# Patient Record
Sex: FEMALE | Race: WHITE | NOT HISPANIC OR LATINO | Employment: OTHER | URBAN - METROPOLITAN AREA
[De-identification: names, ages, dates, MRNs, and addresses within clinical notes are randomized per-mention and may not be internally consistent; named-entity substitution may affect disease eponyms.]

---

## 2018-01-03 ENCOUNTER — HOSPITAL ENCOUNTER (OUTPATIENT)
Dept: RADIOLOGY | Facility: HOSPITAL | Age: 68
Discharge: HOME | End: 2018-01-03
Attending: ORTHOPAEDIC SURGERY
Payer: MEDICARE

## 2018-01-03 ENCOUNTER — OFFICE VISIT (OUTPATIENT)
Dept: ORTHOPEDICS | Facility: CLINIC | Age: 68
End: 2018-01-03
Payer: MEDICARE

## 2018-01-03 VITALS — WEIGHT: 161.06 LBS | BODY MASS INDEX: 24.41 KG/M2 | HEIGHT: 68 IN

## 2018-01-03 DIAGNOSIS — M79.605 LEG PAIN, LEFT: ICD-10-CM

## 2018-01-03 DIAGNOSIS — M79.605 LEG PAIN, LEFT: Primary | ICD-10-CM

## 2018-01-03 DIAGNOSIS — M79.604 LEG PAIN, RIGHT: ICD-10-CM

## 2018-01-03 PROCEDURE — 99213 OFFICE O/P EST LOW 20 MIN: CPT | Mod: S$GLB,,, | Performed by: ORTHOPAEDIC SURGERY

## 2018-01-03 PROCEDURE — 99999 PR PBB SHADOW E&M-EST. PATIENT-LVL III: CPT | Mod: PBBFAC,,, | Performed by: ORTHOPAEDIC SURGERY

## 2018-01-03 PROCEDURE — 73552 X-RAY EXAM OF FEMUR 2/>: CPT | Mod: 26,RT,, | Performed by: RADIOLOGY

## 2018-01-03 PROCEDURE — 73552 X-RAY EXAM OF FEMUR 2/>: CPT | Mod: TC,RT

## 2018-01-03 RX ORDER — LEVOTHYROXINE SODIUM 25 UG/1
25 TABLET ORAL DAILY
COMMUNITY
Start: 2017-10-29 | End: 2019-08-06 | Stop reason: SDUPTHER

## 2018-01-03 RX ORDER — HYDROCODONE BITARTRATE AND ACETAMINOPHEN 5; 325 MG/1; MG/1
TABLET ORAL
COMMUNITY
Start: 2017-11-01 | End: 2020-01-22 | Stop reason: SDUPTHER

## 2018-01-03 RX ORDER — PROPAFENONE HYDROCHLORIDE 150 MG/1
150 TABLET, COATED ORAL 4 TIMES DAILY
Status: ON HOLD | COMMUNITY
Start: 2017-12-18 | End: 2019-08-15 | Stop reason: HOSPADM

## 2018-01-03 RX ORDER — TALC
5 POWDER (GRAM) TOPICAL NIGHTLY
COMMUNITY
End: 2019-07-12

## 2018-01-03 RX ORDER — RIVAROXABAN 20 MG/1
20 TABLET, FILM COATED ORAL DAILY
COMMUNITY
Start: 2017-12-11 | End: 2018-12-05

## 2018-01-03 RX ORDER — METOPROLOL SUCCINATE 25 MG/1
12.5 TABLET, EXTENDED RELEASE ORAL DAILY
COMMUNITY
Start: 2017-12-11 | End: 2019-07-12

## 2018-01-03 RX ORDER — ACYCLOVIR 400 MG/1
TABLET ORAL
COMMUNITY
End: 2019-07-12

## 2018-01-03 RX ORDER — DOCUSATE SODIUM 250 MG
250 CAPSULE ORAL 2 TIMES DAILY
COMMUNITY

## 2018-01-03 NOTE — PROGRESS NOTES
CHIEF COMPLAINT: Right  Knee pain.                                                          HISTORY OF PRESENT ILLNESS:  The patient is a 67 y.o. female  who presents  for re-evaluation of her right knee pain. 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  She subsequently had this revised to a distal femoral replacing knee replacement in [facility] in 2007.  She apparently had an infection treated in 2009.  She denies significant pain and denies swelling.      She  presents for further treatment recommendations.    She denies radicular pain or low back pain.  She denies distal paresthesias, lower extremity edema, or calf pain concerning for vascular claudication.  She has no history of discreet prior trauma.                                                                                                                       PAST MEDICAL HISTORY:    Past Medical History:   Diagnosis Date    Atrial fibrillation 09/2017    Diverticulosis 1998    Hx of atrial flutter     Hx of mitral valve prolapse 1990    Migraines                                                                                                             PAST SURGICAL HISTORY:    Past Surgical History:   Procedure Laterality Date    HYSTERECTOMY      KNEE ARTHROPLASTY Right 2007    revison in 2009    KNEE SURGERY Right 1989    wide resection of right distal femur with graft                                                                                                               SOCIAL HISTORY:  Reviewed per EPIC history for tobacco or alcohol use and she   is an active  67 y.o.  female                                                                             FAMILY MEDICAL HISTORY:  family history is not on file.                                                                                                                                                     PHYSICAL EXAMINATION:                                                         GENERAL:  A well-developed, well-nourished 67 y.o. female who is alert and       oriented in no acute distress.      Gait: She  walks with a slight limp.                   EXTREMITIES:  Examination of lower extremities reveals there is no visible mass or deformity.      Right knee:  ROM 0-115    Well healed incision    Ligamentously stable to varus/valgus stress.    Anterior and posterior drawers negative.    No pain over pes bursa.    No warmth    No erythema    Effusion No    She has no calf tenderness to palpation nor edema.    AP and lateral radiographs of the right femur were ordered and personally reviewed with the patient today.                                                                                 IMPRESSION: S/P right distal femoral replacing total knee.    These show a stable prosthesis.                     PLAN:  Dental prophylaxsis discussed.  F/U in 1 year for repeat x-rays.

## 2018-03-15 ENCOUNTER — TELEPHONE (OUTPATIENT)
Dept: NEUROLOGY | Facility: CLINIC | Age: 68
End: 2018-03-15

## 2018-03-15 NOTE — TELEPHONE ENCOUNTER
----- Message from Kathryn Martinez RN sent at 3/15/2018 10:51 AM CDT -----  Contact: pt @ [phone]      ----- Message -----  From: Charity Gordon  Sent: 3/15/2018   9:55 AM  To: Farida Talamantes Staff    Patient is calling to schedule an appt with Dr. Iqbal.Says she has called several times. Please call.

## 2018-03-26 ENCOUNTER — OFFICE VISIT (OUTPATIENT)
Dept: NEUROLOGY | Facility: CLINIC | Age: 68
End: 2018-03-26
Payer: MEDICARE

## 2018-03-26 VITALS
BODY MASS INDEX: 24.32 KG/M2 | WEIGHT: 160.5 LBS | SYSTOLIC BLOOD PRESSURE: 105 MMHG | HEART RATE: 69 BPM | DIASTOLIC BLOOD PRESSURE: 69 MMHG | HEIGHT: 68 IN

## 2018-03-26 DIAGNOSIS — M79.18 MYOFASCIAL PAIN: ICD-10-CM

## 2018-03-26 DIAGNOSIS — R06.83 SNORING: ICD-10-CM

## 2018-03-26 DIAGNOSIS — G44.229 CHRONIC TENSION-TYPE HEADACHE, NOT INTRACTABLE: ICD-10-CM

## 2018-03-26 DIAGNOSIS — R51.9 CHRONIC DAILY HEADACHE: Primary | ICD-10-CM

## 2018-03-26 DIAGNOSIS — G44.86 CERVICOGENIC HEADACHE: ICD-10-CM

## 2018-03-26 DIAGNOSIS — G47.9 TROUBLE IN SLEEPING: ICD-10-CM

## 2018-03-26 PROCEDURE — 99205 OFFICE O/P NEW HI 60 MIN: CPT | Mod: S$GLB,,, | Performed by: NURSE PRACTITIONER

## 2018-03-26 PROCEDURE — 99999 PR PBB SHADOW E&M-EST. PATIENT-LVL III: CPT | Mod: PBBFAC,,, | Performed by: NURSE PRACTITIONER

## 2018-03-26 RX ORDER — BACLOFEN 10 MG/1
TABLET ORAL
Qty: 90 TABLET | Refills: 2 | Status: SHIPPED | OUTPATIENT
Start: 2018-03-26 | End: 2019-07-12

## 2018-03-26 RX ORDER — PREDNISONE 20 MG/1
TABLET ORAL
Qty: 12 TABLET | Refills: 0 | Status: SHIPPED | OUTPATIENT
Start: 2018-03-26 | End: 2018-04-30 | Stop reason: ALTCHOICE

## 2018-03-26 NOTE — PROGRESS NOTES
"SUBJECTIVE:  Patient ID: Zoë Enrique   MRN: [id number]  Referred By: Aaareferral Self  Chief Complaint: Consult    History of Present Illness:   68 y.o. female with migraines, A-fib, diverticulosis, MVP, hypothyroidism, and hx of osteosarcoma s/p right distal femur removal, who presents to clinic alone for evaluation of headaches.   She has suffered with headaches and migraines since she was in her twenties, though her headaches became more prominent in the early 90's.  She has been under the care of Dr. Wong, Neurologist in [place] for the last 18 years, she wants to re-establish care in [place] as she recently moved here.      Headaches are described as a "vice" or pressure, located across her forehead, encompassing her temples and throughout her cheeks and upper teeth.  In the past, headaches would come and go intermittently, however since the end of February, Zoë reports experiencing a headache all, day everyday.  Headaches gradually became all day, everyday.  Prior to this, she would experience a daily morning headache which would typically resolve after her morning cup of coffee, however with time, headache began to persist for longer and now is present all day, everyday.   Pain can be rated anywhere from a 1-5 out of 10, she reports pain gets to a 5 out of 10 a few days per week.  During this time, she would only have to treat a headache once every 2-4 weeks as more often than not the headache would resolve once she got her day going and/or drank a cup of coffee.  She denies presence of nausea, vomiting, photo/phonophobia, positional component, changes in her vision, confusion, weakness, bowel/bladder dysfunction, tinnitus.  Headaches are not worsened by movement/activity, Zoë reports exercise actually makes the pain of her headache lessen.  Around late February/early March, she noticed she was using OTC medications far too frequently, she reports treating her headaches with Excedrin daily, for " about 10-14 days.  She has been counseled on medication overuse in the past and thus stopped taking daily Excedrin.  She denies any change in the headaches since d/c excedrin.  In the past she has been treated with Lyrica, which greatly decreased the frequency of her headaches.  Acutely she would treat her headaches with Fioricet, Norco or Excedrin.  If these therapies were ineffective, she would get a prednisone taper to break up the headache.  She has also tried sumatriptan which was ineffective.  She has seen her eye doctor since her headaches began to worse, she had a dilated exam and was told it was normal.    Secondarily, she reports she has difficulty sleeping at night. She had a blepharoplasty done years ago and her  tells her she sleeps with her eyes open, since the blepharoplasty she has also chronically suffered with dry eyes.  She thinks she may be squinting at night and not allowing her muscles to fully relax.  She feels some of her headaches are caused by this.  She also knows she clenches her jaw and teeth both at night as well as during the day time, though she does not feel she is under an increased amount of stress which would correlate with why she is clenching her jaw so often, she also feels this is contributing to her headaches.  She has limited her caffeine intake to 1 cup of coffee per day, no change in headaches with decreased caffeine intake.  She also stopped reading to see if that would help, as she was spending a lot of time on the computer, has since stopped, no change in the headaches.    Headaches are the same as they have always been, just occurring more frequently, lasting longer.  She has had imaging done in the past, all of which was negative.   Head Trauma? Infection? Fever? Cancer? <-- denies   Recent Changes - January she began working as a , but reports she is not stressed, she does not work many days    Sleep - she has trouble staying asleep, wakes up 1-2  times per night to go to the bathroom, she does not feel rested in the morning, this is a chronic problem,  does report she snores (she sleeps in separate room from )   Positives in bold: Hx of Kidney Stones, asthma, GI bleed, osteoporosis, CAD/MI, CVA/TIA, DM <-- otherwise she denies     Treatments Tried and Response  Lyrica - helped   Ice - no   Heat - decreased the intensity of her headaches   Excedrin - lost efficacy   Fioricet - helped in the past  Norco - helps   Prednisone - helped in past, retried today   Baclofen - given today   Topical cream - given today     Social History  Alcohol - yes, very rarely now due to headaches, prior to this scoth 1-2 times per week   Smoke - denies   Recreational Drug Use- denies     Current Medications:    acyclovir (ZOVIRAX) 400 MG tablet, Take by mouth as needed. Three times daily PRN, Disp: , Rfl:     AMOXICILLIN ORAL, Take by mouth. 3 gms one hr prior plus 1 gm six hrs after for dental procedure, Disp: , Rfl:     CALCIUM CARB/VIT D3/MINERALS (CALTRATE PLUS ORAL), Take by mouth. Takes two chews daily, Disp: , Rfl:     docusate sodium (COLACE) 250 MG capsule, Take 250 mg by mouth once daily., Disp: , Rfl:     hydrocodone-acetaminophen 5-325mg (NORCO) 5-325 mg per tablet, Pt states takes PRN for headaches, Disp: , Rfl:     levothyroxine (SYNTHROID) 25 MCG tablet, 25 mcg once daily. , Disp: , Rfl:     melatonin 3 mg Tab, Take by mouth every evening., Disp: , Rfl:     metoprolol succinate (TOPROL-XL) 25 MG 24 hr tablet, 12.5 mg once daily. Pt states takes only half daily (12.5), Disp: , Rfl:     propafenone (RHTHYMOL) 150 MG Tab, 150 mg 2 (two) times daily. , Disp: , Rfl:     VIT A/VIT C/VIT E/ZINC/COPPER (ICAPS AREDS ORAL), Take by mouth 2 (two) times daily., Disp: , Rfl:     XARELTO 20 mg Tab, 20 mg once daily. , Disp: , Rfl:     baclofen (LIORESAL) 10 MG tablet, 1/2 tab 1-2 times per day as needed for muscle tightness, 1 tab nightly., Disp: 90  "tablet, Rfl: 2    hydrOXYzine pamoate (VISTARIL) 25 MG Cap, Take 1 capsule every 8 hours for 3 days. No alcohol or driving with medication.  May use the rest as needed to help with sleep., Disp: 15 capsule, Rfl: 0    predniSONE (DELTASONE) 20 MG tablet, 3 tabs in Am x 2 days, then 2 tabs in AM x 2 days, then 1 tab in AM x 2 days.  Take with food., Disp: 12 tablet, Rfl: 0    UNABLE TO FIND, Diclofenac 0.15%, Lidocaine 2.25%, Prilocaine 2.25% in Compounded Topical Cream to be applied up to 5x/day as needed for headaches., Disp: , Rfl:     Review of Systems - as per HPI, otherwise a balanced 10 systems review is negative.    OBJECTIVE:  Vitals:  /69   Pulse 69   Ht 5' 8" (1.727 m)   Wt 72.8 kg (160 lb 7.9 oz)   BMI 24.40 kg/m²     Physical Exam   Constitutional: She appears well-developed and well-nourished. She is well groomed. NAD.  Anxious   HENT:    Head: Normocephalic and atraumatic, oral and nasal mucosa intact.  Frontalis was NTTP, temporalis was NTTP (reports applying pressure over temples gives her relief)   Eyes: Conjunctivae and EOM are normal. Pupils are equal, round, and reactive to light   Neck: Neck supple. Occiput TTP bilaterally and trapezius TTP bilaterally.  Traps tight bilaterally.   DROM of neck in all directions.    Musculoskeletal: Normal range of motion. No joint stiffness. No vertebral point tenderness.  Skin: Skin is warm and dry.  Psychiatric: Normal mood and affect.     Neuro exam:    Mental status:  The patient is awake, alert, and oriented to person, place and time.  Language is intact and fluent  Remote and recent memory are intact  Normal attention and concentration  Mood is stable    Cranial Nerves:  Fundoscopic examination does not reveal any occult papilledema.    Pupils are equal and reactive to light.    Extraocular movements are intact and without nystagmus.    Visual fields are full to confrontation testing.   Facial movement is symmetric.  Facial sensation is intact. "    Hearing is intact to finger rub   Uvula in midline. Oral crowding. Tongue in midline without fasciculation.   Shoulder shrug symmetrical.    Coordination:     Finger to nose - normal and symmetric bilaterally   Heel to shin test - normal and symmetric bilaterally     Motor:  Normal muscle bulk and symmetry. No fasciculations were noted.   Tremor not apparent   Pronator drift not apparent.    strength was strong and symmetric   Finger extension strength was strong and symmetric  RUE:appropriate against gravity and medium force as tested 5/5  LUE: appropriate against gravity and medium force as tested 5/5  RLE:appropriate against gravity and medium force as tested 5/5              LLE: appropriate against gravity and medium force as tested 5/5    Reflexes:  Right Brachioradialis 2+  Left Brachioradialis 2+  Right Biceps 2+  Left Biceps 2+  Right Patellar2+  Left Patellar 2+                          Plantar flexion bilat   Torres was negative bilaterally      Sensory:  RUE  intact light touch  LUE intact light touch    RLE intact light touch  LLE intact light touch    Gait:   Romberg - mild sway from weakness in RLE   Normal gait  Tandem, Heel, and Toe Walk - able to perform without difficulty     Review of Data:   Notes from Dr. Wong reviewed     Note: I have independently reviewed any/all imaging/labs/tests and agree with the report (s) as documented.  Any discrepancies will be as noted/demarcated by free text.  YULISSA, GILBERTOP-C 3/26/2018    ASSESSMENT:  1. Chronic daily headache    2. Myofascial pain    3. Trouble in sleeping    4. Chronic tension-type headache, not intractable    5. Snoring    6. Cervicogenic headache      PLAN:  - To help break up headache cycle - 6 day Prednisone taper - 60 mg x 2 days, 40 mg x 2 days, 20 mg x 2 days. Take in AM with food.   - To ease muscle tension and decrease jaw clenching - start Baclofen 10 mg, encouraged her to try 1/2 tab AM and noon and 1 tab nightly x 1 week, if she  does not find 5 mg Baclofen too sedating, she can increase dose to 10 mg TID   - To help with acute headaches - given compounded topical cream to be applied up to 5x/day as needed for headaches   - She has Norco available if needed   - Strongly advised she decrease use of Norco to avoid rebound headache   - Future treatment considerations include - restarting Lyrica or Gabapentin vs Topiramate vs Effexor or Elavil   - May consider referral to PT vs sleep medicine in future   - Start tracking headaches via [facility] on phone   - RTC in 1 month or sooner if needed      Orders Placed This Encounter    baclofen (LIORESAL) 10 MG tablet    predniSONE (DELTASONE) 20 MG tablet    UNABLE TO FIND     I have discussed realistic goals of care with patient at length as well as medication options, and need for lifestyle adjustment. I have explained that treatment will take time. We have agreed that the goal will be to reduce frequency/intensity/quantity of HA, not to be completely HA free. I have explained my non narcotic policy regarding headache treatment.    Patient to track frequency of headaches.  Patient agreeable to work on lifestyle adjustments.    I spent 65 minutes face-to-face with the patient with >50% of the time spent with counseling and education regarding:  - results of data, diagnosis, and recommendations stated above  - risks, benefits, and potential side effects of baclofen and prednisone discussed   - alternative treatment options including topiramate vs gabapentin or lyrica offered   - importance of healthy diet, regular exercise and sleep hygiene in the treatment of headaches      Questions and concerns were sought and answered to the patient's stated verbal satisfaction.  The patient verbalizes understanding and agreement with the above stated treatment plan.     CC: MD Karlee Padgett, FNP-C  [facility]  [phone]    Dr. Iqbal was available during  today's encounter.

## 2018-04-02 ENCOUNTER — TELEPHONE (OUTPATIENT)
Dept: NEUROLOGY | Facility: CLINIC | Age: 68
End: 2018-04-02

## 2018-04-02 RX ORDER — HYDROXYZINE PAMOATE 25 MG/1
CAPSULE ORAL
Qty: 15 CAPSULE | Refills: 0 | Status: SHIPPED | OUTPATIENT
Start: 2018-04-02 | End: 2019-07-12

## 2018-04-02 NOTE — TELEPHONE ENCOUNTER
Call returned/Spoke with Mrs. Enrique-    She states that her headaches are worsening, when she saw MONIKA MACIASP-JOHN last week she was given prednisone, a muscle relaxer and a pain cream. She's been having daily headaches and the only thing that's helping is Norco (until it wears off).     Jacquelyn,    She states that nothing you prescribed is helping an she would like a call back with recommendations.

## 2018-04-02 NOTE — TELEPHONE ENCOUNTER
----- Message from Nathaly Alexandre sent at 4/2/2018 10:17 AM CDT -----  Contact: Pt. [phone]  The patient would like to speak to someone regarding her headaches. She's in extreme pain and the medication isn't working. Please contact the patient to discuss further.

## 2018-04-02 NOTE — TELEPHONE ENCOUNTER
Spoke with patient who states she did not get any relief from the prednisone taper.  She is only getting relief from Norco which she is taking 2-3 times per day for the past few weeks.  Checked [place] , new prescription was filled on 3/31 for Norco #30 tabs.    Offered to give her a 3 day course of vistaril.  If ineffective, will have her come back to clinic to discuss alternative treatment options or to discuss nerve blocks.

## 2018-04-05 ENCOUNTER — TELEPHONE (OUTPATIENT)
Dept: NEUROLOGY | Facility: CLINIC | Age: 68
End: 2018-04-05

## 2018-04-05 NOTE — TELEPHONE ENCOUNTER
Called and spoke to Patient.She states that she was placed on  Prednisone and it was ineffective in controlling her headache-and earlier this week was placed on Vistaril.She states this did not work.She rates pain at a 7 on scale.She states she has not taken anything since Sunday as she was instructed concerning rebound headaches.No associated nausea,vomiting,or other symptoms voiced.Will inform Jacquelyn Argueta of Patient's symptoms.

## 2018-04-06 ENCOUNTER — OFFICE VISIT (OUTPATIENT)
Dept: NEUROLOGY | Facility: CLINIC | Age: 68
End: 2018-04-06
Payer: MEDICARE

## 2018-04-06 VITALS
HEART RATE: 66 BPM | WEIGHT: 160.94 LBS | HEIGHT: 68 IN | SYSTOLIC BLOOD PRESSURE: 99 MMHG | BODY MASS INDEX: 24.39 KG/M2 | DIASTOLIC BLOOD PRESSURE: 66 MMHG

## 2018-04-06 DIAGNOSIS — M54.81 BILATERAL OCCIPITAL NEURALGIA: ICD-10-CM

## 2018-04-06 DIAGNOSIS — G51.8 FACIAL NEURALGIA: ICD-10-CM

## 2018-04-06 DIAGNOSIS — R51.9 CHRONIC INTRACTABLE HEADACHE, UNSPECIFIED HEADACHE TYPE: Primary | ICD-10-CM

## 2018-04-06 DIAGNOSIS — G89.29 CHRONIC INTRACTABLE HEADACHE, UNSPECIFIED HEADACHE TYPE: Primary | ICD-10-CM

## 2018-04-06 PROCEDURE — 64400 NJX AA&/STRD TRIGEMINAL NRV: CPT | Mod: 50,51,S$GLB, | Performed by: NURSE PRACTITIONER

## 2018-04-06 PROCEDURE — 64405 NJX AA&/STRD GR OCPL NRV: CPT | Mod: 50,S$GLB,, | Performed by: NURSE PRACTITIONER

## 2018-04-06 PROCEDURE — 99213 OFFICE O/P EST LOW 20 MIN: CPT | Mod: 25,S$GLB,, | Performed by: NURSE PRACTITIONER

## 2018-04-06 PROCEDURE — 99999 PR PBB SHADOW E&M-EST. PATIENT-LVL III: CPT | Mod: PBBFAC,,, | Performed by: NURSE PRACTITIONER

## 2018-04-06 RX ORDER — METHYLPREDNISOLONE ACETATE 40 MG/ML
40 INJECTION, SUSPENSION INTRA-ARTICULAR; INTRALESIONAL; INTRAMUSCULAR; SOFT TISSUE
Status: COMPLETED | OUTPATIENT
Start: 2018-04-06 | End: 2018-04-06

## 2018-04-06 RX ORDER — PREGABALIN 75 MG/1
75 CAPSULE ORAL 2 TIMES DAILY
Qty: 60 CAPSULE | Refills: 2 | Status: SHIPPED | OUTPATIENT
Start: 2018-04-06 | End: 2018-04-30 | Stop reason: SDUPTHER

## 2018-04-06 RX ORDER — LIDOCAINE HYDROCHLORIDE 20 MG/ML
6 INJECTION, SOLUTION INFILTRATION; PERINEURAL
Status: COMPLETED | OUTPATIENT
Start: 2018-04-06 | End: 2018-04-06

## 2018-04-06 RX ADMIN — LIDOCAINE HYDROCHLORIDE 6 ML: 20 INJECTION, SOLUTION INFILTRATION; PERINEURAL at 12:04

## 2018-04-06 RX ADMIN — METHYLPREDNISOLONE ACETATE 40 MG: 40 INJECTION, SUSPENSION INTRA-ARTICULAR; INTRALESIONAL; INTRAMUSCULAR; SOFT TISSUE at 12:04

## 2018-04-06 NOTE — TELEPHONE ENCOUNTER
----- Message from Varinder Sullivan sent at 4/6/2018  9:26 AM CDT -----  Contact: Patient @ [phone]  Patient is requesting a return call about the approval through the insurance for  the nerve block, pls call to advise

## 2018-04-06 NOTE — TELEPHONE ENCOUNTER
Spoke to Patient and informed her that Jacquelyn would like to see her today at 11.Patient is agreeable to this.

## 2018-04-06 NOTE — PROGRESS NOTES
Established Patient   SUBJECTIVE:  Patient ID: Zoë Enrique   Chief Complaint: Headache and Follow-up    History of Present Illness:  Zoë Enrique is a 68 y.o. female who presents to clinic with her friend for follow-up of headaches.     Recommendations made at last Office Visit on 3/26/2018:  - To help break up headache cycle - 6 day Prednisone taper - 60 mg x 2 days, 40 mg x 2 days, 20 mg x 2 days. Take in AM with food.   - To ease muscle tension and decrease jaw clenching - start Baclofen 10 mg, encouraged her to try 1/2 tab AM and noon and 1 tab nightly x 1 week, if she does not find 5 mg Baclofen too sedating, she can increase dose to 10 mg TID   - To help with acute headaches - given compounded topical cream to be applied up to 5x/day as needed for headaches   - She has Norco available if needed   - Strongly advised she decrease use of Norco to avoid rebound headache   - Future treatment considerations include - restarting Lyrica or Gabapentin vs Topiramate vs Effexor or Elavil   - May consider referral to PT vs sleep medicine in future   - Start tracking headaches via [facility] on phone   - RTC in 1 month or sooner if needed      04/06/2018 - Interval History:  She completed prednisone pulse, however she did not experience any relief from prednisone.  She was also started on a 3 day course of vistaril, but has yet to experience any relief from vistaril either.  Headaches continue to be located throughout her forehead and cheeks.  She would like to try nerve blocks to see if she can get any relief from this headache.  She is also interested in restarting Lyrica, as she experienced significant relief from Lyrica in the past.     Otherwise, information below is still accurate and current.     Initial DUBOSE HPI:  68 y.o. female with migraines, A-fib, diverticulosis, MVP, hypothyroidism, and hx of osteosarcoma s/p right distal femur removal, who presents to clinic alone for evaluation of headaches.   She  "has suffered with headaches and migraines since she was in her twenties, though her headaches became more prominent in the early 90's.  She has been under the care of Dr. Wong, Neurologist in [place] for the last 18 years, she wants to re-establish care in [place] as she recently moved here.    Headaches are described as a "vice" or pressure, located across her forehead, encompassing her temples and throughout her cheeks and upper teeth.  In the past, headaches would come and go intermittently, however since the end of February, Zoë reports experiencing a headache all, day everyday.  Headaches gradually became all day, everyday.  Prior to this, she would experience a daily morning headache which would typically resolve after her morning cup of coffee, however with time, headache began to persist for longer and now is present all day, everyday.   Pain can be rated anywhere from a 1-5 out of 10, she reports pain gets to a 5 out of 10 a few days per week.  During this time, she would only have to treat a headache once every 2-4 weeks as more often than not the headache would resolve once she got her day going and/or drank a cup of coffee.  She denies presence of nausea, vomiting, photo/phonophobia, positional component, changes in her vision, confusion, weakness, bowel/bladder dysfunction, tinnitus.  Headaches are not worsened by movement/activity, Zoë reports exercise actually makes the pain of her headache lessen.  Around late February/early March, she noticed she was using OTC medications far too frequently, she reports treating her headaches with Excedrin daily, for about 10-14 days.  She has been counseled on medication overuse in the past and thus stopped taking daily Excedrin.  She denies any change in the headaches since d/c excedrin.  In the past she has been treated with Lyrica, which greatly decreased the frequency of her headaches.  Acutely she would treat her headaches with Fioricet, Norco or " Excedrin.  If these therapies were ineffective, she would get a prednisone taper to break up the headache.  She has also tried sumatriptan which was ineffective.  She has seen her eye doctor since her headaches began to worse, she had a dilated exam and was told it was normal.    Secondarily, she reports she has difficulty sleeping at night. She had a blepharoplasty done years ago and her  tells her she sleeps with her eyes open, since the blepharoplasty she has also chronically suffered with dry eyes.  She thinks she may be squinting at night and not allowing her muscles to fully relax.  She feels some of her headaches are caused by this.  She also knows she clenches her jaw and teeth both at night as well as during the day time, though she does not feel she is under an increased amount of stress which would correlate with why she is clenching her jaw so often, she also feels this is contributing to her headaches.  She has limited her caffeine intake to 1 cup of coffee per day, no change in headaches with decreased caffeine intake.  She also stopped reading to see if that would help, as she was spending a lot of time on the computer, has since stopped, no change in the headaches.    Headaches are the same as they have always been, just occurring more frequently, lasting longer.  She has had imaging done in the past, all of which was negative.   Head Trauma? Infection? Fever? Cancer? <-- denies   Recent Changes - January she began working as a , but reports she is not stressed, she does not work many days    Sleep - she has trouble staying asleep, wakes up 1-2 times per night to go to the bathroom, she does not feel rested in the morning, this is a chronic problem,  does report she snores (she sleeps in separate room from )   Positives in bold: Hx of Kidney Stones, asthma, GI bleed, osteoporosis, CAD/MI, CVA/TIA, DM <-- otherwise she denies      Treatments Tried and Response  Lyrica  - helped   Ice - no   Heat - decreased the intensity of her headaches   Excedrin - lost efficacy   Fioricet - helped in the past  Norco - helps   Prednisone - no  Baclofen - no  Topical cream - no  Vistaril - no  GONB/TNB - tried today  Lyrica - given today    Current Medications:    acyclovir (ZOVIRAX) 400 MG tablet, Take by mouth as needed. Three times daily PRN, Disp: , Rfl:     baclofen (LIORESAL) 10 MG tablet, 1/2 tab 1-2 times per day as needed for muscle tightness, 1 tab nightly., Disp: 90 tablet, Rfl: 2    CALCIUM CARB/VIT D3/MINERALS (CALTRATE PLUS ORAL), Take by mouth. Takes two chews daily, Disp: , Rfl:     docusate sodium (COLACE) 250 MG capsule, Take 250 mg by mouth once daily., Disp: , Rfl:     hydrOXYzine pamoate (VISTARIL) 25 MG Cap, Take 1 capsule every 8 hours for 3 days. No alcohol or driving with medication.  May use the rest as needed to help with sleep., Disp: 15 capsule, Rfl: 0    levothyroxine (SYNTHROID) 25 MCG tablet, 25 mcg once daily. , Disp: , Rfl:     metoprolol succinate (TOPROL-XL) 25 MG 24 hr tablet, 12.5 mg once daily. Pt states takes only half daily (12.5), Disp: , Rfl:     propafenone (RHTHYMOL) 150 MG Tab, 150 mg 2 (two) times daily. , Disp: , Rfl:     VIT A/VIT C/VIT E/ZINC/COPPER (ICAPS AREDS ORAL), Take by mouth 2 (two) times daily., Disp: , Rfl:     XARELTO 20 mg Tab, 20 mg once daily. , Disp: , Rfl:     AMOXICILLIN ORAL, Take by mouth. 3 gms one hr prior plus 1 gm six hrs after for dental procedure, Disp: , Rfl:     hydrocodone-acetaminophen 5-325mg (NORCO) 5-325 mg per tablet, Pt states takes PRN for headaches, Disp: , Rfl:     melatonin 3 mg Tab, Take by mouth every evening., Disp: , Rfl:     predniSONE (DELTASONE) 20 MG tablet, 3 tabs in Am x 2 days, then 2 tabs in AM x 2 days, then 1 tab in AM x 2 days.  Take with food., Disp: 12 tablet, Rfl: 0    pregabalin (LYRICA) 75 MG capsule, Take 1 capsule (75 mg total) by mouth 2 (two) times daily., Disp: 60  "capsule, Rfl: 2    UNABLE TO FIND, Diclofenac 0.15%, Lidocaine 2.25%, Prilocaine 2.25% in Compounded Topical Cream to be applied up to 5x/day as needed for headaches., Disp: , Rfl:   No current facility-administered medications for this visit.     Review of Systems - as per HPI, otherwise a balanced 10 systems review is negative.    OBJECTIVE:  Vitals:  BP 99/66   Pulse 66   Ht 5' 8" (1.727 m)   Wt 73 kg (160 lb 15 oz)   BMI 24.47 kg/m²      Physical Exam:  Constitutional: She appears well-developed and well-nourished. She is well groomed. NAD   HENT:    Head: Normocephalic and atraumatic.  Frontalis was NTTP, temporalis was TTP bilaterally    Neck: Neck supple. Occiput and trapezius TTP bilaterally      ASSESSMENT:  1. Chronic intractable headache, unspecified headache type    2. Facial neuralgia      PLAN:  - Bilateral GONB and TNB performed in clinic (see below)  - Restart Lyrica 75 mg BID, if too expensive, will change prescription to Gabapentin   - Continue all other medications as directed   - Continue tracking headaches   - Follow up in 3 weeks     Orders Placed This Encounter    pregabalin (LYRICA) 75 MG capsule    lidocaine HCL 20 mg/ml (2%) injection 6 mL    methylPREDNISolone acetate injection 40 mg     Procedure Note:   GONB (Greater Occipital Nerve Block): After informed consent was obtained (a copy was given to office staff to scan into the EMR), the patient was asked to remain in a sitting position her head resting prone on her chest. The area was prepped using alcohol swabs. 2% lidocaine (2 mL x2 sides of neck=4 mL total) and 40 mg (1 bottle per sitex2 for total of 80 mg)depomedrol was drawn up utilizing a 21 gauge needle. The occipital trigger points were palpated utilizing latex gloves, a 27 gauge needle and aspiration occured to ensure no medication was introduced into the blood stream during the technique. The medication was delivered bilateral (all of the above was duplicated for the " opposite side) in sites 1) midway between the inion and mastoid along the occipital ridge, 2) 2 finger breaths superior lateral to the first injection and 3) 2 finger breaths superior medial to the first injection. The patient tolerated the procedure well with no active bleeding, erythema, or discharge.  The patient was assessed and allowed to leave after ten minutes.  Findings from repeat exam (10 mins after procedure) showed:  Gen: NAD  Derm: no drainage  Neuro: AOx3, EOMI, tongue in midline, FROM of all extremities, gait WNL   Pre-Procedure Pain- 7 out of 10   Post-Procedure Pain- 0 out of 10     Procedure Note:   Nerve Block ( Trigeminal Nerve/Temporal Auricular Nerve CPT: 55680): After informed consent was obtained (asked office staff to scan into EMR), the patient was asked to remain in a sitting position.The area was prepped using alcohol swabs. 2% lidocaine (2.0 cc)  was drawn up utilizing a 22 gauge needle. A 30 gauge needle was used for the procedure. Three Temperoauricular locations were palpated on the RIGHT side of the head and 0.2 ccs injected into 3 separate sites (1 near the top of the ear and 2 along the parietal region of the head). 2 supraglabellar areas were palpated on the RIGHT, approx 5-6 mm above the orbital ridge and then 5-6 mm lateral along the orbital ridge. 0.2 cc per site for a total of 0.4 cc given. This was repeated on the LEFT for a total of 1 full cc (5 injections, 0.2 cc a piece). The patient tolerated the procedure well with no active bleeding, erythema, or discharge.  The patient was assessed and allowed to leave after ten minutes.  Findings from repeat exam:  Gen: NAD  Derm: no drainage  Neuro: AOx3, VFF, EOMI,  FROM of all extremities, gait WNL   Pre-Procedure Pain - 7 out 10   Post-Procedure Pain- 0 out of 10     I spent 20 minutes face-to-face with the patient with >50% of the time spent with counseling and education regarding:  - results of data, diagnosis, and  recommendations stated above  - risks, benefits, and potential side effects of GONB, TNB, and Lyrica discussed   - alternative treatment options including gabapentin offered   - importance of healthy diet, regular exercise and sleep hygiene in the treatment of headaches     Questions and concerns were sought and answered to the patient's stated verbal satisfaction.  The patient verbalizes understanding and agreement with the above stated treatment plan.     CC: MD Karlee Padgett, P-C  [facility]   [phone]    Dr. Kong was available during today's encounter.

## 2018-04-30 ENCOUNTER — OFFICE VISIT (OUTPATIENT)
Dept: NEUROLOGY | Facility: CLINIC | Age: 68
End: 2018-04-30
Payer: MEDICARE

## 2018-04-30 VITALS — HEIGHT: 68 IN | SYSTOLIC BLOOD PRESSURE: 110 MMHG | DIASTOLIC BLOOD PRESSURE: 68 MMHG | HEART RATE: 55 BPM

## 2018-04-30 DIAGNOSIS — G51.8 FACIAL NEURALGIA: ICD-10-CM

## 2018-04-30 DIAGNOSIS — G44.229 CHRONIC TENSION-TYPE HEADACHE, NOT INTRACTABLE: Primary | ICD-10-CM

## 2018-04-30 PROCEDURE — 99213 OFFICE O/P EST LOW 20 MIN: CPT | Mod: S$GLB,,, | Performed by: NURSE PRACTITIONER

## 2018-04-30 PROCEDURE — 99999 PR PBB SHADOW E&M-EST. PATIENT-LVL III: CPT | Mod: PBBFAC,,, | Performed by: NURSE PRACTITIONER

## 2018-04-30 RX ORDER — PREGABALIN 75 MG/1
75 CAPSULE ORAL 2 TIMES DAILY
Qty: 60 CAPSULE | Refills: 2 | Status: SHIPPED | OUTPATIENT
Start: 2018-04-30 | End: 2018-05-03 | Stop reason: ALTCHOICE

## 2018-04-30 NOTE — PROGRESS NOTES
Established Patient   SUBJECTIVE:  Patient ID: Zoë Enrique   Chief Complaint: Headache and Follow-up    History of Present Illness:  Zoë Enrique is a 68 y.o. female who presents to clinic alone for follow-up of headaches.     Recommendations made at last Office Visit on 4/6/2018:  - Bilateral GONB and TNB performed in clinic (see below)  - Restart Lyrica 75 mg BID, if too expensive, will change prescription to Gabapentin   - Continue all other medications as directed   - Continue tracking headaches   - Follow up in 3 weeks     04/30/2018 - Interval History:  Headaches have been significantly better since she began taking Lyrica. Following nerve blocks, she was headache free for 5 days, after which she suffered with a 9 day headache.  Since then, she has only had two headaches over the last 10 days.  Also, when she does get a headache, intensity is lessened, and they respond better to excedrin.  On two occasions she had to take Norco, however this was during her 9 day headache.  She is not waking up with a headache any longer, though she does report feeling slightly dizzy or woozy when she wakes up which she feels is from the Lyrica, she has had this side effect in the past, these symptoms resolve within 1-2 hours.  She has been monitoring her heart rate and blood pressure regularly, she has not noticed any significant changes in her readings.  un to slack up, she has been taking Excedrin vs 1/2 tab of Norco when she gets a headache, which does abort her migraines.  She has not been taking Baclofen, though she does feel her jaw is not as tight.  Overall, she is happy with the current state of her headaches and does not feel further adjustments need to be made at this time.     Otherwise, information below is still accurate and current.     04/06/2018 - Interval History:  She completed prednisone pulse, however she did not experience any relief from prednisone.  She was also started on a 3 day course of vistaril,  "but has yet to experience any relief from vistaril either.  Headaches continue to be located throughout her forehead and cheeks.  She would like to try nerve blocks to see if she can get any relief from this headache.  She is also interested in restarting Lyrica, as she experienced significant relief from Lyrica in the past.      Initial HA HPI:  68 y.o. female with migraines, A-fib, diverticulosis, MVP, hypothyroidism, and hx of osteosarcoma s/p right distal femur removal, who presents to clinic alone for evaluation of headaches.   She has suffered with headaches and migraines since she was in her twenties, though her headaches became more prominent in the early 90's.  She has been under the care of Dr. Wong, Neurologist in [place] for the last 18 years, she wants to re-establish care in [place] as she recently moved here.    Headaches are described as a "vice" or pressure, located across her forehead, encompassing her temples and throughout her cheeks and upper teeth.  In the past, headaches would come and go intermittently, however since the end of February, Zoë reports experiencing a headache all, day everyday.  Headaches gradually became all day, everyday.  Prior to this, she would experience a daily morning headache which would typically resolve after her morning cup of coffee, however with time, headache began to persist for longer and now is present all day, everyday.   Pain can be rated anywhere from a 1-5 out of 10, she reports pain gets to a 5 out of 10 a few days per week.  During this time, she would only have to treat a headache once every 2-4 weeks as more often than not the headache would resolve once she got her day going and/or drank a cup of coffee.  She denies presence of nausea, vomiting, photo/phonophobia, positional component, changes in her vision, confusion, weakness, bowel/bladder dysfunction, tinnitus.  Headaches are not worsened by movement/activity, Zoë reports exercise " actually makes the pain of her headache lessen.  Around late February/early March, she noticed she was using OTC medications far too frequently, she reports treating her headaches with Excedrin daily, for about 10-14 days.  She has been counseled on medication overuse in the past and thus stopped taking daily Excedrin.  She denies any change in the headaches since d/c excedrin.  In the past she has been treated with Lyrica, which greatly decreased the frequency of her headaches.  Acutely she would treat her headaches with Fioricet, Norco or Excedrin.  If these therapies were ineffective, she would get a prednisone taper to break up the headache.  She has also tried sumatriptan which was ineffective.  She has seen her eye doctor since her headaches began to worse, she had a dilated exam and was told it was normal.    Secondarily, she reports she has difficulty sleeping at night. She had a blepharoplasty done years ago and her  tells her she sleeps with her eyes open, since the blepharoplasty she has also chronically suffered with dry eyes.  She thinks she may be squinting at night and not allowing her muscles to fully relax.  She feels some of her headaches are caused by this.  She also knows she clenches her jaw and teeth both at night as well as during the day time, though she does not feel she is under an increased amount of stress which would correlate with why she is clenching her jaw so often, she also feels this is contributing to her headaches.  She has limited her caffeine intake to 1 cup of coffee per day, no change in headaches with decreased caffeine intake.  She also stopped reading to see if that would help, as she was spending a lot of time on the computer, has since stopped, no change in the headaches.    Headaches are the same as they have always been, just occurring more frequently, lasting longer.  She has had imaging done in the past, all of which was negative.   Head Trauma? Infection?  Fever? Cancer? <-- denies   Recent Changes - January she began working as a , but reports she is not stressed, she does not work many days    Sleep - she has trouble staying asleep, wakes up 1-2 times per night to go to the bathroom, she does not feel rested in the morning, this is a chronic problem,  does report she snores (she sleeps in separate room from )   Positives in bold: Hx of Kidney Stones, asthma, GI bleed, osteoporosis, CAD/MI, CVA/TIA, DM <-- otherwise she denies      Treatments Tried and Response  Lyrica - helped   Ice - no   Heat - decreased the intensity of her headaches   Excedrin - lost efficacy   Fioricet - helped in the past  Norco - helps   Prednisone - no  Baclofen - no  Topical cream - no  Vistaril - no  GONB/TNB - 5 days of relief   Lyrica - helping     Current Medications:    acyclovir (ZOVIRAX) 400 MG tablet, Take by mouth as needed. Three times daily PRN, Disp: , Rfl:     baclofen (LIORESAL) 10 MG tablet, 1/2 tab 1-2 times per day as needed for muscle tightness, 1 tab nightly., Disp: 90 tablet, Rfl: 2    CALCIUM CARB/VIT D3/MINERALS (CALTRATE PLUS ORAL), Take by mouth. Takes two chews daily, Disp: , Rfl:     docusate sodium (COLACE) 250 MG capsule, Take 250 mg by mouth once daily., Disp: , Rfl:     levothyroxine (SYNTHROID) 25 MCG tablet, 25 mcg once daily. , Disp: , Rfl:     melatonin 3 mg Tab, Take by mouth every evening., Disp: , Rfl:     metoprolol succinate (TOPROL-XL) 25 MG 24 hr tablet, 12.5 mg once daily. Pt states takes only half daily (12.5), Disp: , Rfl:     pregabalin (LYRICA) 75 MG capsule, Take 1 capsule (75 mg total) by mouth 2 (two) times daily., Disp: 60 capsule, Rfl: 2    propafenone (RHTHYMOL) 150 MG Tab, 150 mg 3 (three) times daily. , Disp: , Rfl:     VIT A/VIT C/VIT E/ZINC/COPPER (ICAPS AREDS ORAL), Take by mouth 2 (two) times daily., Disp: , Rfl:     XARELTO 20 mg Tab, 20 mg once daily. , Disp: , Rfl:     AMOXICILLIN ORAL, Take  "by mouth. 3 gms one hr prior plus 1 gm six hrs after for dental procedure, Disp: , Rfl:     hydrocodone-acetaminophen 5-325mg (NORCO) 5-325 mg per tablet, Pt states takes PRN for headaches, Disp: , Rfl:     hydrOXYzine pamoate (VISTARIL) 25 MG Cap, Take 1 capsule every 8 hours for 3 days. No alcohol or driving with medication.  May use the rest as needed to help with sleep., Disp: 15 capsule, Rfl: 0    UNABLE TO FIND, Diclofenac 0.15%, Lidocaine 2.25%, Prilocaine 2.25% in Compounded Topical Cream to be applied up to 5x/day as needed for headaches., Disp: , Rfl:     Review of Systems - as per HPI, otherwise a balanced 10 systems review is negative.    OBJECTIVE:  Vitals:  /68 (BP Location: Left arm, Patient Position: Sitting, BP Method: Large (Automatic))   Pulse (!) 55   Ht 5' 8" (1.727 m)      Physical Exam:  Constitutional: She appears well-developed and well-nourished. She is well groomed. NAD   HENT:    Head: Normocephalic and atraumatic    ASSESSMENT:  1. Chronic tension-type headache, not intractable    2. Facial neuralgia      PLAN:  - Continue Lyrica 75 mg BID   - For acute headaches, uses Excedrin migraine vs Norco depending on intensity of headaches   - 3 month refill provided for Lyrica   - Continue tracking headaches   - Discussed goals of therapy are to decrease the frequency, intensity, and duration of headaches  - Follow up in 3 months or sooner if needed      Orders Placed This Encounter    pregabalin (LYRICA) 75 MG capsule     Counseling:  I spent 15 minutes face-to-face with the patient with > 50% of the time spent counseling and educating regarding the results of the data, diagnosis, and recommendations stated above, the risks, benefits, and potential side effects of the medications, and the future plan of care.  Questions and concerns were sought and answered to the patient's stated verbal satisfaction.  The patient verbalizes understanding and agreement with the above stated " treatment plan.     CC: MD Karlee Padgett, FNP-C  [facility]   [phone]    Dr. Iqbal was available during today's encounter.

## 2018-05-02 ENCOUNTER — PATIENT MESSAGE (OUTPATIENT)
Dept: NEUROLOGY | Facility: CLINIC | Age: 68
End: 2018-05-02

## 2018-05-03 ENCOUNTER — PATIENT MESSAGE (OUTPATIENT)
Dept: NEUROLOGY | Facility: CLINIC | Age: 68
End: 2018-05-03

## 2018-05-03 RX ORDER — GABAPENTIN 300 MG/1
300 CAPSULE ORAL 3 TIMES DAILY
Qty: 90 CAPSULE | Refills: 5 | Status: SHIPPED | OUTPATIENT
Start: 2018-05-03 | End: 2018-11-20 | Stop reason: SINTOL

## 2018-06-14 ENCOUNTER — PATIENT MESSAGE (OUTPATIENT)
Dept: NEUROLOGY | Facility: CLINIC | Age: 68
End: 2018-06-14

## 2018-07-05 ENCOUNTER — PATIENT MESSAGE (OUTPATIENT)
Dept: NEUROLOGY | Facility: CLINIC | Age: 68
End: 2018-07-05

## 2018-07-09 RX ORDER — GABAPENTIN 100 MG/1
CAPSULE ORAL
Qty: 60 CAPSULE | Refills: 2 | Status: SHIPPED | OUTPATIENT
Start: 2018-07-09 | End: 2018-11-20 | Stop reason: SINTOL

## 2018-07-23 ENCOUNTER — OFFICE VISIT (OUTPATIENT)
Dept: NEUROLOGY | Facility: CLINIC | Age: 68
End: 2018-07-23
Payer: MEDICARE

## 2018-07-23 VITALS
DIASTOLIC BLOOD PRESSURE: 66 MMHG | BODY MASS INDEX: 24.09 KG/M2 | HEART RATE: 77 BPM | WEIGHT: 158.94 LBS | SYSTOLIC BLOOD PRESSURE: 101 MMHG | HEIGHT: 68 IN

## 2018-07-23 DIAGNOSIS — G51.8 FACIAL NEURALGIA: ICD-10-CM

## 2018-07-23 DIAGNOSIS — G44.229 CHRONIC TENSION-TYPE HEADACHE, NOT INTRACTABLE: Primary | ICD-10-CM

## 2018-07-23 PROCEDURE — 99213 OFFICE O/P EST LOW 20 MIN: CPT | Mod: S$GLB,,, | Performed by: NURSE PRACTITIONER

## 2018-07-23 PROCEDURE — 99999 PR PBB SHADOW E&M-EST. PATIENT-LVL III: CPT | Mod: PBBFAC,,, | Performed by: NURSE PRACTITIONER

## 2018-07-23 NOTE — PROGRESS NOTES
"Established Patient   SUBJECTIVE:  Patient ID: Zoë Enrique   Chief Complaint: Follow-up    History of Present Illness:  Zoë Enrique is a 68 y.o. female who presents to clinic alone for follow-up of headaches.     Recommendations made at last Office Visit on 4/30/2018:  - Continue Lyrica 75 mg BID   - For acute headaches, uses Excedrin migraine vs Norco depending on intensity of headaches   - 3 month refill provided for Lyrica   - Continue tracking headaches   - Discussed goals of therapy are to decrease the frequency, intensity, and duration of headaches  - Follow up in 3 months or sooner if needed       07/23/2018 - Interval History:  Message sent to clinic via patient portal requesting to change Lyrica to Gabapentin due to cost of Lyrica, she was changed to Gabapentin 300 mg nightly with plan to titrate dose to 300 mg TID, however when she started 300 mg AM and Qhs, she felt it was causing her to feel dizzy and lightheaded, morning dose was then changed to 100 mg capsule.  Headaches have been "great", however this morning she feels some pain in her temples and throughout her face.  She is currently taking gabapentin 300 mg nightly and 100 mg in the morning, does feel the following morning she feels groggy until about noon.  Typically takes PM dose of gabapentin between 9 and 11 PM, admits she had one day where she woke up feeling "amazing" had a lot of energy and did not feel groggy at all.  Facial pain and headaches are largely resolved with gabapentin and she does wish to continue with medication.       04/30/2018 - Interval History:  Headaches have been significantly better since she began taking Lyrica. Following nerve blocks, she was headache free for 5 days, after which she suffered with a 9 day headache.  Since then, she has only had two headaches over the last 10 days.  Also, when she does get a headache, intensity is lessened, and they respond better to excedrin.  On two occasions she had to take " "Norco, however this was during her 9 day headache.  She is not waking up with a headache any longer, though she does report feeling slightly dizzy or woozy when she wakes up which she feels is from the Lyrica, she has had this side effect in the past, these symptoms resolve within 1-2 hours.  She has been monitoring her heart rate and blood pressure regularly, she has not noticed any significant changes in her readings.  un to slack up, she has been taking Excedrin vs 1/2 tab of Norco when she gets a headache, which does abort her migraines.  She has not been taking Baclofen, though she does feel her jaw is not as tight.  Overall, she is happy with the current state of her headaches and does not feel further adjustments need to be made at this time.       04/06/2018 - Interval History:  She completed prednisone pulse, however she did not experience any relief from prednisone.  She was also started on a 3 day course of vistaril, but has yet to experience any relief from vistaril either.  Headaches continue to be located throughout her forehead and cheeks.  She would like to try nerve blocks to see if she can get any relief from this headache.  She is also interested in restarting Lyrica, as she experienced significant relief from Lyrica in the past.      Initial HA HPI:  68 y.o. female with migraines, A-fib, diverticulosis, MVP, hypothyroidism, and hx of osteosarcoma s/p right distal femur removal, who presents to clinic alone for evaluation of headaches.   She has suffered with headaches and migraines since she was in her twenties, though her headaches became more prominent in the early 90's.  She has been under the care of Dr. Wong, Neurologist in [place] for the last 18 years, she wants to re-establish care in [place] as she recently moved here.    Headaches are described as a "vice" or pressure, located across her forehead, encompassing her temples and throughout her cheeks and upper teeth.  In the past, " headaches would come and go intermittently, however since the end of February, Zoë reports experiencing a headache all, day everyday.  Headaches gradually became all day, everyday.  Prior to this, she would experience a daily morning headache which would typically resolve after her morning cup of coffee, however with time, headache began to persist for longer and now is present all day, everyday.   Pain can be rated anywhere from a 1-5 out of 10, she reports pain gets to a 5 out of 10 a few days per week.  During this time, she would only have to treat a headache once every 2-4 weeks as more often than not the headache would resolve once she got her day going and/or drank a cup of coffee.  She denies presence of nausea, vomiting, photo/phonophobia, positional component, changes in her vision, confusion, weakness, bowel/bladder dysfunction, tinnitus.  Headaches are not worsened by movement/activity, Zoë reports exercise actually makes the pain of her headache lessen.  Around late February/early March, she noticed she was using OTC medications far too frequently, she reports treating her headaches with Excedrin daily, for about 10-14 days.  She has been counseled on medication overuse in the past and thus stopped taking daily Excedrin.  She denies any change in the headaches since d/c excedrin.  In the past she has been treated with Lyrica, which greatly decreased the frequency of her headaches.  Acutely she would treat her headaches with Fioricet, Norco or Excedrin.  If these therapies were ineffective, she would get a prednisone taper to break up the headache.  She has also tried sumatriptan which was ineffective.  She has seen her eye doctor since her headaches began to worse, she had a dilated exam and was told it was normal.    Secondarily, she reports she has difficulty sleeping at night. She had a blepharoplasty done years ago and her  tells her she sleeps with her eyes open, since the blepharoplasty  she has also chronically suffered with dry eyes.  She thinks she may be squinting at night and not allowing her muscles to fully relax.  She feels some of her headaches are caused by this.  She also knows she clenches her jaw and teeth both at night as well as during the day time, though she does not feel she is under an increased amount of stress which would correlate with why she is clenching her jaw so often, she also feels this is contributing to her headaches.  She has limited her caffeine intake to 1 cup of coffee per day, no change in headaches with decreased caffeine intake.  She also stopped reading to see if that would help, as she was spending a lot of time on the computer, has since stopped, no change in the headaches.    Headaches are the same as they have always been, just occurring more frequently, lasting longer.  She has had imaging done in the past, all of which was negative.   Head Trauma? Infection? Fever? Cancer? <-- denies   Recent Changes - January she began working as a , but reports she is not stressed, she does not work many days    Sleep - she has trouble staying asleep, wakes up 1-2 times per night to go to the bathroom, she does not feel rested in the morning, this is a chronic problem,  does report she snores (she sleeps in separate room from )   Positives in bold: Hx of Kidney Stones, asthma, GI bleed, osteoporosis, CAD/MI, CVA/TIA, DM <-- otherwise she denies      Treatments Tried and Response  Lyrica - helped   Ice - no   Heat - decreased the intensity of her headaches   Excedrin - lost efficacy   Fioricet - helped in the past  Norco - helps   Prednisone - no  Baclofen - no  Topical cream - no  Vistaril - no  GONB/TNB - 5 days of relief   Lyrica - helping/cost (too expensive)  Gabapentin - helping (currently taking 100 mg AM/300 mg PM)    Current Medications:    acyclovir (ZOVIRAX) 400 MG tablet, Take by mouth as needed. Three times daily PRN, Disp: , Rfl:  "    baclofen (LIORESAL) 10 MG tablet, 1/2 tab 1-2 times per day as needed for muscle tightness, 1 tab nightly., Disp: 90 tablet, Rfl: 2    CALCIUM CARB/VIT D3/MINERALS (CALTRATE PLUS ORAL), Take by mouth. Takes two chews daily, Disp: , Rfl:     docusate sodium (COLACE) 250 MG capsule, Take 250 mg by mouth once daily., Disp: , Rfl:     gabapentin (NEURONTIN) 100 MG capsule, Take 1 capsule twice daily, continue gabapentin 300 mg once daily., Disp: 60 capsule, Rfl: 2    gabapentin (NEURONTIN) 300 MG capsule, Take 1 capsule (300 mg total) by mouth 3 (three) times daily., Disp: 90 capsule, Rfl: 5    hydrocodone-acetaminophen 5-325mg (NORCO) 5-325 mg per tablet, Pt states takes PRN for headaches, Disp: , Rfl:     levothyroxine (SYNTHROID) 25 MCG tablet, 25 mcg once daily. , Disp: , Rfl:     melatonin 3 mg Tab, Take 5 mg by mouth every evening. , Disp: , Rfl:     metoprolol succinate (TOPROL-XL) 25 MG 24 hr tablet, 12.5 mg once daily. Pt states takes only half daily (12.5), Disp: , Rfl:     propafenone (RHTHYMOL) 150 MG Tab, 150 mg 4 (four) times daily. , Disp: , Rfl:     VIT A/VIT C/VIT E/ZINC/COPPER (ICAPS AREDS ORAL), Take by mouth 2 (two) times daily., Disp: , Rfl:     XARELTO 20 mg Tab, 20 mg once daily. , Disp: , Rfl:     hydrOXYzine pamoate (VISTARIL) 25 MG Cap, Take 1 capsule every 8 hours for 3 days. No alcohol or driving with medication.  May use the rest as needed to help with sleep., Disp: 15 capsule, Rfl: 0    UNABLE TO FIND, Diclofenac 0.15%, Lidocaine 2.25%, Prilocaine 2.25% in Compounded Topical Cream to be applied up to 5x/day as needed for headaches., Disp: , Rfl:     Review of Systems - as per HPI, otherwise a balanced 10 systems review is negative.    OBJECTIVE:  Vitals:  /66 (BP Location: Right arm, Patient Position: Sitting, BP Method: Large (Automatic))   Pulse 77   Ht 5' 8" (1.727 m)   Wt 72.1 kg (158 lb 15.2 oz)   BMI 24.17 kg/m²      Physical Exam:  Constitutional: She " appears well-developed and well-nourished. She is well groomed. NAD.      ASSESSMENT:  1. Chronic tension-type headache, not intractable    2. Facial neuralgia      PLAN:  - Gentle change in gabapentin from 100 mg AM/300 mg PM to 100 mg AM and 100-200 mg PM   - She will trial taking both 100 mg and 200 mg nightly to see if decreasing the dose helps with morning drowsiness and will send message via patient portal with update.    - Continue tracking headaches   - Has compounded topical cream available for acute headaches   - Follow up in 4-6 months or sooner if needed     Questions and concerns were sought and answered to the patient's stated verbal satisfaction.  The patient verbalizes understanding and agreement with the above stated treatment plan.     CC: MD Karlee Padgett, FNP-C  [facility]   [phone]    Dr. Iqbal was available during today's encounter.

## 2018-08-08 ENCOUNTER — PATIENT MESSAGE (OUTPATIENT)
Dept: NEUROLOGY | Facility: CLINIC | Age: 68
End: 2018-08-08

## 2018-08-09 ENCOUNTER — PATIENT MESSAGE (OUTPATIENT)
Dept: NEUROLOGY | Facility: CLINIC | Age: 68
End: 2018-08-09

## 2018-11-16 ENCOUNTER — PATIENT MESSAGE (OUTPATIENT)
Dept: NEUROLOGY | Facility: CLINIC | Age: 68
End: 2018-11-16

## 2018-11-20 ENCOUNTER — PATIENT MESSAGE (OUTPATIENT)
Dept: NEUROLOGY | Facility: CLINIC | Age: 68
End: 2018-11-20

## 2018-11-20 DIAGNOSIS — G44.229 CHRONIC TENSION-TYPE HEADACHE, NOT INTRACTABLE: Primary | ICD-10-CM

## 2018-11-20 RX ORDER — PREGABALIN 75 MG/1
75 CAPSULE ORAL 2 TIMES DAILY
Qty: 60 CAPSULE | Refills: 2 | Status: SHIPPED | OUTPATIENT
Start: 2018-11-20 | End: 2019-07-12

## 2018-12-05 ENCOUNTER — OFFICE VISIT (OUTPATIENT)
Dept: NEUROLOGY | Facility: CLINIC | Age: 68
End: 2018-12-05
Payer: MEDICARE

## 2018-12-05 VITALS
WEIGHT: 156.31 LBS | HEART RATE: 91 BPM | DIASTOLIC BLOOD PRESSURE: 75 MMHG | BODY MASS INDEX: 23.69 KG/M2 | HEIGHT: 68 IN | SYSTOLIC BLOOD PRESSURE: 113 MMHG

## 2018-12-05 DIAGNOSIS — I48.0 PAROXYSMAL ATRIAL FIBRILLATION: ICD-10-CM

## 2018-12-05 DIAGNOSIS — G44.229 CHRONIC TENSION-TYPE HEADACHE, NOT INTRACTABLE: Primary | ICD-10-CM

## 2018-12-05 PROCEDURE — 99999 PR PBB SHADOW E&M-EST. PATIENT-LVL III: CPT | Mod: PBBFAC,,, | Performed by: NURSE PRACTITIONER

## 2018-12-05 PROCEDURE — 1101F PT FALLS ASSESS-DOCD LE1/YR: CPT | Mod: CPTII,S$GLB,, | Performed by: NURSE PRACTITIONER

## 2018-12-05 PROCEDURE — 99214 OFFICE O/P EST MOD 30 MIN: CPT | Mod: S$GLB,,, | Performed by: NURSE PRACTITIONER

## 2018-12-05 RX ORDER — WARFARIN 7.5 MG/1
7.5 TABLET ORAL DAILY
COMMUNITY
End: 2019-07-12

## 2018-12-05 RX ORDER — WARFARIN SODIUM 5 MG/1
5 TABLET ORAL DAILY
COMMUNITY
End: 2019-07-12

## 2018-12-05 NOTE — PROGRESS NOTES
"Established Patient   SUBJECTIVE:  Patient ID: Zoë Enrique   Chief Complaint: Follow-up    History of Present Illness:  Zoë Enrique is a 68 y.o. female who presents to clinic alone for follow-up of headaches.     Recommendations made at last Office Visit on 7/23/2018:  - Gentle change in gabapentin from 100 mg AM/300 mg PM to 100 mg AM and 100-200 mg PM   - She will trial taking both 100 mg and 200 mg nightly to see if decreasing the dose helps with morning drowsiness and will send message via patient portal with update.    - Continue tracking headaches   - Has compounded topical cream available for acute headaches   - Follow up in 4-6 months or sooner if needed     12/05/2018 - Interval History:  Headaches were well controlled for sometime after changing lyrica to gabapentin, however over the past few months, she has been expeirencing increased frequency of headaches.  Additionally she has since discontinued taking gabapentin as she felt it was causing her blood pressure to become too low as well as contributing to symptoms of dizziness. She has since restarted taking Lyrica 75 mg nightly.  Additionally, she reports she has been suffering with increasing episodes of A-Fib, has been restarted on Coumadin and anti-arrhythmics.  She is scheduled for a cardiac ablation on Friday.  Also adds symptoms of dizziness and low blood pressure could be associated with A-Fib.  Headaches continue to feel the same as they always have, she denies any change in the quality or nature of her headaches.     07/23/2018 - Interval History:  Message sent to clinic via patient portal requesting to change Lyrica to Gabapentin due to cost of Lyrica, she was changed to Gabapentin 300 mg nightly with plan to titrate dose to 300 mg TID, however when she started 300 mg AM and Qhs, she felt it was causing her to feel dizzy and lightheaded, morning dose was then changed to 100 mg capsule.  Headaches have been "great", however this morning she " "feels some pain in her temples and throughout her face.  She is currently taking gabapentin 300 mg nightly and 100 mg in the morning, does feel the following morning she feels groggy until about noon.  Typically takes PM dose of gabapentin between 9 and 11 PM, admits she had one day where she woke up feeling "amazing" had a lot of energy and did not feel groggy at all.  Facial pain and headaches are largely resolved with gabapentin and she does wish to continue with medication.       04/30/2018 - Interval History:  Headaches have been significantly better since she began taking Lyrica. Following nerve blocks, she was headache free for 5 days, after which she suffered with a 9 day headache.  Since then, she has only had two headaches over the last 10 days.  Also, when she does get a headache, intensity is lessened, and they respond better to excedrin.  On two occasions she had to take Norco, however this was during her 9 day headache.  She is not waking up with a headache any longer, though she does report feeling slightly dizzy or woozy when she wakes up which she feels is from the Lyrica, she has had this side effect in the past, these symptoms resolve within 1-2 hours.  She has been monitoring her heart rate and blood pressure regularly, she has not noticed any significant changes in her readings.  un to slack up, she has been taking Excedrin vs 1/2 tab of Norco when she gets a headache, which does abort her migraines.  She has not been taking Baclofen, though she does feel her jaw is not as tight.  Overall, she is happy with the current state of her headaches and does not feel further adjustments need to be made at this time.       04/06/2018 - Interval History:  She completed prednisone pulse, however she did not experience any relief from prednisone.  She was also started on a 3 day course of vistaril, but has yet to experience any relief from vistaril either.  Headaches continue to be located throughout her " "forehead and cheeks.  She would like to try nerve blocks to see if she can get any relief from this headache.  She is also interested in restarting Lyrica, as she experienced significant relief from Lyrica in the past.      Initial HA HPI:  68 y.o. female with migraines, A-fib, diverticulosis, MVP, hypothyroidism, and hx of osteosarcoma s/p right distal femur removal, who presents to clinic alone for evaluation of headaches.   She has suffered with headaches and migraines since she was in her twenties, though her headaches became more prominent in the early 90's.  She has been under the care of Dr. Wong, Neurologist in [place] for the last 18 years, she wants to re-establish care in [place] as she recently moved here.    Headaches are described as a "vice" or pressure, located across her forehead, encompassing her temples and throughout her cheeks and upper teeth.  In the past, headaches would come and go intermittently, however since the end of February, Zoë reports experiencing a headache all, day everyday.  Headaches gradually became all day, everyday.  Prior to this, she would experience a daily morning headache which would typically resolve after her morning cup of coffee, however with time, headache began to persist for longer and now is present all day, everyday.   Pain can be rated anywhere from a 1-5 out of 10, she reports pain gets to a 5 out of 10 a few days per week.  During this time, she would only have to treat a headache once every 2-4 weeks as more often than not the headache would resolve once she got her day going and/or drank a cup of coffee.  She denies presence of nausea, vomiting, photo/phonophobia, positional component, changes in her vision, confusion, weakness, bowel/bladder dysfunction, tinnitus.  Headaches are not worsened by movement/activity, Zoë reports exercise actually makes the pain of her headache lessen.  Around late February/early March, she noticed she was using OTC " medications far too frequently, she reports treating her headaches with Excedrin daily, for about 10-14 days.  She has been counseled on medication overuse in the past and thus stopped taking daily Excedrin.  She denies any change in the headaches since d/c excedrin.  In the past she has been treated with Lyrica, which greatly decreased the frequency of her headaches.  Acutely she would treat her headaches with Fioricet, Norco or Excedrin.  If these therapies were ineffective, she would get a prednisone taper to break up the headache.  She has also tried sumatriptan which was ineffective.  She has seen her eye doctor since her headaches began to worse, she had a dilated exam and was told it was normal.    Secondarily, she reports she has difficulty sleeping at night. She had a blepharoplasty done years ago and her  tells her she sleeps with her eyes open, since the blepharoplasty she has also chronically suffered with dry eyes.  She thinks she may be squinting at night and not allowing her muscles to fully relax.  She feels some of her headaches are caused by this.  She also knows she clenches her jaw and teeth both at night as well as during the day time, though she does not feel she is under an increased amount of stress which would correlate with why she is clenching her jaw so often, she also feels this is contributing to her headaches.  She has limited her caffeine intake to 1 cup of coffee per day, no change in headaches with decreased caffeine intake.  She also stopped reading to see if that would help, as she was spending a lot of time on the computer, has since stopped, no change in the headaches.    Headaches are the same as they have always been, just occurring more frequently, lasting longer.  She has had imaging done in the past, all of which was negative.   Head Trauma? Infection? Fever? Cancer? <-- denies   Recent Changes - January she began working as a , but reports she is not  stressed, she does not work many days    Sleep - she has trouble staying asleep, wakes up 1-2 times per night to go to the bathroom, she does not feel rested in the morning, this is a chronic problem,  does report she snores (she sleeps in separate room from )   Positives in bold: Hx of Kidney Stones, asthma, GI bleed, osteoporosis, CAD/MI, CVA/TIA, DM <-- otherwise she denies      Treatments Tried and Response  Lyrica - helped   Ice - no   Heat - decreased the intensity of her headaches   Excedrin - lost efficacy   Fioricet - helped in the past  Norco - helps   Prednisone - no  Baclofen - no  Topical cream - no  Vistaril - no  GONB/TNB - 5 days of relief   Lyrica - helping/retried today   Gabapentin - helping (currently taking 100 mg AM/300 mg PM)    Current Medications:    acyclovir (ZOVIRAX) 400 MG tablet, Take by mouth as needed. Three times daily PRN, Disp: , Rfl:     CALCIUM CARB/VIT D3/MINERALS (CALTRATE PLUS ORAL), Take by mouth. Takes two chews daily, Disp: , Rfl:     docusate sodium (COLACE) 250 MG capsule, Take 250 mg by mouth once daily., Disp: , Rfl:     hydrocodone-acetaminophen 5-325mg (NORCO) 5-325 mg per tablet, Pt states takes PRN for headaches, Disp: , Rfl:     levothyroxine (SYNTHROID) 25 MCG tablet, 25 mcg once daily. , Disp: , Rfl:     melatonin 3 mg Tab, Take 5 mg by mouth every evening. , Disp: , Rfl:     metoprolol succinate (TOPROL-XL) 25 MG 24 hr tablet, 12.5 mg once daily. Pt states takes only half daily (12.5), Disp: , Rfl:     propafenone (RHTHYMOL) 150 MG Tab, 150 mg 4 (four) times daily. , Disp: , Rfl:     UNABLE TO FIND, Diclofenac 0.15%, Lidocaine 2.25%, Prilocaine 2.25% in Compounded Topical Cream to be applied up to 5x/day as needed for headaches., Disp: , Rfl:     VIT A/VIT C/VIT E/ZINC/COPPER (ICAPS AREDS ORAL), Take by mouth 2 (two) times daily., Disp: , Rfl:     warfarin (COUMADIN) 5 MG tablet, Take 5 mg by mouth once daily., Disp: , Rfl:     warfarin  "(COUMADIN) 7.5 MG tablet, Take 7.5 mg by mouth once daily., Disp: , Rfl:     baclofen (LIORESAL) 10 MG tablet, 1/2 tab 1-2 times per day as needed for muscle tightness, 1 tab nightly., Disp: 90 tablet, Rfl: 2    hydrOXYzine pamoate (VISTARIL) 25 MG Cap, Take 1 capsule every 8 hours for 3 days. No alcohol or driving with medication.  May use the rest as needed to help with sleep., Disp: 15 capsule, Rfl: 0    pregabalin (LYRICA) 75 MG capsule, Take 1 capsule (75 mg total) by mouth 2 (two) times daily., Disp: 60 capsule, Rfl: 2    Review of Systems - as per HPI, otherwise a balanced 10 systems review is negative.    OBJECTIVE:  Vitals:  /75 (BP Location: Right arm, Patient Position: Sitting, BP Method: Large (Automatic))   Pulse 91   Ht 5' 8" (1.727 m)   Wt 70.9 kg (156 lb 4.9 oz)   BMI 23.77 kg/m²      Physical Exam:  Constitutional: she appears well-developed and well-nourished. she is well groomed. NAD   HENT:    Head: Normocephalic and atraumatic  Eyes: Conjunctivae and EOM are normal  Musculoskeletal: Normal range of motion. No joint stiffness.   Skin: Skin is warm and dry.  Psychiatric: Mood and affect are normal    Neuro: Patient is alert and oriented to person, place, and time. Language is intact and fluent.  Recent and remote memory are intact.  Normal attention and concentration.  Gait is normal.     ASSESSMENT:  1. Chronic tension-type headache, not intractable    2. Paroxysmal atrial fibrillation      PLAN:  - Discussed at length its possible symptoms of dizziness and low BP could be attributed to episodes of atrial fib, encouraged her to move forward with the scheduled ablation on Friday and restart Lyrica after procedure  - Start Lyrica 75 mg nightly x 2 weeks, if no benefits, but no side effects, then increase dose to 150 mg daily, divided into two doses   - Continue tracking headaches   - Discussed goals of therapy are to decrease the frequency, intensity, and duration of headaches  - RTC " in 6 weeks or sooner if needed     Counseling:  I spent 26 minutes face-to-face with the patient with > 50% of the time spent counseling and educating regarding the results of the data, diagnosis, and recommendations stated above, the risks, benefits, and potential side effects of the medications, and the future plan of care.  Questions and concerns were sought and answered to the patient's stated verbal satisfaction.  The patient verbalizes understanding and agreement with the above stated treatment plan.     CC: MD Karlee Padgett, FNP-C  [facility]   [phone]    Dr. Iqbal was available during today's encounter.

## 2018-12-06 ENCOUNTER — PATIENT MESSAGE (OUTPATIENT)
Dept: ORTHOPEDICS | Facility: CLINIC | Age: 68
End: 2018-12-06

## 2018-12-08 DIAGNOSIS — Z98.890 POSTOPERATIVE STATE: Primary | ICD-10-CM

## 2018-12-10 ENCOUNTER — TELEPHONE (OUTPATIENT)
Dept: ORTHOPEDICS | Facility: CLINIC | Age: 68
End: 2018-12-10

## 2018-12-10 PROBLEM — I48.0 PAROXYSMAL ATRIAL FIBRILLATION: Status: ACTIVE | Noted: 2018-12-10

## 2018-12-10 NOTE — TELEPHONE ENCOUNTER
Left message for patient informing her that the referral to Dr. Urban is in the system and ready to be sent. I contacted Kimberly Luu satellite nurse for a valid fax because we have [phone] and it is a voice line. Message left for Kimberly to provide a good fax number 12/10

## 2018-12-20 ENCOUNTER — PATIENT MESSAGE (OUTPATIENT)
Dept: ORTHOPEDICS | Facility: CLINIC | Age: 68
End: 2018-12-20

## 2019-02-05 ENCOUNTER — PATIENT MESSAGE (OUTPATIENT)
Dept: ORTHOPEDICS | Facility: CLINIC | Age: 69
End: 2019-02-05

## 2019-03-04 ENCOUNTER — HOSPITAL ENCOUNTER (OUTPATIENT)
Dept: RADIOLOGY | Facility: HOSPITAL | Age: 69
Discharge: HOME | End: 2019-03-04
Attending: ORTHOPAEDIC SURGERY
Payer: MEDICARE

## 2019-03-04 DIAGNOSIS — C41.9 OSTEOSARCOMA: ICD-10-CM

## 2019-03-04 DIAGNOSIS — Z96.651 PRESENCE OF RIGHT ARTIFICIAL KNEE JOINT: ICD-10-CM

## 2019-03-04 DIAGNOSIS — Z96.651 PRESENCE OF RIGHT ARTIFICIAL KNEE JOINT: Primary | ICD-10-CM

## 2019-03-04 PROCEDURE — 73552 X-RAY EXAM OF FEMUR 2/>: CPT | Mod: 26,RT,, | Performed by: RADIOLOGY

## 2019-03-04 PROCEDURE — 73564 XR KNEE COMP 4 OR MORE VIEWS RIGHT: ICD-10-PCS | Mod: 26,RT,, | Performed by: RADIOLOGY

## 2019-03-04 PROCEDURE — 73564 X-RAY EXAM KNEE 4 OR MORE: CPT | Mod: TC,RT

## 2019-03-04 PROCEDURE — 73552 X-RAY EXAM OF FEMUR 2/>: CPT | Mod: TC,RT

## 2019-03-04 PROCEDURE — 73552 XR FEMUR 2 VIEW RIGHT: ICD-10-PCS | Mod: 26,RT,, | Performed by: RADIOLOGY

## 2019-03-04 PROCEDURE — 73564 X-RAY EXAM KNEE 4 OR MORE: CPT | Mod: 26,RT,, | Performed by: RADIOLOGY

## 2019-06-06 DIAGNOSIS — I48.91 ATRIAL FIBRILLATION, UNSPECIFIED TYPE: Primary | ICD-10-CM

## 2019-06-07 DIAGNOSIS — Z96.651 KNEE JOINT REPLACEMENT STATUS, RIGHT: ICD-10-CM

## 2019-06-07 DIAGNOSIS — C41.9: Primary | ICD-10-CM

## 2019-06-10 ENCOUNTER — HOSPITAL ENCOUNTER (OUTPATIENT)
Dept: RADIOLOGY | Facility: HOSPITAL | Age: 69
Discharge: HOME | End: 2019-06-10
Attending: ORTHOPAEDIC SURGERY
Payer: MEDICARE

## 2019-06-10 DIAGNOSIS — Z96.651 KNEE JOINT REPLACEMENT STATUS, RIGHT: ICD-10-CM

## 2019-06-10 DIAGNOSIS — C41.9: ICD-10-CM

## 2019-06-10 PROCEDURE — 73552 X-RAY EXAM OF FEMUR 2/>: CPT | Mod: TC,RT

## 2019-06-10 PROCEDURE — 73552 XR FEMUR 2 VIEW RIGHT: ICD-10-PCS | Mod: 26,RT,, | Performed by: RADIOLOGY

## 2019-06-10 PROCEDURE — 73564 X-RAY EXAM KNEE 4 OR MORE: CPT | Mod: 26,RT,, | Performed by: RADIOLOGY

## 2019-06-10 PROCEDURE — 73552 X-RAY EXAM OF FEMUR 2/>: CPT | Mod: 26,RT,, | Performed by: RADIOLOGY

## 2019-06-10 PROCEDURE — 73562 X-RAY EXAM OF KNEE 3: CPT | Mod: TC,LT,59

## 2019-06-10 PROCEDURE — 73564 XR KNEE ORTHO RIGHT WITH FLEXION: ICD-10-PCS | Mod: 26,RT,, | Performed by: RADIOLOGY

## 2019-06-21 ENCOUNTER — TELEPHONE (OUTPATIENT)
Dept: ELECTROPHYSIOLOGY | Facility: CLINIC | Age: 69
End: 2019-06-21

## 2019-06-21 NOTE — TELEPHONE ENCOUNTER
Spoke w/ pt regarding her appt w/ DM on 6/26. Pt stated she would bring all her outside records, but she needed to cancel her appts as well. Pt will reschedule appts on her own & I will request records from [facility].

## 2019-06-26 ENCOUNTER — TELEPHONE (OUTPATIENT)
Dept: ELECTROPHYSIOLOGY | Facility: CLINIC | Age: 69
End: 2019-06-26

## 2019-07-12 ENCOUNTER — OFFICE VISIT (OUTPATIENT)
Dept: ELECTROPHYSIOLOGY | Facility: CLINIC | Age: 69
End: 2019-07-12
Payer: MEDICARE

## 2019-07-12 ENCOUNTER — HOSPITAL ENCOUNTER (OUTPATIENT)
Dept: CARDIOLOGY | Facility: CLINIC | Age: 69
Discharge: HOME | End: 2019-07-12
Payer: MEDICARE

## 2019-07-12 VITALS
SYSTOLIC BLOOD PRESSURE: 130 MMHG | DIASTOLIC BLOOD PRESSURE: 80 MMHG | BODY MASS INDEX: 23.02 KG/M2 | WEIGHT: 151.88 LBS | HEART RATE: 80 BPM | HEIGHT: 68 IN

## 2019-07-12 DIAGNOSIS — I48.91 ATRIAL FIBRILLATION, UNSPECIFIED TYPE: ICD-10-CM

## 2019-07-12 DIAGNOSIS — G44.229 CHRONIC TENSION-TYPE HEADACHE, NOT INTRACTABLE: ICD-10-CM

## 2019-07-12 DIAGNOSIS — I48.0 PAROXYSMAL ATRIAL FIBRILLATION: Primary | ICD-10-CM

## 2019-07-12 PROCEDURE — 1101F PR PT FALLS ASSESS DOC 0-1 FALLS W/OUT INJ PAST YR: ICD-10-PCS | Mod: CPTII,S$GLB,, | Performed by: INTERNAL MEDICINE

## 2019-07-12 PROCEDURE — 93005 RHYTHM STRIP: ICD-10-PCS | Mod: S$GLB,,, | Performed by: INTERNAL MEDICINE

## 2019-07-12 PROCEDURE — 1101F PT FALLS ASSESS-DOCD LE1/YR: CPT | Mod: CPTII,S$GLB,, | Performed by: INTERNAL MEDICINE

## 2019-07-12 PROCEDURE — 93010 RHYTHM STRIP: ICD-10-PCS | Mod: S$GLB,,, | Performed by: INTERNAL MEDICINE

## 2019-07-12 PROCEDURE — 99205 OFFICE O/P NEW HI 60 MIN: CPT | Mod: S$GLB,,, | Performed by: INTERNAL MEDICINE

## 2019-07-12 PROCEDURE — 99999 PR PBB SHADOW E&M-EST. PATIENT-LVL III: CPT | Mod: PBBFAC,,, | Performed by: INTERNAL MEDICINE

## 2019-07-12 PROCEDURE — 93005 ELECTROCARDIOGRAM TRACING: CPT | Mod: S$GLB,,, | Performed by: INTERNAL MEDICINE

## 2019-07-12 PROCEDURE — 99205 PR OFFICE/OUTPT VISIT, NEW, LEVL V, 60-74 MIN: ICD-10-PCS | Mod: S$GLB,,, | Performed by: INTERNAL MEDICINE

## 2019-07-12 PROCEDURE — 93010 ELECTROCARDIOGRAM REPORT: CPT | Mod: S$GLB,,, | Performed by: INTERNAL MEDICINE

## 2019-07-12 PROCEDURE — 99999 PR PBB SHADOW E&M-EST. PATIENT-LVL III: ICD-10-PCS | Mod: PBBFAC,,, | Performed by: INTERNAL MEDICINE

## 2019-07-12 RX ORDER — MELATONIN 5 MG
5 CAPSULE ORAL
COMMUNITY
Start: 2017-07-21 | End: 2022-04-20

## 2019-07-12 RX ORDER — LEVOTHYROXINE SODIUM 25 UG/1
25 TABLET ORAL
COMMUNITY
Start: 2017-07-21 | End: 2019-07-12
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
## 2019-07-17 ENCOUNTER — PATIENT MESSAGE (OUTPATIENT)
Dept: ELECTROPHYSIOLOGY | Facility: CLINIC | Age: 69
End: 2019-07-17

## 2019-07-23 ENCOUNTER — PATIENT MESSAGE (OUTPATIENT)
Dept: ELECTROPHYSIOLOGY | Facility: CLINIC | Age: 69
End: 2019-07-23

## 2019-07-23 ENCOUNTER — TELEPHONE (OUTPATIENT)
Dept: ELECTROPHYSIOLOGY | Facility: CLINIC | Age: 69
End: 2019-07-23

## 2019-07-23 DIAGNOSIS — I48.91 ATRIAL FIBRILLATION, UNSPECIFIED TYPE: Primary | ICD-10-CM

## 2019-07-23 NOTE — TELEPHONE ENCOUNTER
Patient  Instructions  KHANH (Transesophageal Echocardiogram) with Cardioversion     Pre-Procedure Testin/7/19 at 8 am, or your convience  Pre-procedure labs have been ordered for you at:  [facility]  · Be sure to arrive at your scheduled time. You do NOT need to fast for this blood work.       Important Medication Information:    · HOLD (do NOT take) propaferone 3 days prior to your procedure.  Your last dose should be taken on 12  · You may take your other usual morning medications with a sip of water on the day of your procedure.       Day of Procedure:    19 at 11:00 am   Please report to Cardiology Waiting Room on the 3rd floor of the Hospital    · Do not eat or drink anything after 12 midnight on the night before your procedure.  · Please do not wear makeup, especially mascara, when arriving for your procedure.  · You will be going home after your procedure.  · You will need someone to drive you home from the hospital due to anesthesia.       Post Procedure Testin/19/19 at 0930 am  You will have a post procedural EKG one week after your cardioversion to assess your heart rhythm and rate.       Directions to Cardiology Waiting Room  If you park in the Parking Garage:  Take elevators to the 2nd floor  Walk up ramp and turn right by [employer] Elevators  Take elevator to the 3rd floor  Upon exiting the elevator, turn away from the clinic areas  Walk long donald around to front of hospital to area with windows overlooking [facility]  Check in at Reception Desk  OR  If family is dropping you off:  Have them drop you off at the front of the Hospital  (Near the ER, where all the flags are hung).  Take the E elevators to the 3rd floor.  Check in at the Reception Desk in the waiting room.      Any need to reschedule or cancel procedures, or any questions regarding your procedures should be addressed directly with the Arrhythmia Department Nurses at the following phone number: [phone].  You will  have a post procedural EKG one week after your cardioversion to assess your heart rhythm and rate.       Directions to Cardiology Waiting Room  If you park in the Parking Garage:  Take elevators to the 2nd floor  Walk up ramp and turn right by Gold Elevators  Take elevator to the 3rd floor  Upon exiting the elevator, turn away from the clinic areas  Walk long donald around to front of hospital to area with windows overlooking [facility]  Check in at Reception Desk  OR  If family is dropping you off:  Have them drop you off at the front of the Hospital  (Near the ER, where all the flags are hung).  Take the E elevators to the 3rd floor.  Check in at the Reception Desk in the waiting room.      Any need to reschedule or cancel procedures, or any questions regarding your procedures should be addressed directly with the Arrhythmia Department Nurses at the following phone number: [phone].

## 2019-08-06 ENCOUNTER — PATIENT MESSAGE (OUTPATIENT)
Dept: FAMILY MEDICINE | Facility: CLINIC | Age: 69
End: 2019-08-06

## 2019-08-06 RX ORDER — LEVOTHYROXINE SODIUM 25 UG/1
25 TABLET ORAL DAILY
Qty: 30 TABLET | Refills: 1 | Status: SHIPPED | OUTPATIENT
Start: 2019-08-06 | End: 2019-09-04 | Stop reason: SDUPTHER

## 2019-08-06 RX ORDER — LEVOTHYROXINE SODIUM 25 UG/1
25 TABLET ORAL DAILY
Qty: 30 TABLET | Refills: 1 | Status: CANCELLED | OUTPATIENT
Start: 2019-08-06

## 2019-08-07 ENCOUNTER — TELEPHONE (OUTPATIENT)
Dept: ELECTROPHYSIOLOGY | Facility: CLINIC | Age: 69
End: 2019-08-07

## 2019-08-07 DIAGNOSIS — I48.91 ATRIAL FIBRILLATION, UNSPECIFIED TYPE: Primary | ICD-10-CM

## 2019-08-07 NOTE — TELEPHONE ENCOUNTER
Spoke w/ pt regarding her lab work. Informed her that lorna will put orders in &  Pt can go to [facility] to have her blood work done. No appt is needed. Will also contact her to let her know that orders are in.  ----- Message from Carlee Sands sent at 8/7/2019  4:20 PM CDT -----  Contact: Erwin GarciaNeeds Advice    Reason for call: Pt is @ lab trying to do her pre-surgery labs but [facility]  unable to open file/ orders. They are closing in 7 min, so Pt ask ing if can get the orders scheduled @ [facility] for tomorrow. Thanks        Communication Preference:  [phone]    Additional Information:

## 2019-08-08 ENCOUNTER — LAB VISIT (OUTPATIENT)
Dept: LAB | Facility: HOSPITAL | Age: 69
End: 2019-08-08
Attending: INTERNAL MEDICINE
Payer: MEDICARE

## 2019-08-08 ENCOUNTER — PATIENT MESSAGE (OUTPATIENT)
Dept: MEDSURG UNIT | Facility: HOSPITAL | Age: 69
End: 2019-08-08

## 2019-08-08 ENCOUNTER — TELEPHONE (OUTPATIENT)
Dept: ELECTROPHYSIOLOGY | Facility: CLINIC | Age: 69
End: 2019-08-08

## 2019-08-08 ENCOUNTER — PATIENT MESSAGE (OUTPATIENT)
Dept: ELECTROPHYSIOLOGY | Facility: CLINIC | Age: 69
End: 2019-08-08

## 2019-08-08 DIAGNOSIS — I48.91 ATRIAL FIBRILLATION, UNSPECIFIED TYPE: ICD-10-CM

## 2019-08-08 LAB
ANION GAP SERPL CALC-SCNC: 8 MMOL/L (ref 8–16)
APTT BLDCRRT: 29.4 SEC (ref 21–32)
BASOPHILS # BLD AUTO: 0 K/UL (ref 0–0.2)
BASOPHILS NFR BLD: 0 % (ref 0–1.9)
BUN SERPL-MCNC: 17 MG/DL (ref 8–23)
CALCIUM SERPL-MCNC: 9.3 MG/DL (ref 8.7–10.5)
CHLORIDE SERPL-SCNC: 107 MMOL/L (ref 95–110)
CO2 SERPL-SCNC: 27 MMOL/L (ref 23–29)
CREAT SERPL-MCNC: 0.9 MG/DL (ref 0.5–1.4)
DIFFERENTIAL METHOD: ABNORMAL
EOSINOPHIL # BLD AUTO: 0 K/UL (ref 0–0.5)
EOSINOPHIL NFR BLD: 0.7 % (ref 0–8)
ERYTHROCYTE [DISTWIDTH] IN BLOOD BY AUTOMATED COUNT: 13.5 % (ref 11.5–14.5)
EST. GFR  (AFRICAN AMERICAN): >60 ML/MIN/1.73 M^2
EST. GFR  (NON AFRICAN AMERICAN): >60 ML/MIN/1.73 M^2
GLUCOSE SERPL-MCNC: 86 MG/DL (ref 70–110)
HCT VFR BLD AUTO: 41.8 % (ref 37–48.5)
HGB BLD-MCNC: 13.6 G/DL (ref 12–16)
INR PPP: 0.9 (ref 0.8–1.2)
LYMPHOCYTES # BLD AUTO: 1 K/UL (ref 1–4.8)
LYMPHOCYTES NFR BLD: 23.1 % (ref 18–48)
MCH RBC QN AUTO: 31.9 PG (ref 27–31)
MCHC RBC AUTO-ENTMCNC: 32.5 G/DL (ref 32–36)
MCV RBC AUTO: 98 FL (ref 82–98)
MONOCYTES # BLD AUTO: 0.4 K/UL (ref 0.3–1)
MONOCYTES NFR BLD: 9.8 % (ref 4–15)
NEUTROPHILS # BLD AUTO: 3 K/UL (ref 1.8–7.7)
NEUTROPHILS NFR BLD: 66.4 % (ref 38–73)
PLATELET # BLD AUTO: 140 K/UL (ref 150–350)
PMV BLD AUTO: 11 FL (ref 9.2–12.9)
POTASSIUM SERPL-SCNC: 4.8 MMOL/L (ref 3.5–5.1)
PROTHROMBIN TIME: 10.1 SEC (ref 9–12.5)
RBC # BLD AUTO: 4.26 M/UL (ref 4–5.4)
SODIUM SERPL-SCNC: 142 MMOL/L (ref 136–145)
WBC # BLD AUTO: 4.51 K/UL (ref 3.9–12.7)

## 2019-08-08 PROCEDURE — 80048 BASIC METABOLIC PNL TOTAL CA: CPT

## 2019-08-08 PROCEDURE — 85610 PROTHROMBIN TIME: CPT

## 2019-08-08 PROCEDURE — 85025 COMPLETE CBC W/AUTO DIFF WBC: CPT

## 2019-08-08 PROCEDURE — 85730 THROMBOPLASTIN TIME PARTIAL: CPT

## 2019-08-08 PROCEDURE — 36415 COLL VENOUS BLD VENIPUNCTURE: CPT

## 2019-08-08 NOTE — TELEPHONE ENCOUNTER
Spoke w/ pt regarding her lab work. Informed pt that orders are in and it is better for her to have them done prior to proedure date so procedure won't get delayed. Pt understood & will go to [facility] today for labs to be done.

## 2019-08-08 NOTE — TELEPHONE ENCOUNTER
Called patient.  Answered questions.     ----- Message from Carmen Nelson MA sent at 8/8/2019  3:02 PM CDT -----  Contact: self      ----- Message -----  From: Isela Kim MA  Sent: 8/8/2019   2:11 PM  To: Dawn RODRIGUEZ Staff    Pt.is going to have a CV on Mon.8/12. She is also going to stay in hospital 3 more days after  procedure for Tykson issue.Tue.Wed & Thurs.What about another hospital admission or is this continued stay. Please  call

## 2019-08-09 ENCOUNTER — PATIENT MESSAGE (OUTPATIENT)
Dept: ELECTROPHYSIOLOGY | Facility: CLINIC | Age: 69
End: 2019-08-09

## 2019-08-11 ENCOUNTER — ANESTHESIA EVENT (OUTPATIENT)
Dept: MEDSURG UNIT | Facility: HOSPITAL | Age: 69
DRG: 310 | End: 2019-08-11
Payer: MEDICARE

## 2019-08-12 ENCOUNTER — ANESTHESIA (OUTPATIENT)
Dept: MEDSURG UNIT | Facility: HOSPITAL | Age: 69
DRG: 310 | End: 2019-08-12
Payer: MEDICARE

## 2019-08-12 ENCOUNTER — HOSPITAL ENCOUNTER (INPATIENT)
Facility: HOSPITAL | Age: 69
LOS: 3 days | Discharge: HOME | DRG: 310 | End: 2019-08-15
Attending: INTERNAL MEDICINE | Admitting: HOSPITALIST
Payer: MEDICARE

## 2019-08-12 DIAGNOSIS — I50.9 HF (HEART FAILURE): ICD-10-CM

## 2019-08-12 DIAGNOSIS — E03.9 HYPOTHYROIDISM (ACQUIRED): Primary | ICD-10-CM

## 2019-08-12 DIAGNOSIS — Z79.899 MEDICATION MANAGEMENT: ICD-10-CM

## 2019-08-12 DIAGNOSIS — I48.0 PAROXYSMAL ATRIAL FIBRILLATION: ICD-10-CM

## 2019-08-12 DIAGNOSIS — I48.91 ATRIAL FIBRILLATION: ICD-10-CM

## 2019-08-12 DIAGNOSIS — R07.9 CHEST PAIN: ICD-10-CM

## 2019-08-12 DIAGNOSIS — Z51.81 VISIT FOR MONITORING TIKOSYN THERAPY: ICD-10-CM

## 2019-08-12 DIAGNOSIS — Z79.899 VISIT FOR MONITORING TIKOSYN THERAPY: ICD-10-CM

## 2019-08-12 LAB
ANION GAP SERPL CALC-SCNC: 4 MMOL/L (ref 8–16)
ASCENDING AORTA: 2.7 CM
BSA FOR ECHO PROCEDURE: 1.81 M2
BUN SERPL-MCNC: 15 MG/DL (ref 8–23)
CALCIUM SERPL-MCNC: 8.8 MG/DL (ref 8.7–10.5)
CHLORIDE SERPL-SCNC: 107 MMOL/L (ref 95–110)
CO2 SERPL-SCNC: 28 MMOL/L (ref 23–29)
CREAT SERPL-MCNC: 0.8 MG/DL (ref 0.5–1.4)
EST. GFR  (AFRICAN AMERICAN): >60 ML/MIN/1.73 M^2
EST. GFR  (NON AFRICAN AMERICAN): >60 ML/MIN/1.73 M^2
GLUCOSE SERPL-MCNC: 101 MG/DL (ref 70–110)
POTASSIUM SERPL-SCNC: 4.1 MMOL/L (ref 3.5–5.1)
SINUS: 3.1 CM
SODIUM SERPL-SCNC: 139 MMOL/L (ref 136–145)
STJ: 2.5 CM
TSH SERPL DL<=0.005 MIU/L-ACNC: 1.85 UIU/ML (ref 0.4–4)

## 2019-08-12 PROCEDURE — 93010 ELECTROCARDIOGRAM REPORT: CPT | Mod: 76,,, | Performed by: INTERNAL MEDICINE

## 2019-08-12 PROCEDURE — 99233 SBSQ HOSP IP/OBS HIGH 50: CPT | Mod: 25,,, | Performed by: INTERNAL MEDICINE

## 2019-08-12 PROCEDURE — 63600175 PHARM REV CODE 636 W HCPCS: Performed by: NURSE ANESTHETIST, CERTIFIED REGISTERED

## 2019-08-12 PROCEDURE — 92960 CARDIOVERSION ELECTRIC EXT: CPT | Performed by: INTERNAL MEDICINE

## 2019-08-12 PROCEDURE — 92960 CARDIOVERSION ELECTRIC EXT: CPT | Mod: ,,, | Performed by: INTERNAL MEDICINE

## 2019-08-12 PROCEDURE — D9220A PRA ANESTHESIA: ICD-10-PCS | Mod: ANES,,, | Performed by: ANESTHESIOLOGY

## 2019-08-12 PROCEDURE — 93005 ELECTROCARDIOGRAM TRACING: CPT

## 2019-08-12 PROCEDURE — 25000003 PHARM REV CODE 250: Performed by: INTERNAL MEDICINE

## 2019-08-12 PROCEDURE — 99223 1ST HOSP IP/OBS HIGH 75: CPT | Mod: AI,,, | Performed by: NURSE PRACTITIONER

## 2019-08-12 PROCEDURE — 37000008 HC ANESTHESIA 1ST 15 MINUTES: Performed by: INTERNAL MEDICINE

## 2019-08-12 PROCEDURE — 84443 ASSAY THYROID STIM HORMONE: CPT

## 2019-08-12 PROCEDURE — D9220A PRA ANESTHESIA: ICD-10-PCS | Mod: CRNA,,, | Performed by: NURSE ANESTHETIST, CERTIFIED REGISTERED

## 2019-08-12 PROCEDURE — 25000003 PHARM REV CODE 250: Performed by: NURSE PRACTITIONER

## 2019-08-12 PROCEDURE — 93010 EKG 12-LEAD: ICD-10-PCS | Mod: 76,,, | Performed by: INTERNAL MEDICINE

## 2019-08-12 PROCEDURE — D9220A PRA ANESTHESIA: Mod: ANES,,, | Performed by: ANESTHESIOLOGY

## 2019-08-12 PROCEDURE — 36415 COLL VENOUS BLD VENIPUNCTURE: CPT

## 2019-08-12 PROCEDURE — 37000009 HC ANESTHESIA EA ADD 15 MINS: Performed by: INTERNAL MEDICINE

## 2019-08-12 PROCEDURE — D9220A PRA ANESTHESIA: Mod: CRNA,,, | Performed by: NURSE ANESTHETIST, CERTIFIED REGISTERED

## 2019-08-12 PROCEDURE — 92960 PR CARDIOVERSION, ELECTIVE;EXTERN: ICD-10-PCS | Mod: ,,, | Performed by: INTERNAL MEDICINE

## 2019-08-12 PROCEDURE — 80048 BASIC METABOLIC PNL TOTAL CA: CPT

## 2019-08-12 PROCEDURE — 20600001 HC STEP DOWN PRIVATE ROOM

## 2019-08-12 PROCEDURE — 99223 PR INITIAL HOSPITAL CARE,LEVL III: ICD-10-PCS | Mod: AI,,, | Performed by: NURSE PRACTITIONER

## 2019-08-12 PROCEDURE — 63600175 PHARM REV CODE 636 W HCPCS: Performed by: NURSE PRACTITIONER

## 2019-08-12 PROCEDURE — 99233 PR SUBSEQUENT HOSPITAL CARE,LEVL III: ICD-10-PCS | Mod: 25,,, | Performed by: INTERNAL MEDICINE

## 2019-08-12 PROCEDURE — 25000003 PHARM REV CODE 250: Performed by: NURSE ANESTHETIST, CERTIFIED REGISTERED

## 2019-08-12 RX ORDER — GLUCAGON 1 MG
1 KIT INJECTION
Status: DISCONTINUED | OUTPATIENT
Start: 2019-08-12 | End: 2019-08-15 | Stop reason: HOSPADM

## 2019-08-12 RX ORDER — FENTANYL CITRATE 50 UG/ML
25 INJECTION, SOLUTION INTRAMUSCULAR; INTRAVENOUS EVERY 5 MIN PRN
Status: DISCONTINUED | OUTPATIENT
Start: 2019-08-12 | End: 2019-08-12

## 2019-08-12 RX ORDER — ETOMIDATE 2 MG/ML
INJECTION INTRAVENOUS
Status: DISCONTINUED | OUTPATIENT
Start: 2019-08-12 | End: 2019-08-12

## 2019-08-12 RX ORDER — SODIUM CHLORIDE 0.9 % (FLUSH) 0.9 %
10 SYRINGE (ML) INJECTION
Status: DISCONTINUED | OUTPATIENT
Start: 2019-08-12 | End: 2019-08-12

## 2019-08-12 RX ORDER — DIPHENHYDRAMINE HYDROCHLORIDE 50 MG/ML
25 INJECTION INTRAMUSCULAR; INTRAVENOUS EVERY 6 HOURS PRN
Status: DISCONTINUED | OUTPATIENT
Start: 2019-08-12 | End: 2019-08-12

## 2019-08-12 RX ORDER — SODIUM CHLORIDE 9 MG/ML
INJECTION, SOLUTION INTRAVENOUS CONTINUOUS
Status: DISCONTINUED | OUTPATIENT
Start: 2019-08-12 | End: 2019-08-12

## 2019-08-12 RX ORDER — RAMELTEON 8 MG/1
8 TABLET ORAL NIGHTLY PRN
Status: DISCONTINUED | OUTPATIENT
Start: 2019-08-12 | End: 2019-08-15 | Stop reason: HOSPADM

## 2019-08-12 RX ORDER — LIDOCAINE HYDROCHLORIDE 10 MG/ML
INJECTION, SOLUTION INTRAVENOUS
Status: DISCONTINUED | OUTPATIENT
Start: 2019-08-12 | End: 2019-08-12

## 2019-08-12 RX ORDER — ACYCLOVIR 400 MG/1
TABLET ORAL 3 TIMES DAILY PRN
COMMUNITY
End: 2019-09-04 | Stop reason: SDUPTHER

## 2019-08-12 RX ORDER — SILVER SULFADIAZINE 10 G/1000G
CREAM TOPICAL
Status: DISCONTINUED | OUTPATIENT
Start: 2019-08-12 | End: 2019-08-12 | Stop reason: HOSPADM

## 2019-08-12 RX ORDER — IBUPROFEN 200 MG
16 TABLET ORAL
Status: DISCONTINUED | OUTPATIENT
Start: 2019-08-12 | End: 2019-08-15 | Stop reason: HOSPADM

## 2019-08-12 RX ORDER — IBUPROFEN 200 MG
24 TABLET ORAL
Status: DISCONTINUED | OUTPATIENT
Start: 2019-08-12 | End: 2019-08-15 | Stop reason: HOSPADM

## 2019-08-12 RX ORDER — SODIUM CHLORIDE 0.9 % (FLUSH) 0.9 %
5 SYRINGE (ML) INJECTION
Status: ACTIVE | OUTPATIENT
Start: 2019-08-12

## 2019-08-12 RX ORDER — HYDROMORPHONE HYDROCHLORIDE 1 MG/ML
0.2 INJECTION, SOLUTION INTRAMUSCULAR; INTRAVENOUS; SUBCUTANEOUS EVERY 5 MIN PRN
Status: DISCONTINUED | OUTPATIENT
Start: 2019-08-12 | End: 2019-08-12

## 2019-08-12 RX ORDER — DOFETILIDE 0.25 MG/1
250 CAPSULE ORAL EVERY 12 HOURS
Status: DISCONTINUED | OUTPATIENT
Start: 2019-08-12 | End: 2019-08-12

## 2019-08-12 RX ORDER — LEVOTHYROXINE SODIUM 25 UG/1
25 TABLET ORAL
Status: DISCONTINUED | OUTPATIENT
Start: 2019-08-13 | End: 2019-08-15 | Stop reason: HOSPADM

## 2019-08-12 RX ORDER — PROPOFOL 10 MG/ML
VIAL (ML) INTRAVENOUS
Status: DISCONTINUED | OUTPATIENT
Start: 2019-08-12 | End: 2019-08-12

## 2019-08-12 RX ORDER — DOFETILIDE 0.25 MG/1
500 CAPSULE ORAL EVERY 12 HOURS
Status: DISCONTINUED | OUTPATIENT
Start: 2019-08-12 | End: 2019-08-13

## 2019-08-12 RX ORDER — ACETAMINOPHEN 325 MG/1
650 TABLET ORAL EVERY 4 HOURS PRN
Status: DISCONTINUED | OUTPATIENT
Start: 2019-08-12 | End: 2019-08-15 | Stop reason: HOSPADM

## 2019-08-12 RX ADMIN — PROPOFOL 20 MG: 10 INJECTION, EMULSION INTRAVENOUS at 01:08

## 2019-08-12 RX ADMIN — PROPOFOL 30 MG: 10 INJECTION, EMULSION INTRAVENOUS at 01:08

## 2019-08-12 RX ADMIN — DOFETILIDE 500 MCG: 0.25 CAPSULE ORAL at 09:08

## 2019-08-12 RX ADMIN — ETOMIDATE 2 MG: 2 INJECTION, SOLUTION INTRAVENOUS at 01:08

## 2019-08-12 RX ADMIN — LIDOCAINE HYDROCHLORIDE 20 MG: 10 INJECTION, SOLUTION INTRAVENOUS at 01:08

## 2019-08-12 RX ADMIN — SODIUM CHLORIDE: 0.9 INJECTION, SOLUTION INTRAVENOUS at 01:08

## 2019-08-12 RX ADMIN — DOCUSATE SODIUM 250 MG: 50 CAPSULE, LIQUID FILLED ORAL at 09:08

## 2019-08-12 RX ADMIN — RIVAROXABAN 20 MG: 20 TABLET, FILM COATED ORAL at 05:08

## 2019-08-12 RX ADMIN — RAMELTEON 8 MG: 8 TABLET, FILM COATED ORAL at 09:08

## 2019-08-12 NOTE — HOSPITAL COURSE
Ms. Enrique was admitted 8/12 after successful DCCV for initiation of tikoysn. EP consulted and followed. Tikosyn initiated at 500 mcg however decreased to 250 mcg after first dose based upon QTc. Baseline QTc 430 >> 507 >> 429 >> 478 >> 449 >> 459 >> 465.     Disposition: home with spouse, no home needs identified, pharmD assisted with tikosyn PA and cost will be ~ $87/month. Patient financially capable of the medication cost. Outpt follow up with EP scheduled.

## 2019-08-12 NOTE — ASSESSMENT & PLAN NOTE
1. KHANH for evaluation of BRITTNI for DCCV  -No absolute contraindications of esophageal stricture, tumor, perforation, laceration,or diverticulum and/or active GI bleed  -The risks, benefits & alternatives of the procedure were explained to the patient.   -The risks of transesophageal echo include but are not limited to:  Dental trauma, esophageal trauma/perforation, bleeding, laryngospasm/brochospasm, aspiration, sore throat/hoarseness, & dislodgement of the endotracheal tube/nasogastric tube (where applicable).    -The risks of moderate sedation include hypotension, respiratory depression, arrhythmias, bronchospasm, & death.    -Informed consent was obtained. The patient is agreeable to proceed with the procedure and all questions and concerns addressed.    Case discussed with an attending in echocardiography lab.     Further recommendations per attending addendum
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
## 2019-08-12 NOTE — SUBJECTIVE & OBJECTIVE
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    Diverticulosis 1998    Hx of atrial flutter     Hx of mitral valve prolapse 1990    Migraines        Past Surgical History:   Procedure Laterality Date    HYSTERECTOMY      KNEE ARTHROPLASTY Right 2007    revison in 2009    KNEE SURGERY Right 1989    distal femoral replacing TK       Review of patient's allergies indicates:   Allergen Reactions    Anesthetics - amide type Other (See Comments)     Succinocholine, gives v-tach    Morphine Nausea And Vomiting    Succinylcholine      Other reaction(s): v tach       No current facility-administered medications on file prior to encounter.      Current Outpatient Medications on File Prior to Encounter   Medication Sig    acyclovir (ZOVIRAX) 400 MG tablet Take by mouth 3 (three) times daily as needed.    CALCIUM CARB/VIT D3/MINERALS (CALTRATE PLUS ORAL) Take by mouth. Takes two chews daily    docusate sodium (COLACE) 250 MG capsule Take 250 mg by mouth once daily.    hydrocodone-acetaminophen 5-325mg (NORCO) 5-325 mg per tablet Pt states takes PRN for headaches    melatonin 5 mg Cap Take 5 mg by mouth as needed.     rivaroxaban (XARELTO) 20 mg Tab Take 1 tablet (20 mg total) by mouth once daily.    vit A/vit C/vit E/zinc/copper (ICAPS AREDS ORAL) Take by mouth.    propafenone (RHTHYMOL) 150 MG Tab 150 mg 4 (four) times daily.     UNABLE TO FIND Diclofenac 0.15%, Lidocaine 2.25%, Prilocaine 2.25% in Compounded Topical Cream to be applied up to 5x/day as needed for headaches.     Family History     None        Tobacco Use    Smoking status: Never Smoker    Smokeless tobacco: Never Used   Substance and Sexual Activity    Alcohol use: Yes     Comment: socially    Drug use: No    Sexual activity: Not on file     Review of Systems   Constitution: Negative. Negative for chills and fever.   HENT: Negative.    Eyes: Negative.     Cardiovascular: Negative for chest pain, claudication and paroxysmal nocturnal dyspnea.   Respiratory: Negative for cough, shortness of breath and wheezing.    Endocrine: Negative.    Hematologic/Lymphatic: Does not bruise/bleed easily.   Skin: Negative for nail changes and rash.   Musculoskeletal: Negative.  Negative for back pain.   Gastrointestinal: Negative for abdominal pain, melena, nausea and vomiting.   Neurological: Negative for dizziness and headaches.   Psychiatric/Behavioral: Negative for altered mental status, depression and substance abuse.   Allergic/Immunologic: Negative.      Objective:     Vital Signs (Most Recent):  Temp: 97.6 °F (36.4 °C) (08/12/19 1655)  Pulse: 87 (08/12/19 1655)  Resp: 16 (08/12/19 1655)  BP: 118/65 (08/12/19 1655)  SpO2: 98 % (08/12/19 1655) Vital Signs (24h Range):  Temp:  [97 °F (36.1 °C)-97.6 °F (36.4 °C)] 97.6 °F (36.4 °C)  Pulse:  [] 87  Resp:  [12-18] 16  SpO2:  [94 %-98 %] 98 %  BP: (105-132)/(65-75) 118/65       Weight: 68 kg (150 lb)  Body mass index is 22.81 kg/m².    SpO2: 98 %  O2 Device (Oxygen Therapy): room air    Physical Exam   Constitutional: She is oriented to person, place, and time. She appears well-developed and well-nourished.   HENT:   Head: Normocephalic and atraumatic.   Eyes: Pupils are equal, round, and reactive to light. Conjunctivae and EOM are normal.   Neck: No JVD present.   Cardiovascular: Normal rate, regular rhythm, normal heart sounds and intact distal pulses. Exam reveals no gallop and no friction rub.   No murmur heard.  Pulmonary/Chest: Effort normal. No stridor. She has no wheezes. She has no rales. She exhibits no tenderness.   Abdominal: Soft. Bowel sounds are normal. She exhibits no distension and no mass. There is no tenderness. There is no guarding.   Musculoskeletal: She exhibits no edema, tenderness or deformity.   Neurological: She is alert and oriented to person, place, and time.   Skin: Skin is warm and dry. No rash  noted. No erythema.   Psychiatric: She has a normal mood and affect.       Significant Labs:   CMP:   Recent Labs   Lab 08/12/19  1656      K 4.1      CO2 28      BUN 15   CREATININE 0.8   CALCIUM 8.8   ANIONGAP 4*   ESTGFRAFRICA >60.0   EGFRNONAA >60.0    and CBC: No results for input(s): WBC, HGB, HCT, PLT in the last 48 hours.    Lab Results   Component Value Date    TSH 1.854 08/12/2019         Significant Imaging: reviewed in Epic

## 2019-08-12 NOTE — H&P
[facility]  Cardiology  History and Physical     Patient Name: Zoë Enrique  MRN: [id number]  Admission Date: 8/12/2019  Code Status: No Order   Attending Provider: Moe Seymour MD   Primary Care Physician: Praveen Jeffery MD  Principal Problem:<principal problem not specified>    Patient information was obtained from patient and ER records.     Subjective:     Chief Complaint:  Afib     HPI:  70 yo F with PMH of Complex AF/AT/AFL hx, with multiple ablations in past performed by experienced operators. Now with both AF and atypical AFL again.  Plan DCCV +  trial of tikosyn. Stop propafenone for at least 3 days pre-tikosyn.  Cath showed perfectly clean cors in 2014. 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    Diverticulosis 1998    Hx of atrial flutter     Hx of mitral valve prolapse 1990    Migraines        Past Surgical History:   Procedure Laterality Date    HYSTERECTOMY      KNEE ARTHROPLASTY Right 2007    revison in 2009    KNEE SURGERY Right 1989    distal femoral replacing TK       Review of patient's allergies indicates:   Allergen Reactions    Anesthetics - amide type Other (See Comments)     Succinocholine, gives v-tach    Morphine Nausea And Vomiting    Succinylcholine      Other reaction(s): v tach       No current facility-administered medications on file prior to encounter.      Current Outpatient Medications on File Prior to Encounter   Medication Sig    CALCIUM CARB/VIT D3/MINERALS (CALTRATE PLUS  ORAL) Take by mouth. Takes two chews daily    docusate sodium (COLACE) 250 MG capsule Take 250 mg by mouth once daily.    hydrocodone-acetaminophen 5-325mg (NORCO) 5-325 mg per tablet Pt states takes PRN for headaches    melatonin 5 mg Cap Take 5 mg by mouth.    propafenone (RHTHYMOL) 150 MG Tab 150 mg 4 (four) times daily.     rivaroxaban (XARELTO) 20 mg Tab Take 1 tablet (20 mg total) by mouth once daily.    UNABLE TO FIND Diclofenac 0.15%, Lidocaine 2.25%, Prilocaine 2.25% in Compounded Topical Cream to be applied up to 5x/day as needed for headaches.     Family History     None        Tobacco Use    Smoking status: Never Smoker    Smokeless tobacco: Never Used   Substance and Sexual Activity    Alcohol use: Yes     Comment: socially    Drug use: No    Sexual activity: Not on file     Review of Systems   Constitution: Negative for chills, decreased appetite, diaphoresis and night sweats.   HENT: Negative for congestion and ear discharge.    Eyes: Negative for blurred vision and discharge.   Cardiovascular: Negative for chest pain, dyspnea on exertion, irregular heartbeat, leg swelling and paroxysmal nocturnal dyspnea.   Respiratory: Negative for cough, hemoptysis and shortness of breath.    Gastrointestinal: Negative for abdominal pain.     Objective:     Vital Signs (Most Recent):  Temp: 97.5 °F (36.4 °C) (08/12/19 1146)  Pulse: (!) 117 (08/12/19 1146)  Resp: 18 (08/12/19 1146)  BP: 106/68 (08/12/19 1147)  SpO2: (!) 94 % (08/12/19 1146) Vital Signs (24h Range):  Temp:  [97.5 °F (36.4 °C)] 97.5 °F (36.4 °C)  Pulse:  [117] 117  Resp:  [18] 18  SpO2:  [94 %] 94 %  BP: (105-106)/(68-75) 106/68        There is no height or weight on file to calculate BMI.    SpO2: (!) 94 %  O2 Device (Oxygen Therapy): room air    No intake or output data in the 24 hours ending 08/12/19 1150    Lines/Drains/Airways          None          Physical Exam   Constitutional: She is oriented to person, place, and time. She appears  well-developed and well-nourished. No distress.   Eyes: Pupils are equal, round, and reactive to light. Conjunctivae are normal.   Neck: No tracheal deviation present. No thyromegaly present.   Cardiovascular: Intact distal pulses. An irregularly irregular rhythm present. Tachycardia present. Exam reveals gallop. Exam reveals no S3 and no friction rub.   No murmur heard.  Pulses:       Radial pulses are 2+ on the right side, and 2+ on the left side.        Femoral pulses are 2+ on the right side, and 2+ on the left side.  Pulmonary/Chest: Effort normal and breath sounds normal. No respiratory distress. She has no wheezes. She has no rales.   Abdominal: Soft. Bowel sounds are normal. She exhibits no distension. There is no tenderness.   Musculoskeletal: She exhibits no edema or deformity.   Neurological: She is alert and oriented to person, place, and time. No cranial nerve deficit. Coordination normal.   Skin: Skin is warm and dry. She is not diaphoretic.   Psychiatric: She has a normal mood and affect. Her behavior is normal.       Significant Labs: BMP: No results for input(s): GLU, NA, K, CL, CO2, BUN, CREATININE, CALCIUM, MG in the last 48 hours.    Significant Imaging: Echocardiogram: Transthoracic echo (TTE) complete (Cupid Only): No results found for this or any previous visit.    Assessment and Plan:     Paroxysmal atrial fibrillation  1. KHANH for evaluation of BRITTNI for DCCV  -No absolute contraindications of esophageal stricture, tumor, perforation, laceration,or diverticulum and/or active GI bleed  -The risks, benefits & alternatives of the procedure were explained to the patient.   -The risks of transesophageal echo include but are not limited to:  Dental trauma, esophageal trauma/perforation, bleeding, laryngospasm/brochospasm, aspiration, sore throat/hoarseness, & dislodgement of the endotracheal tube/nasogastric tube (where applicable).    -The risks of moderate sedation include hypotension, respiratory  depression, arrhythmias, bronchospasm, & death.    -Informed consent was obtained. The patient is agreeable to proceed with the procedure and all questions and concerns addressed.    Case discussed with an attending in echocardiography lab.     Further recommendations per attending addendum          VTE Risk Mitigation (From admission, onward)    None          Yuridia Borrego MD  Cardiology   [facility]

## 2019-08-12 NOTE — TRANSFER OF CARE
"Anesthesia Transfer of Care Note    Patient: Zoë Enrique    Procedure(s) Performed: Procedure(s) (LRB):  CARDIOVERSION (N/A)  ECHOCARDIOGRAM,TRANSESOPHAGEAL (N/A)    Patient location: [facility]    Anesthesia Type: general    Transport from OR: Transported from OR on room air with adequate spontaneous ventilation    Post pain: adequate analgesia    Post assessment: no apparent anesthetic complications and tolerated procedure well    Post vital signs: stable    Level of consciousness: awake, alert and oriented    Nausea/Vomiting: no nausea/vomiting    Complications: none    Transfer of care protocol was followed      Last vitals:   Visit Vitals  /68 (BP Location: Left arm, Patient Position: Lying)   Pulse 67   Temp 36.4 °C (97.5 °F) (Oral)   Resp 18   Ht 5' 8" (1.727 m)   Wt 68 kg (150 lb)   SpO2 98%   Breastfeeding? No   BMI 22.81 kg/m²     "

## 2019-08-12 NOTE — ASSESSMENT & PLAN NOTE
- s/p DCCV, now in Sr with 1st degree AVB, PVC's, baseline   - Tikosyn 250 bid ordered based on labs from 8/8, rechecking labs to see if improvement  - consulted EP to monitor tikosyn and to see if they wanted higher dose of 500 as she's so close and complex history  - EKG scheduled 2 hrs post Tikosyn dosing 11 am and 11 pm, following dosage to be based on prior ECG  - [id number] 2. Anticoagulation indicated currently. Anticoagulation done with xarelto 20

## 2019-08-12 NOTE — CONSULTS
[facility]  Cardiac Electrophysiology  Consult Note    Admission Date: 8/12/2019  Code Status: Full Code   Attending Provider: Dayanna Mota MD  Consulting Provider: Lui Latif MD  Principal Problem:Paroxysmal atrial fibrillation    Inpatient consult to Electrophysiology  Consult performed by: Lui Latif MD  Consult ordered by: Aleksandra Taylor NP        Subjective:     Chief Complaint:  Atrial fibrillation  HPI:   68 y/o F with history of AF/AT/AFL hx, s/p multiple ablations in past. Presenting for AF and atypical AFL again, s/p DCCV; planned for initiation of tikosyn. Stopped propafenone for at least 3 days pre-tikosyn. Denies chest pain, shortness of breath, dyspnea on exertion.          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    Diverticulosis 1998    Hx of atrial flutter     Hx of mitral valve prolapse 1990    Migraines        Past Surgical History:   Procedure Laterality Date    HYSTERECTOMY      KNEE ARTHROPLASTY Right 2007    revison in 2009    KNEE SURGERY Right 1989    distal femoral replacing TK       Review of patient's allergies indicates:   Allergen Reactions    Anesthetics - amide type Other (See Comments)     Succinocholine, gives v-tach    Morphine Nausea And Vomiting    Succinylcholine      Other reaction(s): v tach       No current facility-administered medications on file prior to encounter.      Current Outpatient Medications on File Prior to Encounter   Medication Sig    acyclovir (ZOVIRAX) 400 MG tablet Take by mouth 3 (three) times daily as needed.    CALCIUM CARB/VIT D3/MINERALS (CALTRATE PLUS ORAL) Take by mouth. Takes two chews daily    docusate sodium (COLACE) 250 MG capsule Take 250 mg by mouth once daily.    hydrocodone-acetaminophen 5-325mg (NORCO) 5-325 mg per tablet Pt states takes PRN for headaches    melatonin 5 mg Cap Take 5 mg by mouth as needed.      rivaroxaban (XARELTO) 20 mg Tab Take 1 tablet (20 mg total) by mouth once daily.    vit A/vit C/vit E/zinc/copper (ICAPS AREDS ORAL) Take by mouth.    propafenone (RHTHYMOL) 150 MG Tab 150 mg 4 (four) times daily.     UNABLE TO FIND Diclofenac 0.15%, Lidocaine 2.25%, Prilocaine 2.25% in Compounded Topical Cream to be applied up to 5x/day as needed for headaches.     Family History     None        Tobacco Use    Smoking status: Never Smoker    Smokeless tobacco: Never Used   Substance and Sexual Activity    Alcohol use: Yes     Comment: socially    Drug use: No    Sexual activity: Not on file     Review of Systems   Constitution: Negative. Negative for chills and fever.   HENT: Negative.    Eyes: Negative.    Cardiovascular: Negative for chest pain, claudication and paroxysmal nocturnal dyspnea.   Respiratory: Negative for cough, shortness of breath and wheezing.    Endocrine: Negative.    Hematologic/Lymphatic: Does not bruise/bleed easily.   Skin: Negative for nail changes and rash.   Musculoskeletal: Negative.  Negative for back pain.   Gastrointestinal: Negative for abdominal pain, melena, nausea and vomiting.   Neurological: Negative for dizziness and headaches.   Psychiatric/Behavioral: Negative for altered mental status, depression and substance abuse.   Allergic/Immunologic: Negative.      Objective:     Vital Signs (Most Recent):  Temp: 97.6 °F (36.4 °C) (08/12/19 1655)  Pulse: 87 (08/12/19 1655)  Resp: 16 (08/12/19 1655)  BP: 118/65 (08/12/19 1655)  SpO2: 98 % (08/12/19 1655) Vital Signs (24h Range):  Temp:  [97 °F (36.1 °C)-97.6 °F (36.4 °C)] 97.6 °F (36.4 °C)  Pulse:  [] 87  Resp:  [12-18] 16  SpO2:  [94 %-98 %] 98 %  BP: (105-132)/(65-75) 118/65       Weight: 68 kg (150 lb)  Body mass index is 22.81 kg/m².    SpO2: 98 %  O2 Device (Oxygen Therapy): room air    Physical Exam   Constitutional: She is oriented to person, place, and time. She appears well-developed and well-nourished.    HENT:   Head: Normocephalic and atraumatic.   Eyes: Pupils are equal, round, and reactive to light. Conjunctivae and EOM are normal.   Neck: No JVD present.   Cardiovascular: Normal rate, regular rhythm, normal heart sounds and intact distal pulses. Exam reveals no gallop and no friction rub.   No murmur heard.  Pulmonary/Chest: Effort normal. No stridor. She has no wheezes. She has no rales. She exhibits no tenderness.   Abdominal: Soft. Bowel sounds are normal. She exhibits no distension and no mass. There is no tenderness. There is no guarding.   Musculoskeletal: She exhibits no edema, tenderness or deformity.   Neurological: She is alert and oriented to person, place, and time.   Skin: Skin is warm and dry. No rash noted. No erythema.   Psychiatric: She has a normal mood and affect.       Significant Labs:   CMP:   Recent Labs   Lab 08/12/19  1656      K 4.1      CO2 28      BUN 15   CREATININE 0.8   CALCIUM 8.8   ANIONGAP 4*   ESTGFRAFRICA >60.0   EGFRNONAA >60.0    and CBC: No results for input(s): WBC, HGB, HCT, PLT in the last 48 hours.    Lab Results   Component Value Date    TSH 1.854 08/12/2019         Significant Imaging: reviewed in Epic              Assessment and Plan:     * Paroxysmal atrial fibrillation  CHADS-VASC 3  Rhythm control: dofetilide as below  AC: continue home xarelto    Visit for monitoring Tikosyn therapy  QTc<500ms at 430ms.  - First dose dofetilide 500mcg 8/12 @2100  - Check EKG 2 hours after each dose dofetilide  - If QTc>495ms; decrease dosing to 250mcg bid; if second EKG or any thereafter the shows QTc>495ms, please DC dofetilide  - Replete lytes daily, K>4, Mg>2        Thank you for your consult. I will follow-up with patient. Please contact us if you have any additional questions.    Lui Latif MD  Cardiac Electrophysiology  [facility]

## 2019-08-12 NOTE — SUBJECTIVE & OBJECTIVE
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    Diverticulosis 1998    Hx of atrial flutter     Hx of mitral valve prolapse 1990    Migraines        Past Surgical History:   Procedure Laterality Date    HYSTERECTOMY      KNEE ARTHROPLASTY Right 2007    revison in 2009    KNEE SURGERY Right 1989    distal femoral replacing TK       Review of patient's allergies indicates:   Allergen Reactions    Anesthetics - amide type Other (See Comments)     Succinocholine, gives v-tach    Morphine Nausea And Vomiting    Succinylcholine      Other reaction(s): v tach       No current facility-administered medications on file prior to encounter.      Current Outpatient Medications on File Prior to Encounter   Medication Sig    acyclovir (ZOVIRAX) 400 MG tablet Take by mouth 3 (three) times daily as needed.    CALCIUM CARB/VIT D3/MINERALS (CALTRATE PLUS ORAL) Take by mouth. Takes two chews daily    docusate sodium (COLACE) 250 MG capsule Take 250 mg by mouth once daily.    hydrocodone-acetaminophen 5-325mg (NORCO) 5-325 mg per tablet Pt states takes PRN for headaches    melatonin 5 mg Cap Take 5 mg by mouth as needed.     rivaroxaban (XARELTO) 20 mg Tab Take 1 tablet (20 mg total) by mouth once daily.    vit A/vit C/vit E/zinc/copper (ICAPS AREDS ORAL) Take by mouth.    propafenone (RHTHYMOL) 150 MG Tab 150 mg 4 (four) times daily.     UNABLE TO FIND Diclofenac 0.15%, Lidocaine 2.25%, Prilocaine 2.25% in Compounded Topical Cream to be applied up to 5x/day as needed for headaches.     Family History     None        Tobacco Use    Smoking status: Never Smoker    Smokeless tobacco: Never Used   Substance and Sexual Activity    Alcohol use: Yes     Comment: socially    Drug use: No    Sexual activity: Not on file     Review of Systems   Constitutional: Positive for activity change and fatigue. Negative for appetite change, chills and fever.    HENT: Negative for congestion, rhinorrhea, sinus pressure, sore throat and trouble swallowing.    Eyes: Negative for pain, redness and visual disturbance.   Respiratory: Positive for shortness of breath. Negative for cough, chest tightness, wheezing and stridor.    Cardiovascular: Negative for chest pain, palpitations and leg swelling.   Gastrointestinal: Positive for constipation. Negative for abdominal distention, abdominal pain, blood in stool, diarrhea, nausea and vomiting.   Endocrine: Negative for cold intolerance and heat intolerance.   Genitourinary: Negative for dysuria, frequency, hematuria and urgency.   Musculoskeletal: Negative for arthralgias, back pain, myalgias and neck pain.   Skin: Negative for color change, pallor and rash.   Allergic/Immunologic: Negative for immunocompromised state.   Neurological: Negative for dizziness, tremors, syncope, weakness, light-headedness, numbness and headaches.   Hematological: Does not bruise/bleed easily.   Psychiatric/Behavioral: Negative for agitation and confusion. The patient is not nervous/anxious.      Objective:     Vital Signs (Most Recent):  Temp: 97 °F (36.1 °C) (08/12/19 1517)  Pulse: 70 (08/12/19 1517)  Resp: 16 (08/12/19 1517)  BP: 119/70 (08/12/19 1517)  SpO2: 98 % (08/12/19 1517) Vital Signs (24h Range):  Temp:  [97 °F (36.1 °C)-97.5 °F (36.4 °C)] 97 °F (36.1 °C)  Pulse:  [] 70  Resp:  [12-18] 16  SpO2:  [94 %-98 %] 98 %  BP: (105-132)/(68-75) 119/70     Weight: 68 kg (150 lb)  Body mass index is 22.81 kg/m².    Physical Exam   Constitutional: She is oriented to person, place, and time. She appears well-developed and well-nourished. No distress.   HENT:   Head: Normocephalic and atraumatic.   Right Ear: External ear normal.   Left Ear: External ear normal.   Nose: Nose normal.   Mouth/Throat: Oropharynx is clear and moist.   Eyes: Conjunctivae and EOM are normal. No scleral icterus.   Neck: Normal range of motion. Neck supple. No JVD  present. No tracheal deviation present. No thyromegaly present.   Cardiovascular: Normal rate, regular rhythm, normal heart sounds and intact distal pulses. Exam reveals no gallop and no friction rub.   No murmur heard.  Pulmonary/Chest: Effort normal and breath sounds normal. No respiratory distress. She has no wheezes. She has no rales.   Abdominal: Soft. Bowel sounds are normal. She exhibits no distension and no mass. There is no tenderness. There is no rebound and no guarding.   + bm today   Musculoskeletal: Normal range of motion. She exhibits no edema or tenderness.   Neurological: She is alert and oriented to person, place, and time. No cranial nerve deficit or sensory deficit. She exhibits normal muscle tone. Coordination normal.   Skin: Skin is warm and dry. No rash noted. No erythema.   Psychiatric: She has a normal mood and affect. Her behavior is normal. Judgment and thought content normal.   Nursing note and vitals reviewed.        CRANIAL NERVES     CN III, IV, VI   Extraocular motions are normal.        Significant Labs: None  pending cmp/ tsh    Significant Imaging: EKG: I have reviewed all pertinent results/findings within the past 24 hours and my personal findings are: baseline , SR Ist degree avb

## 2019-08-12 NOTE — H&P
[facility]  Cardiology  History and Physical     Patient Name: Zoë Enrique  MRN: [id number]  Admission Date: 8/12/2019  Code Status: No Order   Attending Provider: Moe Seymour MD   Primary Care Physician: Praveen Jeffery MD  Principal Problem:<principal problem not specified>    Patient information was obtained from patient and ER records.     Subjective:     Chief Complaint:  Afib     HPI:  70 yo F with PMH of Complex AF/AT/AFL hx, with multiple ablations in past performed by experienced operators. Now with both AF and atypical AFL again.  Plan DCCV +  trial of tikosyn. Stop propafenone for at least 3 days pre-tikosyn.  Cath showed perfectly clean cors in 2014. Had DCCV 8/18, 1/2019     TTE  11/18 60-65%    Dysphagia or odynophagia:  No  Liver Disease, esophageal disease, or known varices:  No  Upper GI Bleeding: Yes  Snoring:  No  Sleep Apnea:  No  Prior neck surgery or radiation:  No  History of anesthetic difficulties:  No  Family history of anesthetic difficulties:  No  Last oral intake:  12 hours ago  Able to move neck in all directions:  Yes        No new subjective & objective note has been filed under this hospital service since the last note was generated.    Assessment and Plan:     Paroxysmal atrial fibrillation  1. KHANH for evaluation of BRITTNI for DCCV  -No absolute contraindications of esophageal stricture, tumor, perforation, laceration,or diverticulum and/or active GI bleed  -The risks, benefits & alternatives of the procedure were explained to the patient.   -The risks of transesophageal echo include but are not limited to:  Dental trauma, esophageal trauma/perforation, bleeding, laryngospasm/brochospasm, aspiration, sore throat/hoarseness, & dislodgement of the endotracheal tube/nasogastric tube (where applicable).    -The risks of moderate sedation include hypotension, respiratory depression, arrhythmias, bronchospasm, & death.    -Informed consent was obtained. The patient  is agreeable to proceed with the procedure and all questions and concerns addressed.    Case discussed with an attending in echocardiography lab.     Further recommendations per attending addendum          VTE Risk Mitigation (From admission, onward)    None          Yuridia Borrego MD  Cardiology   [facility]

## 2019-08-12 NOTE — HPI
"70 yo F with PMH of Complex AF/AT/AFL hx, with multiple ablations in past performed by experienced operators. Now with both AF and atypical AFL again.  Plan DCCV +  trial of tikosyn. Stop propafenone for at least 3 days pre-tikosyn.  Cath showed perfectly clean cors in 2014. Had DCCV 8/18, 1/2019     TTE  11/18 60-65%    Dysphagia or odynophagia:  {Blank single:19197::"Yes","No"}  Liver Disease, esophageal disease, or known varices:  {Blank single:19197::"Yes","No"}  Upper GI Bleeding: {Blank single:19197::"Yes","No"}  Snoring:  {Blank single:19197::"Yes","No"}  Sleep Apnea:  {Blank single:19197::"Yes","No"}  Prior neck surgery or radiation:  {Blank single:19197::"Yes","No"}  History of anesthetic difficulties:  {Blank single:19197::"Yes","No"}  Family history of anesthetic difficulties:  {Blank single:19197::"Yes","No"}  Last oral intake:  {Blank single:19197::"12 hours ago","24 hours ago"}  Able to move neck in all directions:  {Blank single:19197::"Yes","No"}      "

## 2019-08-12 NOTE — PLAN OF CARE
Received report from FADI Saucedo. Patient s/p DCCV, AAOx3. VSS, no c/o pain or discomfort at this time, resp even and unlabored. Post procedure protocol reviewed with patient and patient's family. Understanding verbalized. Family members at bedside. Nurse call bell within reach. Will continue to monitor per post procedure protocol.

## 2019-08-12 NOTE — HPI
"Ms. Enrique is a 69 year old female with past medical history of complex AFib/ ATach/ AFlutter, multiple ablations/DCCVs (8/19, 1/19), clean coronaries noted on [facility] 2014, colon resection/diverticulosis/itis, migraines (takes narcotics), MVP, osteosarcoma/ R distal femoral revision, R TKR/ Revisions, breast augmentation, and SAM/ BSO. She presented for outpt KHANH/DCCV with successful restoration of NSR. She was then admitted for tikosyn initiation. She reports that during arrhythmia episodes she is very symptomatic with marked CHAIDEZ and chest pressure; describing "feeling awful". She lives with , is independent in ADLs, retired nurse. She denies alcohol, tobacco, or illicit drug use. All past medical, social, and family history reviewed.     Labs prior to DCCV reviewed noting CBC unremarkable, chemistry with normal SCr/BUN, electrolytes, and  CrCL 59, baseline QTc 430. Tikoysn initiated per protocol. The patient was admitted to the Hospital Medicine Service for further evaluation and management.   "

## 2019-08-12 NOTE — ASSESSMENT & PLAN NOTE
- baseline   - Tikosyn 250 bid ordered based on labs from 8/8, rechecking labs to see if improvement  - consulted EP to monitor tikosyn and to see if they wanted higher dose of 500 as she's so close and complex history  - EKG scheduled 2 hrs post Tikosyn dosing 11 am and 11 pm, following dosage to be based on prior ECG  - [id number] 2. Anticoagulation indicated currently. Anticoagulation done with xarelto 20

## 2019-08-12 NOTE — ANESTHESIA PREPROCEDURE EVALUATION
08/12/2019  Zoë Enrique is a 69 y.o., female.  Patient Active Problem List   Diagnosis    Chronic tension-type headache, not intractable    Paroxysmal atrial fibrillation       Anesthesia Evaluation         Review of Systems      Physical Exam  General:  Well nourished    Airway/Jaw/Neck:  Airway Findings: Mouth Opening: Normal Tongue: Normal  General Airway Assessment: Adult  Mallampati: II  Improves to II with phonation.  TM Distance: Normal, at least 6 cm      Dental:  Dental Findings: In tact   Chest/Lungs:  Chest/Lungs Findings: Clear to auscultation     Heart/Vascular:  Heart Findings: Rate: Normal  Rhythm: Regular Rhythm  Sounds: Normal        Mental Status:  Mental Status Findings:  Cooperative, Alert and Oriented         Anesthesia Plan  Type of Anesthesia, risks & benefits discussed:  Anesthesia Type:  general  Patient's Preference: General  Intra-op Monitoring Plan: standard ASA monitors  Intra-op Monitoring Plan Comments: Standard ASA monitors.   Post Op Pain Control Plan: per primary service following discharge from PACU  Post Op Pain Control Plan Comments: Per primary service.     Induction:   IV  Beta Blocker:  Patient is not currently on a Beta-Blocker (No further documentation required).       Informed Consent: Patient understands risks and agrees with Anesthesia plan.  Questions answered. Anesthesia consent signed with patient.  ASA Score: 3     Day of Surgery Review of History & Physical:    H&P update referred to the surgeon.     Anesthesia Plan Notes: Chart reviewed, patient interviewed and examined.  The plan for general anesthesia was explained.  Questions were answered and the consent was signed.  Jadyn KUHN         Ready For Surgery From Anesthesia Perspective.

## 2019-08-12 NOTE — SUBJECTIVE & OBJECTIVE
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    Diverticulosis 1998    Hx of atrial flutter     Hx of mitral valve prolapse 1990    Migraines        Past Surgical History:   Procedure Laterality Date    HYSTERECTOMY      KNEE ARTHROPLASTY Right 2007    revison in 2009    KNEE SURGERY Right 1989    distal femoral replacing TK       Review of patient's allergies indicates:   Allergen Reactions    Anesthetics - amide type Other (See Comments)     Succinocholine, gives v-tach    Morphine Nausea And Vomiting    Succinylcholine      Other reaction(s): v tach       No current facility-administered medications on file prior to encounter.      Current Outpatient Medications on File Prior to Encounter   Medication Sig    CALCIUM CARB/VIT D3/MINERALS (CALTRATE PLUS ORAL) Take by mouth. Takes two chews daily    docusate sodium (COLACE) 250 MG capsule Take 250 mg by mouth once daily.    hydrocodone-acetaminophen 5-325mg (NORCO) 5-325 mg per tablet Pt states takes PRN for headaches    melatonin 5 mg Cap Take 5 mg by mouth.    propafenone (RHTHYMOL) 150 MG Tab 150 mg 4 (four) times daily.     rivaroxaban (XARELTO) 20 mg Tab Take 1 tablet (20 mg total) by mouth once daily.    UNABLE TO FIND Diclofenac 0.15%, Lidocaine 2.25%, Prilocaine 2.25% in Compounded Topical Cream to be applied up to 5x/day as needed for headaches.     Family History     None        Tobacco Use    Smoking status: Never Smoker    Smokeless tobacco: Never Used   Substance and Sexual Activity    Alcohol use: Yes     Comment: socially    Drug use: No    Sexual activity: Not on file     Review of Systems   Constitution: Negative for chills, decreased appetite, diaphoresis and night sweats.   HENT: Negative for congestion and ear discharge.    Eyes: Negative for blurred vision and discharge.   Cardiovascular: Negative for chest pain, dyspnea on exertion, irregular  heartbeat, leg swelling and paroxysmal nocturnal dyspnea.   Respiratory: Negative for cough, hemoptysis and shortness of breath.    Gastrointestinal: Negative for abdominal pain.     Objective:     Vital Signs (Most Recent):  Temp: 97.5 °F (36.4 °C) (08/12/19 1146)  Pulse: (!) 117 (08/12/19 1146)  Resp: 18 (08/12/19 1146)  BP: 106/68 (08/12/19 1147)  SpO2: (!) 94 % (08/12/19 1146) Vital Signs (24h Range):  Temp:  [97.5 °F (36.4 °C)] 97.5 °F (36.4 °C)  Pulse:  [117] 117  Resp:  [18] 18  SpO2:  [94 %] 94 %  BP: (105-106)/(68-75) 106/68        There is no height or weight on file to calculate BMI.    SpO2: (!) 94 %  O2 Device (Oxygen Therapy): room air    No intake or output data in the 24 hours ending 08/12/19 1150    Lines/Drains/Airways          None          Physical Exam   Constitutional: She is oriented to person, place, and time. She appears well-developed and well-nourished. No distress.   Eyes: Pupils are equal, round, and reactive to light. Conjunctivae are normal.   Neck: No tracheal deviation present. No thyromegaly present.   Cardiovascular: Intact distal pulses. An irregularly irregular rhythm present. Tachycardia present. Exam reveals gallop. Exam reveals no S3 and no friction rub.   No murmur heard.  Pulses:       Radial pulses are 2+ on the right side, and 2+ on the left side.        Femoral pulses are 2+ on the right side, and 2+ on the left side.  Pulmonary/Chest: Effort normal and breath sounds normal. No respiratory distress. She has no wheezes. She has no rales.   Abdominal: Soft. Bowel sounds are normal. She exhibits no distension. There is no tenderness.   Musculoskeletal: She exhibits no edema or deformity.   Neurological: She is alert and oriented to person, place, and time. No cranial nerve deficit. Coordination normal.   Skin: Skin is warm and dry. She is not diaphoretic.   Psychiatric: She has a normal mood and affect. Her behavior is normal.       Significant Labs: BMP: No results for  input(s): GLU, NA, K, CL, CO2, BUN, CREATININE, CALCIUM, MG in the last 48 hours.    Significant Imaging: Echocardiogram: Transthoracic echo (TTE) complete (Cupid Only): No results found for this or any previous visit.

## 2019-08-12 NOTE — HPI
68 y/o F with history of AF/AT/AFL hx, s/p multiple ablations in past. Presenting for AF and atypical AFL again, s/p DCCV; planned for initiation of tikosyn. Stopped propafenone for at least 3 days pre-tikosyn. Denies chest pain, shortness of breath, dyspnea on exertion.

## 2019-08-12 NOTE — ANESTHESIA POSTPROCEDURE EVALUATION
Anesthesia Post Evaluation    Patient: Zoë Enrique    Procedure(s) Performed: Procedure(s) (LRB):  CARDIOVERSION (N/A)  ECHOCARDIOGRAM,TRANSESOPHAGEAL (N/A)    Final Anesthesia Type: general  Patient location during evaluation: telemetry step down  Patient participation: Yes- Able to Participate  Level of consciousness: awake and alert  Post-procedure vital signs: reviewed and stable  Pain management: adequate  Airway patency: patent  PONV status at discharge: No PONV  Anesthetic complications: no      Cardiovascular status: hemodynamically stable  Respiratory status: unassisted  Hydration status: euvolemic  Follow-up not needed.          Vitals Value Taken Time   /65 8/12/2019  4:59 PM   Temp 36.4 °C (97.6 °F) 8/12/2019  4:55 PM   Pulse 87 8/12/2019  5:41 PM   Resp 16 8/12/2019  4:55 PM   SpO2 98 % 8/12/2019  4:55 PM   Vitals shown include unvalidated device data.      No case tracking events are documented in the log.      Pain/Kim Score: Kim Score: 10 (8/12/2019  2:55 PM)

## 2019-08-12 NOTE — ASSESSMENT & PLAN NOTE
QTc<500ms at 430ms.  - First dose dofetilide 500mcg 8/12 @2100  - Check EKG 2 hours after each dose dofetilide  - If QTc>495ms; decrease dosing to 250mcg bid; if second EKG or any thereafter the shows QTc>495ms, please DC dofetilide  - Replete lytes daily, K>4, Mg>2

## 2019-08-12 NOTE — PROGRESS NOTES
Pt arrived on unit via stretcher. Pt on RA. Telemetry applied. Pt sitting up comfortable in bed denying c/o: VSS. Chart and meds sent with pt. Family at beside. Pt and family oriented to room and unit. Bed in lowest position with siderales up x2. Call bell within reach. Pt instructed to call for assistance. Pt and family tolerating care. Will continue to monitor.

## 2019-08-13 ENCOUNTER — TELEPHONE (OUTPATIENT)
Dept: PHARMACY | Facility: CLINIC | Age: 69
End: 2019-08-13

## 2019-08-13 PROBLEM — E03.9 HYPOTHYROIDISM (ACQUIRED): Status: ACTIVE | Noted: 2019-08-13

## 2019-08-13 LAB
ANION GAP SERPL CALC-SCNC: 5 MMOL/L (ref 8–16)
BASOPHILS # BLD AUTO: 0 K/UL (ref 0–0.2)
BASOPHILS NFR BLD: 0 % (ref 0–1.9)
BUN SERPL-MCNC: 16 MG/DL (ref 8–23)
CALCIUM SERPL-MCNC: 8.4 MG/DL (ref 8.7–10.5)
CHLORIDE SERPL-SCNC: 108 MMOL/L (ref 95–110)
CO2 SERPL-SCNC: 27 MMOL/L (ref 23–29)
CREAT SERPL-MCNC: 0.7 MG/DL (ref 0.5–1.4)
DIFFERENTIAL METHOD: ABNORMAL
EOSINOPHIL # BLD AUTO: 0.1 K/UL (ref 0–0.5)
EOSINOPHIL NFR BLD: 1.4 % (ref 0–8)
ERYTHROCYTE [DISTWIDTH] IN BLOOD BY AUTOMATED COUNT: 13.3 % (ref 11.5–14.5)
EST. GFR  (AFRICAN AMERICAN): >60 ML/MIN/1.73 M^2
EST. GFR  (NON AFRICAN AMERICAN): >60 ML/MIN/1.73 M^2
GLUCOSE SERPL-MCNC: 89 MG/DL (ref 70–110)
HCT VFR BLD AUTO: 35 % (ref 37–48.5)
HGB BLD-MCNC: 11.3 G/DL (ref 12–16)
IMM GRANULOCYTES # BLD AUTO: 0.01 K/UL (ref 0–0.04)
IMM GRANULOCYTES NFR BLD AUTO: 0.3 % (ref 0–0.5)
LYMPHOCYTES # BLD AUTO: 1.4 K/UL (ref 1–4.8)
LYMPHOCYTES NFR BLD: 38.1 % (ref 18–48)
MAGNESIUM SERPL-MCNC: 2 MG/DL (ref 1.6–2.6)
MCH RBC QN AUTO: 31.8 PG (ref 27–31)
MCHC RBC AUTO-ENTMCNC: 32.3 G/DL (ref 32–36)
MCV RBC AUTO: 99 FL (ref 82–98)
MONOCYTES # BLD AUTO: 0.4 K/UL (ref 0.3–1)
MONOCYTES NFR BLD: 11.3 % (ref 4–15)
NEUTROPHILS # BLD AUTO: 1.7 K/UL (ref 1.8–7.7)
NEUTROPHILS NFR BLD: 48.9 % (ref 38–73)
NRBC BLD-RTO: 0 /100 WBC
PHOSPHATE SERPL-MCNC: 3.5 MG/DL (ref 2.7–4.5)
PLATELET # BLD AUTO: 113 K/UL (ref 150–350)
PMV BLD AUTO: 11.8 FL (ref 9.2–12.9)
POTASSIUM SERPL-SCNC: 4.5 MMOL/L (ref 3.5–5.1)
RBC # BLD AUTO: 3.55 M/UL (ref 4–5.4)
SODIUM SERPL-SCNC: 140 MMOL/L (ref 136–145)
WBC # BLD AUTO: 3.54 K/UL (ref 3.9–12.7)

## 2019-08-13 PROCEDURE — 99232 SBSQ HOSP IP/OBS MODERATE 35: CPT | Mod: ,,, | Performed by: INTERNAL MEDICINE

## 2019-08-13 PROCEDURE — 93005 ELECTROCARDIOGRAM TRACING: CPT

## 2019-08-13 PROCEDURE — 93010 ELECTROCARDIOGRAM REPORT: CPT | Mod: 76,,, | Performed by: INTERNAL MEDICINE

## 2019-08-13 PROCEDURE — 36415 COLL VENOUS BLD VENIPUNCTURE: CPT

## 2019-08-13 PROCEDURE — 25000003 PHARM REV CODE 250: Performed by: STUDENT IN AN ORGANIZED HEALTH CARE EDUCATION/TRAINING PROGRAM

## 2019-08-13 PROCEDURE — 83735 ASSAY OF MAGNESIUM: CPT

## 2019-08-13 PROCEDURE — 99233 PR SUBSEQUENT HOSPITAL CARE,LEVL III: ICD-10-PCS | Mod: ,,, | Performed by: NURSE PRACTITIONER

## 2019-08-13 PROCEDURE — 93010 ELECTROCARDIOGRAM REPORT: CPT | Mod: ,,, | Performed by: INTERNAL MEDICINE

## 2019-08-13 PROCEDURE — 84100 ASSAY OF PHOSPHORUS: CPT

## 2019-08-13 PROCEDURE — 99233 SBSQ HOSP IP/OBS HIGH 50: CPT | Mod: ,,, | Performed by: NURSE PRACTITIONER

## 2019-08-13 PROCEDURE — 80048 BASIC METABOLIC PNL TOTAL CA: CPT

## 2019-08-13 PROCEDURE — 25000003 PHARM REV CODE 250: Performed by: NURSE PRACTITIONER

## 2019-08-13 PROCEDURE — 99232 PR SUBSEQUENT HOSPITAL CARE,LEVL II: ICD-10-PCS | Mod: ,,, | Performed by: INTERNAL MEDICINE

## 2019-08-13 PROCEDURE — 93010 EKG 12-LEAD: ICD-10-PCS | Mod: 76,,, | Performed by: INTERNAL MEDICINE

## 2019-08-13 PROCEDURE — 20600001 HC STEP DOWN PRIVATE ROOM

## 2019-08-13 PROCEDURE — 85025 COMPLETE CBC W/AUTO DIFF WBC: CPT

## 2019-08-13 RX ORDER — HYDROXYZINE HYDROCHLORIDE 25 MG/1
25 TABLET, FILM COATED ORAL 3 TIMES DAILY PRN
Status: DISCONTINUED | OUTPATIENT
Start: 2019-08-13 | End: 2019-08-15 | Stop reason: HOSPADM

## 2019-08-13 RX ORDER — DOFETILIDE 0.25 MG/1
250 CAPSULE ORAL EVERY 12 HOURS
Status: DISCONTINUED | OUTPATIENT
Start: 2019-08-13 | End: 2019-08-15 | Stop reason: HOSPADM

## 2019-08-13 RX ADMIN — LEVOTHYROXINE SODIUM 25 MCG: 25 TABLET ORAL at 05:08

## 2019-08-13 RX ADMIN — DOFETILIDE 250 MCG: 0.25 CAPSULE ORAL at 08:08

## 2019-08-13 RX ADMIN — DOFETILIDE 250 MCG: 0.25 CAPSULE ORAL at 09:08

## 2019-08-13 RX ADMIN — RAMELTEON 8 MG: 8 TABLET, FILM COATED ORAL at 11:08

## 2019-08-13 RX ADMIN — DOCUSATE SODIUM 250 MG: 50 CAPSULE, LIQUID FILLED ORAL at 09:08

## 2019-08-13 RX ADMIN — DOCUSATE SODIUM 250 MG: 50 CAPSULE, LIQUID FILLED ORAL at 08:08

## 2019-08-13 RX ADMIN — RIVAROXABAN 20 MG: 20 TABLET, FILM COATED ORAL at 05:08

## 2019-08-13 NOTE — PLAN OF CARE
Problem: Adult Inpatient Plan of Care  Goal: Plan of Care Review  Outcome: Ongoing (interventions implemented as appropriate)  Reviewed plan of care with patient.  Plan is Tikosyn therapy, and EKG x 2 hours post medication administration.  Patient has remained free of falls/trauma/injury by using appropriate lighting, nonskid socks, by keeping area free of debris, call light within reach, and frequent rounding of staff.  Patient verbalized understanding of all instructions.

## 2019-08-13 NOTE — PLAN OF CARE
Problem: Adult Inpatient Plan of Care  Goal: Plan of Care Review  Outcome: Ongoing (interventions implemented as appropriate)  Pt free of falls and injury during shift. POC reviewed with pt VS stable and AAox4. Pt was encouraged to ask questions and all questions were answered.  Pt ambulated around the halls. SR on telemetry. Pt was in and out of trigeminy, see note. Pt received her first dose of Tikosyn and had an EKG done 2 hours after dose. Admin PRN med for insomnia.  No complaints. Educated pt why she is at risk for falls and to use call light for assistance ambulating. Yellow non-slip socks on pt. Bed low and locked, call light with in reach. Will continue to monitor.

## 2019-08-13 NOTE — NURSING
Shazia Latif, EP team, to approve morning dose of Tikosyn.  Morning dose may be increased to 500 mcg, will hold medication until determination of increase dose has been made.

## 2019-08-13 NOTE — PROGRESS NOTES
MD Lona made aware tele called and stated the pt was in and out of trigeminy. Pt stated she did feel fluttering in her chest but denies chest pain. PT had a recent EKG after first dose of Tikosyn, EKG shows SR with 1st degree AV block and  . MD stated no new orders. Will continue to monitor.

## 2019-08-13 NOTE — NURSING
"Administered Tikosyn at 0834 hours.  Patient reports "feeling unwell, and tightness in chest."  PVCs noted.  THOMAS Cordero NP, notified.  STAT EKG ordered.   "

## 2019-08-13 NOTE — NURSING
Patient states that she feels much better.  Called ASHLY Latif M.D. to approve night dose of Tikosyn.

## 2019-08-13 NOTE — PLAN OF CARE
08/13/19 1030   Discharge Assessment   Assessment Type Discharge Planning Assessment   Confirmed/corrected address and phone number on facesheet? Yes   Assessment information obtained from? Patient;Medical Record   Expected Length of Stay (days) 3   Communicated expected length of stay with patient/caregiver yes   Prior to hospitilization cognitive status: Alert/Oriented   Prior to hospitalization functional status: Independent   Current cognitive status: Alert/Oriented   Current Functional Status: Independent   Lives With spouse   Able to Return to Prior Arrangements yes   Is patient able to care for self after discharge? Yes   Patient's perception of discharge disposition home or selfcare   Readmission Within the Last 30 Days no previous admission in last 30 days   Patient currently being followed by outpatient case management? No   Patient currently receives any other outside agency services? No   Equipment Currently Used at Home none   Do you have any problems affording any of your prescribed medications? TBD   Is the patient taking medications as prescribed? yes   Does the patient have transportation home? Yes   Does the patient receive services at the [facility]? No   Discharge Plan A Home with family   DME Needed Upon Discharge  none   Patient/Family in Agreement with Plan yes   Admitted with [facility] for Tikosyn initiation. Lives with  and is independent in her ADLs. Plan is to DC home. No DC needs identified.  CM requested assistance from [employer] to look into Rx Cost.

## 2019-08-13 NOTE — SUBJECTIVE & OBJECTIVE
Interval History: tolerated initial dose of flecainide, QTc inc to 507 ms; decreased dose to 250mcg.    Review of Systems   Constitution: Negative. Negative for chills and fever.   HENT: Negative.    Eyes: Negative.    Cardiovascular: Negative for chest pain, claudication and paroxysmal nocturnal dyspnea.   Respiratory: Negative for cough, shortness of breath and wheezing.    Endocrine: Negative.    Hematologic/Lymphatic: Does not bruise/bleed easily.   Skin: Negative for nail changes and rash.   Musculoskeletal: Negative.  Negative for back pain.   Gastrointestinal: Negative for abdominal pain, melena, nausea and vomiting.   Neurological: Negative for dizziness and headaches.   Psychiatric/Behavioral: Negative for altered mental status, depression and substance abuse.   Allergic/Immunologic: Negative.      Objective:     Vital Signs (Most Recent):  Temp: 98.5 °F (36.9 °C) (08/13/19 0743)  Pulse: 65 (08/13/19 0743)  Resp: 16 (08/13/19 0743)  BP: 94/62 (08/13/19 0743)  SpO2: (!) 94 % (08/13/19 0743) Vital Signs (24h Range):  Temp:  [97 °F (36.1 °C)-98.5 °F (36.9 °C)] 98.5 °F (36.9 °C)  Pulse:  [] 65  Resp:  [12-18] 16  SpO2:  [94 %-98 %] 94 %  BP: ()/(57-75) 94/62     Weight: 68.2 kg (150 lb 5.7 oz)  Body mass index is 22.86 kg/m².     SpO2: (!) 94 %  O2 Device (Oxygen Therapy): room air    Physical Exam   Constitutional: She is oriented to person, place, and time. She appears well-developed and well-nourished.   HENT:   Head: Normocephalic and atraumatic.   Eyes: Pupils are equal, round, and reactive to light. Conjunctivae and EOM are normal.   Neck: No JVD present.   Cardiovascular: Normal rate, regular rhythm, normal heart sounds and intact distal pulses. Exam reveals no gallop and no friction rub.   No murmur heard.  Pulmonary/Chest: Effort normal. No stridor. She has no wheezes. She has no rales. She exhibits no tenderness.   Abdominal: Soft. Bowel sounds are normal. She exhibits no distension and no  mass. There is no tenderness. There is no guarding.   Musculoskeletal: She exhibits no edema, tenderness or deformity.   Neurological: She is alert and oriented to person, place, and time.   Skin: Skin is warm and dry. No rash noted. No erythema.   Psychiatric: She has a normal mood and affect.       Significant Labs:   CMP:   Recent Labs   Lab 08/12/19  1656 08/13/19  0434    140   K 4.1 4.5    108   CO2 28 27    89   BUN 15 16   CREATININE 0.8 0.7   CALCIUM 8.8 8.4*   ANIONGAP 4* 5*   ESTGFRAFRICA >60.0 >60.0   EGFRNONAA >60.0 >60.0    and CBC:   Recent Labs   Lab 08/13/19  0434   WBC 3.54*   HGB 11.3*   HCT 35.0*   *       Significant Imaging: Echocardiogram: Transthoracic echo (TTE) complete (Cupid Only): No results found for this or any previous visit.

## 2019-08-13 NOTE — PROGRESS NOTES
[facility]  Cardiac Electrophysiology  Progress Note    Admission Date: 8/12/2019  Code Status: Full Code   Attending Physician: Praveen Thomas MD   Expected Discharge Date:   Principal Problem:Paroxysmal atrial fibrillation    Subjective:     Interval History: tolerated initial dose of flecainide, QTc inc to 507 ms; decreased dose to 250mcg.    Review of Systems   Constitution: Negative. Negative for chills and fever.   HENT: Negative.    Eyes: Negative.    Cardiovascular: Negative for chest pain, claudication and paroxysmal nocturnal dyspnea.   Respiratory: Negative for cough, shortness of breath and wheezing.    Endocrine: Negative.    Hematologic/Lymphatic: Does not bruise/bleed easily.   Skin: Negative for nail changes and rash.   Musculoskeletal: Negative.  Negative for back pain.   Gastrointestinal: Negative for abdominal pain, melena, nausea and vomiting.   Neurological: Negative for dizziness and headaches.   Psychiatric/Behavioral: Negative for altered mental status, depression and substance abuse.   Allergic/Immunologic: Negative.      Objective:     Vital Signs (Most Recent):  Temp: 98.5 °F (36.9 °C) (08/13/19 0743)  Pulse: 65 (08/13/19 0743)  Resp: 16 (08/13/19 0743)  BP: 94/62 (08/13/19 0743)  SpO2: (!) 94 % (08/13/19 0743) Vital Signs (24h Range):  Temp:  [97 °F (36.1 °C)-98.5 °F (36.9 °C)] 98.5 °F (36.9 °C)  Pulse:  [] 65  Resp:  [12-18] 16  SpO2:  [94 %-98 %] 94 %  BP: ()/(57-75) 94/62     Weight: 68.2 kg (150 lb 5.7 oz)  Body mass index is 22.86 kg/m².     SpO2: (!) 94 %  O2 Device (Oxygen Therapy): room air    Physical Exam   Constitutional: She is oriented to person, place, and time. She appears well-developed and well-nourished.   HENT:   Head: Normocephalic and atraumatic.   Eyes: Pupils are equal, round, and reactive to light. Conjunctivae and EOM are normal.   Neck: No JVD present.   Cardiovascular: Normal rate, regular rhythm, normal heart sounds and  intact distal pulses. Exam reveals no gallop and no friction rub.   No murmur heard.  Pulmonary/Chest: Effort normal. No stridor. She has no wheezes. She has no rales. She exhibits no tenderness.   Abdominal: Soft. Bowel sounds are normal. She exhibits no distension and no mass. There is no tenderness. There is no guarding.   Musculoskeletal: She exhibits no edema, tenderness or deformity.   Neurological: She is alert and oriented to person, place, and time.   Skin: Skin is warm and dry. No rash noted. No erythema.   Psychiatric: She has a normal mood and affect.       Significant Labs:   CMP:   Recent Labs   Lab 08/12/19  1656 08/13/19  0434    140   K 4.1 4.5    108   CO2 28 27    89   BUN 15 16   CREATININE 0.8 0.7   CALCIUM 8.8 8.4*   ANIONGAP 4* 5*   ESTGFRAFRICA >60.0 >60.0   EGFRNONAA >60.0 >60.0    and CBC:   Recent Labs   Lab 08/13/19  0434   WBC 3.54*   HGB 11.3*   HCT 35.0*   *       Significant Imaging: Echocardiogram: Transthoracic echo (TTE) complete (Cupid Only): No results found for this or any previous visit.    Assessment and Plan:     * Paroxysmal atrial fibrillation  CHADS-VASC 3  Rhythm control: dofetilide as below  AC: continue home xarelto    Visit for monitoring Tikosyn therapy  Baseline QTc<500ms at 430ms. >500ms after first dose  - Decreased dofetilide to 250mcg   - Check EKG 2 hours after each dose dofetilide  - If next EKG or any thereafter the shows QTc>495ms, please DC dofetilide  - Replete lytes daily, K>4, Mg>2        Lui Latif MD  Cardiac Electrophysiology  [facility]

## 2019-08-14 PROCEDURE — 25000003 PHARM REV CODE 250: Performed by: STUDENT IN AN ORGANIZED HEALTH CARE EDUCATION/TRAINING PROGRAM

## 2019-08-14 PROCEDURE — 93010 EKG 12-LEAD: ICD-10-PCS | Mod: 76,,, | Performed by: INTERNAL MEDICINE

## 2019-08-14 PROCEDURE — 93010 ELECTROCARDIOGRAM REPORT: CPT | Mod: 76,,, | Performed by: INTERNAL MEDICINE

## 2019-08-14 PROCEDURE — 93010 ELECTROCARDIOGRAM REPORT: CPT | Mod: ,,, | Performed by: INTERNAL MEDICINE

## 2019-08-14 PROCEDURE — 20600001 HC STEP DOWN PRIVATE ROOM

## 2019-08-14 PROCEDURE — 99232 SBSQ HOSP IP/OBS MODERATE 35: CPT | Mod: ,,, | Performed by: INTERNAL MEDICINE

## 2019-08-14 PROCEDURE — 93005 ELECTROCARDIOGRAM TRACING: CPT

## 2019-08-14 PROCEDURE — 25000003 PHARM REV CODE 250: Performed by: NURSE PRACTITIONER

## 2019-08-14 PROCEDURE — 94761 N-INVAS EAR/PLS OXIMETRY MLT: CPT

## 2019-08-14 PROCEDURE — 99232 PR SUBSEQUENT HOSPITAL CARE,LEVL II: ICD-10-PCS | Mod: ,,, | Performed by: INTERNAL MEDICINE

## 2019-08-14 PROCEDURE — 99233 PR SUBSEQUENT HOSPITAL CARE,LEVL III: ICD-10-PCS | Mod: ,,, | Performed by: NURSE PRACTITIONER

## 2019-08-14 PROCEDURE — 99233 SBSQ HOSP IP/OBS HIGH 50: CPT | Mod: ,,, | Performed by: NURSE PRACTITIONER

## 2019-08-14 RX ADMIN — DOCUSATE SODIUM 250 MG: 50 CAPSULE, LIQUID FILLED ORAL at 08:08

## 2019-08-14 RX ADMIN — ACETAMINOPHEN 650 MG: 325 TABLET ORAL at 11:08

## 2019-08-14 RX ADMIN — DOFETILIDE 250 MCG: 0.25 CAPSULE ORAL at 09:08

## 2019-08-14 RX ADMIN — DOFETILIDE 250 MCG: 0.25 CAPSULE ORAL at 08:08

## 2019-08-14 RX ADMIN — DOCUSATE SODIUM 250 MG: 50 CAPSULE, LIQUID FILLED ORAL at 09:08

## 2019-08-14 RX ADMIN — RIVAROXABAN 20 MG: 20 TABLET, FILM COATED ORAL at 04:08

## 2019-08-14 RX ADMIN — LEVOTHYROXINE SODIUM 25 MCG: 25 TABLET ORAL at 06:08

## 2019-08-14 NOTE — SUBJECTIVE & OBJECTIVE
Interval History: Ambulating in hallway with spouse, reports feeling well at current, dizziness earlier in the morning has subsided. She understands plan of care, medication cost reviewed and patient accepts cost. She currently denies chest pain, palpitations, or shortness of breath. Denies any acute events or distress overnight.     Review of Systems   Constitutional: Positive for fatigue. Negative for activity change, appetite change, chills and fever.   HENT: Negative for congestion, rhinorrhea, sinus pressure, sore throat and trouble swallowing.    Respiratory: Negative for cough, chest tightness, shortness of breath and wheezing.    Cardiovascular: Negative for chest pain, palpitations and leg swelling.   Gastrointestinal: Negative for abdominal distention, abdominal pain, constipation, diarrhea, nausea and vomiting.   Genitourinary: Negative for difficulty urinating, dysuria, frequency and urgency.   Musculoskeletal: Negative for arthralgias, back pain, gait problem, joint swelling, myalgias and neck pain.   Skin: Negative for color change, pallor, rash and wound.   Neurological: Negative for dizziness, syncope, weakness, light-headedness and headaches.   Psychiatric/Behavioral: Negative for agitation and confusion. The patient is not nervous/anxious.      Objective:     Vital Signs (Most Recent):  Temp: 98.2 °F (36.8 °C) (08/13/19 1539)  Pulse: 67 (08/13/19 2015)  Resp: 16 (08/13/19 1539)  BP: (!) 120/57 (08/13/19 2015)  SpO2: 98 % (08/13/19 1539) Vital Signs (24h Range):  Temp:  [97.4 °F (36.3 °C)-98.3 °F (36.8 °C)] 98.2 °F (36.8 °C)  Pulse:  [55-85] 67  Resp:  [16-18] 16  SpO2:  [96 %-98 %] 98 %  BP: (108-120)/(56-60) 120/57     Weight: 68.2 kg (150 lb 5.7 oz)  Body mass index is 22.86 kg/m².    Intake/Output Summary (Last 24 hours) at 8/13/2019 2105  Last data filed at 8/13/2019 1200  Gross per 24 hour   Intake 955 ml   Output --   Net 955 ml      Physical Exam   Constitutional: She is oriented to person,  place, and time. She appears well-developed and well-nourished. No distress.   HENT:   Head: Normocephalic and atraumatic.   Mouth/Throat: No oropharyngeal exudate.   Eyes: Conjunctivae and EOM are normal. No scleral icterus.   Neck: Normal range of motion. Neck supple. No JVD present.   Cardiovascular: Normal rate, regular rhythm, normal heart sounds and intact distal pulses.   No murmur heard.  Pulmonary/Chest: Effort normal and breath sounds normal. No respiratory distress. She has no wheezes. She has no rales.   Abdominal: Soft. Bowel sounds are normal. She exhibits no distension. There is no tenderness.   Musculoskeletal: Normal range of motion. She exhibits no edema or tenderness.   Neurological: She is alert and oriented to person, place, and time.   Skin: Skin is warm and dry. No rash noted. She is not diaphoretic. No erythema.   Psychiatric: She has a normal mood and affect. Her behavior is normal. Judgment normal.   Nursing note and vitals reviewed.    Significant Labs:   CBC:   Recent Labs   Lab 08/13/19  0434   WBC 3.54*   HGB 11.3*   HCT 35.0*   *     CMP:   Recent Labs   Lab 08/12/19  1656 08/13/19  0434    140   K 4.1 4.5    108   CO2 28 27    89   BUN 15 16   CREATININE 0.8 0.7   CALCIUM 8.8 8.4*   ANIONGAP 4* 5*   EGFRNONAA >60.0 >60.0     Magnesium:   Recent Labs   Lab 08/13/19  0434   MG 2.0     Significant Imaging: I have reviewed all pertinent imaging results/findings within the past 24 hours.

## 2019-08-14 NOTE — PROGRESS NOTES
"[facility] Hospital Medicine  Progress Note    Patient Name: Zoë Enrique  MRN: [id number]  Patient Class: IP- Inpatient   Admission Date: 8/12/2019  Length of Stay: 1 days  Attending Physician: Praveen Thomas MD  Primary Care Provider: Praveen Jeffery MD    [facility] Medicine Team: [facility] J Natasha Cordero NP    Subjective:     Principal Problem:Visit for monitoring Tikosyn therapy    HPI:  Ms. Enrique is a 69 year old female with past medical history of complex AFib/ ATach/ AFlutter, multiple ablations/DCCVs (8/19, 1/19), clean coronaries noted on [facility] 2014, colon resection/diverticulosis/itis, migraines (takes narcotics), MVP, osteosarcoma/ R distal femoral revision, R TKR/ Revisions, breast augmentation, and SAM/ BSO. She presented for outpt KHANH/DCCV with successful restoration of NSR. She was then admitted for tikosyn initiation. She reports that during arrhythmia episodes she is very symptomatic with marked CHAIDEZ and chest pressure; describing "feeling awful". She lives with , is independent in ADLs, retired nurse. She denies alcohol, tobacco, or illicit drug use. All past medical, social, and family history reviewed.     Labs prior to DCCV reviewed noting CBC unremarkable, chemistry with normal SCr/BUN, electrolytes, and  CrCL 59, baseline QTc 430. Tikoysn initiated per protocol. The patient was admitted to the Hospital Medicine Service for further evaluation and management.     Overview/Hospital Course:  Ms. Enrique was admitted 8/12 after successful DCCV for initiation of tikoysn. EP consulted and following. Tikosyn initiated at 500 mcg however decreased to 250 mcg after first dose, continue to monitor EKGs after each dose. Baseline QTc 430 >> 507 >> 429.     Disposition plans: home with spouse, Thursday midday likely, no home needs identified, pharmD assisted with tikosyn [place] and cost will be ~ $87/month. Patient financially capable of the medication cost. Will need " outpt follow up with EP.     Interval History: Ambulating in hallway with spouse, reports feeling well at current, dizziness earlier in the morning has subsided. She understands plan of care, medication cost reviewed and patient accepts cost. She currently denies chest pain, palpitations, or shortness of breath. Denies any acute events or distress overnight.     Review of Systems   Constitutional: Positive for fatigue. Negative for activity change, appetite change, chills and fever.   HENT: Negative for congestion, rhinorrhea, sinus pressure, sore throat and trouble swallowing.    Respiratory: Negative for cough, chest tightness, shortness of breath and wheezing.    Cardiovascular: Negative for chest pain, palpitations and leg swelling.   Gastrointestinal: Negative for abdominal distention, abdominal pain, constipation, diarrhea, nausea and vomiting.   Genitourinary: Negative for difficulty urinating, dysuria, frequency and urgency.   Musculoskeletal: Negative for arthralgias, back pain, gait problem, joint swelling, myalgias and neck pain.   Skin: Negative for color change, pallor, rash and wound.   Neurological: Negative for dizziness, syncope, weakness, light-headedness and headaches.   Psychiatric/Behavioral: Negative for agitation and confusion. The patient is not nervous/anxious.      Objective:     Vital Signs (Most Recent):  Temp: 98.2 °F (36.8 °C) (08/13/19 1539)  Pulse: 67 (08/13/19 2015)  Resp: 16 (08/13/19 1539)  BP: (!) 120/57 (08/13/19 2015)  SpO2: 98 % (08/13/19 1539) Vital Signs (24h Range):  Temp:  [97.4 °F (36.3 °C)-98.3 °F (36.8 °C)] 98.2 °F (36.8 °C)  Pulse:  [55-85] 67  Resp:  [16-18] 16  SpO2:  [96 %-98 %] 98 %  BP: (108-120)/(56-60) 120/57     Weight: 68.2 kg (150 lb 5.7 oz)  Body mass index is 22.86 kg/m².    Intake/Output Summary (Last 24 hours) at 8/13/2019 2105  Last data filed at 8/13/2019 1200  Gross per 24 hour   Intake 955 ml   Output --   Net 955 ml      Physical Exam    Constitutional: She is oriented to person, place, and time. She appears well-developed and well-nourished. No distress.   HENT:   Head: Normocephalic and atraumatic.   Mouth/Throat: No oropharyngeal exudate.   Eyes: Conjunctivae and EOM are normal. No scleral icterus.   Neck: Normal range of motion. Neck supple. No JVD present.   Cardiovascular: Normal rate, regular rhythm, normal heart sounds and intact distal pulses.   No murmur heard.  Pulmonary/Chest: Effort normal and breath sounds normal. No respiratory distress. She has no wheezes. She has no rales.   Abdominal: Soft. Bowel sounds are normal. She exhibits no distension. There is no tenderness.   Musculoskeletal: Normal range of motion. She exhibits no edema or tenderness.   Neurological: She is alert and oriented to person, place, and time.   Skin: Skin is warm and dry. No rash noted. She is not diaphoretic. No erythema.   Psychiatric: She has a normal mood and affect. Her behavior is normal. Judgment normal.   Nursing note and vitals reviewed.    Significant Labs:   CBC:   Recent Labs   Lab 08/13/19  0434   WBC 3.54*   HGB 11.3*   HCT 35.0*   *     CMP:   Recent Labs   Lab 08/12/19  1656 08/13/19  0434    140   K 4.1 4.5    108   CO2 28 27    89   BUN 15 16   CREATININE 0.8 0.7   CALCIUM 8.8 8.4*   ANIONGAP 4* 5*   EGFRNONAA >60.0 >60.0     Magnesium:   Recent Labs   Lab 08/13/19  0434   MG 2.0     Significant Imaging: I have reviewed all pertinent imaging results/findings within the past 24 hours.    Assessment/Plan:      * Visit for monitoring Tikosyn therapy  -see below    Paroxysmal atrial fibrillation  -admitted 8/12 after successful DCCV for initiation of tikoysn  -EP consulted and following  -tikosyn initiated at 500 mcg however decreased to 250 mcg after first dose   -continue to monitor EKGs 2-3 hours after each dose, NO EXCEPTIONS   -EP fellow to approve each dose   -baseline QTc 430 >> 507 >> 429  -[id number] 2 >>  continue OAC with xarelto  -continue tele monitoring  -cardiac diet in house  -outpt follow up with EP at [place]     Hypothyroidism (acquired)  -chronic, stable  -TSH WNL  -continue home levothyroxine       VTE Risk Mitigation (From admission, onward)        Ordered     rivaroxaban tablet 20 mg  with dinner      08/12/19 1633     IP VTE LOW RISK PATIENT  Once      08/12/19 1633          Natasha Cordero, DNP, AG-ACNP, BC  Department of Hospital Medicine  [facility]  Pager [phone]  [facility] [id number]

## 2019-08-14 NOTE — SUBJECTIVE & OBJECTIVE
Interval History: Resting on couch in room, spouse at bedside, reports feeling well, no dizziness or light headiness today. She understands plan of care, medication cost reviewed and patient accepts cost. She currently denies chest pain, palpitations, or shortness of breath. Denies any acute events or distress overnight.     Review of Systems   Constitutional: Negative for activity change, appetite change, chills, fatigue and fever.   HENT: Negative for congestion, rhinorrhea, sinus pressure, sore throat and trouble swallowing.    Respiratory: Negative for cough, chest tightness, shortness of breath and wheezing.    Cardiovascular: Negative for chest pain, palpitations and leg swelling.   Gastrointestinal: Negative for abdominal distention, abdominal pain, constipation, diarrhea, nausea and vomiting.   Genitourinary: Negative for difficulty urinating, dysuria, frequency and urgency.   Musculoskeletal: Negative for arthralgias, back pain, gait problem, joint swelling, myalgias and neck pain.   Skin: Negative for color change, pallor, rash and wound.   Neurological: Negative for dizziness, syncope, weakness, light-headedness and headaches.   Psychiatric/Behavioral: Negative for agitation and confusion. The patient is not nervous/anxious.      Objective:     Vital Signs (Most Recent):  Temp: 96.4 °F (35.8 °C) (08/14/19 1150)  Pulse: 77 (08/14/19 1419)  Resp: 14 (08/14/19 1419)  BP: (!) 106/58 (08/14/19 1150)  SpO2: 96 % (08/14/19 1419) Vital Signs (24h Range):  Temp:  [96.4 °F (35.8 °C)-98.2 °F (36.8 °C)] 96.4 °F (35.8 °C)  Pulse:  [52-85] 77  Resp:  [14-18] 14  SpO2:  [96 %-99 %] 96 %  BP: (103-120)/(51-59) 106/58     Weight: 68.7 kg (151 lb 7.3 oz)  Body mass index is 23.03 kg/m².    Intake/Output Summary (Last 24 hours) at 8/14/2019 1517  Last data filed at 8/14/2019 1200  Gross per 24 hour   Intake 710 ml   Output --   Net 710 ml      Physical Exam   Constitutional: She is oriented to person, place, and time. She  appears well-developed and well-nourished. No distress.   HENT:   Head: Normocephalic and atraumatic.   Mouth/Throat: No oropharyngeal exudate.   Eyes: Conjunctivae and EOM are normal. No scleral icterus.   Neck: Normal range of motion. Neck supple. No JVD present.   Cardiovascular: Normal rate, regular rhythm, normal heart sounds and intact distal pulses.   No murmur heard.  Pulmonary/Chest: Effort normal and breath sounds normal. No respiratory distress. She has no wheezes. She has no rales.   Abdominal: Soft. Bowel sounds are normal. She exhibits no distension. There is no tenderness.   Musculoskeletal: Normal range of motion. She exhibits no edema or tenderness.   Neurological: She is alert and oriented to person, place, and time.   Skin: Skin is warm and dry. No rash noted. She is not diaphoretic. No erythema.   Psychiatric: She has a normal mood and affect. Her behavior is normal. Judgment normal.   Nursing note and vitals reviewed.    Significant Labs:   CBC:   Recent Labs   Lab 08/13/19  0434   WBC 3.54*   HGB 11.3*   HCT 35.0*   *     CMP:   Recent Labs   Lab 08/12/19  1656 08/13/19  0434    140   K 4.1 4.5    108   CO2 28 27    89   BUN 15 16   CREATININE 0.8 0.7   CALCIUM 8.8 8.4*   ANIONGAP 4* 5*   EGFRNONAA >60.0 >60.0     Magnesium:   Recent Labs   Lab 08/13/19  0434   MG 2.0     Significant Imaging: I have reviewed all pertinent imaging results/findings within the past 24 hours.

## 2019-08-14 NOTE — PROGRESS NOTES
[facility]  Cardiac Electrophysiology  Progress Note    Admission Date: 8/12/2019  Code Status: Full Code   Attending Physician: Praveen Thomas MD   Expected Discharge Date: 8/15/2019  Principal Problem:Visit for monitoring Tikosyn therapy    Subjective:     Interval History: no acute events overnight; QTc on 11pm EKG remains <500ms    Review of Systems   Constitution: Negative. Negative for chills and fever.   HENT: Negative.    Eyes: Negative.    Cardiovascular: Negative for chest pain, claudication and paroxysmal nocturnal dyspnea.   Respiratory: Negative for cough, shortness of breath and wheezing.    Endocrine: Negative.    Hematologic/Lymphatic: Does not bruise/bleed easily.   Skin: Negative for nail changes and rash.   Musculoskeletal: Negative.  Negative for back pain.   Gastrointestinal: Negative for abdominal pain, melena, nausea and vomiting.   Neurological: Negative for dizziness and headaches.   Psychiatric/Behavioral: Negative for altered mental status, depression and substance abuse.   Allergic/Immunologic: Negative.      Objective:     Vital Signs (Most Recent):  Temp: 98.1 °F (36.7 °C) (08/14/19 0825)  Pulse: 72 (08/14/19 0825)  Resp: 16 (08/14/19 0825)  BP: (!) 103/51 (08/14/19 0825)  SpO2: 98 % (08/14/19 0825) Vital Signs (24h Range):  Temp:  [97.5 °F (36.4 °C)-98.2 °F (36.8 °C)] 98.1 °F (36.7 °C)  Pulse:  [52-85] 72  Resp:  [16-18] 16  SpO2:  [96 %-99 %] 98 %  BP: (103-120)/(51-59) 103/51     Weight: 68.7 kg (151 lb 7.3 oz)  Body mass index is 23.03 kg/m².     SpO2: 98 %  O2 Device (Oxygen Therapy): room air    Physical Exam   Constitutional: She is oriented to person, place, and time. She appears well-developed and well-nourished.   HENT:   Head: Normocephalic and atraumatic.   Eyes: Pupils are equal, round, and reactive to light. Conjunctivae and EOM are normal.   Neck: No JVD present.   Cardiovascular: Normal rate, regular rhythm, normal heart sounds and intact distal  pulses. Exam reveals no gallop and no friction rub.   No murmur heard.  Pulmonary/Chest: Effort normal. No stridor. She has no wheezes. She has no rales. She exhibits no tenderness.   Abdominal: Soft. Bowel sounds are normal. She exhibits no distension and no mass. There is no tenderness. There is no guarding.   Musculoskeletal: She exhibits no edema, tenderness or deformity.   Neurological: She is alert and oriented to person, place, and time.   Skin: Skin is warm and dry. No rash noted. No erythema.   Psychiatric: She has a normal mood and affect.       Significant Labs: reviewed    Significant Imaging: reviewed    Assessment and Plan:     * Visit for monitoring Tikosyn therapy  QTc remains <495 ms   - First dose dofetilide 500mcg 8/12 @2100  - Check EKG 2 hours after each dose dofetilide  - If QTc>495ms please DC dofetilide  - Replete lytes daily, K>4, Mg>2    Paroxysmal atrial fibrillation  CHADS-VASC 3  Rhythm control: dofetilide as below  AC: continue home xarelto    Thank you for this interesting consult. EP consult will continue to follow. Case to be discussed with attending, Dr. Seymour with final attestation to follow.          Lui Latif MD  Cardiac Electrophysiology  [facility]

## 2019-08-14 NOTE — PROGRESS NOTES
Spoke with JO-ANN Cordero that pt has no labs ordered. NP stated that no labs were needed.   Pt also requested to shower. Spoke with JO-ANN Cordero as pt would be off tele. NP stated it was OK for pt to shower.    Will continue to monitor pt.

## 2019-08-14 NOTE — PLAN OF CARE
Problem: Adult Inpatient Plan of Care  Goal: Plan of Care Review  Outcome: Ongoing (interventions implemented as appropriate)  Pt free of falls and injury during shift. POC reviewed with pt VS stable and AAox4. Pt ambulated around the donald with daughter. SR On telemetry.  No acute events noted at this time. Admin Tikosyn, EKG completed 2 hours after, and QTC was 429.  Pt had BM tonight. PRN Ramelteon given. No complaints. Educated pt why she is at risk for falls and to use call light for assistance ambulating. Yellow non-slip socks on pt. Bed low and locked, call light with in reach. Will continue to monitor.

## 2019-08-14 NOTE — PROGRESS NOTES
"[facility] Hospital Medicine  Progress Note    Patient Name: Zoë Enrique  MRN: [id number]  Patient Class: IP- Inpatient   Admission Date: 8/12/2019  Length of Stay: 2 days  Attending Physician: Praveen Thomas MD  Primary Care Provider: Praveen Jeffery MD    [facility] Medicine Team: [facility] [place] [facility] MED J Natasha Cordero NP    Subjective:     Principal Problem:Visit for monitoring Tikosyn therapy    HPI:  Ms. Enrique is a 69 year old female with past medical history of complex AFib/ ATach/ AFlutter, multiple ablations/DCCVs (8/19, 1/19), clean coronaries noted on [facility] 2014, colon resection/diverticulosis/itis, migraines (takes narcotics), MVP, osteosarcoma/ R distal femoral revision, R TKR/ Revisions, breast augmentation, and SAM/ BSO. She presented for outpt KHANH/DCCV with successful restoration of NSR. She was then admitted for tikosyn initiation. She reports that during arrhythmia episodes she is very symptomatic with marked CHAIDEZ and chest pressure; describing "feeling awful". She lives with , is independent in ADLs, retired nurse. She denies alcohol, tobacco, or illicit drug use. All past medical, social, and family history reviewed.     Labs prior to DCCV reviewed noting CBC unremarkable, chemistry with normal SCr/BUN, electrolytes, and  CrCL 59, baseline QTc 430. Tikoysn initiated per protocol. The patient was admitted to the Hospital Medicine Service for further evaluation and management.     Overview/Hospital Course:  Ms. Enrique was admitted 8/12 after successful DCCV for initiation of tikoysn. EP consulted and following. Tikosyn initiated at 500 mcg however decreased to 250 mcg after first dose, continue to monitor EKGs after each dose. Baseline QTc 430 >> 507 >> 429 >> 478.     Disposition plans: home with spouse, Thursday midday likely, no home needs identified, pharmD assisted with tikosyn [place] and cost will be ~ $87/month. Patient financially capable of the medication cost. Will " need outpt follow up with EP.     Interval History: Resting on couch in room, spouse at bedside, reports feeling well, no dizziness or light headiness today. She understands plan of care, medication cost reviewed and patient accepts cost. She currently denies chest pain, palpitations, or shortness of breath. Denies any acute events or distress overnight.     Review of Systems   Constitutional: Negative for activity change, appetite change, chills, fatigue and fever.   HENT: Negative for congestion, rhinorrhea, sinus pressure, sore throat and trouble swallowing.    Respiratory: Negative for cough, chest tightness, shortness of breath and wheezing.    Cardiovascular: Negative for chest pain, palpitations and leg swelling.   Gastrointestinal: Negative for abdominal distention, abdominal pain, constipation, diarrhea, nausea and vomiting.   Genitourinary: Negative for difficulty urinating, dysuria, frequency and urgency.   Musculoskeletal: Negative for arthralgias, back pain, gait problem, joint swelling, myalgias and neck pain.   Skin: Negative for color change, pallor, rash and wound.   Neurological: Negative for dizziness, syncope, weakness, light-headedness and headaches.   Psychiatric/Behavioral: Negative for agitation and confusion. The patient is not nervous/anxious.      Objective:     Vital Signs (Most Recent):  Temp: 96.4 °F (35.8 °C) (08/14/19 1150)  Pulse: 77 (08/14/19 1419)  Resp: 14 (08/14/19 1419)  BP: (!) 106/58 (08/14/19 1150)  SpO2: 96 % (08/14/19 1419) Vital Signs (24h Range):  Temp:  [96.4 °F (35.8 °C)-98.2 °F (36.8 °C)] 96.4 °F (35.8 °C)  Pulse:  [52-85] 77  Resp:  [14-18] 14  SpO2:  [96 %-99 %] 96 %  BP: (103-120)/(51-59) 106/58     Weight: 68.7 kg (151 lb 7.3 oz)  Body mass index is 23.03 kg/m².    Intake/Output Summary (Last 24 hours) at 8/14/2019 1517  Last data filed at 8/14/2019 1200  Gross per 24 hour   Intake 710 ml   Output --   Net 710 ml      Physical Exam   Constitutional: She is oriented  to person, place, and time. She appears well-developed and well-nourished. No distress.   HENT:   Head: Normocephalic and atraumatic.   Mouth/Throat: No oropharyngeal exudate.   Eyes: Conjunctivae and EOM are normal. No scleral icterus.   Neck: Normal range of motion. Neck supple. No JVD present.   Cardiovascular: Normal rate, regular rhythm, normal heart sounds and intact distal pulses.   No murmur heard.  Pulmonary/Chest: Effort normal and breath sounds normal. No respiratory distress. She has no wheezes. She has no rales.   Abdominal: Soft. Bowel sounds are normal. She exhibits no distension. There is no tenderness.   Musculoskeletal: Normal range of motion. She exhibits no edema or tenderness.   Neurological: She is alert and oriented to person, place, and time.   Skin: Skin is warm and dry. No rash noted. She is not diaphoretic. No erythema.   Psychiatric: She has a normal mood and affect. Her behavior is normal. Judgment normal.   Nursing note and vitals reviewed.    Significant Labs:   CBC:   Recent Labs   Lab 08/13/19  0434   WBC 3.54*   HGB 11.3*   HCT 35.0*   *     CMP:   Recent Labs   Lab 08/12/19  1656 08/13/19  0434    140   K 4.1 4.5    108   CO2 28 27    89   BUN 15 16   CREATININE 0.8 0.7   CALCIUM 8.8 8.4*   ANIONGAP 4* 5*   EGFRNONAA >60.0 >60.0     Magnesium:   Recent Labs   Lab 08/13/19  0434   MG 2.0     Significant Imaging: I have reviewed all pertinent imaging results/findings within the past 24 hours.     Assessment/Plan:      * Visit for monitoring Tikosyn therapy  -see below    Paroxysmal atrial fibrillation  -admitted 8/12 after successful DCCV for initiation of tikoysn  -EP consulted and following  -tikosyn initiated at 500 mcg however decreased to 250 mcg after first dose   -continue to monitor EKGs 2-3 hours after each dose, NO EXCEPTIONS   -EP fellow to approve each dose   -baseline QTc 430 >> 507 >> 429 >> 478  -[id number] 2 >> continue OAC with  xarelto  -continue tele monitoring  -cardiac diet in house  -outpt follow up with EP at [place]     Hypothyroidism (acquired)  -chronic, stable  -TSH WNL  -continue home levothyroxine       VTE Risk Mitigation (From admission, onward)        Ordered     rivaroxaban tablet 20 mg  with dinner      08/12/19 1633     IP VTE LOW RISK PATIENT  Once      08/12/19 1633          Natasha Cordero, DNP, AG-ACNP, BC  Department of Hospital Medicine  [facility]  Pager [phone]  [facility] [address]

## 2019-08-14 NOTE — ASSESSMENT & PLAN NOTE
QTc remains <495 ms   - First dose dofetilide 500mcg 8/12 @2100  - Check EKG 2 hours after each dose dofetilide  - If QTc>495ms please DC dofetilide  - Replete lytes daily, K>4, Mg>2

## 2019-08-14 NOTE — SUBJECTIVE & OBJECTIVE
Interval History: no acute events overnight; QTc on 11pm EKG remains <500ms    Review of Systems   Constitution: Negative. Negative for chills and fever.   HENT: Negative.    Eyes: Negative.    Cardiovascular: Negative for chest pain, claudication and paroxysmal nocturnal dyspnea.   Respiratory: Negative for cough, shortness of breath and wheezing.    Endocrine: Negative.    Hematologic/Lymphatic: Does not bruise/bleed easily.   Skin: Negative for nail changes and rash.   Musculoskeletal: Negative.  Negative for back pain.   Gastrointestinal: Negative for abdominal pain, melena, nausea and vomiting.   Neurological: Negative for dizziness and headaches.   Psychiatric/Behavioral: Negative for altered mental status, depression and substance abuse.   Allergic/Immunologic: Negative.      Objective:     Vital Signs (Most Recent):  Temp: 98.1 °F (36.7 °C) (08/14/19 0825)  Pulse: 72 (08/14/19 0825)  Resp: 16 (08/14/19 0825)  BP: (!) 103/51 (08/14/19 0825)  SpO2: 98 % (08/14/19 0825) Vital Signs (24h Range):  Temp:  [97.5 °F (36.4 °C)-98.2 °F (36.8 °C)] 98.1 °F (36.7 °C)  Pulse:  [52-85] 72  Resp:  [16-18] 16  SpO2:  [96 %-99 %] 98 %  BP: (103-120)/(51-59) 103/51     Weight: 68.7 kg (151 lb 7.3 oz)  Body mass index is 23.03 kg/m².     SpO2: 98 %  O2 Device (Oxygen Therapy): room air    Physical Exam   Constitutional: She is oriented to person, place, and time. She appears well-developed and well-nourished.   HENT:   Head: Normocephalic and atraumatic.   Eyes: Pupils are equal, round, and reactive to light. Conjunctivae and EOM are normal.   Neck: No JVD present.   Cardiovascular: Normal rate, regular rhythm, normal heart sounds and intact distal pulses. Exam reveals no gallop and no friction rub.   No murmur heard.  Pulmonary/Chest: Effort normal. No stridor. She has no wheezes. She has no rales. She exhibits no tenderness.   Abdominal: Soft. Bowel sounds are normal. She exhibits no distension and no mass. There is no  tenderness. There is no guarding.   Musculoskeletal: She exhibits no edema, tenderness or deformity.   Neurological: She is alert and oriented to person, place, and time.   Skin: Skin is warm and dry. No rash noted. No erythema.   Psychiatric: She has a normal mood and affect.       Significant Labs: reviewed    Significant Imaging: reviewed

## 2019-08-14 NOTE — ASSESSMENT & PLAN NOTE
-admitted 8/12 after successful DCCV for initiation of tikoysn  -EP consulted and following  -tikosyn initiated at 500 mcg however decreased to 250 mcg after first dose   -continue to monitor EKGs 2-3 hours after each dose, NO EXCEPTIONS   -EP fellow to approve each dose   -baseline QTc 430 >> 507 >> 429 >> 478  -[id number] 2 >> continue OAC with xarelto  -continue tele monitoring  -cardiac diet in house  -outpt follow up with EP at [place]

## 2019-08-14 NOTE — PLAN OF CARE
Problem: Adult Inpatient Plan of Care  Goal: Patient-Specific Goal (Individualization)  Outcome: Ongoing (interventions implemented as appropriate)  Plan of care discussed with patient. Patient is free of fall/trauma/injury. Denies CP, SOB, or pain/discomfort. 4th dose tikosyn administered, OK to give night dose. All questions addressed. Will continue to monitor.

## 2019-08-15 ENCOUNTER — TELEPHONE (OUTPATIENT)
Dept: PHARMACY | Facility: CLINIC | Age: 69
End: 2019-08-15

## 2019-08-15 VITALS
BODY MASS INDEX: 22.95 KG/M2 | HEART RATE: 77 BPM | WEIGHT: 151.44 LBS | HEIGHT: 68 IN | SYSTOLIC BLOOD PRESSURE: 101 MMHG | DIASTOLIC BLOOD PRESSURE: 64 MMHG | TEMPERATURE: 97 F | OXYGEN SATURATION: 98 % | RESPIRATION RATE: 14 BRPM

## 2019-08-15 LAB
ANION GAP SERPL CALC-SCNC: 7 MMOL/L (ref 8–16)
BASOPHILS # BLD AUTO: 0 K/UL (ref 0–0.2)
BASOPHILS NFR BLD: 0 % (ref 0–1.9)
BUN SERPL-MCNC: 18 MG/DL (ref 8–23)
CALCIUM SERPL-MCNC: 9.1 MG/DL (ref 8.7–10.5)
CHLORIDE SERPL-SCNC: 106 MMOL/L (ref 95–110)
CO2 SERPL-SCNC: 27 MMOL/L (ref 23–29)
CREAT SERPL-MCNC: 0.7 MG/DL (ref 0.5–1.4)
DIFFERENTIAL METHOD: ABNORMAL
EOSINOPHIL # BLD AUTO: 0 K/UL (ref 0–0.5)
EOSINOPHIL NFR BLD: 0.8 % (ref 0–8)
ERYTHROCYTE [DISTWIDTH] IN BLOOD BY AUTOMATED COUNT: 13.2 % (ref 11.5–14.5)
EST. GFR  (AFRICAN AMERICAN): >60 ML/MIN/1.73 M^2
EST. GFR  (NON AFRICAN AMERICAN): >60 ML/MIN/1.73 M^2
GLUCOSE SERPL-MCNC: 97 MG/DL (ref 70–110)
HCT VFR BLD AUTO: 37.4 % (ref 37–48.5)
HGB BLD-MCNC: 12.1 G/DL (ref 12–16)
IMM GRANULOCYTES # BLD AUTO: 0 K/UL (ref 0–0.04)
IMM GRANULOCYTES NFR BLD AUTO: 0 % (ref 0–0.5)
LYMPHOCYTES # BLD AUTO: 1.4 K/UL (ref 1–4.8)
LYMPHOCYTES NFR BLD: 36.6 % (ref 18–48)
MAGNESIUM SERPL-MCNC: 2.1 MG/DL (ref 1.6–2.6)
MCH RBC QN AUTO: 31.8 PG (ref 27–31)
MCHC RBC AUTO-ENTMCNC: 32.4 G/DL (ref 32–36)
MCV RBC AUTO: 98 FL (ref 82–98)
MONOCYTES # BLD AUTO: 0.4 K/UL (ref 0.3–1)
MONOCYTES NFR BLD: 10.3 % (ref 4–15)
NEUTROPHILS # BLD AUTO: 2 K/UL (ref 1.8–7.7)
NEUTROPHILS NFR BLD: 52.3 % (ref 38–73)
NRBC BLD-RTO: 0 /100 WBC
PLATELET # BLD AUTO: 116 K/UL (ref 150–350)
PMV BLD AUTO: 11.3 FL (ref 9.2–12.9)
POTASSIUM SERPL-SCNC: 4.6 MMOL/L (ref 3.5–5.1)
RBC # BLD AUTO: 3.8 M/UL (ref 4–5.4)
SODIUM SERPL-SCNC: 140 MMOL/L (ref 136–145)
WBC # BLD AUTO: 3.8 K/UL (ref 3.9–12.7)

## 2019-08-15 PROCEDURE — 25000003 PHARM REV CODE 250: Performed by: NURSE PRACTITIONER

## 2019-08-15 PROCEDURE — 99232 SBSQ HOSP IP/OBS MODERATE 35: CPT | Mod: ,,, | Performed by: INTERNAL MEDICINE

## 2019-08-15 PROCEDURE — 83735 ASSAY OF MAGNESIUM: CPT

## 2019-08-15 PROCEDURE — 93010 ELECTROCARDIOGRAM REPORT: CPT | Mod: ,,, | Performed by: INTERNAL MEDICINE

## 2019-08-15 PROCEDURE — 99232 PR SUBSEQUENT HOSPITAL CARE,LEVL II: ICD-10-PCS | Mod: ,,, | Performed by: INTERNAL MEDICINE

## 2019-08-15 PROCEDURE — 99239 PR HOSPITAL DISCHARGE DAY,>30 MIN: ICD-10-PCS | Mod: ,,, | Performed by: NURSE PRACTITIONER

## 2019-08-15 PROCEDURE — 99239 HOSP IP/OBS DSCHRG MGMT >30: CPT | Mod: ,,, | Performed by: NURSE PRACTITIONER

## 2019-08-15 PROCEDURE — 36415 COLL VENOUS BLD VENIPUNCTURE: CPT

## 2019-08-15 PROCEDURE — 85025 COMPLETE CBC W/AUTO DIFF WBC: CPT

## 2019-08-15 PROCEDURE — 93005 ELECTROCARDIOGRAM TRACING: CPT

## 2019-08-15 PROCEDURE — 25000003 PHARM REV CODE 250: Performed by: STUDENT IN AN ORGANIZED HEALTH CARE EDUCATION/TRAINING PROGRAM

## 2019-08-15 PROCEDURE — 80048 BASIC METABOLIC PNL TOTAL CA: CPT

## 2019-08-15 PROCEDURE — 93010 EKG 12-LEAD: ICD-10-PCS | Mod: ,,, | Performed by: INTERNAL MEDICINE

## 2019-08-15 RX ORDER — DOFETILIDE 0.25 MG/1
250 CAPSULE ORAL EVERY 12 HOURS
Qty: 60 CAPSULE | Refills: 3 | Status: SHIPPED | OUTPATIENT
Start: 2019-08-15 | End: 2020-02-11 | Stop reason: SDUPTHER

## 2019-08-15 RX ADMIN — ACETAMINOPHEN 650 MG: 325 TABLET ORAL at 05:08

## 2019-08-15 RX ADMIN — DOCUSATE SODIUM 250 MG: 50 CAPSULE, LIQUID FILLED ORAL at 09:08

## 2019-08-15 RX ADMIN — ACETAMINOPHEN 650 MG: 325 TABLET ORAL at 09:08

## 2019-08-15 RX ADMIN — DOFETILIDE 250 MCG: 0.25 CAPSULE ORAL at 09:08

## 2019-08-15 RX ADMIN — LEVOTHYROXINE SODIUM 25 MCG: 25 TABLET ORAL at 05:08

## 2019-08-15 NOTE — PLAN OF CARE
08/15/19 1504   Final Note   Assessment Type Final Discharge Note   Anticipated Discharge Disposition Home   Hospital Follow Up  Appt(s) scheduled? Yes

## 2019-08-15 NOTE — TELEPHONE ENCOUNTER
Samia Castillo, PharmD, informed [facility] of patient discharging today, 8/15/19, deliver Tikosyn 250mcg to [id number].  Copay $86.90; [facility].  Patient declines consult, provided by floor pharmacist already.  Comorbidities reviewed.  Medication list and allergies reviewed with patient; she was advised to call [facility] should any medication changes. Explained [facility] services.  [facility] will reach out to her in 1-2 weeks to f/u and then monthly to arrange refills if needed; patient is in process of applying for Tikosyn assistance from . Patient verbalized understanding. All questions answered to patient's satisfaction.

## 2019-08-15 NOTE — NURSING
Ordered EKG for 10:40pm. EKG completed too early so reordered second EKG for 11 pm. Spoke with Vanessa from respiratory about importance of obtaining EKG within correct timeframe. Second EKG completed at 11:40pm.

## 2019-08-15 NOTE — DISCHARGE SUMMARY
"[facility] Hospital Medicine  Discharge Summary      Patient Name: Zoë Enrique  MRN: [id number]  Admission Date: 8/12/2019  Hospital Length of Stay: 3 days  Discharge Date and Time:  08/15/2019 2:08 PM  Attending Physician: Praveen Thomas MD   Discharging Provider: Natasha Cordero NP  Primary Care Provider: Praveen Jeffery MD  [facility] Medicine Team: [facility]OS[facility] MED  Natasha Cordero NP    HPI:   Ms. Ernique is a 69 year old female with past medical history of complex AFib/ ATach/ AFlutter, multiple ablations/DCCVs (8/19, 1/19), clean coronaries noted on [facility] 2014, colon resection/diverticulosis/itis, migraines (takes narcotics), MVP, osteosarcoma/ R distal femoral revision, R TKR/ Revisions, breast augmentation, and SAM/ BSO. She presented for outpt KHANH/DCCV with successful restoration of NSR. She was then admitted for tikosyn initiation. She reports that during arrhythmia episodes she is very symptomatic with marked CHAIDEZ and chest pressure; describing "feeling awful". She lives with , is independent in ADLs, retired nurse. She denies alcohol, tobacco, or illicit drug use. All past medical, social, and family history reviewed.     Labs prior to DCCV reviewed noting CBC unremarkable, chemistry with normal SCr/BUN, electrolytes, and  CrCL 59, baseline QTc 430. Tikoysn initiated per protocol. The patient was admitted to the Hospital Medicine Service for further evaluation and management.     Procedure(s) (LRB):  CARDIOVERSION (N/A)  ECHOCARDIOGRAM,TRANSESOPHAGEAL (N/A)      Hospital Course:   Ms. Enrique was admitted 8/12 after successful DCCV for initiation of tikoysn. EP consulted and followed. Tikosyn initiated at 500 mcg however decreased to 250 mcg after first dose based upon QTc. Baseline QTc 430 >> 507 >> 429 >> 478 >> 449 >> 459 >> 465.     Disposition: home with spouse, no home needs identified, pharmD assisted with tikosyn PA and cost will be ~ $87/month. Patient financially " capable of the medication cost. Outpt follow up with EP scheduled.      Consults:   Consults (From admission, onward)        Status Ordering Provider     Inpatient consult to Electrophysiology  Once     Provider:  (Not yet assigned)    JACKI Urrutia        * Visit for monitoring Tikosyn therapy  -see below    Paroxysmal atrial fibrillation  -admitted 8/12 after successful DCCV for initiation of tikoysn  -EP consulted and following  -tikosyn initiated at 500 mcg however decreased to 250 mcg after first dose   -continue to monitor EKGs 2-3 hours after each dose, NO EXCEPTIONS   -EP fellow to approve each dose   -baseline QTc 430 >> 507 >> 429 >> 478 >> 449 >> 459  [id number] 2 >> continue OAC with xarelto  -no acute events on tele   -cardiac diet advised  -outpt follow up with EP scheduled     Hypothyroidism (acquired)  -chronic, stable  -TSH WNL  -continue home levothyroxine     Final Active Diagnoses:    Diagnosis Date Noted POA    PRINCIPAL PROBLEM:  Visit for monitoring Tikosyn therapy [Z51.81, Z79.899] 08/12/2019 Not Applicable    Paroxysmal atrial fibrillation [I48.0] 12/10/2018 Yes    Hypothyroidism (acquired) [E03.9] 08/13/2019 Unknown      Problems Resolved During this Admission:     Discharged Condition: good    Disposition: Home or Self Care    Follow Up: as scheduled    Patient Instructions:      Diet Cardiac     Notify your health care provider if you experience any of the following:  temperature >100.4     Notify your health care provider if you experience any of the following:  persistent nausea and vomiting or diarrhea     Notify your health care provider if you experience any of the following:  severe uncontrolled pain     Notify your health care provider if you experience any of the following:  difficulty breathing or increased cough     Notify your health care provider if you experience any of the following:  severe persistent headache     Notify your health care provider if  you experience any of the following:  persistent dizziness, light-headedness, or visual disturbances     Notify your health care provider if you experience any of the following:  increased confusion or weakness     Activity as tolerated     Review of Systems   Constitutional: Negative for activity change, appetite change, chills, fatigue and fever.   HENT: Negative for congestion, rhinorrhea, sinus pressure, sore throat and trouble swallowing.    Respiratory: Negative for cough, chest tightness, shortness of breath and wheezing.    Cardiovascular: Negative for chest pain, palpitations and leg swelling.   Gastrointestinal: Negative for abdominal distention, abdominal pain, constipation, diarrhea, nausea and vomiting.   Genitourinary: Negative for difficulty urinating, dysuria, frequency and urgency.   Musculoskeletal: Negative for arthralgias, back pain, gait problem, joint swelling, myalgias and neck pain.   Skin: Negative for color change, pallor, rash and wound.   Neurological: Negative for dizziness, syncope, weakness, light-headedness and headaches.   Psychiatric/Behavioral: Negative for agitation and confusion. The patient is not nervous/anxious.      Physical Exam   Constitutional: She is oriented to person, place, and time. She appears well-developed and well-nourished. No distress.   HENT:   Head: Normocephalic and atraumatic.   Mouth/Throat: No oropharyngeal exudate.   Eyes: Conjunctivae and EOM are normal. No scleral icterus.   Neck: Normal range of motion. Neck supple. No JVD present.   Cardiovascular: Normal rate, regular rhythm, normal heart sounds and intact distal pulses.   No murmur heard.  Pulmonary/Chest: Effort normal and breath sounds normal. No respiratory distress. She has no wheezes. She has no rales.   Abdominal: Soft. Bowel sounds are normal. She exhibits no distension. There is no tenderness.   Musculoskeletal: Normal range of motion. She exhibits no edema or tenderness.   Neurological: She  is alert and oriented to person, place, and time.   Skin: Skin is warm and dry. No rash noted. She is not diaphoretic. No erythema.   Psychiatric: She has a normal mood and affect. Her behavior is normal. Judgment normal.   Nursing note and vitals reviewed.    Significant Diagnostic Studies: Labs:   BMP:   Recent Labs   Lab 08/15/19  0323   GLU 97      K 4.6      CO2 27   BUN 18   CREATININE 0.7   CALCIUM 9.1   MG 2.1   , CBC   Recent Labs   Lab 08/15/19  0323   WBC 3.80*   HGB 12.1   HCT 37.4   *    and All labs within the past 24 hours have been reviewed    Pending Diagnostic Studies:     Procedure Component Value Units Date/Time    EKG 12-lead [[id number]]     Order Status:  Sent Lab Status:  No result          Medications:  Reconciled Home Medications:      Medication List      START taking these medications    dofetilide 250 MCG Cap  Commonly known as:  TIKOSYN  Take 1 capsule (250 mcg total) by mouth every 12 (twelve) hours.        CONTINUE taking these medications    acyclovir 400 MG tablet  Commonly known as:  ZOVIRAX  Take by mouth 3 (three) times daily as needed.     CALTRATE PLUS ORAL  Take by mouth. Takes two chews daily     docusate sodium 250 MG capsule  Commonly known as:  COLACE  Take 250 mg by mouth once daily.     HYDROcodone-acetaminophen 5-325 mg per tablet  Commonly known as:  NORCO  Pt states takes PRN for headaches     ICAPS AREDS ORAL  Take by mouth.     levothyroxine 25 MCG tablet  Commonly known as:  SYNTHROID  Take 1 tablet (25 mcg total) by mouth once daily.     melatonin 5 mg Cap  Take 5 mg by mouth as needed.     UNABLE TO FIND  Diclofenac 0.15%, Lidocaine 2.25%, Prilocaine 2.25% in Compounded Topical Cream to be applied up to 5x/day as needed for headaches.     XARELTO 20 mg Tab  Generic drug:  rivaroxaban  Take 1 tablet (20 mg total) by mouth once daily.        STOP taking these medications    propafenone 150 MG Tab  Commonly known as:  RHTHYMOL          Indwelling  Lines/Drains at time of discharge:   Lines/Drains/Airways          None        Time spent on the discharge of patient: 45 minutes  Patient was seen and examined on the date of discharge and determined to be suitable for discharge.      Natasha Cordero DNP, AG-ACNP, BC  Department of Hospital Medicine  [facility]

## 2019-08-15 NOTE — PLAN OF CARE
Problem: Adult Inpatient Plan of Care  Goal: Patient-Specific Goal (Individualization)  Outcome: Ongoing (interventions implemented as appropriate)  Plan of care discussed with patient. Patient is free of fall/trauma/injury. Denies CP and SOB. C/O mild HA relieved by PRN tylenol.  All questions addressed. Will continue to monitor

## 2019-08-15 NOTE — TELEPHONE ENCOUNTER
Tikosyn is approved by the patient's insurance with a high co pay. We will be assisting the patient in applying to the  for assistance.   Sending Dr Moe Seymour a staff message regarding approval and faxing assistance application for their review and signature

## 2019-08-15 NOTE — PLAN OF CARE
Problem: Adult Inpatient Plan of Care  Goal: Plan of Care Review  Outcome: Ongoing (interventions implemented as appropriate)  Pt remained free of falls, trauma, injury. Pt AOx4. VSS. Skin CDI. Pt continues on tikosyn therapy monitoring. 5th dose administered tonight. Order placed for EKG (see previous note). Awaiting nursing communication for next dose. Pt complained of 4/10 headache administered PRN tylenol with relief obtained. Reviewed plan of care with pt; answered all questions. Pt tolerating plan of care. Will continue to monitor.

## 2019-08-15 NOTE — NURSING
Patient is ready for discharge. Patient stable alert and oriented. IVs removed. No complaints of pain. Discussed discharge plan. Reviewed medications and side effects, appointments, and answered questions with patient and family. Pt received supply of tikosyn before d/c.

## 2019-08-15 NOTE — ASSESSMENT & PLAN NOTE
-admitted 8/12 after successful DCCV for initiation of tikoysn  -EP consulted and following  -tikosyn initiated at 500 mcg however decreased to 250 mcg after first dose   -continue to monitor EKGs 2-3 hours after each dose, NO EXCEPTIONS   -EP fellow to approve each dose   -baseline QTc 430 >> 507 >> 429 >> 478 >> 449 >> 459  [id number] 2 >> continue OAC with xarelto  -no acute events on tele   -cardiac diet advised  -outpt follow up with EP scheduled

## 2019-08-15 NOTE — PLAN OF CARE
Mrs Enrique was educated on dofetilide.  Side effects, compliance, drug interactions, and how to properly take was discussed.  She was given a medication hand out.  She is aware that [facility] will deliver her medication to bedside and of her co-pay.  All questions and concerns were addressed.    Samia Castillo, PharmD  [phone]

## 2019-08-15 NOTE — SUBJECTIVE & OBJECTIVE
Interval History: No acute events, feeling well after last in house dose of tikosyn.    Review of Systems   Constitution: Negative. Negative for chills and fever.   HENT: Negative.    Eyes: Negative.    Cardiovascular: Negative for chest pain, claudication and paroxysmal nocturnal dyspnea.   Respiratory: Negative for cough, shortness of breath and wheezing.    Endocrine: Negative.    Hematologic/Lymphatic: Does not bruise/bleed easily.   Skin: Negative for nail changes and rash.   Musculoskeletal: Negative.  Negative for back pain.   Gastrointestinal: Negative for abdominal pain, melena, nausea and vomiting.   Neurological: Negative for dizziness and headaches.   Psychiatric/Behavioral: Negative for altered mental status, depression and substance abuse.   Allergic/Immunologic: Negative.      Objective:     Vital Signs (Most Recent):  Temp: 96.8 °F (36 °C) (08/15/19 1111)  Pulse: 67 (08/15/19 1111)  Resp: 14 (08/15/19 1111)  BP: 101/64 (08/15/19 1111)  SpO2: 98 % (08/15/19 1111) Vital Signs (24h Range):  Temp:  [96.4 °F (35.8 °C)-98.6 °F (37 °C)] 96.8 °F (36 °C)  Pulse:  [54-80] 67  Resp:  [14-18] 14  SpO2:  [95 %-98 %] 98 %  BP: ()/(54-84) 101/64     Weight: 68.7 kg (151 lb 7.3 oz)  Body mass index is 23.03 kg/m².     SpO2: 98 %  O2 Device (Oxygen Therapy): room air      Intake/Output Summary (Last 24 hours) at 8/15/2019 1127  Last data filed at 8/15/2019 0900  Gross per 24 hour   Intake 1250 ml   Output --   Net 1250 ml       Lines/Drains/Airways     Peripheral Intravenous Line                 Peripheral IV - Single Lumen 08/12/19 1628 20 G Left Forearm 2 days                Physical Exam   Constitutional: She is oriented to person, place, and time. She appears well-developed and well-nourished.   HENT:   Head: Normocephalic and atraumatic.   Eyes: Pupils are equal, round, and reactive to light. Conjunctivae and EOM are normal.   Neck: No JVD present.   Cardiovascular: Normal rate, regular rhythm, normal  heart sounds and intact distal pulses. Exam reveals no gallop and no friction rub.   No murmur heard.  Pulmonary/Chest: Effort normal. No stridor. She has no wheezes. She has no rales. She exhibits no tenderness.   Abdominal: Soft. Bowel sounds are normal. She exhibits no distension and no mass. There is no tenderness. There is no guarding.   Musculoskeletal: She exhibits no edema, tenderness or deformity.   Neurological: She is alert and oriented to person, place, and time.   Skin: Skin is warm and dry. No rash noted. No erythema.   Psychiatric: She has a normal mood and affect.       Significant Labs:   Blood Culture: No results for input(s): LABBLOO in the last 48 hours., CMP   Recent Labs   Lab 08/15/19  0323      K 4.6      CO2 27   GLU 97   BUN 18   CREATININE 0.7   CALCIUM 9.1   ANIONGAP 7*   ESTGFRAFRICA >60.0   EGFRNONAA >60.0    and CBC   Recent Labs   Lab 08/15/19  0323   WBC 3.80*   HGB 12.1   HCT 37.4   *       Significant Imaging: no new imaging    EKG 8/15: QTc<495ms

## 2019-08-15 NOTE — PROGRESS NOTES
[facility]  Cardiology  Progress Note    Patient Name: Zoë Enrique  MRN: [id number]  Admission Date: 8/12/2019  Hospital Length of Stay: 3 days  Code Status: Full Code   Attending Physician: Praveen Thomas MD   Primary Care Physician: Praveen Jeffery MD  Expected Discharge Date: 8/15/2019  Principal Problem:Visit for monitoring Tikosyn therapy    Subjective:     Hospital Course:   No notes on file    Interval History: No acute events, feeling well after last in house dose of tikosyn.    Review of Systems   Constitution: Negative. Negative for chills and fever.   HENT: Negative.    Eyes: Negative.    Cardiovascular: Negative for chest pain, claudication and paroxysmal nocturnal dyspnea.   Respiratory: Negative for cough, shortness of breath and wheezing.    Endocrine: Negative.    Hematologic/Lymphatic: Does not bruise/bleed easily.   Skin: Negative for nail changes and rash.   Musculoskeletal: Negative.  Negative for back pain.   Gastrointestinal: Negative for abdominal pain, melena, nausea and vomiting.   Neurological: Negative for dizziness and headaches.   Psychiatric/Behavioral: Negative for altered mental status, depression and substance abuse.   Allergic/Immunologic: Negative.      Objective:     Vital Signs (Most Recent):  Temp: 96.8 °F (36 °C) (08/15/19 1111)  Pulse: 67 (08/15/19 1111)  Resp: 14 (08/15/19 1111)  BP: 101/64 (08/15/19 1111)  SpO2: 98 % (08/15/19 1111) Vital Signs (24h Range):  Temp:  [96.4 °F (35.8 °C)-98.6 °F (37 °C)] 96.8 °F (36 °C)  Pulse:  [54-80] 67  Resp:  [14-18] 14  SpO2:  [95 %-98 %] 98 %  BP: ()/(54-84) 101/64     Weight: 68.7 kg (151 lb 7.3 oz)  Body mass index is 23.03 kg/m².     SpO2: 98 %  O2 Device (Oxygen Therapy): room air      Intake/Output Summary (Last 24 hours) at 8/15/2019 1127  Last data filed at 8/15/2019 0900  Gross per 24 hour   Intake 1250 ml   Output --   Net 1250 ml       Lines/Drains/Airways     Peripheral Intravenous Line                  Peripheral IV - Single Lumen 08/12/19 1628 20 G Left Forearm 2 days                Physical Exam   Constitutional: She is oriented to person, place, and time. She appears well-developed and well-nourished.   HENT:   Head: Normocephalic and atraumatic.   Eyes: Pupils are equal, round, and reactive to light. Conjunctivae and EOM are normal.   Neck: No JVD present.   Cardiovascular: Normal rate, regular rhythm, normal heart sounds and intact distal pulses. Exam reveals no gallop and no friction rub.   No murmur heard.  Pulmonary/Chest: Effort normal. No stridor. She has no wheezes. She has no rales. She exhibits no tenderness.   Abdominal: Soft. Bowel sounds are normal. She exhibits no distension and no mass. There is no tenderness. There is no guarding.   Musculoskeletal: She exhibits no edema, tenderness or deformity.   Neurological: She is alert and oriented to person, place, and time.   Skin: Skin is warm and dry. No rash noted. No erythema.   Psychiatric: She has a normal mood and affect.       Significant Labs:   Blood Culture: No results for input(s): LABBLOO in the last 48 hours., CMP   Recent Labs   Lab 08/15/19  0323      K 4.6      CO2 27   GLU 97   BUN 18   CREATININE 0.7   CALCIUM 9.1   ANIONGAP 7*   ESTGFRAFRICA >60.0   EGFRNONAA >60.0    and CBC   Recent Labs   Lab 08/15/19  0323   WBC 3.80*   HGB 12.1   HCT 37.4   *       Significant Imaging: no new imaging    EKG 8/15: QTc<495ms    Assessment and Plan:     Visit for monitoring Tikosyn therapy  Baseline QTc<500ms at 430ms. >500ms after first dose  - Patient QTc at goal following 6 doses Tikosyn, may be discharged on Tikosyn 250mc q12h  - advised on avoidance of QT prolonging meds  - Patient should be seen in 3 months in clinic w Dr. Seymour and annually theeafter    Paroxysmal atrial fibrillation  CHADS-VASC 3  Rhythm control: dofetilide as above  AC: continue home xarelto       Thank you for this interesting consult. EP consult  will sign off. Please call with questions as needed. Will need follow up in Dr. Seymour's clinic in 3 months.         VTE Risk Mitigation (From admission, onward)        Ordered     rivaroxaban tablet 20 mg  with dinner      08/12/19 1633     IP VTE LOW RISK PATIENT  Once      08/12/19 1633          Lui Latif MD  Cardiology  [facility]

## 2019-08-19 ENCOUNTER — HOSPITAL ENCOUNTER (OUTPATIENT)
Dept: CARDIOLOGY | Facility: CLINIC | Age: 69
Discharge: HOME | End: 2019-08-19
Payer: MEDICARE

## 2019-08-19 ENCOUNTER — PATIENT OUTREACH (OUTPATIENT)
Dept: ADMINISTRATIVE | Facility: CLINIC | Age: 69
End: 2019-08-19

## 2019-08-19 ENCOUNTER — PATIENT MESSAGE (OUTPATIENT)
Dept: ELECTROPHYSIOLOGY | Facility: CLINIC | Age: 69
End: 2019-08-19

## 2019-08-19 DIAGNOSIS — I48.91 ATRIAL FIBRILLATION, UNSPECIFIED TYPE: ICD-10-CM

## 2019-08-19 PROCEDURE — 93005 EKG 12-LEAD: ICD-10-PCS | Mod: S$GLB,,, | Performed by: INTERNAL MEDICINE

## 2019-08-19 PROCEDURE — 93010 EKG 12-LEAD: ICD-10-PCS | Mod: S$GLB,,, | Performed by: INTERNAL MEDICINE

## 2019-08-19 PROCEDURE — 93010 ELECTROCARDIOGRAM REPORT: CPT | Mod: S$GLB,,, | Performed by: INTERNAL MEDICINE

## 2019-08-19 PROCEDURE — 93005 ELECTROCARDIOGRAM TRACING: CPT | Mod: S$GLB,,, | Performed by: INTERNAL MEDICINE

## 2019-08-19 NOTE — PROGRESS NOTES
Patient states that she has an EKG monitor at home and the strip showed she was in Afib yesterday. Patient states that she has sent a message to Dr Seymour. Also states that she went in for an EKG this morning. Patient states that she feels fine now. OOC # given.

## 2019-08-19 NOTE — PATIENT INSTRUCTIONS
Dofetilide capsules  What is this medicine?  DOFETILIDE (lantigua FET il kailey) is an antiarrhythmic drug. It helps make your heart beat regularly. This medicine also helps to slow rapid heartbeats.  How should I use this medicine?  Take this medicine by mouth with a glass of water. Follow the directions on the prescription label. You can take this medicine with or without food. Do not drink grapefruit juice with this medicine. Take your doses at regular intervals. Do not take your medicine more often than directed. Do not stop taking this medicine suddenly. This may cause serious, heart-related side effects. Your doctor will tell you how much medicine to take. If your doctor wants you to stop the medicine, the dose will be slowly lowered over time to avoid any side effects.  Talk to your pediatrician regarding the use of this medicine in children. Special care may be needed.  What side effects may I notice from receiving this medicine?  Side effects that you should report to your doctor or health care professional as soon as possible:  · allergic reactions like skin rash, itching or hives, swelling of the face, lips, or tongue  · breathing problems  · dizziness  · fast or rapid beating of the heart  · feeling faint or lightheaded  · swelling of the ankles  · unusually weak or tired  · vomiting  Side effects that usually do not require medical attention (report to your doctor or health care professional if they continue or are bothersome):  · cough  · diarrhea  · difficulty sleeping  · headache  · nausea  · stomach pain  What may interact with this medicine?  Do not take this medicine with any of the following medications:  · cimetidine  · dolutegravir  · hydrochlorothiazide alone or in combination with other medicines  · ketoconazole  · megestrol  · other medicines that prolong the QT interval (cause an abnormal heart rhythm)  · prochlorperazine  · trimethoprim alone or in combination with  sulfamethoxazole  · verapamil  This medicine may also interact with the following medications:  · amiloride  · certain antidepressants like fluvoxamine or paroxetine  · certain antiviral medicines for HIV or AIDS like atazanavir or darunavir  · certain medicines for fungal infections like clotrimazole or miconazole  · digoxin  · diltiazem  · dronabinol, THC  · grapefruit juice  · metformin  · nefazodone  · triamterene  · zafirlukast  What if I miss a dose?  If you miss a dose, take it as soon as you can. If it is almost time for your next dose, take only that dose. Do not take double or extra doses.  Where should I keep my medicine?  Keep out of the reach of children.  Store at room temperature between 15 and 30 degrees C (59 and 86 degrees F). Protect the medicine from moisture or humidity. Keep container tightly closed. Throw away any unused medicine after the expiration date.  What should I tell my health care provider before I take this medicine?  They need to know if you have any of these conditions:  · heart disease  · history of low levels of potassium or magnesium  · kidney disease  · liver disease  · an unusual or allergic reaction to dofetilide, other medicines, foods, dyes, or preservatives  · pregnant or trying to get pregnant  · breast-feeding  What should I watch for while using this medicine?  Visit your doctor or health care professional for regular checks on your progress. Wear a medical ID bracelet or chain, and carry a card that describes your disease and details of your medicine and dosage times.  Check your heart rate and blood pressure regularly while you are taking this medicine. Ask your doctor or health care professional what your heart rate and blood pressure should be, and when you should contact him or her. Your doctor or health care professional also may schedule regular tests to check your progress.  You will be started on this medicine in a specialized facility for at least three days.  You will be monitored to find the right dose of medicine for you. It is very important that you take your medicine exactly as prescribed when you leave the hospital. The correct dosing of this medicine is very important to treat your condition and prevent possible serious side effects.  NOTE:This sheet is a summary. It may not cover all possible information. If you have questions about this medicine, talk to your doctor, pharmacist, or health care provider. Copyright© 2017 [employer]

## 2019-08-19 NOTE — PROGRESS NOTES
Pt is currently scheduled 09/04/19. Do you want to fit pt in on schedule before appt? See her for the follow up on 09/04?

## 2019-08-19 NOTE — PROGRESS NOTES
C3 nurse attempted to contact patient. The following occurred:   C3 nurse attempted to contact Zoë Enrique for a TCC post hospital discharge follow up call. The patient is unable to conduct the call @ this time. The patient requested a callback.    The patient does not have a scheduled HOSFU appointment within 7-14 days post hospital discharge date 08/15/19. Message sent to Physician staff to assist with HOSFU appointment scheduling.

## 2019-08-21 ENCOUNTER — TELEPHONE (OUTPATIENT)
Dept: ELECTROPHYSIOLOGY | Facility: CLINIC | Age: 69
End: 2019-08-21

## 2019-08-21 ENCOUNTER — PATIENT MESSAGE (OUTPATIENT)
Dept: ELECTROPHYSIOLOGY | Facility: CLINIC | Age: 69
End: 2019-08-21

## 2019-08-21 NOTE — TELEPHONE ENCOUNTER
Called pt to let her know that the EKG she done yesterday was showed to Dr Seymour and it shows she is in Sinus.  Pt stated the night before she was not. She has the [employer] valentín which showed her to be irregular. Advised pt to send strip via portal for Dr Seymour to view. Pt will attach to message.       ----- Message from Carmen Nelson MA sent at 8/20/2019  8:44 AM CDT -----      ----- Message -----  From: Blanca Allen LPN  Sent: 8/19/2019   4:28 PM  To: Dawn RODRIGUEZ Staff    Spoke with patient, states that she had an episode of Afib last night. Came into [facility] today for an EKG. Patient states that she is not currently experiencing any issues. Please contact patient to discuss.    Respectfully,  Blanca Allen LPN  [facility]    [email or web address]       Please do not reply to this message, as this inbox is not routinely monitored.

## 2019-08-23 ENCOUNTER — PATIENT MESSAGE (OUTPATIENT)
Dept: ELECTROPHYSIOLOGY | Facility: CLINIC | Age: 69
End: 2019-08-23

## 2019-08-26 NOTE — TELEPHONE ENCOUNTER
Tikosyn 7 day post call follow up. Confirmed two patient identifiers - name + . She confirms continuing Tikosyn at home when discharged from the hospital on 8/15/19, which she takes 1 capsule po BID. She denies any side effects or missed doses. She also has no further questions or concerns at this time. [facility] working on FA for future fills. We will continue to reach out to patient accordingly for refills. TTN

## 2019-08-29 ENCOUNTER — TELEPHONE (OUTPATIENT)
Dept: ELECTROPHYSIOLOGY | Facility: CLINIC | Age: 69
End: 2019-08-29

## 2019-08-29 NOTE — TELEPHONE ENCOUNTER
FOR DOCUMENTATION ONLY: Financial Assistance for Tikosyn is approved from 8/27/19 to 12/31/19  Source [employer] .Sending a staff message to   Dr Moe Seymour regarding assistance approval.

## 2019-08-29 NOTE — TELEPHONE ENCOUNTER
----- Message from Carmen Nelson MA sent at 8/29/2019  9:43 AM CDT -----  Regarding: FW: Tikosyn  Patient Assistance Approval      ----- Message -----  From: Kina Post  Sent: 8/29/2019   9:28 AM  To: Moe Seymour MD, Dawn RODRIGUEZ Staff  Subject: Tikosyn  Patient Assistance Approval             Financial Assistance for Tikosyn has been approved from 8/27/19 to  12/31/19 thru the [employer] Program. We will reach out to the patient to notify of the approval.  Thank you     Kina Post, [facility]   [id number]

## 2019-08-30 ENCOUNTER — TELEPHONE (OUTPATIENT)
Dept: ELECTROPHYSIOLOGY | Facility: CLINIC | Age: 69
End: 2019-08-30

## 2019-08-30 NOTE — TELEPHONE ENCOUNTER
Notified Pt that Tikosyn was received from [employer] in the mail. Pt stated she would  next week.

## 2019-09-04 ENCOUNTER — OFFICE VISIT (OUTPATIENT)
Dept: FAMILY MEDICINE | Facility: CLINIC | Age: 69
End: 2019-09-04
Payer: MEDICARE

## 2019-09-04 VITALS
OXYGEN SATURATION: 98 % | HEIGHT: 68 IN | DIASTOLIC BLOOD PRESSURE: 76 MMHG | SYSTOLIC BLOOD PRESSURE: 138 MMHG | TEMPERATURE: 98 F | WEIGHT: 153.88 LBS | HEART RATE: 58 BPM | BODY MASS INDEX: 23.32 KG/M2

## 2019-09-04 DIAGNOSIS — C40.21 OSTEOSARCOMA OF RIGHT FEMUR: ICD-10-CM

## 2019-09-04 DIAGNOSIS — Z23 NEED FOR VACCINATION: ICD-10-CM

## 2019-09-04 DIAGNOSIS — Z00.00 ANNUAL PHYSICAL EXAM: ICD-10-CM

## 2019-09-04 DIAGNOSIS — B00.9 HERPES: ICD-10-CM

## 2019-09-04 DIAGNOSIS — I48.0 PAROXYSMAL ATRIAL FIBRILLATION: Primary | ICD-10-CM

## 2019-09-04 DIAGNOSIS — E03.9 HYPOTHYROIDISM (ACQUIRED): ICD-10-CM

## 2019-09-04 PROCEDURE — 99387 INIT PM E/M NEW PAT 65+ YRS: CPT | Mod: HCNC,S$GLB,, | Performed by: INTERNAL MEDICINE

## 2019-09-04 PROCEDURE — 99499 RISK ADDL DX/OHS AUDIT: ICD-10-PCS | Mod: HCNC,S$GLB,, | Performed by: INTERNAL MEDICINE

## 2019-09-04 PROCEDURE — 99999 PR PBB SHADOW E&M-EST. PATIENT-LVL III: ICD-10-PCS | Mod: PBBFAC,HCNC,, | Performed by: INTERNAL MEDICINE

## 2019-09-04 PROCEDURE — 99499 UNLISTED E&M SERVICE: CPT | Mod: HCNC,S$GLB,, | Performed by: INTERNAL MEDICINE

## 2019-09-04 PROCEDURE — 99999 PR PBB SHADOW E&M-EST. PATIENT-LVL III: CPT | Mod: PBBFAC,HCNC,, | Performed by: INTERNAL MEDICINE

## 2019-09-04 PROCEDURE — 99387 PR PREVENTIVE VISIT,NEW,65 & OVER: ICD-10-PCS | Mod: HCNC,S$GLB,, | Performed by: INTERNAL MEDICINE

## 2019-09-04 RX ORDER — ACYCLOVIR 400 MG/1
400 TABLET ORAL 3 TIMES DAILY PRN
Qty: 60 TABLET | Refills: 5 | Status: SHIPPED | OUTPATIENT
Start: 2019-09-04 | End: 2020-07-28 | Stop reason: SDUPTHER

## 2019-09-04 RX ORDER — LEVOTHYROXINE SODIUM 25 UG/1
25 TABLET ORAL DAILY
Qty: 90 TABLET | Refills: 3 | Status: SHIPPED | OUTPATIENT
Start: 2019-09-04 | End: 2020-07-16

## 2019-09-04 NOTE — PROGRESS NOTES
[facility] Note    Chief Complaint      Chief Complaint   Patient presents with    Annual Exam       History of Present Illness      Zoë Enrique is a 69 y.o. female with chronic conditions of A fib, hypothyroidism, hx of osteosarcoma of right femur, herpes who presents today for: re-establish care from  and annual preventative visit.   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  Needs referral.  Surveillance exam ok.  A fib: Sees Dr. Seymour.  On xarelto for anticoagulation. Recently started on tikosyn. Does not need rate control.  Hypothyroidism: Controlled on sytnhroid.  Will update labs.    Herpes: Usually takes acyclovir as needed but asking about suppression prior to delivery of grandchild.    Diet: Prepares own food.  Eats healthy when out.  Limtiing processed foods and fatty foods.  Limits sugars and carbs.  Drinks plenty water  Exercise: Limited by A fib.    Denies drinking and driving, drinking more than 4 drinks on occasion, drug use.       Past Medical History:  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    Diverticulosis 1998    Hx of atrial flutter     Hx of mitral valve prolapse 1990    Migraines        Past Surgical History:   has a past surgical history that includes Hysterectomy; Knee surgery (Right, 1989); Knee Arthroplasty (Right, 2007); and Treatment of cardiac arrhythmia (N/A, 8/12/2019).    Family History:  family history is not on file.     Social History:  Social History     Tobacco Use    Smoking status: Never Smoker    Smokeless tobacco: Never Used   Substance Use Topics    Alcohol use: Yes     Frequency: 2-4 times a month     Drinks per session: 1 or 2     Binge frequency: Never     Comment: socially    Drug use: No       Review of Systems   HENT: Negative for hearing loss.    Eyes: Negative for discharge.   Respiratory: Negative for wheezing.    Cardiovascular: Positive for palpitations.  Negative for chest pain.   Gastrointestinal: Negative for blood in stool, constipation, diarrhea and vomiting.   Genitourinary: Negative for dysuria and hematuria.   Musculoskeletal: Negative for neck pain.   Neurological: Positive for headaches. Negative for weakness.   Endo/Heme/Allergies: Negative for polydipsia.        Medications:  Outpatient Encounter Medications as of 9/4/2019   Medication Sig Dispense Refill    acyclovir (ZOVIRAX) 400 MG tablet Take by mouth 3 (three) times daily as needed.      CALCIUM CARB/VIT D3/MINERALS (CALTRATE PLUS ORAL) Take by mouth. Takes two chews daily      docusate sodium (COLACE) 250 MG capsule Take 250 mg by mouth 2 (two) times daily.       dofetilide (TIKOSYN) 250 MCG Cap Take 1 capsule (250 mcg total) by mouth every 12 (twelve) hours. 60 capsule 3    hydrocodone-acetaminophen 5-325mg (NORCO) 5-325 mg per tablet Pt states takes PRN for headaches      levothyroxine (SYNTHROID) 25 MCG tablet Take 1 tablet (25 mcg total) by mouth once daily. 30 tablet 1    melatonin 5 mg Cap Take 5 mg by mouth as needed.       rivaroxaban (XARELTO) 20 mg Tab Take 1 tablet (20 mg total) by mouth once daily. 90 tablet 3    vit A/vit C/vit E/zinc/copper (ICAPS AREDS ORAL) Take by mouth.      diphth,pertus,acell,,tetanus (BOOSTRIX) 2.5-8-5 Lf-mcg-Lf/0.5mL Susp Inject 0.5 mLs into the muscle once. for 1 dose 0.5 mL 0    UNABLE TO FIND Diclofenac 0.15%, Lidocaine 2.25%, Prilocaine 2.25% in Compounded Topical Cream to be applied up to 5x/day as needed for headaches.       Facility-Administered Encounter Medications as of 9/4/2019   Medication Dose Route Frequency Provider Last Rate Last Dose    sodium chloride 0.9% flush 5 mL  5 mL Intravenous PRN Aleksandra Taylor NP           Allergies:  Review of patient's allergies indicates:   Allergen Reactions    Morphine Nausea And Vomiting    Succinylcholine      Other reaction(s): v tach       Health Maintenance:    There is no immunization  "history on file for this patient.   Health Maintenance   Topic Date Due    Hepatitis C Screening  1950    Lipid Panel  1950    TETANUS VACCINE  03/07/1968    Mammogram  03/07/1990    DEXA SCAN  03/07/1990    Colonoscopy  03/07/2000    Pneumococcal Vaccine (65+ High/Highest Risk) (1 of 2 - PCV13) 03/07/2015      Flu shot due when available.  TdAP due today.  Prevnar 2016, pneumovax 2011.  Zostavax UTD.  Mammogram due soon.  DEXA 2018 normal  FIT test 2019 negative from [employer].  Physical Exam      Vital Signs  Temp: 97.5 °F (36.4 °C)  Temp src: Oral  Pulse: (!) 58  SpO2: 98 %  BP: 138/76  BP Location: Right arm  Patient Position: Sitting  Height and Weight  Height: 5' 8" (172.7 cm)  Weight: 69.8 kg (153 lb 14.1 oz)  BSA (Calculated - sq m): 1.83 sq meters  BMI (Calculated): 23.4  Weight in (lb) to have BMI = 25: 164.1]    Physical Exam   Constitutional: She appears well-developed and well-nourished.   HENT:   Head: Normocephalic and atraumatic.   Right Ear: External ear normal.   Left Ear: External ear normal.   Mouth/Throat: Oropharynx is clear and moist.   Eyes: Pupils are equal, round, and reactive to light. Conjunctivae and EOM are normal.   Neck: Carotid bruit is not present.   Cardiovascular: Normal rate, regular rhythm, normal heart sounds and intact distal pulses.   No murmur heard.  Pulmonary/Chest: Effort normal and breath sounds normal. She has no wheezes. She has no rales.   Abdominal: Soft. Bowel sounds are normal. She exhibits no distension. There is no hepatosplenomegaly. There is no tenderness.   Musculoskeletal: She exhibits no edema.   Vitals reviewed.       Laboratory:  CBC:  Recent Labs   Lab 08/08/19  1304 08/13/19  0434 08/15/19  0323   WBC 4.51 3.54 L 3.80 L   RBC 4.26 3.55 L 3.80 L   Hemoglobin 13.6 11.3 L 12.1   Hematocrit 41.8 35.0 L 37.4   Platelets 140 L 113 L 116 L   Mean Corpuscular Volume 98 99 H 98   Mean Corpuscular Hemoglobin 31.9 H 31.8 H 31.8 H   Mean Corpuscular " Hemoglobin Conc 32.5 32.3 32.4     CMP:  Recent Labs   Lab 08/15/19  0323   Glucose 97   Calcium 9.1   Sodium 140   Potassium 4.6   CO2 27   Chloride 106   BUN, Bld 18     URINALYSIS:       LIPIDS:  Recent Labs   Lab 08/12/19  1656   TSH 1.854     TSH:  Recent Labs   Lab 08/12/19  1656   TSH 1.854     A1C:        Assessment/Plan     Zoë Enrique is a 69 y.o.female with:    1. Annual physical exam  Discussed diet and exercise, vaccines and cancer screening, risk factors.  Screening labs ordered.     2. Paroxysmal atrial fibrillation  Sees Dr. Seymour.  DOing well  3. Hypothyroidism (acquired)  Cont current meds.    4. Osteosarcoma of right femur  Cont f/u with specialist.    5. Need for vaccination  - diphth,pertus,acell,,tetanus (BOOSTRIX) 2.5-8-5 Lf-mcg-Lf/0.5mL Susp; Inject 0.5 mLs into the muscle once. for 1 dose  Dispense: 0.5 mL; Refill: 0       Chronic conditions status updated as per HPI.  Other than changes above, cont current medications and maintain follow up with specialists.  Return to clinic in 12 months.    Praveen Jeffery MD  [facility]

## 2019-09-04 NOTE — LETTER
[address], [address]  [facility] [address]  Phone: [phone]  Fax: [phone] February 22, 2019    Paul Urban M.D.    Patient: Zoë Enrique   MR Number: [id number]   YOB: 1950           Dear Dr. Urban:    I am referring my patient, Zoë Enrique, to you for evaluation of Osteosarcoma of the right femur.  Please extend this referral to a 12 month period.    I appreciate your assistance in her care and look forward to your findings and recommendations.    Sincerely,                           Praveen Jeffery MD

## 2019-09-04 NOTE — PROGRESS NOTES
Patient, Zoë Enrique (MRN #[id number]), presented with a recent Platelet count less than 150 K/uL consistent with the definition of thrombocytopenia (ICD10 - D69.6).    Platelets   Date Value Ref Range Status   08/15/2019 116 (L) 150 - 350 K/uL Final     The patient's thrombocytopenia was monitored, evaluated, addressed and/or treated. This addendum to the medical record is made on 09/04/2019.

## 2019-09-19 ENCOUNTER — PATIENT MESSAGE (OUTPATIENT)
Dept: FAMILY MEDICINE | Facility: CLINIC | Age: 69
End: 2019-09-19

## 2019-09-19 DIAGNOSIS — T85.43XA BREAST IMPLANT RUPTURE, INITIAL ENCOUNTER: Primary | ICD-10-CM

## 2019-09-27 DIAGNOSIS — Z12.39 BREAST CANCER SCREENING: ICD-10-CM

## 2019-10-03 ENCOUNTER — OFFICE VISIT (OUTPATIENT)
Dept: PLASTIC SURGERY | Facility: CLINIC | Age: 69
End: 2019-10-03
Payer: MEDICARE

## 2019-10-03 VITALS
HEART RATE: 73 BPM | BODY MASS INDEX: 23.42 KG/M2 | WEIGHT: 154 LBS | SYSTOLIC BLOOD PRESSURE: 111 MMHG | DIASTOLIC BLOOD PRESSURE: 63 MMHG

## 2019-10-03 DIAGNOSIS — Z98.82 HISTORY OF BREAST AUGMENTATION: Primary | ICD-10-CM

## 2019-10-03 PROCEDURE — 1101F PT FALLS ASSESS-DOCD LE1/YR: CPT | Mod: HCNC,CPTII,S$GLB, | Performed by: SURGERY

## 2019-10-03 PROCEDURE — 99999 PR PBB SHADOW E&M-EST. PATIENT-LVL III: CPT | Mod: PBBFAC,HCNC,, | Performed by: SURGERY

## 2019-10-03 PROCEDURE — 99203 OFFICE O/P NEW LOW 30 MIN: CPT | Mod: HCNC,S$GLB,, | Performed by: SURGERY

## 2019-10-03 PROCEDURE — 99203 PR OFFICE/OUTPT VISIT, NEW, LEVL III, 30-44 MIN: ICD-10-PCS | Mod: HCNC,S$GLB,, | Performed by: SURGERY

## 2019-10-03 PROCEDURE — 1101F PR PT FALLS ASSESS DOC 0-1 FALLS W/OUT INJ PAST YR: ICD-10-PCS | Mod: HCNC,CPTII,S$GLB, | Performed by: SURGERY

## 2019-10-03 PROCEDURE — 99999 PR PBB SHADOW E&M-EST. PATIENT-LVL III: ICD-10-PCS | Mod: PBBFAC,HCNC,, | Performed by: SURGERY

## 2019-10-03 NOTE — LETTER
[facility] - Plastic Surg [facility]  [address], [address]  [facility] [address]  Phone: [phone]  Fax: [phone] October 13, 2019      Praveen Jeffery MD  [address]  [address]  [facility] [address]    Patient: Zoë Enrique   MR Number: [id number]   YOB: 1950   Date of Visit: 10/3/2019     Dear Dr. Praveen Jeffery:    Thank you for referring Zoë Enrique to me for evaluation. Attached you will find relevant portions of my assessment and plan of care.    If you have questions, please do not hesitate to call me. I look forward to following Zoë Enrique along with you.    Sincerely,    Colin Siddiqui MD  Section of Plastic & Reconstructive Surgery  Department of Surgery  [facility]    TEE/neil

## 2019-10-04 ENCOUNTER — HOSPITAL ENCOUNTER (OUTPATIENT)
Dept: CARDIOLOGY | Facility: CLINIC | Age: 69
Discharge: HOME | End: 2019-10-04
Payer: MEDICARE

## 2019-10-04 ENCOUNTER — OFFICE VISIT (OUTPATIENT)
Dept: ELECTROPHYSIOLOGY | Facility: CLINIC | Age: 69
End: 2019-10-04
Payer: MEDICARE

## 2019-10-04 VITALS
SYSTOLIC BLOOD PRESSURE: 112 MMHG | BODY MASS INDEX: 23.56 KG/M2 | DIASTOLIC BLOOD PRESSURE: 62 MMHG | WEIGHT: 155.44 LBS | HEIGHT: 68 IN | HEART RATE: 68 BPM

## 2019-10-04 DIAGNOSIS — I48.91 ATRIAL FIBRILLATION, UNSPECIFIED TYPE: ICD-10-CM

## 2019-10-04 DIAGNOSIS — Z79.899 VISIT FOR MONITORING TIKOSYN THERAPY: ICD-10-CM

## 2019-10-04 DIAGNOSIS — Z51.81 VISIT FOR MONITORING TIKOSYN THERAPY: ICD-10-CM

## 2019-10-04 DIAGNOSIS — I48.0 PAROXYSMAL ATRIAL FIBRILLATION: Primary | ICD-10-CM

## 2019-10-04 PROCEDURE — 1101F PT FALLS ASSESS-DOCD LE1/YR: CPT | Mod: HCNC,CPTII,S$GLB, | Performed by: INTERNAL MEDICINE

## 2019-10-04 PROCEDURE — 93010 ELECTROCARDIOGRAM REPORT: CPT | Mod: HCNC,S$GLB,, | Performed by: INTERNAL MEDICINE

## 2019-10-04 PROCEDURE — 99214 OFFICE O/P EST MOD 30 MIN: CPT | Mod: HCNC,S$GLB,, | Performed by: INTERNAL MEDICINE

## 2019-10-04 PROCEDURE — 93005 RHYTHM STRIP: ICD-10-PCS | Mod: HCNC,S$GLB,, | Performed by: INTERNAL MEDICINE

## 2019-10-04 PROCEDURE — 93005 ELECTROCARDIOGRAM TRACING: CPT | Mod: HCNC,S$GLB,, | Performed by: INTERNAL MEDICINE

## 2019-10-04 PROCEDURE — 99499 UNLISTED E&M SERVICE: CPT | Mod: HCNC,S$GLB,, | Performed by: INTERNAL MEDICINE

## 2019-10-04 PROCEDURE — 1101F PR PT FALLS ASSESS DOC 0-1 FALLS W/OUT INJ PAST YR: ICD-10-PCS | Mod: HCNC,CPTII,S$GLB, | Performed by: INTERNAL MEDICINE

## 2019-10-04 PROCEDURE — 99999 PR PBB SHADOW E&M-EST. PATIENT-LVL III: ICD-10-PCS | Mod: PBBFAC,HCNC,, | Performed by: INTERNAL MEDICINE

## 2019-10-04 PROCEDURE — 99214 PR OFFICE/OUTPT VISIT, EST, LEVL IV, 30-39 MIN: ICD-10-PCS | Mod: HCNC,S$GLB,, | Performed by: INTERNAL MEDICINE

## 2019-10-04 PROCEDURE — 99999 PR PBB SHADOW E&M-EST. PATIENT-LVL III: CPT | Mod: PBBFAC,HCNC,, | Performed by: INTERNAL MEDICINE

## 2019-10-04 PROCEDURE — 93010 RHYTHM STRIP: ICD-10-PCS | Mod: HCNC,S$GLB,, | Performed by: INTERNAL MEDICINE

## 2019-10-04 PROCEDURE — 99499 RISK ADDL DX/OHS AUDIT: ICD-10-PCS | Mod: HCNC,S$GLB,, | Performed by: INTERNAL MEDICINE
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
## 2019-10-10 ENCOUNTER — HOSPITAL ENCOUNTER (OUTPATIENT)
Dept: RADIOLOGY | Facility: HOSPITAL | Age: 69
Discharge: HOME | End: 2019-10-10
Attending: INTERNAL MEDICINE
Payer: MEDICARE

## 2019-10-10 DIAGNOSIS — Z12.39 BREAST CANCER SCREENING: ICD-10-CM

## 2019-10-10 PROCEDURE — 77063 MAMMO DIGITAL SCREENING BILAT WITH TOMOSYNTHESIS_CAD: ICD-10-PCS | Mod: 26,HCNC,, | Performed by: RADIOLOGY

## 2019-10-10 PROCEDURE — 77067 MAMMO DIGITAL SCREENING BILAT WITH TOMOSYNTHESIS_CAD: ICD-10-PCS | Mod: 26,HCNC,, | Performed by: RADIOLOGY

## 2019-10-10 PROCEDURE — 77067 SCR MAMMO BI INCL CAD: CPT | Mod: TC,HCNC

## 2019-10-10 PROCEDURE — 77063 BREAST TOMOSYNTHESIS BI: CPT | Mod: 26,HCNC,, | Performed by: RADIOLOGY

## 2019-10-10 PROCEDURE — 77067 SCR MAMMO BI INCL CAD: CPT | Mod: 26,HCNC,, | Performed by: RADIOLOGY

## 2019-10-13 NOTE — PROGRESS NOTES
Plastic Surgery Consult    The patient presents at her own request to discuss management of her bilateral breast implants.  She recently felt a pop in her right breast and is concerned that her prosthesis is leaking.  She admits to pain and reports that she has new tender areas of her right breast.  She saw an outside plastic surgeon for consultation, who did not recommend implant removal.  She receives screening mammography at the [facility] and is concerned that mammography may worsen any potential rupture.  She had the implants placed for augmentation.  She has no personal history of breast cancer.  On examination, she has Baker grade 3 capsules.  There is no obvious seroma, skin retractions or bulky adenopathy in her axillae.  She has symmetric volume and she reports that she feels good in clothes.  We discussed management of her implants, the risk of ALCL, indications for ultrasound, protocols for observation related to implant rupture.  I recommended after hearing her concerns that she continue mammography (accepting possible risks) and return if she has demonstrated gel rupture, worsening pain, worsening capsular contracture, or any suggestion of fluid collection.  All of her questions were answered.  I personally reviewed her intake form and it was scanned into the computer.  She states that she is not inclined to have her implants removed at this time from a cosmetic or pain standpoint, and she agrees to follow up if her preferences or clinical condition changes.  We discussed MRI without contrast for monitoring her implants, should she wish to pursue it.     30 minutes was spend with patient, more than 50% was spent explaining the nature of implant rupture or answering questions, coordinating care related to her bilateral breast prostheses.      Plastic & Reconstructive Surgery  [facility]  c/o Colin Siddiqui M.D.  [facility]  [address]  [address] [place] [address]    Work [phone]  Toll free [phone]  If no answer [phone]

## 2019-10-30 ENCOUNTER — CLINICAL SUPPORT (OUTPATIENT)
Dept: FAMILY MEDICINE | Facility: CLINIC | Age: 69
End: 2019-10-30
Payer: MEDICARE

## 2019-10-30 DIAGNOSIS — Z23 FLU VACCINE NEED: Primary | ICD-10-CM

## 2019-10-30 PROCEDURE — 90662 FLU VACCINE - HIGH DOSE (65+) PRESERVATIVE FREE IM: ICD-10-PCS | Mod: HCNC,S$GLB,, | Performed by: INTERNAL MEDICINE

## 2019-10-30 PROCEDURE — 90662 IIV NO PRSV INCREASED AG IM: CPT | Mod: HCNC,S$GLB,, | Performed by: INTERNAL MEDICINE

## 2019-10-30 PROCEDURE — G0008 ADMIN INFLUENZA VIRUS VAC: HCPCS | Mod: HCNC,S$GLB,, | Performed by: INTERNAL MEDICINE

## 2019-10-30 PROCEDURE — G0008 FLU VACCINE - HIGH DOSE (65+) PRESERVATIVE FREE IM: ICD-10-PCS | Mod: HCNC,S$GLB,, | Performed by: INTERNAL MEDICINE

## 2019-11-11 ENCOUNTER — TELEPHONE (OUTPATIENT)
Dept: ELECTROPHYSIOLOGY | Facility: CLINIC | Age: 69
End: 2019-11-11

## 2019-11-11 ENCOUNTER — PATIENT MESSAGE (OUTPATIENT)
Dept: ELECTROPHYSIOLOGY | Facility: CLINIC | Age: 69
End: 2019-11-11

## 2019-11-11 NOTE — TELEPHONE ENCOUNTER
Called Pt assistant program at [employer]. [phone]  PT ID#[id number]   Order # [id number]     Medication should arrive in 7-10 days. Pt notified.

## 2019-11-12 ENCOUNTER — PATIENT MESSAGE (OUTPATIENT)
Dept: FAMILY MEDICINE | Facility: CLINIC | Age: 69
End: 2019-11-12

## 2019-11-12 DIAGNOSIS — G89.29 CHRONIC RIGHT SHOULDER PAIN: Primary | ICD-10-CM

## 2019-11-12 DIAGNOSIS — M25.511 CHRONIC RIGHT SHOULDER PAIN: Primary | ICD-10-CM

## 2019-11-13 ENCOUNTER — PATIENT MESSAGE (OUTPATIENT)
Dept: FAMILY MEDICINE | Facility: CLINIC | Age: 69
End: 2019-11-13

## 2019-11-21 ENCOUNTER — TELEPHONE (OUTPATIENT)
Dept: ELECTROPHYSIOLOGY | Facility: CLINIC | Age: 69
End: 2019-11-21

## 2019-11-21 NOTE — TELEPHONE ENCOUNTER
----- Message from Kina Post sent at 11/21/2019  9:01 AM CST -----  Regarding: Tikosyn  Good Morning,  I am reaching out to you concerning the patients Tikosyn Renewal Application. I have been informed by [employer] that accepting or renewing any Patient Assistance Applications for 2020. Tikosyn will be on back order till the middle of the year. I wanted to inform the office so that other therapy can be looked into. I will notify the patient of the situation so that she can call the office.  Thank you    Kina Post  Patient Care Advocate  [facility]

## 2019-11-21 NOTE — TELEPHONE ENCOUNTER
Attempted to reach pt, no answer. Left voice message with number advises pt that her TIKOSYN was in and she could  at her convenience.

## 2019-11-21 NOTE — TELEPHONE ENCOUNTER
----- Message from Kina Post sent at 11/21/2019  9:37 AM CST -----  Regarding: Tikosyn  Correction on message before. [employer] is not accepting any new or Renewal application for Tikosyn due to back order.  Thank you    Kina Post, [facility]  [phone]

## 2019-11-30 ENCOUNTER — PATIENT MESSAGE (OUTPATIENT)
Dept: FAMILY MEDICINE | Facility: CLINIC | Age: 69
End: 2019-11-30

## 2019-12-10 ENCOUNTER — PATIENT MESSAGE (OUTPATIENT)
Dept: FAMILY MEDICINE | Facility: CLINIC | Age: 69
End: 2019-12-10

## 2019-12-26 ENCOUNTER — PATIENT MESSAGE (OUTPATIENT)
Dept: FAMILY MEDICINE | Facility: CLINIC | Age: 69
End: 2019-12-26

## 2019-12-26 ENCOUNTER — PATIENT MESSAGE (OUTPATIENT)
Dept: ADMINISTRATIVE | Facility: HOSPITAL | Age: 69
End: 2019-12-26

## 2019-12-26 DIAGNOSIS — Z12.11 SCREEN FOR COLON CANCER: Primary | ICD-10-CM

## 2020-01-21 ENCOUNTER — PATIENT MESSAGE (OUTPATIENT)
Dept: FAMILY MEDICINE | Facility: CLINIC | Age: 70
End: 2020-01-21

## 2020-01-22 ENCOUNTER — OFFICE VISIT (OUTPATIENT)
Dept: FAMILY MEDICINE | Facility: CLINIC | Age: 70
End: 2020-01-22
Payer: MEDICARE

## 2020-01-22 VITALS
WEIGHT: 159.19 LBS | HEIGHT: 68 IN | OXYGEN SATURATION: 98 % | HEART RATE: 88 BPM | TEMPERATURE: 98 F | SYSTOLIC BLOOD PRESSURE: 110 MMHG | DIASTOLIC BLOOD PRESSURE: 70 MMHG | BODY MASS INDEX: 24.13 KG/M2

## 2020-01-22 DIAGNOSIS — M79.10 MYALGIA: Primary | ICD-10-CM

## 2020-01-22 DIAGNOSIS — C40.21 OSTEOSARCOMA OF RIGHT FEMUR: ICD-10-CM

## 2020-01-22 DIAGNOSIS — M25.50 POLYARTHRALGIA: ICD-10-CM

## 2020-01-22 DIAGNOSIS — I48.0 PAROXYSMAL ATRIAL FIBRILLATION: ICD-10-CM

## 2020-01-22 DIAGNOSIS — E03.9 HYPOTHYROIDISM (ACQUIRED): ICD-10-CM

## 2020-01-22 DIAGNOSIS — G44.229 CHRONIC TENSION-TYPE HEADACHE, NOT INTRACTABLE: ICD-10-CM

## 2020-01-22 DIAGNOSIS — E78.2 HYPERLIPIDEMIA, MIXED: ICD-10-CM

## 2020-01-22 PROCEDURE — 1101F PR PT FALLS ASSESS DOC 0-1 FALLS W/OUT INJ PAST YR: ICD-10-PCS | Mod: HCNC,CPTII,S$GLB, | Performed by: INTERNAL MEDICINE

## 2020-01-22 PROCEDURE — 99499 UNLISTED E&M SERVICE: CPT | Mod: HCNC,S$GLB,, | Performed by: INTERNAL MEDICINE

## 2020-01-22 PROCEDURE — 1125F PR PAIN SEVERITY QUANTIFIED, PAIN PRESENT: ICD-10-PCS | Mod: HCNC,S$GLB,, | Performed by: INTERNAL MEDICINE

## 2020-01-22 PROCEDURE — 1125F AMNT PAIN NOTED PAIN PRSNT: CPT | Mod: HCNC,S$GLB,, | Performed by: INTERNAL MEDICINE

## 2020-01-22 PROCEDURE — 1159F MED LIST DOCD IN RCRD: CPT | Mod: HCNC,S$GLB,, | Performed by: INTERNAL MEDICINE

## 2020-01-22 PROCEDURE — 99999 PR PBB SHADOW E&M-EST. PATIENT-LVL III: CPT | Mod: PBBFAC,HCNC,, | Performed by: INTERNAL MEDICINE

## 2020-01-22 PROCEDURE — 99213 OFFICE O/P EST LOW 20 MIN: CPT | Mod: HCNC,S$GLB,, | Performed by: INTERNAL MEDICINE

## 2020-01-22 PROCEDURE — 99999 PR PBB SHADOW E&M-EST. PATIENT-LVL III: ICD-10-PCS | Mod: PBBFAC,HCNC,, | Performed by: INTERNAL MEDICINE

## 2020-01-22 PROCEDURE — 1101F PT FALLS ASSESS-DOCD LE1/YR: CPT | Mod: HCNC,CPTII,S$GLB, | Performed by: INTERNAL MEDICINE

## 2020-01-22 PROCEDURE — 1159F PR MEDICATION LIST DOCUMENTED IN MEDICAL RECORD: ICD-10-PCS | Mod: HCNC,S$GLB,, | Performed by: INTERNAL MEDICINE

## 2020-01-22 PROCEDURE — 99499 RISK ADDL DX/OHS AUDIT: ICD-10-PCS | Mod: HCNC,S$GLB,, | Performed by: INTERNAL MEDICINE

## 2020-01-22 PROCEDURE — 99213 PR OFFICE/OUTPT VISIT, EST, LEVL III, 20-29 MIN: ICD-10-PCS | Mod: HCNC,S$GLB,, | Performed by: INTERNAL MEDICINE

## 2020-01-22 RX ORDER — HYDROCODONE BITARTRATE AND ACETAMINOPHEN 5; 325 MG/1; MG/1
1 TABLET ORAL EVERY 6 HOURS PRN
Qty: 28 TABLET | Refills: 0 | Status: SHIPPED | OUTPATIENT
Start: 2020-01-22 | End: 2022-01-04 | Stop reason: SDUPTHER

## 2020-01-22 NOTE — PROGRESS NOTES
[facility] Note    Chief Complaint      Chief Complaint   Patient presents with    Joint pain     both hands and ankles       History of Present Illness      Zoë Enrique is a 69 y.o. female with chronic conditions of A fib, hypothyroidism, hx of osteosarcoma of right femur, tension headaches who presents today for: complaints of bilateral wrist, hand, ankle joint stiffness.  Started 5-6 days ago, onset was abrupt. 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  Worse in the mornings, takes a few hours to loosen up.  Has been taking excedrin for headaches which has helped.  Has had worsened sinus headaches and tension headaches in the past week.  Of note, pt went to see Dr. Urban for right hip pain and was given a steroid injection in the trochanteric bursa which resolved pain.      Past Medical History:  Past Medical History:   Diagnosis Date    Atrial fibrillation 2017    Cancer     osteosarcoma of right femur    Diverticulosis     Hx of atrial flutter     Hx of mitral valve prolapse     Migraines        Past Surgical History:   has a past surgical history that includes Hysterectomy; Knee surgery (Right, ); Knee Arthroplasty (Right, ); Treatment of cardiac arrhythmia (N/A, 2019); Augmentation of breast (Bilateral);  section (); Eye surgery (); Breast surgery (); Joint replacement (, , ); Colon surgery (); and Cosmetic surgery ().    Family History:  family history includes Diabetes in her sister; Hearing loss in her father; Heart disease in her brother, brother, father, mother, and sister.     Social History:  Social History     Tobacco Use    Smoking status: Never Smoker    Smokeless tobacco: Never Used   Substance Use Topics    Alcohol use: Yes     Alcohol/week: 1.0 standard drinks     Types: 1 drink(s) per week     Frequency: 2-4 times a month     Drinks per session: 1 or 2      Binge frequency: Never     Comment: socially    Drug use: No       Review of Systems   Constitutional: Negative for chills, fever and malaise/fatigue.   Respiratory: Negative for shortness of breath.    Cardiovascular: Negative for chest pain.   Gastrointestinal: Negative for constipation, diarrhea, nausea and vomiting.   Skin: Negative for rash.   Neurological: Negative for weakness.        Medications:  Outpatient Encounter Medications as of 1/22/2020   Medication Sig Dispense Refill    acyclovir (ZOVIRAX) 400 MG tablet Take 1 tablet (400 mg total) by mouth 3 (three) times daily as needed. 60 tablet 5    CALCIUM CARB/VIT D3/MINERALS (CALTRATE PLUS ORAL) Take by mouth. Takes two chews daily      docusate sodium (COLACE) 250 MG capsule Take 250 mg by mouth 2 (two) times daily.       dofetilide (TIKOSYN) 250 MCG Cap Take 1 capsule (250 mcg total) by mouth every 12 (twelve) hours. 60 capsule 3    HYDROcodone-acetaminophen (NORCO) 5-325 mg per tablet Take 1 tablet by mouth every 6 (six) hours as needed for Pain. Pt states takes PRN for headaches 28 tablet 0    levothyroxine (SYNTHROID) 25 MCG tablet Take 1 tablet (25 mcg total) by mouth once daily. 90 tablet 3    melatonin 5 mg Cap Take 5 mg by mouth as needed.       rivaroxaban (XARELTO) 20 mg Tab Take 1 tablet (20 mg total) by mouth once daily. 90 tablet 3    UNABLE TO FIND Diclofenac 0.15%, Lidocaine 2.25%, Prilocaine 2.25% in Compounded Topical Cream to be applied up to 5x/day as needed for headaches.      vit C/E/Zn/coppr/lutein/zeaxan (PRESERVISION AREDS-2 ORAL)       [DISCONTINUED] hydrocodone-acetaminophen 5-325mg (NORCO) 5-325 mg per tablet Pt states takes PRN for headaches       Facility-Administered Encounter Medications as of 1/22/2020   Medication Dose Route Frequency Provider Last Rate Last Dose    sodium chloride 0.9% flush 5 mL  5 mL Intravenous PRN Aleksandra Taylor NP           Allergies:  Review of patient's allergies indicates:  "  Allergen Reactions    Morphine Nausea And Vomiting    Succinylcholine      Other reaction(s): v tach       Health Maintenance:  Immunization History   Administered Date(s) Administered    Influenza 10/06/2018    Influenza - High Dose - PF (65 years and older) 10/30/2019      Health Maintenance   Topic Date Due    Hepatitis C Screening  1950    Lipid Panel  1950    TETANUS VACCINE  03/07/1968    DEXA SCAN  03/07/1990    Colonoscopy  03/07/2000    Pneumococcal Vaccine (65+ High/Highest Risk) (1 of 2 - PCV13) 03/07/2015    Mammogram  10/10/2021        Physical Exam      Vital Signs  Temp: 97.8 °F (36.6 °C)  Temp src: Oral  Pulse: 88  SpO2: 98 %  BP: 110/70  BP Location: Right arm  Patient Position: Sitting  Pain Score:   2  Height and Weight  Height: 5' 8" (172.7 cm)  Weight: 72.2 kg (159 lb 2.8 oz)  BSA (Calculated - sq m): 1.86 sq meters  BMI (Calculated): 24.2  Weight in (lb) to have BMI = 25: 164.1]    Physical Exam   Constitutional: She appears well-developed and well-nourished.   HENT:   Head: Normocephalic and atraumatic.   Right Ear: External ear normal.   Left Ear: External ear normal.   Mouth/Throat: Oropharynx is clear and moist.   Eyes: Pupils are equal, round, and reactive to light. Conjunctivae and EOM are normal.   Neck: Carotid bruit is not present.   Cardiovascular: Normal rate, regular rhythm, normal heart sounds and intact distal pulses.   No murmur heard.  Pulmonary/Chest: Effort normal and breath sounds normal. She has no wheezes. She has no rales.   Abdominal: Soft. Bowel sounds are normal. She exhibits no distension. There is no hepatosplenomegaly. There is no tenderness.   Musculoskeletal: She exhibits no edema.   Vitals reviewed.       Laboratory:  CBC:  Recent Labs   Lab 08/08/19  1304 08/13/19  0434 08/15/19  0323   WBC 4.51 3.54 L 3.80 L   RBC 4.26 3.55 L 3.80 L   Hemoglobin 13.6 11.3 L 12.1   Hematocrit 41.8 35.0 L 37.4   Platelets 140 L 113 L 116 L   Mean " Corpuscular Volume 98 99 H 98   Mean Corpuscular Hemoglobin 31.9 H 31.8 H 31.8 H   Mean Corpuscular Hemoglobin Conc 32.5 32.3 32.4     CMP:  Recent Labs   Lab 08/15/19  0323   Glucose 97   Calcium 9.1   Sodium 140   Potassium 4.6   CO2 27   Chloride 106   BUN, Bld 18     URINALYSIS:       LIPIDS:  Recent Labs   Lab 08/12/19  1656   TSH 1.854     TSH:  Recent Labs   Lab 08/12/19  1656   TSH 1.854     A1C:        Assessment/Plan     Zoë Enrique is a 69 y.o.female with:    1. Myalgia  - Sedimentation rate; Future  - C-reactive protein; Future  - ABILIO Screen w/Reflex; Future  - Rheumatoid factor; Future  - CK; Future    2. Polyarthralgia  - Sedimentation rate; Future  - C-reactive protein; Future  - ABILIO Screen w/Reflex; Future  - Rheumatoid factor; Future  - CK; Future  - Uric acid; Future    3. Chronic tension-type headache, not intractable  - HYDROcodone-acetaminophen (NORCO) 5-325 mg per tablet; Take 1 tablet by mouth every 6 (six) hours as needed for Pain. Pt states takes PRN for headaches  Dispense: 28 tablet; Refill: 0    4. Paroxysmal atrial fibrillation  - Comprehensive metabolic panel; Future  - CBC auto differential; Future    5. Hypothyroidism (acquired)  - Comprehensive metabolic panel; Future  - CBC auto differential; Future  - TSH; Future  - T4, free; Future    6. Osteosarcoma of right femur  - Comprehensive metabolic panel; Future  - CBC auto differential; Future    7. Hyperlipidemia, mixed  - Lipid panel; Future     Will look for inflammation or autoimmune disease with labs.  Will likely send medrol dosepack once labs result.  Also recommended to start anti-inflammatory supplements like turmeric and fish oil.  RTC as scheduled.    Praveen Jeffery MD  [facility]

## 2020-01-23 ENCOUNTER — LAB VISIT (OUTPATIENT)
Dept: LAB | Facility: HOSPITAL | Age: 70
End: 2020-01-23
Attending: INTERNAL MEDICINE
Payer: MEDICARE

## 2020-01-23 DIAGNOSIS — C40.21 OSTEOSARCOMA OF RIGHT FEMUR: ICD-10-CM

## 2020-01-23 DIAGNOSIS — M25.50 POLYARTHRALGIA: ICD-10-CM

## 2020-01-23 DIAGNOSIS — I48.0 PAROXYSMAL ATRIAL FIBRILLATION: ICD-10-CM

## 2020-01-23 DIAGNOSIS — E78.2 HYPERLIPIDEMIA, MIXED: ICD-10-CM

## 2020-01-23 DIAGNOSIS — M79.10 MYALGIA: ICD-10-CM

## 2020-01-23 DIAGNOSIS — E03.9 HYPOTHYROIDISM (ACQUIRED): ICD-10-CM

## 2020-01-23 LAB
ALBUMIN SERPL BCP-MCNC: 4.3 G/DL (ref 3.5–5.2)
ALP SERPL-CCNC: 63 U/L (ref 55–135)
ALT SERPL W/O P-5'-P-CCNC: 16 U/L (ref 10–44)
ANION GAP SERPL CALC-SCNC: 8 MMOL/L (ref 8–16)
AST SERPL-CCNC: 21 U/L (ref 10–40)
BASOPHILS # BLD AUTO: 0.01 K/UL (ref 0–0.2)
BASOPHILS NFR BLD: 0.3 % (ref 0–1.9)
BILIRUB SERPL-MCNC: 0.8 MG/DL (ref 0.1–1)
BUN SERPL-MCNC: 21 MG/DL (ref 8–23)
CALCIUM SERPL-MCNC: 9.6 MG/DL (ref 8.7–10.5)
CHLORIDE SERPL-SCNC: 106 MMOL/L (ref 95–110)
CHOLEST SERPL-MCNC: 210 MG/DL (ref 120–199)
CHOLEST/HDLC SERPL: 2.8 {RATIO} (ref 2–5)
CK SERPL-CCNC: 46 U/L (ref 20–180)
CO2 SERPL-SCNC: 27 MMOL/L (ref 23–29)
CREAT SERPL-MCNC: 0.8 MG/DL (ref 0.5–1.4)
CRP SERPL-MCNC: 2.1 MG/L (ref 0–8.2)
DIFFERENTIAL METHOD: ABNORMAL
EOSINOPHIL # BLD AUTO: 0.1 K/UL (ref 0–0.5)
EOSINOPHIL NFR BLD: 1.8 % (ref 0–8)
ERYTHROCYTE [DISTWIDTH] IN BLOOD BY AUTOMATED COUNT: 12.9 % (ref 11.5–14.5)
ERYTHROCYTE [SEDIMENTATION RATE] IN BLOOD BY WESTERGREN METHOD: <2 MM/HR (ref 0–36)
EST. GFR  (AFRICAN AMERICAN): >60 ML/MIN/1.73 M^2
EST. GFR  (NON AFRICAN AMERICAN): >60 ML/MIN/1.73 M^2
GLUCOSE SERPL-MCNC: 91 MG/DL (ref 70–110)
HCT VFR BLD AUTO: 43.2 % (ref 37–48.5)
HDLC SERPL-MCNC: 75 MG/DL (ref 40–75)
HDLC SERPL: 35.7 % (ref 20–50)
HGB BLD-MCNC: 13.6 G/DL (ref 12–16)
IMM GRANULOCYTES # BLD AUTO: 0.01 K/UL (ref 0–0.04)
IMM GRANULOCYTES NFR BLD AUTO: 0.3 % (ref 0–0.5)
LDLC SERPL CALC-MCNC: 119 MG/DL (ref 63–159)
LYMPHOCYTES # BLD AUTO: 1.3 K/UL (ref 1–4.8)
LYMPHOCYTES NFR BLD: 38.2 % (ref 18–48)
MCH RBC QN AUTO: 31.1 PG (ref 27–31)
MCHC RBC AUTO-ENTMCNC: 31.5 G/DL (ref 32–36)
MCV RBC AUTO: 99 FL (ref 82–98)
MONOCYTES # BLD AUTO: 0.3 K/UL (ref 0.3–1)
MONOCYTES NFR BLD: 8.8 % (ref 4–15)
NEUTROPHILS # BLD AUTO: 1.7 K/UL (ref 1.8–7.7)
NEUTROPHILS NFR BLD: 50.6 % (ref 38–73)
NONHDLC SERPL-MCNC: 135 MG/DL
NRBC BLD-RTO: 0 /100 WBC
PLATELET # BLD AUTO: 154 K/UL (ref 150–350)
PMV BLD AUTO: 11.4 FL (ref 9.2–12.9)
POTASSIUM SERPL-SCNC: 4.2 MMOL/L (ref 3.5–5.1)
PROT SERPL-MCNC: 6.9 G/DL (ref 6–8.4)
RBC # BLD AUTO: 4.38 M/UL (ref 4–5.4)
RHEUMATOID FACT SERPL-ACNC: <10 IU/ML (ref 0–15)
SODIUM SERPL-SCNC: 141 MMOL/L (ref 136–145)
T4 FREE SERPL-MCNC: 1.04 NG/DL (ref 0.71–1.51)
TRIGL SERPL-MCNC: 80 MG/DL (ref 30–150)
TSH SERPL DL<=0.005 MIU/L-ACNC: 2.53 UIU/ML (ref 0.4–4)
URATE SERPL-MCNC: 4.1 MG/DL (ref 2.4–5.7)
WBC # BLD AUTO: 3.4 K/UL (ref 3.9–12.7)

## 2020-01-23 PROCEDURE — 85025 COMPLETE CBC W/AUTO DIFF WBC: CPT | Mod: HCNC

## 2020-01-23 PROCEDURE — 86140 C-REACTIVE PROTEIN: CPT | Mod: HCNC

## 2020-01-23 PROCEDURE — 36415 COLL VENOUS BLD VENIPUNCTURE: CPT | Mod: HCNC

## 2020-01-23 PROCEDURE — 80061 LIPID PANEL: CPT | Mod: HCNC

## 2020-01-23 PROCEDURE — 85652 RBC SED RATE AUTOMATED: CPT | Mod: HCNC

## 2020-01-23 PROCEDURE — 82550 ASSAY OF CK (CPK): CPT | Mod: HCNC

## 2020-01-23 PROCEDURE — 86431 RHEUMATOID FACTOR QUANT: CPT | Mod: HCNC

## 2020-01-23 PROCEDURE — 84443 ASSAY THYROID STIM HORMONE: CPT | Mod: HCNC

## 2020-01-23 PROCEDURE — 80053 COMPREHEN METABOLIC PANEL: CPT | Mod: HCNC

## 2020-01-23 PROCEDURE — 84439 ASSAY OF FREE THYROXINE: CPT | Mod: HCNC

## 2020-01-23 PROCEDURE — 84550 ASSAY OF BLOOD/URIC ACID: CPT | Mod: HCNC

## 2020-01-23 PROCEDURE — 86038 ANTINUCLEAR ANTIBODIES: CPT | Mod: HCNC

## 2020-01-24 LAB — ANA SER QL IF: NORMAL

## 2020-01-24 NOTE — PROGRESS NOTES
Labs ok.  No sign of underlying autoimmune or inflammatory disease.  No sign of unusual muscle breakdown.

## 2020-01-27 ENCOUNTER — PATIENT MESSAGE (OUTPATIENT)
Dept: FAMILY MEDICINE | Facility: CLINIC | Age: 70
End: 2020-01-27

## 2020-01-27 DIAGNOSIS — M25.50 POLYARTHRALGIA: Primary | ICD-10-CM

## 2020-01-27 RX ORDER — METHYLPREDNISOLONE 4 MG/1
TABLET ORAL
Qty: 1 PACKAGE | Refills: 0 | Status: SHIPPED | OUTPATIENT
Start: 2020-01-27 | End: 2020-02-17

## 2020-01-28 DIAGNOSIS — Z96.651 KNEE JOINT REPLACEMENT STATUS, RIGHT: ICD-10-CM

## 2020-01-28 DIAGNOSIS — C41.9: ICD-10-CM

## 2020-02-03 ENCOUNTER — OFFICE VISIT (OUTPATIENT)
Dept: PAIN MEDICINE | Facility: CLINIC | Age: 70
End: 2020-02-03
Payer: MEDICARE

## 2020-02-03 ENCOUNTER — PATIENT OUTREACH (OUTPATIENT)
Dept: ADMINISTRATIVE | Facility: OTHER | Age: 70
End: 2020-02-03

## 2020-02-03 ENCOUNTER — TELEPHONE (OUTPATIENT)
Dept: PAIN MEDICINE | Facility: CLINIC | Age: 70
End: 2020-02-03

## 2020-02-03 ENCOUNTER — PATIENT MESSAGE (OUTPATIENT)
Dept: FAMILY MEDICINE | Facility: CLINIC | Age: 70
End: 2020-02-03

## 2020-02-03 VITALS
DIASTOLIC BLOOD PRESSURE: 75 MMHG | HEART RATE: 88 BPM | BODY MASS INDEX: 24.72 KG/M2 | SYSTOLIC BLOOD PRESSURE: 121 MMHG | WEIGHT: 162.56 LBS

## 2020-02-03 DIAGNOSIS — M25.50 DIFFUSE ARTHRALGIA: ICD-10-CM

## 2020-02-03 DIAGNOSIS — M25.539 PAIN IN WRIST, UNSPECIFIED LATERALITY: ICD-10-CM

## 2020-02-03 DIAGNOSIS — M79.10 MYALGIA: ICD-10-CM

## 2020-02-03 DIAGNOSIS — M25.532 ACUTE PAIN OF LEFT WRIST: Primary | ICD-10-CM

## 2020-02-03 DIAGNOSIS — M25.50 POLYARTHRALGIA: Primary | ICD-10-CM

## 2020-02-03 PROCEDURE — 99204 OFFICE O/P NEW MOD 45 MIN: CPT | Mod: HCNC,S$GLB,, | Performed by: PAIN MEDICINE

## 2020-02-03 PROCEDURE — 99999 PR PBB SHADOW E&M-EST. PATIENT-LVL III: CPT | Mod: PBBFAC,HCNC,, | Performed by: PAIN MEDICINE

## 2020-02-03 PROCEDURE — 1159F MED LIST DOCD IN RCRD: CPT | Mod: HCNC,S$GLB,, | Performed by: PAIN MEDICINE

## 2020-02-03 PROCEDURE — 1125F PR PAIN SEVERITY QUANTIFIED, PAIN PRESENT: ICD-10-PCS | Mod: HCNC,S$GLB,, | Performed by: PAIN MEDICINE

## 2020-02-03 PROCEDURE — 1125F AMNT PAIN NOTED PAIN PRSNT: CPT | Mod: HCNC,S$GLB,, | Performed by: PAIN MEDICINE

## 2020-02-03 PROCEDURE — 99999 PR PBB SHADOW E&M-EST. PATIENT-LVL III: ICD-10-PCS | Mod: PBBFAC,HCNC,, | Performed by: PAIN MEDICINE

## 2020-02-03 PROCEDURE — 99204 PR OFFICE/OUTPT VISIT, NEW, LEVL IV, 45-59 MIN: ICD-10-PCS | Mod: HCNC,S$GLB,, | Performed by: PAIN MEDICINE

## 2020-02-03 PROCEDURE — 1101F PR PT FALLS ASSESS DOC 0-1 FALLS W/OUT INJ PAST YR: ICD-10-PCS | Mod: HCNC,CPTII,S$GLB, | Performed by: PAIN MEDICINE

## 2020-02-03 PROCEDURE — 1101F PT FALLS ASSESS-DOCD LE1/YR: CPT | Mod: HCNC,CPTII,S$GLB, | Performed by: PAIN MEDICINE

## 2020-02-03 PROCEDURE — 1159F PR MEDICATION LIST DOCUMENTED IN MEDICAL RECORD: ICD-10-PCS | Mod: HCNC,S$GLB,, | Performed by: PAIN MEDICINE

## 2020-02-03 RX ORDER — DICLOFENAC SODIUM 10 MG/G
2 GEL TOPICAL DAILY
Qty: 100 G | Refills: 2 | Status: SHIPPED | OUTPATIENT
Start: 2020-02-03 | End: 2021-06-01

## 2020-02-03 NOTE — TELEPHONE ENCOUNTER
Sending two referrals: one for pain management to see if they can do a joint injection to help wrist and one for rheumatology to get a second opinion as to what's causing her new, significant joint pains.

## 2020-02-03 NOTE — PROGRESS NOTES
[facility] New Patient Evaluation    Referred by: Praveen Graff MD  Reason for referral: Polyarthralgia,Myalgia and wrist pain    CC:   Chief Complaint   Patient presents with    Wrist Pain     Left     Last 3 PDI Scores 2/3/2020   Pain Disability Index (PDI) 35       HPI:   Zoë Enrique is a 69 y.o. female who presents with a chief complaint of left wrist pain at the base of the thumb.  She also complains of diffuse arthralgia.  Both of the symptoms began abruptly approximately 1 week ago; however, the diffuse arthralgia appears to have responded well to an oral steroid regimen.  Pain at the base of the left wrist has continued to worsen over the past 8 days, which is why she sought advice from her primary care physician.  She was referred for consideration of a local injection to help alleviate her pain. Patient notes that she has been wearing a wrist brace for the past few days and that there has been persistent swelling in the left wrist.  She denies a history of rheumatological disease or diffuse arthralgia.  She cannot identify any particular inciting event.    Location: Left Wrist   Onset: one week ago  Current Pain Score: 5/10  Daily Pain of Range: 5-9/10  Quality: Aching and Sharp  Radiation: Into left thumb and left forearm  Worsened by: nothing in particular  Improved by: ice    Previous Therapies:  PT/OT: Denies  HEP: Denies  Interventions: Denies  Surgery:Denies  Medications:   - NSAIDS: Avoiding systemic NSAIDS  - MSK Relaxants:   - TCAs:   - SNRIs:   - Topicals: None for this ailment  - Anticonvulsants:  - Opioids:     Current Pain Medications:  1. Hydrocodone 5-325 mg    2. Tylenol    Review of Systems:  Review of Systems   Constitutional: Negative for chills and fever.   HENT: Negative for nosebleeds.    Eyes: Negative for blurred vision and pain.   Respiratory: Negative for hemoptysis.    Cardiovascular: Negative for chest pain and palpitations.   Gastrointestinal: Negative for  heartburn, nausea and vomiting.   Genitourinary: Negative for dysuria and hematuria.   Musculoskeletal: Positive for joint pain and myalgias.   Skin: Negative for rash.   Neurological: Negative for tingling, focal weakness, seizures and loss of consciousness.   Endo/Heme/Allergies: Does not bruise/bleed easily.   Psychiatric/Behavioral: Negative for hallucinations.       History:    Current Outpatient Medications:     acyclovir (ZOVIRAX) 400 MG tablet, Take 1 tablet (400 mg total) by mouth 3 (three) times daily as needed., Disp: 60 tablet, Rfl: 5    CALCIUM CARB/VIT D3/MINERALS (CALTRATE PLUS ORAL), Take by mouth. Takes two chews daily, Disp: , Rfl:     docusate sodium (COLACE) 250 MG capsule, Take 250 mg by mouth 2 (two) times daily. , Disp: , Rfl:     dofetilide (TIKOSYN) 250 MCG Cap, Take 1 capsule (250 mcg total) by mouth every 12 (twelve) hours., Disp: 60 capsule, Rfl: 3    HYDROcodone-acetaminophen (NORCO) 5-325 mg per tablet, Take 1 tablet by mouth every 6 (six) hours as needed for Pain. Pt states takes PRN for headaches, Disp: 28 tablet, Rfl: 0    levothyroxine (SYNTHROID) 25 MCG tablet, Take 1 tablet (25 mcg total) by mouth once daily., Disp: 90 tablet, Rfl: 3    melatonin 5 mg Cap, Take 5 mg by mouth as needed. , Disp: , Rfl:     rivaroxaban (XARELTO) 20 mg Tab, Take 1 tablet (20 mg total) by mouth once daily., Disp: 90 tablet, Rfl: 3    UNABLE TO FIND, Diclofenac 0.15%, Lidocaine 2.25%, Prilocaine 2.25% in Compounded Topical Cream to be applied up to 5x/day as needed for headaches., Disp: , Rfl:     vit C/E/Zn/coppr/lutein/zeaxan (PRESERVISION AREDS-2 ORAL), , Disp: , Rfl:     methylPREDNISolone (MEDROL DOSEPACK) 4 mg tablet, use as directed (Patient not taking: Reported on 2/3/2020), Disp: 1 Package, Rfl: 0  No current facility-administered medications for this visit.     Facility-Administered Medications Ordered in Other Visits:     sodium chloride 0.9% flush 5 mL, 5 mL, Intravenous, PRN,  Aleksandra Taylor NP    Past Medical History:   Diagnosis Date    Atrial fibrillation 2017    Cancer     osteosarcoma of right femur    Diverticulosis     Hx of atrial flutter     Hx of mitral valve prolapse 1990    Migraines        Past Surgical History:   Procedure Laterality Date    AUGMENTATION OF BREAST Bilateral     35 yrs     BREAST SURGERY  1984     SECTION      COLON SURGERY      COSMETIC SURGERY  1987    EYE SURGERY  2016    HYSTERECTOMY      JOINT REPLACEMENT  , ,     KNEE ARTHROPLASTY Right 2007    revison in     KNEE SURGERY Right     distal femoral replacing TK    TREATMENT OF CARDIAC ARRHYTHMIA N/A 2019    Procedure: CARDIOVERSION;  Surgeon: Moe Seymour MD;  Location: [facility] EP LAB;  Service: Cardiology;  Laterality: N/A;  KHANH/DCCV/MAC/DM/3PREP/*ADMIT FOR TIKOSYN*       Family History   Problem Relation Age of Onset    Diabetes Sister     Heart disease Sister         Atrrial fib, mitral valve repair    Hearing loss Father     Heart disease Father         Angina, AAA    Heart disease Mother         Atrial fib    Heart disease Brother         Atrial fib    Heart disease Brother         Atrial fib       Social History     Socioeconomic History    Marital status:      Spouse name: Not on file    Number of children: Not on file    Years of education: Not on file    Highest education level: Not on file   Occupational History    Not on file   Social Needs    Financial resource strain: Not very hard    Food insecurity:     Worry: Never true     Inability: Never true    Transportation needs:     Medical: No     Non-medical: No   Tobacco Use    Smoking status: Never Smoker    Smokeless tobacco: Never Used   Substance and Sexual Activity    Alcohol use: Yes     Alcohol/week: 1.0 standard drinks     Types: 1 drink(s) per week     Frequency: 2-4 times a month     Drinks per session: 1 or 2     Binge frequency:  Never     Comment: socially    Drug use: No    Sexual activity: Not Currently     Partners: Male     Birth control/protection: None   Lifestyle    Physical activity:     Days per week: 0 days     Minutes per session: Not on file    Stress: Only a little   Relationships    Social connections:     Talks on phone: More than three times a week     Gets together: Three times a week     Attends Orthodoxy service: Not on file     Active member of club or organization: No     Attends meetings of clubs or organizations: Never     Relationship status:    Other Topics Concern    Not on file   Social History Narrative    Not on file       Review of patient's allergies indicates:   Allergen Reactions    Morphine Nausea And Vomiting    Succinylcholine      Other reaction(s): v tach       Physical Exam:  Vitals:    02/03/20 1127   BP: 121/75   Pulse: 88   Weight: 73.8 kg (162 lb 9.4 oz)   PainSc:   5     General    Nursing note and vitals reviewed.  Constitutional: She is oriented to person, place, and time. She appears well-developed and well-nourished. No distress.   HENT:   Head: Normocephalic and atraumatic.   Nose: Nose normal.   Eyes: Conjunctivae and EOM are normal. Pupils are equal, round, and reactive to light. Right eye exhibits no discharge. Left eye exhibits no discharge. No scleral icterus.   Neck: No JVD present.   Cardiovascular: Intact distal pulses.    Pulmonary/Chest: Effort normal. No respiratory distress.   Abdominal: She exhibits no distension.   Neurological: She is alert and oriented to person, place, and time. Coordination normal.   Psychiatric: She has a normal mood and affect. Her behavior is normal. Judgment and thought content normal.         Left Hand/Wrist Exam     Inspection   Scars: Wrist - absent   Effusion: Wrist - present   Bruising: Wrist - absent     Pain   Wrist - The patient exhibits pain of the extensory musculature and lateral epicondyle.    Swelling   Wrist - The patient is  swollen on the extensory musculature.    Tenderness   The patient is tender to palpation of the snuff box.             Imaging:  None relavent    Labs:  BMP  Lab Results   Component Value Date     01/23/2020    K 4.2 01/23/2020     01/23/2020    CO2 27 01/23/2020    BUN 21 01/23/2020    CREATININE 0.8 01/23/2020    CALCIUM 9.6 01/23/2020    ANIONGAP 8 01/23/2020    ESTGFRAFRICA >60.0 01/23/2020    EGFRNONAA >60.0 01/23/2020     Lab Results   Component Value Date    ALT 16 01/23/2020    AST 21 01/23/2020    ALKPHOS 63 01/23/2020    BILITOT 0.8 01/23/2020       Assessment:  Problem List Items Addressed This Visit     Acute pain of left wrist - Primary    Relevant Medications    diclofenac sodium (VOLTAREN) 1 % Gel    Other Relevant Orders    Ambulatory referral/consult to Orthopedics    Diffuse arthralgia          2/3/2020 - Zoë Enrique is a 69 y.o. female with acute onset diffuse arthralgia which responded well to an oral steroid regiment as well as acute onset left wrist pain, the etiology of which is unclear at this time.  There is still significant swelling near the snuffbox of the left risk, the location of the majority of her pain.  As such, I have recommended against a steroid injection. Patient was informed the my practice centers around the management of chronic pain and that acute pain is best treated by identifying the underlying cause, such as would be the case with an evaluation from Orthopedic surgery or rheumatology.  I have advised her to obtain those evaluations.  I do not typically performed injections of the hand and wrist and will defer the need to Orthopedics.  I will place a referral for her to Dr. Adame.    : Reviewed and consistent with medication use as prescribed.    Treatment Plan:   Procedures: None  PT/OT/HEP: Need for PT or OT is deferred to ortho  Medications: Patient was provided Diclofenac topical cream for anti-inflammatory effect while minimizing systemic exposure  due to chronic Xarelto use.  Labs: reviewed and medications are appropriately dosed for current hepatorenal function.  Imaging: No additional recommended at this time.    Follow Up: RTC prn    Dallin Gonzalez Jr, MD  Interventional Pain Medicine / Anesthesiology    Disclaimer: This note was partly generated using dictation software which may occasionally result in transcription errors.

## 2020-02-03 NOTE — LETTER
February 3, 2020      Praveen Jeffery MD  [address]  [address]  [place] [address]           [facility]  [address]  [place] [address]  Phone: [phone]          Patient: Zoë Enrique   MR Number: [id number]   YOB: 1950   Date of Visit: 2/3/2020       Dear Dr. Praveen Jeffery:    Thank you for referring Zoë Enrique to me for evaluation. Attached you will find relevant portions of my assessment and plan of care.    If you have questions, please do not hesitate to call me. I look forward to following Zoë Enrique along with you.    Sincerely,    Dallin Gonzalez Jr., MD    Enclosure  CC:  No Recipients    If you would like to receive this communication electronically, please contact [email or web address] or [phone] to request more information on [email or web address] Link access.    For providers and/or their staff who would like to refer a patient to [facility], please contact us through our one-stop-shop provider referral line, [facility], at [phone].    If you feel you have received this communication in error or would no longer like to receive these types of communications, please e-mail [email or web address]

## 2020-02-05 ENCOUNTER — HOSPITAL ENCOUNTER (OUTPATIENT)
Dept: RADIOLOGY | Facility: HOSPITAL | Age: 70
Discharge: HOME | End: 2020-02-05
Attending: INTERNAL MEDICINE
Payer: MEDICARE

## 2020-02-05 ENCOUNTER — OFFICE VISIT (OUTPATIENT)
Dept: RHEUMATOLOGY | Facility: CLINIC | Age: 70
End: 2020-02-05
Payer: MEDICARE

## 2020-02-05 VITALS
HEIGHT: 68 IN | DIASTOLIC BLOOD PRESSURE: 71 MMHG | WEIGHT: 163 LBS | HEART RATE: 85 BPM | SYSTOLIC BLOOD PRESSURE: 113 MMHG | BODY MASS INDEX: 24.71 KG/M2

## 2020-02-05 DIAGNOSIS — M25.50 POLYARTHRALGIA: ICD-10-CM

## 2020-02-05 DIAGNOSIS — M25.50 POLYARTHRALGIA: Primary | ICD-10-CM

## 2020-02-05 DIAGNOSIS — Z11.4 ENCOUNTER FOR SCREENING FOR HUMAN IMMUNODEFICIENCY VIRUS (HIV): ICD-10-CM

## 2020-02-05 PROCEDURE — 1125F PR PAIN SEVERITY QUANTIFIED, PAIN PRESENT: ICD-10-PCS | Mod: HCNC,S$GLB,, | Performed by: INTERNAL MEDICINE

## 2020-02-05 PROCEDURE — 77077 XR ARTHRITIS SURVEY: ICD-10-PCS | Mod: 26,HCNC,, | Performed by: RADIOLOGY

## 2020-02-05 PROCEDURE — 1125F AMNT PAIN NOTED PAIN PRSNT: CPT | Mod: HCNC,S$GLB,, | Performed by: INTERNAL MEDICINE

## 2020-02-05 PROCEDURE — 99999 PR PBB SHADOW E&M-EST. PATIENT-LVL III: ICD-10-PCS | Mod: PBBFAC,HCNC,, | Performed by: INTERNAL MEDICINE

## 2020-02-05 PROCEDURE — 1159F PR MEDICATION LIST DOCUMENTED IN MEDICAL RECORD: ICD-10-PCS | Mod: HCNC,S$GLB,, | Performed by: INTERNAL MEDICINE

## 2020-02-05 PROCEDURE — 1101F PR PT FALLS ASSESS DOC 0-1 FALLS W/OUT INJ PAST YR: ICD-10-PCS | Mod: HCNC,CPTII,S$GLB, | Performed by: INTERNAL MEDICINE

## 2020-02-05 PROCEDURE — 1159F MED LIST DOCD IN RCRD: CPT | Mod: HCNC,S$GLB,, | Performed by: INTERNAL MEDICINE

## 2020-02-05 PROCEDURE — 99999 PR PBB SHADOW E&M-EST. PATIENT-LVL III: CPT | Mod: PBBFAC,HCNC,, | Performed by: INTERNAL MEDICINE

## 2020-02-05 PROCEDURE — 77077 JOINT SURVEY SINGLE VIEW: CPT | Mod: TC,HCNC,PO

## 2020-02-05 PROCEDURE — 99205 PR OFFICE/OUTPT VISIT, NEW, LEVL V, 60-74 MIN: ICD-10-PCS | Mod: HCNC,S$GLB,, | Performed by: INTERNAL MEDICINE

## 2020-02-05 PROCEDURE — 99205 OFFICE O/P NEW HI 60 MIN: CPT | Mod: HCNC,S$GLB,, | Performed by: INTERNAL MEDICINE

## 2020-02-05 PROCEDURE — 77077 JOINT SURVEY SINGLE VIEW: CPT | Mod: 26,HCNC,, | Performed by: RADIOLOGY

## 2020-02-05 PROCEDURE — 1101F PT FALLS ASSESS-DOCD LE1/YR: CPT | Mod: HCNC,CPTII,S$GLB, | Performed by: INTERNAL MEDICINE

## 2020-02-05 RX ORDER — PREDNISONE 20 MG/1
40 TABLET ORAL DAILY
Qty: 20 TABLET | Refills: 0 | Status: SHIPPED | OUTPATIENT
Start: 2020-02-05 | End: 2020-02-15

## 2020-02-05 NOTE — PROGRESS NOTES
Subjective:       Patient ID: Zoë Enrique is a 69 y.o. female.    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  After yadira, she developed pain in right CMC region.  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 She woke up on Sunday the  Jan 25th, she had pain in hands, wrists, and ankles.  There was swelling in hands and wrists.  Reports significant pain in let wrist.  Sometimes she has shooting pain in left wrist. She has been using voltaren gel for her pain. Pain level can be 8/10, aching. Pain is sharp and burning.   She was given medrol dose pack with some improvement. She still has pain and stiffness in the hands.  She has pain in left wrist. She has pain in left ankle.  She reports she has stiffness worse in the morning for a few hours.  Denies any changes in diet. Denies history of smoking.  Denies changes in weight, rashes, oral ulcers,photosensitivity.    Family hx:niece: PILY        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    Diverticulosis 1998    Hx of atrial flutter     Hx of mitral valve prolapse 1990    Migraines        Review of Systems   Constitutional: Positive for fatigue. Negative for activity change, appetite change, chills, diaphoresis, fever and unexpected weight change.   HENT: Negative for congestion, ear discharge, ear pain, facial swelling, mouth sores, sinus pressure, sneezing, sore throat, tinnitus and trouble swallowing.    Eyes: Negative for photophobia, pain, discharge, redness, itching and visual disturbance.   Respiratory: Negative for apnea, chest tightness, shortness of breath, wheezing and stridor.    Cardiovascular: Negative for leg  "swelling.   Gastrointestinal: Negative for abdominal distention, abdominal pain, anal bleeding, blood in stool, constipation, diarrhea and nausea.   Endocrine: Negative for cold intolerance and heat intolerance.   Genitourinary: Negative for difficulty urinating and dysuria.   Musculoskeletal: Positive for arthralgias and joint swelling. Negative for back pain, gait problem, myalgias, neck pain and neck stiffness.   Skin: Negative for color change, pallor, rash and wound.   Neurological: Negative for dizziness, seizures, light-headedness and numbness.   Hematological: Negative for adenopathy. Does not bruise/bleed easily.   Psychiatric/Behavioral: Negative for sleep disturbance. The patient is not nervous/anxious.            Objective:   /71   Pulse 85   Ht 5' 8" (1.727 m)   Wt 73.9 kg (163 lb 0.5 oz)   BMI 24.79 kg/m²      Physical Exam   Constitutional: She is oriented to person, place, and time.   HENT:   Head: Normocephalic and atraumatic.   Right Ear: External ear normal.   Left Ear: External ear normal.   Nose: Nose normal.   Mouth/Throat: Oropharynx is clear and moist. No oropharyngeal exudate.   Eyes: Conjunctivae and EOM are normal. Pupils are equal, round, and reactive to light. Right eye exhibits no discharge. Left eye exhibits no discharge. No scleral icterus.   Neck: Neck supple. No JVD present. No thyromegaly present.   Cardiovascular: Normal rate, regular rhythm, normal heart sounds and intact distal pulses.  Exam reveals no gallop and no friction rub.    No murmur heard.  Pulmonary/Chest: Effort normal and breath sounds normal. No respiratory distress. She has no wheezes. She has no rales. She exhibits no tenderness.   Abdominal: Soft. Bowel sounds are normal. She exhibits no distension and no mass. There is no tenderness. There is no rebound and no guarding.   Lymphadenopathy:     She has no cervical adenopathy.   Neurological: She is alert and oriented to person, place, and time. No " cranial nerve deficit. Gait normal. Coordination normal.   Skin: Skin is dry. No rash noted. No erythema. No pallor.     Psychiatric: Affect and judgment normal.   Musculoskeletal: She exhibits edema and tenderness. She exhibits no deformity.   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 She also has some synovitis in her ankles so will work her up for inflammatory arthritis.  No diagnosis found.        Plan:       Problem List Items Addressed This Visit     None        Labs  xrays  Start prednisone 40mg a day for 7 days  Note is made of leukopenia/thrombocytopenia  rtc next week    *

## 2020-02-05 NOTE — LETTER
February 6, 2020      Praveen Jeffery MD  [address]  [address]  [place] [address]           [facility]  [address] [address]  Phone: [phone]  Fax: [phone]          Patient: Zoë Enrique   MR Number: [id number]   YOB: 1950   Date of Visit: 2/5/2020       Dear Dr. Praveen Jeffery:    Thank you for referring Zoë Enrique to me for evaluation. Attached you will find relevant portions of my assessment and plan of care.    If you have questions, please do not hesitate to call me. I look forward to following Zoë Enrique along with you.    Sincerely,    Zoë Montesinos MD    Enclosure  CC:  No Recipients    If you would like to receive this communication electronically, please contact [email or web address] or [phone] to request more information on [facility] access.    For providers and/or their staff who would like to refer a patient to [facility], please contact us through our one-stop-shop provider referral line, [facility], at [phone].    If you feel you have received this communication in error or would no longer like to receive these types of communications, please e-mail [email or web address]

## 2020-02-09 ENCOUNTER — PATIENT OUTREACH (OUTPATIENT)
Dept: ADMINISTRATIVE | Facility: OTHER | Age: 70
End: 2020-02-09

## 2020-02-10 ENCOUNTER — PATIENT MESSAGE (OUTPATIENT)
Dept: ELECTROPHYSIOLOGY | Facility: CLINIC | Age: 70
End: 2020-02-10

## 2020-02-10 ENCOUNTER — PATIENT MESSAGE (OUTPATIENT)
Dept: RHEUMATOLOGY | Facility: CLINIC | Age: 70
End: 2020-02-10

## 2020-02-11 ENCOUNTER — OFFICE VISIT (OUTPATIENT)
Dept: ORTHOPEDICS | Facility: CLINIC | Age: 70
End: 2020-02-11
Payer: MEDICARE

## 2020-02-11 VITALS — WEIGHT: 163 LBS | HEIGHT: 68 IN | BODY MASS INDEX: 24.71 KG/M2

## 2020-02-11 DIAGNOSIS — M65.4 TENDINITIS, DE QUERVAIN'S: Primary | ICD-10-CM

## 2020-02-11 DIAGNOSIS — M25.532 ACUTE PAIN OF LEFT WRIST: ICD-10-CM

## 2020-02-11 PROCEDURE — 1101F PT FALLS ASSESS-DOCD LE1/YR: CPT | Mod: HCNC,CPTII,S$GLB, | Performed by: ORTHOPAEDIC SURGERY

## 2020-02-11 PROCEDURE — 99203 PR OFFICE/OUTPT VISIT, NEW, LEVL III, 30-44 MIN: ICD-10-PCS | Mod: HCNC,25,S$GLB, | Performed by: ORTHOPAEDIC SURGERY

## 2020-02-11 PROCEDURE — 1125F PR PAIN SEVERITY QUANTIFIED, PAIN PRESENT: ICD-10-PCS | Mod: HCNC,S$GLB,, | Performed by: ORTHOPAEDIC SURGERY

## 2020-02-11 PROCEDURE — 99999 PR PBB SHADOW E&M-EST. PATIENT-LVL III: CPT | Mod: PBBFAC,HCNC,, | Performed by: ORTHOPAEDIC SURGERY

## 2020-02-11 PROCEDURE — 99203 OFFICE O/P NEW LOW 30 MIN: CPT | Mod: HCNC,25,S$GLB, | Performed by: ORTHOPAEDIC SURGERY

## 2020-02-11 PROCEDURE — 20550 NJX 1 TENDON SHEATH/LIGAMENT: CPT | Mod: HCNC,LT,S$GLB, | Performed by: ORTHOPAEDIC SURGERY

## 2020-02-11 PROCEDURE — 1159F MED LIST DOCD IN RCRD: CPT | Mod: HCNC,S$GLB,, | Performed by: ORTHOPAEDIC SURGERY

## 2020-02-11 PROCEDURE — 1159F PR MEDICATION LIST DOCUMENTED IN MEDICAL RECORD: ICD-10-PCS | Mod: HCNC,S$GLB,, | Performed by: ORTHOPAEDIC SURGERY

## 2020-02-11 PROCEDURE — 99999 PR PBB SHADOW E&M-EST. PATIENT-LVL III: ICD-10-PCS | Mod: PBBFAC,HCNC,, | Performed by: ORTHOPAEDIC SURGERY

## 2020-02-11 PROCEDURE — 1125F AMNT PAIN NOTED PAIN PRSNT: CPT | Mod: HCNC,S$GLB,, | Performed by: ORTHOPAEDIC SURGERY

## 2020-02-11 PROCEDURE — 20550 PR INJECT TENDON SHEATH/LIGAMENT: ICD-10-PCS | Mod: HCNC,LT,S$GLB, | Performed by: ORTHOPAEDIC SURGERY

## 2020-02-11 PROCEDURE — 1101F PR PT FALLS ASSESS DOC 0-1 FALLS W/OUT INJ PAST YR: ICD-10-PCS | Mod: HCNC,CPTII,S$GLB, | Performed by: ORTHOPAEDIC SURGERY

## 2020-02-11 RX ORDER — TRIAMCINOLONE ACETONIDE 40 MG/ML
20 INJECTION, SUSPENSION INTRA-ARTICULAR; INTRAMUSCULAR
Status: COMPLETED | OUTPATIENT
Start: 2020-02-11 | End: 2020-02-11

## 2020-02-11 RX ORDER — DOFETILIDE 0.25 MG/1
250 CAPSULE ORAL EVERY 12 HOURS
Qty: 60 CAPSULE | Refills: 11 | Status: SHIPPED | OUTPATIENT
Start: 2020-02-11 | End: 2020-10-16

## 2020-02-11 RX ADMIN — TRIAMCINOLONE ACETONIDE 20 MG: 40 INJECTION, SUSPENSION INTRA-ARTICULAR; INTRAMUSCULAR at 01:02

## 2020-02-11 NOTE — PROCEDURES
Procedures   PROCEDURE:  I have explained the risks, benefits, and alternatives of the procedure in detail.  The patient voices understanding, gives consent, and all questions have been answered.  Pause for timeout. A sterile prep of the skin performed, then the [id number] dorsal compartment is injected from the radial approach using a 25 gauge needle with a combination of 0.5 cc 1% plain xylocaine and 20 mg of Kenolog.  The remaining 20 mg of Kenolog was properly wasted.   The patient is cautioned and immediate relief of pain is secondary to the local anesthetic and will be temporary.  After the anesthetic wears off there may be a increase in pain that may last for a few hours or a few days and they should use ice to help alleviate this flair up of pain. Patient tolerated the procedure well.

## 2020-02-11 NOTE — PROGRESS NOTES
Subjective:      Patient ID: Zoë Enrique is a 69 y.o. female.    Chief Complaint: Pain of the Left Wrist      HPI: Zoë Enrique is here  For initial visit evaluation with complaints of left wrist pain for 3 weeks duration. 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 Symptoms started as polyarthropathy and pt was evaluated with arthritis survey that was negative for significant degenerative disease. She was treated with 40 mg Prednisone and symptoms have improved except for the left wrist. Pain is located over the 1st dorsal compartment and is worse with gripping, twisting, and lifting activizes. She has tried oral steroids, Voltaren gel and intermittent bracing. Associated symptoms include swelling in that area. She denies any numbness and tingling.     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    Diverticulosis 1998    Hx of atrial flutter     Hx of mitral valve prolapse 1990    Migraines        Current Outpatient Medications:     acyclovir (ZOVIRAX) 400 MG tablet, Take 1 tablet (400 mg total) by mouth 3 (three) times daily as needed., Disp: 60 tablet, Rfl: 5    CALCIUM CARB/VIT D3/MINERALS (CALTRATE PLUS ORAL), Take by mouth. Takes two chews daily, Disp: , Rfl:     diclofenac sodium (VOLTAREN) 1 % Gel, Apply 2 g topically once daily., Disp: 100 g, Rfl: 2    docusate sodium (COLACE) 250 MG capsule, Take 250 mg by mouth 2 (two) times daily. , Disp: , Rfl:     HYDROcodone-acetaminophen (NORCO) 5-325 mg per tablet, Take 1 tablet by mouth every 6 (six) hours as needed for Pain. Pt states takes PRN for headaches, Disp: 28 tablet, Rfl: 0    levothyroxine (SYNTHROID) 25 MCG tablet, Take 1 tablet (25 mcg total) by mouth once daily., Disp: 90 tablet, Rfl: 3    melatonin 5 mg Cap, Take 5 mg by mouth as needed. , Disp: , Rfl:     predniSONE (DELTASONE)  "20 MG tablet, Take 2 tablets (40 mg total) by mouth once daily. for 10 days, Disp: 20 tablet, Rfl: 0    rivaroxaban (XARELTO) 20 mg Tab, Take 1 tablet (20 mg total) by mouth once daily., Disp: 90 tablet, Rfl: 3    vit C/E/Zn/coppr/lutein/zeaxan (PRESERVISION AREDS-2 ORAL), , Disp: , Rfl:     dofetilide (TIKOSYN) 250 MCG Cap, Take 1 capsule (250 mcg total) by mouth every 12 (twelve) hours., Disp: 60 capsule, Rfl: 11    methylPREDNISolone (MEDROL DOSEPACK) 4 mg tablet, use as directed, Disp: 1 Package, Rfl: 0    UNABLE TO FIND, Diclofenac 0.15%, Lidocaine 2.25%, Prilocaine 2.25% in Compounded Topical Cream to be applied up to 5x/day as needed for headaches., Disp: , Rfl:   No current facility-administered medications for this visit.     Facility-Administered Medications Ordered in Other Visits:     sodium chloride 0.9% flush 5 mL, 5 mL, Intravenous, PRN, Aleksandra Taylor NP  Review of patient's allergies indicates:   Allergen Reactions    Morphine Nausea And Vomiting    Succinylcholine      Other reaction(s): v tach       Ht 5' 8" (1.727 m)   Wt 73.9 kg (163 lb)   BMI 24.78 kg/m²     Review of Systems   Constitution: Negative for chills and fever.   Cardiovascular: Negative for chest pain and palpitations.   Respiratory: Negative for shortness of breath and wheezing.    Skin: Negative for poor wound healing and rash.   Musculoskeletal: Positive for joint pain and stiffness.   Gastrointestinal: Negative for nausea and vomiting.   Genitourinary: Negative for dysuria and hematuria.   Neurological: Negative for numbness, paresthesias, seizures and tremors.   Psychiatric/Behavioral: Negative for altered mental status.   Allergic/Immunologic: Negative for environmental allergies and persistent infections.         Objective:    Ortho Exam        left upper extremity:  Significant for tenderness to palpation and swelling over the 1st dorsal compartment.  Positive Finkelstein.  Range of motion thumb full.  Range " of motion remaining fingers full but slightly stiff.  Negative grind.  Sensation intact.  Pulses present.  Cap refill brisk.  GEN: Well developed, well nourished female. AAOX3. No acute distress.   Normocephalic, atraumatic.   LISA  Breathing unlabored.  Mood and affect appropriate.     Assessment:     Imaging: Left  Wrist radiographs from the arthritis survey were reviewed which is unremarkable for significant degenerative disease.  No fracture dislocation noted.        1. Tendinitis, de Quervain's    2. Acute pain of left wrist          Plan:       Orders Placed This Encounter    triamcinolone acetonide injection 20 mg       explained the nature of the problem to the patient.   I recommended and performed corticosteroid injection of the 1st dorsal compartment without complication.  Patient tolerated well.   patient is on long-term blood thinners and cannot tolerate NSAIDs. Continue Voltaren gel.    Recommended thumb wrap for use with activities.  Patient declined.    Follow up in about 6 weeks (around 3/24/2020).

## 2020-02-11 NOTE — LETTER
February 11, 2020      aDllin Gonzalez Jr., MD  [address]  [address]  [facility] [address]           [facility]oped[facility]  [address]  [facility] [address]  Phone: [phone]          Patient: Zoë Enrique   MR Number: [id number]   YOB: 1950   Date of Visit: 2/11/2020       Dear Dr. Dallin Gonzalez Jr.:    Thank you for referring Zoë Enrique to me for evaluation. Attached you will find relevant portions of my assessment and plan of care.    If you have questions, please do not hesitate to call me. I look forward to following Zoë Enrique along with you.    Sincerely,    Aby Green PA-C    Enclosure  CC:  No Recipients    If you would like to receive this communication electronically, please contact [email or web address] or [phone] to request more information on [facility] access.    For providers and/or their staff who would like to refer a patient to [facility], please contact us through our one-stop-shop provider referral line, [facility], at [phone].    If you feel you have received this communication in error or would no longer like to receive these types of communications, please e-mail [email or web address]

## 2020-02-12 ENCOUNTER — PATIENT MESSAGE (OUTPATIENT)
Dept: RHEUMATOLOGY | Facility: CLINIC | Age: 70
End: 2020-02-12

## 2020-02-19 ENCOUNTER — HOSPITAL ENCOUNTER (OUTPATIENT)
Dept: RADIOLOGY | Facility: HOSPITAL | Age: 70
Discharge: HOME | End: 2020-02-19
Attending: ORTHOPAEDIC SURGERY
Payer: MEDICARE

## 2020-02-19 DIAGNOSIS — C41.9: ICD-10-CM

## 2020-02-19 DIAGNOSIS — Z96.651 KNEE JOINT REPLACEMENT STATUS, RIGHT: ICD-10-CM

## 2020-02-19 PROCEDURE — 73552 XR FEMUR 2 VIEW RIGHT: ICD-10-PCS | Mod: 26,HCNC,RT, | Performed by: RADIOLOGY

## 2020-02-19 PROCEDURE — 73590 XR TIBIA FIBULA 2 VIEW RIGHT: ICD-10-PCS | Mod: 26,HCNC,RT, | Performed by: RADIOLOGY

## 2020-02-19 PROCEDURE — 73552 X-RAY EXAM OF FEMUR 2/>: CPT | Mod: TC,HCNC,RT

## 2020-02-19 PROCEDURE — 73590 X-RAY EXAM OF LOWER LEG: CPT | Mod: 26,HCNC,RT, | Performed by: RADIOLOGY

## 2020-02-19 PROCEDURE — 73552 X-RAY EXAM OF FEMUR 2/>: CPT | Mod: 26,HCNC,RT, | Performed by: RADIOLOGY

## 2020-02-19 PROCEDURE — 73590 X-RAY EXAM OF LOWER LEG: CPT | Mod: TC,HCNC,RT

## 2020-02-27 ENCOUNTER — PATIENT OUTREACH (OUTPATIENT)
Dept: ADMINISTRATIVE | Facility: OTHER | Age: 70
End: 2020-02-27

## 2020-02-28 ENCOUNTER — OFFICE VISIT (OUTPATIENT)
Dept: ORTHOPEDICS | Facility: CLINIC | Age: 70
End: 2020-02-28
Payer: MEDICARE

## 2020-02-28 VITALS
SYSTOLIC BLOOD PRESSURE: 131 MMHG | BODY MASS INDEX: 24.96 KG/M2 | HEART RATE: 95 BPM | TEMPERATURE: 98 F | WEIGHT: 164.69 LBS | DIASTOLIC BLOOD PRESSURE: 88 MMHG | HEIGHT: 68 IN

## 2020-02-28 DIAGNOSIS — M70.61 GREATER TROCHANTERIC BURSITIS OF RIGHT HIP: ICD-10-CM

## 2020-02-28 DIAGNOSIS — C40.21 OSTEOSARCOMA OF RIGHT FEMUR: Primary | ICD-10-CM

## 2020-02-28 DIAGNOSIS — Z96.651 HISTORY OF ARTHROPLASTY OF RIGHT KNEE: ICD-10-CM

## 2020-02-28 PROCEDURE — 1125F PR PAIN SEVERITY QUANTIFIED, PAIN PRESENT: ICD-10-PCS | Mod: HCNC,S$GLB,, | Performed by: ORTHOPAEDIC SURGERY

## 2020-02-28 PROCEDURE — 99204 PR OFFICE/OUTPT VISIT, NEW, LEVL IV, 45-59 MIN: ICD-10-PCS | Mod: HCNC,S$GLB,, | Performed by: ORTHOPAEDIC SURGERY

## 2020-02-28 PROCEDURE — 1125F AMNT PAIN NOTED PAIN PRSNT: CPT | Mod: HCNC,S$GLB,, | Performed by: ORTHOPAEDIC SURGERY

## 2020-02-28 PROCEDURE — 1159F PR MEDICATION LIST DOCUMENTED IN MEDICAL RECORD: ICD-10-PCS | Mod: HCNC,S$GLB,, | Performed by: ORTHOPAEDIC SURGERY

## 2020-02-28 PROCEDURE — 1159F MED LIST DOCD IN RCRD: CPT | Mod: HCNC,S$GLB,, | Performed by: ORTHOPAEDIC SURGERY

## 2020-02-28 PROCEDURE — 99204 OFFICE O/P NEW MOD 45 MIN: CPT | Mod: HCNC,S$GLB,, | Performed by: ORTHOPAEDIC SURGERY

## 2020-02-28 PROCEDURE — 99999 PR PBB SHADOW E&M-EST. PATIENT-LVL III: CPT | Mod: PBBFAC,HCNC,, | Performed by: ORTHOPAEDIC SURGERY

## 2020-02-28 PROCEDURE — 99999 PR PBB SHADOW E&M-EST. PATIENT-LVL III: ICD-10-PCS | Mod: PBBFAC,HCNC,, | Performed by: ORTHOPAEDIC SURGERY

## 2020-02-28 PROCEDURE — 1101F PR PT FALLS ASSESS DOC 0-1 FALLS W/OUT INJ PAST YR: ICD-10-PCS | Mod: HCNC,CPTII,S$GLB, | Performed by: ORTHOPAEDIC SURGERY

## 2020-02-28 PROCEDURE — 1101F PT FALLS ASSESS-DOCD LE1/YR: CPT | Mod: HCNC,CPTII,S$GLB, | Performed by: ORTHOPAEDIC SURGERY

## 2020-02-28 RX ORDER — AMOXICILLIN 500 MG/1
CAPSULE ORAL
COMMUNITY
Start: 2020-02-19 | End: 2020-10-09

## 2020-02-28 NOTE — PROGRESS NOTES
Subjective:     HPI:   Zoë Enrique is a 69 y.o. female who presents established seen Caire in my Orthopedic Oncology Clinic at [facility].  She is known to me from [facility].  Her last notes briefly summarized below    Briefly, she has a history of right femur parosteal osteosarcoma in 1989, resected with massive allograft over an intramedullary arnaldo. No adjuvant therapies, wide resection. She had a right knee arthroplasty in February 2007, this collapsed and was revised to a distal femur replacement in 11/2007 at the [facility] by Dr. Vides. She had an infection that was managed with implant retention and debridement in 2009, 8 wks antibiotics, with no issues since then.     She has also had right hip greater trochanteric bursitis that I injected 8 months ago.  This injection largely worked and her hip is only mildly symptomatic now.     She returns for an annual follow-up.  She is over 11 years postop.  No new symptoms involving catching or clicking or giving way from her right knee distal femur prosthesis.  Denies any thigh pain or aching with activities.  Only new change in the past few months is some swelling over her patella.  The chronic swelling over her proximal pretibial area remains unchanged.  She has taken bisphosphonates in the past.    ROS:  The updated medical history is in the chart and has been reviewed. A review of systems is updated and there is no reported vision changes, ear/nose/mouth/throat complaints,  chest pain, shortness of breath, abdominal pain, urological complaints, fevers or chills, psychiatric complaints. Musculoskeletal and neurologcial symptoms are as documented. All other systems are negative.  Review of Systems   Constitutional: Negative.  Negative for chills, fever and weight loss.   HENT: Negative for congestion.    Eyes: Negative for blurred vision.   Respiratory: Negative for cough.    Cardiovascular: Positive for leg swelling. Negative for chest pain and  orthopnea.   Gastrointestinal: Negative for heartburn, nausea and vomiting.   Genitourinary: Negative for dysuria.   Skin: Negative.  Negative for rash.   Neurological: Negative for weakness and headaches.   Psychiatric/Behavioral: Negative for depression.           Objective:   Exam:  Vitals:    02/28/20 1125   BP: 131/88   Pulse: 95   Temp: 98.4 °F (36.9 °C)     Body mass index is 25.04 kg/m².    Physical examination included assessment of the patient's general appearance with particular attention to development, nutrition, body habitus, attention to grooming, and any evidence of distress.  Constitutional: The patient is a well-developed, well-nourished patient in no acute distress.   Cardiovascular: Vascular examination included warmth and capillary refill as well inspection for edema and assessment of pedal pulses. Pulses are palpable and regular.  Musculoskeletal: Gait was assessed as to whether it was steady, non-antalgic, and/or required the use of an assist device.  Well healed anterior incision RLE, knee ROM 0-130 degrees without crepitus, patella is well tracking.  Hinge mechanism is stable.  Boggy region of pretibial area laterally, nontender, nonerythematous.  Similar area over the patella.  No fluctuance.  Mild tenderness over greater trochanter on the right.  There is no tenderness over the thigh with deep palpation.  Skin: The skin was examined for any obvious rashes or lesions in the extremity.  Neurologic: Sensation is intact to light touch in the extremity. The patient has good coordination without hyperreflexia and is alert and oriented to person, place and time and has normal mood and affect.     All of the above were examined and found to be within normal limits except for the following pertinent clinical findings:    Ortho Exam     Specimens (From admission, onward)    None           Imaging:  Right femur and tibia orthogonal radiographs from late January were reviewed, these show a distal  femur knee prosthesis good alignment no complication.  There is cortical hypertrophy at in the femoral diaphysis and wear the implant stem and size.      Assessment:     Osteosarcoma of right femur [C40.21]    Active Problem List with Overview Notes    Diagnosis Date Noted    History of arthroplasty of right knee 02/28/2020    Greater trochanteric bursitis of right hip 02/28/2020    Acute pain of left wrist 02/03/2020    Diffuse arthralgia 02/03/2020    Osteosarcoma of right femur 09/04/2019    Hypothyroidism (acquired) 08/13/2019    Visit for monitoring Tikosyn therapy 08/12/2019    Paroxysmal atrial fibrillation 12/10/2018    Chronic tension-type headache, not intractable 07/23/2018     No evidence of recurrence.  The area of cortical hypertrophy in the femur is likely reactive since it occurs at the end of the femoral stem, and no evidence of loosening.  However with her history of bisphosphonate use, it is in an identical location to where a stress reaction for impending subtrochanteric femur fracture would occur.  She has no symptoms, and has not taken bisphosphonates for years, will continue to observe as needed.     Plan:       Follow up as needed if symptoms occur.  After 10 years postop, no interval change on her radiographs, and no symptoms, I will see her as needed.  Counseled for symptoms of thigh pain for impending subtrochanteric stress fracture.  Counseled on average duration of endoprosthesis longevity, 85% at 10 years on average, with polyethylene inserts potentially able to last 20 years.    I spent more than 45 minutes reviewing this patient's medical records, imaging studies, and taking a full history and physical, and discussing his treatment plan and expected prognosis.  More than 50% of this time was spent face to face with the patient.      No orders of the defined types were placed in this encounter.      Past Medical History:   Diagnosis Date    Atrial fibrillation 09/2017     Cancer     osteosarcoma of right femur    Diverticulosis     Hx of atrial flutter     Hx of mitral valve prolapse     Migraines        Past Surgical History:   Procedure Laterality Date    AUGMENTATION OF BREAST Bilateral     35 yrs     BREAST SURGERY       SECTION      COLON SURGERY      COSMETIC SURGERY  1987    EYE SURGERY  2016    HYSTERECTOMY      JOINT REPLACEMENT  , ,     KNEE ARTHROPLASTY Right 2007    revison in 2009    KNEE SURGERY Right     distal femoral replacing TK    TREATMENT OF CARDIAC ARRHYTHMIA N/A 2019    Procedure: CARDIOVERSION;  Surgeon: Moe Seymour MD;  Location: [facility] EP LAB;  Service: Cardiology;  Laterality: N/A;  KHANH/DCCV/MAC/DM/3PREP/*ADMIT FOR TIKOSYN*       Family History   Problem Relation Age of Onset    Diabetes Sister     Heart disease Sister         Atrrial fib, mitral valve repair    Hearing loss Father     Heart disease Father         Angina, AAA    Heart disease Mother         Atrial fib    Heart disease Brother         Atrial fib    Heart disease Brother         Atrial fib       Social History     Socioeconomic History    Marital status:      Spouse name: Not on file    Number of children: Not on file    Years of education: Not on file    Highest education level: Not on file   Occupational History    Not on file   Social Needs    Financial resource strain: Not very hard    Food insecurity:     Worry: Never true     Inability: Never true    Transportation needs:     Medical: No     Non-medical: No   Tobacco Use    Smoking status: Never Smoker    Smokeless tobacco: Never Used   Substance and Sexual Activity    Alcohol use: Yes     Alcohol/week: 1.0 standard drinks     Types: 1 drink(s) per week     Frequency: 2-4 times a month     Drinks per session: 1 or 2     Binge frequency: Never     Comment: socially    Drug use: No    Sexual activity: Not Currently     Partners: Male      Birth control/protection: None   Lifestyle    Physical activity:     Days per week: 0 days     Minutes per session: Not on file    Stress: Only a little   Relationships    Social connections:     Talks on phone: More than three times a week     Gets together: Three times a week     Attends [employer] service: Not on file     Active member of club or organization: No     Attends meetings of clubs or organizations: Never     Relationship status:    Other Topics Concern    Not on file   Social History Narrative    Not on file

## 2020-03-06 ENCOUNTER — PATIENT MESSAGE (OUTPATIENT)
Dept: FAMILY MEDICINE | Facility: CLINIC | Age: 70
End: 2020-03-06

## 2020-03-06 DIAGNOSIS — Z12.11 SCREEN FOR COLON CANCER: Primary | ICD-10-CM

## 2020-03-06 NOTE — TELEPHONE ENCOUNTER
Will fax referral to [facility] in [place].  Please give pt [facility] contact information to call as well.

## 2020-03-10 ENCOUNTER — PATIENT MESSAGE (OUTPATIENT)
Dept: RHEUMATOLOGY | Facility: CLINIC | Age: 70
End: 2020-03-10

## 2020-03-10 ENCOUNTER — PATIENT OUTREACH (OUTPATIENT)
Dept: ADMINISTRATIVE | Facility: OTHER | Age: 70
End: 2020-03-10

## 2020-03-10 NOTE — PROGRESS NOTES
Chart reviewed.   Requested updates from Care Everywhere.  Immunizations reconciled.   HM updated.  Open referral to GI.

## 2020-03-11 ENCOUNTER — TELEPHONE (OUTPATIENT)
Dept: RHEUMATOLOGY | Facility: CLINIC | Age: 70
End: 2020-03-11

## 2020-03-19 ENCOUNTER — PATIENT OUTREACH (OUTPATIENT)
Dept: ADMINISTRATIVE | Facility: OTHER | Age: 70
End: 2020-03-19

## 2020-04-16 NOTE — TELEPHONE ENCOUNTER
Dr. Jackson, Kiesha Cruz   HPI: A 53-year old female here for follow up. She was admitted to the hospital last month with acute heart failure and found to have a new cardiomyopathy with EF 25%. Heart catheterization did not show any epicardial disease. She has taken off more fluid during dialysis and actually feeling well. She continues to wear a Life Vest. She denies any new chest pain, dyspnea, PND, orthopnea, edema or syncope. Impression/Plan:  1. Recent acute heart failure systolic in nature, improved and likely worsened due to not enough fluid removed during dialysis. 2. Nonischemic cardiomyopathy and heart catheterization March 2020 without evidence for significant epicardial disease. 3. End-stage renal disease on dialysis. She has a right sided dialysis catheter and she is planning to start peritoneal dialysis in the coming months. Her EF was normal June 2019.  4. History of hypertension improving. 5. Dyslipidemia. 6. Diabetes. 7. History of idiopathic thrombocytopenia purpura. Her heart failure appears compensated today. I will continue to have her wear the Life Vest for at least two months and repeat an echocardiogram. If her EF is improved, I will plan to remove the Life Vest. She remains on beta blocker. If her blood pressure remains elevated, my plan would be to start her on low dose ACE inhibitor. She has adequate access on her left arm without previous fistula if she does require an ICD. All questions answered. Past Medical History:   Diagnosis Date    Anemia     Arthritis     Diabetes ([facility] [address]     Hypertension     ITP (idiopathic thrombocytopenic purpura)        Current Outpatient Medications   Medication Sig Dispense Refill    carvediloL (COREG) 6.25 mg tablet Take 1 Tab by mouth every twelve (12) hours. 60 Tab 0    aspirin 81 mg chewable tablet Take 1 Tab by mouth daily. 30 Tab 0    atorvastatin (LIPITOR) 10 mg tablet Take  by mouth daily.       hydrALAZINE (APRESOLINE) 25 mg tablet Take 25 mg by mouth three (3) times daily.  calcium acetate (PHOSLO) 667 mg cap Take 1 Cap by mouth three (3) times daily (with meals). 90 Cap 3    loratadine (CLARITIN) 10 mg tablet Take 10 mg by mouth daily as needed for Allergies.  acetaminophen (TYLENOL) 500 mg tablet Take 2 Tabs by mouth every six (6) hours as needed for Pain. 20 Tab 0    ferrous sulfate 325 mg (65 mg iron) tablet Take 1 Tab by mouth two (2) times daily (with meals). 60 Tab 0       Social History   reports that she has never smoked. She has never used smokeless tobacco.   reports no history of alcohol use. Family History  family history includes Cancer in her father; Hypertension in her mother. Review of Systems  Except as stated above include:  Constitutional: Negative for fever, chills and malaise/fatigue. HEENT: No congestion or recent URI. Gastrointestinal: No nausea, vomiting, abdominal pain, bloody stools. Pulmonary:  Negative except as stated above. Cardiac:  Negative except as stated above. Musculoskeletal: Negative except as stated above. Neurological:  No localized symptoms. Skin:  Negative except as stated above. Psych:  Negative except as stated above. Endocrine:  Negative except as stated above. PHYSICAL EXAM  BP Readings from Last 3 Encounters:   04/16/20 146/90   03/16/20 133/66   12/10/19 (!) 197/112     Pulse Readings from Last 3 Encounters:   04/16/20 93   03/16/20 80   12/10/19 (!) 119     Wt Readings from Last 3 Encounters:   04/16/20 88 kg (194 lb)   03/16/20 85.4 kg (188 lb 3.2 oz)   12/10/19 91.3 kg (201 lb 3.2 oz)     General:   Well developed, well groomed. Head/Neck:   No jugular venous distention     No carotid bruits. No evidence of xanthelasma. Lungs:   No respiratory distress. Clear bilaterally. Heart:    Regular rate and rhythm. Normal S1/S2. Palpation of heart with normal point of maximum impulse. No significant murmurs, rubs or gallops. Abdomen:   Soft and nontender. No palpable abdominal mass or bruits. Extremities:   Intact peripheral pulses. No significant edema. Neurological:   Alert and oriented to person, place, time. No focal neurological deficit visually. Skin:   No obvious rash    Blood Pressure Metric:  Monitor recommended and adjustments stated if needed.

## 2020-05-07 ENCOUNTER — OFFICE VISIT (OUTPATIENT)
Dept: FAMILY MEDICINE | Facility: CLINIC | Age: 70
End: 2020-05-07
Payer: MEDICARE

## 2020-05-07 ENCOUNTER — PATIENT MESSAGE (OUTPATIENT)
Dept: FAMILY MEDICINE | Facility: CLINIC | Age: 70
End: 2020-05-07

## 2020-05-07 DIAGNOSIS — T63.301A SPIDER BITE WOUND, ACCIDENTAL OR UNINTENTIONAL, INITIAL ENCOUNTER: Primary | ICD-10-CM

## 2020-05-07 DIAGNOSIS — L03.115 CELLULITIS OF RIGHT LOWER EXTREMITY: ICD-10-CM

## 2020-05-07 PROCEDURE — 1101F PR PT FALLS ASSESS DOC 0-1 FALLS W/OUT INJ PAST YR: ICD-10-PCS | Mod: HCNC,CPTII,95, | Performed by: INTERNAL MEDICINE

## 2020-05-07 PROCEDURE — 99214 OFFICE O/P EST MOD 30 MIN: CPT | Mod: HCNC,95,, | Performed by: INTERNAL MEDICINE

## 2020-05-07 PROCEDURE — 99214 PR OFFICE/OUTPT VISIT, EST, LEVL IV, 30-39 MIN: ICD-10-PCS | Mod: HCNC,95,, | Performed by: INTERNAL MEDICINE

## 2020-05-07 PROCEDURE — 1159F MED LIST DOCD IN RCRD: CPT | Mod: HCNC,95,, | Performed by: INTERNAL MEDICINE

## 2020-05-07 PROCEDURE — 1101F PT FALLS ASSESS-DOCD LE1/YR: CPT | Mod: HCNC,CPTII,95, | Performed by: INTERNAL MEDICINE

## 2020-05-07 PROCEDURE — 1159F PR MEDICATION LIST DOCUMENTED IN MEDICAL RECORD: ICD-10-PCS | Mod: HCNC,95,, | Performed by: INTERNAL MEDICINE

## 2020-05-07 RX ORDER — TRIAMCINOLONE ACETONIDE 1 MG/G
CREAM TOPICAL 3 TIMES DAILY
Qty: 30 G | Refills: 2 | Status: SHIPPED | OUTPATIENT
Start: 2020-05-07 | End: 2020-10-09

## 2020-05-07 RX ORDER — SULFAMETHOXAZOLE AND TRIMETHOPRIM 800; 160 MG/1; MG/1
1 TABLET ORAL 2 TIMES DAILY
Qty: 14 TABLET | Refills: 0 | Status: SHIPPED | OUTPATIENT
Start: 2020-05-07 | End: 2020-07-09

## 2020-05-08 ENCOUNTER — PATIENT MESSAGE (OUTPATIENT)
Dept: FAMILY MEDICINE | Facility: CLINIC | Age: 70
End: 2020-05-08

## 2020-05-11 ENCOUNTER — PATIENT MESSAGE (OUTPATIENT)
Dept: FAMILY MEDICINE | Facility: CLINIC | Age: 70
End: 2020-05-11

## 2020-05-15 ENCOUNTER — PATIENT OUTREACH (OUTPATIENT)
Dept: ADMINISTRATIVE | Facility: OTHER | Age: 70
End: 2020-05-15

## 2020-05-18 ENCOUNTER — OFFICE VISIT (OUTPATIENT)
Dept: ORTHOPEDICS | Facility: CLINIC | Age: 70
End: 2020-05-18
Payer: MEDICARE

## 2020-05-18 VITALS — BODY MASS INDEX: 24.86 KG/M2 | HEIGHT: 68 IN | WEIGHT: 164 LBS

## 2020-05-18 DIAGNOSIS — M65.4 TENDINITIS, DE QUERVAIN'S: Primary | ICD-10-CM

## 2020-05-18 DIAGNOSIS — M70.61 GREATER TROCHANTERIC BURSITIS OF RIGHT HIP: ICD-10-CM

## 2020-05-18 PROCEDURE — 1101F PR PT FALLS ASSESS DOC 0-1 FALLS W/OUT INJ PAST YR: ICD-10-PCS | Mod: HCNC,CPTII,S$GLB, | Performed by: ORTHOPAEDIC SURGERY

## 2020-05-18 PROCEDURE — 99214 PR OFFICE/OUTPT VISIT, EST, LEVL IV, 30-39 MIN: ICD-10-PCS | Mod: HCNC,25,S$GLB, | Performed by: ORTHOPAEDIC SURGERY

## 2020-05-18 PROCEDURE — 20550 PR INJECT TENDON SHEATH/LIGAMENT: ICD-10-PCS | Mod: HCNC,59,51,LT | Performed by: ORTHOPAEDIC SURGERY

## 2020-05-18 PROCEDURE — 1101F PT FALLS ASSESS-DOCD LE1/YR: CPT | Mod: HCNC,CPTII,S$GLB, | Performed by: ORTHOPAEDIC SURGERY

## 2020-05-18 PROCEDURE — 1159F PR MEDICATION LIST DOCUMENTED IN MEDICAL RECORD: ICD-10-PCS | Mod: HCNC,S$GLB,, | Performed by: ORTHOPAEDIC SURGERY

## 2020-05-18 PROCEDURE — 20550 NJX 1 TENDON SHEATH/LIGAMENT: CPT | Mod: HCNC,59,51,LT | Performed by: ORTHOPAEDIC SURGERY

## 2020-05-18 PROCEDURE — 1125F PR PAIN SEVERITY QUANTIFIED, PAIN PRESENT: ICD-10-PCS | Mod: HCNC,S$GLB,, | Performed by: ORTHOPAEDIC SURGERY

## 2020-05-18 PROCEDURE — 20610 DRAIN/INJ JOINT/BURSA W/O US: CPT | Mod: HCNC,RT,S$GLB, | Performed by: ORTHOPAEDIC SURGERY

## 2020-05-18 PROCEDURE — 1125F AMNT PAIN NOTED PAIN PRSNT: CPT | Mod: HCNC,S$GLB,, | Performed by: ORTHOPAEDIC SURGERY

## 2020-05-18 PROCEDURE — 20610 PR DRAIN/INJECT LARGE JOINT/BURSA: ICD-10-PCS | Mod: HCNC,RT,S$GLB, | Performed by: ORTHOPAEDIC SURGERY

## 2020-05-18 PROCEDURE — 1159F MED LIST DOCD IN RCRD: CPT | Mod: HCNC,S$GLB,, | Performed by: ORTHOPAEDIC SURGERY

## 2020-05-18 PROCEDURE — 99999 PR PBB SHADOW E&M-EST. PATIENT-LVL III: CPT | Mod: PBBFAC,HCNC,, | Performed by: ORTHOPAEDIC SURGERY

## 2020-05-18 PROCEDURE — 99214 OFFICE O/P EST MOD 30 MIN: CPT | Mod: HCNC,25,S$GLB, | Performed by: ORTHOPAEDIC SURGERY

## 2020-05-18 PROCEDURE — 99999 PR PBB SHADOW E&M-EST. PATIENT-LVL III: ICD-10-PCS | Mod: PBBFAC,HCNC,, | Performed by: ORTHOPAEDIC SURGERY

## 2020-05-18 RX ORDER — TRIAMCINOLONE ACETONIDE 40 MG/ML
40 INJECTION, SUSPENSION INTRA-ARTICULAR; INTRAMUSCULAR
Status: COMPLETED | OUTPATIENT
Start: 2020-05-18 | End: 2020-05-18

## 2020-05-18 RX ORDER — TRIAMCINOLONE ACETONIDE 40 MG/ML
20 INJECTION, SUSPENSION INTRA-ARTICULAR; INTRAMUSCULAR
Status: COMPLETED | OUTPATIENT
Start: 2020-05-18 | End: 2020-05-18

## 2020-05-18 RX ADMIN — TRIAMCINOLONE ACETONIDE 40 MG: 40 INJECTION, SUSPENSION INTRA-ARTICULAR; INTRAMUSCULAR at 11:05

## 2020-05-18 RX ADMIN — TRIAMCINOLONE ACETONIDE 20 MG: 40 INJECTION, SUSPENSION INTRA-ARTICULAR; INTRAMUSCULAR at 11:05

## 2020-05-18 NOTE — PROGRESS NOTES
Subjective:      Patient ID: Zoë Enrique is a 70 y.o. female.    Chief Complaint: Follow-up and Pain of the Left Wrist      HPI: Zoë Enrique is here with complaints of recurrent left wrist pain.  Patient was last seen and treated approximately 3 months ago with corticosteroid injection of the left wrist for de Quervain tenosynovitis.  She reports injection was very helpful in symptoms resolved until recently.  Patient reports approximately 10 days of increased left wrist pain with use.  Of note she has been visiting frequently with her 2-month-old grandson which might have caused recurrence.  She cannot use anti-inflammatories secondary to Xarelto.  The Voltaren cream has not been very helpful.    Unrelated, she also complains of right lateral hip pain.  She denies any groin pain or radiation down the leg.  Pain is achy in nature and worse with sleeping on the right side and walking.  She has had a previous trochanteric injection approximately 1 year ago which provided satisfactory symptom control.     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    Diverticulosis 1998    Hx of atrial flutter     Hx of mitral valve prolapse 1990    Migraines        Current Outpatient Medications:     acyclovir (ZOVIRAX) 400 MG tablet, Take 1 tablet (400 mg total) by mouth 3 (three) times daily as needed., Disp: 60 tablet, Rfl: 5    amoxicillin (AMOXIL) 500 MG capsule, , Disp: , Rfl:     CALCIUM CARB/VIT D3/MINERALS (CALTRATE PLUS ORAL), Take by mouth. Takes two chews daily, Disp: , Rfl:     diclofenac sodium (VOLTAREN) 1 % Gel, Apply 2 g topically once daily., Disp: 100 g, Rfl: 2    docusate sodium (COLACE) 250 MG capsule, Take 250 mg by mouth 2 (two) times daily. , Disp: , Rfl:     dofetilide (TIKOSYN) 250 MCG Cap, Take 1 capsule (250 mcg total) by mouth every 12 (twelve) hours., Disp: 60 capsule, Rfl: 11    HYDROcodone-acetaminophen (NORCO)  "5-325 mg per tablet, Take 1 tablet by mouth every 6 (six) hours as needed for Pain. Pt states takes PRN for headaches, Disp: 28 tablet, Rfl: 0    levothyroxine (SYNTHROID) 25 MCG tablet, Take 1 tablet (25 mcg total) by mouth once daily., Disp: 90 tablet, Rfl: 3    melatonin 5 mg Cap, Take 5 mg by mouth as needed. , Disp: , Rfl:     rivaroxaban (XARELTO) 20 mg Tab, Take 1 tablet (20 mg total) by mouth once daily., Disp: 90 tablet, Rfl: 3    triamcinolone acetonide 0.1% (KENALOG) 0.1 % cream, Apply topically 3 (three) times daily., Disp: 30 g, Rfl: 2    UNABLE TO FIND, Diclofenac 0.15%, Lidocaine 2.25%, Prilocaine 2.25% in Compounded Topical Cream to be applied up to 5x/day as needed for headaches., Disp: , Rfl:     vit C/E/Zn/coppr/lutein/zeaxan (PRESERVISION AREDS-2 ORAL), , Disp: , Rfl:     sulfamethoxazole-trimethoprim 800-160mg (BACTRIM DS) 800-160 mg Tab, Take 1 tablet by mouth 2 (two) times daily. (Patient not taking: Reported on 5/18/2020), Disp: 14 tablet, Rfl: 0  No current facility-administered medications for this visit.     Facility-Administered Medications Ordered in Other Visits:     sodium chloride 0.9% flush 5 mL, 5 mL, Intravenous, PRN, Aleksandra Taylor NP  Review of patient's allergies indicates:   Allergen Reactions    Morphine Nausea And Vomiting    Succinylcholine      Other reaction(s): v tach       Ht 5' 8" (1.727 m)   Wt 74.4 kg (164 lb)   BMI 24.94 kg/m²     Review of Systems   Constitution: Negative for chills and fever.   Cardiovascular: Negative for chest pain and palpitations.   Respiratory: Negative for shortness of breath and wheezing.    Skin: Negative for poor wound healing and rash.   Musculoskeletal: Positive for joint pain and joint swelling.   Gastrointestinal: Negative for nausea and vomiting.   Genitourinary: Negative for dysuria and hematuria.   Neurological: Negative for numbness, paresthesias, seizures and tremors.   Psychiatric/Behavioral: Negative for " altered mental status.   Allergic/Immunologic: Negative for environmental allergies and persistent infections.         Objective:    Ortho Exam       Left upper extremity:  Significant for tenderness to palpation over the 1st dorsal compartment with mild swelling noted.  Range of motion wrist and fingers full.  Finkelstein's positive.  Left HIP EXAM  The patient is not in acute distress.   Body habitus is::normal.   The patient walks without a limp.   The skin over the hip is::intact.   There is::tenderness laterally.  Range of motion- Flexion full, External rotation full, internal rotation full.  Resisted SLR negative.  Pain with rotation negative  Sciatic tension findings negative.  Shortening/lengthening compared to the contralateral side exam deferred.  Pulses DP present, PT present.  Motor no muscle wasting or atrophy.   Sensory normal.  GEN: Well developed, well nourished female. AAOX3. No acute distress.   Normocephalic, atraumatic.   LISA  Breathing unlabored.  Mood and affect appropriate.     Assessment:     Imaging:  No new imaging.  Patient will need hip radiographs at her next visit if symptoms persist given her past medical history.        1. Tendinitis, de Quervain's    2. Greater trochanteric bursitis of right hip          Plan:       Orders Placed This Encounter    triamcinolone acetonide injection 20 mg    triamcinolone acetonide injection 40 mg      I recommended and performed repeat corticosteroid injection of the left wrist and right hip.  I recommended OTC Tylenol as needed for pain since patient cannot take NSAIDs.  Continue Voltaren as needed.  Avoid provocative activities such as lifting the grandbaby.  Modifications for this were demonstrated and explained.  Patient follows with a orthopedic oncologist who recommended monitoring thigh pain for concern of stress fracture -- If patient's hip pain persists she should follow up with Dr. li.  Follow up in about 6 weeks (around  6/29/2020).

## 2020-05-18 NOTE — PROCEDURES
Procedures   PROCEDURE:  I have explained the risks, benefits, and alternatives of the procedure in detail.  The patient voices understanding, gives consent, and all questions have been answered.  Pause for timeout. A sterile prep of the skin performed, then the [id number] dorsal compartment is injected from the radial approach using a 25 gauge needle with a combination of 0.5 cc 1% plain xylocaine and 20 mg of Kenolog.  The remaining 20 mg of Kenolog was properly wasted.   The patient is cautioned and immediate relief of pain is secondary to the local anesthetic and will be temporary.  After the anesthetic wears off there may be a increase in pain that may last for a few hours or a few days and they should use ice to help alleviate this flair up of pain. Patient tolerated the procedure well.     PROCEDURE NOTE:  I have explained the risks, benefits, and alternatives of the procedure in detail.  The patient voices understanding and all questions have been answered.  The patient agrees to proceed as planned, consents to injection. Pause for timeout. After a sterile prep of the skin performed in the normal fashion, the right hip is injected from the lateral approach using a 22 gauge needle with a combination of 2cc 1% lidocaine and 40 mg of kenalog. The patient is cautioned and immediate relief of pain is secondary to the local anesthetic and will be temporary.  After the anesthetic wears off there may be a increase in pain that may last for a few hours or a few days and they should use ice to help alleviate this flair up of pain.

## 2020-05-20 ENCOUNTER — OFFICE VISIT (OUTPATIENT)
Dept: RHEUMATOLOGY | Facility: CLINIC | Age: 70
End: 2020-05-20
Payer: MEDICARE

## 2020-05-20 DIAGNOSIS — M15.4 EROSIVE OSTEOARTHRITIS OF BOTH HANDS: Primary | ICD-10-CM

## 2020-05-20 PROCEDURE — 99214 OFFICE O/P EST MOD 30 MIN: CPT | Mod: HCNC,95,, | Performed by: INTERNAL MEDICINE

## 2020-05-20 PROCEDURE — 99214 PR OFFICE/OUTPT VISIT, EST, LEVL IV, 30-39 MIN: ICD-10-PCS | Mod: HCNC,95,, | Performed by: INTERNAL MEDICINE

## 2020-05-20 PROCEDURE — 1159F PR MEDICATION LIST DOCUMENTED IN MEDICAL RECORD: ICD-10-PCS | Mod: HCNC,95,, | Performed by: INTERNAL MEDICINE

## 2020-05-20 PROCEDURE — 1159F MED LIST DOCD IN RCRD: CPT | Mod: HCNC,95,, | Performed by: INTERNAL MEDICINE

## 2020-05-20 PROCEDURE — 1101F PR PT FALLS ASSESS DOC 0-1 FALLS W/OUT INJ PAST YR: ICD-10-PCS | Mod: HCNC,CPTII,95, | Performed by: INTERNAL MEDICINE

## 2020-05-20 PROCEDURE — 1101F PT FALLS ASSESS-DOCD LE1/YR: CPT | Mod: HCNC,CPTII,95, | Performed by: INTERNAL MEDICINE

## 2020-05-20 RX ORDER — METHYLPREDNISOLONE 4 MG/1
TABLET ORAL
Qty: 1 PACKAGE | Refills: 0 | Status: SHIPPED | OUTPATIENT
Start: 2020-05-20 | End: 2020-06-10

## 2020-05-20 NOTE — PROGRESS NOTES
Subjective:       Patient ID: Zoë Enrique is a 69 y.o. female.    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  After yadira, she developed pain in right CMC region.  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 She woke up on Sunday the  Jan 25th, she had pain in hands, wrists, and ankles.  There was swelling in hands and wrists.  Reports significant pain in let wrist.  Sometimes she has shooting pain in left wrist. She has been using voltaren gel for her pain. Pain level can be 8/10, aching. Pain is sharp and burning.   She was given medrol dose pack with some improvement. She still has pain and stiffness in the hands.  She has pain in left wrist. She has pain in left ankle.  She reports she has stiffness worse in the morning for a few hours.  Denies any changes in diet. Denies history of smoking.  Denies changes in weight, rashes, oral ulcers,photosensitivity.    Family hx:niece: PILY      Interval history: she does not think she noticed a difference with the prednisone.SHe has pain in hands and wrists. Pain is worse in morning.  She has mild discomfort with making a fist.  She has pain in both hips, worse in right. She had steroid injection with moderate improvement.  She continues to get swelling. Hot water improves her pain. Voltaren gel  does not help.      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    Diverticulosis 1998    Hx of atrial flutter     Hx of mitral valve prolapse 1990    Migraines        Review of Systems   Constitutional: Positive for fatigue. Negative for activity change, appetite change, chills, diaphoresis, fever and unexpected weight change.   HENT:  "Negative for congestion, ear discharge, ear pain, facial swelling, mouth sores, sinus pressure, sneezing, sore throat, tinnitus and trouble swallowing.    Eyes: Negative for photophobia, pain, discharge, redness, itching and visual disturbance.   Respiratory: Negative for apnea, chest tightness, shortness of breath, wheezing and stridor.    Cardiovascular: Negative for leg swelling.   Gastrointestinal: Negative for abdominal distention, abdominal pain, anal bleeding, blood in stool, constipation, diarrhea and nausea.   Endocrine: Negative for cold intolerance and heat intolerance.   Genitourinary: Negative for difficulty urinating and dysuria.   Musculoskeletal: Positive for arthralgias and joint swelling. Negative for back pain, gait problem, myalgias, neck pain and neck stiffness.   Skin: Negative for color change, pallor, rash and wound.   Neurological: Negative for dizziness, seizures, light-headedness and numbness.   Hematological: Negative for adenopathy. Does not bruise/bleed easily.   Psychiatric/Behavioral: Negative for sleep disturbance. The patient is not nervous/anxious.            Objective:   /71   Pulse 85   Ht 5' 8" (1.727 m)   Wt 73.9 kg (163 lb 0.5 oz)   BMI 24.79 kg/m²      Physical Exam   Constitutional: She is oriented to person, place, and time.   HENT:   Head: Normocephalic and atraumatic.   Right Ear: External ear normal.   Left Ear: External ear normal.   Nose: Nose normal.   Mouth/Throat: Oropharynx is clear and moist. No oropharyngeal exudate.   Eyes: Conjunctivae and EOM are normal. Pupils are equal, round, and reactive to light. Right eye exhibits no discharge. Left eye exhibits no discharge. No scleral icterus.   Neck: Neck supple. No JVD present. No thyromegaly present.   Cardiovascular: Normal rate, regular rhythm, normal heart sounds and intact distal pulses.  Exam reveals no gallop and no friction rub.    No murmur heard.  Pulmonary/Chest: Effort normal and breath sounds " normal. No respiratory distress. She has no wheezes. She has no rales. She exhibits no tenderness.   Abdominal: Soft. Bowel sounds are normal. She exhibits no distension and no mass. There is no tenderness. There is no rebound and no guarding.   Lymphadenopathy:     She has no cervical adenopathy.   Neurological: She is alert and oriented to person, place, and time. No cranial nerve deficit. Gait normal. Coordination normal.   Skin: Skin is dry. No rash noted. No erythema. No pallor.     Psychiatric: Affect and judgment normal.   Musculoskeletal: She exhibits edema and tenderness. She exhibits no deformity.   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  Discussed trial of plaquenil but patient declines.      Plan:       Problem List Items Addressed This Visit     None      Consider MRI of both hands  Medrol pack for inflammatory arthritis of hands  Note is made of leukopenia/thrombocytopenia  rtc in 12 weeks    *  The patient location is: home  The chief complaint leading to consultation is: joint pain    Visit type: audiovisual   Face to Face time with patient: 45 minutes* minutes of total time spent on the encounter, which includes face to face time and non-face to face time preparing to see the patient (eg, review of tests), Obtaining and/or reviewing separately obtained history, Documenting clinical information in the electronic or other health record, Independently interpreting results (not separately reported) and communicating results to the patient/family/caregiver, or Care coordination (not separately  reported).         Each patient to whom he or she provides medical services by telemedicine is:  (1) informed of the relationship between the physician and patient and the respective role of any other health care provider with respect to management of the patient; and (2) notified that he or she may decline to receive medical services by telemedicine and may withdraw from such care at any time.

## 2020-05-21 LAB
LEFT EYE DM RETINOPATHY: NEGATIVE
RIGHT EYE DM RETINOPATHY: NEGATIVE

## 2020-05-27 ENCOUNTER — PATIENT OUTREACH (OUTPATIENT)
Dept: ADMINISTRATIVE | Facility: HOSPITAL | Age: 70
End: 2020-05-27

## 2020-05-27 ENCOUNTER — TELEPHONE (OUTPATIENT)
Dept: ADMINISTRATIVE | Facility: HOSPITAL | Age: 70
End: 2020-05-27

## 2020-06-19 ENCOUNTER — PATIENT OUTREACH (OUTPATIENT)
Dept: ADMINISTRATIVE | Facility: OTHER | Age: 70
End: 2020-06-19

## 2020-06-19 NOTE — PROGRESS NOTES
Chart reviewed.   Immunizations: updated  Orders placed: n/a  Upcoming appts to satisfy TO topics: n/a  Open referral to outside GI.

## 2020-06-22 ENCOUNTER — OFFICE VISIT (OUTPATIENT)
Dept: DERMATOLOGY | Facility: CLINIC | Age: 70
End: 2020-06-22
Payer: MEDICARE

## 2020-06-22 DIAGNOSIS — D22.9 MULTIPLE BENIGN NEVI: ICD-10-CM

## 2020-06-22 DIAGNOSIS — L85.3 XEROSIS CUTIS: ICD-10-CM

## 2020-06-22 DIAGNOSIS — L82.1 SEBORRHEIC KERATOSES: ICD-10-CM

## 2020-06-22 DIAGNOSIS — D48.5 NEOPLASM OF UNCERTAIN BEHAVIOR OF SKIN: Primary | ICD-10-CM

## 2020-06-22 DIAGNOSIS — D18.01 CHERRY ANGIOMA: ICD-10-CM

## 2020-06-22 DIAGNOSIS — Z12.83 SCREENING EXAM FOR SKIN CANCER: ICD-10-CM

## 2020-06-22 PROCEDURE — 11103 TANGNTL BX SKIN EA SEP/ADDL: CPT | Mod: 59,HCNC,S$GLB, | Performed by: DERMATOLOGY

## 2020-06-22 PROCEDURE — 99203 PR OFFICE/OUTPT VISIT, NEW, LEVL III, 30-44 MIN: ICD-10-PCS | Mod: 25,HCNC,S$GLB, | Performed by: DERMATOLOGY

## 2020-06-22 PROCEDURE — 88341 IMHCHEM/IMCYTCHM EA ADD ANTB: CPT | Mod: 26,HCNC,, | Performed by: DERMATOLOGY

## 2020-06-22 PROCEDURE — 88342 CHG IMMUNOCYTOCHEMISTRY: ICD-10-PCS | Mod: 26,HCNC,, | Performed by: DERMATOLOGY

## 2020-06-22 PROCEDURE — 11102 TANGNTL BX SKIN SINGLE LES: CPT | Mod: HCNC,S$GLB,, | Performed by: DERMATOLOGY

## 2020-06-22 PROCEDURE — 88341 PR IHC OR ICC EACH ADD'L SINGLE ANTIBODY  STAINPR: ICD-10-PCS | Mod: 26,HCNC,, | Performed by: DERMATOLOGY

## 2020-06-22 PROCEDURE — 1101F PT FALLS ASSESS-DOCD LE1/YR: CPT | Mod: HCNC,CPTII,S$GLB, | Performed by: DERMATOLOGY

## 2020-06-22 PROCEDURE — 99999 PR PBB SHADOW E&M-EST. PATIENT-LVL III: ICD-10-PCS | Mod: PBBFAC,HCNC,, | Performed by: DERMATOLOGY

## 2020-06-22 PROCEDURE — 99999 PR PBB SHADOW E&M-EST. PATIENT-LVL III: CPT | Mod: PBBFAC,HCNC,, | Performed by: DERMATOLOGY

## 2020-06-22 PROCEDURE — 1126F PR PAIN SEVERITY QUANTIFIED, NO PAIN PRESENT: ICD-10-PCS | Mod: HCNC,S$GLB,, | Performed by: DERMATOLOGY

## 2020-06-22 PROCEDURE — 88305 TISSUE EXAM BY PATHOLOGIST: ICD-10-PCS | Mod: 26,HCNC,, | Performed by: DERMATOLOGY

## 2020-06-22 PROCEDURE — 11102 PR TANGENTIAL BIOPSY, SKIN, SINGLE LESION: ICD-10-PCS | Mod: HCNC,S$GLB,, | Performed by: DERMATOLOGY

## 2020-06-22 PROCEDURE — 99203 OFFICE O/P NEW LOW 30 MIN: CPT | Mod: 25,HCNC,S$GLB, | Performed by: DERMATOLOGY

## 2020-06-22 PROCEDURE — 88341 IMHCHEM/IMCYTCHM EA ADD ANTB: CPT | Mod: HCNC | Performed by: DERMATOLOGY

## 2020-06-22 PROCEDURE — 11103 PR TANGENTIAL BIOPSY, SKIN, EA ADDTL LESION: ICD-10-PCS | Mod: 59,HCNC,S$GLB, | Performed by: DERMATOLOGY

## 2020-06-22 PROCEDURE — 1126F AMNT PAIN NOTED NONE PRSNT: CPT | Mod: HCNC,S$GLB,, | Performed by: DERMATOLOGY

## 2020-06-22 PROCEDURE — 88342 IMHCHEM/IMCYTCHM 1ST ANTB: CPT | Mod: HCNC | Performed by: DERMATOLOGY

## 2020-06-22 PROCEDURE — 1159F MED LIST DOCD IN RCRD: CPT | Mod: HCNC,S$GLB,, | Performed by: DERMATOLOGY

## 2020-06-22 PROCEDURE — 1101F PR PT FALLS ASSESS DOC 0-1 FALLS W/OUT INJ PAST YR: ICD-10-PCS | Mod: HCNC,CPTII,S$GLB, | Performed by: DERMATOLOGY

## 2020-06-22 PROCEDURE — 88342 IMHCHEM/IMCYTCHM 1ST ANTB: CPT | Mod: 26,HCNC,, | Performed by: DERMATOLOGY

## 2020-06-22 PROCEDURE — 88305 TISSUE EXAM BY PATHOLOGIST: CPT | Mod: 59,HCNC | Performed by: DERMATOLOGY

## 2020-06-22 PROCEDURE — 1159F PR MEDICATION LIST DOCUMENTED IN MEDICAL RECORD: ICD-10-PCS | Mod: HCNC,S$GLB,, | Performed by: DERMATOLOGY

## 2020-06-22 PROCEDURE — 88305 TISSUE EXAM BY PATHOLOGIST: CPT | Mod: 26,HCNC,, | Performed by: DERMATOLOGY

## 2020-06-22 NOTE — LETTER
June 22, 2020      Praveen Jeffery MD  [address] [facility]  [address]  [place] [address]           [facility] - Dermatology  [address]  [place] [address]  Phone: [phone]  Fax: [phone]          Patient: Zoë Enrique   MR Number: [id number]   YOB: 1950   Date of Visit: 6/22/2020       Dear Dr. Praveen Jeffery:    Thank you for referring Zoë Enrique to me for evaluation. Attached you will find relevant portions of my assessment and plan of care.    If you have questions, please do not hesitate to call me. I look forward to following Zoë Enrique along with you.    Sincerely,    Abeba Giron MD    Enclosure  CC:  No Recipients    If you would like to receive this communication electronically, please contact [email or web address] or [phone] to request more information on [employer] access.    For providers and/or their staff who would like to refer a patient to [facility], please contact us through our one-stop-shop provider referral line, [facility], at [phone].    If you feel you have received this communication in error or would no longer like to receive these types of communications, please e-mail [email or web address]

## 2020-06-22 NOTE — PROGRESS NOTES
Subjective:       Patient ID:  Zoë Enrique is a 70 y.o. female who presents for   Chief Complaint   Patient presents with    Skin Check     Patient is a 71 yo female with hx of right femur osteosarcoma diagnosed 1989 here for skin exam. Last time she saw dermatologist was 10 years ago; was not a full body screening. Main area of concern that prompted visit is a nonhealing patch on the right lower cheek present x many months that is growing.  No symptoms but scales a little bit.  Also c/o milia left lower eyelid.  The patient denies any moles or growths of the skin that are rapidly growing, hurting, itching, bleeding, or changing colors.  Has itching on left thigh. Has tried Benadryl cream on this area. Has dry skin all over.      Review of Systems   Skin: Positive for daily sunscreen use, activity-related sunscreen use and recent sunburn. Negative for wears hat.   Hematologic/Lymphatic: Bruises/bleeds easily.        Objective:    Physical Exam   Constitutional: She appears well-developed and well-nourished. No distress.   Neurological: She is alert and oriented to person, place, and time. She is not disoriented.   Psychiatric: She has a normal mood and affect.   Skin:   Areas Examined (abnormalities noted in diagram):   Scalp / Hair Palpated and Inspected  Head / Face Inspection Performed  Neck Inspection Performed  Chest / Axilla Inspection Performed  Abdomen Inspection Performed  Genitals / Buttocks / Groin Inspection Performed  Back Inspection Performed  RUE Inspected  LUE Inspection Performed  RLE Inspected  LLE Inspection Performed  Nails and Digits Inspection Performed                   Diagram Legend     Erythematous scaling macule/papule c/w actinic keratosis       Vascular papule c/w angioma      Pigmented verrucoid papule/plaque c/w seborrheic keratosis      Yellow umbilicated papule c/w sebaceous hyperplasia      Irregularly shaped tan macule c/w lentigo     1-2 mm smooth white papules consistent  with Milia      Movable subcutaneous cyst with punctum c/w epidermal inclusion cyst      Subcutaneous movable cyst c/w pilar cyst      Firm pink to brown papule c/w dermatofibroma      Pedunculated fleshy papule(s) c/w skin tag(s)      Evenly pigmented macule c/w junctional nevus     Mildly variegated pigmented, slightly irregular-bordered macule c/w mildly atypical nevus      Flesh colored to evenly pigmented papule c/w intradermal nevus       Pink pearly papule/plaque c/w basal cell carcinoma      Erythematous hyperkeratotic cursted plaque c/w SCC      Surgical scar with no sign of skin cancer recurrence      Open and closed comedones      Inflammatory papules and pustules      Verrucoid papule consistent consistent with wart     Erythematous eczematous patches and plaques     Dystrophic onycholytic nail with subungual debris c/w onychomycosis     Umbilicated papule    Erythematous-base heme-crusted tan verrucoid plaque consistent with inflamed seborrheic keratosis     Erythematous Silvery Scaling Plaque c/w Psoriasis     See annotation    Right cheek          Left breast    Assessment / Plan:      Pathology Orders:     Normal Orders This Visit    Specimen to Pathology, Dermatology     Comments:    Number of Specimens:->2  ------------------------->-------------------------  Spec 1 Procedure:->Biopsy  Spec 1 Clinical Impression:->1 cm scaly irregular white pink  patch r/o superficial BCC vs Ak  Spec 1 Source:->right cheek  ------------------------->-------------------------  Spec 2 Procedure:->Biopsy  Spec 2 Clinical Impression:->irregularly pigmented 3 mm  macule r/o DN  Spec 2 Source:->left breast    Questions:    Procedure Type: Dermatology and skin neoplasms    Number of Specimens: 2    ------------------------: -------------------------    Spec 1 Procedure: Biopsy    Spec 1 Clinical Impression: 1 cm scaly irregular white pink patch r/o superficial BCC vs Ak    Spec 1 Source: right cheek     ------------------------: -------------------------    Spec 2 Procedure: Biopsy    Spec 2 Clinical Impression: irregularly pigmented 3 mm macule r/o DN    Spec 2 Source: left breast    Clinical Information: r/o skin ca        Neoplasm of uncertain behavior of skin  -     Specimen to Pathology, Dermatology  Right cheek r/o superficial BCC  Left breast r/o DN    Shave biopsy procedure note:    Shave biopsy performed after verbal consent including risk of infection, scar, recurrence, need for additional treatment of site. Area prepped with alcohol, anesthetized with approximately 1.0cc of 1% lidocaine with epinephrine. Lesional tissue shaved with razor blade. Hemostasis achieved with application of aluminum chloride. No complications. Dressing applied. Wound care explained.      Screening exam for skin cancer    Total body skin examination performed today including at least 12 points as noted in physical examination. Suspicious lesions noted.    Multiple benign nevi  Discussed ABCDE's of nevi.  Monitor for new mole or moles that are becoming bigger, darker, irritated, or developing irregular borders.     Seborrheic keratoses  These are benign inherited growths without a malignant potential. Reassurance given to patient. No treatment is necessary.     Cherry angioma  This is a benign vascular lesion. Reassurance given. No treatment required.     Xerosis cutis  cetaphil cream  TAC cream to itchy patch left anterior thigh  I advised changing to a fragrance free bar soap or body wash such as Dove, and fragrance free detergent such as Tide Free & Clear, for sensitive skin.                 Follow up if symptoms worsen or fail to improve.

## 2020-06-22 NOTE — PATIENT INSTRUCTIONS

## 2020-06-30 LAB
COMMENT: NORMAL
FINAL PATHOLOGIC DIAGNOSIS: NORMAL
GROSS: NORMAL
MICROSCOPIC EXAM: NORMAL

## 2020-07-01 ENCOUNTER — TELEPHONE (OUTPATIENT)
Dept: DERMATOLOGY | Facility: CLINIC | Age: 70
End: 2020-07-01

## 2020-07-01 DIAGNOSIS — D22.9 ATYPICAL NEVUS: ICD-10-CM

## 2020-07-01 DIAGNOSIS — C44.91 SUPERFICIAL BASAL CELL CARCINOMA (BCC): Primary | ICD-10-CM

## 2020-07-01 RX ORDER — AMOXICILLIN 500 MG/1
CAPSULE ORAL
Qty: 8 CAPSULE | Refills: 0 | Status: SHIPPED | OUTPATIENT
Start: 2020-07-01 | End: 2020-10-09

## 2020-07-01 RX ORDER — FLUOROURACIL 50 MG/G
CREAM TOPICAL
Qty: 40 G | Refills: 0 | Status: SHIPPED | OUTPATIENT
Start: 2020-07-01 | End: 2021-02-17

## 2020-07-01 NOTE — TELEPHONE ENCOUNTER
Left voicemail for pt to discuss bx results - pt instructed to call me back or message me through portal.

## 2020-07-01 NOTE — TELEPHONE ENCOUNTER
Spoke with pt about skin biopsy results.    For superficial BCC right cheek will treat with Efudex cream twice daily with qtip thin layer x 4-6 weeks. Irritation and soreness are expected results. Will mail handout.    For severely dysplastic nevus left breast, will excise with me in clinic 8/7/20 11 am. Pt requires lifelong preoperative antibiotics due to her many metal implants and dose her surgeon advised is 3 g amoxillin 1 hour prior to surgery, 1 g amoxicillin 6 hours after surgery.  I discussed that this is higher than the usual prophylaxis dose of 2 g amoxicillin 1 hour prior to surgery and that for dermatology we usually only prophylaxis for implants done within the past 2 yrs. Pt prefers her usual regimen as given to her in letter from her surgeon and reports she has tolerated fine in the past.  8 tabs amoxicillin sent to [facility] for this purpose.

## 2020-07-01 NOTE — TELEPHONE ENCOUNTER
----- Message from Rosemarie Heard sent at 7/1/2020  1:46 PM CDT -----  Regarding: returning doctor call  Contact: patient  [phone]-please call above patient returning doctor call thanks.

## 2020-07-07 ENCOUNTER — PATIENT OUTREACH (OUTPATIENT)
Dept: ADMINISTRATIVE | Facility: OTHER | Age: 70
End: 2020-07-07

## 2020-07-07 NOTE — PROGRESS NOTES
Requested updates within Care Everywhere.  Patient's chart was reviewed for overdue TO topics.  Immunizations reconciled.    Orders placed:  Tasked appts:  Labs Linked:

## 2020-07-08 ENCOUNTER — TELEPHONE (OUTPATIENT)
Dept: ORTHOPEDICS | Facility: CLINIC | Age: 70
End: 2020-07-08

## 2020-07-08 DIAGNOSIS — M25.551 PAIN OF RIGHT HIP JOINT: Primary | ICD-10-CM

## 2020-07-09 ENCOUNTER — OFFICE VISIT (OUTPATIENT)
Dept: ORTHOPEDICS | Facility: CLINIC | Age: 70
End: 2020-07-09
Payer: MEDICARE

## 2020-07-09 ENCOUNTER — HOSPITAL ENCOUNTER (OUTPATIENT)
Dept: RADIOLOGY | Facility: HOSPITAL | Age: 70
Discharge: HOME | End: 2020-07-09
Attending: ORTHOPAEDIC SURGERY
Payer: MEDICARE

## 2020-07-09 VITALS — HEIGHT: 68 IN | BODY MASS INDEX: 24.86 KG/M2 | WEIGHT: 164 LBS

## 2020-07-09 DIAGNOSIS — M70.61 GREATER TROCHANTERIC BURSITIS OF RIGHT HIP: Primary | ICD-10-CM

## 2020-07-09 DIAGNOSIS — M25.551 PAIN OF RIGHT HIP JOINT: ICD-10-CM

## 2020-07-09 PROCEDURE — 1125F PR PAIN SEVERITY QUANTIFIED, PAIN PRESENT: ICD-10-PCS | Mod: HCNC,S$GLB,, | Performed by: ORTHOPAEDIC SURGERY

## 2020-07-09 PROCEDURE — 99213 OFFICE O/P EST LOW 20 MIN: CPT | Mod: 25,HCNC,S$GLB, | Performed by: ORTHOPAEDIC SURGERY

## 2020-07-09 PROCEDURE — 1125F AMNT PAIN NOTED PAIN PRSNT: CPT | Mod: HCNC,S$GLB,, | Performed by: ORTHOPAEDIC SURGERY

## 2020-07-09 PROCEDURE — 20610 PR DRAIN/INJECT LARGE JOINT/BURSA: ICD-10-PCS | Mod: HCNC,RT,S$GLB, | Performed by: ORTHOPAEDIC SURGERY

## 2020-07-09 PROCEDURE — 1101F PR PT FALLS ASSESS DOC 0-1 FALLS W/OUT INJ PAST YR: ICD-10-PCS | Mod: HCNC,CPTII,S$GLB, | Performed by: ORTHOPAEDIC SURGERY

## 2020-07-09 PROCEDURE — 1159F PR MEDICATION LIST DOCUMENTED IN MEDICAL RECORD: ICD-10-PCS | Mod: HCNC,S$GLB,, | Performed by: ORTHOPAEDIC SURGERY

## 2020-07-09 PROCEDURE — 99999 PR PBB SHADOW E&M-EST. PATIENT-LVL III: CPT | Mod: PBBFAC,HCNC,, | Performed by: ORTHOPAEDIC SURGERY

## 2020-07-09 PROCEDURE — 20610 DRAIN/INJ JOINT/BURSA W/O US: CPT | Mod: HCNC,RT,S$GLB, | Performed by: ORTHOPAEDIC SURGERY

## 2020-07-09 PROCEDURE — 99213 PR OFFICE/OUTPT VISIT, EST, LEVL III, 20-29 MIN: ICD-10-PCS | Mod: 25,HCNC,S$GLB, | Performed by: ORTHOPAEDIC SURGERY

## 2020-07-09 PROCEDURE — 73502 XR HIP 2 VIEW RIGHT: ICD-10-PCS | Mod: 26,HCNC,RT, | Performed by: RADIOLOGY

## 2020-07-09 PROCEDURE — 73502 X-RAY EXAM HIP UNI 2-3 VIEWS: CPT | Mod: TC,HCNC,PN,RT

## 2020-07-09 PROCEDURE — 73502 X-RAY EXAM HIP UNI 2-3 VIEWS: CPT | Mod: 26,HCNC,RT, | Performed by: RADIOLOGY

## 2020-07-09 PROCEDURE — 1159F MED LIST DOCD IN RCRD: CPT | Mod: HCNC,S$GLB,, | Performed by: ORTHOPAEDIC SURGERY

## 2020-07-09 PROCEDURE — 99999 PR PBB SHADOW E&M-EST. PATIENT-LVL III: ICD-10-PCS | Mod: PBBFAC,HCNC,, | Performed by: ORTHOPAEDIC SURGERY

## 2020-07-09 PROCEDURE — 3008F PR BODY MASS INDEX (BMI) DOCUMENTED: ICD-10-PCS | Mod: HCNC,CPTII,S$GLB, | Performed by: ORTHOPAEDIC SURGERY

## 2020-07-09 PROCEDURE — 3008F BODY MASS INDEX DOCD: CPT | Mod: HCNC,CPTII,S$GLB, | Performed by: ORTHOPAEDIC SURGERY

## 2020-07-09 PROCEDURE — 1101F PT FALLS ASSESS-DOCD LE1/YR: CPT | Mod: HCNC,CPTII,S$GLB, | Performed by: ORTHOPAEDIC SURGERY

## 2020-07-09 RX ORDER — TRIAMCINOLONE ACETONIDE 40 MG/ML
40 INJECTION, SUSPENSION INTRA-ARTICULAR; INTRAMUSCULAR
Status: DISCONTINUED | OUTPATIENT
Start: 2020-07-09 | End: 2020-07-09 | Stop reason: HOSPADM

## 2020-07-09 RX ADMIN — TRIAMCINOLONE ACETONIDE 40 MG: 40 INJECTION, SUSPENSION INTRA-ARTICULAR; INTRAMUSCULAR at 01:07

## 2020-07-09 NOTE — PROCEDURES
Large Joint Aspiration/Injection    Date/Time: 7/9/2020 1:00 PM  Performed by: Aby Green PA-C  Authorized by: Aby Green PA-C     Medications:  40 mg triamcinolone acetonide 40 mg/mL       PROCEDURE NOTE:  I have explained the risks, benefits, and alternatives of the procedure in detail.  The patient voices understanding and all questions have been answered.  The patient agrees to proceed as planned, consents to injection. Pause for timeout. After a sterile prep of the skin performed in the normal fashion, the right trochanteric bursa is injected from the lateral approach using a 22 gauge needle with a combination of 2cc 1% lidocaine and 40 mg of kenalog. The patient is cautioned and immediate relief of pain is secondary to the local anesthetic and will be temporary.  After the anesthetic wears off there may be a increase in pain that may last for a few hours or a few days and they should use ice to help alleviate this flair up of pain.

## 2020-07-09 NOTE — PROGRESS NOTES
Subjective:      Patient ID: Zoë Enrique is a 70 y.o. female.    Chief Complaint: Follow-up of the Left Wrist and Follow-up of the Right Hip      HPI: Zoë Enrique is here in follow-up of left de Quervain tenosynovitis and right trochanteric bursitis.  She was last seen and treated approximately 7 weeks ago with corticosteroid injection the 1st dorsal compartment and right trochanteric bursa.  Patient reports the wrist injection has resolved her symptoms.  However, the hip injection has not provided any symptom relief.  She continues to have tenderness palpation of the lateral hip which causes pain when lying on the hip and when walking. She has been using Volaten with poor benefit.     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    Diverticulosis 1998    Hx of atrial flutter     Hx of mitral valve prolapse 1990    Migraines        Current Outpatient Medications:     acyclovir (ZOVIRAX) 400 MG tablet, Take 1 tablet (400 mg total) by mouth 3 (three) times daily as needed., Disp: 60 tablet, Rfl: 5    amoxicillin (AMOXIL) 500 MG capsule, , Disp: , Rfl:     amoxicillin (AMOXIL) 500 MG capsule, Take 6 capsules 1 hour prior to surgery, then 2 capsules 6 hours after surgery for prophylaxis., Disp: 8 capsule, Rfl: 0    CALCIUM CARB/VIT D3/MINERALS (CALTRATE PLUS ORAL), Take by mouth. Takes two chews daily, Disp: , Rfl:     diclofenac sodium (VOLTAREN) 1 % Gel, Apply 2 g topically once daily., Disp: 100 g, Rfl: 2    docusate sodium (COLACE) 250 MG capsule, Take 250 mg by mouth 2 (two) times daily. , Disp: , Rfl:     dofetilide (TIKOSYN) 250 MCG Cap, Take 1 capsule (250 mcg total) by mouth every 12 (twelve) hours., Disp: 60 capsule, Rfl: 11    fluorouraciL (EFUDEX) 5 % cream, Apply thin layer to spot on right cheek with qtip x 4-6 weeks., Disp: 40 g, Rfl: 0    HYDROcodone-acetaminophen (NORCO) 5-325 mg per tablet, Take 1 tablet by mouth  "every 6 (six) hours as needed for Pain. Pt states takes PRN for headaches, Disp: 28 tablet, Rfl: 0    levothyroxine (SYNTHROID) 25 MCG tablet, Take 1 tablet (25 mcg total) by mouth once daily., Disp: 90 tablet, Rfl: 3    melatonin 5 mg Cap, Take 5 mg by mouth as needed. , Disp: , Rfl:     rivaroxaban (XARELTO) 20 mg Tab, Take 1 tablet (20 mg total) by mouth once daily., Disp: 90 tablet, Rfl: 3    triamcinolone acetonide 0.1% (KENALOG) 0.1 % cream, Apply topically 3 (three) times daily., Disp: 30 g, Rfl: 2    UNABLE TO FIND, Diclofenac 0.15%, Lidocaine 2.25%, Prilocaine 2.25% in Compounded Topical Cream to be applied up to 5x/day as needed for headaches., Disp: , Rfl:     vit C/E/Zn/coppr/lutein/zeaxan (PRESERVISION AREDS-2 ORAL), , Disp: , Rfl:   No current facility-administered medications for this visit.     Facility-Administered Medications Ordered in Other Visits:     sodium chloride 0.9% flush 5 mL, 5 mL, Intravenous, PRN, Aleksandra Taylor NP  Review of patient's allergies indicates:   Allergen Reactions    Morphine Nausea And Vomiting    Succinylcholine      Other reaction(s): v tach       Ht 5' 8" (1.727 m)   Wt 74.4 kg (164 lb)   BMI 24.94 kg/m²     Review of Systems   Constitution: Negative for chills and fever.   Cardiovascular: Negative for chest pain and palpitations.   Respiratory: Negative for shortness of breath and wheezing.    Skin: Negative for poor wound healing and rash.   Musculoskeletal: Positive for joint pain. Negative for joint swelling and stiffness.   Gastrointestinal: Negative for nausea and vomiting.   Genitourinary: Negative for dysuria and hematuria.   Neurological: Negative for numbness, paresthesias, seizures and tremors.   Psychiatric/Behavioral: Negative for altered mental status.   Allergic/Immunologic: Negative for environmental allergies and persistent infections.         Objective:    Ortho Exam       Left upper extremity:  Significant for tenderness to palpation " over the 1st dorsal compartment with mild swelling noted.  Range of motion wrist and fingers full.  Finkelstein's positive.    Right HIP EXAM  The patient is not in acute distress.   Body habitus is::normal.   The patient walks without a limp.   The skin over the hip is::intact.   There is::tenderness laterally.  Range of motion- Flexion full, External rotation full, internal rotation full.  Resisted SLR negative.  Pain with rotation negative  Sciatic tension findings negative.  Shortening/lengthening compared to the contralateral side exam deferred.  Pulses DP present, PT present.  Motor no muscle wasting or atrophy.   Sensory normal.    GEN: Well developed, well nourished female. AAOX3. No acute distress.   Normocephalic, atraumatic.   LISA  Breathing unlabored.  Mood and affect appropriate.      Assessment:     Imaging:  Right hip radiographs from today without any significant degenerative disease.   Previous intramedullary arnaldo is noted.      1. Greater trochanteric bursitis of right hip          Plan:       Patient understands the problem in regards to trochanteric bursitis.  She would like to try repeat corticosteroid injection and physical therapy.  Continue Voltaren.    Orders Placed This Encounter    Large Joint Aspiration/Injection    Ambulatory referral/consult to Physical/Occupational Therapy     Follow up in about 4 weeks (around 8/6/2020).

## 2020-07-14 RX ORDER — RIVAROXABAN 20 MG/1
20 TABLET, FILM COATED ORAL DAILY
Qty: 90 TABLET | Refills: 3 | Status: SHIPPED | OUTPATIENT
Start: 2020-07-14 | End: 2021-03-26 | Stop reason: SDUPTHER

## 2020-07-16 DIAGNOSIS — E03.9 HYPOTHYROIDISM (ACQUIRED): ICD-10-CM

## 2020-07-16 RX ORDER — LEVOTHYROXINE SODIUM 25 UG/1
TABLET ORAL
Qty: 90 TABLET | Refills: 2 | Status: SHIPPED | OUTPATIENT
Start: 2020-07-16 | End: 2021-05-24

## 2020-07-17 NOTE — PROGRESS NOTES
Refill Authorization Note     is requesting a refill authorization.    Brief assessment and rationale for refill: APPROVE: prr               Medication reconciliation completed: No                         Comments:   Automatic Epic Protocol Generated Data:    Requested Prescriptions   Signed Prescriptions Disp Refills    levothyroxine (SYNTHROID) 25 MCG tablet 90 tablet 2     Sig: TAKE 1 TABLET EVERY DAY       Endocrinology:  Hypothyroid Agents Failed - 7/16/2020  1:31 PM        Failed - Manual Review: FT4 is not required if last TSH is WNL.        Passed - Patient is at least 18 years old        Passed - Office visit in past 12 months or future 90 days.     Recent Outpatient Visits            1 week ago Greater trochanteric bursitis of right hip    [place] - Orthopedics Aby Green PA-C    3 weeks ago Neoplasm of uncertain behavior of skin    [facility] - Dermatology Abeba Giron MD    1 month ago Erosive osteoarthritis of both hands    [place] - Rheumatology Zoë Montesinos MD    1 month ago Tendinitis, de Quervain's    [facility] Aby Green PA-C    2 months ago Spider bite wound, accidental or unintentional, initial encounter    [facility] Praveen Jeffery MD          Future Appointments              In 1 week Yonny Lang, AUD[facility], [place]    In 3 weeks MD [facility] - Dermatology, [facility]    In 3 weeks [facility] Green [facility]-C [facility]                Passed - TSH in normal range and within 360 days     TSH   Date Value Ref Range Status   01/23/2020 2.533 0.400 - 4.000 uIU/mL Final   08/12/2019 1.854 0.400 - 4.000 uIU/mL Final              Passed - T4 free within 1080 days     Free T4   Date Value Ref Range Status   01/23/2020 1.04 0.71 - 1.51 ng/dL Final                    Appointments  past 12m or future 3m with PCP    Date Provider   Last Visit   5/7/2020 Praveen Jeffery MD   Next Visit   Visit date  not found Praveen Jeffery MD   ED visits in past 90 days: 0     Note composed:7:18 PM 07/16/2020

## 2020-07-27 ENCOUNTER — CLINICAL SUPPORT (OUTPATIENT)
Dept: REHABILITATION | Facility: HOSPITAL | Age: 70
End: 2020-07-27
Payer: MEDICARE

## 2020-07-27 DIAGNOSIS — R29.898 HIP TIGHTNESS: ICD-10-CM

## 2020-07-27 DIAGNOSIS — M25.551 PAIN OF RIGHT HIP JOINT: ICD-10-CM

## 2020-07-27 DIAGNOSIS — M70.61 GREATER TROCHANTERIC BURSITIS OF RIGHT HIP: ICD-10-CM

## 2020-07-27 PROBLEM — M25.559 HIP PAIN: Status: ACTIVE | Noted: 2020-07-27

## 2020-07-27 PROCEDURE — 97110 THERAPEUTIC EXERCISES: CPT | Mod: HCNC

## 2020-07-27 PROCEDURE — 97161 PT EVAL LOW COMPLEX 20 MIN: CPT | Mod: HCNC

## 2020-07-27 NOTE — PLAN OF CARE
[facility]  Physical Therapy Initial Evaluation    Name: Zoë Enrique  Clinic Number: [id number]    Therapy Diagnosis:   Encounter Diagnoses   Name Primary?    Greater trochanteric bursitis of right hip     Hip tightness     Pain of right hip joint      Physician: Aby Green PA-C    Physician Orders: PT Eval and Treat   Medical Diagnosis from Referral: M70.61 (ICD-10-CM) - Greater trochanteric bursitis of right hip  Evaluation Date: 2020  Authorization Period Expiration:   Plan of Care Expiration: 10/09/2020  Visit # / Visits authorized: D    Time In: 835  Time Out: 915  Total Billable Time: 15 minutes    Visit: 113  Total (timed and un-timed): 113    Precautions: Standard    Subjective   Date of onset: 6 Months +   History of current condition - Zoë reports: having R hip pain for about 6 months and does not have a mechanism of injury that she can remember. Patient reports she had an xray on her R hip but did not show anything significant. Patient does not have any shooting pain that goes down her leg. Patient reports her hip pain is causing her to walk less. Patient reports her pain is pretty constant, but can worsen after a long walk. Patient reports icing her hip helps decrease her pain.     Medical History:   Past Medical History:   Diagnosis Date    Atrial fibrillation 2017    Cancer     osteosarcoma of right femur    Diverticulosis     Hx of atrial flutter     Hx of mitral valve prolapse     Migraines        Surgical History:   Zoë Enrique  has a past surgical history that includes Hysterectomy; Knee surgery (Right, ); Knee Arthroplasty (Right, ); Treatment of cardiac arrhythmia (N/A, 2019); Augmentation of breast (Bilateral);  section (); Eye surgery (); Breast surgery (); Joint replacement (, , ); Colon surgery (); and Cosmetic surgery ().    Medications:   Zoë has a current  medication list which includes the following prescription(s): acyclovir, amoxicillin, amoxicillin, calcium carb/vit d3/minerals, diclofenac sodium, docusate sodium, dofetilide, fluorouracil, hydrocodone-acetaminophen, levothyroxine, melatonin, xarelto, triamcinolone acetonide 0.1%, UNABLE TO FIND, and vit c/e/zn/coppr/lutein/zeaxan, and the following Facility-Administered Medications: sodium chloride 0.9%.    Allergies:   Review of patient's allergies indicates:   Allergen Reactions    Morphine Nausea And Vomiting    Succinylcholine      Other reaction(s): v tach        Imaging, Xrays: See imaging report    Prior Therapy: Not for hip   Social History: lives with their family  Occupation: Retired   Prior Level of Function: Walking, ADLs, take care of grandchild  Current Level of Function: Walking less because of hip pain, but still able to do activities she wishes to do    Pain:  Current 2/10, worst 4/10, best 1/10   Location: right hip   Description: Aching and Burning  Aggravating Factors: Walking  Easing Factors: ice and tylenol     Pts goals: To decrease pain and have a good home exercise program with less frequent visits.     Objective     Hip Right Right Left Left Pain/Dysfunction with Movement    AROM MMT AROM MMT    Flexion WNL 4+/5 WNL 4+/5    Abduction WNL 4+/5 WNL 4+/5    Internal rotation 40 4+/5 WNL 4+/5    External rotation 40 4+/5 WNL 4+/5       Knee Right Right Left Left Pain/Dysfunction with Movement    AROM MMT AROM MMT    Flexion WNL 5/5 WNL 5/5    Extension WNL 5/5 WNL 5/5      Palpation:   - Tender to R piriformis, R Trochanteric Bursa, and Glute Max origin on iliac crest    Manual Hip Traction  - Reduced symptoms on R side    CMS Impairment/Limitation/Restriction for FOTO Hip Survey    Therapist reviewed FOTO scores for Zoë Enrique on 7/27/2020.   FOTO documents entered into [employer] - see Media section.    Limitation Score: 48%  Category: Mobility             TREATMENT   Treatment Time In:  900  Treatment Time Out: 915  Total Treatment time separate from Evaluation: 15 minutes    Zoë received therapeutic exercises to develop strength, endurance, ROM and flexibility for 15 minutes including:  Seated Piriformis Stretch   Seated Posterior Hip Stretch  Supine IT Band Stretch  Muscle Roller to glutes      Home Exercises and Patient Education Provided    Education provided:   - HEP    Written Home Exercises Provided: yes.  Exercises were reviewed and Zoë was able to demonstrate them prior to the end of the session.  Zoë demonstrated good  understanding of the education provided.     See EMR under Patient Instructions for exercises provided 7/27/2020.    Assessment   Zoë is a 70 y.o. female referred to outpatient Physical Therapy with a medical diagnosis of Greater Trochanteric Hip Bursitis on R. Pt presents with decreased R hip range of motion, increased tenderness to palpation on R hip/multiple structures, and slightly decreased strength on B LE. Patient tolerated all therex and manual therapy today without adverse effects. Patient reported improvements in symptoms after therex and manual therapy.     Pt prognosis is Good.   Pt will benefit from skilled outpatient Physical Therapy to address the deficits stated above and in the chart below, provide pt/family education, and to maximize pt's level of independence.     Plan of care discussed with patient: Yes  Pt's spiritual, cultural and educational needs considered and patient is agreeable to the plan of care and goals as stated below:     Anticipated Barriers for therapy: None    Medical Necessity is demonstrated by the following  History  Co-morbidities and personal factors that may impact the plan of care Co-morbidities:   history of cancer    Personal Factors:   no deficits     moderate   Examination  Body Structures and Functions, activity limitations and participation restrictions that may impact the plan of care Body Regions:   lower  extremities    Body Systems:    ROM  strength    Participation Restrictions:   None    Activity limitations:   Learning and applying knowledge  no deficits    General Tasks and Commands  no deficits    Communication  no deficits    Mobility  walking    Self care  no deficits    Domestic Life  no deficits    Interactions/Relationships  no deficits    Life Areas  no deficits    Community and Social Life  no deficits         moderate   Clinical Presentation stable and uncomplicated low   Decision Making/ Complexity Score: low     Goals:  Short Term Goals (3 Weeks):   1. Pt will be compliant with HEP to supplement PT in restoring pain free function.  2. Pt will improve impaired LE MMTs by 1/2 grade  to improve strength for functional tasks    Long Term Goals (6 Weeks):  1. Pt will improve FOTO score to </= 25% limited to decrease perceived limitation with mobility  2. Pt will improve impaired LE MMTs by 1 grade to improve strength for functional tasks.  3. Pt improve impaired hip ROM to WNL in all planes to improve mobility for normal movement patterns.   4. Pt will decrease hip pain to 0/10 at its worst to improve tolerance to walking       Plan   Plan of care Certification: 7/27/2020 to 10/09/2020.    Outpatient Physical Therapy 3 times weekly for 10 weeks to include the following interventions: Manual Therapy, Moist Heat/ Ice, Neuromuscular Re-ed, Patient Education, Self Care, Therapeutic Activites, Therapeutic Exercise and Dry Needling.     Yonny Lang, PT

## 2020-07-28 DIAGNOSIS — B00.9 HERPES: ICD-10-CM

## 2020-07-28 NOTE — TELEPHONE ENCOUNTER
No new care gaps identified.  Powered by [employer]. Reference number: [id number]. 7/28/2020 7:38:04 AM KEILYT

## 2020-07-30 RX ORDER — ACYCLOVIR 400 MG/1
400 TABLET ORAL 3 TIMES DAILY PRN
Qty: 180 TABLET | Refills: 1 | Status: SHIPPED | OUTPATIENT
Start: 2020-07-30 | End: 2022-03-03 | Stop reason: SDUPTHER

## 2020-07-30 NOTE — PROGRESS NOTES
Refill Authorization Note    is requesting a refill authorization.    Brief assessment and rationale for refill: APPROVE: prr          Medication Therapy Plan: CDMR.  Pt is using acyclovir for suppression 2/2 new grandchild; valentín    Medication reconciliation completed: No                         Comments:      Orders Placed This Encounter    acyclovir (ZOVIRAX) 400 MG tablet      Requested Prescriptions   Signed Prescriptions Disp Refills    acyclovir (ZOVIRAX) 400 MG tablet 180 tablet 1     Sig: Take 1 tablet (400 mg total) by mouth 3 (three) times daily as needed.       Antimicrobials:  Oral Antiviral Agents - Anti-Herpetic Failed - 7/28/2020  7:37 AM        Failed - WBC in normal range and within 360 days     WBC   Date Value Ref Range Status   01/23/2020 3.40 (L) 3.90 - 12.70 K/uL Final   08/15/2019 3.80 (L) 3.90 - 12.70 K/uL Final   08/13/2019 3.54 (L) 3.90 - 12.70 K/uL Final              Passed - Patient is at least 18 years old        Passed - Office visit in past 12 months or future 90 days.     Recent Outpatient Visits            3 weeks ago Greater trochanteric bursitis of right hip    [place] - Orthopedics Aby Green PA-C    1 month ago Neoplasm of uncertain behavior of skin    [facility] - Dermatology Abeba Giron MD    2 months ago Erosive osteoarthritis of both hands    [place] - Rheumatology Zoë Montesinos MD    2 months ago Tendinitis, de Quervain's    [place] - Orthopedics Aby Green PA-C    2 months ago Spider bite wound, accidental or unintentional, initial encounter    [facility] Praveen Jeffery MD          Future Appointments              In 4 days Yonny Lang PT [facility], [place]    In 1 week MD [facility] - [facility]atology, [facility]    In 1 week Aby Green PA-C [place] [facility], [facility]    In 2 weeks Yonny Lang PT [facility], [place]    In 3 weeks Yonny Lang PT [facility],  [place]    In 3 weeks Yonny Lang, PT [facility] [place]                Passed - Cr is 1.3 or below and within 360 days     Creatinine   Date Value Ref Range Status   01/23/2020 0.8 0.5 - 1.4 mg/dL Final   08/15/2019 0.7 0.5 - 1.4 mg/dL Final   08/13/2019 0.7 0.5 - 1.4 mg/dL Final              Passed - eGFR within 360 days     eGFR if non    Date Value Ref Range Status   01/23/2020 >60.0 >60 mL/min/1.73 m^2 Final     Comment:     Calculation used to obtain the estimated glomerular filtration  rate (eGFR) is the CKD-EPI equation.      08/15/2019 >60.0 >60 mL/min/1.73 m^2 Final     Comment:     Calculation used to obtain the estimated glomerular filtration  rate (eGFR) is the CKD-EPI equation.      08/13/2019 >60.0 >60 mL/min/1.73 m^2 Final     Comment:     Calculation used to obtain the estimated glomerular filtration  rate (eGFR) is the CKD-EPI equation.        eGFR if    Date Value Ref Range Status   01/23/2020 >60.0 >60 mL/min/1.73 m^2 Final   08/15/2019 >60.0 >60 mL/min/1.73 m^2 Final   08/13/2019 >60.0 >60 mL/min/1.73 m^2 Final                  Appointments  past 12m or future 3m with PCP    Date Provider   Last Visit   5/7/2020 Praveen Jeffery MD   Next Visit   Visit date not found Praveen Jeffery MD   ED visits in past 90 days: 0     Note composed:1:40 PM 07/30/2020

## 2020-08-03 ENCOUNTER — CLINICAL SUPPORT (OUTPATIENT)
Dept: REHABILITATION | Facility: HOSPITAL | Age: 70
End: 2020-08-03
Payer: MEDICARE

## 2020-08-03 DIAGNOSIS — R29.898 WEAKNESS OF RIGHT HIP: ICD-10-CM

## 2020-08-03 DIAGNOSIS — M25.551 PAIN OF RIGHT HIP JOINT: ICD-10-CM

## 2020-08-03 PROCEDURE — 97110 THERAPEUTIC EXERCISES: CPT | Mod: HCNC

## 2020-08-03 NOTE — PROGRESS NOTES
Physical Therapy Treatment Note     Name: Zoë Enrique  [facility] Number: [id number]    Therapy Diagnosis:   Encounter Diagnoses   Name Primary?    Weakness of right hip     Pain of right hip joint      Physician: Aby Green PA-C    Visit Date: 8/3/2020    Physician Orders: PT Eval and Treat   Medical Diagnosis from Referral: M70.61 (ICD-10-CM) - Greater trochanteric bursitis of right hip  Evaluation Date: 7/27/2020  Authorization Period Expiration: TBD  Plan of Care Expiration: 10/09/2020  Visit # / Visits authorized: 2/ TBD     Time In: 828  Time Out: 915  Total Billable Time: 47 minutes     Visit: 90  Total (timed and un-timed): 203     Precautions: Standard    Subjective     Pt reports: she is sore today and thinks it is from the stretching exercises.  She was compliant with home exercise program.    Pain: 2/10  Location: right hip      Objective     Zoë received therapeutic exercises to develop strength, endurance, ROM, flexibility, posture and core stabilization for 47 minutes including:    Seated Piriformis Stretch 30 sec x 3  Seated Posterior Hip Stretch 30 sec x 3   Supine IT Band Stretch 30 sec x 3  Bridging 20x  Clams 30x  SLR 30x  Standing Hip ext 30x    Zoë received the following manual therapy techniques: Manual traction were applied to the: R hip joint for 5 minutes, including:  Hip Traction       Home Exercises Provided and Patient Education Provided     Education provided:   - Cont HEP    Written Home Exercises Provided: Patient instructed to cont prior HEP.  Exercises were reviewed and Zoë was able to demonstrate them prior to the end of the session.  Zoë demonstrated good  understanding of the education provided.     See EMR under Patient Instructions for exercises provided prior visit.    Assessment     Patient was progressed with lower extremity strengthening today of glutes, hip rotators, and hip flexors. Patient reported decreased symptoms after physical therapy session today.  Patient requested to continue exercises at home and potentially discharge if is feeling better after doing exercises at home.   Zoë is progressing well towards her goals.   Pt prognosis is Good.     Pt will continue to benefit from skilled outpatient physical therapy to address the deficits listed in the problem list box on initial evaluation, provide pt/family education and to maximize pt's level of independence in the home and community environment.     Pt's spiritual, cultural and educational needs considered and pt agreeable to plan of care and goals.     Anticipated barriers to physical therapy: None    Goals:   Short Term Goals (3 Weeks):   1. Pt will be compliant with HEP to supplement PT in restoring pain free function.  2. Pt will improve impaired LE MMTs by 1/2 grade  to improve strength for functional tasks     Long Term Goals (6 Weeks):  1. Pt will improve FOTO score to </= 25% limited to decrease perceived limitation with mobility  2. Pt will improve impaired LE MMTs by 1 grade to improve strength for functional tasks.  3. Pt improve impaired hip ROM to WNL in all planes to improve mobility for normal movement patterns.   4. Pt will decrease hip pain to 0/10 at its worst to improve tolerance to walking         Plan   Plan of care Certification: 7/27/2020 to 10/09/2020.     Outpatient Physical Therapy 3 times weekly for 10 weeks to include the following interventions: Manual Therapy, Moist Heat/ Ice, Neuromuscular Re-ed, Patient Education, Self Care, Therapeutic Activites, Therapeutic Exercise and Dry Needling.     Yonny Lang, PT

## 2020-08-06 ENCOUNTER — TELEPHONE (OUTPATIENT)
Dept: DERMATOLOGY | Facility: CLINIC | Age: 70
End: 2020-08-06

## 2020-08-06 NOTE — TELEPHONE ENCOUNTER
Left pt a message to contact office to reschedule procedure for a later date since Dr. Giron is out of the office due to labor.

## 2020-08-07 ENCOUNTER — TELEPHONE (OUTPATIENT)
Dept: DERMATOLOGY | Facility: CLINIC | Age: 70
End: 2020-08-07

## 2020-08-10 ENCOUNTER — TELEPHONE (OUTPATIENT)
Dept: DERMATOLOGY | Facility: CLINIC | Age: 70
End: 2020-08-10

## 2020-08-10 NOTE — TELEPHONE ENCOUNTER
Spoke with pt to try to get this compound nevus rescheduled for a future date with Dr. Giron, however, pt prefers to have a different provider remove this Compound Nevus. Can this be something that can be done by the resident or can I get this pt schedule with a different provider.     ----- Message from Mamie He MA sent at 8/6/2020  1:18 PM CDT -----  Procedure needs to be rescheduled. Pt knows you are out for today and that Dr. Giron is out.  ----- Message -----  From: Kat Botello  Sent: 8/6/2020  11:26 AM CDT  To: Mamie He MA    Pt is returning call [phone]

## 2020-08-17 ENCOUNTER — DOCUMENTATION ONLY (OUTPATIENT)
Dept: REHABILITATION | Facility: HOSPITAL | Age: 70
End: 2020-08-17

## 2020-08-17 NOTE — PROGRESS NOTES
Outpatient Therapy Discharge Summary     Name: Zoë Enrique  [facility] Number: [id number]    Therapy Diagnosis: No diagnosis found.  Physician: No ref. provider found    Physician Orders: PT Eval and Treat   Medical Diagnosis from Referral: M70.61 (ICD-10-CM) - Greater trochanteric bursitis of right hip  Evaluation Date: 7/27/2020      Date of Last visit: 8/03/2020  Total Visits Received: 2  Cancelled Visits: 0  No Show Visits: 0    Assessment    Goals: Unable to determine secondary to patient cancelling all remaining appointments     Discharge reason: Patient requested discharge    Plan   This patient is discharged from Physical Therapy

## 2020-09-13 ENCOUNTER — PATIENT OUTREACH (OUTPATIENT)
Dept: ADMINISTRATIVE | Facility: HOSPITAL | Age: 70
End: 2020-09-13

## 2020-09-13 NOTE — PROGRESS NOTES
Tim reviewed. Care Everywhere reviewed.   Chart scrubbed for Colon Cancer Screening.     DEMARIO

## 2020-09-21 ENCOUNTER — PATIENT MESSAGE (OUTPATIENT)
Dept: ELECTROPHYSIOLOGY | Facility: CLINIC | Age: 70
End: 2020-09-21

## 2020-09-21 DIAGNOSIS — I48.0 PAROXYSMAL ATRIAL FIBRILLATION: Primary | ICD-10-CM

## 2020-09-24 ENCOUNTER — OFFICE VISIT (OUTPATIENT)
Dept: DERMATOLOGY | Facility: CLINIC | Age: 70
End: 2020-09-24
Payer: MEDICARE

## 2020-09-24 ENCOUNTER — PROCEDURE VISIT (OUTPATIENT)
Dept: DERMATOLOGY | Facility: CLINIC | Age: 70
End: 2020-09-24
Payer: MEDICARE

## 2020-09-24 ENCOUNTER — PATIENT OUTREACH (OUTPATIENT)
Dept: ADMINISTRATIVE | Facility: OTHER | Age: 70
End: 2020-09-24

## 2020-09-24 DIAGNOSIS — L82.1 SK (SEBORRHEIC KERATOSIS): ICD-10-CM

## 2020-09-24 DIAGNOSIS — C44.91 SUPERFICIAL BASAL CELL CARCINOMA (BCC): Primary | ICD-10-CM

## 2020-09-24 DIAGNOSIS — L90.5 SCAR: ICD-10-CM

## 2020-09-24 DIAGNOSIS — D22.9 ATYPICAL MOLE: Primary | ICD-10-CM

## 2020-09-24 PROCEDURE — 13100 CMPLX RPR TRUNK 1.1-2.5 CM: CPT | Mod: HCNC,51,S$GLB, | Performed by: DERMATOLOGY

## 2020-09-24 PROCEDURE — 99213 PR OFFICE/OUTPT VISIT, EST, LEVL III, 20-29 MIN: ICD-10-PCS | Mod: HCNC,25,S$GLB, | Performed by: DERMATOLOGY

## 2020-09-24 PROCEDURE — 99213 OFFICE O/P EST LOW 20 MIN: CPT | Mod: HCNC,25,S$GLB, | Performed by: DERMATOLOGY

## 2020-09-24 PROCEDURE — 88305 TISSUE EXAM BY PATHOLOGIST: ICD-10-PCS | Mod: 26,HCNC,, | Performed by: PATHOLOGY

## 2020-09-24 PROCEDURE — 1126F AMNT PAIN NOTED NONE PRSNT: CPT | Mod: HCNC,S$GLB,, | Performed by: DERMATOLOGY

## 2020-09-24 PROCEDURE — 11402 EXC TR-EXT B9+MARG 1.1-2 CM: CPT | Mod: HCNC,S$GLB,, | Performed by: DERMATOLOGY

## 2020-09-24 PROCEDURE — 99999 PR PBB SHADOW E&M-EST. PATIENT-LVL II: ICD-10-PCS | Mod: PBBFAC,HCNC,, | Performed by: DERMATOLOGY

## 2020-09-24 PROCEDURE — 1159F MED LIST DOCD IN RCRD: CPT | Mod: HCNC,S$GLB,, | Performed by: DERMATOLOGY

## 2020-09-24 PROCEDURE — 1159F PR MEDICATION LIST DOCUMENTED IN MEDICAL RECORD: ICD-10-PCS | Mod: HCNC,S$GLB,, | Performed by: DERMATOLOGY

## 2020-09-24 PROCEDURE — 88305 TISSUE EXAM BY PATHOLOGIST: CPT | Mod: 26,HCNC,, | Performed by: PATHOLOGY

## 2020-09-24 PROCEDURE — 1126F PR PAIN SEVERITY QUANTIFIED, NO PAIN PRESENT: ICD-10-PCS | Mod: HCNC,S$GLB,, | Performed by: DERMATOLOGY

## 2020-09-24 PROCEDURE — 11402 PR EXC SKIN BENIG 1.1-2 CM TRUNK,ARM,LEG: ICD-10-PCS | Mod: HCNC,S$GLB,, | Performed by: DERMATOLOGY

## 2020-09-24 PROCEDURE — 1101F PR PT FALLS ASSESS DOC 0-1 FALLS W/OUT INJ PAST YR: ICD-10-PCS | Mod: HCNC,CPTII,S$GLB, | Performed by: DERMATOLOGY

## 2020-09-24 PROCEDURE — 13100 PR RECMPL WND TRUNK 1.1-2.5 CM: ICD-10-PCS | Mod: HCNC,51,S$GLB, | Performed by: DERMATOLOGY

## 2020-09-24 PROCEDURE — 1101F PT FALLS ASSESS-DOCD LE1/YR: CPT | Mod: HCNC,CPTII,S$GLB, | Performed by: DERMATOLOGY

## 2020-09-24 PROCEDURE — 88305 TISSUE EXAM BY PATHOLOGIST: CPT | Mod: HCNC | Performed by: PATHOLOGY

## 2020-09-24 PROCEDURE — 99999 PR PBB SHADOW E&M-EST. PATIENT-LVL II: CPT | Mod: PBBFAC,HCNC,, | Performed by: DERMATOLOGY

## 2020-09-24 NOTE — PROGRESS NOTES
PROCEDURE: Elliptical excision with complex layered repair in order to close large gap.    ANESTHETIC: 3 cc 1% Xylocaine with Epinephrine 1:100,000, buffered    SURGEON:  Bello Richter M.D.    ASSISTANTS:  Alycia Campoverde MA    PREOPERATIVE DIAGNOSIS:  Severely Atypical Nevus    POSTOPERATIVE DIAGNOSIS:  Same as preoperative diagnosis    PATHOLOGIC DIAGNOSIS: Pending    LOCATION: l breast    INITIAL LESION SIZE: 1.1 cm    EXCISED DIAMETER: 1.9 cm    PREPARATION: The diagnosis, procedure, alternatives, benefits and risks, including but not limited to: infection, bleeding/bruising, drug reactions, pain, scar or cosmetic defect, local sensation disturbances, wound dehiscence (separation of wound edges after sutures removed) and/or recurrence of present condition were explained to the patient. The patient elected to proceed.  Patient's identity was verified and the site was verified.    PROCEDURE: The location noted above was prepped, draped, and anesthetized in the usual sterile fashion per Alycia Campoverde MA. Lesional tissue was carefully marked with at least 4 mm margins of clinically normal skin in all directions. A fusiform elliptical excision was done with #15 blade carried down completely through the dermis into the deep subcutaneous tissues to the level of the non-muscle fascia, and dissection was carried out in that plane. The wound was widely undermined in all directions. Electrocoagulation was used to obtain hemostasis. Blood loss was minimal. The wound was then approximated in a layered fashion with subcutaneous and intradermal sutures of 3.0 Monocryl, approximately 3 in number, and the wound was then superficially closed with simple interrupted sutures of 4.0 Prolene.    The patient tolerated the procedure well.    The area was cleaned and dressed appropriately and the patient was given wound care instructions, as well as an appointment for follow-up evaluation.    LENGTH OF REPAIR: 2.5 cm

## 2020-09-24 NOTE — PROGRESS NOTES
Requested updates within Care Everywhere.  Patient's chart was reviewed for overdue TO topics.  Media reviewed for outside colon ca screening.  Immunizations reconciled.    Orders placed:n/a  Tasked appts:n/a  Labs Linked:n/a

## 2020-09-24 NOTE — PATIENT INSTRUCTIONS

## 2020-09-24 NOTE — PROGRESS NOTES
Subjective:       Patient ID:  Zoë Enrique is a 70 y.o. female who presents for   Chief Complaint   Patient presents with    Spot     face     Patient is a 69 yo female present for efudex f/u.  Using on r cheek sup bcc.    Not getting any irritation with bid since June.          Review of Systems   Constitutional: Negative for fever, chills and fatigue.   HENT: Negative for sore throat and mouth sores.    Respiratory: Negative for cough and shortness of breath.    Gastrointestinal: Negative for vomiting.   Skin: Positive for daily sunscreen use and activity-related sunscreen use. Negative for rash, dry skin, recent sunburn and wears hat.   Hematologic/Lymphatic: Bruises/bleeds easily.        Objective:    Physical Exam   Constitutional: She appears well-developed and well-nourished.   Eyes: No conjunctival no injection.   Neurological: She is alert and oriented to person, place, and time.   Psychiatric: She has a normal mood and affect.   Skin:   Areas Examined (abnormalities noted in diagram):   Head / Face Inspection Performed  Neck Inspection Performed  Chest / Axilla Inspection Performed                   Diagram Legend     Erythematous scaling macule/papule c/w actinic keratosis       Vascular papule c/w angioma      Pigmented verrucoid papule/plaque c/w seborrheic keratosis      Yellow umbilicated papule c/w sebaceous hyperplasia      Irregularly shaped tan macule c/w lentigo     1-2 mm smooth white papules consistent with Milia      Movable subcutaneous cyst with punctum c/w epidermal inclusion cyst      Subcutaneous movable cyst c/w pilar cyst      Firm pink to brown papule c/w dermatofibroma      Pedunculated fleshy papule(s) c/w skin tag(s)      Evenly pigmented macule c/w junctional nevus     Mildly variegated pigmented, slightly irregular-bordered macule c/w mildly atypical nevus      Flesh colored to evenly pigmented papule c/w intradermal nevus       Pink pearly papule/plaque c/w basal cell  carcinoma      Erythematous hyperkeratotic cursted plaque c/w SCC      Surgical scar with no sign of skin cancer recurrence      Open and closed comedones      Inflammatory papules and pustules      Verrucoid papule consistent consistent with wart     Erythematous eczematous patches and plaques     Dystrophic onycholytic nail with subungual debris c/w onychomycosis     Umbilicated papule    Erythematous-base heme-crusted tan verrucoid plaque consistent with inflamed seborrheic keratosis     Erythematous Silvery Scaling Plaque c/w Psoriasis     See annotation      Assessment / Plan:        Superficial basal cell carcinoma (BCC)  Discussed with patient the etiology and pathogenesis of the disease or skin lesion(s) and possible treatments and aggravators.    [facility] and or records and notes reviewed and considered for their impact on our clinical decision making today.  Hold 5fu for now.  We will recheck the rcheek at our follow up appointment.  May need to consider cryo vs exc.  Reviewed with patient different treatment options and associated risks.      SK (seborrheic keratosis)  Discussed with patient the benign nature of these lesions and that no treatment is indicated.      Scar  We will recheck the r cheek at our follow up appointment.           Follow up in about 2 weeks (around 10/8/2020).

## 2020-10-01 LAB
FINAL PATHOLOGIC DIAGNOSIS: NORMAL
GROSS: NORMAL
MICROSCOPIC EXAM: NORMAL

## 2020-10-09 ENCOUNTER — OFFICE VISIT (OUTPATIENT)
Dept: OBSTETRICS AND GYNECOLOGY | Facility: CLINIC | Age: 70
End: 2020-10-09
Payer: MEDICARE

## 2020-10-09 VITALS
BODY MASS INDEX: 25.29 KG/M2 | DIASTOLIC BLOOD PRESSURE: 70 MMHG | SYSTOLIC BLOOD PRESSURE: 120 MMHG | WEIGHT: 166.88 LBS | HEIGHT: 68 IN

## 2020-10-09 DIAGNOSIS — N95.2 POSTMENOPAUSAL ATROPHIC VAGINITIS: ICD-10-CM

## 2020-10-09 DIAGNOSIS — Z12.31 ENCOUNTER FOR SCREENING MAMMOGRAM FOR MALIGNANT NEOPLASM OF BREAST: ICD-10-CM

## 2020-10-09 DIAGNOSIS — R35.0 URINARY FREQUENCY: ICD-10-CM

## 2020-10-09 DIAGNOSIS — N95.1 MENOPAUSAL SYMPTOM: ICD-10-CM

## 2020-10-09 DIAGNOSIS — Z01.419 ENCOUNTER FOR GYNECOLOGICAL EXAMINATION WITHOUT ABNORMAL FINDING: Primary | ICD-10-CM

## 2020-10-09 LAB
BACTERIA #/AREA URNS AUTO: NORMAL /HPF
BILIRUB UR QL STRIP: NEGATIVE
CLARITY UR REFRACT.AUTO: CLEAR
COLOR UR AUTO: YELLOW
GLUCOSE UR QL STRIP: NEGATIVE
HGB UR QL STRIP: NEGATIVE
KETONES UR QL STRIP: NEGATIVE
LEUKOCYTE ESTERASE UR QL STRIP: NEGATIVE
MICROSCOPIC COMMENT: NORMAL
NITRITE UR QL STRIP: NEGATIVE
PH UR STRIP: 5 [PH] (ref 5–8)
PROT UR QL STRIP: NEGATIVE
RBC #/AREA URNS AUTO: 1 /HPF (ref 0–4)
SP GR UR STRIP: 1.03 (ref 1–1.03)
SQUAMOUS #/AREA URNS AUTO: 0 /HPF
URN SPEC COLLECT METH UR: NORMAL
WBC #/AREA URNS AUTO: 1 /HPF (ref 0–5)

## 2020-10-09 PROCEDURE — 87086 URINE CULTURE/COLONY COUNT: CPT

## 2020-10-09 PROCEDURE — 99999 PR PBB SHADOW E&M-EST. PATIENT-LVL III: ICD-10-PCS | Mod: PBBFAC,,, | Performed by: OBSTETRICS & GYNECOLOGY

## 2020-10-09 PROCEDURE — G0101 PR CA SCREEN;PELVIC/BREAST EXAM: ICD-10-PCS | Mod: S$GLB,,, | Performed by: OBSTETRICS & GYNECOLOGY

## 2020-10-09 PROCEDURE — 81001 URINALYSIS AUTO W/SCOPE: CPT

## 2020-10-09 PROCEDURE — G0101 CA SCREEN;PELVIC/BREAST EXAM: HCPCS | Mod: S$GLB,,, | Performed by: OBSTETRICS & GYNECOLOGY

## 2020-10-09 PROCEDURE — 99999 PR PBB SHADOW E&M-EST. PATIENT-LVL III: CPT | Mod: PBBFAC,,, | Performed by: OBSTETRICS & GYNECOLOGY

## 2020-10-09 RX ORDER — ESTRADIOL 0.1 MG/G
0.5 CREAM VAGINAL
Qty: 42 G | Refills: 4 | Status: SHIPPED | OUTPATIENT
Start: 2020-10-12 | End: 2021-02-17

## 2020-10-09 RX ORDER — TESTOSTERONE 50 MG/5G
0.25 GEL TRANSDERMAL DAILY
Qty: 15 G | Refills: 5 | Status: SHIPPED | OUTPATIENT
Start: 2020-10-09 | End: 2021-06-01

## 2020-10-09 NOTE — PROGRESS NOTES
Subjective:       Patient ID: Zoë Enrique is a 70 y.o. female.    Chief Complaint:  Gynecologic Exam      History of Present Illness  HPI  Zoë Enrique is a 70 y.o. female  new to me  here for her annual GYN exam.  She has not seen a gynecologist in many years as she was told she didn't need an exam following complete hysterectomy; and reports nocturia and urinary frequency/urgency .  She also reports renewed recent occurrence of hot flashes after not having them in many years, had been on HRT for several years after her hysterectomy but stopped over 15 years ago .     denies vaginal itching or irritation.  Denies vaginal discharge.  She is not currently sexually active. She has had 1 partner for the past 34 years .   History of abnormal pap: yes , prior to hysterectomy which was done for Endometriosis  Last Pap: was normal and patient does not recall when last pap was  Last MMG: normal- -routine follow-up in 12 months  Last Colonoscopy:  colonoscopy 10 years ago without abnormalities.  denies domestic violence. She does feel safe at home.     Past Medical History:   Diagnosis Date    Abnormal Pap smear of cervix     Atrial fibrillation 2017    Cancer     osteosarcoma of right femur    Diverticulosis     Hx of atrial flutter     Hx of mitral valve prolapse     Migraines      Past Surgical History:   Procedure Laterality Date    AUGMENTATION OF BREAST Bilateral     35 yrs     BREAST SURGERY       SECTION      COLON SURGERY      COLPOSCOPY      COSMETIC SURGERY  1987    EYE SURGERY  2016    JOINT REPLACEMENT  , ,     KNEE ARTHROPLASTY Right     revison in     KNEE SURGERY Right     distal femoral replacing TK    OOPHORECTOMY      TOTAL ABDOMINAL HYSTERECTOMY      TAHBSO    TREATMENT OF CARDIAC ARRHYTHMIA N/A 2019    Procedure: CARDIOVERSION;  Surgeon: Moe Seymour MD;  Location: [facility];  Service:  Cardiology;  Laterality: N/A;  KHANH/DCCV/MAC/DM/3PREP/*ADMIT FOR TIKOSYN*     Social History     Socioeconomic History    Marital status:      Spouse name: Not on file    Number of children: Not on file    Years of education: Not on file    Highest education level: Not on file   Occupational History    Not on file   Social Needs    Financial resource strain: Not very hard    Food insecurity     Worry: Never true     Inability: Never true    Transportation needs     Medical: No     Non-medical: No   Tobacco Use    Smoking status: Never Smoker    Smokeless tobacco: Never Used   Substance and Sexual Activity    Alcohol use: Yes     Alcohol/week: 2.0 standard drinks     Types: 1 Glasses of wine, 1 Standard drinks or equivalent per week     Frequency: 2-4 times a month     Drinks per session: 1 or 2     Binge frequency: Never     Comment: socially    Drug use: No    Sexual activity: Not Currently     Partners: Male     Birth control/protection: None     Comment: , together x 34 years   Lifestyle    Physical activity     Days per week: 0 days     Minutes per session: Not on file    Stress: Only a little   Relationships    Social connections     Talks on phone: More than three times a week     Gets together: Three times a week     Attends [employer] service: Not on file     Active member of club or organization: No     Attends meetings of clubs or organizations: Never     Relationship status:    Other Topics Concern    Not on file   Social History Narrative    Not on file     Family History   Problem Relation Age of Onset    Diabetes Sister         Type I    Heart disease Sister         Atrrial fib, mitral valve repair    Hearing loss Father     Heart disease Father         Angina, AAA    Stroke Father 93    Heart disease Mother         Atrial fib    Diabetes Mother         Type II    Heart disease Brother         Atrial fib    Heart disease Brother         Atrial fib     "Breast cancer Neg Hx     Ovarian cancer Neg Hx     Colon cancer Neg Hx     Cancer Neg Hx     Hypertension Neg Hx      OB History        1    Para   1    Term   1            AB        Living   1       SAB        TAB        Ectopic        Multiple        Live Births   1                 /70   Ht 5' 8" (1.727 m)   Wt 75.7 kg (166 lb 14.2 oz)   BMI 25.38 kg/m²         GYN & OB History    Date of Last Pap: No result found    OB History    Para Term  AB Living   1 1 1     1   SAB TAB Ectopic Multiple Live Births           1      # Outcome Date GA Lbr Michael/2nd Weight Sex Delivery Anes PTL Lv   1 Term      CS-LTranv   EDWINA       Review of Systems  Review of Systems   Constitutional: Negative for activity change, appetite change, fatigue and unexpected weight change.   HENT: Negative.    Eyes: Negative for visual disturbance.   Respiratory: Negative for shortness of breath and wheezing.    Cardiovascular: Negative for chest pain, palpitations and leg swelling.   Gastrointestinal: Negative for abdominal pain, bloating and blood in stool.   Endocrine: Positive for hot flashes. Negative for diabetes and hair loss.   Genitourinary: Positive for frequency, hot flashes, urgency and vaginal dryness. Negative for decreased libido and dyspareunia.   Musculoskeletal: Negative for back pain and joint swelling.   Integumentary:  Negative for acne, hair changes and nipple discharge.   Neurological: Negative for headaches.   Hematological: Does not bruise/bleed easily.   Psychiatric/Behavioral: Negative for depression and sleep disturbance. The patient is not nervous/anxious.    Breast: Negative for mastodynia and nipple discharge          Objective:      Physical Exam:   Constitutional: She is oriented to person, place, and time. She appears well-developed and well-nourished.    HENT:   Head: Normocephalic and atraumatic.    Eyes: Pupils are equal, round, and reactive to light. EOM are normal.  "   Neck: Normal range of motion. Neck supple.    Cardiovascular: Normal rate and regular rhythm.     Pulmonary/Chest: Effort normal and breath sounds normal.   BREASTS:  no mass, no tenderness, no deformity and no retraction. Right breast exhibits no inverted nipple, no mass, no nipple discharge, no skin change, no tenderness, no bleeding and no swelling. Left breast exhibits no inverted nipple, no mass, no nipple discharge, no skin change, no tenderness, no bleeding and no swelling. Breasts are symmetrical.      Bilateral implants noted.         Abdominal: Soft. Bowel sounds are normal.     Genitourinary:    Pelvic exam was performed with patient supine.      Genitourinary Comments: PELVIC: Normal external female genitalia without lesions. Normal hair distribution. Adequate perineal body, normal urethral meatus. Vagina atrophic without lesions or discharge. No significant cystocele or rectocele. Bimanual exam shows uterus and cervix to be surgically absent. Adnexa without masses or tenderness.  RECTAL: Deferred               Musculoskeletal: Normal range of motion and moves all extremeties.       Neurological: She is alert and oriented to person, place, and time.    Skin: Skin is warm and dry.    Psychiatric: She has a normal mood and affect.              Assessment:        1. Encounter for gynecological examination without abnormal finding    2. Encounter for screening mammogram for malignant neoplasm of breast    3. Urinary frequency    4. Postmenopausal atrophic vaginitis    5. Menopausal symptom                  Plan:      1. Encounter for gynecological examination without abnormal finding  COUNSELING:  The patient was counseled today on regular weight bearing exercise. Patient was counseled today on the new ACS guidelines for cervical cytology screening as well as the current recommendations for breast cancer screening. Counseling session lasted approximately 10 minutes, and all her questions were answered. She  was advised to see her primary care physician for all other health maintenance.   FOLLOW-UP with me for next routine visit.     '    2. Encounter for screening mammogram for malignant neoplasm of breast    - Mammo Digital Screening Bilat w/ Rinku; Future    3. Urinary frequency      - Urine culture  - Urinalysis    4. Postmenopausal atrophic vaginitis    - estradioL (ESTRACE) 0.01 % (0.1 mg/gram) vaginal cream; Place 0.5 g vaginally twice a week. Insert nightly x 2 weeks then twice weekly  Dispense: 42 g; Refill: 4    5. Menopausal symptom  (we discussed that being this far past menopause and use of HRT, she is no longer a candidate to resume Estrogen treatment. Discussed use of low dose testosterone with risks. Side effects and benefits discussed. Will try low dose gel)   - testosterone (TESTIM) 50 mg/5 gram (1 %) Gel; Apply 0.25 g topically once daily.  Dispense: 15 g; Refill: 5       Follow up in about 1 year (around 10/9/2021).

## 2020-10-10 LAB — BACTERIA UR CULT: NO GROWTH

## 2020-10-12 ENCOUNTER — IMMUNIZATION (OUTPATIENT)
Dept: PHARMACY | Facility: CLINIC | Age: 70
End: 2020-10-12
Payer: MEDICARE

## 2020-10-13 ENCOUNTER — TELEPHONE (OUTPATIENT)
Dept: ELECTROPHYSIOLOGY | Facility: CLINIC | Age: 70
End: 2020-10-13

## 2020-10-14 ENCOUNTER — HOSPITAL ENCOUNTER (OUTPATIENT)
Dept: CARDIOLOGY | Facility: HOSPITAL | Age: 70
Discharge: HOME | End: 2020-10-14
Attending: INTERNAL MEDICINE
Payer: MEDICARE

## 2020-10-14 VITALS
DIASTOLIC BLOOD PRESSURE: 70 MMHG | SYSTOLIC BLOOD PRESSURE: 120 MMHG | HEART RATE: 71 BPM | HEIGHT: 68 IN | WEIGHT: 166 LBS | BODY MASS INDEX: 25.16 KG/M2

## 2020-10-14 DIAGNOSIS — I48.0 PAROXYSMAL ATRIAL FIBRILLATION: ICD-10-CM

## 2020-10-14 LAB
ASCENDING AORTA: 2.88 CM
AV INDEX (PROSTH): 0.75
AV MEAN GRADIENT: 3 MMHG
AV PEAK GRADIENT: 5 MMHG
AV VALVE AREA: 2.97 CM2
AV VELOCITY RATIO: 0.73
BSA FOR ECHO PROCEDURE: 1.9 M2
CV ECHO LV RWT: 0.29 CM
DOP CALC AO PEAK VEL: 1.13 M/S
DOP CALC AO VTI: 21.76 CM
DOP CALC LVOT AREA: 4 CM2
DOP CALC LVOT DIAMETER: 2.25 CM
DOP CALC LVOT PEAK VEL: 0.83 M/S
DOP CALC LVOT STROKE VOLUME: 64.66 CM3
DOP CALCLVOT PEAK VEL VTI: 16.27 CM
E/E' RATIO: 10.77 M/S
ECHO LV POSTERIOR WALL: 0.66 CM (ref 0.6–1.1)
FRACTIONAL SHORTENING: 29 % (ref 28–44)
INTERVENTRICULAR SEPTUM: 0.72 CM (ref 0.6–1.1)
IVRT: 77.07 MSEC
LA MAJOR: 4.94 CM
LA MINOR: 4.96 CM
LA WIDTH: 4.85 CM
LEFT ATRIUM SIZE: 3.96 CM
LEFT ATRIUM VOLUME INDEX: 42.8 ML/M2
LEFT ATRIUM VOLUME: 80.81 CM3
LEFT INTERNAL DIMENSION IN SYSTOLE: 3.23 CM (ref 2.1–4)
LEFT VENTRICLE DIASTOLIC VOLUME INDEX: 50.62 ML/M2
LEFT VENTRICLE DIASTOLIC VOLUME: 95.58 ML
LEFT VENTRICLE MASS INDEX: 51 G/M2
LEFT VENTRICLE SYSTOLIC VOLUME INDEX: 22.2 ML/M2
LEFT VENTRICLE SYSTOLIC VOLUME: 42 ML
LEFT VENTRICULAR INTERNAL DIMENSION IN DIASTOLE: 4.56 CM (ref 3.5–6)
LEFT VENTRICULAR MASS: 96.09 G
LV LATERAL E/E' RATIO: 11.67 M/S
LV SEPTAL E/E' RATIO: 10 M/S
MV PEAK E VEL: 1.4 M/S
PISA TR MAX VEL: 2.7 M/S
RA MAJOR: 5.57 CM
RA PRESSURE: 3 MMHG
RA WIDTH: 3.45 CM
RIGHT VENTRICULAR END-DIASTOLIC DIMENSION: 2.99 CM
RV TISSUE DOPPLER FREE WALL SYSTOLIC VELOCITY 1 (APICAL 4 CHAMBER VIEW): 13.55 CM/S
SINUS: 2.65 CM
STJ: 2.83 CM
TDI LATERAL: 0.12 M/S
TDI SEPTAL: 0.14 M/S
TDI: 0.13 M/S
TR MAX PG: 29 MMHG
TRICUSPID ANNULAR PLANE SYSTOLIC EXCURSION: 1.64 CM
TV REST PULMONARY ARTERY PRESSURE: 32 MMHG

## 2020-10-14 PROCEDURE — 93306 TTE W/DOPPLER COMPLETE: CPT

## 2020-10-14 PROCEDURE — 93306 ECHO (CUPID ONLY): ICD-10-PCS | Mod: 26,,, | Performed by: INTERNAL MEDICINE

## 2020-10-14 PROCEDURE — 93306 TTE W/DOPPLER COMPLETE: CPT | Mod: 26,,, | Performed by: INTERNAL MEDICINE

## 2020-10-16 ENCOUNTER — OFFICE VISIT (OUTPATIENT)
Dept: ELECTROPHYSIOLOGY | Facility: CLINIC | Age: 70
End: 2020-10-16
Payer: MEDICARE

## 2020-10-16 ENCOUNTER — HOSPITAL ENCOUNTER (OUTPATIENT)
Dept: CARDIOLOGY | Facility: CLINIC | Age: 70
Discharge: HOME | End: 2020-10-16
Payer: MEDICARE

## 2020-10-16 VITALS
HEART RATE: 87 BPM | DIASTOLIC BLOOD PRESSURE: 71 MMHG | HEIGHT: 68 IN | BODY MASS INDEX: 25.76 KG/M2 | SYSTOLIC BLOOD PRESSURE: 125 MMHG | WEIGHT: 170 LBS

## 2020-10-16 DIAGNOSIS — I48.0 PAROXYSMAL ATRIAL FIBRILLATION: Primary | ICD-10-CM

## 2020-10-16 DIAGNOSIS — Z51.81 VISIT FOR MONITORING TIKOSYN THERAPY: ICD-10-CM

## 2020-10-16 DIAGNOSIS — Z79.899 VISIT FOR MONITORING TIKOSYN THERAPY: ICD-10-CM

## 2020-10-16 DIAGNOSIS — E03.9 HYPOTHYROIDISM (ACQUIRED): ICD-10-CM

## 2020-10-16 DIAGNOSIS — I48.0 PAROXYSMAL ATRIAL FIBRILLATION: ICD-10-CM

## 2020-10-16 PROCEDURE — 93005 RHYTHM STRIP: ICD-10-PCS | Mod: S$GLB,,, | Performed by: INTERNAL MEDICINE

## 2020-10-16 PROCEDURE — 3008F PR BODY MASS INDEX (BMI) DOCUMENTED: ICD-10-PCS | Mod: CPTII,S$GLB,, | Performed by: INTERNAL MEDICINE

## 2020-10-16 PROCEDURE — 99215 PR OFFICE/OUTPT VISIT, EST, LEVL V, 40-54 MIN: ICD-10-PCS | Mod: S$GLB,,, | Performed by: INTERNAL MEDICINE

## 2020-10-16 PROCEDURE — 93010 RHYTHM STRIP: ICD-10-PCS | Mod: S$GLB,,, | Performed by: INTERNAL MEDICINE

## 2020-10-16 PROCEDURE — 1159F MED LIST DOCD IN RCRD: CPT | Mod: S$GLB,,, | Performed by: INTERNAL MEDICINE

## 2020-10-16 PROCEDURE — 1159F PR MEDICATION LIST DOCUMENTED IN MEDICAL RECORD: ICD-10-PCS | Mod: S$GLB,,, | Performed by: INTERNAL MEDICINE

## 2020-10-16 PROCEDURE — 99999 PR PBB SHADOW E&M-EST. PATIENT-LVL III: CPT | Mod: PBBFAC,,, | Performed by: INTERNAL MEDICINE

## 2020-10-16 PROCEDURE — 3008F BODY MASS INDEX DOCD: CPT | Mod: CPTII,S$GLB,, | Performed by: INTERNAL MEDICINE

## 2020-10-16 PROCEDURE — 1126F AMNT PAIN NOTED NONE PRSNT: CPT | Mod: S$GLB,,, | Performed by: INTERNAL MEDICINE

## 2020-10-16 PROCEDURE — 93005 ELECTROCARDIOGRAM TRACING: CPT | Mod: S$GLB,,, | Performed by: INTERNAL MEDICINE

## 2020-10-16 PROCEDURE — 1101F PR PT FALLS ASSESS DOC 0-1 FALLS W/OUT INJ PAST YR: ICD-10-PCS | Mod: CPTII,S$GLB,, | Performed by: INTERNAL MEDICINE

## 2020-10-16 PROCEDURE — 99999 PR PBB SHADOW E&M-EST. PATIENT-LVL III: ICD-10-PCS | Mod: PBBFAC,,, | Performed by: INTERNAL MEDICINE

## 2020-10-16 PROCEDURE — 1126F PR PAIN SEVERITY QUANTIFIED, NO PAIN PRESENT: ICD-10-PCS | Mod: S$GLB,,, | Performed by: INTERNAL MEDICINE

## 2020-10-16 PROCEDURE — 1101F PT FALLS ASSESS-DOCD LE1/YR: CPT | Mod: CPTII,S$GLB,, | Performed by: INTERNAL MEDICINE

## 2020-10-16 PROCEDURE — 99215 OFFICE O/P EST HI 40 MIN: CPT | Mod: S$GLB,,, | Performed by: INTERNAL MEDICINE

## 2020-10-16 PROCEDURE — 93010 ELECTROCARDIOGRAM REPORT: CPT | Mod: S$GLB,,, | Performed by: INTERNAL MEDICINE

## 2020-10-16 RX ORDER — DOFETILIDE 0.25 MG/1
250 CAPSULE ORAL EVERY 12 HOURS
Qty: 180 CAPSULE | Refills: 3 | Status: ON HOLD | OUTPATIENT
Start: 2020-10-16 | End: 2020-11-24 | Stop reason: HOSPADM
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
## 2020-10-19 ENCOUNTER — OFFICE VISIT (OUTPATIENT)
Dept: DERMATOLOGY | Facility: CLINIC | Age: 70
End: 2020-10-19
Payer: MEDICARE

## 2020-10-19 DIAGNOSIS — L90.5 SCAR: ICD-10-CM

## 2020-10-19 DIAGNOSIS — Z48.02 VISIT FOR SUTURE REMOVAL: Primary | ICD-10-CM

## 2020-10-19 DIAGNOSIS — C44.91 SUPERFICIAL BASAL CELL CARCINOMA (BCC): ICD-10-CM

## 2020-10-19 PROCEDURE — 99999 PR PBB SHADOW E&M-EST. PATIENT-LVL III: CPT | Mod: PBBFAC,HCNC,, | Performed by: DERMATOLOGY

## 2020-10-19 PROCEDURE — 1101F PT FALLS ASSESS-DOCD LE1/YR: CPT | Mod: HCNC,CPTII,S$GLB, | Performed by: DERMATOLOGY

## 2020-10-19 PROCEDURE — 99213 OFFICE O/P EST LOW 20 MIN: CPT | Mod: HCNC,S$GLB,, | Performed by: DERMATOLOGY

## 2020-10-19 PROCEDURE — 1101F PR PT FALLS ASSESS DOC 0-1 FALLS W/OUT INJ PAST YR: ICD-10-PCS | Mod: HCNC,CPTII,S$GLB, | Performed by: DERMATOLOGY

## 2020-10-19 PROCEDURE — 1126F PR PAIN SEVERITY QUANTIFIED, NO PAIN PRESENT: ICD-10-PCS | Mod: HCNC,S$GLB,, | Performed by: DERMATOLOGY

## 2020-10-19 PROCEDURE — 1126F AMNT PAIN NOTED NONE PRSNT: CPT | Mod: HCNC,S$GLB,, | Performed by: DERMATOLOGY

## 2020-10-19 PROCEDURE — 99213 PR OFFICE/OUTPT VISIT, EST, LEVL III, 20-29 MIN: ICD-10-PCS | Mod: HCNC,S$GLB,, | Performed by: DERMATOLOGY

## 2020-10-19 PROCEDURE — 1159F MED LIST DOCD IN RCRD: CPT | Mod: HCNC,S$GLB,, | Performed by: DERMATOLOGY

## 2020-10-19 PROCEDURE — 1159F PR MEDICATION LIST DOCUMENTED IN MEDICAL RECORD: ICD-10-PCS | Mod: HCNC,S$GLB,, | Performed by: DERMATOLOGY

## 2020-10-19 PROCEDURE — 99999 PR PBB SHADOW E&M-EST. PATIENT-LVL III: ICD-10-PCS | Mod: PBBFAC,HCNC,, | Performed by: DERMATOLOGY

## 2020-10-19 NOTE — PROGRESS NOTES
Subjective:       Patient ID:  Zoë Enrique is a 70 y.o. female who presents for   Chief Complaint   Patient presents with    Suture / Staple Removal     left chest             Review of Systems   Constitutional: Negative for fever, chills, weight loss, weight gain, fatigue, night sweats and malaise.   Respiratory: Negative for cough and shortness of breath.    Gastrointestinal: Negative for nausea, vomiting, abdominal pain, diarrhea, constipation and indigestion.   Skin: Positive for dry skin.        Objective:    Physical Exam   Constitutional: She appears well-developed and well-nourished.   Eyes: No conjunctival no injection.   Lymphadenopathy:     She has no cervical adenopathy.   Neurological: She is alert and oriented to person, place, and time.   Psychiatric: She has a normal mood and affect.   Skin:   Areas Examined (abnormalities noted in diagram):   Head / Face Inspection Performed  Neck Inspection Performed  Chest / Axilla Inspection Performed  RUE Inspected  LUE Inspection Performed              Diagram Legend     Erythematous scaling macule/papule c/w actinic keratosis       Vascular papule c/w angioma      Pigmented verrucoid papule/plaque c/w seborrheic keratosis      Yellow umbilicated papule c/w sebaceous hyperplasia      Irregularly shaped tan macule c/w lentigo     1-2 mm smooth white papules consistent with Milia      Movable subcutaneous cyst with punctum c/w epidermal inclusion cyst      Subcutaneous movable cyst c/w pilar cyst      Firm pink to brown papule c/w dermatofibroma      Pedunculated fleshy papule(s) c/w skin tag(s)      Evenly pigmented macule c/w junctional nevus     Mildly variegated pigmented, slightly irregular-bordered macule c/w mildly atypical nevus      Flesh colored to evenly pigmented papule c/w intradermal nevus       Pink pearly papule/plaque c/w basal cell carcinoma      Erythematous hyperkeratotic cursted plaque c/w SCC      Surgical scar with no sign of skin  cancer recurrence      Open and closed comedones      Inflammatory papules and pustules      Verrucoid papule consistent consistent with wart     Erythematous eczematous patches and plaques     Dystrophic onycholytic nail with subungual debris c/w onychomycosis     Umbilicated papule    Erythematous-base heme-crusted tan verrucoid plaque consistent with inflamed seborrheic keratosis     Erythematous Silvery Scaling Plaque c/w Psoriasis     See annotation      Assessment / Plan:        Visit for suture removal  sr for chest.  atyp mole all out.    Scar  No signs of recurrence are noted in previous surgical areas or scars.    Superficial basal cell carcinoma (BCC)  Discussed with patient the etiology and pathogenesis of the disease or skin lesion(s) and possible treatments and aggravators.    Patient and or guardian to monitor this area/lesion or these areas/lesions for changes or worsening.  Patient and or guardian to contact us if any changes are noted for such.  Pt tx'd with 5fu cr but wo irritation.  Consider aldara later?  rev'd tx with exc, cryo, aldara if any recur.  Okay to hold 5fu for now.  We will recheck the r cheek at our follow up appointment.             Follow up in about 3 months (around 1/19/2021).

## 2020-10-22 ENCOUNTER — TELEPHONE (OUTPATIENT)
Dept: ELECTROPHYSIOLOGY | Facility: CLINIC | Age: 70
End: 2020-10-22

## 2020-10-22 NOTE — TELEPHONE ENCOUNTER
Returned call to pt. She continues to be very confused about what decision she should move forward with in her care. She would like more clarity from Dr Seymour on whether she will be cardioverted before TIKOSYN is increased and will she still be required to stay 4 days, if she chooses amiodarone will she have cardioversion prior and will she have to stay 4 days.  Advised I would discuss with dr Seymour and return the call.           ----- Message from Evelyn Lane MA sent at 10/22/2020  1:29 PM CDT -----  Regarding: FW: questions  Contact: patient  Please advise     Thank you,   Evelyn Lane MA  [facility]      ----- Message -----  From: Meghan Morrison  Sent: 10/22/2020  10:27 AM CDT  To: Dawn RODRIGUEZ Staff  Subject: questions                                        The pt is calling to speak with Alize. Please call her back @ [phone]. Thanks, Meghan

## 2020-10-28 ENCOUNTER — HOSPITAL ENCOUNTER (OUTPATIENT)
Dept: RADIOLOGY | Facility: HOSPITAL | Age: 70
Discharge: HOME | End: 2020-10-28
Attending: OBSTETRICS & GYNECOLOGY
Payer: MEDICARE

## 2020-10-28 VITALS — HEIGHT: 68 IN | BODY MASS INDEX: 25.07 KG/M2 | WEIGHT: 165.38 LBS

## 2020-10-28 DIAGNOSIS — Z12.31 ENCOUNTER FOR SCREENING MAMMOGRAM FOR MALIGNANT NEOPLASM OF BREAST: ICD-10-CM

## 2020-10-28 PROCEDURE — 77067 MAMMO DIGITAL SCREENING BILAT WITH TOMO: ICD-10-PCS | Mod: 26,HCNC,, | Performed by: RADIOLOGY

## 2020-10-28 PROCEDURE — 77067 SCR MAMMO BI INCL CAD: CPT | Mod: TC,HCNC

## 2020-10-28 PROCEDURE — 77063 BREAST TOMOSYNTHESIS BI: CPT | Mod: 26,HCNC,, | Performed by: RADIOLOGY

## 2020-10-28 PROCEDURE — 77067 SCR MAMMO BI INCL CAD: CPT | Mod: 26,HCNC,, | Performed by: RADIOLOGY

## 2020-10-28 PROCEDURE — 77063 MAMMO DIGITAL SCREENING BILAT WITH TOMO: ICD-10-PCS | Mod: 26,HCNC,, | Performed by: RADIOLOGY

## 2020-10-30 ENCOUNTER — TELEPHONE (OUTPATIENT)
Dept: ELECTROPHYSIOLOGY | Facility: CLINIC | Age: 70
End: 2020-10-30

## 2020-10-30 ENCOUNTER — PATIENT MESSAGE (OUTPATIENT)
Dept: ADMINISTRATIVE | Facility: HOSPITAL | Age: 70
End: 2020-10-30

## 2020-10-30 NOTE — TELEPHONE ENCOUNTER
Called pt today to see if she made a decision on how she would like to move forward. Pt stated that Rose Baldwin had not called her about out of pocket cost. Message resent to Rose Baldwin advising that she is leaning toward a PVI. Codes provided to Rose and asked for her to call pt.so she can move forward and make her decision.

## 2020-11-03 ENCOUNTER — TELEPHONE (OUTPATIENT)
Dept: ELECTROPHYSIOLOGY | Facility: CLINIC | Age: 70
End: 2020-11-03

## 2020-11-03 ENCOUNTER — PATIENT MESSAGE (OUTPATIENT)
Dept: ELECTROPHYSIOLOGY | Facility: CLINIC | Age: 70
End: 2020-11-03

## 2020-11-03 DIAGNOSIS — Z01.818 PRE-OP TESTING: ICD-10-CM

## 2020-11-03 DIAGNOSIS — I48.19 PERSISTENT ATRIAL FIBRILLATION: Primary | ICD-10-CM

## 2020-11-03 DIAGNOSIS — I48.92 ATRIAL FLUTTER, UNSPECIFIED TYPE: ICD-10-CM

## 2020-11-03 NOTE — PROGRESS NOTES
Patient Instructions  PVI (Pulmonary Vein Isolation) Ablation   with   KHANH (Transesophageal Echocardiogram)      Pre-Procedure Testing Needed for Ablation Procedure:      11/20/2020 - Last day to take TIKOSYN prior to procedure.      11/19/2020 at 10:30 AM  Blood Work has been scheduled for you at : [facility]  [address]  [place] [address]  Located behind the Imaging Center to the right  · You do NOT need to fast for this blood work.        11/19/2020 AT 11:15 AM  Cardiac Imaging - CTA Scan has been scheduled for you at: [facility] [address] [place] [address]    · The purpose of this CT scan is to visualize and measure the pulmonary veins (area where your doctor will be performing ablation on the day of your procedure).  This will be done at  the [facility] ([address] [place] [address]) located across the street from the [facility].   · Please do not eat or drink anything FOUR hours prior to your scheduled CT scan.  · Please arrive 30 minutes prior to your scheduled time listed above.  · If you take the medication Metformin, please do NOT take it the day of your CT scan and two days following it (due to kidney pre-cautions).      11/21/2020 at 8:00 AM  COVID 19 Nasal Swab has been scheduled for you at: [facility]  Please park in surface lot or garage and check in on the first floor, Building B  · You do NOT need to fast for this.       **Prior to your procedure please check your skin and groin area thoroughly for any signs or symptoms of infection such as rashes, open sores, yeast infection, or heat rashes.  If you have any concerns with your skin you should be evaluated by your Primary Care Provider or an Urgent Care for treatment prior to your procedure. Your procedure will be cancelled if not treated prior to the day of your procedure.**        Important Medication Information for your ABLATION procedure:    · HOLD (do NOT take)  TIKOSYN 3 days prior to your procedure.  Your last dose should be taken on 11/20/2020.  · Take Xarelto as prescribed the evening prior to procedure.  · You may take all other usual morning medications with a sip of water on the day of your procedure.         Day of Ablation Procedure:    11/24/2020 at 5:15 AM  - KHANH / Cardiac Ablation     [facility] - [address] [place] [address]  Please report to Cardiology Waiting Room on the 3rd floor of the Hospital,    · Do not eat or drink anything after 12 midnight on the night before your procedure.  · Please follow above important medication instructions.  · Please do not wear makeup, especially mascara, when arriving for your procedure.  · You will be spending the night in the hospital the night of your procedure.   · You will need someone to drive you home from the hospital due to anesthesia (you will be unable to drive for 24 hours).  You are allowed one adult guest to accompany you pre and post procedure.  Once your procedure is over, guest must adhere to visiting hours of 8 am - 6 pm.   · If you wear a CPAP or BIPAP machine for sleep apnea, please be sure to bring your machine with you.        · All patients/visitors are asked to arrive with a mask and keep the mask on throughout their visit  · All persons entering will be screened for fever and respiratory symptoms  · Patients may have one adult support person pre and post procedure  · Support person may remain with the patient prior to their procedure and then must wait in a socially distant manner until a member of the medical team provides an update at the conclusion of the procedure  · If you are spending the night, your support person must follow the visitation hours (8am-6pm)  · Your support person should provide the staff with a valid cell phone number so that he or she can receive automated updates        Directions to Cardiology Waiting Room  If you park in the Parking Garage:  Take  elevators to the 2nd floor  Walk up ramp and turn right by Gold Elevators  Take elevator to the 3rd floor  Upon exiting the elevator, turn away from the clinic areas  Walk long donald around to front of hospital to area with windows overlooking [facility]  Check in at Reception Desk  OR  If family is dropping you off:  Have them drop you off at the front of the Hospital  (Near the ER, where all the flags are hung).  Take the 'E' elevators to the 3rd floor.  Check in at the Reception Desk in the waiting room.      Post Ablation Instructions:     No pushing, pulling, or lifting greater than 5 pounds for ONE week.   No long car rides (greater than 1 hour) for ONE week.  If a long car ride is required, we ask that you stop every hour and walk around for a few minutes.    No driving for 24 hours after procedure due to anesthesia.   Do not soak access site (groin) in water for ONE week (this includes baths, pools, and hot tubs).    Your groin site(s) may be tender and have some bruising.  This is normal.  A small lump less than the size of a quarter or a marble is normal and can last a couple of weeks.   If you notice any swelling, bleeding, or increase in the size of this lump please call us at [phone].    It is OK to go up and down home stairs as needed after ablation procedure.       Any need to reschedule or cancel procedures, or any questions regarding your procedures should be addressed directly with the Arrhythmia Department Nurses at the following phone number: [phone].

## 2020-11-03 NOTE — TELEPHONE ENCOUNTER
Returned call to pt and PVI scheduled for 11/23/2020        ----- Message from Gordo Castellanos MA sent at 11/3/2020  3:10 PM CST -----  Regarding: FW: wants to speak with you  Contact: Patient    ----- Message -----  From: Meghan Morrison  Sent: 11/3/2020   2:19 PM CST  To: Dawn RODRIGUEZ Staff  Subject: wants to speak with you                          The pt is calling to speak with Alize. Please call her back @ [phone]. Thanks, Meghan

## 2020-11-06 ENCOUNTER — PATIENT MESSAGE (OUTPATIENT)
Dept: ELECTROPHYSIOLOGY | Facility: CLINIC | Age: 70
End: 2020-11-06

## 2020-11-19 ENCOUNTER — HOSPITAL ENCOUNTER (OUTPATIENT)
Dept: RADIOLOGY | Facility: HOSPITAL | Age: 70
Discharge: HOME | End: 2020-11-19
Attending: INTERNAL MEDICINE
Payer: MEDICARE

## 2020-11-19 DIAGNOSIS — I48.92 ATRIAL FLUTTER, UNSPECIFIED TYPE: ICD-10-CM

## 2020-11-19 DIAGNOSIS — I48.19 PERSISTENT ATRIAL FIBRILLATION: ICD-10-CM

## 2020-11-19 DIAGNOSIS — I48.0 PAROXYSMAL ATRIAL FIBRILLATION: Primary | ICD-10-CM

## 2020-11-19 LAB
CREAT SERPL-MCNC: 0.7 MG/DL (ref 0.5–1.4)
SAMPLE: NORMAL

## 2020-11-19 PROCEDURE — 25500020 PHARM REV CODE 255: Mod: HCNC | Performed by: INTERNAL MEDICINE

## 2020-11-19 PROCEDURE — 71275 CTA CHEST NON CORONARY: ICD-10-PCS | Mod: 26,HCNC,, | Performed by: RADIOLOGY

## 2020-11-19 PROCEDURE — 71275 CT ANGIOGRAPHY CHEST: CPT | Mod: TC,HCNC

## 2020-11-19 PROCEDURE — 71275 CT ANGIOGRAPHY CHEST: CPT | Mod: 26,HCNC,, | Performed by: RADIOLOGY

## 2020-11-19 RX ADMIN — IOHEXOL 100 ML: 350 INJECTION, SOLUTION INTRAVENOUS at 11:11

## 2020-11-20 ENCOUNTER — TELEPHONE (OUTPATIENT)
Dept: ELECTROPHYSIOLOGY | Facility: CLINIC | Age: 70
End: 2020-11-20

## 2020-11-20 ENCOUNTER — PATIENT MESSAGE (OUTPATIENT)
Dept: MEDSURG UNIT | Facility: HOSPITAL | Age: 70
End: 2020-11-20

## 2020-11-20 NOTE — TELEPHONE ENCOUNTER
Returned call to pt. Advised that the second CTA will be done on Monday at 3:45 pm at the hospital. The KHANH will remain on the morning of the procedure. Pt voiced understanding.        ----- Message from Elayne He sent at 11/20/2020 10:22 AM CST -----  Contact: pt called  Pt has a KHANH scheduled on Tuesday but was told by Dr. Seymour to have it completed on Monday.  Please call pt @ [phone]. Thank you.

## 2020-11-21 ENCOUNTER — LAB VISIT (OUTPATIENT)
Dept: SPORTS MEDICINE | Facility: CLINIC | Age: 70
End: 2020-11-21
Payer: MEDICARE

## 2020-11-21 DIAGNOSIS — Z01.818 PRE-OP TESTING: ICD-10-CM

## 2020-11-21 PROCEDURE — U0003 INFECTIOUS AGENT DETECTION BY NUCLEIC ACID (DNA OR RNA); SEVERE ACUTE RESPIRATORY SYNDROME CORONAVIRUS 2 (SARS-COV-2) (CORONAVIRUS DISEASE [COVID-19]), AMPLIFIED PROBE TECHNIQUE, MAKING USE OF HIGH THROUGHPUT TECHNOLOGIES AS DESCRIBED BY CMS-2020-01-R: HCPCS | Mod: HCNC

## 2020-11-22 LAB — SARS-COV-2 RNA RESP QL NAA+PROBE: NOT DETECTED

## 2020-11-23 ENCOUNTER — HOSPITAL ENCOUNTER (OUTPATIENT)
Dept: RADIOLOGY | Facility: HOSPITAL | Age: 70
Discharge: HOME | End: 2020-11-23
Attending: INTERNAL MEDICINE
Payer: MEDICARE

## 2020-11-23 ENCOUNTER — TELEPHONE (OUTPATIENT)
Dept: ELECTROPHYSIOLOGY | Facility: CLINIC | Age: 70
End: 2020-11-23

## 2020-11-23 DIAGNOSIS — I48.0 PAROXYSMAL ATRIAL FIBRILLATION: ICD-10-CM

## 2020-11-23 PROCEDURE — 75574 CTA CARDIAC: ICD-10-PCS | Mod: 26,HCNC,, | Performed by: RADIOLOGY

## 2020-11-23 PROCEDURE — 75574 CT ANGIO HRT W/3D IMAGE: CPT | Mod: TC,HCNC

## 2020-11-23 PROCEDURE — 25500020 PHARM REV CODE 255: Mod: HCNC | Performed by: INTERNAL MEDICINE

## 2020-11-23 PROCEDURE — 75574 CT ANGIO HRT W/3D IMAGE: CPT | Mod: 26,HCNC,, | Performed by: RADIOLOGY

## 2020-11-23 RX ADMIN — IOHEXOL 100 ML: 350 INJECTION, SOLUTION INTRAVENOUS at 04:11

## 2020-11-23 NOTE — TELEPHONE ENCOUNTER
Attempted to contact patient to confirm procedure details. No answer. Left detailed voicemail including: arrival time, NPO after midnight and medication instructions, along with callback number.     Labs done, no alerts    Covid negative    Patient having CTA done today at 3:45PM

## 2020-11-24 ENCOUNTER — ANESTHESIA (OUTPATIENT)
Dept: MEDSURG UNIT | Facility: HOSPITAL | Age: 70
End: 2020-11-24
Payer: MEDICARE

## 2020-11-24 ENCOUNTER — HOSPITAL ENCOUNTER (OUTPATIENT)
Dept: CARDIOLOGY | Facility: HOSPITAL | Age: 70
Discharge: HOME | End: 2020-11-24
Attending: INTERNAL MEDICINE | Admitting: INTERNAL MEDICINE
Payer: MEDICARE

## 2020-11-24 ENCOUNTER — HOSPITAL ENCOUNTER (OUTPATIENT)
Facility: HOSPITAL | Age: 70
Discharge: HOME | End: 2020-11-24
Attending: INTERNAL MEDICINE | Admitting: INTERNAL MEDICINE
Payer: MEDICARE

## 2020-11-24 ENCOUNTER — ANESTHESIA EVENT (OUTPATIENT)
Dept: MEDSURG UNIT | Facility: HOSPITAL | Age: 70
End: 2020-11-24
Payer: MEDICARE

## 2020-11-24 VITALS
WEIGHT: 165 LBS | BODY MASS INDEX: 25.01 KG/M2 | SYSTOLIC BLOOD PRESSURE: 109 MMHG | HEIGHT: 68 IN | DIASTOLIC BLOOD PRESSURE: 66 MMHG

## 2020-11-24 VITALS
HEART RATE: 99 BPM | TEMPERATURE: 98 F | WEIGHT: 165 LBS | BODY MASS INDEX: 25.01 KG/M2 | SYSTOLIC BLOOD PRESSURE: 131 MMHG | DIASTOLIC BLOOD PRESSURE: 61 MMHG | OXYGEN SATURATION: 99 % | RESPIRATION RATE: 20 BRPM | HEIGHT: 68 IN

## 2020-11-24 DIAGNOSIS — I48.3 TYPICAL ATRIAL FLUTTER: ICD-10-CM

## 2020-11-24 DIAGNOSIS — I49.9 ARRHYTHMIA: ICD-10-CM

## 2020-11-24 DIAGNOSIS — I48.92 ATRIAL FLUTTER, UNSPECIFIED TYPE: ICD-10-CM

## 2020-11-24 DIAGNOSIS — I48.11 LONGSTANDING PERSISTENT ATRIAL FIBRILLATION: ICD-10-CM

## 2020-11-24 DIAGNOSIS — I48.19 PERSISTENT ATRIAL FIBRILLATION: ICD-10-CM

## 2020-11-24 LAB
ABO + RH BLD: NORMAL
BLD GP AB SCN CELLS X3 SERPL QL: NORMAL
BSA FOR ECHO PROCEDURE: 1.89 M2
SINUS: 3.1 CM
STJ: 2.7 CM

## 2020-11-24 PROCEDURE — D9220A PRA ANESTHESIA: ICD-10-PCS | Mod: HCNC,CRNA,, | Performed by: NURSE ANESTHETIST, CERTIFIED REGISTERED

## 2020-11-24 PROCEDURE — 93655 PR ABLATE ARRHYTHMIA ADD ON: ICD-10-PCS | Mod: HCNC,,, | Performed by: INTERNAL MEDICINE

## 2020-11-24 PROCEDURE — 25000003 PHARM REV CODE 250: Mod: HCNC | Performed by: NURSE ANESTHETIST, CERTIFIED REGISTERED

## 2020-11-24 PROCEDURE — 99220 PR INITIAL OBSERVATION CARE,LEVL III: CPT | Mod: HCNC,,, | Performed by: INTERNAL MEDICINE

## 2020-11-24 PROCEDURE — C1766 INTRO/SHEATH,STRBLE,NON-PEEL: HCPCS | Mod: HCNC | Performed by: INTERNAL MEDICINE

## 2020-11-24 PROCEDURE — 93010 ELECTROCARDIOGRAM REPORT: CPT | Mod: HCNC,,, | Performed by: INTERNAL MEDICINE

## 2020-11-24 PROCEDURE — 93656 COMPRE EP EVAL ABLTJ ATR FIB: CPT | Mod: HCNC | Performed by: INTERNAL MEDICINE

## 2020-11-24 PROCEDURE — 86901 BLOOD TYPING SEROLOGIC RH(D): CPT | Mod: HCNC

## 2020-11-24 PROCEDURE — 27201037 HC PRESSURE MONITORING SET UP: Mod: HCNC

## 2020-11-24 PROCEDURE — 37000008 HC ANESTHESIA 1ST 15 MINUTES: Mod: HCNC | Performed by: INTERNAL MEDICINE

## 2020-11-24 PROCEDURE — 63600175 PHARM REV CODE 636 W HCPCS: Mod: HCNC | Performed by: NURSE ANESTHETIST, CERTIFIED REGISTERED

## 2020-11-24 PROCEDURE — 93662 INTRACARDIAC ECG (ICE): CPT | Mod: 26,HCNC,, | Performed by: INTERNAL MEDICINE

## 2020-11-24 PROCEDURE — C1753 CATH, INTRAVAS ULTRASOUND: HCPCS | Mod: HCNC | Performed by: INTERNAL MEDICINE

## 2020-11-24 PROCEDURE — 93325 TRANSESOPHAGEAL ECHO (TEE) (CUPID ONLY): ICD-10-PCS | Mod: 26,HCNC,, | Performed by: INTERNAL MEDICINE

## 2020-11-24 PROCEDURE — 93005 ELECTROCARDIOGRAM TRACING: CPT | Mod: HCNC

## 2020-11-24 PROCEDURE — D9220A PRA ANESTHESIA: Mod: HCNC,CRNA,, | Performed by: NURSE ANESTHETIST, CERTIFIED REGISTERED

## 2020-11-24 PROCEDURE — 93613 INTRACARDIAC EPHYS 3D MAPG: CPT | Mod: HCNC,,, | Performed by: INTERNAL MEDICINE

## 2020-11-24 PROCEDURE — 63600175 PHARM REV CODE 636 W HCPCS: Mod: HCNC | Performed by: INTERNAL MEDICINE

## 2020-11-24 PROCEDURE — C1769 GUIDE WIRE: HCPCS | Mod: HCNC | Performed by: INTERNAL MEDICINE

## 2020-11-24 PROCEDURE — 93656 COMPRE EP EVAL ABLTJ ATR FIB: CPT | Mod: HCNC,,, | Performed by: INTERNAL MEDICINE

## 2020-11-24 PROCEDURE — 93662 PR INTRACARD ECHO, THER/DX INTERVENT: ICD-10-PCS | Mod: 26,HCNC,, | Performed by: INTERNAL MEDICINE

## 2020-11-24 PROCEDURE — C1730 CATH, EP, 19 OR FEW ELECT: HCPCS | Mod: HCNC | Performed by: INTERNAL MEDICINE

## 2020-11-24 PROCEDURE — 93325 DOPPLER ECHO COLOR FLOW MAPG: CPT | Mod: 26,HCNC,, | Performed by: INTERNAL MEDICINE

## 2020-11-24 PROCEDURE — 27201423 OPTIME MED/SURG SUP & DEVICES STERILE SUPPLY: Mod: HCNC | Performed by: INTERNAL MEDICINE

## 2020-11-24 PROCEDURE — D9220A PRA ANESTHESIA: ICD-10-PCS | Mod: HCNC,ANES,, | Performed by: ANESTHESIOLOGY

## 2020-11-24 PROCEDURE — 93325 DOPPLER ECHO COLOR FLOW MAPG: CPT | Mod: HCNC

## 2020-11-24 PROCEDURE — 25000003 PHARM REV CODE 250: Mod: HCNC | Performed by: INTERNAL MEDICINE

## 2020-11-24 PROCEDURE — 93320 TRANSESOPHAGEAL ECHO (TEE) (CUPID ONLY): ICD-10-PCS | Mod: 26,HCNC,, | Performed by: INTERNAL MEDICINE

## 2020-11-24 PROCEDURE — 25000003 PHARM REV CODE 250: Mod: HCNC | Performed by: NURSE PRACTITIONER

## 2020-11-24 PROCEDURE — 93655 ICAR CATH ABLTJ DSCRT ARRHYT: CPT | Mod: HCNC,,, | Performed by: INTERNAL MEDICINE

## 2020-11-24 PROCEDURE — 25500020 PHARM REV CODE 255: Mod: HCNC | Performed by: INTERNAL MEDICINE

## 2020-11-24 PROCEDURE — C1732 CATH, EP, DIAG/ABL, 3D/VECT: HCPCS | Mod: HCNC | Performed by: INTERNAL MEDICINE

## 2020-11-24 PROCEDURE — 93312 ECHO TRANSESOPHAGEAL: CPT | Mod: 26,HCNC,, | Performed by: INTERNAL MEDICINE

## 2020-11-24 PROCEDURE — 93662 INTRACARDIAC ECG (ICE): CPT | Mod: HCNC | Performed by: INTERNAL MEDICINE

## 2020-11-24 PROCEDURE — 93613 INTRACARDIAC EPHYS 3D MAPG: CPT | Mod: HCNC | Performed by: INTERNAL MEDICINE

## 2020-11-24 PROCEDURE — 37000009 HC ANESTHESIA EA ADD 15 MINS: Mod: HCNC | Performed by: INTERNAL MEDICINE

## 2020-11-24 PROCEDURE — 93010 EKG 12-LEAD: ICD-10-PCS | Mod: 76,HCNC,, | Performed by: INTERNAL MEDICINE

## 2020-11-24 PROCEDURE — D9220A PRA ANESTHESIA: Mod: HCNC,ANES,, | Performed by: ANESTHESIOLOGY

## 2020-11-24 PROCEDURE — 93312 TRANSESOPHAGEAL ECHO (TEE) (CUPID ONLY): ICD-10-PCS | Mod: 26,HCNC,, | Performed by: INTERNAL MEDICINE

## 2020-11-24 PROCEDURE — 93320 DOPPLER ECHO COMPLETE: CPT | Mod: 26,HCNC,, | Performed by: INTERNAL MEDICINE

## 2020-11-24 PROCEDURE — C1731 CATH, EP, 20 OR MORE ELEC: HCPCS | Mod: HCNC | Performed by: INTERNAL MEDICINE

## 2020-11-24 PROCEDURE — C1894 INTRO/SHEATH, NON-LASER: HCPCS | Mod: HCNC | Performed by: INTERNAL MEDICINE

## 2020-11-24 PROCEDURE — 63600175 PHARM REV CODE 636 W HCPCS: Mod: HCNC | Performed by: ANESTHESIOLOGY

## 2020-11-24 PROCEDURE — 99220 PR INITIAL OBSERVATION CARE,LEVL III: ICD-10-PCS | Mod: HCNC,,, | Performed by: INTERNAL MEDICINE

## 2020-11-24 PROCEDURE — 93655 ICAR CATH ABLTJ DSCRT ARRHYT: CPT | Mod: HCNC | Performed by: INTERNAL MEDICINE

## 2020-11-24 PROCEDURE — 93656 PR ELECTROPHYS EVAL, COMPREHEN, W/ABLATION OF ATRIAL FIBR: ICD-10-PCS | Mod: HCNC,,, | Performed by: INTERNAL MEDICINE

## 2020-11-24 PROCEDURE — 93613 PR INTRACARD ELECTROPHYS 3-DIMENS MAPPING: ICD-10-PCS | Mod: HCNC,,, | Performed by: INTERNAL MEDICINE

## 2020-11-24 RX ORDER — PANTOPRAZOLE SODIUM 40 MG/1
40 TABLET, DELAYED RELEASE ORAL DAILY
Qty: 30 TABLET | Refills: 0 | Status: SHIPPED | OUTPATIENT
Start: 2020-11-24 | End: 2021-02-17

## 2020-11-24 RX ORDER — HALOPERIDOL 5 MG/ML
0.5 INJECTION INTRAMUSCULAR EVERY 10 MIN PRN
Status: DISCONTINUED | OUTPATIENT
Start: 2020-11-24 | End: 2020-11-24 | Stop reason: HOSPADM

## 2020-11-24 RX ORDER — AMIODARONE HYDROCHLORIDE 200 MG/1
400 TABLET ORAL 2 TIMES DAILY
Qty: 56 TABLET | Refills: 0 | Status: SHIPPED | OUTPATIENT
Start: 2020-11-24 | End: 2020-12-08

## 2020-11-24 RX ORDER — HEPARIN SODIUM 10000 [USP'U]/100ML
INJECTION, SOLUTION INTRAVENOUS CONTINUOUS PRN
Status: DISCONTINUED | OUTPATIENT
Start: 2020-11-24 | End: 2020-11-24

## 2020-11-24 RX ORDER — PROTAMINE SULFATE 10 MG/ML
INJECTION, SOLUTION INTRAVENOUS
Status: DISCONTINUED | OUTPATIENT
Start: 2020-11-24 | End: 2020-11-24

## 2020-11-24 RX ORDER — DEXAMETHASONE SODIUM PHOSPHATE 4 MG/ML
INJECTION, SOLUTION INTRA-ARTICULAR; INTRALESIONAL; INTRAMUSCULAR; INTRAVENOUS; SOFT TISSUE
Status: DISCONTINUED | OUTPATIENT
Start: 2020-11-24 | End: 2020-11-24

## 2020-11-24 RX ORDER — LIDOCAINE HYDROCHLORIDE 20 MG/ML
INJECTION, SOLUTION INFILTRATION; PERINEURAL
Status: DISCONTINUED | OUTPATIENT
Start: 2020-11-24 | End: 2020-11-24 | Stop reason: HOSPADM

## 2020-11-24 RX ORDER — AMIODARONE HYDROCHLORIDE 200 MG/1
200 TABLET ORAL DAILY
Qty: 30 TABLET | Refills: 11 | Status: SHIPPED | OUTPATIENT
Start: 2020-12-09 | End: 2021-02-24

## 2020-11-24 RX ORDER — ROCURONIUM BROMIDE 10 MG/ML
INJECTION, SOLUTION INTRAVENOUS
Status: DISCONTINUED | OUTPATIENT
Start: 2020-11-24 | End: 2020-11-24

## 2020-11-24 RX ORDER — PROPOFOL 10 MG/ML
VIAL (ML) INTRAVENOUS
Status: DISCONTINUED | OUTPATIENT
Start: 2020-11-24 | End: 2020-11-24

## 2020-11-24 RX ORDER — CEFAZOLIN SODIUM 1 G/3ML
INJECTION, POWDER, FOR SOLUTION INTRAMUSCULAR; INTRAVENOUS
Status: DISCONTINUED | OUTPATIENT
Start: 2020-11-24 | End: 2020-11-24

## 2020-11-24 RX ORDER — MIDAZOLAM HYDROCHLORIDE 1 MG/ML
INJECTION, SOLUTION INTRAMUSCULAR; INTRAVENOUS
Status: DISCONTINUED | OUTPATIENT
Start: 2020-11-24 | End: 2020-11-24

## 2020-11-24 RX ORDER — LIDOCAINE HYDROCHLORIDE 20 MG/ML
INJECTION INTRAVENOUS
Status: DISCONTINUED | OUTPATIENT
Start: 2020-11-24 | End: 2020-11-24

## 2020-11-24 RX ORDER — FENTANYL CITRATE 50 UG/ML
25 INJECTION, SOLUTION INTRAMUSCULAR; INTRAVENOUS EVERY 5 MIN PRN
Status: DISCONTINUED | OUTPATIENT
Start: 2020-11-24 | End: 2020-11-24 | Stop reason: HOSPADM

## 2020-11-24 RX ORDER — ACETAMINOPHEN 325 MG/1
650 TABLET ORAL EVERY 4 HOURS PRN
Status: DISCONTINUED | OUTPATIENT
Start: 2020-11-24 | End: 2020-11-24 | Stop reason: HOSPADM

## 2020-11-24 RX ORDER — PHENYLEPHRINE HYDROCHLORIDE 10 MG/ML
INJECTION INTRAVENOUS
Status: DISCONTINUED | OUTPATIENT
Start: 2020-11-24 | End: 2020-11-24

## 2020-11-24 RX ORDER — SODIUM CHLORIDE 0.9 % (FLUSH) 0.9 %
10 SYRINGE (ML) INJECTION
Status: DISCONTINUED | OUTPATIENT
Start: 2020-11-24 | End: 2020-11-24 | Stop reason: HOSPADM

## 2020-11-24 RX ORDER — ONDANSETRON 2 MG/ML
INJECTION INTRAMUSCULAR; INTRAVENOUS
Status: DISCONTINUED | OUTPATIENT
Start: 2020-11-24 | End: 2020-11-24

## 2020-11-24 RX ORDER — HEPARIN SODIUM 200 [USP'U]/100ML
INJECTION, SOLUTION INTRAVENOUS
Status: DISCONTINUED | OUTPATIENT
Start: 2020-11-24 | End: 2020-11-24 | Stop reason: HOSPADM

## 2020-11-24 RX ORDER — FUROSEMIDE 10 MG/ML
INJECTION INTRAMUSCULAR; INTRAVENOUS
Status: DISCONTINUED | OUTPATIENT
Start: 2020-11-24 | End: 2020-11-24

## 2020-11-24 RX ORDER — HEPARIN SODIUM 1000 [USP'U]/ML
INJECTION, SOLUTION INTRAVENOUS; SUBCUTANEOUS
Status: DISCONTINUED | OUTPATIENT
Start: 2020-11-24 | End: 2020-11-24

## 2020-11-24 RX ORDER — IODIXANOL 320 MG/ML
INJECTION, SOLUTION INTRAVASCULAR
Status: DISCONTINUED | OUTPATIENT
Start: 2020-11-24 | End: 2020-11-24 | Stop reason: HOSPADM

## 2020-11-24 RX ORDER — FENTANYL CITRATE 50 UG/ML
INJECTION, SOLUTION INTRAMUSCULAR; INTRAVENOUS
Status: DISCONTINUED | OUTPATIENT
Start: 2020-11-24 | End: 2020-11-24

## 2020-11-24 RX ADMIN — HEPARIN SODIUM 5000 UNITS: 1000 INJECTION, SOLUTION INTRAVENOUS; SUBCUTANEOUS at 10:11

## 2020-11-24 RX ADMIN — DEXAMETHASONE SODIUM PHOSPHATE 4 MG: 4 INJECTION, SOLUTION INTRAMUSCULAR; INTRAVENOUS at 08:11

## 2020-11-24 RX ADMIN — SODIUM CHLORIDE: 0.9 INJECTION, SOLUTION INTRAVENOUS at 07:11

## 2020-11-24 RX ADMIN — CEFAZOLIN 2 G: 330 INJECTION, POWDER, FOR SOLUTION INTRAMUSCULAR; INTRAVENOUS at 08:11

## 2020-11-24 RX ADMIN — HEPARIN SODIUM 12000 UNITS: 1000 INJECTION, SOLUTION INTRAVENOUS; SUBCUTANEOUS at 10:11

## 2020-11-24 RX ADMIN — PROPOFOL 50 MG: 10 INJECTION, EMULSION INTRAVENOUS at 09:11

## 2020-11-24 RX ADMIN — FUROSEMIDE 20 MG: 10 INJECTION, SOLUTION INTRAMUSCULAR; INTRAVENOUS at 11:11

## 2020-11-24 RX ADMIN — FENTANYL CITRATE 25 MCG: 50 INJECTION INTRAMUSCULAR; INTRAVENOUS at 09:11

## 2020-11-24 RX ADMIN — FENTANYL CITRATE 25 MCG: 50 INJECTION INTRAMUSCULAR; INTRAVENOUS at 12:11

## 2020-11-24 RX ADMIN — FENTANYL CITRATE 50 MCG: 50 INJECTION INTRAMUSCULAR; INTRAVENOUS at 07:11

## 2020-11-24 RX ADMIN — LIDOCAINE HYDROCHLORIDE 100 MG: 20 INJECTION, SOLUTION INTRAVENOUS at 07:11

## 2020-11-24 RX ADMIN — MIDAZOLAM 1 MG: 1 INJECTION INTRAMUSCULAR; INTRAVENOUS at 07:11

## 2020-11-24 RX ADMIN — PROTAMINE SULFATE 75 MG: 10 INJECTION, SOLUTION INTRAVENOUS at 11:11

## 2020-11-24 RX ADMIN — PHENYLEPHRINE HYDROCHLORIDE 100 MCG: 10 INJECTION, SOLUTION INTRAMUSCULAR; INTRAVENOUS; SUBCUTANEOUS at 09:11

## 2020-11-24 RX ADMIN — SODIUM CHLORIDE: 0.9 INJECTION, SOLUTION INTRAVENOUS at 10:11

## 2020-11-24 RX ADMIN — PROPOFOL 160 MG: 10 INJECTION, EMULSION INTRAVENOUS at 07:11

## 2020-11-24 RX ADMIN — HEPARIN SODIUM AND DEXTROSE 12 UNITS/KG/HR: 10000; 5 INJECTION INTRAVENOUS at 10:11

## 2020-11-24 RX ADMIN — ONDANSETRON 4 MG: 2 INJECTION, SOLUTION INTRAMUSCULAR; INTRAVENOUS at 11:11

## 2020-11-24 RX ADMIN — ACETAMINOPHEN 650 MG: 325 TABLET ORAL at 05:11

## 2020-11-24 RX ADMIN — ROCURONIUM BROMIDE 50 MG: 10 INJECTION, SOLUTION INTRAVENOUS at 07:11

## 2020-11-24 NOTE — CONSULTS
[facility] Short Stay Cardiac Unit  Cardiology  Consult Note    Patient Name: Zoë Enrique  MRN: [id number]  Admission Date: 11/24/2020  Hospital Length of Stay: 0 days  Code Status: Prior   Attending Provider: Moe Seymour MD   Consulting Provider: Zoila Tran MD  Primary Care Physician: Praveen Jeffery MD  Principal Problem:<principal problem not specified>    Patient information was obtained from patient and ER records.     Consults  Subjective:       HPI:   Zoë Enrique presents to short stay cardiac unit on 11/23/2020 for planned redo-PVI and AFL (typical/atypical) RFA with Dr. Seymour. Ordered KHANH prior to PVI/RFA.     Ms. Enrique is a 70 y.o. female with atrial fibrillation, atypical/typical atrial flutter, hypothyroidism, and   femur osteosarcoma s/p resection.  Arrhythmia history is complex, undergoing PVI ablations in 2003, 2004, and 2013.  Later required cardioversion in 08/2018 and 01/2019.  Following cardioversion in 2019, she was placed on tikosyn with symptom improvement.  However, with time, she reports having more frequent episodes of AF associated with fatigue.  Most recent Echo from 10/2020 demonstrated preserved EF 55%.     Most recently, Ms. Enrique was seen in arrhythmia clinic by Dr. Seymour.  At time of visit they discussed treatment options for management of AF/AFL.  Options discussed included increasing tikosyn, switching to amiodarone, or PVI.  Ms. Enrique elected to proceed with PVI.        Today, she reports feeling well, although she continues to have episodes of profound fatigue. She denies chest pain, overt shortness of breath, syncope, or other acute symptoms.  She continues to be treated with tikosyn 250 mg bid for rhythm control (last dose on 11/20/20) and xarelto to stroke prophylaxis.     Review of recent labs demonstrate normal renal function with Cr 0.8 and stable Hgb/Hct.     My independent visualization of most recent EKG is NSR    Dysphagia or  odynophagia:  No  Liver Disease, esophageal disease, or known varices:  No  Upper GI Bleeding: No  Snoring:  Yes  Sleep Apnea:  No  Prior neck surgery or radiation:  No  History of anesthetic difficulties:  No  Family history of anesthetic difficulties:  No  Last oral intake:  12 hours ago  Able to move neck in all directions:  Yes           Past Medical History:   Diagnosis Date    Abnormal Pap smear of cervix     Atrial fibrillation 2017    Atrial flutter     Cancer     osteosarcoma of right femur    Diverticulosis     Hx of atrial flutter     Hx of mitral valve prolapse     Migraines        Past Surgical History:   Procedure Laterality Date    AUGMENTATION OF BREAST Bilateral 1984    35 yrs     BREAST SURGERY       SECTION      COLON SURGERY      COLPOSCOPY      COSMETIC SURGERY  1987    EYE SURGERY  2016    JOINT REPLACEMENT  , ,     KNEE ARTHROPLASTY Right 2007    revison in     KNEE SURGERY Right     distal femoral replacing TK    OOPHORECTOMY      TOTAL ABDOMINAL HYSTERECTOMY      TAHBSO    TREATMENT OF CARDIAC ARRHYTHMIA N/A 2019    Procedure: CARDIOVERSION;  Surgeon: Moe Seymour MD;  Location: [facility] EP LAB;  Service: Cardiology;  Laterality: N/A;  KHANH/DCCV/MAC/DM/3PREP/*ADMIT FOR TIKOSYN*       Review of patient's allergies indicates:   Allergen Reactions    Morphine Nausea And Vomiting    Succinylcholine      Other reaction(s): v tach       Current Facility-Administered Medications on File Prior to Encounter   Medication    sodium chloride 0.9% flush 5 mL     Current Outpatient Medications on File Prior to Encounter   Medication Sig    CALCIUM CARB/VIT D3/MINERALS (CALTRATE PLUS ORAL) Take by mouth. Takes two chews daily    docusate sodium (COLACE) 250 MG capsule Take 250 mg by mouth 2 (two) times daily.     levothyroxine (SYNTHROID) 25 MCG tablet TAKE 1 TABLET EVERY DAY    melatonin 5 mg Cap Take 5 mg by mouth  as needed.     rivaroxaban (XARELTO) 20 mg Tab Take 1 tablet (20 mg total) by mouth once daily.    vit C/E/Zn/coppr/lutein/zeaxan (PRESERVISION AREDS-2 ORAL)     acyclovir (ZOVIRAX) 400 MG tablet Take 1 tablet (400 mg total) by mouth 3 (three) times daily as needed.    diclofenac sodium (VOLTAREN) 1 % Gel Apply 2 g topically once daily. (Patient taking differently: Apply 2 g topically once daily. As needed)    dofetilide (TIKOSYN) 250 MCG Cap Take 1 capsule (250 mcg total) by mouth every 12 (twelve) hours.    estradioL (ESTRACE) 0.01 % (0.1 mg/gram) vaginal cream Place 0.5 g vaginally twice a week. Insert nightly x 2 weeks then twice weekly    fluorouraciL (EFUDEX) 5 % cream Apply thin layer to spot on right cheek with qtip x 4-6 weeks.    HYDROcodone-acetaminophen (NORCO) 5-325 mg per tablet Take 1 tablet by mouth every 6 (six) hours as needed for Pain. Pt states takes PRN for headaches    testosterone (TESTIM) 50 mg/5 gram (1 %) Gel Apply 0.25 g topically once daily.    UNABLE TO FIND Diclofenac 0.15%, Lidocaine 2.25%, Prilocaine 2.25% in Compounded Topical Cream to be applied up to 5x/day as needed for headaches.     Family History     Problem Relation (Age of Onset)    Diabetes Sister, Mother    Hearing loss Father    Heart disease Sister, Father, Mother, Brother, Brother    Stroke Father (93)        Tobacco Use    Smoking status: Never Smoker    Smokeless tobacco: Never Used   Substance and Sexual Activity    Alcohol use: Yes     Alcohol/week: 2.0 standard drinks     Types: 1 Glasses of wine, 1 Standard drinks or equivalent per week     Frequency: 2-4 times a month     Drinks per session: 1 or 2     Binge frequency: Never     Comment: socially    Drug use: No    Sexual activity: Not Currently     Partners: Male     Birth control/protection: None     Comment: , together x 34 years     Review of Systems   All other systems reviewed and are negative.    Objective:     Vital Signs (Most  Recent):  Temp: 97.5 °F (36.4 °C) (11/24/20 0600)  Pulse: 83 (11/24/20 0600)  Resp: 20 (11/24/20 0600)  BP: 109/66 (11/24/20 0606)  SpO2: 95 % (11/24/20 0600) Vital Signs (24h Range):  Temp:  [97.5 °F (36.4 °C)] 97.5 °F (36.4 °C)  Pulse:  [83] 83  Resp:  [20] 20  SpO2:  [95 %] 95 %  BP: (109-122)/(66-68) 109/66     Weight: 74.8 kg (165 lb)  Body mass index is 25.09 kg/m².    SpO2: 95 %  O2 Device (Oxygen Therapy): room air    No intake or output data in the 24 hours ending 11/24/20 0654    Lines/Drains/Airways     Peripheral Intravenous Line                 Peripheral IV - Single Lumen 11/24/20 0617 18 G Left Antecubital less than 1 day         Peripheral IV - Single Lumen 11/24/20 0618 20 G Right Forearm less than 1 day                Physical Exam   Constitutional: She is oriented to person, place, and time. She appears well-developed.   HENT:   Head: Normocephalic.   Eyes: Conjunctivae are normal.   Neck: Neck supple.   Cardiovascular: Normal rate and regular rhythm.   Pulmonary/Chest: Effort normal.   Abdominal: Soft.   Musculoskeletal:         General: No edema.   Neurological: She is alert and oriented to person, place, and time.   Skin: Skin is warm and dry.   Psychiatric: She has a normal mood and affect.       Significant Labs: All pertinent lab results from the last 24 hours have been reviewed.    Significant Imaging: Echocardiogram:   2D echo with color flow doppler: No results found for this or any previous visit. and Transthoracic echo (TTE) complete (Cupid Only):   Results for orders placed or performed during the hospital encounter of 10/14/20   Echo Color Flow Doppler? Yes   Result Value Ref Range    BSA 1.9 m2    TDI SEPTAL 0.14 m/s    LV LATERAL E/E' RATIO 11.67 m/s    LV SEPTAL E/E' RATIO 10.00 m/s    LA WIDTH 4.85 cm    TDI LATERAL 0.12 m/s    LVIDd 4.56 3.5 - 6.0 cm    IVS 0.72 0.6 - 1.1 cm    Posterior Wall 0.66 0.6 - 1.1 cm    LVIDs 3.23 2.1 - 4.0 cm    FS 29 28 - 44 %    LA volume 80.81 cm3     Sinus 2.65 cm    STJ 2.83 cm    Ascending aorta 2.88 cm    LV mass 96.09 g    LA size 3.96 cm    RVDD 2.99 cm    TAPSE 1.64 cm    RV S' 13.55 cm/s    Left Ventricle Relative Wall Thickness 0.29 cm    AV mean gradient 3 mmHg    AV valve area 2.97 cm2    AV Velocity Ratio 0.73     AV index (prosthetic) 0.75     Mean e' 0.13 m/s    IVRT 77.07 msec    LVOT diameter 2.25 cm    LVOT area 4.0 cm2    LVOT peak tera 0.83 m/s    LVOT peak VTI 16.27 cm    Ao peak tera 1.13 m/s    Ao VTI 21.76 cm    LVOT stroke volume 64.66 cm3    AV peak gradient 5 mmHg    E/E' ratio 10.77 m/s    MV Peak E Tera 1.40 m/s    TR Max Tera 2.70 m/s    LV Systolic Volume 42.00 mL    LV Systolic Volume Index 22.2 mL/m2    LV Diastolic Volume 95.58 mL    LV Diastolic Volume Index 50.62 mL/m2    LA Volume Index 42.8 mL/m2    LV Mass Index 51 g/m2    RA Major Axis 5.57 cm    Left Atrium Minor Axis 4.96 cm    Left Atrium Major Axis 4.94 cm    Triscuspid Valve Regurgitation Peak Gradient 29 mmHg    RA Width 3.45 cm    Right Atrial Pressure (from IVC) 3 mmHg    TV rest pulmonary artery pressure 32 mmHg    Narrative    · Moderate left atrial enlargement.  · The left ventricle is normal in size with normal systolic function. The   estimated ejection fraction is 55%.  · Normal left ventricular diastolic function.  · Mild right atrial enlargement.  · Normal right ventricular systolic function.  · Mild tricuspid regurgitation.  · Normal central venous pressure (3 mmHg).  · The estimated PA systolic pressure is 32 mmHg.        Assessment and Plan:     Longstanding persistent atrial fibrillation  1. KHANH for evaluation of BRITTNI thrombus prior to PVI   -No absolute contraindications of esophageal stricture, tumor, perforation, laceration,or diverticulum and/or active GI bleed  -The risks, benefits & alternatives of the procedure were explained to the patient.   -The risks of transesophageal echo include but are not limited to:  Dental trauma, esophageal  trauma/perforation, bleeding, laryngospasm/brochospasm, aspiration, sore throat/hoarseness, & dislodgement of the endotracheal tube/nasogastric tube (where applicable).    -The risks of moderate sedation include hypotension, respiratory depression, arrhythmias, bronchospasm, & death.    -Informed consent was obtained. The patient is agreeable to proceed with the procedure and all questions and concerns addressed.    Case discussed with an attending in echocardiography lab.     Further recommendations per attending addendum          VTE Risk Mitigation (From admission, onward)    None          Thank you for your consult. I will sign off. Please contact us if you have any additional questions.    Zoila Tran MD  Cardiology   [facility] Stay Cardiac [facility]

## 2020-11-24 NOTE — ANESTHESIA PROCEDURE NOTES
Intubation  Performed by: Riaz Preston  Authorized by: VLAD Bocanegra MD     Intubation:     Induction:  Intravenous    Intubated:  Postinduction    Mask Ventilation:  Easy mask    Attempts:  1    Attempted By:  Student    Method of Intubation:  Direct    Blade:  Montes 2    Laryngeal View Grade: Grade IIA - cords partially seen      Difficult Airway Encountered?: No      Complications:  None    Airway Device:  Oral endotracheal tube    Airway Device Size:  7.5    Style/Cuff Inflation:  Cuffed (inflated to minimal occlusive pressure)    Inflation Amount (mL):  6    Tube secured:  22    Secured at:  The lips    Placement Verified By:  Capnometry    Complicating Factors:  Small mouth    Findings Post-Intubation:  BS equal bilateral and atraumatic/condition of teeth unchanged

## 2020-11-24 NOTE — PLAN OF CARE
Received report from FADI Garcia. Patient s/p RFA, AAOx3. VSS, no c/o pain or discomfort at this time, resp even and unlabored. Sutures to B groin is CDI. No active bleeding. No hematoma noted. Post procedure protocol reviewed with patient and patient's family. Understanding verbalized. Family members at bedside. Nurse call bell within reach. Will continue to monitor per post procedure protocol.

## 2020-11-24 NOTE — HOSPITAL COURSE
Ms. Enrique underwent KHANH without evidence of BRITTNI thrombus and preserved EF 65% and is s/p successful ablation of typical atrial flutter and atrial tachycardia.  She tolerated the procedure well without any acute complications. Admitted to PACU and post-procedure ECG sinus rhythm with 1st degree AVB,  ms.  Bilateral groin sites intact and without evidence of hematoma. Sutures removed and she completed bedrest x 6 hours.  Ambulated without difficulty.  Ms. Enrique was monitored on telemetry without any arrhythmias.  Diuresed with Lasix 20 mg IV.   Plan to resume Xarelto for CVA prophylaxis.  Switch from Tikosyn to Amiodarone with oral load and maintenance dose x 3 months.    Discharge instructions discussed with Ms. Enrique and her .  All questions were answered and she was discharged home in stable condition.

## 2020-11-24 NOTE — H&P
[facility] Short Stay Cardiac Unit  Cardiac Electrophysiology  History and Physical     Admission Date: 11/24/2020  Code Status: Prior   Attending Provider: Moe Seymour MD   Principal Problem:<principal problem not specified>    Subjective:     Chief Complaint:  AF     HPI:  Zoë Enrique presents to short stay cardiac unit on 11/23/2020 for planned redo-PVI and AFL (typical/atypical) RFA with Dr. Seymour.     Ms. Enrique is a 70 y.o. female with atrial fibrillation, atypical/typical atrial flutter, hypothyroidism, and   femur osteosarcoma s/p resection.  Arrhythmia history is complex, undergoing PVI ablations in 2003, 2004, and 2013.  Later required cardioversion in 08/2018 and 01/2019.  Following cardioversion in 2019, she was placed on tikosyn with symptom improvement.  However, with time, she reports having more frequent episodes of AF associated with fatigue.  Most recent Echo from 10/2020 demonstrated preserved EF 55%.     Most recently, Ms. Enrique was seen in arrhythmia clinic by Dr. Seymour.  At time of visit they discussed treatment options for management of AF/AFL.  Options discussed included increasing tikosyn, switching to amiodarone, or PVI.  Ms. Enrique elected to proceed with PVI.        Today, she reports feeling well, although she continues to have episodes of profound fatigue. She denies chest pain, overt shortness of breath, syncope, or other acute symptoms.  She continues to be treated with tikosyn 250 mg bid for rhythm control (last dose on 11/20/20) and xarelto to stroke prophylaxis.     Review of recent labs demonstrate normal renal function with Cr 0.8 and stable Hgb/Hct.     EKG:   My independent visualization of most recent EKG is NSR    Past Medical History:   Diagnosis Date    Abnormal Pap smear of cervix     Atrial fibrillation 09/2017    Atrial flutter     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    Diverticulosis 1998    Hx of atrial flutter     Hx of mitral  valve prolapse     Migraines        Past Surgical History:   Procedure Laterality Date    AUGMENTATION OF BREAST Bilateral 1984    35 yrs     BREAST SURGERY  1984     SECTION      COLON SURGERY  2011    COLPOSCOPY      COSMETIC SURGERY  1987    EYE SURGERY  2016    JOINT REPLACEMENT  , ,     KNEE ARTHROPLASTY Right 2007    revison in 2009    KNEE SURGERY Right     distal femoral replacing TK    OOPHORECTOMY      TOTAL ABDOMINAL HYSTERECTOMY      TAHBSO    TREATMENT OF CARDIAC ARRHYTHMIA N/A 2019    Procedure: CARDIOVERSION;  Surgeon: Moe Seymour MD;  Location: [facility];  Service: Cardiology;  Laterality: N/A;  KHANH/DCCV/MAC/DM/3PREP/*ADMIT FOR TIKOSYN*       Review of patient's allergies indicates:   Allergen Reactions    Morphine Nausea And Vomiting    Succinylcholine      Other reaction(s): v tach       Current Facility-Administered Medications on File Prior to Encounter   Medication    sodium chloride 0.9% flush 5 mL     Current Outpatient Medications on File Prior to Encounter   Medication Sig    CALCIUM CARB/VIT D3/MINERALS (CALTRATE PLUS ORAL) Take by mouth. Takes two chews daily    docusate sodium (COLACE) 250 MG capsule Take 250 mg by mouth 2 (two) times daily.     levothyroxine (SYNTHROID) 25 MCG tablet TAKE 1 TABLET EVERY DAY    melatonin 5 mg Cap Take 5 mg by mouth as needed.     rivaroxaban (XARELTO) 20 mg Tab Take 1 tablet (20 mg total) by mouth once daily.    vit C/E/Zn/coppr/lutein/zeaxan (PRESERVISION AREDS-2 ORAL)     acyclovir (ZOVIRAX) 400 MG tablet Take 1 tablet (400 mg total) by mouth 3 (three) times daily as needed.    diclofenac sodium (VOLTAREN) 1 % Gel Apply 2 g topically once daily. (Patient taking differently: Apply 2 g topically once daily. As needed)    dofetilide (TIKOSYN) 250 MCG Cap Take 1 capsule (250 mcg total) by mouth every 12 (twelve) hours.    estradioL (ESTRACE) 0.01 % (0.1 mg/gram) vaginal cream Place  0.5 g vaginally twice a week. Insert nightly x 2 weeks then twice weekly    fluorouraciL (EFUDEX) 5 % cream Apply thin layer to spot on right cheek with qtip x 4-6 weeks.    HYDROcodone-acetaminophen (NORCO) 5-325 mg per tablet Take 1 tablet by mouth every 6 (six) hours as needed for Pain. Pt states takes PRN for headaches    testosterone (TESTIM) 50 mg/5 gram (1 %) Gel Apply 0.25 g topically once daily.    UNABLE TO FIND Diclofenac 0.15%, Lidocaine 2.25%, Prilocaine 2.25% in Compounded Topical Cream to be applied up to 5x/day as needed for headaches.     Family History     Problem Relation (Age of Onset)    Diabetes Sister, Mother    Hearing loss Father    Heart disease Sister, Father, Mother, Brother, Brother    Stroke Father (93)        Tobacco Use    Smoking status: Never Smoker    Smokeless tobacco: Never Used   Substance and Sexual Activity    Alcohol use: Yes     Alcohol/week: 2.0 standard drinks     Types: 1 Glasses of wine, 1 Standard drinks or equivalent per week     Frequency: 2-4 times a month     Drinks per session: 1 or 2     Binge frequency: Never     Comment: socially    Drug use: No    Sexual activity: Not Currently     Partners: Male     Birth control/protection: None     Comment: , together x 34 years     Review of Systems   Constitution: Negative for fever and malaise/fatigue.   HENT: Negative for hearing loss.    Cardiovascular: Negative for chest pain, dyspnea on exertion, irregular heartbeat, leg swelling, palpitations and syncope.   Hematologic/Lymphatic: Negative for bleeding problem.   Gastrointestinal: Negative for heartburn.   Neurological: Negative for light-headedness.   Psychiatric/Behavioral: Negative for altered mental status. The patient is not nervous/anxious.      Objective:     Vital Signs (Most Recent):  Temp: 97.5 °F (36.4 °C) (11/24/20 0600)  Pulse: 83 (11/24/20 0600)  Resp: 20 (11/24/20 0600)  BP: 109/66 (11/24/20 0606)  SpO2: 95 % (11/24/20 0600) Vital Signs  (24h Range):  Temp:  [97.5 °F (36.4 °C)] 97.5 °F (36.4 °C)  Pulse:  [83] 83  Resp:  [20] 20  SpO2:  [95 %] 95 %  BP: (109-122)/(66-68) 109/66       Weight: 74.8 kg (165 lb)  Body mass index is 25.09 kg/m².    SpO2: 95 %  O2 Device (Oxygen Therapy): room air    Physical Exam   Constitutional: She is oriented to person, place, and time. Vital signs are normal. She appears well-developed and well-nourished.   Cardiovascular: Normal rate, regular rhythm, S1 normal, S2 normal, normal heart sounds and intact distal pulses.   Pulmonary/Chest: Effort normal and breath sounds normal.   Musculoskeletal: Normal range of motion.         General: No edema.   Neurological: She is alert and oriented to person, place, and time. She has normal strength.   Skin: Skin is warm, dry and intact.   Vitals reviewed.        Assessment and Plan:     Longstanding persistent atrial fibrillation  - Last dose of Xarelto last eveing, 11/23/20  - Anesthesia for sedation   - Redo-PVI  - CARTO     Informed Consent  -The risks, benefits & alternatives of the procedure were explained to the patient.    -The risks of atrial fibrillation ablation include but are not limited to: Bleeding, infection, AF fistula, cardiac tamponade, heart rhythm abnormalities, allergic reactions, kidney injury, stroke and death.    -Informed consent was obtained & the patient is agreeable to proceed with the procedure.             Candida Issa NP  Cardiac Electrophysiology  [facility]

## 2020-11-24 NOTE — ANESTHESIA POSTPROCEDURE EVALUATION
Anesthesia Post Evaluation    Patient: Zoë Enrique    Procedure(s) Performed: Procedure(s) (LRB):  ABLATION, ARRHYTHMOGENIC FOCUS, FOR ATRIAL FIBRILLATION (N/A)  ABLATION, ATRIAL FLUTTER, TYPICAL (N/A)  ECHOCARDIOGRAM, TRANSESOPHAGEAL (N/A)  Cardioversion    Final Anesthesia Type: general    Patient location during evaluation: Cath Lab  Patient participation: Yes- Able to Participate  Level of consciousness: awake and alert  Post-procedure vital signs: reviewed and stable  Pain management: adequate  Airway patency: patent    PONV status at discharge: No PONV  Anesthetic complications: no      Cardiovascular status: hemodynamically stable and hypertensive  Respiratory status: unassisted, spontaneous ventilation and room air  Hydration status: euvolemic            Vitals Value Taken Time   /66 11/24/20 1216   Temp 36.6 °C (97.9 °F) 11/24/20 1145   Pulse 87 11/24/20 1231   Resp 20 11/24/20 1231   SpO2 96 % 11/24/20 1231   Vitals shown include unvalidated device data.      No case tracking events are documented in the log.      Pain/Kim Score: Pain Rating Prior to Med Admin: 0 (11/24/2020 12:15 PM)  Kim Score: 9 (11/24/2020 12:15 PM)

## 2020-11-24 NOTE — NURSING TRANSFER
Nursing Transfer Note      11/24/2020     Transfer To: ep pacu 3 to sscu 04    Transfer via stretcher    Transfer with cardiac monitoring tele box on confirmed by tele tech    Transported by ronny muse    Medicines sent: silvadene  Chart send with patient: Yes    Notified: spouse    Patient reassessed at: 11/24/2020 1245, next due 1315    Upon arrival to floor: cardiac monitor applied, patient oriented to room, call bell in reach and bed in lowest position

## 2020-11-24 NOTE — ASSESSMENT & PLAN NOTE
- Last dose of Xarelto last eveing, 11/23/20  - Anesthesia for sedation   - Redo-PVI  - CARTO     Informed Consent  -The risks, benefits & alternatives of the procedure were explained to the patient.    -The risks of atrial fibrillation ablation include but are not limited to: Bleeding, infection, AF fistula, cardiac tamponade, heart rhythm abnormalities, allergic reactions, kidney injury, stroke and death.    -Informed consent was obtained & the patient is agreeable to proceed with the procedure.

## 2020-11-24 NOTE — ANESTHESIA PREPROCEDURE EVALUATION
11/24/2020  Zoë Enrique is a 70 y.o., female.    [facility]  Anesthesia Pre-Operative Evaluation       Patient Name: Zoë Enrique  YOB: 1950  MRN: [id number]  CSN: [id number]      Code Status: Prior   Date of Procedure: 11/24/2020  Anesthesia: General Procedure: Procedure(s) (LRB):  ABLATION, ARRHYTHMOGENIC FOCUS, FOR ATRIAL FIBRILLATION (N/A)  ABLATION, ATRIAL FLUTTER, TYPICAL (N/A)  ECHOCARDIOGRAM, TRANSESOPHAGEAL (N/A)  Pre-Operative Diagnosis: Longstanding persistent atrial fibrillation [I48.11]Typical atrial flutter [I48.3]  Proceduralist: Surgeon(s) and Role:  Panel 1:     * Moe Seymour MD - Primary  Panel 2:     * Ramírez Ortez III, MD - Primary     * Zoila Tran MD - Fellow        SUBJECTIVE:   Zoë Enrique is a 70 y.o. female who  has a past medical history of Abnormal Pap smear of cervix, Atrial fibrillation (09/2017), Atrial flutter, Cancer (1989), Diverticulosis (1998), atrial flutter, mitral valve prolapse (1990), and Migraines.   Revised cardiac risk index (RCRI) score is **. Estimated Risk of Major Cardiac Event (MACE) is ** within 30d.    (1 point each: CVA/TIA, CAD, HF, IDDM, Cr > 2, high risk surgery [intraperitoneal, intrathoracic, suprainguinal vascular])           she has a current medication list which includes the following long-term medication(s): acyclovir, diclofenac sodium, dofetilide, estradiol, levothyroxine, and testosterone.   ALLERGIES:     Review of patient's allergies indicates:   Allergen Reactions    Morphine Nausea And Vomiting    Succinylcholine      Other reaction(s): v tach     LDA:      Lines/Drains/Airways     Drain                 Urethral Catheter 11/24/20 0758 less than 1 day          Airway                 Airway - Non-Surgical 11/24/20 0743 Endotracheal Tube less than 1 day          Arterial Line             Arterial Line 20 0744 Right Radial less than 1 day          Peripheral Intravenous Line                 Peripheral IV - Single Lumen 20 0617 18 G Left Antecubital less than 1 day         Peripheral IV - Single Lumen 20 0618 20 G Right Forearm less than 1 day               Anesthesia Evaluation      Airway   Mallampati: II  TM distance: Normal, at least 6 cm  Dental    (+) In tact    Pulmonary    Cardiovascular     Rate: Normal    Neuro/Psych      GI/Hepatic/Renal      Endo/Other    Abdominal                   MEDICATIONS:     Antibiotics (From admission, onward)    None        VTE Risk Mitigation (From admission, onward)    None        No current facility-administered medications for this encounter.      Facility-Administered Medications Ordered in Other Encounters   Medication Dose Route Frequency Provider Last Rate Last Dose    dexamethasone injection    PRN Amira BARRY Parhamot, CRNA   4 mg at 20 0810    fentaNYL injection    PRN Amira M. Guilliot, CRNA   50 mcg at 20 0730    lidocaine (cardiac) injection    PRN Amira BARRY Guilliot, CRNA   100 mg at 20 0739    midazolam injection   Intravenous PRN Amira BARRY Guilliot, CRNA   1 mg at 20 0729    propofol (DIPRIVAN) 10 mg/mL infusion    PRN Amira BARRY Guilliot, CRNA   160 mg at 20 0739    rocuronium injection    PRN Amira RAMU. Guilliot, CRNA   50 mg at 20 0740    sodium chloride 0.9% flush 5 mL  5 mL Intravenous PRN Aleksandra Taylor NP              History:     Active Hospital Problems    Diagnosis  POA    Longstanding persistent atrial fibrillation [I48.11]  Yes      Resolved Hospital Problems   No resolved problems to display.     Surgical History:    has a past surgical history that includes Knee surgery (Right, ); Knee Arthroplasty (Right, ); Treatment of cardiac arrhythmia (N/A, 2019); Augmentation of breast (Bilateral, );  section (); Eye surgery ();  Breast surgery (1984); Joint replacement (2/07, 11/07, 2/09); Colon surgery (2011); Cosmetic surgery (1987); Colposcopy; Oophorectomy; and Total abdominal hysterectomy (1994).   Social History:    reports previously being sexually active and has had partner(s) who are Male. She reports using the following method of birth control/protection: None.  reports that she has never smoked. She has never used smokeless tobacco. She reports current alcohol use of about 2.0 standard drinks of alcohol per week. She reports that she does not use drugs.     OBJECTIVE:     Vital Signs (Most Recent):  Temp: 36.4 °C (97.5 °F) (11/24/20 0600)  Pulse: 83 (11/24/20 0600)  Resp: 20 (11/24/20 0600)  BP: 109/66 (11/24/20 0606)  SpO2: 95 % (11/24/20 0600) Vital Signs Range (Last 24H):  Temp:  [36.4 °C (97.5 °F)]   Pulse:  [83]   Resp:  [20]   BP: (109-122)/(66-68)   SpO2:  [95 %]        Body mass index is 25.09 kg/m².   Wt Readings from Last 4 Encounters:   11/24/20 74.8 kg (165 lb)   11/24/20 74.8 kg (165 lb)   10/28/20 75 kg (165 lb 5.5 oz)   10/16/20 77.1 kg (169 lb 15.6 oz)     Significant Labs:  Lab Results   Component Value Date    WBC 3.59 (L) 11/19/2020    HGB 13.3 11/19/2020    HCT 43.0 11/19/2020     (L) 11/19/2020     11/19/2020    K 4.6 11/19/2020     11/19/2020    CREATININE 0.8 11/19/2020    BUN 13 11/19/2020    CO2 28 11/19/2020    GLU 93 11/19/2020    CALCIUM 8.7 11/19/2020    MG 2.1 08/15/2019    PHOS 3.5 08/13/2019    ALKPHOS 63 01/23/2020    ALT 16 01/23/2020    AST 21 01/23/2020    ALBUMIN 4.3 01/23/2020    INR 1.0 11/19/2020    APTT 33.3 (H) 11/19/2020    CPK 46 01/23/2020     No LMP recorded. Patient has had a hysterectomy.  Recent Results (from the past 72 hour(s))   Type & Screen    Collection Time: 11/24/20  5:30 AM   Result Value Ref Range    Group & Rh A POS     Indirect Hanh NEG    Transesophageal echo (KHANH)    Collection Time: 11/24/20  8:13 AM   Result Value Ref Range    BSA 1.89 m2        EKG:   Results for orders placed or performed during the hospital encounter of 08/19/19   EKG 12-lead    Collection Time: 08/19/19  9:37 AM    Narrative    Test Reason : I48.91,    Vent. Rate : 059 BPM     Atrial Rate : 059 BPM     P-R Int : 212 ms          QRS Dur : 076 ms      QT Int : 446 ms       P-R-T Axes : 079 021 044 degrees     QTc Int : 441 ms    Sinus bradycardia with 1st degree A-V block with Premature atrial complexes   and  with occasional Premature ventricular complexes  Abnormal ECG  When compared with ECG of 15-AUG-2019 11:04,  Premature ventricular complexes are now Present  Premature atrial complexes Now present  Confirmed by Gordo Rao MD ([id number]) on 8/19/2019 5:18:23 PM    Referred By: PETER SCHULTZ           Confirmed By:Gordo Rao MD       TTE:  Results for orders placed or performed during the hospital encounter of 10/14/20   Echo Color Flow Doppler? Yes   Result Value Ref Range    BSA 1.9 m2    TDI SEPTAL 0.14 m/s    LV LATERAL E/E' RATIO 11.67 m/s    LV SEPTAL E/E' RATIO 10.00 m/s    LA WIDTH 4.85 cm    TDI LATERAL 0.12 m/s    LVIDd 4.56 3.5 - 6.0 cm    IVS 0.72 0.6 - 1.1 cm    Posterior Wall 0.66 0.6 - 1.1 cm    LVIDs 3.23 2.1 - 4.0 cm    FS 29 28 - 44 %    LA volume 80.81 cm3    Sinus 2.65 cm    STJ 2.83 cm    Ascending aorta 2.88 cm    LV mass 96.09 g    LA size 3.96 cm    RVDD 2.99 cm    TAPSE 1.64 cm    RV S' 13.55 cm/s    Left Ventricle Relative Wall Thickness 0.29 cm    AV mean gradient 3 mmHg    AV valve area 2.97 cm2    AV Velocity Ratio 0.73     AV index (prosthetic) 0.75     Mean e' 0.13 m/s    IVRT 77.07 msec    LVOT diameter 2.25 cm    LVOT area 4.0 cm2    LVOT peak tera 0.83 m/s    LVOT peak VTI 16.27 cm    Ao peak tera 1.13 m/s    Ao VTI 21.76 cm    LVOT stroke volume 64.66 cm3    AV peak gradient 5 mmHg    E/E' ratio 10.77 m/s    MV Peak E Tera 1.40 m/s    TR Max Tera 2.70 m/s    LV Systolic Volume 42.00 mL    LV Systolic Volume Index 22.2 mL/m2    LV Diastolic Volume 95.58  mL    LV Diastolic Volume Index 50.62 mL/m2    LA Volume Index 42.8 mL/m2    LV Mass Index 51 g/m2    RA Major Axis 5.57 cm    Left Atrium Minor Axis 4.96 cm    Left Atrium Major Axis 4.94 cm    Triscuspid Valve Regurgitation Peak Gradient 29 mmHg    RA Width 3.45 cm    Right Atrial Pressure (from IVC) 3 mmHg    TV rest pulmonary artery pressure 32 mmHg    Narrative    · Moderate left atrial enlargement.  · The left ventricle is normal in size with normal systolic function. The   estimated ejection fraction is 55%.  · Normal left ventricular diastolic function.  · Mild right atrial enlargement.  · Normal right ventricular systolic function.  · Mild tricuspid regurgitation.  · Normal central venous pressure (3 mmHg).  · The estimated PA systolic pressure is 32 mmHg.        No results found for: EF   No results found for this or any previous visit.  KHANH:  Results for orders placed or performed during the hospital encounter of 11/24/20   Transesophageal echo (KHANH)   Result Value Ref Range    BSA 1.89 m2       PFT:  No results found for: FEV1, FVC, [id number], TLC, DLCO   ASSESSMENT/PLAN:     Anesthesia Evaluation    I have reviewed the Patient Summary Reports.    I have reviewed the Nursing Notes. I have reviewed the NPO Status.   I have reviewed the Medications.     Review of Systems      Physical Exam  General:  Well nourished    Airway/Jaw/Neck:  Airway Findings: Mouth Opening: Normal Tongue: Normal  Mallampati: II  TM Distance: Normal, at least 6 cm      Dental:  Dental Findings: In tact   Chest/Lungs:  Chest/Lungs Findings: Normal Respiratory Rate     Heart/Vascular:  Heart Findings: Rate: Normal        Mental Status:  Mental Status Findings:  Cooperative, Alert and Oriented         Anesthesia Plan  Type of Anesthesia, risks & benefits discussed:  Anesthesia Type:  general  Patient's Preference: same   Intra-op Monitoring Plan: arterial line and standard ASA monitors  Intra-op Monitoring Plan Comments:   Post Op Pain  Control Plan: per primary service following discharge from PACU  Post Op Pain Control Plan Comments:   Induction:   IV  Beta Blocker:  Patient is not currently on a Beta-Blocker (No further documentation required).       Informed Consent: Patient understands risks and agrees with Anesthesia plan.  Questions answered. Anesthesia consent signed with patient.  ASA Score: 3     Day of Surgery Review of History & Physical:    H&P update referred to the surgeon.         Ready For Surgery From Anesthesia Perspective.

## 2020-11-24 NOTE — SUBJECTIVE & OBJECTIVE
Past Medical History:   Diagnosis Date    Abnormal Pap smear of cervix     Atrial fibrillation 2017    Atrial flutter     Cancer     osteosarcoma of right femur    Diverticulosis     Hx of atrial flutter     Hx of mitral valve prolapse 1990    Migraines        Past Surgical History:   Procedure Laterality Date    AUGMENTATION OF BREAST Bilateral 1984    35 yrs     BREAST SURGERY       SECTION      COLON SURGERY      COLPOSCOPY      COSMETIC SURGERY  1987    EYE SURGERY  2016    JOINT REPLACEMENT  , ,     KNEE ARTHROPLASTY Right 2007    revison in 2009    KNEE SURGERY Right 1989    distal femoral replacing TK    OOPHORECTOMY      TOTAL ABDOMINAL HYSTERECTOMY      TAHBSO    TREATMENT OF CARDIAC ARRHYTHMIA N/A 2019    Procedure: CARDIOVERSION;  Surgeon: Moe Seymour MD;  Location: [facility] EP LAB;  Service: Cardiology;  Laterality: N/A;  KHANH/DCCV/MAC/DM/3PREP/*ADMIT FOR TIKOSYN*       Review of patient's allergies indicates:   Allergen Reactions    Morphine Nausea And Vomiting    Succinylcholine      Other reaction(s): v tach       Current Facility-Administered Medications on File Prior to Encounter   Medication    sodium chloride 0.9% flush 5 mL     Current Outpatient Medications on File Prior to Encounter   Medication Sig    CALCIUM CARB/VIT D3/MINERALS (CALTRATE PLUS ORAL) Take by mouth. Takes two chews daily    docusate sodium (COLACE) 250 MG capsule Take 250 mg by mouth 2 (two) times daily.     levothyroxine (SYNTHROID) 25 MCG tablet TAKE 1 TABLET EVERY DAY    melatonin 5 mg Cap Take 5 mg by mouth as needed.     rivaroxaban (XARELTO) 20 mg Tab Take 1 tablet (20 mg total) by mouth once daily.    vit C/E/Zn/coppr/lutein/zeaxan (PRESERVISION AREDS-2 ORAL)     acyclovir (ZOVIRAX) 400 MG tablet Take 1 tablet (400 mg total) by mouth 3 (three) times daily as needed.    diclofenac sodium (VOLTAREN) 1 % Gel Apply 2 g topically once daily.  (Patient taking differently: Apply 2 g topically once daily. As needed)    dofetilide (TIKOSYN) 250 MCG Cap Take 1 capsule (250 mcg total) by mouth every 12 (twelve) hours.    estradioL (ESTRACE) 0.01 % (0.1 mg/gram) vaginal cream Place 0.5 g vaginally twice a week. Insert nightly x 2 weeks then twice weekly    fluorouraciL (EFUDEX) 5 % cream Apply thin layer to spot on right cheek with qtip x 4-6 weeks.    HYDROcodone-acetaminophen (NORCO) 5-325 mg per tablet Take 1 tablet by mouth every 6 (six) hours as needed for Pain. Pt states takes PRN for headaches    testosterone (TESTIM) 50 mg/5 gram (1 %) Gel Apply 0.25 g topically once daily.    UNABLE TO FIND Diclofenac 0.15%, Lidocaine 2.25%, Prilocaine 2.25% in Compounded Topical Cream to be applied up to 5x/day as needed for headaches.     Family History     Problem Relation (Age of Onset)    Diabetes Sister, Mother    Hearing loss Father    Heart disease Sister, Father, Mother, Brother, Brother    Stroke Father (93)        Tobacco Use    Smoking status: Never Smoker    Smokeless tobacco: Never Used   Substance and Sexual Activity    Alcohol use: Yes     Alcohol/week: 2.0 standard drinks     Types: 1 Glasses of wine, 1 Standard drinks or equivalent per week     Frequency: 2-4 times a month     Drinks per session: 1 or 2     Binge frequency: Never     Comment: socially    Drug use: No    Sexual activity: Not Currently     Partners: Male     Birth control/protection: None     Comment: , together x 34 years     Review of Systems   Constitution: Negative for fever and malaise/fatigue.   HENT: Negative for hearing loss.    Cardiovascular: Negative for chest pain, dyspnea on exertion, irregular heartbeat, leg swelling, palpitations and syncope.   Hematologic/Lymphatic: Negative for bleeding problem.   Gastrointestinal: Negative for heartburn.   Neurological: Negative for light-headedness.   Psychiatric/Behavioral: Negative for altered mental status. The  patient is not nervous/anxious.      Objective:     Vital Signs (Most Recent):  Temp: 97.5 °F (36.4 °C) (11/24/20 0600)  Pulse: 83 (11/24/20 0600)  Resp: 20 (11/24/20 0600)  BP: 109/66 (11/24/20 0606)  SpO2: 95 % (11/24/20 0600) Vital Signs (24h Range):  Temp:  [97.5 °F (36.4 °C)] 97.5 °F (36.4 °C)  Pulse:  [83] 83  Resp:  [20] 20  SpO2:  [95 %] 95 %  BP: (109-122)/(66-68) 109/66       Weight: 74.8 kg (165 lb)  Body mass index is 25.09 kg/m².    SpO2: 95 %  O2 Device (Oxygen Therapy): room air    Physical Exam   Constitutional: She is oriented to person, place, and time. Vital signs are normal. She appears well-developed and well-nourished.   Cardiovascular: Normal rate, regular rhythm, S1 normal, S2 normal, normal heart sounds and intact distal pulses.   Pulmonary/Chest: Effort normal and breath sounds normal.   Musculoskeletal: Normal range of motion.         General: No edema.   Neurological: She is alert and oriented to person, place, and time. She has normal strength.   Skin: Skin is warm, dry and intact.   Vitals reviewed.

## 2020-11-24 NOTE — TRANSFER OF CARE
"Anesthesia Transfer of Care Note    Patient: Zoë Enrique    Procedure(s) Performed: Procedure(s) (LRB):  ABLATION, ARRHYTHMOGENIC FOCUS, FOR ATRIAL FIBRILLATION (N/A)  ABLATION, ATRIAL FLUTTER, TYPICAL (N/A)  ECHOCARDIOGRAM, TRANSESOPHAGEAL (N/A)  Cardioversion    Patient location: OPS    Anesthesia Type: general    Transport from OR: Transported from OR on 100% O2 by closed face mask with adequate spontaneous ventilation    Post pain: adequate analgesia    Post assessment: no apparent anesthetic complications and tolerated procedure well    Post vital signs: stable    Level of consciousness: awake, alert and oriented    Nausea/Vomiting: no nausea/vomiting    Complications: none    Transfer of care protocol was followed      Last vitals:   Visit Vitals  /66 (BP Location: Right arm, Patient Position: Lying)   Pulse 83   Temp 36.4 °C (97.5 °F) (Oral)   Resp 20   Ht 5' 8" (1.727 m)   Wt 74.8 kg (165 lb)   SpO2 95%   Breastfeeding No   BMI 25.09 kg/m²     "

## 2020-11-24 NOTE — ASSESSMENT & PLAN NOTE
1. KHANH for evaluation of BRITTNI thrombus prior to PVI   -No absolute contraindications of esophageal stricture, tumor, perforation, laceration,or diverticulum and/or active GI bleed  -The risks, benefits & alternatives of the procedure were explained to the patient.   -The risks of transesophageal echo include but are not limited to:  Dental trauma, esophageal trauma/perforation, bleeding, laryngospasm/brochospasm, aspiration, sore throat/hoarseness, & dislodgement of the endotracheal tube/nasogastric tube (where applicable).    -The risks of moderate sedation include hypotension, respiratory depression, arrhythmias, bronchospasm, & death.    -Informed consent was obtained. The patient is agreeable to proceed with the procedure and all questions and concerns addressed.    Case discussed with an attending in echocardiography lab.     Further recommendations per attending addendum

## 2020-11-24 NOTE — SUBJECTIVE & OBJECTIVE
Past Medical History:   Diagnosis Date    Abnormal Pap smear of cervix     Atrial fibrillation 2017    Atrial flutter     Cancer     osteosarcoma of right femur    Diverticulosis     Hx of atrial flutter     Hx of mitral valve prolapse 1990    Migraines        Past Surgical History:   Procedure Laterality Date    AUGMENTATION OF BREAST Bilateral 1984    35 yrs     BREAST SURGERY       SECTION      COLON SURGERY      COLPOSCOPY      COSMETIC SURGERY  1987    EYE SURGERY  2016    JOINT REPLACEMENT  , ,     KNEE ARTHROPLASTY Right 2007    revison in 2009    KNEE SURGERY Right 1989    distal femoral replacing TK    OOPHORECTOMY      TOTAL ABDOMINAL HYSTERECTOMY      TAHBSO    TREATMENT OF CARDIAC ARRHYTHMIA N/A 2019    Procedure: CARDIOVERSION;  Surgeon: Moe Seymour MD;  Location: [facility] EP LAB;  Service: Cardiology;  Laterality: N/A;  KHANH/DCCV/MAC/DM/3PREP/*ADMIT FOR TIKOSYN*       Review of patient's allergies indicates:   Allergen Reactions    Morphine Nausea And Vomiting    Succinylcholine      Other reaction(s): v tach       Current Facility-Administered Medications on File Prior to Encounter   Medication    sodium chloride 0.9% flush 5 mL     Current Outpatient Medications on File Prior to Encounter   Medication Sig    CALCIUM CARB/VIT D3/MINERALS (CALTRATE PLUS ORAL) Take by mouth. Takes two chews daily    docusate sodium (COLACE) 250 MG capsule Take 250 mg by mouth 2 (two) times daily.     levothyroxine (SYNTHROID) 25 MCG tablet TAKE 1 TABLET EVERY DAY    melatonin 5 mg Cap Take 5 mg by mouth as needed.     rivaroxaban (XARELTO) 20 mg Tab Take 1 tablet (20 mg total) by mouth once daily.    vit C/E/Zn/coppr/lutein/zeaxan (PRESERVISION AREDS-2 ORAL)     acyclovir (ZOVIRAX) 400 MG tablet Take 1 tablet (400 mg total) by mouth 3 (three) times daily as needed.    diclofenac sodium (VOLTAREN) 1 % Gel Apply 2 g topically once daily.  (Patient taking differently: Apply 2 g topically once daily. As needed)    dofetilide (TIKOSYN) 250 MCG Cap Take 1 capsule (250 mcg total) by mouth every 12 (twelve) hours.    estradioL (ESTRACE) 0.01 % (0.1 mg/gram) vaginal cream Place 0.5 g vaginally twice a week. Insert nightly x 2 weeks then twice weekly    fluorouraciL (EFUDEX) 5 % cream Apply thin layer to spot on right cheek with qtip x 4-6 weeks.    HYDROcodone-acetaminophen (NORCO) 5-325 mg per tablet Take 1 tablet by mouth every 6 (six) hours as needed for Pain. Pt states takes PRN for headaches    testosterone (TESTIM) 50 mg/5 gram (1 %) Gel Apply 0.25 g topically once daily.    UNABLE TO FIND Diclofenac 0.15%, Lidocaine 2.25%, Prilocaine 2.25% in Compounded Topical Cream to be applied up to 5x/day as needed for headaches.     Family History     Problem Relation (Age of Onset)    Diabetes Sister, Mother    Hearing loss Father    Heart disease Sister, Father, Mother, Brother, Brother    Stroke Father (93)        Tobacco Use    Smoking status: Never Smoker    Smokeless tobacco: Never Used   Substance and Sexual Activity    Alcohol use: Yes     Alcohol/week: 2.0 standard drinks     Types: 1 Glasses of wine, 1 Standard drinks or equivalent per week     Frequency: 2-4 times a month     Drinks per session: 1 or 2     Binge frequency: Never     Comment: socially    Drug use: No    Sexual activity: Not Currently     Partners: Male     Birth control/protection: None     Comment: , together x 34 years     Review of Systems   All other systems reviewed and are negative.    Objective:     Vital Signs (Most Recent):  Temp: 97.5 °F (36.4 °C) (11/24/20 0600)  Pulse: 83 (11/24/20 0600)  Resp: 20 (11/24/20 0600)  BP: 109/66 (11/24/20 0606)  SpO2: 95 % (11/24/20 0600) Vital Signs (24h Range):  Temp:  [97.5 °F (36.4 °C)] 97.5 °F (36.4 °C)  Pulse:  [83] 83  Resp:  [20] 20  SpO2:  [95 %] 95 %  BP: (109-122)/(66-68) 109/66     Weight: 74.8 kg (165 lb)  Body  mass index is 25.09 kg/m².    SpO2: 95 %  O2 Device (Oxygen Therapy): room air    No intake or output data in the 24 hours ending 11/24/20 0654    Lines/Drains/Airways     Peripheral Intravenous Line                 Peripheral IV - Single Lumen 11/24/20 0617 18 G Left Antecubital less than 1 day         Peripheral IV - Single Lumen 11/24/20 0618 20 G Right Forearm less than 1 day                Physical Exam   Constitutional: She is oriented to person, place, and time. She appears well-developed.   HENT:   Head: Normocephalic.   Eyes: Conjunctivae are normal.   Neck: Neck supple.   Cardiovascular: Normal rate and regular rhythm.   Pulmonary/Chest: Effort normal.   Abdominal: Soft.   Musculoskeletal:         General: No edema.   Neurological: She is alert and oriented to person, place, and time.   Skin: Skin is warm and dry.   Psychiatric: She has a normal mood and affect.       Significant Labs: All pertinent lab results from the last 24 hours have been reviewed.    Significant Imaging: Echocardiogram:   2D echo with color flow doppler: No results found for this or any previous visit. and Transthoracic echo (TTE) complete (Cupid Only):   Results for orders placed or performed during the hospital encounter of 10/14/20   Echo Color Flow Doppler? Yes   Result Value Ref Range    BSA 1.9 m2    TDI SEPTAL 0.14 m/s    LV LATERAL E/E' RATIO 11.67 m/s    LV SEPTAL E/E' RATIO 10.00 m/s    LA WIDTH 4.85 cm    TDI LATERAL 0.12 m/s    LVIDd 4.56 3.5 - 6.0 cm    IVS 0.72 0.6 - 1.1 cm    Posterior Wall 0.66 0.6 - 1.1 cm    LVIDs 3.23 2.1 - 4.0 cm    FS 29 28 - 44 %    LA volume 80.81 cm3    Sinus 2.65 cm    STJ 2.83 cm    Ascending aorta 2.88 cm    LV mass 96.09 g    LA size 3.96 cm    RVDD 2.99 cm    TAPSE 1.64 cm    RV S' 13.55 cm/s    Left Ventricle Relative Wall Thickness 0.29 cm    AV mean gradient 3 mmHg    AV valve area 2.97 cm2    AV Velocity Ratio 0.73     AV index (prosthetic) 0.75     Mean e' 0.13 m/s    IVRT 77.07 msec     LVOT diameter 2.25 cm    LVOT area 4.0 cm2    LVOT peak tera 0.83 m/s    LVOT peak VTI 16.27 cm    Ao peak tera 1.13 m/s    Ao VTI 21.76 cm    LVOT stroke volume 64.66 cm3    AV peak gradient 5 mmHg    E/E' ratio 10.77 m/s    MV Peak E Tera 1.40 m/s    TR Max Tera 2.70 m/s    LV Systolic Volume 42.00 mL    LV Systolic Volume Index 22.2 mL/m2    LV Diastolic Volume 95.58 mL    LV Diastolic Volume Index 50.62 mL/m2    LA Volume Index 42.8 mL/m2    LV Mass Index 51 g/m2    RA Major Axis 5.57 cm    Left Atrium Minor Axis 4.96 cm    Left Atrium Major Axis 4.94 cm    Triscuspid Valve Regurgitation Peak Gradient 29 mmHg    RA Width 3.45 cm    Right Atrial Pressure (from IVC) 3 mmHg    TV rest pulmonary artery pressure 32 mmHg    Narrative    · Moderate left atrial enlargement.  · The left ventricle is normal in size with normal systolic function. The   estimated ejection fraction is 55%.  · Normal left ventricular diastolic function.  · Mild right atrial enlargement.  · Normal right ventricular systolic function.  · Mild tricuspid regurgitation.  · Normal central venous pressure (3 mmHg).  · The estimated PA systolic pressure is 32 mmHg.

## 2020-11-24 NOTE — DISCHARGE SUMMARY
[facility] - Short Stay Cardiac Unit  Cardiac Electrophysiology  Discharge Summary      Patient Name: Zoë Enrique  MRN: [id number]  Admission Date: 11/24/2020  Hospital Length of Stay: 0 days  Discharge Date and Time:  11/24/2020 3:54 PM  Attending Physician: Moe Seymour MD    Discharging Provider: Candida Issa NP  Primary Care Physician: Praveen Jeffery MD    HPI:   Zoë Enrique presents to short stay cardiac unit on 11/23/2020 for planned redo-PVI and AFL (typical/atypical) RFA with Dr. Seymour.     Ms. Enrique is a 70 y.o. female with atrial fibrillation, atypical/typical atrial flutter, hypothyroidism, and   femur osteosarcoma s/p resection.  Arrhythmia history is complex, undergoing PVI ablations in 2003, 2004, and 2013.  Later required cardioversion in 08/2018 and 01/2019.  Following cardioversion in 2019, she was placed on tikosyn with symptom improvement.  However, with time, she reports having more frequent episodes of AF associated with fatigue.  Most recent Echo from 10/2020 demonstrated preserved EF 55%.     Most recently, Ms. Enrique was seen in arrhythmia clinic by Dr. Seymour.  At time of visit they discussed treatment options for management of AF/AFL.  Options discussed included increasing tikosyn, switching to amiodarone, or PVI.  Ms. Enrique elected to proceed with PVI.        Today, she reports feeling well, although she continues to have episodes of profound fatigue. She denies chest pain, overt shortness of breath, syncope, or other acute symptoms.  She continues to be treated with tikosyn 250 mg bid for rhythm control (last dose on 11/20/20) and xarelto to stroke prophylaxis.     Review of recent labs demonstrate normal renal function with Cr 0.8 and stable Hgb/Hct.     EKG:   My independent visualization of most recent EKG is NSR    Procedure(s) (LRB):  ABLATION, ARRHYTHMOGENIC FOCUS, FOR ATRIAL FIBRILLATION (N/A)  ABLATION, ATRIAL FLUTTER, TYPICAL (N/A)  ECHOCARDIOGRAM,  TRANSESOPHAGEAL (N/A)  Cardioversion     Indwelling Lines/Drains at time of discharge:  Lines/Drains/Airways     Drain                 Urethral Catheter 11/24/20 0758 less than 1 day            Hospital Course:  Ms. Enrique underwent KHANH without evidence of BRITTNI thrombus and preserved EF 65% and is s/p successful ablation of typical atrial flutter and atrial tachycardia.  She tolerated the procedure well without any acute complications. Admitted to PACU and post-procedure ECG sinus rhythm with 1st degree AVB,  ms.  Bilateral groin sites intact and without evidence of hematoma. Sutures removed and she completed bedrest x 6 hours.  Ambulated without difficulty.  Ms. Enrique was monitored on telemetry without any arrhythmias.  Diuresed with Lasix 20 mg IV.   Plan to resume Xarelto for CVA prophylaxis.  Switch from Tikosyn to Amiodarone with oral load and maintenance dose x 3 months.    Discharge instructions discussed with Ms. Enrique and her .  All questions were answered and she was discharged home in stable condition.       Consults: Anesthesia       Pending Diagnostic Studies:     None          Final Active Diagnoses:    Diagnosis Date Noted POA    Longstanding persistent atrial fibrillation [I48.11] 11/24/2020 Yes      Problems Resolved During this Admission:     Longstanding persistent atrial fibrillation  s/p AT/Typical AFL ablation     Plan:  - Pain relief with Tylenol 650 mg q4h PRN   - Anticoagulation: continue Xarelto 20 mg  - Anti-arrythmic: start Amiodarone 400 mg bid x 14 days, then 200 mg daily    -Protonix 40 mg daily x 4 weeks     - Follow up with Dr. Seymour in 4 months    - Discharge plans/instructions discussed with patient who verbalized understanding and agreement of plans of care. No further questions or concerns voiced at this time.       Discharged Condition: good    Disposition: Home or Self Care    Follow Up:  Follow-up Information     Moe Seymour MD In 4 months.     Specialties: Electrophysiology, Cardiology  Why: s/p AT/typical AFL RFA  Contact information:  [address]  [facility] [address]  [phone]                 Patient Instructions:      Diet Adult Regular     Other restrictions (specify):   Order Comments: Restrictions  1. Do not strain or lift anything greater than 5 lb for 1 week.   2. Do not drive or operate any dangerous machinery for 24 hours.    3. After 24 hours, a shower is allowed.   4. Clean the area with mild soap and water.   5. Once the skin has healed (1 week), bathing in a tub, swimming, or hot tub is allowed.   6. Inspect the groin site daily and report to the physician any signs of infection at the site: redness, pain, fever >100.4, unusual pain at the access site or affected extremity, unusual swelling at the access site, or any yellow, white or green drainage.     Seek immediate medical attention:  Bleeding from the puncture site that you cannot stop by doing the following:   Relax and lie down right away. Keep your leg flat and apply firm pressure to the site using your fingers and a gauze pad. Keep the pressure on for 10 minutes. Continue this until the bleeding stops. This may take awhile. When bleeding stops, cover the site with a sterile bandage and keep your leg still as much as possible.     Go to the Emergency Department if you develop:   -Severe bleeding   -Acute Weakness or numbness   -Visual, gait or speech disturbances   -New chest pain, palpitations, shortness of breath, fainting       Any need to reschedule appointments, or any questions regarding your procedures should be addressed directly with the Arrhythmia Department Nurses at the following phone number: [phone].     Notify your health care provider if you experience any of the following:  difficulty breathing or increased cough     Notify your health care provider if you experience any of the following:  persistent dizziness, light-headedness, or visual  disturbances     Activity as tolerated     Medications:  Reconciled Home Medications:      Medication List      START taking these medications    * amiodarone 200 MG Tab  Commonly known as: PACERONE  Take 2 tablets (400 mg total) by mouth 2 (two) times daily. STEP 1: LOADING DOSE for 14 days     * amiodarone 200 MG Tab  Commonly known as: PACERONE  Take 1 tablet (200 mg total) by mouth once daily. STEP 2: MAINTENANCE DOSE  Start taking on: December 9, 2020     pantoprazole 40 MG tablet  Commonly known as: PROTONIX  Take 1 tablet (40 mg total) by mouth once daily. Take for 30 days post-ablation, then discontinue. No refills.         * This list has 2 medication(s) that are the same as other medications prescribed for you. Read the directions carefully, and ask your doctor or other care provider to review them with you.            CHANGE how you take these medications    diclofenac sodium 1 % Gel  Commonly known as: VOLTAREN  Apply 2 g topically once daily.  What changed: additional instructions        CONTINUE taking these medications    acyclovir 400 MG tablet  Commonly known as: ZOVIRAX  Take 1 tablet (400 mg total) by mouth 3 (three) times daily as needed.     CALTRATE PLUS ORAL  Take by mouth. Takes two chews daily     docusate sodium 250 MG capsule  Commonly known as: COLACE  Take 250 mg by mouth 2 (two) times daily.     estradioL 0.01 % (0.1 mg/gram) vaginal cream  Commonly known as: ESTRACE  Place 0.5 g vaginally twice a week. Insert nightly x 2 weeks then twice weekly     fluorouraciL 5 % cream  Commonly known as: EFUDEX  Apply thin layer to spot on right cheek with qtip x 4-6 weeks.     HYDROcodone-acetaminophen 5-325 mg per tablet  Commonly known as: NORCO  Take 1 tablet by mouth every 6 (six) hours as needed for Pain. Pt states takes PRN for headaches     levothyroxine 25 MCG tablet  Commonly known as: SYNTHROID  TAKE 1 TABLET EVERY DAY     melatonin 5 mg Cap  Take 5 mg by mouth as needed.     PRESERVISION  AREDS-2 ORAL     testosterone 50 mg/5 gram (1 %) Gel  Commonly known as: TESTIM  Apply 0.25 g topically once daily.     UNABLE TO FIND  Diclofenac 0.15%, Lidocaine 2.25%, Prilocaine 2.25% in Compounded Topical Cream to be applied up to 5x/day as needed for headaches.     XARELTO 20 mg Tab  Generic drug: rivaroxaban  Take 1 tablet (20 mg total) by mouth once daily.        STOP taking these medications    dofetilide 250 MCG Cap  Commonly known as: TIKOSYN            Time spent on the discharge of patient: 30 minutes    Candida Issa NP  Cardiac Electrophysiology  [facility] - Short Stay Cardiac Unit

## 2020-11-24 NOTE — PLAN OF CARE
"Vss. sats 99% on room air.  Pt arrived with georgina groin sutures placed at 1100, removal time at 1500, bedrest done at 1700.  Lio well approx x1 each groin. No hematoma or drainage noted.  Palpable pulses noted.  Schmidt in place from procedure. Pt s/p 20mg lasix iv x1 by crna.  1500ml noted clear yellow urine.  Pt complaints of "sore throat."  Chloraseptic lozenge given in ep pacu 3.  12 lead ekg done per md order. Pt arrived cold from procedure.  Warm blankets placed.  Pt states "sore to groin areas".  Prn iv pain meds given per md order.  Pt did not want to take tylenol prn until gets to sscu room. Tele box on . Pt cardiovert x1 200j in ep lab. Silvadene with pt to sscu 04. Pt's  updated by ep pacu rn. Verbalizes understanding. See flowsheet for full assessment.   "

## 2020-11-24 NOTE — ASSESSMENT & PLAN NOTE
S/p AT/Typical AFL ablation     Plan:  - Pain relief with Tylenol 650 mg q4h PRN   - Anticoagulation: continue Xarelto 20 mg nightly   - Anti-arrythmic: start Amiodarone 400 mg bid x 14 days, then 200 mg daily    -Protonix 40 mg daily x 4 weeks     - Follow up with Dr. Seymour in 3 months    - Discharge plans/instructions discussed with patient who verbalized understanding and agreement of plans of care. No further questions or concerns voiced at this time.

## 2020-11-24 NOTE — HPI
Zoë Enrique presents to short stay cardiac unit on 11/23/2020 for planned redo-PVI and AFL (typical/atypical) RFA with Dr. Seymour. Ordered KHANH prior to PVI/RFA.     Ms. Enrique is a 70 y.o. female with atrial fibrillation, atypical/typical atrial flutter, hypothyroidism, and   femur osteosarcoma s/p resection.  Arrhythmia history is complex, undergoing PVI ablations in 2003, 2004, and 2013.  Later required cardioversion in 08/2018 and 01/2019.  Following cardioversion in 2019, she was placed on tikosyn with symptom improvement.  However, with time, she reports having more frequent episodes of AF associated with fatigue.  Most recent Echo from 10/2020 demonstrated preserved EF 55%.     Most recently, Ms. Enrique was seen in arrhythmia clinic by Dr. Seymour.  At time of visit they discussed treatment options for management of AF/AFL.  Options discussed included increasing tikosyn, switching to amiodarone, or PVI.  Ms. Enrique elected to proceed with PVI.        Today, she reports feeling well, although she continues to have episodes of profound fatigue. She denies chest pain, overt shortness of breath, syncope, or other acute symptoms.  She continues to be treated with tikosyn 250 mg bid for rhythm control (last dose on 11/20/20) and xarelto to stroke prophylaxis.     Review of recent labs demonstrate normal renal function with Cr 0.8 and stable Hgb/Hct.     EKG today with 2:1 atrial flutter.     Dysphagia or odynophagia:  No  Liver Disease, esophageal disease, or known varices:  No  Upper GI Bleeding: No  Snoring:  Yes  Sleep Apnea:  No  Prior neck surgery or radiation:  No  History of anesthetic difficulties:  No  Family history of anesthetic difficulties:  No  Last oral intake:  12 hours ago  Able to move neck in all directions:  Yes

## 2020-11-24 NOTE — PROGRESS NOTES
Suture removed from L groin without complications. Dressing applied. Suture removed from R groin with bleeding noted. Pressure held, Candida BUSCH at bedside to continue holding pressure. Will monitor.

## 2020-11-24 NOTE — HPI
Zoë Enrique presents to short stay cardiac unit on 11/23/2020 for planned redo-PVI and AFL (typical/atypical) RFA with Dr. Seymour.     Ms. Enrique is a 70 y.o. female with atrial fibrillation, atypical/typical atrial flutter, hypothyroidism, and   femur osteosarcoma s/p resection.  Arrhythmia history is complex, undergoing PVI ablations in 2003, 2004, and 2013.  Later required cardioversion in 08/2018 and 01/2019.  Following cardioversion in 2019, she was placed on tikosyn with symptom improvement.  However, with time, she reports having more frequent episodes of AF associated with fatigue.  Most recent Echo from 10/2020 demonstrated preserved EF 55%.     Most recently, Ms. Enrique was seen in arrhythmia clinic by Dr. Seymour.  At time of visit they discussed treatment options for management of AF/AFL.  Options discussed included increasing tikosyn, switching to amiodarone, or PVI.  Ms. Enrique elected to proceed with PVI.        Today, she reports feeling well, although she continues to have episodes of profound fatigue. She denies chest pain, overt shortness of breath, syncope, or other acute symptoms.  She continues to be treated with tikosyn 250 mg bid for rhythm control (last dose on 11/20/20) and xarelto to stroke prophylaxis.     Review of recent labs demonstrate normal renal function with Cr 0.8 and stable Hgb/Hct.     EKG:   My independent visualization of most recent EKG is NSR

## 2020-11-25 NOTE — NURSING
Pt dizzy after sitting up and standing. Pt ambulated to bathroom and voided, LONDON groin G&T CDI, no bleeding, no hematoma noted. Candida BIANCHI at bedside ok to D/C after 6pm if pt comfortable.

## 2020-11-25 NOTE — NURSING
Patient discharged per MD orders. Instructions given on medications, wound care, activity, signs of infection, when to call MD, and follow up appointments. Pt and  verbalized understanding.  Patient AAOx4, VSS, no c/o pain or discomfort at this time. Telemetry and PIV removed. Patient left unit via wheelchair with transport and family.

## 2020-11-30 ENCOUNTER — PATIENT MESSAGE (OUTPATIENT)
Dept: FAMILY MEDICINE | Facility: CLINIC | Age: 70
End: 2020-11-30

## 2020-12-02 LAB
POC ACTIVATED CLOTTING TIME K: 136 SEC (ref 74–137)
POC ACTIVATED CLOTTING TIME K: 312 SEC (ref 74–137)
POC ACTIVATED CLOTTING TIME K: 362 SEC (ref 74–137)
POC ACTIVATED CLOTTING TIME K: 527 SEC (ref 74–137)
SAMPLE: ABNORMAL
SAMPLE: NORMAL

## 2020-12-03 ENCOUNTER — OFFICE VISIT (OUTPATIENT)
Dept: FAMILY MEDICINE | Facility: CLINIC | Age: 70
End: 2020-12-03
Payer: MEDICARE

## 2020-12-03 VITALS
HEART RATE: 62 BPM | TEMPERATURE: 98 F | WEIGHT: 173.75 LBS | OXYGEN SATURATION: 97 % | HEIGHT: 68 IN | DIASTOLIC BLOOD PRESSURE: 70 MMHG | BODY MASS INDEX: 26.33 KG/M2 | SYSTOLIC BLOOD PRESSURE: 118 MMHG

## 2020-12-03 DIAGNOSIS — R93.89 ABNORMAL CT OF THE CHEST: ICD-10-CM

## 2020-12-03 DIAGNOSIS — R05.9 COUGH: Primary | ICD-10-CM

## 2020-12-03 DIAGNOSIS — Z12.11 SCREEN FOR COLON CANCER: ICD-10-CM

## 2020-12-03 DIAGNOSIS — J47.9 BRONCHIECTASIS WITHOUT COMPLICATION: ICD-10-CM

## 2020-12-03 PROCEDURE — 99999 PR PBB SHADOW E&M-EST. PATIENT-LVL V: ICD-10-PCS | Mod: PBBFAC,HCNC,, | Performed by: INTERNAL MEDICINE

## 2020-12-03 PROCEDURE — 99214 OFFICE O/P EST MOD 30 MIN: CPT | Mod: HCNC,S$GLB,, | Performed by: INTERNAL MEDICINE

## 2020-12-03 PROCEDURE — 1159F PR MEDICATION LIST DOCUMENTED IN MEDICAL RECORD: ICD-10-PCS | Mod: HCNC,S$GLB,, | Performed by: INTERNAL MEDICINE

## 2020-12-03 PROCEDURE — 99999 PR PBB SHADOW E&M-EST. PATIENT-LVL V: CPT | Mod: PBBFAC,HCNC,, | Performed by: INTERNAL MEDICINE

## 2020-12-03 PROCEDURE — 99499 RISK ADDL DX/OHS AUDIT: ICD-10-PCS | Mod: HCNC,S$GLB,, | Performed by: INTERNAL MEDICINE

## 2020-12-03 PROCEDURE — 3008F PR BODY MASS INDEX (BMI) DOCUMENTED: ICD-10-PCS | Mod: HCNC,CPTII,S$GLB, | Performed by: INTERNAL MEDICINE

## 2020-12-03 PROCEDURE — 99499 UNLISTED E&M SERVICE: CPT | Mod: HCNC,S$GLB,, | Performed by: INTERNAL MEDICINE

## 2020-12-03 PROCEDURE — 1159F MED LIST DOCD IN RCRD: CPT | Mod: HCNC,S$GLB,, | Performed by: INTERNAL MEDICINE

## 2020-12-03 PROCEDURE — 1126F PR PAIN SEVERITY QUANTIFIED, NO PAIN PRESENT: ICD-10-PCS | Mod: HCNC,S$GLB,, | Performed by: INTERNAL MEDICINE

## 2020-12-03 PROCEDURE — 99214 PR OFFICE/OUTPT VISIT, EST, LEVL IV, 30-39 MIN: ICD-10-PCS | Mod: HCNC,S$GLB,, | Performed by: INTERNAL MEDICINE

## 2020-12-03 PROCEDURE — 1126F AMNT PAIN NOTED NONE PRSNT: CPT | Mod: HCNC,S$GLB,, | Performed by: INTERNAL MEDICINE

## 2020-12-03 PROCEDURE — 3008F BODY MASS INDEX DOCD: CPT | Mod: HCNC,CPTII,S$GLB, | Performed by: INTERNAL MEDICINE

## 2020-12-03 NOTE — PROGRESS NOTES
[facility] Note    Chief Complaint      Chief Complaint   Patient presents with    Results     of CT sacn    Cough       History of Present Illness      Zoë Enrique is a 70 y.o. female with chronic conditions of A fib, hypothyroidism, hx of osteosarcoma of right femur, herpes who presents today for: chronic non-productive cough and abnormal CT scan of the chest.  Has had chronic cough for the past few years, however, in the past few weeks, seems to have worsened. Minimal phlegm if at all.  No hemoptysis, fevers, chills. In the past 2 weeks, has had sinus congestion, pressure, post nasal drip.  Of note, pt had CTA chest in preparation for cardiac ablation which showed tubular bronchiectasis concerning for non-TB mycobacterial disease.      Also, of note, pt is a retired nurse and had a positive TB test 20-25 yrs ago followed up with a normal chest xray at the time.  Subsequent CXR for annual TB screenings had been normal.  Pt did not take any antimicrobials for LTBI at the time.    Past Medical History:  Past Medical History:   Diagnosis Date    Abnormal Pap smear of cervix     Atrial fibrillation 2017    Atrial flutter     Cancer     osteosarcoma of right femur    Diverticulosis     Hx of atrial flutter     Hx of mitral valve prolapse     Migraines        Past Surgical History:   has a past surgical history that includes Knee surgery (Right, ); Knee Arthroplasty (Right, ); Treatment of cardiac arrhythmia (N/A, 2019); Augmentation of breast (Bilateral, );  section (); Eye surgery (); Breast surgery (); Joint replacement (, , ); Colon surgery (); Cosmetic surgery (); Colposcopy; Oophorectomy; Total abdominal hysterectomy (); Ablation of arrhythmogenic focus for atrial fibrillation (N/A, 2020); Cardioversion (2020); and Transesophageal echocardiography (N/A, 2020).    Family History:  family  history includes Asthma in her brother; Diabetes in her mother and sister; Hearing loss in her father; Heart disease in her brother, brother, father, mother, and sister; Stroke (age of onset: 93) in her father.     Social History:  Social History     Tobacco Use    Smoking status: Never Smoker    Smokeless tobacco: Never Used   Substance Use Topics    Alcohol use: Yes     Alcohol/week: 2.0 standard drinks     Types: 2 Standard drinks or equivalent per week     Frequency: 2-4 times a month     Drinks per session: 1 or 2     Binge frequency: Never     Comment: socially    Drug use: No       I personally reviewed all past medical, surgical, social and family history.    Review of Systems   Constitutional: Negative for chills, fever and weight loss.   HENT: Negative for ear pain and sore throat.    Respiratory: Positive for cough. Negative for hemoptysis, shortness of breath and wheezing.    Cardiovascular: Negative for chest pain.   Gastrointestinal: Positive for heartburn.   Musculoskeletal: Negative for myalgias.   Skin: Negative for rash.   Neurological: Positive for headaches.   Endo/Heme/Allergies: Negative for environmental allergies.        Medications:  Outpatient Encounter Medications as of 12/3/2020   Medication Sig Dispense Refill    [START ON 12/9/2020] amiodarone (PACERONE) 200 MG Tab Take 1 tablet (200 mg total) by mouth once daily. STEP 2: MAINTENANCE DOSE 30 tablet 11    docusate sodium (COLACE) 250 MG capsule Take 250 mg by mouth 2 (two) times daily.       estradioL (ESTRACE) 0.01 % (0.1 mg/gram) vaginal cream Place 0.5 g vaginally twice a week. Insert nightly x 2 weeks then twice weekly 42 g 4    levothyroxine (SYNTHROID) 25 MCG tablet TAKE 1 TABLET EVERY DAY 90 tablet 2    melatonin 5 mg Cap Take 5 mg by mouth as needed.       pantoprazole (PROTONIX) 40 MG tablet Take 1 tablet (40 mg total) by mouth once daily. Take for 30 days post-ablation, then discontinue. No refills. 30 tablet 0     rivaroxaban (XARELTO) 20 mg Tab Take 1 tablet (20 mg total) by mouth once daily. 90 tablet 3    testosterone (TESTIM) 50 mg/5 gram (1 %) Gel Apply 0.25 g topically once daily. 15 g 5    UNABLE TO FIND Diclofenac 0.15%, Lidocaine 2.25%, Prilocaine 2.25% in Compounded Topical Cream to be applied up to 5x/day as needed for headaches.      vit C/E/Zn/coppr/lutein/zeaxan (PRESERVISION AREDS-2 ORAL)       acyclovir (ZOVIRAX) 400 MG tablet Take 1 tablet (400 mg total) by mouth 3 (three) times daily as needed. 180 tablet 1    amiodarone (PACERONE) 200 MG Tab Take 2 tablets (400 mg total) by mouth 2 (two) times daily. STEP 1: LOADING DOSE for 14 days (Patient not taking: Reported on 12/3/2020) 56 tablet 0    CALCIUM CARB/VIT D3/MINERALS (CALTRATE PLUS ORAL) Take by mouth. Takes two chews daily      diclofenac sodium (VOLTAREN) 1 % Gel Apply 2 g topically once daily. (Patient taking differently: Apply 2 g topically once daily. As needed) 100 g 2    fluorouraciL (EFUDEX) 5 % cream Apply thin layer to spot on right cheek with qtip x 4-6 weeks. 40 g 0    HYDROcodone-acetaminophen (NORCO) 5-325 mg per tablet Take 1 tablet by mouth every 6 (six) hours as needed for Pain. Pt states takes PRN for headaches (Patient not taking: Reported on 12/3/2020) 28 tablet 0     Facility-Administered Encounter Medications as of 12/3/2020   Medication Dose Route Frequency Provider Last Rate Last Dose    sodium chloride 0.9% flush 5 mL  5 mL Intravenous PRN Aleksandra Taylor NP           Allergies:  Review of patient's allergies indicates:   Allergen Reactions    Morphine Nausea And Vomiting    Succinylcholine      Other reaction(s): v tach       Health Maintenance:  Immunization History   Administered Date(s) Administered    Influenza 11/04/2008, 10/06/2018    Influenza (FLUAD) - Quadrivalent - Adjuvanted - PF *Preferred* (65+) 10/09/2020    Influenza (FLUAD) - Trivalent - Adjuvanted - PF (65+) 10/09/2018    Influenza - High  "Dose - PF (65 years and older) 01/04/2016, 11/03/2016, 09/28/2017, 10/30/2019    Pneumococcal Conjugate - 13 Valent 01/04/2016    Tdap 02/13/2020      Health Maintenance   Topic Date Due    DEXA SCAN  03/07/1990    Pneumococcal Vaccine (65+ High/Highest Risk) (2 of 2 - PPSV23) 02/29/2016    Mammogram  10/28/2022    Lipid Panel  01/23/2025    TETANUS VACCINE  02/13/2030    Hepatitis C Screening  Completed        Physical Exam      Vital Signs  Temp: 97.9 °F (36.6 °C)  Temp src: Oral  Pulse: 62  SpO2: 97 %  BP: 118/70  BP Location: Left arm  Patient Position: Sitting  Pain Score: 0-No pain  Height and Weight  Height: 5' 8" (172.7 cm)  Weight: 78.8 kg (173 lb 11.6 oz)  BSA (Calculated - sq m): 1.94 sq meters  BMI (Calculated): 26.4  Weight in (lb) to have BMI = 25: 164.1]    Physical Exam  Vitals signs reviewed.   Constitutional:       Appearance: She is well-developed.   HENT:      Head: Normocephalic and atraumatic.      Right Ear: External ear normal.      Left Ear: External ear normal.   Eyes:      Conjunctiva/sclera: Conjunctivae normal.      Pupils: Pupils are equal, round, and reactive to light.   Neck:      Vascular: No carotid bruit.   Cardiovascular:      Rate and Rhythm: Normal rate and regular rhythm.      Heart sounds: Normal heart sounds. No murmur.   Pulmonary:      Effort: Pulmonary effort is normal.      Breath sounds: Normal breath sounds. No wheezing or rales.   Abdominal:      General: Bowel sounds are normal. There is no distension.      Palpations: Abdomen is soft.      Tenderness: There is no abdominal tenderness.          Laboratory:  CBC:  Recent Labs   Lab 08/15/19  0323 01/23/20  0933 11/19/20  1026   WBC 3.80 L 3.40 L 3.59 L   RBC 3.80 L 4.38 4.26   Hemoglobin 12.1 13.6 13.3   Hematocrit 37.4 43.2 43.0   Platelets 116 L 154 136 L   MCV 98 99 H 101 H   MCH 31.8 H 31.1 H 31.2 H   MCHC 32.4 31.5 L 30.9 L     CMP:  Recent Labs   Lab 01/23/20  0933 11/19/20  1026   Glucose 91 93   Calcium " 9.6 8.7   Albumin 4.3  --    Total Protein 6.9  --    Sodium 141 141   Potassium 4.2 4.6   CO2 27 28   Chloride 106 106   BUN 21 13   Alkaline Phosphatase 63  --    ALT 16  --    AST 21  --    Total Bilirubin 0.8  --      URINALYSIS:  Recent Labs   Lab 10/09/20  1555   Color, UA Yellow   Specific Gravity, UA 1.030   pH, UA 5.0   Protein, UA Negative   Bacteria Occasional   Nitrite, UA Negative   Leukocytes, UA Negative      LIPIDS:  Recent Labs   Lab 08/12/19  1656 01/23/20  0933   TSH 1.854 2.533   HDL  --  75   Cholesterol  --  210 H   Triglycerides  --  80   LDL Cholesterol  --  119.0   HDL/Cholesterol Ratio  --  35.7   Non-HDL Cholesterol  --  135   Total Cholesterol/HDL Ratio  --  2.8     TSH:  Recent Labs   Lab 08/12/19 1656 01/23/20  0933   TSH 1.854 2.533     A1C:        Assessment/Plan     Zoë Enrique is a 70 y.o.female with:    1. Cough  - Ambulatory referral/consult to Pulmonology; Future    2. Bronchiectasis without complication  - Ambulatory referral/consult to Pulmonology; Future    3. Abnormal CT of the chest  - Ambulatory referral/consult to Pulmonology; Future  Will refer to pulm for evaluation and AFB cultures.  Will consider giving antibiotic for this superimposed URTI if it won't affect sensitivities of AFB cultures.     Praveen Jeffery MD  [facility]      Answers for HPI/ROS submitted by the patient on 12/1/2020   Cough  Chronicity: new  Onset: 1 to 4 weeks ago  Progression since onset: gradually worsening  Frequency: hourly  Cough characteristics: productive of sputum  ear congestion: No  nasal congestion: Yes  postnasal drip: Yes  rhinorrhea: No  sweats: No  Aggravated by: exercise, lying down  asthma: No  bronchiectasis: No  bronchitis: Yes  COPD: No  emphysema: No  pneumonia: Yes  Treatments tried: OTC cough suppressant, body position changes, rest  Improvement on treatment: mild

## 2020-12-05 ENCOUNTER — LAB VISIT (OUTPATIENT)
Dept: LAB | Facility: HOSPITAL | Age: 70
End: 2020-12-05
Attending: INTERNAL MEDICINE
Payer: MEDICARE

## 2020-12-05 DIAGNOSIS — Z12.11 SCREEN FOR COLON CANCER: ICD-10-CM

## 2020-12-05 PROCEDURE — 82274 ASSAY TEST FOR BLOOD FECAL: CPT | Mod: HCNC

## 2020-12-07 NOTE — PROGRESS NOTES
CC:  Bronchiectasis  New patient    PI:  71 yo F referred for bronchiectasis.    There is no family history of pulmonary disease.There is no prior history of pneumonia, bronchitis, emphysema, TB, pneumothorax, rib fracture, or pleural effusion.  There is no Hx of asthma, allergies, or sinus dz. She had CT prior to ablation which showed RML bronchiectasis.  She had pneumonia as a child <12 years requiring hosp.  No pneumonia as adult.  No Hx TB but had positive TB skin test years ago and CXR was neg.  No Tx. She has periodic bronchitis.  No Hx emphysema, COPD.  No PTX, rib fx.  Tobacco- LLNS.  Hse has a many year Hx of chronic cough which she attributes to sinus drainage.  Cough is worse with recumbency.  Sputum is clear.  No blood.  She notes CHAIDEZ at 1 block walking.  Thought it would improve with ablation but didn't.  No nocuturnal SOB.  No Hx wt loss.  She has noted hot flashes over past 2 years.  No Hx HIV.      PH:  Long standing atrial fibrillation.  Had ablation procedure 11/24/20.  Apparetnly had cardiac cath showing no sign CAD 2014.    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  No history of toxic exposures.  Patient is currently unemployed.    Meds:  Amiodarone on x 2 weeks. 200 mg daily.    Review of Systems   Constitution: Negative. Negative for malaise/fatigue.   HENT: Negative.  Negative for ear pain and tinnitus.    Eyes: Negative for blurred vision.   Cardiovascular: Positive for palpitations. Negative for near-syncope and syncope.   Respiratory: above    Endocrine: Negative.  Negative for polyuria.   Hematologic/Lymphatic: Does not bruise/bleed easily.   Skin: Negative.  Negative for rash.   Musculoskeletal: Negative.  Negative for joint pain and muscle weakness.   Gastrointestinal:  Negative.  Negative for abdominal pain and change in bowel habit.   Genitourinary: Negative for frequency.   Neurological: Negative.  Negative for dizziness and weakness.   Psychiatric/Behavioral: Negative.  Negative for depression. The patient is not nervous/anxious.    Allergic/Immunologic: Negative for environmental allergies.      Physcial Examination:      Radiology:  CTA chest 11/3/20:  Aorta: Left-sided aortic arch with 3 arterial branches. The aorta maintains normal caliber, contour and course. There is no detectable calcification in the thoracic aorta or coronary arteries of this 70-year-old woman noting that small amounts of calcium could be obscured by the presence of contrast medium..     Heart/pericardium, mediastinum and brendan:     -The heart has a transverse configuration with the interventricular septum in paracoronal orientation (axial series 2, images 300 through 342).  The left ventricular apex is directed at the left anterior axillary line. This represents a normal anatomic variant reflecting the patient's narrow sagittal dimension.     -By echocardiography there is left and right atrial enlargement.     -There is a small amount of pericardial fluid in the sleeve of pericardium that lies along the right inferior pulmonary vein.  This pericardial fluid extends cephalad, medial to the bronchus intermedius and posterior to the right pulmonary artery (axial series 2, image 233.     -No significant pericardial fluid elsewhere.  No pericardial calcification.     -No mediastinal or hilar darlene enlargement.     Pulmonary arteries: Pulmonary arteries distribute normally. Pulmonary arteries are sufficiently opacified for diagnostic assessment.  There no pulmonary thromboembolism or other filling defect and no pulmonary infarct..     Pulmonary veins, left atrium and esophagus:     There are four pulmonary veins that return to the left atrium. Measurements of the pulmonary veins:     LSPV + LIPV: Left  pulmonary veins form a common confluence, a normal congenital variant.     1.6 cm x 2.0 cm.  (Ax SE 2 image #233; cor  image # 99).     RIPV:1.6 cm x 1.7 cm.  (Ax SE 2 image #266; cor  image # 95).     RSPV:1.6 cm x 1.8 cm.  (Ax SE 2 image #254; cor  image # 85).     Please note that the SVC is filled with concentrated contrast medium on images acquired during pulmonary venous enhancement.  There is bright opacification in or overlying the left atrium at the expected location of the atrial septum between the cephalad right atrium and left atrium (see axial series 2, image 251 and coronal series 602, image 85).  This may represent artifact especially given the lack of enlargement of pulmonary vessels and would be expected in the setting of ASD.  There is also no evidence of partial anomalous pulmonary venous return.  However if there is concern for possible ASD and particularly sinus venosus type ASD, consider echocardiogram with bubble study for further assessment.     Bright contrast medium also courses through the right atrium and posterior to the left atrium (axial series 2, image 292); this may represent streaming of refluxed contrast within the cephalad aspect of the IVC.     Esophagus: The esophagus is closely applied to the posterior wall of the left superior and inferior pulmonary veins and their common confluence as well as the posterior wall of the left atrium.  Is remote from the right inferior and superior pulmonary veins.     Pleura: No pleural fluid.No pleural calcification.     Upper Abdomen: No abnormality of the partially imaged upper abdomen.     Thoracic soft tissues: Normal. Both breasts are present.  There are bilateral breast implants with capsular calcification.     Bones: No acute fracture. No suspicious lytic or sclerotic lesion.     Central airways: Patent with normal distribution.     Lungs:     -There is tubular bronchiectasis in the right middle lobe with retained  "secretions.  Appearance and location are typical for nontuberculous mycobacterium avium infection of these airways ("[place] syndrome").  This infection is often associated with cough.  Otherwise intraparenchymal airways appear unremarkable.     -Mild subsegmental atelectasis in the periphery of the middle lobe and the inferior segment of the lingula, likely not significant.    Pulmonary Function  No PFTs in Epic.    Labs:  Quantiferon neg 2/5/20    Physical Exam  Vitals    HEENT Clear    Neck Supple .  No jvd    Chest clear A and P, even with FVC.  No wheeze    Heart RR    Abdomen soft    Extrem  No edema, clubbing    Assesswment:  Right middle lobe bronchiectasis-  In patient w/o prior hx of pneumonia, lung injury most common cause is atypical NTM, usually MAC.  Other possibilities include HIV, alpha 1 AT defic.    Plan:  Sputum AFB x 3  PFTs    Even if proven to be MAC, may not need to treat unless symptoms worsen.       "

## 2020-12-08 ENCOUNTER — OFFICE VISIT (OUTPATIENT)
Dept: PULMONOLOGY | Facility: CLINIC | Age: 70
End: 2020-12-08
Payer: MEDICARE

## 2020-12-08 VITALS
DIASTOLIC BLOOD PRESSURE: 79 MMHG | SYSTOLIC BLOOD PRESSURE: 130 MMHG | HEART RATE: 63 BPM | WEIGHT: 174.38 LBS | HEIGHT: 68 IN | BODY MASS INDEX: 26.43 KG/M2

## 2020-12-08 DIAGNOSIS — R05.9 COUGH: ICD-10-CM

## 2020-12-08 DIAGNOSIS — R93.89 ABNORMAL CT OF THE CHEST: ICD-10-CM

## 2020-12-08 DIAGNOSIS — J47.9 BRONCHIECTASIS WITHOUT COMPLICATION: ICD-10-CM

## 2020-12-08 LAB — HEMOCCULT STL QL IA: POSITIVE

## 2020-12-08 PROCEDURE — 1159F PR MEDICATION LIST DOCUMENTED IN MEDICAL RECORD: ICD-10-PCS | Mod: HCNC,S$GLB,, | Performed by: INTERNAL MEDICINE

## 2020-12-08 PROCEDURE — 3008F BODY MASS INDEX DOCD: CPT | Mod: HCNC,CPTII,S$GLB, | Performed by: INTERNAL MEDICINE

## 2020-12-08 PROCEDURE — 99203 PR OFFICE/OUTPT VISIT, NEW, LEVL III, 30-44 MIN: ICD-10-PCS | Mod: HCNC,S$GLB,, | Performed by: INTERNAL MEDICINE

## 2020-12-08 PROCEDURE — 99203 OFFICE O/P NEW LOW 30 MIN: CPT | Mod: HCNC,S$GLB,, | Performed by: INTERNAL MEDICINE

## 2020-12-08 PROCEDURE — 3008F PR BODY MASS INDEX (BMI) DOCUMENTED: ICD-10-PCS | Mod: HCNC,CPTII,S$GLB, | Performed by: INTERNAL MEDICINE

## 2020-12-08 PROCEDURE — 99999 PR PBB SHADOW E&M-EST. PATIENT-LVL IV: CPT | Mod: PBBFAC,HCNC,, | Performed by: INTERNAL MEDICINE

## 2020-12-08 PROCEDURE — 99999 PR PBB SHADOW E&M-EST. PATIENT-LVL IV: ICD-10-PCS | Mod: PBBFAC,HCNC,, | Performed by: INTERNAL MEDICINE

## 2020-12-08 PROCEDURE — 1159F MED LIST DOCD IN RCRD: CPT | Mod: HCNC,S$GLB,, | Performed by: INTERNAL MEDICINE

## 2020-12-08 NOTE — LETTER
December 8, 2020      Praveen Jeffery MD  [address]  [place] [address]           [facility]  [id number]  [place] [address]          Patient: Zoë Enrique   MR Number: [id number]   YOB: 1950   Date of Visit: 12/8/2020       Dear Dr. Praveen Jeffery:    Thank you for referring Zoë Enrique to me for evaluation. Attached you will find relevant portions of my assessment and plan of care.    If you have questions, please do not hesitate to call me. I look forward to following Zoë Enrique along with you.    Sincerely,    Franklin Ortiz MD    Enclosure  CC:  No Recipients    If you would like to receive this communication electronically, please contact [email or web address] or [phone] to request more information on Pull Link access.    For providers and/or their staff who would like to refer a patient to [facility], please contact us through our one-stop-shop provider referral line, Annmarie Bashir, at [phone].    If you feel you have received this communication in error or would no longer like to receive these types of communications, please e-mail [email or web address]

## 2020-12-08 NOTE — PATIENT INSTRUCTIONS
Sputum Culture  Does this test have other names?  No.  What is this test?  This test finds out what's causing your lung infection. Sputum, or phlegm, is the mucus that settles in the lower airways of your lungs when you have an infection or a chronic illness.  A lung infection like pneumonia can cause you to cough up phlegm. Other conditions, including bronchiectasis and chronic obstructive pulmonary disease (COPD), can also cause coughing and make it difficult to breathe. This test helps your healthcare provider find out whether the buildup of phlegm in your lungs is caused by bacteria, fungi, or another microorganism. Knowing the cause of your infection can help your provider decide on the right treatment for you.  Why do I need this test?  You may have this test if your healthcare provider suspects that you have a lung infection, such as pneumonia, especially if you're coughing up a lot of phlegm.  You may also have this test if you have severe pneumonia and are admitted to the hospital.  What other tests might I have along with this test?  Your healthcare provider may also order an X-ray, blood culture, sputum Gram stain, or urine antigen test to find the source of your infection.  If your provider suspects a chronic lung condition, you may have other tests, including:  · CT scan  · Pulmonary function test to see how well your lungs work  · Complete blood count  · Erythrocyte sedimentation rate and C-reactive protein tests to look for inflammation  What do my test results mean?  Many things may affect your lab test results. These include the method each lab uses to do the test. Even if your test results are different from the normal value, you may not have a problem. To learn what the results mean for you, talk with your healthcare provider.  Normal results are negative, meaning you don't have a lung infection. A positive result means bacteria were found, but it may not pinpoint the exact cause of your  condition. Some bacteria are normally found in the lungs and cause no problems. Your healthcare provider may have to do other tests to a make a diagnosis.  How is this test done?  The test requires a sputum sample from deep inside your lungs. You may need to rinse your mouth with water first, then cough up enough mucus for a culture sample to be taken. If you can't cough up enough fluid, your healthcare provider may use a bronchoscope to get a sample for testing. This instrument is a narrow, flexible tube with a light on it.  Does this test pose any risks?  This test poses no known risks.  What might affect my test results?  Taking antibiotics can cause a false-negative result, because these medicines may kill the bacteria causing the problem.  How do I prepare for the test?  You may need to stop taking antibiotics before the test. You may also need to avoid eating for 1 to 2 hours before the test, or longer if your healthcare provider plans to use a bronchoscope to get the sample.     © 4612-2715 [employer]. [address] [place] [address], [place] [address]. All rights reserved. This information is not intended as a substitute for professional medical care. Always follow your healthcare professional's instructions.

## 2020-12-09 ENCOUNTER — TELEPHONE (OUTPATIENT)
Dept: FAMILY MEDICINE | Facility: CLINIC | Age: 70
End: 2020-12-09

## 2020-12-09 DIAGNOSIS — R19.5 POSITIVE OCCULT STOOL BLOOD TEST: Primary | ICD-10-CM

## 2020-12-09 NOTE — TELEPHONE ENCOUNTER
----- Message from Moriah Romero MA sent at 12/9/2020 11:00 AM CST -----  Regarding: returning call  Type:  Patient Returning Call    Who Called: pt   Who Left Message for Patient: unknown   Does the patient know what this is regarding?:results   Would the patient rather a call back or a response via [facility]? Call back   Best Call Back Number:[phone]  Additional Information: none

## 2020-12-11 ENCOUNTER — TELEPHONE (OUTPATIENT)
Dept: ORTHOPEDICS | Facility: CLINIC | Age: 70
End: 2020-12-11

## 2020-12-11 ENCOUNTER — LAB VISIT (OUTPATIENT)
Dept: LAB | Facility: HOSPITAL | Age: 70
End: 2020-12-11
Attending: INTERNAL MEDICINE
Payer: MEDICARE

## 2020-12-11 DIAGNOSIS — J47.9 BRONCHIECTASIS WITHOUT COMPLICATION: ICD-10-CM

## 2020-12-11 PROCEDURE — 87116 MYCOBACTERIA CULTURE: CPT | Mod: 59,HCNC

## 2020-12-11 PROCEDURE — 87015 SPECIMEN INFECT AGNT CONCNTJ: CPT | Mod: 59,HCNC

## 2020-12-11 PROCEDURE — 87206 SMEAR FLUORESCENT/ACID STAI: CPT | Mod: 91,HCNC

## 2020-12-11 NOTE — TELEPHONE ENCOUNTER
----- Message from Stephanie King sent at 12/11/2020 12:10 PM CST -----  Regarding: appt  Contact: pt  Pt called to reschedule please contact pt epic would not bring up schedule        [phone]

## 2020-12-13 ENCOUNTER — LAB VISIT (OUTPATIENT)
Dept: SPORTS MEDICINE | Facility: CLINIC | Age: 70
End: 2020-12-13
Payer: MEDICARE

## 2020-12-13 DIAGNOSIS — Z13.9 SCREENING PROCEDURE: ICD-10-CM

## 2020-12-13 PROCEDURE — U0003 INFECTIOUS AGENT DETECTION BY NUCLEIC ACID (DNA OR RNA); SEVERE ACUTE RESPIRATORY SYNDROME CORONAVIRUS 2 (SARS-COV-2) (CORONAVIRUS DISEASE [COVID-19]), AMPLIFIED PROBE TECHNIQUE, MAKING USE OF HIGH THROUGHPUT TECHNOLOGIES AS DESCRIBED BY CMS-2020-01-R: HCPCS | Mod: HCNC

## 2020-12-14 ENCOUNTER — OFFICE VISIT (OUTPATIENT)
Dept: ORTHOPEDICS | Facility: CLINIC | Age: 70
End: 2020-12-14
Payer: MEDICARE

## 2020-12-14 VITALS
BODY MASS INDEX: 26.43 KG/M2 | HEART RATE: 76 BPM | HEIGHT: 68 IN | WEIGHT: 174.38 LBS | SYSTOLIC BLOOD PRESSURE: 111 MMHG | DIASTOLIC BLOOD PRESSURE: 70 MMHG

## 2020-12-14 DIAGNOSIS — M65.4 TENDINITIS, DE QUERVAIN'S: Primary | ICD-10-CM

## 2020-12-14 LAB — SARS-COV-2 RNA RESP QL NAA+PROBE: NOT DETECTED

## 2020-12-14 PROCEDURE — 99213 PR OFFICE/OUTPT VISIT, EST, LEVL III, 20-29 MIN: ICD-10-PCS | Mod: HCNC,25,S$GLB, | Performed by: PHYSICIAN ASSISTANT

## 2020-12-14 PROCEDURE — 1101F PR PT FALLS ASSESS DOC 0-1 FALLS W/OUT INJ PAST YR: ICD-10-PCS | Mod: HCNC,CPTII,S$GLB, | Performed by: PHYSICIAN ASSISTANT

## 2020-12-14 PROCEDURE — 99999 PR PBB SHADOW E&M-EST. PATIENT-LVL IV: CPT | Mod: PBBFAC,HCNC,, | Performed by: PHYSICIAN ASSISTANT

## 2020-12-14 PROCEDURE — 3008F PR BODY MASS INDEX (BMI) DOCUMENTED: ICD-10-PCS | Mod: HCNC,CPTII,S$GLB, | Performed by: PHYSICIAN ASSISTANT

## 2020-12-14 PROCEDURE — 3008F BODY MASS INDEX DOCD: CPT | Mod: HCNC,CPTII,S$GLB, | Performed by: PHYSICIAN ASSISTANT

## 2020-12-14 PROCEDURE — 20550 TENDON SHEATH: ICD-10-PCS | Mod: HCNC,LT,S$GLB, | Performed by: PHYSICIAN ASSISTANT

## 2020-12-14 PROCEDURE — 20550 NJX 1 TENDON SHEATH/LIGAMENT: CPT | Mod: HCNC,LT,S$GLB, | Performed by: PHYSICIAN ASSISTANT

## 2020-12-14 PROCEDURE — 1125F AMNT PAIN NOTED PAIN PRSNT: CPT | Mod: HCNC,S$GLB,, | Performed by: PHYSICIAN ASSISTANT

## 2020-12-14 PROCEDURE — 99213 OFFICE O/P EST LOW 20 MIN: CPT | Mod: HCNC,25,S$GLB, | Performed by: PHYSICIAN ASSISTANT

## 2020-12-14 PROCEDURE — 99999 PR PBB SHADOW E&M-EST. PATIENT-LVL IV: ICD-10-PCS | Mod: PBBFAC,HCNC,, | Performed by: PHYSICIAN ASSISTANT

## 2020-12-14 PROCEDURE — 1159F MED LIST DOCD IN RCRD: CPT | Mod: HCNC,S$GLB,, | Performed by: PHYSICIAN ASSISTANT

## 2020-12-14 PROCEDURE — 1125F PR PAIN SEVERITY QUANTIFIED, PAIN PRESENT: ICD-10-PCS | Mod: HCNC,S$GLB,, | Performed by: PHYSICIAN ASSISTANT

## 2020-12-14 PROCEDURE — 1101F PT FALLS ASSESS-DOCD LE1/YR: CPT | Mod: HCNC,CPTII,S$GLB, | Performed by: PHYSICIAN ASSISTANT

## 2020-12-14 PROCEDURE — 1159F PR MEDICATION LIST DOCUMENTED IN MEDICAL RECORD: ICD-10-PCS | Mod: HCNC,S$GLB,, | Performed by: PHYSICIAN ASSISTANT

## 2020-12-14 PROCEDURE — 3288F PR FALLS RISK ASSESSMENT DOCUMENTED: ICD-10-PCS | Mod: HCNC,CPTII,S$GLB, | Performed by: PHYSICIAN ASSISTANT

## 2020-12-14 PROCEDURE — 3288F FALL RISK ASSESSMENT DOCD: CPT | Mod: HCNC,CPTII,S$GLB, | Performed by: PHYSICIAN ASSISTANT

## 2020-12-14 RX ORDER — TRIAMCINOLONE ACETONIDE 40 MG/ML
40 INJECTION, SUSPENSION INTRA-ARTICULAR; INTRAMUSCULAR
Status: COMPLETED | OUTPATIENT
Start: 2020-12-14 | End: 2020-12-14

## 2020-12-14 RX ADMIN — TRIAMCINOLONE ACETONIDE 40 MG: 40 INJECTION, SUSPENSION INTRA-ARTICULAR; INTRAMUSCULAR at 02:12

## 2020-12-14 NOTE — PROCEDURES
Tendon Sheath    Date/Time: 12/14/2020 1:30 PM  Performed by: Tj Godwin PA-C  Authorized by: Tj Godwin PA-C       Additional Comments: PROCEDURE:  I have explained the risks, benefits, and alternatives of the procedure in detail.  The patient voices understanding, gives consent, and all questions have been answered.  Pause for timeout. A sterile prep of the skin performed, then the [id number] dorsal compartment is injected from the radial approach using a 25 gauge needle with a combination of 0.5 cc 1% plain xylocaine and 20 mg of Kenolog.  The remaining 20 mg of Kenolog was properly wasted.   The patient is cautioned and immediate relief of pain is secondary to the local anesthetic and will be temporary.  After the anesthetic wears off there may be a increase in pain that may last for a few hours or a few days and they should use ice to help alleviate this flair up of pain. Patient tolerated the procedure well.

## 2020-12-16 ENCOUNTER — HOSPITAL ENCOUNTER (OUTPATIENT)
Dept: PULMONOLOGY | Facility: CLINIC | Age: 70
Discharge: HOME | End: 2020-12-16
Payer: MEDICARE

## 2020-12-16 DIAGNOSIS — R05.9 COUGH: Primary | ICD-10-CM

## 2020-12-16 DIAGNOSIS — J47.9 BRONCHIECTASIS WITHOUT COMPLICATION: ICD-10-CM

## 2020-12-16 PROCEDURE — 94729 DIFFUSING CAPACITY: CPT | Mod: HCNC,S$GLB,, | Performed by: INTERNAL MEDICINE

## 2020-12-16 PROCEDURE — 94729 PR C02/MEMBANE DIFFUSE CAPACITY: ICD-10-PCS | Mod: HCNC,S$GLB,, | Performed by: INTERNAL MEDICINE

## 2020-12-16 PROCEDURE — 94727 PR PULM FUNCTION TEST BY GAS: ICD-10-PCS | Mod: HCNC,S$GLB,, | Performed by: INTERNAL MEDICINE

## 2020-12-16 PROCEDURE — 94010 BREATHING CAPACITY TEST: ICD-10-PCS | Mod: HCNC,S$GLB,, | Performed by: INTERNAL MEDICINE

## 2020-12-16 PROCEDURE — 94727 GAS DIL/WSHOT DETER LNG VOL: CPT | Mod: HCNC,S$GLB,, | Performed by: INTERNAL MEDICINE

## 2020-12-16 PROCEDURE — 94010 BREATHING CAPACITY TEST: CPT | Mod: HCNC,S$GLB,, | Performed by: INTERNAL MEDICINE

## 2020-12-16 NOTE — PROGRESS NOTES
Subjective:      Patient ID: Zoë Enrique is a 70 y.o. female.    Chief Complaint: Wrist Pain (left ) and Injections      HPI: Zoë Enrique is a 70-year-old female in clinic today for follow-up of de Quervain tenosynovitis of the left wrist.  Patient has been treated for this in the past with injection, the last of which coming in July.  Patient reports that symptoms improved greatly with the injection, but they have since returned.  She would like to receive another injection today.  At her last visit she also received injection for trochanteric bursitis of the right hip, but patient reports that symptoms of this have completely resolved at this time    Past Medical History:   Diagnosis Date    Abnormal Pap smear of cervix     Atrial fibrillation 09/2017    Atrial flutter     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    Diverticulosis 1998    Hx of atrial flutter     Hx of mitral valve prolapse 1990    Migraines        Current Outpatient Medications:     amiodarone (PACERONE) 200 MG Tab, Take 1 tablet (200 mg total) by mouth once daily. STEP 2: MAINTENANCE DOSE, Disp: 30 tablet, Rfl: 11    CALCIUM CARB/VIT D3/MINERALS (CALTRATE PLUS ORAL), Take by mouth. Takes two chews daily, Disp: , Rfl:     levothyroxine (SYNTHROID) 25 MCG tablet, TAKE 1 TABLET EVERY DAY, Disp: 90 tablet, Rfl: 2    pantoprazole (PROTONIX) 40 MG tablet, Take 1 tablet (40 mg total) by mouth once daily. Take for 30 days post-ablation, then discontinue. No refills., Disp: 30 tablet, Rfl: 0    rivaroxaban (XARELTO) 20 mg Tab, Take 1 tablet (20 mg total) by mouth once daily., Disp: 90 tablet, Rfl: 3    vit C/E/Zn/coppr/lutein/zeaxan (PRESERVISION AREDS-2 ORAL), , Disp: , Rfl:     acyclovir (ZOVIRAX) 400 MG tablet, Take 1 tablet (400 mg total) by mouth 3 (three) times daily as needed. (Patient not taking: Reported on 12/14/2020), Disp: 180 tablet, Rfl: 1    diclofenac sodium (VOLTAREN) 1 % Gel, Apply 2 g topically once daily.  "(Patient not taking: Reported on 12/14/2020), Disp: 100 g, Rfl: 2    docusate sodium (COLACE) 250 MG capsule, Take 250 mg by mouth 2 (two) times daily. , Disp: , Rfl:     estradioL (ESTRACE) 0.01 % (0.1 mg/gram) vaginal cream, Place 0.5 g vaginally twice a week. Insert nightly x 2 weeks then twice weekly (Patient not taking: Reported on 12/14/2020), Disp: 42 g, Rfl: 4    fluorouraciL (EFUDEX) 5 % cream, Apply thin layer to spot on right cheek with qtip x 4-6 weeks. (Patient not taking: Reported on 12/14/2020), Disp: 40 g, Rfl: 0    HYDROcodone-acetaminophen (NORCO) 5-325 mg per tablet, Take 1 tablet by mouth every 6 (six) hours as needed for Pain. Pt states takes PRN for headaches (Patient not taking: Reported on 12/14/2020), Disp: 28 tablet, Rfl: 0    melatonin 5 mg Cap, Take 5 mg by mouth as needed. , Disp: , Rfl:     testosterone (TESTIM) 50 mg/5 gram (1 %) Gel, Apply 0.25 g topically once daily. (Patient not taking: Reported on 12/14/2020), Disp: 15 g, Rfl: 5    UNABLE TO FIND, Diclofenac 0.15%, Lidocaine 2.25%, Prilocaine 2.25% in Compounded Topical Cream to be applied up to 5x/day as needed for headaches. (Patient not taking: Reported on 12/14/2020), Disp: , Rfl:   No current facility-administered medications for this visit.     Facility-Administered Medications Ordered in Other Visits:     sodium chloride 0.9% flush 5 mL, 5 mL, Intravenous, PRN, Aleksandra Taylor NP  Review of patient's allergies indicates:   Allergen Reactions    Morphine Nausea And Vomiting    Succinylcholine      Other reaction(s): v tach       /70   Pulse 76   Ht 5' 8" (1.727 m)   Wt 79.1 kg (174 lb 6.1 oz)   BMI 26.51 kg/m²     ROS      Objective:    Ortho Exam   Left wrist:  TTP over the 1st dorsal compartment  Mild swelling noted  Positive Finkelstein  Sensation and pulses intact    GEN: Well developed, well nourished female. AAOX3. No acute distress.   Normocephalic, atraumatic.   LISA  Breathing " unlabored.  Mood and affect appropriate.        Assessment:     Imaging:  No new imaging ordered today        1. Tendinitis, de Quervain's          Plan:       Recommended and performed corticosteroid injection of the 1st dorsal compartment of the left wrist for de Quervain tenosynovitis.  Patient tolerated the procedure well.  I have discussed pathophysiology of this problem with the patient and she understands that a surgery may be necessary to completely resolve her symptoms.  Patient will follow up in 6 weeks for further evaluation    Orders Placed This Encounter    Tendon Sheath    triamcinolone acetonide injection 40 mg     Follow up in about 6 weeks (around 1/25/2021).

## 2020-12-28 ENCOUNTER — PATIENT OUTREACH (OUTPATIENT)
Dept: ADMINISTRATIVE | Facility: OTHER | Age: 70
End: 2020-12-28

## 2020-12-29 ENCOUNTER — PES CALL (OUTPATIENT)
Dept: ADMINISTRATIVE | Facility: CLINIC | Age: 70
End: 2020-12-29

## 2020-12-29 NOTE — PROGRESS NOTES
Requested updates within Care Everywhere.  Patient's chart was reviewed for overdue TO topics.  Open referral to Gastro. Appt 1/07/21 for positive fitkit 12/08/20  Immunizations reconciled.

## 2021-01-04 ENCOUNTER — OFFICE VISIT (OUTPATIENT)
Dept: DERMATOLOGY | Facility: CLINIC | Age: 71
End: 2021-01-04
Payer: MEDICARE

## 2021-01-04 DIAGNOSIS — C44.91 SUPERFICIAL BASAL CELL CARCINOMA (BCC): Primary | ICD-10-CM

## 2021-01-04 DIAGNOSIS — D22.9 ATYPICAL NEVUS: ICD-10-CM

## 2021-01-04 PROCEDURE — 99999 PR PBB SHADOW E&M-EST. PATIENT-LVL II: CPT | Mod: PBBFAC,HCNC,, | Performed by: DERMATOLOGY

## 2021-01-04 PROCEDURE — 1159F PR MEDICATION LIST DOCUMENTED IN MEDICAL RECORD: ICD-10-PCS | Mod: HCNC,S$GLB,, | Performed by: DERMATOLOGY

## 2021-01-04 PROCEDURE — 3288F FALL RISK ASSESSMENT DOCD: CPT | Mod: HCNC,CPTII,S$GLB, | Performed by: DERMATOLOGY

## 2021-01-04 PROCEDURE — 99212 OFFICE O/P EST SF 10 MIN: CPT | Mod: HCNC,S$GLB,, | Performed by: DERMATOLOGY

## 2021-01-04 PROCEDURE — 1126F PR PAIN SEVERITY QUANTIFIED, NO PAIN PRESENT: ICD-10-PCS | Mod: HCNC,S$GLB,, | Performed by: DERMATOLOGY

## 2021-01-04 PROCEDURE — 99999 PR PBB SHADOW E&M-EST. PATIENT-LVL II: ICD-10-PCS | Mod: PBBFAC,HCNC,, | Performed by: DERMATOLOGY

## 2021-01-04 PROCEDURE — 3288F PR FALLS RISK ASSESSMENT DOCUMENTED: ICD-10-PCS | Mod: HCNC,CPTII,S$GLB, | Performed by: DERMATOLOGY

## 2021-01-04 PROCEDURE — 1126F AMNT PAIN NOTED NONE PRSNT: CPT | Mod: HCNC,S$GLB,, | Performed by: DERMATOLOGY

## 2021-01-04 PROCEDURE — 1101F PT FALLS ASSESS-DOCD LE1/YR: CPT | Mod: HCNC,CPTII,S$GLB, | Performed by: DERMATOLOGY

## 2021-01-04 PROCEDURE — 1159F MED LIST DOCD IN RCRD: CPT | Mod: HCNC,S$GLB,, | Performed by: DERMATOLOGY

## 2021-01-04 PROCEDURE — 1101F PR PT FALLS ASSESS DOC 0-1 FALLS W/OUT INJ PAST YR: ICD-10-PCS | Mod: HCNC,CPTII,S$GLB, | Performed by: DERMATOLOGY

## 2021-01-04 PROCEDURE — 99212 PR OFFICE/OUTPT VISIT, EST, LEVL II, 10-19 MIN: ICD-10-PCS | Mod: HCNC,S$GLB,, | Performed by: DERMATOLOGY

## 2021-01-05 ENCOUNTER — OFFICE VISIT (OUTPATIENT)
Dept: PULMONOLOGY | Facility: CLINIC | Age: 71
End: 2021-01-05
Payer: MEDICARE

## 2021-01-05 VITALS
WEIGHT: 173.31 LBS | SYSTOLIC BLOOD PRESSURE: 115 MMHG | BODY MASS INDEX: 26.35 KG/M2 | DIASTOLIC BLOOD PRESSURE: 75 MMHG | OXYGEN SATURATION: 98 % | HEART RATE: 70 BPM

## 2021-01-05 DIAGNOSIS — J47.9 BRONCHIECTASIS WITHOUT COMPLICATION: Primary | ICD-10-CM

## 2021-01-05 PROCEDURE — 99213 PR OFFICE/OUTPT VISIT, EST, LEVL III, 20-29 MIN: ICD-10-PCS | Mod: HCNC,S$GLB,, | Performed by: INTERNAL MEDICINE

## 2021-01-05 PROCEDURE — 1126F PR PAIN SEVERITY QUANTIFIED, NO PAIN PRESENT: ICD-10-PCS | Mod: HCNC,S$GLB,, | Performed by: INTERNAL MEDICINE

## 2021-01-05 PROCEDURE — 99213 OFFICE O/P EST LOW 20 MIN: CPT | Mod: HCNC,S$GLB,, | Performed by: INTERNAL MEDICINE

## 2021-01-05 PROCEDURE — 1101F PT FALLS ASSESS-DOCD LE1/YR: CPT | Mod: HCNC,CPTII,S$GLB, | Performed by: INTERNAL MEDICINE

## 2021-01-05 PROCEDURE — 3008F PR BODY MASS INDEX (BMI) DOCUMENTED: ICD-10-PCS | Mod: HCNC,CPTII,S$GLB, | Performed by: INTERNAL MEDICINE

## 2021-01-05 PROCEDURE — 1159F MED LIST DOCD IN RCRD: CPT | Mod: HCNC,S$GLB,, | Performed by: INTERNAL MEDICINE

## 2021-01-05 PROCEDURE — 3288F FALL RISK ASSESSMENT DOCD: CPT | Mod: HCNC,CPTII,S$GLB, | Performed by: INTERNAL MEDICINE

## 2021-01-05 PROCEDURE — 1101F PR PT FALLS ASSESS DOC 0-1 FALLS W/OUT INJ PAST YR: ICD-10-PCS | Mod: HCNC,CPTII,S$GLB, | Performed by: INTERNAL MEDICINE

## 2021-01-05 PROCEDURE — 1126F AMNT PAIN NOTED NONE PRSNT: CPT | Mod: HCNC,S$GLB,, | Performed by: INTERNAL MEDICINE

## 2021-01-05 PROCEDURE — 1159F PR MEDICATION LIST DOCUMENTED IN MEDICAL RECORD: ICD-10-PCS | Mod: HCNC,S$GLB,, | Performed by: INTERNAL MEDICINE

## 2021-01-05 PROCEDURE — 3288F PR FALLS RISK ASSESSMENT DOCUMENTED: ICD-10-PCS | Mod: HCNC,CPTII,S$GLB, | Performed by: INTERNAL MEDICINE

## 2021-01-05 PROCEDURE — 3008F BODY MASS INDEX DOCD: CPT | Mod: HCNC,CPTII,S$GLB, | Performed by: INTERNAL MEDICINE

## 2021-01-05 PROCEDURE — 99999 PR PBB SHADOW E&M-EST. PATIENT-LVL III: ICD-10-PCS | Mod: PBBFAC,HCNC,, | Performed by: INTERNAL MEDICINE

## 2021-01-05 PROCEDURE — 99999 PR PBB SHADOW E&M-EST. PATIENT-LVL III: CPT | Mod: PBBFAC,HCNC,, | Performed by: INTERNAL MEDICINE

## 2021-01-05 RX ORDER — ALBUTEROL SULFATE 0.83 MG/ML
2.5 SOLUTION RESPIRATORY (INHALATION) 2 TIMES DAILY
Qty: 60 BOX | Refills: 0 | Status: SHIPPED | OUTPATIENT
Start: 2021-01-05 | End: 2021-02-18

## 2021-01-07 ENCOUNTER — OFFICE VISIT (OUTPATIENT)
Dept: GASTROENTEROLOGY | Facility: CLINIC | Age: 71
End: 2021-01-07
Payer: MEDICARE

## 2021-01-07 VITALS
BODY MASS INDEX: 26.13 KG/M2 | HEART RATE: 64 BPM | DIASTOLIC BLOOD PRESSURE: 70 MMHG | WEIGHT: 172.38 LBS | HEIGHT: 68 IN | SYSTOLIC BLOOD PRESSURE: 125 MMHG

## 2021-01-07 DIAGNOSIS — Z12.11 ENCOUNTER FOR SCREENING COLONOSCOPY: Primary | ICD-10-CM

## 2021-01-07 DIAGNOSIS — R13.10 DYSPHAGIA, UNSPECIFIED TYPE: ICD-10-CM

## 2021-01-07 DIAGNOSIS — R19.5 POSITIVE OCCULT STOOL BLOOD TEST: ICD-10-CM

## 2021-01-07 PROCEDURE — 3008F PR BODY MASS INDEX (BMI) DOCUMENTED: ICD-10-PCS | Mod: HCNC,CPTII,S$GLB, | Performed by: NURSE PRACTITIONER

## 2021-01-07 PROCEDURE — 3288F PR FALLS RISK ASSESSMENT DOCUMENTED: ICD-10-PCS | Mod: HCNC,CPTII,S$GLB, | Performed by: NURSE PRACTITIONER

## 2021-01-07 PROCEDURE — 1159F PR MEDICATION LIST DOCUMENTED IN MEDICAL RECORD: ICD-10-PCS | Mod: HCNC,S$GLB,, | Performed by: NURSE PRACTITIONER

## 2021-01-07 PROCEDURE — 99204 PR OFFICE/OUTPT VISIT, NEW, LEVL IV, 45-59 MIN: ICD-10-PCS | Mod: HCNC,S$GLB,, | Performed by: NURSE PRACTITIONER

## 2021-01-07 PROCEDURE — 1101F PT FALLS ASSESS-DOCD LE1/YR: CPT | Mod: HCNC,CPTII,S$GLB, | Performed by: NURSE PRACTITIONER

## 2021-01-07 PROCEDURE — 1159F MED LIST DOCD IN RCRD: CPT | Mod: HCNC,S$GLB,, | Performed by: NURSE PRACTITIONER

## 2021-01-07 PROCEDURE — 1126F PR PAIN SEVERITY QUANTIFIED, NO PAIN PRESENT: ICD-10-PCS | Mod: HCNC,S$GLB,, | Performed by: NURSE PRACTITIONER

## 2021-01-07 PROCEDURE — 3008F BODY MASS INDEX DOCD: CPT | Mod: HCNC,CPTII,S$GLB, | Performed by: NURSE PRACTITIONER

## 2021-01-07 PROCEDURE — 99999 PR PBB SHADOW E&M-EST. PATIENT-LVL IV: ICD-10-PCS | Mod: PBBFAC,HCNC,, | Performed by: NURSE PRACTITIONER

## 2021-01-07 PROCEDURE — 99204 OFFICE O/P NEW MOD 45 MIN: CPT | Mod: HCNC,S$GLB,, | Performed by: NURSE PRACTITIONER

## 2021-01-07 PROCEDURE — 99999 PR PBB SHADOW E&M-EST. PATIENT-LVL IV: CPT | Mod: PBBFAC,HCNC,, | Performed by: NURSE PRACTITIONER

## 2021-01-07 PROCEDURE — 3288F FALL RISK ASSESSMENT DOCD: CPT | Mod: HCNC,CPTII,S$GLB, | Performed by: NURSE PRACTITIONER

## 2021-01-07 PROCEDURE — 1126F AMNT PAIN NOTED NONE PRSNT: CPT | Mod: HCNC,S$GLB,, | Performed by: NURSE PRACTITIONER

## 2021-01-07 PROCEDURE — 1101F PR PT FALLS ASSESS DOC 0-1 FALLS W/OUT INJ PAST YR: ICD-10-PCS | Mod: HCNC,CPTII,S$GLB, | Performed by: NURSE PRACTITIONER

## 2021-01-13 ENCOUNTER — PATIENT MESSAGE (OUTPATIENT)
Dept: ELECTROPHYSIOLOGY | Facility: CLINIC | Age: 71
End: 2021-01-13

## 2021-01-14 ENCOUNTER — TELEPHONE (OUTPATIENT)
Dept: ELECTROPHYSIOLOGY | Facility: CLINIC | Age: 71
End: 2021-01-14

## 2021-01-14 DIAGNOSIS — I48.11 LONGSTANDING PERSISTENT ATRIAL FIBRILLATION: Primary | ICD-10-CM

## 2021-01-21 ENCOUNTER — PATIENT MESSAGE (OUTPATIENT)
Dept: PULMONOLOGY | Facility: CLINIC | Age: 71
End: 2021-01-21

## 2021-01-22 ENCOUNTER — PATIENT MESSAGE (OUTPATIENT)
Dept: ADMINISTRATIVE | Facility: OTHER | Age: 71
End: 2021-01-22

## 2021-01-26 ENCOUNTER — PATIENT MESSAGE (OUTPATIENT)
Dept: ELECTROPHYSIOLOGY | Facility: CLINIC | Age: 71
End: 2021-01-26

## 2021-01-26 DIAGNOSIS — I48.0 PAROXYSMAL ATRIAL FIBRILLATION: Primary | ICD-10-CM

## 2021-01-27 ENCOUNTER — PATIENT MESSAGE (OUTPATIENT)
Dept: OBSTETRICS AND GYNECOLOGY | Facility: CLINIC | Age: 71
End: 2021-01-27

## 2021-01-27 DIAGNOSIS — R35.0 URINARY FREQUENCY: ICD-10-CM

## 2021-01-27 DIAGNOSIS — R35.1 NOCTURIA: Primary | ICD-10-CM

## 2021-01-28 ENCOUNTER — TELEPHONE (OUTPATIENT)
Dept: CARDIOLOGY | Facility: HOSPITAL | Age: 71
End: 2021-01-28

## 2021-01-28 ENCOUNTER — CLINICAL SUPPORT (OUTPATIENT)
Dept: CARDIOLOGY | Facility: HOSPITAL | Age: 71
End: 2021-01-28
Attending: INTERNAL MEDICINE
Payer: MEDICARE

## 2021-01-28 DIAGNOSIS — I48.0 PAROXYSMAL ATRIAL FIBRILLATION: ICD-10-CM

## 2021-02-02 ENCOUNTER — PATIENT OUTREACH (OUTPATIENT)
Dept: ADMINISTRATIVE | Facility: OTHER | Age: 71
End: 2021-02-02

## 2021-02-08 ENCOUNTER — OFFICE VISIT (OUTPATIENT)
Dept: PULMONOLOGY | Facility: CLINIC | Age: 71
End: 2021-02-08
Payer: MEDICARE

## 2021-02-08 VITALS
HEIGHT: 68 IN | DIASTOLIC BLOOD PRESSURE: 67 MMHG | BODY MASS INDEX: 26.3 KG/M2 | HEART RATE: 77 BPM | RESPIRATION RATE: 18 BRPM | SYSTOLIC BLOOD PRESSURE: 104 MMHG | OXYGEN SATURATION: 97 % | WEIGHT: 173.5 LBS

## 2021-02-08 DIAGNOSIS — R05.9 COUGH: Primary | ICD-10-CM

## 2021-02-08 PROCEDURE — 1101F PT FALLS ASSESS-DOCD LE1/YR: CPT | Mod: HCNC,CPTII,S$GLB, | Performed by: INTERNAL MEDICINE

## 2021-02-08 PROCEDURE — 1159F MED LIST DOCD IN RCRD: CPT | Mod: HCNC,S$GLB,, | Performed by: INTERNAL MEDICINE

## 2021-02-08 PROCEDURE — 1126F AMNT PAIN NOTED NONE PRSNT: CPT | Mod: HCNC,S$GLB,, | Performed by: INTERNAL MEDICINE

## 2021-02-08 PROCEDURE — 3008F PR BODY MASS INDEX (BMI) DOCUMENTED: ICD-10-PCS | Mod: HCNC,CPTII,S$GLB, | Performed by: INTERNAL MEDICINE

## 2021-02-08 PROCEDURE — 3008F BODY MASS INDEX DOCD: CPT | Mod: HCNC,CPTII,S$GLB, | Performed by: INTERNAL MEDICINE

## 2021-02-08 PROCEDURE — 1101F PR PT FALLS ASSESS DOC 0-1 FALLS W/OUT INJ PAST YR: ICD-10-PCS | Mod: HCNC,CPTII,S$GLB, | Performed by: INTERNAL MEDICINE

## 2021-02-08 PROCEDURE — 99999 PR PBB SHADOW E&M-EST. PATIENT-LVL IV: ICD-10-PCS | Mod: PBBFAC,HCNC,, | Performed by: INTERNAL MEDICINE

## 2021-02-08 PROCEDURE — 3288F FALL RISK ASSESSMENT DOCD: CPT | Mod: HCNC,CPTII,S$GLB, | Performed by: INTERNAL MEDICINE

## 2021-02-08 PROCEDURE — 99213 OFFICE O/P EST LOW 20 MIN: CPT | Mod: HCNC,S$GLB,, | Performed by: INTERNAL MEDICINE

## 2021-02-08 PROCEDURE — 99213 PR OFFICE/OUTPT VISIT, EST, LEVL III, 20-29 MIN: ICD-10-PCS | Mod: HCNC,S$GLB,, | Performed by: INTERNAL MEDICINE

## 2021-02-08 PROCEDURE — 3288F PR FALLS RISK ASSESSMENT DOCUMENTED: ICD-10-PCS | Mod: HCNC,CPTII,S$GLB, | Performed by: INTERNAL MEDICINE

## 2021-02-08 PROCEDURE — 99999 PR PBB SHADOW E&M-EST. PATIENT-LVL IV: CPT | Mod: PBBFAC,HCNC,, | Performed by: INTERNAL MEDICINE

## 2021-02-08 PROCEDURE — 1126F PR PAIN SEVERITY QUANTIFIED, NO PAIN PRESENT: ICD-10-PCS | Mod: HCNC,S$GLB,, | Performed by: INTERNAL MEDICINE

## 2021-02-08 PROCEDURE — 1159F PR MEDICATION LIST DOCUMENTED IN MEDICAL RECORD: ICD-10-PCS | Mod: HCNC,S$GLB,, | Performed by: INTERNAL MEDICINE

## 2021-02-08 RX ORDER — TIOTROPIUM BROMIDE 18 UG/1
18 CAPSULE ORAL; RESPIRATORY (INHALATION) DAILY
Qty: 30 CAPSULE | Refills: 1 | Status: SHIPPED | OUTPATIENT
Start: 2021-02-08 | End: 2021-06-01

## 2021-02-10 ENCOUNTER — TELEPHONE (OUTPATIENT)
Dept: ENDOSCOPY | Facility: HOSPITAL | Age: 71
End: 2021-02-10

## 2021-02-11 ENCOUNTER — IMMUNIZATION (OUTPATIENT)
Dept: PHARMACY | Facility: CLINIC | Age: 71
End: 2021-02-11
Payer: MEDICARE

## 2021-02-11 DIAGNOSIS — Z23 NEED FOR VACCINATION: Primary | ICD-10-CM

## 2021-02-13 LAB
ACID FAST MOD KINY STN SPEC: NORMAL
MYCOBACTERIUM SPEC QL CULT: NORMAL

## 2021-02-17 DIAGNOSIS — I48.0 PAROXYSMAL ATRIAL FIBRILLATION: Primary | ICD-10-CM

## 2021-02-17 DIAGNOSIS — Z01.818 PRE-OP TESTING: ICD-10-CM

## 2021-02-18 ENCOUNTER — PATIENT MESSAGE (OUTPATIENT)
Dept: FAMILY MEDICINE | Facility: CLINIC | Age: 71
End: 2021-02-18

## 2021-02-18 ENCOUNTER — PATIENT MESSAGE (OUTPATIENT)
Dept: ENDOSCOPY | Facility: HOSPITAL | Age: 71
End: 2021-02-18

## 2021-02-18 ENCOUNTER — OFFICE VISIT (OUTPATIENT)
Dept: FAMILY MEDICINE | Facility: CLINIC | Age: 71
End: 2021-02-18
Payer: MEDICARE

## 2021-02-18 VITALS
HEART RATE: 63 BPM | SYSTOLIC BLOOD PRESSURE: 123 MMHG | BODY MASS INDEX: 25.73 KG/M2 | TEMPERATURE: 98 F | DIASTOLIC BLOOD PRESSURE: 62 MMHG | HEIGHT: 68 IN | WEIGHT: 169.75 LBS | OXYGEN SATURATION: 97 %

## 2021-02-18 DIAGNOSIS — M25.50 DIFFUSE ARTHRALGIA: ICD-10-CM

## 2021-02-18 DIAGNOSIS — M70.61 GREATER TROCHANTERIC BURSITIS OF RIGHT HIP: ICD-10-CM

## 2021-02-18 DIAGNOSIS — Z79.2 PROPHYLACTIC ANTIBIOTIC: Primary | ICD-10-CM

## 2021-02-18 DIAGNOSIS — G44.229 CHRONIC TENSION-TYPE HEADACHE, NOT INTRACTABLE: ICD-10-CM

## 2021-02-18 DIAGNOSIS — D69.6 THROMBOCYTOPENIA: ICD-10-CM

## 2021-02-18 DIAGNOSIS — Z96.651 HISTORY OF ARTHROPLASTY OF RIGHT KNEE: ICD-10-CM

## 2021-02-18 DIAGNOSIS — Z00.00 ENCOUNTER FOR PREVENTIVE HEALTH EXAMINATION: Primary | ICD-10-CM

## 2021-02-18 DIAGNOSIS — J47.9 BRONCHIECTASIS WITHOUT COMPLICATION: ICD-10-CM

## 2021-02-18 DIAGNOSIS — Z12.11 SPECIAL SCREENING FOR MALIGNANT NEOPLASMS, COLON: Primary | ICD-10-CM

## 2021-02-18 DIAGNOSIS — R19.5 FECAL OCCULT BLOOD TEST POSITIVE: ICD-10-CM

## 2021-02-18 DIAGNOSIS — E03.9 HYPOTHYROIDISM (ACQUIRED): ICD-10-CM

## 2021-02-18 DIAGNOSIS — C40.21 OSTEOSARCOMA OF RIGHT FEMUR: ICD-10-CM

## 2021-02-18 DIAGNOSIS — I48.11 LONGSTANDING PERSISTENT ATRIAL FIBRILLATION: ICD-10-CM

## 2021-02-18 PROCEDURE — G0439 PPPS, SUBSEQ VISIT: HCPCS | Mod: S$GLB,,, | Performed by: NURSE PRACTITIONER

## 2021-02-18 PROCEDURE — 3008F BODY MASS INDEX DOCD: CPT | Mod: CPTII,S$GLB,, | Performed by: NURSE PRACTITIONER

## 2021-02-18 PROCEDURE — 3288F PR FALLS RISK ASSESSMENT DOCUMENTED: ICD-10-PCS | Mod: CPTII,S$GLB,, | Performed by: NURSE PRACTITIONER

## 2021-02-18 PROCEDURE — 3288F FALL RISK ASSESSMENT DOCD: CPT | Mod: CPTII,S$GLB,, | Performed by: NURSE PRACTITIONER

## 2021-02-18 PROCEDURE — 1126F AMNT PAIN NOTED NONE PRSNT: CPT | Mod: S$GLB,,, | Performed by: NURSE PRACTITIONER

## 2021-02-18 PROCEDURE — 99999 PR PBB SHADOW E&M-EST. PATIENT-LVL V: ICD-10-PCS | Mod: PBBFAC,,, | Performed by: NURSE PRACTITIONER

## 2021-02-18 PROCEDURE — 1158F PR ADVANCE CARE PLANNING DISCUSS DOCUMENTED IN MEDICAL RECORD: ICD-10-PCS | Mod: S$GLB,,, | Performed by: NURSE PRACTITIONER

## 2021-02-18 PROCEDURE — 99999 PR PBB SHADOW E&M-EST. PATIENT-LVL V: CPT | Mod: PBBFAC,,, | Performed by: NURSE PRACTITIONER

## 2021-02-18 PROCEDURE — 1126F PR PAIN SEVERITY QUANTIFIED, NO PAIN PRESENT: ICD-10-PCS | Mod: S$GLB,,, | Performed by: NURSE PRACTITIONER

## 2021-02-18 PROCEDURE — 1158F ADVNC CARE PLAN TLK DOCD: CPT | Mod: S$GLB,,, | Performed by: NURSE PRACTITIONER

## 2021-02-18 PROCEDURE — 1101F PR PT FALLS ASSESS DOC 0-1 FALLS W/OUT INJ PAST YR: ICD-10-PCS | Mod: CPTII,S$GLB,, | Performed by: NURSE PRACTITIONER

## 2021-02-18 PROCEDURE — 3008F PR BODY MASS INDEX (BMI) DOCUMENTED: ICD-10-PCS | Mod: CPTII,S$GLB,, | Performed by: NURSE PRACTITIONER

## 2021-02-18 PROCEDURE — 1101F PT FALLS ASSESS-DOCD LE1/YR: CPT | Mod: CPTII,S$GLB,, | Performed by: NURSE PRACTITIONER

## 2021-02-18 PROCEDURE — G0439 PR MEDICARE ANNUAL WELLNESS SUBSEQUENT VISIT: ICD-10-PCS | Mod: S$GLB,,, | Performed by: NURSE PRACTITIONER

## 2021-02-18 RX ORDER — SODIUM, POTASSIUM,MAG SULFATES 17.5-3.13G
1 SOLUTION, RECONSTITUTED, ORAL ORAL DAILY
Qty: 1 KIT | Refills: 0 | Status: ON HOLD | OUTPATIENT
Start: 2021-02-18 | End: 2021-03-19 | Stop reason: HOSPADM

## 2021-02-18 RX ORDER — POLYETHYLENE GLYCOL 3350, SODIUM SULFATE ANHYDROUS, SODIUM BICARBONATE, SODIUM CHLORIDE, POTASSIUM CHLORIDE 236; 22.74; 6.74; 5.86; 2.97 G/4L; G/4L; G/4L; G/4L; G/4L
4 POWDER, FOR SOLUTION ORAL ONCE
Qty: 4000 ML | Refills: 0 | Status: SHIPPED | OUTPATIENT
Start: 2021-02-18 | End: 2021-02-18

## 2021-02-18 RX ORDER — AMOXICILLIN 500 MG/1
TABLET, FILM COATED ORAL
Qty: 8 TABLET | Refills: 0 | Status: SHIPPED | OUTPATIENT
Start: 2021-02-18 | End: 2022-02-07

## 2021-02-19 ENCOUNTER — PATIENT MESSAGE (OUTPATIENT)
Dept: FAMILY MEDICINE | Facility: CLINIC | Age: 71
End: 2021-02-19

## 2021-02-19 PROBLEM — D69.6 THROMBOCYTOPENIA: Status: ACTIVE | Noted: 2021-02-19

## 2021-02-19 PROBLEM — R19.5 FECAL OCCULT BLOOD TEST POSITIVE: Status: ACTIVE | Noted: 2021-02-19

## 2021-02-24 ENCOUNTER — OFFICE VISIT (OUTPATIENT)
Dept: ELECTROPHYSIOLOGY | Facility: CLINIC | Age: 71
End: 2021-02-24
Payer: MEDICARE

## 2021-02-24 ENCOUNTER — CLINICAL SUPPORT (OUTPATIENT)
Dept: CARDIOLOGY | Facility: HOSPITAL | Age: 71
End: 2021-02-24
Attending: INTERNAL MEDICINE
Payer: MEDICARE

## 2021-02-24 ENCOUNTER — HOSPITAL ENCOUNTER (OUTPATIENT)
Dept: CARDIOLOGY | Facility: CLINIC | Age: 71
Discharge: HOME | End: 2021-02-24
Payer: MEDICARE

## 2021-02-24 VITALS
HEART RATE: 65 BPM | DIASTOLIC BLOOD PRESSURE: 80 MMHG | WEIGHT: 171.31 LBS | BODY MASS INDEX: 25.96 KG/M2 | HEIGHT: 68 IN | SYSTOLIC BLOOD PRESSURE: 126 MMHG

## 2021-02-24 DIAGNOSIS — I48.11 LONGSTANDING PERSISTENT ATRIAL FIBRILLATION: ICD-10-CM

## 2021-02-24 DIAGNOSIS — I48.0 PAROXYSMAL ATRIAL FIBRILLATION: ICD-10-CM

## 2021-02-24 DIAGNOSIS — I48.0 PAROXYSMAL ATRIAL FIBRILLATION: Primary | ICD-10-CM

## 2021-02-24 PROBLEM — Z79.899 VISIT FOR MONITORING TIKOSYN THERAPY: Status: RESOLVED | Noted: 2019-08-12 | Resolved: 2021-02-24

## 2021-02-24 PROBLEM — Z51.81 VISIT FOR MONITORING TIKOSYN THERAPY: Status: RESOLVED | Noted: 2019-08-12 | Resolved: 2021-02-24

## 2021-02-24 PROCEDURE — 1126F AMNT PAIN NOTED NONE PRSNT: CPT | Mod: S$GLB,,, | Performed by: INTERNAL MEDICINE

## 2021-02-24 PROCEDURE — 1159F PR MEDICATION LIST DOCUMENTED IN MEDICAL RECORD: ICD-10-PCS | Mod: S$GLB,,, | Performed by: INTERNAL MEDICINE

## 2021-02-24 PROCEDURE — 93005 ELECTROCARDIOGRAM TRACING: CPT | Mod: S$GLB,,, | Performed by: INTERNAL MEDICINE

## 2021-02-24 PROCEDURE — 93005 RHYTHM STRIP: ICD-10-PCS | Mod: S$GLB,,, | Performed by: INTERNAL MEDICINE

## 2021-02-24 PROCEDURE — 99214 PR OFFICE/OUTPT VISIT, EST, LEVL IV, 30-39 MIN: ICD-10-PCS | Mod: S$GLB,,, | Performed by: INTERNAL MEDICINE

## 2021-02-24 PROCEDURE — 3008F BODY MASS INDEX DOCD: CPT | Mod: CPTII,S$GLB,, | Performed by: INTERNAL MEDICINE

## 2021-02-24 PROCEDURE — 3288F PR FALLS RISK ASSESSMENT DOCUMENTED: ICD-10-PCS | Mod: CPTII,S$GLB,, | Performed by: INTERNAL MEDICINE

## 2021-02-24 PROCEDURE — 3008F PR BODY MASS INDEX (BMI) DOCUMENTED: ICD-10-PCS | Mod: CPTII,S$GLB,, | Performed by: INTERNAL MEDICINE

## 2021-02-24 PROCEDURE — 93248 EXT ECG>7D<15D REV&INTERPJ: CPT | Mod: ,,, | Performed by: INTERNAL MEDICINE

## 2021-02-24 PROCEDURE — 1101F PR PT FALLS ASSESS DOC 0-1 FALLS W/OUT INJ PAST YR: ICD-10-PCS | Mod: CPTII,S$GLB,, | Performed by: INTERNAL MEDICINE

## 2021-02-24 PROCEDURE — 93247 EXT ECG>7D<15D SCAN A/R: CPT

## 2021-02-24 PROCEDURE — 99214 OFFICE O/P EST MOD 30 MIN: CPT | Mod: S$GLB,,, | Performed by: INTERNAL MEDICINE

## 2021-02-24 PROCEDURE — 1101F PT FALLS ASSESS-DOCD LE1/YR: CPT | Mod: CPTII,S$GLB,, | Performed by: INTERNAL MEDICINE

## 2021-02-24 PROCEDURE — 1126F PR PAIN SEVERITY QUANTIFIED, NO PAIN PRESENT: ICD-10-PCS | Mod: S$GLB,,, | Performed by: INTERNAL MEDICINE

## 2021-02-24 PROCEDURE — 3288F FALL RISK ASSESSMENT DOCD: CPT | Mod: CPTII,S$GLB,, | Performed by: INTERNAL MEDICINE

## 2021-02-24 PROCEDURE — 99499 UNLISTED E&M SERVICE: CPT | Mod: S$GLB,,, | Performed by: INTERNAL MEDICINE

## 2021-02-24 PROCEDURE — 93246 EXT ECG>7D<15D RECORDING: CPT

## 2021-02-24 PROCEDURE — 93248 CV CARDIAC MONITOR - 3-14 DAY ADULT (CUPID ONLY): ICD-10-PCS | Mod: ,,, | Performed by: INTERNAL MEDICINE

## 2021-02-24 PROCEDURE — 93010 RHYTHM STRIP: ICD-10-PCS | Mod: S$GLB,,, | Performed by: INTERNAL MEDICINE

## 2021-02-24 PROCEDURE — 1159F MED LIST DOCD IN RCRD: CPT | Mod: S$GLB,,, | Performed by: INTERNAL MEDICINE

## 2021-02-24 PROCEDURE — 99499 RISK ADDL DX/OHS AUDIT: ICD-10-PCS | Mod: S$GLB,,, | Performed by: INTERNAL MEDICINE

## 2021-02-24 PROCEDURE — 93010 ELECTROCARDIOGRAM REPORT: CPT | Mod: S$GLB,,, | Performed by: INTERNAL MEDICINE

## 2021-02-24 PROCEDURE — 99999 PR PBB SHADOW E&M-EST. PATIENT-LVL IV: CPT | Mod: PBBFAC,,, | Performed by: INTERNAL MEDICINE

## 2021-02-24 PROCEDURE — 99999 PR PBB SHADOW E&M-EST. PATIENT-LVL IV: ICD-10-PCS | Mod: PBBFAC,,, | Performed by: INTERNAL MEDICINE

## 2021-02-24 RX ORDER — POLYETHYLENE GLYCOL 3350, SODIUM SULFATE ANHYDROUS, SODIUM BICARBONATE, SODIUM CHLORIDE, POTASSIUM CHLORIDE 236; 22.74; 6.74; 5.86; 2.97 G/4L; G/4L; G/4L; G/4L; G/4L
POWDER, FOR SOLUTION ORAL
COMMUNITY
Start: 2021-02-18 | End: 2021-06-01

## 2021-03-05 ENCOUNTER — PATIENT OUTREACH (OUTPATIENT)
Dept: ADMINISTRATIVE | Facility: OTHER | Age: 71
End: 2021-03-05

## 2021-03-05 ENCOUNTER — OFFICE VISIT (OUTPATIENT)
Dept: UROGYNECOLOGY | Facility: CLINIC | Age: 71
End: 2021-03-05
Payer: MEDICARE

## 2021-03-05 VITALS
SYSTOLIC BLOOD PRESSURE: 113 MMHG | DIASTOLIC BLOOD PRESSURE: 70 MMHG | HEIGHT: 68 IN | WEIGHT: 171.31 LBS | BODY MASS INDEX: 25.96 KG/M2

## 2021-03-05 DIAGNOSIS — R39.15 URINARY URGENCY: Primary | ICD-10-CM

## 2021-03-05 DIAGNOSIS — R35.1 NOCTURIA: ICD-10-CM

## 2021-03-05 PROCEDURE — 99215 PR OFFICE/OUTPT VISIT, EST, LEVL V, 40-54 MIN: ICD-10-PCS | Mod: S$GLB,,, | Performed by: OBSTETRICS & GYNECOLOGY

## 2021-03-05 PROCEDURE — 1159F PR MEDICATION LIST DOCUMENTED IN MEDICAL RECORD: ICD-10-PCS | Mod: S$GLB,,, | Performed by: OBSTETRICS & GYNECOLOGY

## 2021-03-05 PROCEDURE — 3008F PR BODY MASS INDEX (BMI) DOCUMENTED: ICD-10-PCS | Mod: CPTII,S$GLB,, | Performed by: OBSTETRICS & GYNECOLOGY

## 2021-03-05 PROCEDURE — 99215 OFFICE O/P EST HI 40 MIN: CPT | Mod: S$GLB,,, | Performed by: OBSTETRICS & GYNECOLOGY

## 2021-03-05 PROCEDURE — 3288F PR FALLS RISK ASSESSMENT DOCUMENTED: ICD-10-PCS | Mod: CPTII,S$GLB,, | Performed by: OBSTETRICS & GYNECOLOGY

## 2021-03-05 PROCEDURE — 1126F AMNT PAIN NOTED NONE PRSNT: CPT | Mod: S$GLB,,, | Performed by: OBSTETRICS & GYNECOLOGY

## 2021-03-05 PROCEDURE — 1101F PT FALLS ASSESS-DOCD LE1/YR: CPT | Mod: CPTII,S$GLB,, | Performed by: OBSTETRICS & GYNECOLOGY

## 2021-03-05 PROCEDURE — 87086 URINE CULTURE/COLONY COUNT: CPT | Performed by: OBSTETRICS & GYNECOLOGY

## 2021-03-05 PROCEDURE — 1126F PR PAIN SEVERITY QUANTIFIED, NO PAIN PRESENT: ICD-10-PCS | Mod: S$GLB,,, | Performed by: OBSTETRICS & GYNECOLOGY

## 2021-03-05 PROCEDURE — 99999 PR PBB SHADOW E&M-EST. PATIENT-LVL IV: ICD-10-PCS | Mod: PBBFAC,,, | Performed by: OBSTETRICS & GYNECOLOGY

## 2021-03-05 PROCEDURE — 1101F PR PT FALLS ASSESS DOC 0-1 FALLS W/OUT INJ PAST YR: ICD-10-PCS | Mod: CPTII,S$GLB,, | Performed by: OBSTETRICS & GYNECOLOGY

## 2021-03-05 PROCEDURE — 1159F MED LIST DOCD IN RCRD: CPT | Mod: S$GLB,,, | Performed by: OBSTETRICS & GYNECOLOGY

## 2021-03-05 PROCEDURE — 99999 PR PBB SHADOW E&M-EST. PATIENT-LVL IV: CPT | Mod: PBBFAC,,, | Performed by: OBSTETRICS & GYNECOLOGY

## 2021-03-05 PROCEDURE — 3288F FALL RISK ASSESSMENT DOCD: CPT | Mod: CPTII,S$GLB,, | Performed by: OBSTETRICS & GYNECOLOGY

## 2021-03-05 PROCEDURE — 3008F BODY MASS INDEX DOCD: CPT | Mod: CPTII,S$GLB,, | Performed by: OBSTETRICS & GYNECOLOGY

## 2021-03-06 LAB — BACTERIA UR CULT: NORMAL

## 2021-03-08 ENCOUNTER — PATIENT MESSAGE (OUTPATIENT)
Dept: UROGYNECOLOGY | Facility: CLINIC | Age: 71
End: 2021-03-08

## 2021-03-09 ENCOUNTER — PATIENT MESSAGE (OUTPATIENT)
Dept: UROGYNECOLOGY | Facility: CLINIC | Age: 71
End: 2021-03-09

## 2021-03-11 ENCOUNTER — IMMUNIZATION (OUTPATIENT)
Dept: PHARMACY | Facility: CLINIC | Age: 71
End: 2021-03-11
Payer: MEDICARE

## 2021-03-11 DIAGNOSIS — Z23 NEED FOR VACCINATION: Primary | ICD-10-CM

## 2021-03-15 ENCOUNTER — OFFICE VISIT (OUTPATIENT)
Dept: PULMONOLOGY | Facility: CLINIC | Age: 71
End: 2021-03-15
Payer: MEDICARE

## 2021-03-15 VITALS
WEIGHT: 170 LBS | RESPIRATION RATE: 18 BRPM | DIASTOLIC BLOOD PRESSURE: 67 MMHG | HEART RATE: 71 BPM | SYSTOLIC BLOOD PRESSURE: 116 MMHG | OXYGEN SATURATION: 97 % | BODY MASS INDEX: 25.76 KG/M2 | HEIGHT: 68 IN

## 2021-03-15 DIAGNOSIS — J47.9 BRONCHIECTASIS WITHOUT COMPLICATION: Primary | ICD-10-CM

## 2021-03-15 PROCEDURE — 99999 PR PBB SHADOW E&M-EST. PATIENT-LVL III: CPT | Mod: PBBFAC,,, | Performed by: INTERNAL MEDICINE

## 2021-03-15 PROCEDURE — 1101F PT FALLS ASSESS-DOCD LE1/YR: CPT | Mod: CPTII,S$GLB,, | Performed by: INTERNAL MEDICINE

## 2021-03-15 PROCEDURE — 3288F FALL RISK ASSESSMENT DOCD: CPT | Mod: CPTII,S$GLB,, | Performed by: INTERNAL MEDICINE

## 2021-03-15 PROCEDURE — 1126F PR PAIN SEVERITY QUANTIFIED, NO PAIN PRESENT: ICD-10-PCS | Mod: S$GLB,,, | Performed by: INTERNAL MEDICINE

## 2021-03-15 PROCEDURE — 99213 OFFICE O/P EST LOW 20 MIN: CPT | Mod: S$GLB,,, | Performed by: INTERNAL MEDICINE

## 2021-03-15 PROCEDURE — 3008F PR BODY MASS INDEX (BMI) DOCUMENTED: ICD-10-PCS | Mod: CPTII,S$GLB,, | Performed by: INTERNAL MEDICINE

## 2021-03-15 PROCEDURE — 99999 PR PBB SHADOW E&M-EST. PATIENT-LVL III: ICD-10-PCS | Mod: PBBFAC,,, | Performed by: INTERNAL MEDICINE

## 2021-03-15 PROCEDURE — 1126F AMNT PAIN NOTED NONE PRSNT: CPT | Mod: S$GLB,,, | Performed by: INTERNAL MEDICINE

## 2021-03-15 PROCEDURE — 99213 PR OFFICE/OUTPT VISIT, EST, LEVL III, 20-29 MIN: ICD-10-PCS | Mod: S$GLB,,, | Performed by: INTERNAL MEDICINE

## 2021-03-15 PROCEDURE — 1159F PR MEDICATION LIST DOCUMENTED IN MEDICAL RECORD: ICD-10-PCS | Mod: S$GLB,,, | Performed by: INTERNAL MEDICINE

## 2021-03-15 PROCEDURE — 3288F PR FALLS RISK ASSESSMENT DOCUMENTED: ICD-10-PCS | Mod: CPTII,S$GLB,, | Performed by: INTERNAL MEDICINE

## 2021-03-15 PROCEDURE — 1101F PR PT FALLS ASSESS DOC 0-1 FALLS W/OUT INJ PAST YR: ICD-10-PCS | Mod: CPTII,S$GLB,, | Performed by: INTERNAL MEDICINE

## 2021-03-15 PROCEDURE — 1159F MED LIST DOCD IN RCRD: CPT | Mod: S$GLB,,, | Performed by: INTERNAL MEDICINE

## 2021-03-15 PROCEDURE — 3008F BODY MASS INDEX DOCD: CPT | Mod: CPTII,S$GLB,, | Performed by: INTERNAL MEDICINE

## 2021-03-16 ENCOUNTER — LAB VISIT (OUTPATIENT)
Dept: INTERNAL MEDICINE | Facility: CLINIC | Age: 71
End: 2021-03-16
Payer: MEDICARE

## 2021-03-16 ENCOUNTER — TELEPHONE (OUTPATIENT)
Dept: ADMINISTRATIVE | Facility: OTHER | Age: 71
End: 2021-03-16

## 2021-03-16 DIAGNOSIS — Z01.818 PRE-OP TESTING: ICD-10-CM

## 2021-03-16 PROCEDURE — U0003 INFECTIOUS AGENT DETECTION BY NUCLEIC ACID (DNA OR RNA); SEVERE ACUTE RESPIRATORY SYNDROME CORONAVIRUS 2 (SARS-COV-2) (CORONAVIRUS DISEASE [COVID-19]), AMPLIFIED PROBE TECHNIQUE, MAKING USE OF HIGH THROUGHPUT TECHNOLOGIES AS DESCRIBED BY CMS-2020-01-R: HCPCS | Performed by: FAMILY MEDICINE

## 2021-03-16 PROCEDURE — U0005 INFEC AGEN DETEC AMPLI PROBE: HCPCS | Performed by: FAMILY MEDICINE

## 2021-03-17 LAB — SARS-COV-2 RNA RESP QL NAA+PROBE: NOT DETECTED

## 2021-03-19 ENCOUNTER — HOSPITAL ENCOUNTER (OUTPATIENT)
Facility: HOSPITAL | Age: 71
Discharge: HOME | End: 2021-03-19
Attending: INTERNAL MEDICINE | Admitting: INTERNAL MEDICINE
Payer: MEDICARE

## 2021-03-19 ENCOUNTER — ANESTHESIA (OUTPATIENT)
Dept: ENDOSCOPY | Facility: HOSPITAL | Age: 71
End: 2021-03-19
Payer: MEDICARE

## 2021-03-19 ENCOUNTER — ANESTHESIA EVENT (OUTPATIENT)
Dept: ENDOSCOPY | Facility: HOSPITAL | Age: 71
End: 2021-03-19
Payer: MEDICARE

## 2021-03-19 VITALS
DIASTOLIC BLOOD PRESSURE: 76 MMHG | RESPIRATION RATE: 14 BRPM | BODY MASS INDEX: 25.76 KG/M2 | OXYGEN SATURATION: 98 % | HEIGHT: 68 IN | SYSTOLIC BLOOD PRESSURE: 145 MMHG | WEIGHT: 170 LBS | TEMPERATURE: 98 F | HEART RATE: 60 BPM

## 2021-03-19 DIAGNOSIS — R19.5 FECAL OCCULT BLOOD TEST POSITIVE: Primary | ICD-10-CM

## 2021-03-19 DIAGNOSIS — R13.10 DYSPHAGIA, UNSPECIFIED TYPE: ICD-10-CM

## 2021-03-19 PROCEDURE — 45385 COLONOSCOPY W/LESION REMOVAL: CPT | Mod: ,,, | Performed by: INTERNAL MEDICINE

## 2021-03-19 PROCEDURE — 37000009 HC ANESTHESIA EA ADD 15 MINS: Performed by: INTERNAL MEDICINE

## 2021-03-19 PROCEDURE — 25000003 PHARM REV CODE 250: Performed by: NURSE ANESTHETIST, CERTIFIED REGISTERED

## 2021-03-19 PROCEDURE — 88305 TISSUE EXAM BY PATHOLOGIST: ICD-10-PCS | Mod: 26,,, | Performed by: PATHOLOGY

## 2021-03-19 PROCEDURE — 63600175 PHARM REV CODE 636 W HCPCS: Performed by: NURSE ANESTHETIST, CERTIFIED REGISTERED

## 2021-03-19 PROCEDURE — E9220 PRA ENDO ANESTHESIA: ICD-10-PCS | Mod: ,,, | Performed by: NURSE ANESTHETIST, CERTIFIED REGISTERED

## 2021-03-19 PROCEDURE — 88305 TISSUE EXAM BY PATHOLOGIST: CPT | Performed by: PATHOLOGY

## 2021-03-19 PROCEDURE — 43239 PR EGD, FLEX, W/BIOPSY, SGL/MULTI: ICD-10-PCS | Mod: 51,,, | Performed by: INTERNAL MEDICINE

## 2021-03-19 PROCEDURE — 45385 COLONOSCOPY W/LESION REMOVAL: CPT | Performed by: INTERNAL MEDICINE

## 2021-03-19 PROCEDURE — 37000008 HC ANESTHESIA 1ST 15 MINUTES: Performed by: INTERNAL MEDICINE

## 2021-03-19 PROCEDURE — 27201012 HC FORCEPS, HOT/COLD, DISP: Performed by: INTERNAL MEDICINE

## 2021-03-19 PROCEDURE — 27201089 HC SNARE, DISP (ANY): Performed by: INTERNAL MEDICINE

## 2021-03-19 PROCEDURE — E9220 PRA ENDO ANESTHESIA: HCPCS | Mod: ,,, | Performed by: NURSE ANESTHETIST, CERTIFIED REGISTERED

## 2021-03-19 PROCEDURE — 43239 EGD BIOPSY SINGLE/MULTIPLE: CPT | Mod: 51,,, | Performed by: INTERNAL MEDICINE

## 2021-03-19 PROCEDURE — 45385 PR COLONOSCOPY,REMV LESN,SNARE: ICD-10-PCS | Mod: ,,, | Performed by: INTERNAL MEDICINE

## 2021-03-19 PROCEDURE — 88305 TISSUE EXAM BY PATHOLOGIST: CPT | Mod: 26,,, | Performed by: PATHOLOGY

## 2021-03-19 PROCEDURE — 43239 EGD BIOPSY SINGLE/MULTIPLE: CPT | Performed by: INTERNAL MEDICINE

## 2021-03-19 RX ORDER — PROPOFOL 10 MG/ML
VIAL (ML) INTRAVENOUS CONTINUOUS PRN
Status: DISCONTINUED | OUTPATIENT
Start: 2021-03-19 | End: 2021-03-19

## 2021-03-19 RX ORDER — LIDOCAINE HCL/PF 100 MG/5ML
SYRINGE (ML) INTRAVENOUS
Status: DISCONTINUED | OUTPATIENT
Start: 2021-03-19 | End: 2021-03-19

## 2021-03-19 RX ORDER — SODIUM CHLORIDE 9 MG/ML
INJECTION, SOLUTION INTRAVENOUS CONTINUOUS
Status: DISCONTINUED | OUTPATIENT
Start: 2021-03-19 | End: 2021-03-19 | Stop reason: HOSPADM

## 2021-03-19 RX ORDER — PROPOFOL 10 MG/ML
VIAL (ML) INTRAVENOUS
Status: DISCONTINUED | OUTPATIENT
Start: 2021-03-19 | End: 2021-03-19

## 2021-03-19 RX ADMIN — Medication 90 MG: at 01:03

## 2021-03-19 RX ADMIN — PROPOFOL 125 MCG/KG/MIN: 10 INJECTION, EMULSION INTRAVENOUS at 01:03

## 2021-03-19 RX ADMIN — SODIUM CHLORIDE: 0.9 INJECTION, SOLUTION INTRAVENOUS at 12:03

## 2021-03-19 RX ADMIN — PROPOFOL 50 MG: 10 INJECTION, EMULSION INTRAVENOUS at 01:03

## 2021-03-19 RX ADMIN — SODIUM CHLORIDE: 0.9 INJECTION, SOLUTION INTRAVENOUS at 01:03

## 2021-03-23 LAB
FINAL PATHOLOGIC DIAGNOSIS: NORMAL
GROSS: NORMAL
Lab: NORMAL

## 2021-03-26 ENCOUNTER — HOSPITAL ENCOUNTER (OUTPATIENT)
Dept: CARDIOLOGY | Facility: CLINIC | Age: 71
Discharge: HOME | End: 2021-03-26
Payer: MEDICARE

## 2021-03-26 ENCOUNTER — OFFICE VISIT (OUTPATIENT)
Dept: ELECTROPHYSIOLOGY | Facility: CLINIC | Age: 71
End: 2021-03-26
Payer: MEDICARE

## 2021-03-26 VITALS
BODY MASS INDEX: 25.73 KG/M2 | HEART RATE: 63 BPM | HEIGHT: 68 IN | WEIGHT: 169.75 LBS | SYSTOLIC BLOOD PRESSURE: 112 MMHG | DIASTOLIC BLOOD PRESSURE: 70 MMHG

## 2021-03-26 DIAGNOSIS — I48.0 PAROXYSMAL ATRIAL FIBRILLATION: ICD-10-CM

## 2021-03-26 DIAGNOSIS — I48.0 PAROXYSMAL ATRIAL FIBRILLATION: Primary | ICD-10-CM

## 2021-03-26 DIAGNOSIS — I47.10 PSVT (PAROXYSMAL SUPRAVENTRICULAR TACHYCARDIA): ICD-10-CM

## 2021-03-26 DIAGNOSIS — I48.11 LONGSTANDING PERSISTENT ATRIAL FIBRILLATION: ICD-10-CM

## 2021-03-26 PROCEDURE — 93010 ELECTROCARDIOGRAM REPORT: CPT | Mod: S$GLB,,, | Performed by: INTERNAL MEDICINE

## 2021-03-26 PROCEDURE — 93010 RHYTHM STRIP: ICD-10-PCS | Mod: S$GLB,,, | Performed by: INTERNAL MEDICINE

## 2021-03-26 PROCEDURE — 99214 OFFICE O/P EST MOD 30 MIN: CPT | Mod: S$GLB,,, | Performed by: INTERNAL MEDICINE

## 2021-03-26 PROCEDURE — 1101F PT FALLS ASSESS-DOCD LE1/YR: CPT | Mod: CPTII,S$GLB,, | Performed by: INTERNAL MEDICINE

## 2021-03-26 PROCEDURE — 99214 PR OFFICE/OUTPT VISIT, EST, LEVL IV, 30-39 MIN: ICD-10-PCS | Mod: S$GLB,,, | Performed by: INTERNAL MEDICINE

## 2021-03-26 PROCEDURE — 93005 ELECTROCARDIOGRAM TRACING: CPT | Mod: S$GLB,,, | Performed by: INTERNAL MEDICINE

## 2021-03-26 PROCEDURE — 93005 RHYTHM STRIP: ICD-10-PCS | Mod: S$GLB,,, | Performed by: INTERNAL MEDICINE

## 2021-03-26 PROCEDURE — 3008F PR BODY MASS INDEX (BMI) DOCUMENTED: ICD-10-PCS | Mod: CPTII,S$GLB,, | Performed by: INTERNAL MEDICINE

## 2021-03-26 PROCEDURE — 1159F PR MEDICATION LIST DOCUMENTED IN MEDICAL RECORD: ICD-10-PCS | Mod: S$GLB,,, | Performed by: INTERNAL MEDICINE

## 2021-03-26 PROCEDURE — 3008F BODY MASS INDEX DOCD: CPT | Mod: CPTII,S$GLB,, | Performed by: INTERNAL MEDICINE

## 2021-03-26 PROCEDURE — 99499 RISK ADDL DX/OHS AUDIT: ICD-10-PCS | Mod: S$GLB,,, | Performed by: INTERNAL MEDICINE

## 2021-03-26 PROCEDURE — 99999 PR PBB SHADOW E&M-EST. PATIENT-LVL IV: ICD-10-PCS | Mod: PBBFAC,,, | Performed by: INTERNAL MEDICINE

## 2021-03-26 PROCEDURE — 99499 UNLISTED E&M SERVICE: CPT | Mod: S$GLB,,, | Performed by: INTERNAL MEDICINE

## 2021-03-26 PROCEDURE — 1101F PR PT FALLS ASSESS DOC 0-1 FALLS W/OUT INJ PAST YR: ICD-10-PCS | Mod: CPTII,S$GLB,, | Performed by: INTERNAL MEDICINE

## 2021-03-26 PROCEDURE — 99999 PR PBB SHADOW E&M-EST. PATIENT-LVL IV: CPT | Mod: PBBFAC,,, | Performed by: INTERNAL MEDICINE

## 2021-03-26 PROCEDURE — 3288F PR FALLS RISK ASSESSMENT DOCUMENTED: ICD-10-PCS | Mod: CPTII,S$GLB,, | Performed by: INTERNAL MEDICINE

## 2021-03-26 PROCEDURE — 1159F MED LIST DOCD IN RCRD: CPT | Mod: S$GLB,,, | Performed by: INTERNAL MEDICINE

## 2021-03-26 PROCEDURE — 3288F FALL RISK ASSESSMENT DOCD: CPT | Mod: CPTII,S$GLB,, | Performed by: INTERNAL MEDICINE

## 2021-03-26 PROCEDURE — 1126F AMNT PAIN NOTED NONE PRSNT: CPT | Mod: S$GLB,,, | Performed by: INTERNAL MEDICINE

## 2021-03-26 PROCEDURE — 1126F PR PAIN SEVERITY QUANTIFIED, NO PAIN PRESENT: ICD-10-PCS | Mod: S$GLB,,, | Performed by: INTERNAL MEDICINE

## 2021-03-26 RX ORDER — RIVAROXABAN 20 MG/1
20 TABLET, FILM COATED ORAL DAILY
Qty: 90 TABLET | Refills: 3 | Status: SHIPPED | OUTPATIENT
Start: 2021-03-26 | End: 2021-12-28 | Stop reason: SDUPTHER

## 2021-04-06 ENCOUNTER — TELEPHONE (OUTPATIENT)
Dept: CARDIOLOGY | Facility: CLINIC | Age: 71
End: 2021-04-06

## 2021-04-08 ENCOUNTER — CLINICAL SUPPORT (OUTPATIENT)
Dept: REHABILITATION | Facility: OTHER | Age: 71
End: 2021-04-08
Attending: OBSTETRICS & GYNECOLOGY
Payer: MEDICARE

## 2021-04-08 ENCOUNTER — HOSPITAL ENCOUNTER (OUTPATIENT)
Dept: CARDIOLOGY | Facility: HOSPITAL | Age: 71
Discharge: HOME | End: 2021-04-08
Attending: INTERNAL MEDICINE
Payer: MEDICARE

## 2021-04-08 VITALS — HEART RATE: 72 BPM | DIASTOLIC BLOOD PRESSURE: 68 MMHG | SYSTOLIC BLOOD PRESSURE: 148 MMHG

## 2021-04-08 DIAGNOSIS — N39.41 URGE INCONTINENCE: ICD-10-CM

## 2021-04-08 DIAGNOSIS — N81.89 PELVIC FLOOR WEAKNESS: ICD-10-CM

## 2021-04-08 DIAGNOSIS — I47.10 PSVT (PAROXYSMAL SUPRAVENTRICULAR TACHYCARDIA): ICD-10-CM

## 2021-04-08 DIAGNOSIS — R35.1 NOCTURIA: ICD-10-CM

## 2021-04-08 DIAGNOSIS — I48.0 PAROXYSMAL ATRIAL FIBRILLATION: ICD-10-CM

## 2021-04-08 DIAGNOSIS — R39.15 URINARY URGENCY: ICD-10-CM

## 2021-04-08 LAB
CFR FLOW - ANTERIOR: 2.42
CFR FLOW - INFERIOR: 2.38
CFR FLOW - LATERAL: 2.23
CFR FLOW - MAX: 3.59
CFR FLOW - MIN: 1.72
CFR FLOW - SEPTAL: 2.86
CFR FLOW - WHOLE HEART: 2.47
CV PHARM DOSE: 43 MG
CV STRESS BASE HR: 61 BPM
DIASTOLIC BLOOD PRESSURE: 69 MMHG
END DIASTOLIC INDEX-HIGH: 170 ML/M2
END SYSTOLIC INDEX-HIGH: 70 ML/M2
NUC REST DIASTOLIC VOLUME INDEX: 94
NUC REST EJECTION FRACTION: 79
NUC REST SYSTOLIC VOLUME INDEX: 19
NUC STRESS DIASTOLIC VOLUME INDEX: 98
NUC STRESS EJECTION FRACTION: 75 %
NUC STRESS SYSTOLIC VOLUME INDEX: 24
OHS CV CPX 85 PERCENT MAX PREDICTED HEART RATE MALE: 122
OHS CV CPX MAX PREDICTED HEART RATE: 144
OHS CV CPX PATIENT IS FEMALE: 1
OHS CV CPX PATIENT IS MALE: 0
OHS CV CPX PEAK DIASTOLIC BLOOD PRESSURE: 67 MMHG
OHS CV CPX PEAK HEAR RATE: 67 BPM
OHS CV CPX PEAK RATE PRESSURE PRODUCT: 9916
OHS CV CPX PEAK SYSTOLIC BLOOD PRESSURE: 148 MMHG
OHS CV CPX PERCENT MAX PREDICTED HEART RATE ACHIEVED: 47
OHS CV CPX RATE PRESSURE PRODUCT PRESENTING: 9028
REST FLOW - ANTERIOR: 0.8 CC/MIN/G
REST FLOW - INFERIOR: 0.7 CC/MIN/G
REST FLOW - LATERAL: 0.72 CC/MIN/G
REST FLOW - MAX: 1.16 CC/MIN/G
REST FLOW - MIN: 0.47 CC/MIN/G
REST FLOW - SEPTAL: 0.66 CC/MIN/G
REST FLOW - WHOLE HEART: 0.72 CC/MIN/G
RETIRED EF AND QEF - SEE NOTES: 51 %
STRESS FLOW - ANTERIOR: 1.87 CC/MIN/G
STRESS FLOW - INFERIOR: 1.65 CC/MIN/G
STRESS FLOW - LATERAL: 1.59 CC/MIN/G
STRESS FLOW - MAX: 2.19 CC/MIN/G
STRESS FLOW - MIN: 1.3 CC/MIN/G
STRESS FLOW - SEPTAL: 1.87 CC/MIN/G
STRESS FLOW - WHOLE HEART: 1.75 CC/MIN/G
SYSTOLIC BLOOD PRESSURE: 148 MMHG

## 2021-04-08 PROCEDURE — 78434 AQMBF PET REST & RX STRESS: CPT

## 2021-04-08 PROCEDURE — 93018 CARDIAC PET SCAN STRESS (CUPID ONLY): ICD-10-PCS | Mod: ,,, | Performed by: INTERNAL MEDICINE

## 2021-04-08 PROCEDURE — 78492 CARDIAC PET SCAN STRESS (CUPID ONLY): ICD-10-PCS | Mod: 26,,, | Performed by: INTERNAL MEDICINE

## 2021-04-08 PROCEDURE — 97112 NEUROMUSCULAR REEDUCATION: CPT

## 2021-04-08 PROCEDURE — 78492 MYOCRD IMG PET MLT RST&STRS: CPT | Mod: 26,,, | Performed by: INTERNAL MEDICINE

## 2021-04-08 PROCEDURE — 63600175 PHARM REV CODE 636 W HCPCS: Performed by: INTERNAL MEDICINE

## 2021-04-08 PROCEDURE — 97110 THERAPEUTIC EXERCISES: CPT

## 2021-04-08 PROCEDURE — 93016 CARDIAC PET SCAN STRESS (CUPID ONLY): ICD-10-PCS | Mod: ,,, | Performed by: INTERNAL MEDICINE

## 2021-04-08 PROCEDURE — 97161 PT EVAL LOW COMPLEX 20 MIN: CPT

## 2021-04-08 PROCEDURE — 93016 CV STRESS TEST SUPVJ ONLY: CPT | Mod: ,,, | Performed by: INTERNAL MEDICINE

## 2021-04-08 PROCEDURE — 78434 AQMBF PET REST & RX STRESS: CPT | Mod: 26,,, | Performed by: INTERNAL MEDICINE

## 2021-04-08 PROCEDURE — 78434 CARDIAC PET SCAN STRESS (CUPID ONLY): ICD-10-PCS | Mod: 26,,, | Performed by: INTERNAL MEDICINE

## 2021-04-08 PROCEDURE — 93018 CV STRESS TEST I&R ONLY: CPT | Mod: ,,, | Performed by: INTERNAL MEDICINE

## 2021-04-08 RX ORDER — DIPYRIDAMOLE 5 MG/ML
43.02 INJECTION INTRAVENOUS ONCE
Status: COMPLETED | OUTPATIENT
Start: 2021-04-08 | End: 2021-04-08

## 2021-04-08 RX ADMIN — DIPYRIDAMOLE 43 MG: 5 INJECTION INTRAVENOUS at 01:04

## 2021-04-12 ENCOUNTER — TELEPHONE (OUTPATIENT)
Dept: UROGYNECOLOGY | Facility: CLINIC | Age: 71
End: 2021-04-12

## 2021-04-29 ENCOUNTER — CLINICAL SUPPORT (OUTPATIENT)
Dept: REHABILITATION | Facility: OTHER | Age: 71
End: 2021-04-29
Attending: INTERNAL MEDICINE
Payer: MEDICARE

## 2021-04-29 DIAGNOSIS — R35.1 NOCTURIA: ICD-10-CM

## 2021-04-29 DIAGNOSIS — N81.89 PELVIC FLOOR WEAKNESS: ICD-10-CM

## 2021-04-29 DIAGNOSIS — N39.41 URGE INCONTINENCE: Primary | ICD-10-CM

## 2021-04-29 PROCEDURE — 97110 THERAPEUTIC EXERCISES: CPT

## 2021-05-11 ENCOUNTER — TELEPHONE (OUTPATIENT)
Dept: ENDOSCOPY | Facility: HOSPITAL | Age: 71
End: 2021-05-11

## 2021-05-18 ENCOUNTER — PATIENT OUTREACH (OUTPATIENT)
Dept: ADMINISTRATIVE | Facility: OTHER | Age: 71
End: 2021-05-18

## 2021-05-20 DIAGNOSIS — E03.9 HYPOTHYROIDISM (ACQUIRED): ICD-10-CM

## 2021-05-24 RX ORDER — LEVOTHYROXINE SODIUM 25 UG/1
TABLET ORAL
Qty: 90 TABLET | Refills: 0 | Status: SHIPPED | OUTPATIENT
Start: 2021-05-24 | End: 2021-05-27 | Stop reason: SDUPTHER

## 2021-05-27 ENCOUNTER — PATIENT MESSAGE (OUTPATIENT)
Dept: FAMILY MEDICINE | Facility: CLINIC | Age: 71
End: 2021-05-27

## 2021-05-27 DIAGNOSIS — E03.9 HYPOTHYROIDISM (ACQUIRED): ICD-10-CM

## 2021-05-27 RX ORDER — LEVOTHYROXINE SODIUM 25 UG/1
25 TABLET ORAL DAILY
Qty: 90 TABLET | Refills: 3 | Status: SHIPPED | OUTPATIENT
Start: 2021-05-27 | End: 2022-01-04 | Stop reason: SDUPTHER

## 2021-05-28 ENCOUNTER — OFFICE VISIT (OUTPATIENT)
Dept: GASTROENTEROLOGY | Facility: CLINIC | Age: 71
End: 2021-05-28
Payer: MEDICARE

## 2021-05-28 ENCOUNTER — LAB VISIT (OUTPATIENT)
Dept: LAB | Facility: HOSPITAL | Age: 71
End: 2021-05-28
Payer: MEDICARE

## 2021-05-28 VITALS
HEIGHT: 68 IN | BODY MASS INDEX: 25.29 KG/M2 | SYSTOLIC BLOOD PRESSURE: 110 MMHG | HEART RATE: 76 BPM | WEIGHT: 166.88 LBS | DIASTOLIC BLOOD PRESSURE: 75 MMHG

## 2021-05-28 DIAGNOSIS — R10.9 ABDOMINAL PAIN, UNSPECIFIED ABDOMINAL LOCATION: ICD-10-CM

## 2021-05-28 DIAGNOSIS — R10.9 ABDOMINAL PAIN, UNSPECIFIED ABDOMINAL LOCATION: Primary | ICD-10-CM

## 2021-05-28 DIAGNOSIS — R13.10 DYSPHAGIA, UNSPECIFIED TYPE: ICD-10-CM

## 2021-05-28 LAB
ALBUMIN SERPL BCP-MCNC: 3.9 G/DL (ref 3.5–5.2)
ALP SERPL-CCNC: 66 U/L (ref 55–135)
ALT SERPL W/O P-5'-P-CCNC: 15 U/L (ref 10–44)
ANION GAP SERPL CALC-SCNC: 5 MMOL/L (ref 8–16)
AST SERPL-CCNC: 20 U/L (ref 10–40)
BASOPHILS # BLD AUTO: 0.02 K/UL (ref 0–0.2)
BASOPHILS NFR BLD: 0.4 % (ref 0–1.9)
BILIRUB SERPL-MCNC: 0.5 MG/DL (ref 0.1–1)
BUN SERPL-MCNC: 17 MG/DL (ref 8–23)
CALCIUM SERPL-MCNC: 9.3 MG/DL (ref 8.7–10.5)
CHLORIDE SERPL-SCNC: 105 MMOL/L (ref 95–110)
CO2 SERPL-SCNC: 31 MMOL/L (ref 23–29)
CREAT SERPL-MCNC: 1 MG/DL (ref 0.5–1.4)
CRP SERPL-MCNC: 1.8 MG/L (ref 0–8.2)
DIFFERENTIAL METHOD: ABNORMAL
EOSINOPHIL # BLD AUTO: 0 K/UL (ref 0–0.5)
EOSINOPHIL NFR BLD: 0.6 % (ref 0–8)
ERYTHROCYTE [DISTWIDTH] IN BLOOD BY AUTOMATED COUNT: 12.7 % (ref 11.5–14.5)
EST. GFR  (AFRICAN AMERICAN): >60 ML/MIN/1.73 M^2
EST. GFR  (NON AFRICAN AMERICAN): 56.8 ML/MIN/1.73 M^2
GLUCOSE SERPL-MCNC: 97 MG/DL (ref 70–110)
HCT VFR BLD AUTO: 38.8 % (ref 37–48.5)
HGB BLD-MCNC: 12.6 G/DL (ref 12–16)
IMM GRANULOCYTES # BLD AUTO: 0.01 K/UL (ref 0–0.04)
IMM GRANULOCYTES NFR BLD AUTO: 0.2 % (ref 0–0.5)
LYMPHOCYTES # BLD AUTO: 1.1 K/UL (ref 1–4.8)
LYMPHOCYTES NFR BLD: 24.3 % (ref 18–48)
MCH RBC QN AUTO: 31.5 PG (ref 27–31)
MCHC RBC AUTO-ENTMCNC: 32.5 G/DL (ref 32–36)
MCV RBC AUTO: 97 FL (ref 82–98)
MONOCYTES # BLD AUTO: 0.3 K/UL (ref 0.3–1)
MONOCYTES NFR BLD: 7.1 % (ref 4–15)
NEUTROPHILS # BLD AUTO: 3.1 K/UL (ref 1.8–7.7)
NEUTROPHILS NFR BLD: 67.4 % (ref 38–73)
NRBC BLD-RTO: 0 /100 WBC
PLATELET # BLD AUTO: 145 K/UL (ref 150–450)
PMV BLD AUTO: 11.6 FL (ref 9.2–12.9)
POTASSIUM SERPL-SCNC: 4.6 MMOL/L (ref 3.5–5.1)
PROT SERPL-MCNC: 6.6 G/DL (ref 6–8.4)
RBC # BLD AUTO: 4 M/UL (ref 4–5.4)
SODIUM SERPL-SCNC: 141 MMOL/L (ref 136–145)
WBC # BLD AUTO: 4.65 K/UL (ref 3.9–12.7)

## 2021-05-28 PROCEDURE — 99999 PR PBB SHADOW E&M-EST. PATIENT-LVL IV: ICD-10-PCS | Mod: PBBFAC,,, | Performed by: NURSE PRACTITIONER

## 2021-05-28 PROCEDURE — 86140 C-REACTIVE PROTEIN: CPT | Performed by: NURSE PRACTITIONER

## 2021-05-28 PROCEDURE — 3288F FALL RISK ASSESSMENT DOCD: CPT | Mod: CPTII,S$GLB,, | Performed by: NURSE PRACTITIONER

## 2021-05-28 PROCEDURE — 80053 COMPREHEN METABOLIC PANEL: CPT | Performed by: NURSE PRACTITIONER

## 2021-05-28 PROCEDURE — 1101F PT FALLS ASSESS-DOCD LE1/YR: CPT | Mod: CPTII,S$GLB,, | Performed by: NURSE PRACTITIONER

## 2021-05-28 PROCEDURE — 1159F MED LIST DOCD IN RCRD: CPT | Mod: S$GLB,,, | Performed by: NURSE PRACTITIONER

## 2021-05-28 PROCEDURE — 3008F PR BODY MASS INDEX (BMI) DOCUMENTED: ICD-10-PCS | Mod: CPTII,S$GLB,, | Performed by: NURSE PRACTITIONER

## 2021-05-28 PROCEDURE — 3288F PR FALLS RISK ASSESSMENT DOCUMENTED: ICD-10-PCS | Mod: CPTII,S$GLB,, | Performed by: NURSE PRACTITIONER

## 2021-05-28 PROCEDURE — 1101F PR PT FALLS ASSESS DOC 0-1 FALLS W/OUT INJ PAST YR: ICD-10-PCS | Mod: CPTII,S$GLB,, | Performed by: NURSE PRACTITIONER

## 2021-05-28 PROCEDURE — 36415 COLL VENOUS BLD VENIPUNCTURE: CPT | Performed by: NURSE PRACTITIONER

## 2021-05-28 PROCEDURE — 3008F BODY MASS INDEX DOCD: CPT | Mod: CPTII,S$GLB,, | Performed by: NURSE PRACTITIONER

## 2021-05-28 PROCEDURE — 99213 OFFICE O/P EST LOW 20 MIN: CPT | Mod: S$GLB,,, | Performed by: NURSE PRACTITIONER

## 2021-05-28 PROCEDURE — 1126F PR PAIN SEVERITY QUANTIFIED, NO PAIN PRESENT: ICD-10-PCS | Mod: S$GLB,,, | Performed by: NURSE PRACTITIONER

## 2021-05-28 PROCEDURE — 99999 PR PBB SHADOW E&M-EST. PATIENT-LVL IV: CPT | Mod: PBBFAC,,, | Performed by: NURSE PRACTITIONER

## 2021-05-28 PROCEDURE — 85025 COMPLETE CBC W/AUTO DIFF WBC: CPT | Performed by: NURSE PRACTITIONER

## 2021-05-28 PROCEDURE — 99213 PR OFFICE/OUTPT VISIT, EST, LEVL III, 20-29 MIN: ICD-10-PCS | Mod: S$GLB,,, | Performed by: NURSE PRACTITIONER

## 2021-05-28 PROCEDURE — 1126F AMNT PAIN NOTED NONE PRSNT: CPT | Mod: S$GLB,,, | Performed by: NURSE PRACTITIONER

## 2021-05-28 PROCEDURE — 1159F PR MEDICATION LIST DOCUMENTED IN MEDICAL RECORD: ICD-10-PCS | Mod: S$GLB,,, | Performed by: NURSE PRACTITIONER

## 2021-06-01 ENCOUNTER — OFFICE VISIT (OUTPATIENT)
Dept: OPTOMETRY | Facility: CLINIC | Age: 71
End: 2021-06-01
Payer: MEDICARE

## 2021-06-01 DIAGNOSIS — Z98.890 HX OF LASIK: ICD-10-CM

## 2021-06-01 DIAGNOSIS — Z96.1 PSEUDOPHAKIA OF BOTH EYES: ICD-10-CM

## 2021-06-01 DIAGNOSIS — H35.363 DRUSEN OF MACULA OF BOTH EYES: ICD-10-CM

## 2021-06-01 DIAGNOSIS — H10.13 CONJUNCTIVITIS, ALLERGIC, BILATERAL: ICD-10-CM

## 2021-06-01 DIAGNOSIS — H52.11 MYOPIA OF RIGHT EYE: ICD-10-CM

## 2021-06-01 DIAGNOSIS — H04.123 DRY EYE SYNDROME, BILATERAL: Primary | ICD-10-CM

## 2021-06-01 DIAGNOSIS — Z98.890 H/O BLEPHAROPLASTY: ICD-10-CM

## 2021-06-01 PROCEDURE — 1126F AMNT PAIN NOTED NONE PRSNT: CPT | Mod: S$GLB,,, | Performed by: OPTOMETRIST

## 2021-06-01 PROCEDURE — 1101F PT FALLS ASSESS-DOCD LE1/YR: CPT | Mod: CPTII,S$GLB,, | Performed by: OPTOMETRIST

## 2021-06-01 PROCEDURE — 1126F PR PAIN SEVERITY QUANTIFIED, NO PAIN PRESENT: ICD-10-PCS | Mod: S$GLB,,, | Performed by: OPTOMETRIST

## 2021-06-01 PROCEDURE — 3288F PR FALLS RISK ASSESSMENT DOCUMENTED: ICD-10-PCS | Mod: CPTII,S$GLB,, | Performed by: OPTOMETRIST

## 2021-06-01 PROCEDURE — 92015 PR REFRACTION: ICD-10-PCS | Mod: S$GLB,,, | Performed by: OPTOMETRIST

## 2021-06-01 PROCEDURE — 99999 PR PBB SHADOW E&M-EST. PATIENT-LVL II: CPT | Mod: PBBFAC,,, | Performed by: OPTOMETRIST

## 2021-06-01 PROCEDURE — 1101F PR PT FALLS ASSESS DOC 0-1 FALLS W/OUT INJ PAST YR: ICD-10-PCS | Mod: CPTII,S$GLB,, | Performed by: OPTOMETRIST

## 2021-06-01 PROCEDURE — 92004 PR EYE EXAM, NEW PATIENT,COMPREHESV: ICD-10-PCS | Mod: S$GLB,,, | Performed by: OPTOMETRIST

## 2021-06-01 PROCEDURE — 92015 DETERMINE REFRACTIVE STATE: CPT | Mod: S$GLB,,, | Performed by: OPTOMETRIST

## 2021-06-01 PROCEDURE — 92004 COMPRE OPH EXAM NEW PT 1/>: CPT | Mod: S$GLB,,, | Performed by: OPTOMETRIST

## 2021-06-01 PROCEDURE — 99999 PR PBB SHADOW E&M-EST. PATIENT-LVL II: ICD-10-PCS | Mod: PBBFAC,,, | Performed by: OPTOMETRIST

## 2021-06-01 PROCEDURE — 3288F FALL RISK ASSESSMENT DOCD: CPT | Mod: CPTII,S$GLB,, | Performed by: OPTOMETRIST

## 2021-06-01 RX ORDER — OLOPATADINE HYDROCHLORIDE 1 MG/ML
1 SOLUTION/ DROPS OPHTHALMIC 2 TIMES DAILY
COMMUNITY
End: 2024-01-22

## 2021-06-01 RX ORDER — PROPOFOL 10 MG/ML
INJECTION, EMULSION INTRAVENOUS
COMMUNITY
Start: 2021-03-19 | End: 2021-11-04

## 2021-06-01 RX ORDER — TRIAMCINOLONE ACETONIDE 40 MG/ML
INJECTION, SUSPENSION INTRA-ARTICULAR; INTRAMUSCULAR
COMMUNITY
Start: 2020-12-14 | End: 2021-11-04

## 2021-07-22 ENCOUNTER — PATIENT MESSAGE (OUTPATIENT)
Dept: RHEUMATOLOGY | Facility: CLINIC | Age: 71
End: 2021-07-22

## 2021-07-22 ENCOUNTER — PATIENT OUTREACH (OUTPATIENT)
Dept: ADMINISTRATIVE | Facility: OTHER | Age: 71
End: 2021-07-22

## 2021-07-22 ENCOUNTER — OFFICE VISIT (OUTPATIENT)
Dept: RHEUMATOLOGY | Facility: CLINIC | Age: 71
End: 2021-07-22
Payer: MEDICARE

## 2021-07-22 DIAGNOSIS — M79.642 PAIN OF LEFT HAND: ICD-10-CM

## 2021-07-22 DIAGNOSIS — M79.89 SWELLING OF LEFT HAND: Primary | ICD-10-CM

## 2021-07-22 PROCEDURE — 99215 OFFICE O/P EST HI 40 MIN: CPT | Mod: 95,,, | Performed by: INTERNAL MEDICINE

## 2021-07-22 PROCEDURE — 1159F MED LIST DOCD IN RCRD: CPT | Mod: CPTII,95,, | Performed by: INTERNAL MEDICINE

## 2021-07-22 PROCEDURE — 1125F PR PAIN SEVERITY QUANTIFIED, PAIN PRESENT: ICD-10-PCS | Mod: CPTII,95,, | Performed by: INTERNAL MEDICINE

## 2021-07-22 PROCEDURE — 99499 RISK ADDL DX/OHS AUDIT: ICD-10-PCS | Mod: HCNC,95,, | Performed by: INTERNAL MEDICINE

## 2021-07-22 PROCEDURE — 99499 UNLISTED E&M SERVICE: CPT | Mod: HCNC,95,, | Performed by: INTERNAL MEDICINE

## 2021-07-22 PROCEDURE — 1159F PR MEDICATION LIST DOCUMENTED IN MEDICAL RECORD: ICD-10-PCS | Mod: CPTII,95,, | Performed by: INTERNAL MEDICINE

## 2021-07-22 PROCEDURE — 99215 PR OFFICE/OUTPT VISIT, EST, LEVL V, 40-54 MIN: ICD-10-PCS | Mod: 95,,, | Performed by: INTERNAL MEDICINE

## 2021-07-22 PROCEDURE — 1125F AMNT PAIN NOTED PAIN PRSNT: CPT | Mod: CPTII,95,, | Performed by: INTERNAL MEDICINE

## 2021-07-23 ENCOUNTER — PATIENT MESSAGE (OUTPATIENT)
Dept: RHEUMATOLOGY | Facility: CLINIC | Age: 71
End: 2021-07-23

## 2021-07-26 ENCOUNTER — LAB VISIT (OUTPATIENT)
Dept: LAB | Facility: HOSPITAL | Age: 71
End: 2021-07-26
Attending: INTERNAL MEDICINE
Payer: MEDICARE

## 2021-07-26 DIAGNOSIS — M79.642 PAIN OF LEFT HAND: ICD-10-CM

## 2021-07-26 LAB
ALBUMIN SERPL BCP-MCNC: 4 G/DL (ref 3.5–5.2)
ALP SERPL-CCNC: 61 U/L (ref 55–135)
ALT SERPL W/O P-5'-P-CCNC: 16 U/L (ref 10–44)
ANION GAP SERPL CALC-SCNC: 6 MMOL/L (ref 8–16)
AST SERPL-CCNC: 25 U/L (ref 10–40)
BASOPHILS # BLD AUTO: 0.01 K/UL (ref 0–0.2)
BASOPHILS NFR BLD: 0.2 % (ref 0–1.9)
BILIRUB SERPL-MCNC: 0.4 MG/DL (ref 0.1–1)
BUN SERPL-MCNC: 18 MG/DL (ref 8–23)
CALCIUM SERPL-MCNC: 9.4 MG/DL (ref 8.7–10.5)
CHLORIDE SERPL-SCNC: 106 MMOL/L (ref 95–110)
CO2 SERPL-SCNC: 25 MMOL/L (ref 23–29)
CREAT SERPL-MCNC: 0.9 MG/DL (ref 0.5–1.4)
CRP SERPL-MCNC: 3.3 MG/L (ref 0–8.2)
DIFFERENTIAL METHOD: ABNORMAL
EOSINOPHIL # BLD AUTO: 0 K/UL (ref 0–0.5)
EOSINOPHIL NFR BLD: 0.9 % (ref 0–8)
ERYTHROCYTE [DISTWIDTH] IN BLOOD BY AUTOMATED COUNT: 13.2 % (ref 11.5–14.5)
ERYTHROCYTE [SEDIMENTATION RATE] IN BLOOD BY WESTERGREN METHOD: 10 MM/HR (ref 0–36)
EST. GFR  (AFRICAN AMERICAN): >60 ML/MIN/1.73 M^2
EST. GFR  (NON AFRICAN AMERICAN): >60 ML/MIN/1.73 M^2
GLUCOSE SERPL-MCNC: 92 MG/DL (ref 70–110)
HCT VFR BLD AUTO: 37.7 % (ref 37–48.5)
HGB BLD-MCNC: 12.3 G/DL (ref 12–16)
IMM GRANULOCYTES # BLD AUTO: 0.01 K/UL (ref 0–0.04)
IMM GRANULOCYTES NFR BLD AUTO: 0.2 % (ref 0–0.5)
LYMPHOCYTES # BLD AUTO: 1.4 K/UL (ref 1–4.8)
LYMPHOCYTES NFR BLD: 31.2 % (ref 18–48)
MCH RBC QN AUTO: 31.9 PG (ref 27–31)
MCHC RBC AUTO-ENTMCNC: 32.6 G/DL (ref 32–36)
MCV RBC AUTO: 98 FL (ref 82–98)
MONOCYTES # BLD AUTO: 0.3 K/UL (ref 0.3–1)
MONOCYTES NFR BLD: 7.3 % (ref 4–15)
NEUTROPHILS # BLD AUTO: 2.6 K/UL (ref 1.8–7.7)
NEUTROPHILS NFR BLD: 60.2 % (ref 38–73)
NRBC BLD-RTO: 0 /100 WBC
PLATELET # BLD AUTO: 131 K/UL (ref 150–450)
PMV BLD AUTO: 12.1 FL (ref 9.2–12.9)
POTASSIUM SERPL-SCNC: 4.7 MMOL/L (ref 3.5–5.1)
PROT SERPL-MCNC: 6.6 G/DL (ref 6–8.4)
RBC # BLD AUTO: 3.86 M/UL (ref 4–5.4)
SODIUM SERPL-SCNC: 137 MMOL/L (ref 136–145)
URATE SERPL-MCNC: 3.4 MG/DL (ref 2.4–5.7)
WBC # BLD AUTO: 4.39 K/UL (ref 3.9–12.7)

## 2021-07-26 PROCEDURE — 85652 RBC SED RATE AUTOMATED: CPT | Performed by: INTERNAL MEDICINE

## 2021-07-26 PROCEDURE — 80053 COMPREHEN METABOLIC PANEL: CPT | Performed by: INTERNAL MEDICINE

## 2021-07-26 PROCEDURE — 85025 COMPLETE CBC W/AUTO DIFF WBC: CPT | Performed by: INTERNAL MEDICINE

## 2021-07-26 PROCEDURE — 36415 COLL VENOUS BLD VENIPUNCTURE: CPT | Performed by: INTERNAL MEDICINE

## 2021-07-26 PROCEDURE — 86140 C-REACTIVE PROTEIN: CPT | Performed by: INTERNAL MEDICINE

## 2021-07-26 PROCEDURE — 84550 ASSAY OF BLOOD/URIC ACID: CPT | Performed by: INTERNAL MEDICINE

## 2021-08-02 ENCOUNTER — PATIENT MESSAGE (OUTPATIENT)
Dept: RHEUMATOLOGY | Facility: CLINIC | Age: 71
End: 2021-08-02

## 2021-08-03 ENCOUNTER — PATIENT MESSAGE (OUTPATIENT)
Dept: RHEUMATOLOGY | Facility: CLINIC | Age: 71
End: 2021-08-03

## 2021-08-03 ENCOUNTER — OFFICE VISIT (OUTPATIENT)
Dept: RHEUMATOLOGY | Facility: CLINIC | Age: 71
End: 2021-08-03
Payer: MEDICARE

## 2021-08-03 VITALS
WEIGHT: 171.5 LBS | DIASTOLIC BLOOD PRESSURE: 83 MMHG | HEIGHT: 68 IN | HEART RATE: 65 BPM | SYSTOLIC BLOOD PRESSURE: 125 MMHG | BODY MASS INDEX: 25.99 KG/M2

## 2021-08-03 DIAGNOSIS — M15.4 EROSIVE OSTEOARTHRITIS OF BOTH HANDS: Primary | ICD-10-CM

## 2021-08-03 PROCEDURE — 3008F BODY MASS INDEX DOCD: CPT | Mod: CPTII,S$GLB,, | Performed by: INTERNAL MEDICINE

## 2021-08-03 PROCEDURE — 1159F MED LIST DOCD IN RCRD: CPT | Mod: CPTII,S$GLB,, | Performed by: INTERNAL MEDICINE

## 2021-08-03 PROCEDURE — 99215 OFFICE O/P EST HI 40 MIN: CPT | Mod: S$GLB,,, | Performed by: INTERNAL MEDICINE

## 2021-08-03 PROCEDURE — 3079F PR MOST RECENT DIASTOLIC BLOOD PRESSURE 80-89 MM HG: ICD-10-PCS | Mod: CPTII,S$GLB,, | Performed by: INTERNAL MEDICINE

## 2021-08-03 PROCEDURE — 1159F PR MEDICATION LIST DOCUMENTED IN MEDICAL RECORD: ICD-10-PCS | Mod: CPTII,S$GLB,, | Performed by: INTERNAL MEDICINE

## 2021-08-03 PROCEDURE — 3079F DIAST BP 80-89 MM HG: CPT | Mod: CPTII,S$GLB,, | Performed by: INTERNAL MEDICINE

## 2021-08-03 PROCEDURE — 3074F SYST BP LT 130 MM HG: CPT | Mod: CPTII,S$GLB,, | Performed by: INTERNAL MEDICINE

## 2021-08-03 PROCEDURE — 3008F PR BODY MASS INDEX (BMI) DOCUMENTED: ICD-10-PCS | Mod: CPTII,S$GLB,, | Performed by: INTERNAL MEDICINE

## 2021-08-03 PROCEDURE — 3074F PR MOST RECENT SYSTOLIC BLOOD PRESSURE < 130 MM HG: ICD-10-PCS | Mod: CPTII,S$GLB,, | Performed by: INTERNAL MEDICINE

## 2021-08-03 PROCEDURE — 99999 PR PBB SHADOW E&M-EST. PATIENT-LVL III: ICD-10-PCS | Mod: PBBFAC,,, | Performed by: INTERNAL MEDICINE

## 2021-08-03 PROCEDURE — 99999 PR PBB SHADOW E&M-EST. PATIENT-LVL III: CPT | Mod: PBBFAC,,, | Performed by: INTERNAL MEDICINE

## 2021-08-03 PROCEDURE — 1125F AMNT PAIN NOTED PAIN PRSNT: CPT | Mod: CPTII,S$GLB,, | Performed by: INTERNAL MEDICINE

## 2021-08-03 PROCEDURE — 99215 PR OFFICE/OUTPT VISIT, EST, LEVL V, 40-54 MIN: ICD-10-PCS | Mod: S$GLB,,, | Performed by: INTERNAL MEDICINE

## 2021-08-03 PROCEDURE — 1125F PR PAIN SEVERITY QUANTIFIED, PAIN PRESENT: ICD-10-PCS | Mod: CPTII,S$GLB,, | Performed by: INTERNAL MEDICINE

## 2021-08-03 RX ORDER — PREDNISONE 20 MG/1
40 TABLET ORAL DAILY
Qty: 14 TABLET | Refills: 0 | Status: SHIPPED | OUTPATIENT
Start: 2021-08-03 | End: 2021-08-10

## 2021-08-09 ENCOUNTER — PATIENT MESSAGE (OUTPATIENT)
Dept: RHEUMATOLOGY | Facility: CLINIC | Age: 71
End: 2021-08-09

## 2021-09-12 ENCOUNTER — PATIENT MESSAGE (OUTPATIENT)
Dept: RHEUMATOLOGY | Facility: CLINIC | Age: 71
End: 2021-09-12

## 2021-09-14 ENCOUNTER — LAB VISIT (OUTPATIENT)
Dept: LAB | Facility: HOSPITAL | Age: 71
End: 2021-09-14
Attending: INTERNAL MEDICINE
Payer: MEDICARE

## 2021-09-14 ENCOUNTER — OFFICE VISIT (OUTPATIENT)
Dept: RHEUMATOLOGY | Facility: CLINIC | Age: 71
End: 2021-09-14
Payer: MEDICARE

## 2021-09-14 VITALS
HEIGHT: 68 IN | DIASTOLIC BLOOD PRESSURE: 73 MMHG | SYSTOLIC BLOOD PRESSURE: 111 MMHG | HEART RATE: 64 BPM | WEIGHT: 170.63 LBS | BODY MASS INDEX: 25.86 KG/M2

## 2021-09-14 DIAGNOSIS — M79.642 PAIN OF LEFT HAND: ICD-10-CM

## 2021-09-14 DIAGNOSIS — M79.89 SWELLING OF LEFT HAND: Primary | ICD-10-CM

## 2021-09-14 LAB
ALBUMIN SERPL BCP-MCNC: 4 G/DL (ref 3.5–5.2)
ALP SERPL-CCNC: 64 U/L (ref 55–135)
ALT SERPL W/O P-5'-P-CCNC: 17 U/L (ref 10–44)
ANION GAP SERPL CALC-SCNC: 7 MMOL/L (ref 8–16)
AST SERPL-CCNC: 22 U/L (ref 10–40)
BASOPHILS # BLD AUTO: 0.01 K/UL (ref 0–0.2)
BASOPHILS NFR BLD: 0.3 % (ref 0–1.9)
BILIRUB SERPL-MCNC: 0.4 MG/DL (ref 0.1–1)
BUN SERPL-MCNC: 17 MG/DL (ref 8–23)
CALCIUM SERPL-MCNC: 8.9 MG/DL (ref 8.7–10.5)
CHLORIDE SERPL-SCNC: 105 MMOL/L (ref 95–110)
CO2 SERPL-SCNC: 28 MMOL/L (ref 23–29)
CREAT SERPL-MCNC: 0.7 MG/DL (ref 0.5–1.4)
CRP SERPL-MCNC: 1.6 MG/L (ref 0–8.2)
DIFFERENTIAL METHOD: ABNORMAL
EOSINOPHIL # BLD AUTO: 0.1 K/UL (ref 0–0.5)
EOSINOPHIL NFR BLD: 1.4 % (ref 0–8)
ERYTHROCYTE [DISTWIDTH] IN BLOOD BY AUTOMATED COUNT: 12.8 % (ref 11.5–14.5)
ERYTHROCYTE [SEDIMENTATION RATE] IN BLOOD BY WESTERGREN METHOD: 3 MM/HR (ref 0–36)
EST. GFR  (AFRICAN AMERICAN): >60 ML/MIN/1.73 M^2
EST. GFR  (NON AFRICAN AMERICAN): >60 ML/MIN/1.73 M^2
GLUCOSE SERPL-MCNC: 105 MG/DL (ref 70–110)
HCT VFR BLD AUTO: 38.6 % (ref 37–48.5)
HGB BLD-MCNC: 13 G/DL (ref 12–16)
IMM GRANULOCYTES # BLD AUTO: 0 K/UL (ref 0–0.04)
IMM GRANULOCYTES NFR BLD AUTO: 0 % (ref 0–0.5)
LYMPHOCYTES # BLD AUTO: 1.3 K/UL (ref 1–4.8)
LYMPHOCYTES NFR BLD: 35 % (ref 18–48)
MCH RBC QN AUTO: 31.9 PG (ref 27–31)
MCHC RBC AUTO-ENTMCNC: 33.7 G/DL (ref 32–36)
MCV RBC AUTO: 95 FL (ref 82–98)
MONOCYTES # BLD AUTO: 0.4 K/UL (ref 0.3–1)
MONOCYTES NFR BLD: 9.7 % (ref 4–15)
NEUTROPHILS # BLD AUTO: 1.9 K/UL (ref 1.8–7.7)
NEUTROPHILS NFR BLD: 53.6 % (ref 38–73)
NRBC BLD-RTO: 0 /100 WBC
PLATELET # BLD AUTO: 146 K/UL (ref 150–450)
PMV BLD AUTO: 11.4 FL (ref 9.2–12.9)
POTASSIUM SERPL-SCNC: 4.5 MMOL/L (ref 3.5–5.1)
PROT SERPL-MCNC: 6.3 G/DL (ref 6–8.4)
RBC # BLD AUTO: 4.08 M/UL (ref 4–5.4)
SODIUM SERPL-SCNC: 140 MMOL/L (ref 136–145)
WBC # BLD AUTO: 3.6 K/UL (ref 3.9–12.7)

## 2021-09-14 PROCEDURE — 99215 OFFICE O/P EST HI 40 MIN: CPT | Mod: HCNC,S$GLB,, | Performed by: INTERNAL MEDICINE

## 2021-09-14 PROCEDURE — 3008F PR BODY MASS INDEX (BMI) DOCUMENTED: ICD-10-PCS | Mod: HCNC,CPTII,S$GLB, | Performed by: INTERNAL MEDICINE

## 2021-09-14 PROCEDURE — 84165 PROTEIN E-PHORESIS SERUM: CPT | Mod: 26,HCNC,, | Performed by: PATHOLOGY

## 2021-09-14 PROCEDURE — 1159F MED LIST DOCD IN RCRD: CPT | Mod: HCNC,CPTII,S$GLB, | Performed by: INTERNAL MEDICINE

## 2021-09-14 PROCEDURE — 3078F DIAST BP <80 MM HG: CPT | Mod: HCNC,CPTII,S$GLB, | Performed by: INTERNAL MEDICINE

## 2021-09-14 PROCEDURE — 99999 PR PBB SHADOW E&M-EST. PATIENT-LVL IV: CPT | Mod: PBBFAC,HCNC,, | Performed by: INTERNAL MEDICINE

## 2021-09-14 PROCEDURE — 99999 PR PBB SHADOW E&M-EST. PATIENT-LVL IV: ICD-10-PCS | Mod: PBBFAC,HCNC,, | Performed by: INTERNAL MEDICINE

## 2021-09-14 PROCEDURE — 86140 C-REACTIVE PROTEIN: CPT | Mod: HCNC | Performed by: INTERNAL MEDICINE

## 2021-09-14 PROCEDURE — 85652 RBC SED RATE AUTOMATED: CPT | Mod: HCNC | Performed by: INTERNAL MEDICINE

## 2021-09-14 PROCEDURE — 85025 COMPLETE CBC W/AUTO DIFF WBC: CPT | Mod: HCNC | Performed by: INTERNAL MEDICINE

## 2021-09-14 PROCEDURE — 3074F PR MOST RECENT SYSTOLIC BLOOD PRESSURE < 130 MM HG: ICD-10-PCS | Mod: HCNC,CPTII,S$GLB, | Performed by: INTERNAL MEDICINE

## 2021-09-14 PROCEDURE — 84165 PATHOLOGIST INTERPRETATION SPE: ICD-10-PCS | Mod: 26,HCNC,, | Performed by: PATHOLOGY

## 2021-09-14 PROCEDURE — 99215 PR OFFICE/OUTPT VISIT, EST, LEVL V, 40-54 MIN: ICD-10-PCS | Mod: HCNC,S$GLB,, | Performed by: INTERNAL MEDICINE

## 2021-09-14 PROCEDURE — 84165 PROTEIN E-PHORESIS SERUM: CPT | Mod: HCNC | Performed by: INTERNAL MEDICINE

## 2021-09-14 PROCEDURE — 3008F BODY MASS INDEX DOCD: CPT | Mod: HCNC,CPTII,S$GLB, | Performed by: INTERNAL MEDICINE

## 2021-09-14 PROCEDURE — 81374 HLA I TYPING 1 ANTIGEN LR: CPT | Mod: HCNC,PO | Performed by: INTERNAL MEDICINE

## 2021-09-14 PROCEDURE — 1125F AMNT PAIN NOTED PAIN PRSNT: CPT | Mod: HCNC,CPTII,S$GLB, | Performed by: INTERNAL MEDICINE

## 2021-09-14 PROCEDURE — 1125F PR PAIN SEVERITY QUANTIFIED, PAIN PRESENT: ICD-10-PCS | Mod: HCNC,CPTII,S$GLB, | Performed by: INTERNAL MEDICINE

## 2021-09-14 PROCEDURE — 3074F SYST BP LT 130 MM HG: CPT | Mod: HCNC,CPTII,S$GLB, | Performed by: INTERNAL MEDICINE

## 2021-09-14 PROCEDURE — 3078F PR MOST RECENT DIASTOLIC BLOOD PRESSURE < 80 MM HG: ICD-10-PCS | Mod: HCNC,CPTII,S$GLB, | Performed by: INTERNAL MEDICINE

## 2021-09-14 PROCEDURE — 80053 COMPREHEN METABOLIC PANEL: CPT | Mod: HCNC | Performed by: INTERNAL MEDICINE

## 2021-09-14 PROCEDURE — 1159F PR MEDICATION LIST DOCUMENTED IN MEDICAL RECORD: ICD-10-PCS | Mod: HCNC,CPTII,S$GLB, | Performed by: INTERNAL MEDICINE

## 2021-09-14 PROCEDURE — 36415 COLL VENOUS BLD VENIPUNCTURE: CPT | Mod: HCNC | Performed by: INTERNAL MEDICINE

## 2021-09-14 RX ORDER — DEXTROMETHORPHAN HYDROBROMIDE, GUAIFENESIN 5; 100 MG/5ML; MG/5ML
650 LIQUID ORAL EVERY 8 HOURS
COMMUNITY

## 2021-09-14 RX ORDER — TRAMADOL HYDROCHLORIDE 50 MG/1
50 TABLET ORAL EVERY 12 HOURS PRN
Qty: 60 TABLET | Refills: 5 | Status: SHIPPED | OUTPATIENT
Start: 2021-09-14 | End: 2022-02-20

## 2021-09-15 LAB
ALBUMIN SERPL ELPH-MCNC: 4.09 G/DL (ref 3.35–5.55)
ALPHA1 GLOB SERPL ELPH-MCNC: 0.22 G/DL (ref 0.17–0.41)
ALPHA2 GLOB SERPL ELPH-MCNC: 0.68 G/DL (ref 0.43–0.99)
B-GLOBULIN SERPL ELPH-MCNC: 0.6 G/DL (ref 0.5–1.1)
GAMMA GLOB SERPL ELPH-MCNC: 0.6 G/DL (ref 0.67–1.58)
PATHOLOGIST INTERPRETATION SPE: NORMAL
PROT SERPL-MCNC: 6.2 G/DL (ref 6–8.4)

## 2021-09-21 ENCOUNTER — OFFICE VISIT (OUTPATIENT)
Dept: ORTHOPEDICS | Facility: CLINIC | Age: 71
End: 2021-09-21
Payer: MEDICARE

## 2021-09-21 DIAGNOSIS — M65.4 TENDINITIS, DE QUERVAIN'S: Primary | ICD-10-CM

## 2021-09-21 LAB
HLA B27 INTERPRETATION: NORMAL
HLA-B27 RELATED AG QL: NEGATIVE
HLA-B27 RELATED AG QL: NORMAL

## 2021-09-21 PROCEDURE — 99213 OFFICE O/P EST LOW 20 MIN: CPT | Mod: HCNC,25,S$GLB, | Performed by: PHYSICIAN ASSISTANT

## 2021-09-21 PROCEDURE — 20550 NJX 1 TENDON SHEATH/LIGAMENT: CPT | Mod: HCNC,LT,S$GLB, | Performed by: PHYSICIAN ASSISTANT

## 2021-09-21 PROCEDURE — 99213 PR OFFICE/OUTPT VISIT, EST, LEVL III, 20-29 MIN: ICD-10-PCS | Mod: HCNC,25,S$GLB, | Performed by: PHYSICIAN ASSISTANT

## 2021-09-21 PROCEDURE — 1159F MED LIST DOCD IN RCRD: CPT | Mod: HCNC,CPTII,S$GLB, | Performed by: PHYSICIAN ASSISTANT

## 2021-09-21 PROCEDURE — 1159F PR MEDICATION LIST DOCUMENTED IN MEDICAL RECORD: ICD-10-PCS | Mod: HCNC,CPTII,S$GLB, | Performed by: PHYSICIAN ASSISTANT

## 2021-09-21 PROCEDURE — 20550 PR INJECT TENDON SHEATH/LIGAMENT: ICD-10-PCS | Mod: HCNC,LT,S$GLB, | Performed by: PHYSICIAN ASSISTANT

## 2021-09-21 PROCEDURE — 3288F FALL RISK ASSESSMENT DOCD: CPT | Mod: HCNC,CPTII,S$GLB, | Performed by: PHYSICIAN ASSISTANT

## 2021-09-21 PROCEDURE — 1101F PR PT FALLS ASSESS DOC 0-1 FALLS W/OUT INJ PAST YR: ICD-10-PCS | Mod: HCNC,CPTII,S$GLB, | Performed by: PHYSICIAN ASSISTANT

## 2021-09-21 PROCEDURE — 1160F RVW MEDS BY RX/DR IN RCRD: CPT | Mod: HCNC,CPTII,S$GLB, | Performed by: PHYSICIAN ASSISTANT

## 2021-09-21 PROCEDURE — 1160F PR REVIEW ALL MEDS BY PRESCRIBER/CLIN PHARMACIST DOCUMENTED: ICD-10-PCS | Mod: HCNC,CPTII,S$GLB, | Performed by: PHYSICIAN ASSISTANT

## 2021-09-21 PROCEDURE — 1125F PR PAIN SEVERITY QUANTIFIED, PAIN PRESENT: ICD-10-PCS | Mod: HCNC,CPTII,S$GLB, | Performed by: PHYSICIAN ASSISTANT

## 2021-09-21 PROCEDURE — 99999 PR PBB SHADOW E&M-EST. PATIENT-LVL III: ICD-10-PCS | Mod: PBBFAC,HCNC,, | Performed by: PHYSICIAN ASSISTANT

## 2021-09-21 PROCEDURE — 3288F PR FALLS RISK ASSESSMENT DOCUMENTED: ICD-10-PCS | Mod: HCNC,CPTII,S$GLB, | Performed by: PHYSICIAN ASSISTANT

## 2021-09-21 PROCEDURE — 1101F PT FALLS ASSESS-DOCD LE1/YR: CPT | Mod: HCNC,CPTII,S$GLB, | Performed by: PHYSICIAN ASSISTANT

## 2021-09-21 PROCEDURE — 99999 PR PBB SHADOW E&M-EST. PATIENT-LVL III: CPT | Mod: PBBFAC,HCNC,, | Performed by: PHYSICIAN ASSISTANT

## 2021-09-21 PROCEDURE — 1125F AMNT PAIN NOTED PAIN PRSNT: CPT | Mod: HCNC,CPTII,S$GLB, | Performed by: PHYSICIAN ASSISTANT

## 2021-09-21 RX ADMIN — TRIAMCINOLONE ACETONIDE 20 MG: 40 INJECTION, SUSPENSION INTRA-ARTICULAR; INTRAMUSCULAR at 01:09

## 2021-09-22 RX ORDER — TRIAMCINOLONE ACETONIDE 40 MG/ML
20 INJECTION, SUSPENSION INTRA-ARTICULAR; INTRAMUSCULAR
Status: DISCONTINUED | OUTPATIENT
Start: 2021-09-21 | End: 2021-09-22 | Stop reason: HOSPADM

## 2021-09-22 RX ORDER — TRIAMCINOLONE ACETONIDE 40 MG/ML
40 INJECTION, SUSPENSION INTRA-ARTICULAR; INTRAMUSCULAR
Status: COMPLETED | OUTPATIENT
Start: 2021-09-22 | End: 2021-09-22

## 2021-09-22 RX ADMIN — TRIAMCINOLONE ACETONIDE 40 MG: 40 INJECTION, SUSPENSION INTRA-ARTICULAR; INTRAMUSCULAR at 01:09

## 2021-09-26 ENCOUNTER — PATIENT MESSAGE (OUTPATIENT)
Dept: FAMILY MEDICINE | Facility: CLINIC | Age: 71
End: 2021-09-26

## 2021-09-26 DIAGNOSIS — Z12.31 SCREENING MAMMOGRAM, ENCOUNTER FOR: Primary | ICD-10-CM

## 2021-09-27 ENCOUNTER — HOSPITAL ENCOUNTER (OUTPATIENT)
Dept: RADIOLOGY | Facility: HOSPITAL | Age: 71
Discharge: HOME | End: 2021-09-27
Attending: INTERNAL MEDICINE
Payer: MEDICARE

## 2021-09-27 DIAGNOSIS — M79.89 SWELLING OF LEFT HAND: ICD-10-CM

## 2021-09-27 DIAGNOSIS — M79.642 PAIN OF LEFT HAND: ICD-10-CM

## 2021-09-27 PROCEDURE — 73220 MRI UPPR EXTREMITY W/O&W/DYE: CPT | Mod: TC,HCNC,LT

## 2021-09-27 PROCEDURE — 25500020 PHARM REV CODE 255: Mod: HCNC | Performed by: INTERNAL MEDICINE

## 2021-09-27 PROCEDURE — A9585 GADOBUTROL INJECTION: HCPCS | Mod: HCNC | Performed by: INTERNAL MEDICINE

## 2021-09-27 PROCEDURE — 73220 MRI UPPR EXTREMITY W/O&W/DYE: CPT | Mod: 26,HCNC,LT, | Performed by: RADIOLOGY

## 2021-09-27 PROCEDURE — 73220 MRI HAND FINGERS W WO CONTRAST LEFT: ICD-10-PCS | Mod: 26,HCNC,LT, | Performed by: RADIOLOGY

## 2021-09-27 RX ORDER — GADOBUTROL 604.72 MG/ML
10 INJECTION INTRAVENOUS
Status: COMPLETED | OUTPATIENT
Start: 2021-09-27 | End: 2021-09-27

## 2021-09-27 RX ADMIN — GADOBUTROL 10 ML: 604.72 INJECTION INTRAVENOUS at 04:09

## 2021-10-29 ENCOUNTER — HOSPITAL ENCOUNTER (OUTPATIENT)
Dept: RADIOLOGY | Facility: HOSPITAL | Age: 71
Discharge: HOME | End: 2021-10-29
Attending: INTERNAL MEDICINE
Payer: MEDICARE

## 2021-10-29 ENCOUNTER — PATIENT MESSAGE (OUTPATIENT)
Dept: OBSTETRICS AND GYNECOLOGY | Facility: CLINIC | Age: 71
End: 2021-10-29
Payer: MEDICARE

## 2021-10-29 ENCOUNTER — TELEPHONE (OUTPATIENT)
Dept: OBSTETRICS AND GYNECOLOGY | Facility: CLINIC | Age: 71
End: 2021-10-29
Payer: MEDICARE

## 2021-10-29 DIAGNOSIS — N64.9 DISORDER OF BREAST: ICD-10-CM

## 2021-10-29 DIAGNOSIS — Z12.31 SCREENING MAMMOGRAM, ENCOUNTER FOR: ICD-10-CM

## 2021-10-29 DIAGNOSIS — N63.0 BREAST LUMP IN FEMALE: Primary | ICD-10-CM

## 2021-11-02 ENCOUNTER — PATIENT OUTREACH (OUTPATIENT)
Dept: ADMINISTRATIVE | Facility: OTHER | Age: 71
End: 2021-11-02
Payer: MEDICARE

## 2021-11-03 ENCOUNTER — PATIENT MESSAGE (OUTPATIENT)
Dept: RHEUMATOLOGY | Facility: CLINIC | Age: 71
End: 2021-11-03
Payer: MEDICARE

## 2021-11-04 ENCOUNTER — OFFICE VISIT (OUTPATIENT)
Dept: ORTHOPEDICS | Facility: CLINIC | Age: 71
End: 2021-11-04
Payer: MEDICARE

## 2021-11-04 VITALS — HEIGHT: 68 IN | BODY MASS INDEX: 25.86 KG/M2 | WEIGHT: 170.63 LBS

## 2021-11-04 DIAGNOSIS — M65.4 DE QUERVAIN'S TENOSYNOVITIS, LEFT: ICD-10-CM

## 2021-11-04 PROCEDURE — 99213 PR OFFICE/OUTPT VISIT, EST, LEVL III, 20-29 MIN: ICD-10-PCS | Mod: HCNC,S$GLB,, | Performed by: ORTHOPAEDIC SURGERY

## 2021-11-04 PROCEDURE — 1125F AMNT PAIN NOTED PAIN PRSNT: CPT | Mod: HCNC,CPTII,S$GLB, | Performed by: ORTHOPAEDIC SURGERY

## 2021-11-04 PROCEDURE — 99999 PR PBB SHADOW E&M-EST. PATIENT-LVL III: CPT | Mod: PBBFAC,HCNC,, | Performed by: ORTHOPAEDIC SURGERY

## 2021-11-04 PROCEDURE — 99213 OFFICE O/P EST LOW 20 MIN: CPT | Mod: HCNC,S$GLB,, | Performed by: ORTHOPAEDIC SURGERY

## 2021-11-04 PROCEDURE — 1159F PR MEDICATION LIST DOCUMENTED IN MEDICAL RECORD: ICD-10-PCS | Mod: HCNC,CPTII,S$GLB, | Performed by: ORTHOPAEDIC SURGERY

## 2021-11-04 PROCEDURE — 1125F PR PAIN SEVERITY QUANTIFIED, PAIN PRESENT: ICD-10-PCS | Mod: HCNC,CPTII,S$GLB, | Performed by: ORTHOPAEDIC SURGERY

## 2021-11-04 PROCEDURE — 3008F PR BODY MASS INDEX (BMI) DOCUMENTED: ICD-10-PCS | Mod: HCNC,CPTII,S$GLB, | Performed by: ORTHOPAEDIC SURGERY

## 2021-11-04 PROCEDURE — 1101F PT FALLS ASSESS-DOCD LE1/YR: CPT | Mod: HCNC,CPTII,S$GLB, | Performed by: ORTHOPAEDIC SURGERY

## 2021-11-04 PROCEDURE — 3288F PR FALLS RISK ASSESSMENT DOCUMENTED: ICD-10-PCS | Mod: HCNC,CPTII,S$GLB, | Performed by: ORTHOPAEDIC SURGERY

## 2021-11-04 PROCEDURE — 1101F PR PT FALLS ASSESS DOC 0-1 FALLS W/OUT INJ PAST YR: ICD-10-PCS | Mod: HCNC,CPTII,S$GLB, | Performed by: ORTHOPAEDIC SURGERY

## 2021-11-04 PROCEDURE — 99999 PR PBB SHADOW E&M-EST. PATIENT-LVL III: ICD-10-PCS | Mod: PBBFAC,HCNC,, | Performed by: ORTHOPAEDIC SURGERY

## 2021-11-04 PROCEDURE — 3008F BODY MASS INDEX DOCD: CPT | Mod: HCNC,CPTII,S$GLB, | Performed by: ORTHOPAEDIC SURGERY

## 2021-11-04 PROCEDURE — 3288F FALL RISK ASSESSMENT DOCD: CPT | Mod: HCNC,CPTII,S$GLB, | Performed by: ORTHOPAEDIC SURGERY

## 2021-11-04 PROCEDURE — 1159F MED LIST DOCD IN RCRD: CPT | Mod: HCNC,CPTII,S$GLB, | Performed by: ORTHOPAEDIC SURGERY

## 2021-11-04 RX ORDER — TRAMADOL HYDROCHLORIDE 50 MG/1
50 TABLET ORAL EVERY 12 HOURS PRN
COMMUNITY
End: 2022-02-07

## 2021-11-05 ENCOUNTER — IMMUNIZATION (OUTPATIENT)
Dept: INTERNAL MEDICINE | Facility: CLINIC | Age: 71
End: 2021-11-05
Payer: MEDICARE

## 2021-11-05 DIAGNOSIS — Z23 NEED FOR VACCINATION: Primary | ICD-10-CM

## 2021-11-05 PROCEDURE — 0064A COVID-19, MRNA, LNP-S, PF, 100 MCG/0.25 ML DOSE VACCINE (MODERNA BOOSTER): CPT | Mod: HCNC,CV19,PBBFAC | Performed by: INTERNAL MEDICINE

## 2021-11-15 ENCOUNTER — OFFICE VISIT (OUTPATIENT)
Dept: OBSTETRICS AND GYNECOLOGY | Facility: CLINIC | Age: 71
End: 2021-11-15
Attending: OBSTETRICS & GYNECOLOGY
Payer: MEDICARE

## 2021-11-15 VITALS
WEIGHT: 164.38 LBS | SYSTOLIC BLOOD PRESSURE: 106 MMHG | BODY MASS INDEX: 24.91 KG/M2 | HEIGHT: 68 IN | DIASTOLIC BLOOD PRESSURE: 69 MMHG

## 2021-11-15 DIAGNOSIS — Z01.411 ENCOUNTER FOR GYNECOLOGICAL EXAMINATION WITH ABNORMAL FINDING: Primary | ICD-10-CM

## 2021-11-15 DIAGNOSIS — N64.4 BREAST PAIN: ICD-10-CM

## 2021-11-15 DIAGNOSIS — N95.2 POSTMENOPAUSAL ATROPHIC VAGINITIS: ICD-10-CM

## 2021-11-15 PROCEDURE — 3288F FALL RISK ASSESSMENT DOCD: CPT | Mod: CPTII,S$GLB,, | Performed by: OBSTETRICS & GYNECOLOGY

## 2021-11-15 PROCEDURE — 1160F PR REVIEW ALL MEDS BY PRESCRIBER/CLIN PHARMACIST DOCUMENTED: ICD-10-PCS | Mod: CPTII,S$GLB,, | Performed by: OBSTETRICS & GYNECOLOGY

## 2021-11-15 PROCEDURE — 1159F PR MEDICATION LIST DOCUMENTED IN MEDICAL RECORD: ICD-10-PCS | Mod: CPTII,S$GLB,, | Performed by: OBSTETRICS & GYNECOLOGY

## 2021-11-15 PROCEDURE — 3074F SYST BP LT 130 MM HG: CPT | Mod: CPTII,S$GLB,, | Performed by: OBSTETRICS & GYNECOLOGY

## 2021-11-15 PROCEDURE — G0101 PR CA SCREEN;PELVIC/BREAST EXAM: ICD-10-PCS | Mod: S$GLB,,, | Performed by: OBSTETRICS & GYNECOLOGY

## 2021-11-15 PROCEDURE — 3078F DIAST BP <80 MM HG: CPT | Mod: CPTII,S$GLB,, | Performed by: OBSTETRICS & GYNECOLOGY

## 2021-11-15 PROCEDURE — 1101F PT FALLS ASSESS-DOCD LE1/YR: CPT | Mod: CPTII,S$GLB,, | Performed by: OBSTETRICS & GYNECOLOGY

## 2021-11-15 PROCEDURE — 1101F PR PT FALLS ASSESS DOC 0-1 FALLS W/OUT INJ PAST YR: ICD-10-PCS | Mod: CPTII,S$GLB,, | Performed by: OBSTETRICS & GYNECOLOGY

## 2021-11-15 PROCEDURE — 3074F PR MOST RECENT SYSTOLIC BLOOD PRESSURE < 130 MM HG: ICD-10-PCS | Mod: CPTII,S$GLB,, | Performed by: OBSTETRICS & GYNECOLOGY

## 2021-11-15 PROCEDURE — 3078F PR MOST RECENT DIASTOLIC BLOOD PRESSURE < 80 MM HG: ICD-10-PCS | Mod: CPTII,S$GLB,, | Performed by: OBSTETRICS & GYNECOLOGY

## 2021-11-15 PROCEDURE — 1126F AMNT PAIN NOTED NONE PRSNT: CPT | Mod: CPTII,S$GLB,, | Performed by: OBSTETRICS & GYNECOLOGY

## 2021-11-15 PROCEDURE — 1159F MED LIST DOCD IN RCRD: CPT | Mod: CPTII,S$GLB,, | Performed by: OBSTETRICS & GYNECOLOGY

## 2021-11-15 PROCEDURE — 3008F BODY MASS INDEX DOCD: CPT | Mod: CPTII,S$GLB,, | Performed by: OBSTETRICS & GYNECOLOGY

## 2021-11-15 PROCEDURE — 3008F PR BODY MASS INDEX (BMI) DOCUMENTED: ICD-10-PCS | Mod: CPTII,S$GLB,, | Performed by: OBSTETRICS & GYNECOLOGY

## 2021-11-15 PROCEDURE — 3288F PR FALLS RISK ASSESSMENT DOCUMENTED: ICD-10-PCS | Mod: CPTII,S$GLB,, | Performed by: OBSTETRICS & GYNECOLOGY

## 2021-11-15 PROCEDURE — G0101 CA SCREEN;PELVIC/BREAST EXAM: HCPCS | Mod: S$GLB,,, | Performed by: OBSTETRICS & GYNECOLOGY

## 2021-11-15 PROCEDURE — 1160F RVW MEDS BY RX/DR IN RCRD: CPT | Mod: CPTII,S$GLB,, | Performed by: OBSTETRICS & GYNECOLOGY

## 2021-11-15 PROCEDURE — 1126F PR PAIN SEVERITY QUANTIFIED, NO PAIN PRESENT: ICD-10-PCS | Mod: CPTII,S$GLB,, | Performed by: OBSTETRICS & GYNECOLOGY

## 2021-11-15 RX ORDER — ESTRADIOL 0.1 MG/G
1 CREAM VAGINAL EVERY OTHER DAY
Qty: 42 G | Refills: 3 | Status: SHIPPED | OUTPATIENT
Start: 2021-11-15 | End: 2023-01-18 | Stop reason: SDUPTHER

## 2021-11-22 ENCOUNTER — HOSPITAL ENCOUNTER (OUTPATIENT)
Dept: RADIOLOGY | Facility: HOSPITAL | Age: 71
Discharge: HOME | End: 2021-11-22
Attending: OBSTETRICS & GYNECOLOGY
Payer: MEDICARE

## 2021-11-22 VITALS — WEIGHT: 163 LBS | HEIGHT: 68 IN | BODY MASS INDEX: 24.71 KG/M2

## 2021-11-22 DIAGNOSIS — N64.9 DISORDER OF BREAST: ICD-10-CM

## 2021-11-22 DIAGNOSIS — N64.4 BREAST PAIN: ICD-10-CM

## 2021-11-22 PROCEDURE — 77062 BREAST TOMOSYNTHESIS BI: CPT | Mod: 26,,, | Performed by: RADIOLOGY

## 2021-11-22 PROCEDURE — 77066 MAMMO DIGITAL DIAGNOSTIC BILAT WITH TOMO: ICD-10-PCS | Mod: 26,,, | Performed by: RADIOLOGY

## 2021-11-22 PROCEDURE — 76642 US BREAST BILATERAL LIMITED: ICD-10-PCS | Mod: 26,50,, | Performed by: RADIOLOGY

## 2021-11-22 PROCEDURE — 77066 DX MAMMO INCL CAD BI: CPT | Mod: 26,,, | Performed by: RADIOLOGY

## 2021-11-22 PROCEDURE — 76642 ULTRASOUND BREAST LIMITED: CPT | Mod: TC,50

## 2021-11-22 PROCEDURE — 76642 ULTRASOUND BREAST LIMITED: CPT | Mod: 26,50,, | Performed by: RADIOLOGY

## 2021-11-22 PROCEDURE — 77062 MAMMO DIGITAL DIAGNOSTIC BILAT WITH TOMO: ICD-10-PCS | Mod: 26,,, | Performed by: RADIOLOGY

## 2021-11-22 PROCEDURE — 77066 DX MAMMO INCL CAD BI: CPT | Mod: TC

## 2021-12-07 ENCOUNTER — OFFICE VISIT (OUTPATIENT)
Dept: RHEUMATOLOGY | Facility: CLINIC | Age: 71
End: 2021-12-07
Payer: MEDICARE

## 2021-12-07 VITALS
DIASTOLIC BLOOD PRESSURE: 77 MMHG | WEIGHT: 172.19 LBS | BODY MASS INDEX: 26.18 KG/M2 | HEART RATE: 60 BPM | SYSTOLIC BLOOD PRESSURE: 120 MMHG

## 2021-12-07 DIAGNOSIS — M15.4 EROSIVE OSTEOARTHRITIS OF BOTH HANDS: Primary | ICD-10-CM

## 2021-12-07 PROCEDURE — 99214 PR OFFICE/OUTPT VISIT, EST, LEVL IV, 30-39 MIN: ICD-10-PCS | Mod: HCNC,S$GLB,, | Performed by: INTERNAL MEDICINE

## 2021-12-07 PROCEDURE — 99999 PR PBB SHADOW E&M-EST. PATIENT-LVL III: ICD-10-PCS | Mod: PBBFAC,HCNC,, | Performed by: INTERNAL MEDICINE

## 2021-12-07 PROCEDURE — 99214 OFFICE O/P EST MOD 30 MIN: CPT | Mod: HCNC,S$GLB,, | Performed by: INTERNAL MEDICINE

## 2021-12-07 PROCEDURE — 99999 PR PBB SHADOW E&M-EST. PATIENT-LVL III: CPT | Mod: PBBFAC,HCNC,, | Performed by: INTERNAL MEDICINE

## 2021-12-07 RX ORDER — COVID-19 MOLECULAR TEST ASSAY
KIT MISCELLANEOUS
COMMUNITY
Start: 2021-11-17 | End: 2022-02-07

## 2021-12-07 ASSESSMENT — ROUTINE ASSESSMENT OF PATIENT INDEX DATA (RAPID3)
AM STIFFNESS SCORE: 1, YES
PATIENT GLOBAL ASSESSMENT SCORE: 1
TOTAL RAPID3 SCORE: 1.39
PAIN SCORE: 1.5
MDHAQ FUNCTION SCORE: 0.5
PSYCHOLOGICAL DISTRESS SCORE: 0
FATIGUE SCORE: 0.5

## 2021-12-09 ENCOUNTER — PATIENT MESSAGE (OUTPATIENT)
Dept: INTERNAL MEDICINE | Facility: CLINIC | Age: 71
End: 2021-12-09
Payer: MEDICARE

## 2021-12-09 DIAGNOSIS — E78.2 HYPERLIPIDEMIA, MIXED: ICD-10-CM

## 2021-12-09 DIAGNOSIS — E03.9 HYPOTHYROIDISM (ACQUIRED): Primary | ICD-10-CM

## 2021-12-28 ENCOUNTER — PATIENT MESSAGE (OUTPATIENT)
Dept: ELECTROPHYSIOLOGY | Facility: CLINIC | Age: 71
End: 2021-12-28
Payer: MEDICARE

## 2021-12-28 ENCOUNTER — LAB VISIT (OUTPATIENT)
Dept: LAB | Facility: HOSPITAL | Age: 71
End: 2021-12-28
Attending: INTERNAL MEDICINE
Payer: MEDICARE

## 2021-12-28 DIAGNOSIS — E03.9 HYPOTHYROIDISM (ACQUIRED): ICD-10-CM

## 2021-12-28 DIAGNOSIS — E78.2 HYPERLIPIDEMIA, MIXED: ICD-10-CM

## 2021-12-28 LAB
ALBUMIN SERPL BCP-MCNC: 4 G/DL (ref 3.5–5.2)
ALP SERPL-CCNC: 71 U/L (ref 55–135)
ALT SERPL W/O P-5'-P-CCNC: 28 U/L (ref 10–44)
ANION GAP SERPL CALC-SCNC: 7 MMOL/L (ref 8–16)
AST SERPL-CCNC: 35 U/L (ref 10–40)
BILIRUB SERPL-MCNC: 0.7 MG/DL (ref 0.1–1)
BUN SERPL-MCNC: 11 MG/DL (ref 8–23)
CALCIUM SERPL-MCNC: 9.3 MG/DL (ref 8.7–10.5)
CHLORIDE SERPL-SCNC: 107 MMOL/L (ref 95–110)
CHOLEST SERPL-MCNC: 172 MG/DL (ref 120–199)
CHOLEST/HDLC SERPL: 2.9 {RATIO} (ref 2–5)
CO2 SERPL-SCNC: 29 MMOL/L (ref 23–29)
CREAT SERPL-MCNC: 0.7 MG/DL (ref 0.5–1.4)
EST. GFR  (AFRICAN AMERICAN): >60 ML/MIN/1.73 M^2
EST. GFR  (NON AFRICAN AMERICAN): >60 ML/MIN/1.73 M^2
GLUCOSE SERPL-MCNC: 91 MG/DL (ref 70–110)
HDLC SERPL-MCNC: 60 MG/DL (ref 40–75)
HDLC SERPL: 34.9 % (ref 20–50)
LDLC SERPL CALC-MCNC: 96.6 MG/DL (ref 63–159)
NONHDLC SERPL-MCNC: 112 MG/DL
POTASSIUM SERPL-SCNC: 4.7 MMOL/L (ref 3.5–5.1)
PROT SERPL-MCNC: 6.6 G/DL (ref 6–8.4)
SODIUM SERPL-SCNC: 143 MMOL/L (ref 136–145)
T4 FREE SERPL-MCNC: 0.98 NG/DL (ref 0.71–1.51)
TRIGL SERPL-MCNC: 77 MG/DL (ref 30–150)
TSH SERPL DL<=0.005 MIU/L-ACNC: 1.58 UIU/ML (ref 0.4–4)

## 2021-12-28 PROCEDURE — 36415 COLL VENOUS BLD VENIPUNCTURE: CPT | Mod: HCNC | Performed by: INTERNAL MEDICINE

## 2021-12-28 PROCEDURE — 80053 COMPREHEN METABOLIC PANEL: CPT | Mod: HCNC | Performed by: INTERNAL MEDICINE

## 2021-12-28 PROCEDURE — 84443 ASSAY THYROID STIM HORMONE: CPT | Mod: HCNC | Performed by: INTERNAL MEDICINE

## 2021-12-28 PROCEDURE — 84439 ASSAY OF FREE THYROXINE: CPT | Mod: HCNC | Performed by: INTERNAL MEDICINE

## 2021-12-28 PROCEDURE — 80061 LIPID PANEL: CPT | Mod: HCNC | Performed by: INTERNAL MEDICINE

## 2022-01-01 ENCOUNTER — PATIENT MESSAGE (OUTPATIENT)
Dept: ADMINISTRATIVE | Facility: OTHER | Age: 72
End: 2022-01-01
Payer: MEDICARE

## 2022-01-03 ENCOUNTER — LAB VISIT (OUTPATIENT)
Dept: PRIMARY CARE CLINIC | Facility: OTHER | Age: 72
End: 2022-01-03
Attending: INTERNAL MEDICINE
Payer: MEDICARE

## 2022-01-03 ENCOUNTER — PATIENT MESSAGE (OUTPATIENT)
Dept: INTERNAL MEDICINE | Facility: CLINIC | Age: 72
End: 2022-01-03
Payer: MEDICARE

## 2022-01-03 ENCOUNTER — PATIENT MESSAGE (OUTPATIENT)
Dept: ADMINISTRATIVE | Facility: OTHER | Age: 72
End: 2022-01-03
Payer: MEDICARE

## 2022-01-03 DIAGNOSIS — Z20.822 ENCOUNTER FOR LABORATORY TESTING FOR COVID-19 VIRUS: ICD-10-CM

## 2022-01-04 ENCOUNTER — TELEPHONE (OUTPATIENT)
Dept: INTERNAL MEDICINE | Facility: CLINIC | Age: 72
End: 2022-01-04

## 2022-01-04 ENCOUNTER — OFFICE VISIT (OUTPATIENT)
Dept: INTERNAL MEDICINE | Facility: CLINIC | Age: 72
End: 2022-01-04
Payer: MEDICARE

## 2022-01-04 VITALS
WEIGHT: 166.13 LBS | SYSTOLIC BLOOD PRESSURE: 120 MMHG | TEMPERATURE: 99 F | OXYGEN SATURATION: 97 % | HEIGHT: 68 IN | HEART RATE: 71 BPM | DIASTOLIC BLOOD PRESSURE: 70 MMHG | BODY MASS INDEX: 25.18 KG/M2

## 2022-01-04 DIAGNOSIS — C40.21 OSTEOSARCOMA OF RIGHT FEMUR: ICD-10-CM

## 2022-01-04 DIAGNOSIS — E03.9 HYPOTHYROIDISM (ACQUIRED): ICD-10-CM

## 2022-01-04 DIAGNOSIS — J47.9 BRONCHIECTASIS WITHOUT COMPLICATION: ICD-10-CM

## 2022-01-04 DIAGNOSIS — G44.229 CHRONIC TENSION-TYPE HEADACHE, NOT INTRACTABLE: ICD-10-CM

## 2022-01-04 DIAGNOSIS — J06.9 URTI (ACUTE UPPER RESPIRATORY INFECTION): Primary | ICD-10-CM

## 2022-01-04 DIAGNOSIS — I48.0 PAROXYSMAL ATRIAL FIBRILLATION: Primary | ICD-10-CM

## 2022-01-04 DIAGNOSIS — D69.6 THROMBOCYTOPENIA: ICD-10-CM

## 2022-01-04 DIAGNOSIS — J06.9 URTI (ACUTE UPPER RESPIRATORY INFECTION): ICD-10-CM

## 2022-01-04 LAB
CTP QC/QA: YES
FLUAV AG NPH QL: NEGATIVE
FLUBV AG NPH QL: NEGATIVE

## 2022-01-04 PROCEDURE — 1160F PR REVIEW ALL MEDS BY PRESCRIBER/CLIN PHARMACIST DOCUMENTED: ICD-10-PCS | Mod: HCNC,CPTII,S$GLB, | Performed by: INTERNAL MEDICINE

## 2022-01-04 PROCEDURE — 1126F PR PAIN SEVERITY QUANTIFIED, NO PAIN PRESENT: ICD-10-PCS | Mod: HCNC,CPTII,S$GLB, | Performed by: INTERNAL MEDICINE

## 2022-01-04 PROCEDURE — 3078F PR MOST RECENT DIASTOLIC BLOOD PRESSURE < 80 MM HG: ICD-10-PCS | Mod: HCNC,CPTII,S$GLB, | Performed by: INTERNAL MEDICINE

## 2022-01-04 PROCEDURE — 99499 RISK ADDL DX/OHS AUDIT: ICD-10-PCS | Mod: S$GLB,,, | Performed by: INTERNAL MEDICINE

## 2022-01-04 PROCEDURE — 99999 PR PBB SHADOW E&M-EST. PATIENT-LVL IV: ICD-10-PCS | Mod: PBBFAC,HCNC,, | Performed by: INTERNAL MEDICINE

## 2022-01-04 PROCEDURE — 1126F AMNT PAIN NOTED NONE PRSNT: CPT | Mod: HCNC,CPTII,S$GLB, | Performed by: INTERNAL MEDICINE

## 2022-01-04 PROCEDURE — 99499 UNLISTED E&M SERVICE: CPT | Mod: S$GLB,,, | Performed by: INTERNAL MEDICINE

## 2022-01-04 PROCEDURE — 99397 PER PM REEVAL EST PAT 65+ YR: CPT | Mod: HCNC,S$GLB,, | Performed by: INTERNAL MEDICINE

## 2022-01-04 PROCEDURE — 1159F MED LIST DOCD IN RCRD: CPT | Mod: HCNC,CPTII,S$GLB, | Performed by: INTERNAL MEDICINE

## 2022-01-04 PROCEDURE — 99999 PR PBB SHADOW E&M-EST. PATIENT-LVL IV: CPT | Mod: PBBFAC,HCNC,, | Performed by: INTERNAL MEDICINE

## 2022-01-04 PROCEDURE — 3074F PR MOST RECENT SYSTOLIC BLOOD PRESSURE < 130 MM HG: ICD-10-PCS | Mod: HCNC,CPTII,S$GLB, | Performed by: INTERNAL MEDICINE

## 2022-01-04 PROCEDURE — 99397 PR PREVENTIVE VISIT,EST,65 & OVER: ICD-10-PCS | Mod: HCNC,S$GLB,, | Performed by: INTERNAL MEDICINE

## 2022-01-04 PROCEDURE — 3078F DIAST BP <80 MM HG: CPT | Mod: HCNC,CPTII,S$GLB, | Performed by: INTERNAL MEDICINE

## 2022-01-04 PROCEDURE — 3008F PR BODY MASS INDEX (BMI) DOCUMENTED: ICD-10-PCS | Mod: HCNC,CPTII,S$GLB, | Performed by: INTERNAL MEDICINE

## 2022-01-04 PROCEDURE — 3008F BODY MASS INDEX DOCD: CPT | Mod: HCNC,CPTII,S$GLB, | Performed by: INTERNAL MEDICINE

## 2022-01-04 PROCEDURE — 1159F PR MEDICATION LIST DOCUMENTED IN MEDICAL RECORD: ICD-10-PCS | Mod: HCNC,CPTII,S$GLB, | Performed by: INTERNAL MEDICINE

## 2022-01-04 PROCEDURE — 3074F SYST BP LT 130 MM HG: CPT | Mod: HCNC,CPTII,S$GLB, | Performed by: INTERNAL MEDICINE

## 2022-01-04 PROCEDURE — 87804 POCT INFLUENZA A/B: ICD-10-PCS | Mod: 59,QW,HCNC,S$GLB | Performed by: INTERNAL MEDICINE

## 2022-01-04 PROCEDURE — 1160F RVW MEDS BY RX/DR IN RCRD: CPT | Mod: HCNC,CPTII,S$GLB, | Performed by: INTERNAL MEDICINE

## 2022-01-04 PROCEDURE — 87804 INFLUENZA ASSAY W/OPTIC: CPT | Mod: QW,HCNC,S$GLB, | Performed by: INTERNAL MEDICINE

## 2022-01-04 RX ORDER — LEVOTHYROXINE SODIUM 25 UG/1
25 TABLET ORAL DAILY
Qty: 90 TABLET | Refills: 3 | Status: SHIPPED | OUTPATIENT
Start: 2022-01-04 | End: 2022-03-28 | Stop reason: SDUPTHER

## 2022-01-04 RX ORDER — HYDROCODONE BITARTRATE AND ACETAMINOPHEN 5; 325 MG/1; MG/1
1 TABLET ORAL EVERY 6 HOURS PRN
Qty: 28 TABLET | Refills: 0 | Status: SHIPPED | OUTPATIENT
Start: 2022-01-04 | End: 2022-05-27 | Stop reason: SDUPTHER

## 2022-01-04 RX ORDER — LEVOFLOXACIN 500 MG/1
500 TABLET, FILM COATED ORAL DAILY
Qty: 7 TABLET | Refills: 0 | Status: SHIPPED | OUTPATIENT
Start: 2022-01-04 | End: 2022-02-07

## 2022-01-04 RX ORDER — METHYLPREDNISOLONE 4 MG/1
TABLET ORAL
Qty: 21 EACH | Refills: 0 | Status: SHIPPED | OUTPATIENT
Start: 2022-01-04 | End: 2022-01-25

## 2022-01-04 NOTE — TELEPHONE ENCOUNTER
COVID test has to be done again, top came off tube and they could not run test. Do you want to place order for clinic to swab?

## 2022-01-04 NOTE — PROGRESS NOTES
[facility] Note    Chief Complaint      Chief Complaint   Patient presents with    Annual Exam       History of Present Illness      Zoë Enrique is a 71 y.o. female with chronic conditions of A fib, hypothyroidism, hx of osteosarcoma of right femur, herpes  who presents today for: annual preventative visit.  Complains of 10 days of productive cough, chest congestion, fever, diarrhea.  Tested for COVID 19 yesterday, results pending.  Has sick contacts at home.    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  Surveillance exams deferred until changes.  Pt has not noticed any new symptoms.    Off tikosyn since ablation. Does not need rate control.  Hypothyroidism: Controlled on sytnhroid.  TSH at goal.    Herpes: Takes acyclovir as needed  Diet: Prepares own food.  Eats healthy when out.  Limiting processed foods and fatty foods.  Limits sugars and carbs.  Drinks plenty water  Exercise: Limited by A fib.    Denies drinking and driving, drinking more than 4 drinks on occasion, drug use.  Flu shot UTD.  TdAP UTD.  Prevnar , pneumovax .  Zostavax UTD. Shingrix discussed.  Mammogram 2021.  DEXA  normal  Cscope , Dr. Salgado, polyp, 7 year interval.    Past Medical History:  Past Medical History:   Diagnosis Date    Abnormal Pap smear of cervix     Atrial fibrillation 2017    Atrial flutter     Cancer     osteosarcoma of right femur    Diverticulosis     Hx of atrial flutter     Hx of mitral valve prolapse     Hypothyroidism     Migraines        Past Surgical History:   has a past surgical history that includes Knee surgery (Right, ); Knee Arthroplasty (Right, ); Treatment of cardiac arrhythmia (N/A, 2019); Augmentation of breast (Bilateral, );  section (); Cosmetic surgery (); Colposcopy; Oophorectomy; Total abdominal hysterectomy (); Ablation of arrhythmogenic focus for atrial fibrillation (N/A, 2020); Cardioversion  (11/24/2020); Transesophageal echocardiography (N/A, 11/24/2020); Colon surgery (2011); Breast surgery (1984); Eye surgery (Bilateral, 2016); Joint replacement (Right, 2/07, 11/07, 2/09); Esophagogastroduodenoscopy (N/A, 3/19/2021); and Colonoscopy (N/A, 3/19/2021).    Family History:  family history includes Asthma in her brother; Diabetes in her mother and sister; Diverticulosis in her brother and brother; Hearing loss in her father; Heart disease in her brother, brother, father, mother, and sister; No Known Problems in her daughter; Stroke (age of onset: 93) in her father.     Social History:  Social History     Tobacco Use    Smoking status: Never Smoker    Smokeless tobacco: Never Used   Substance Use Topics    Alcohol use: Yes     Alcohol/week: 1.0 standard drink     Types: 1 Standard drinks or equivalent per week     Comment: 1-2 per month    Drug use: No     Comment: CBD oils for headache       I personally reviewed all past medical, surgical, social and family history.    Review of Systems   Constitutional: Negative for chills, fever and malaise/fatigue.   HENT: Negative for hearing loss.    Eyes: Negative for discharge.   Respiratory: Negative for shortness of breath and wheezing.    Cardiovascular: Negative for chest pain and palpitations.   Gastrointestinal: Positive for diarrhea. Negative for blood in stool, constipation, nausea and vomiting.   Genitourinary: Negative for dysuria and hematuria.   Musculoskeletal: Negative for neck pain.   Skin: Negative for rash.   Neurological: Positive for headaches. Negative for weakness.   Endo/Heme/Allergies: Negative for polydipsia.   All other systems reviewed and are negative.       Medications:  Outpatient Encounter Medications as of 1/4/2022   Medication Sig Note Dispense Refill    acetaminophen (TYLENOL) 650 MG TbSR Take 650 mg by mouth every 8 (eight) hours.       acyclovir (ZOVIRAX) 400 MG tablet Take 1 tablet (400 mg total) by mouth 3 (three) times  daily as needed. 9/14/2021:  tablet 1    albuterol (PROVENTIL) 2.5 mg /3 mL (0.083 %) nebulizer solution Take 1 vial (3 ml) by nebulization every 6 (six) hours until directed to stop. Take only as needed.  75 mL 0    amoxicillin (AMOXIL) 500 MG Tab Take six tablets by mouth one hour prior to colonoscopy then take two tablets by mouth 6 hours after colonoscopy for antibiotic prophylaxis due to orthopedic history 9/14/2021: PRN 8 tablet 0    benzonatate (TESSALON) 200 MG capsule Take 1 capsule (200 mg total) by mouth 3 (three) times daily until directed to stop. Take only as needed.  30 capsule 0    CALCIUM CARB/VIT D3/MINERALS (CALTRATE PLUS ORAL) Take by mouth. Takes two chews daily       docusate sodium (COLACE) 250 MG capsule Take 250 mg by mouth 2 (two) times daily.        estradioL (ESTRACE) 0.01 % (0.1 mg/gram) vaginal cream Place 1 g vaginally every other day.  42 g 3    HYDROcodone-acetaminophen (NORCO) 5-325 mg per tablet Take 1 tablet by mouth every 6 (six) hours as needed for Pain. Pt states takes PRN for headaches 9/14/2021: PRN 28 tablet 0    ID NOW COVID-19 TEST KIT Kit TEST AS DIRECTED TODAY       levothyroxine (SYNTHROID) 25 MCG tablet Take 1 tablet (25 mcg total) by mouth once daily.  90 tablet 3    melatonin 5 mg Cap Take 5 mg by mouth as needed.        olopatadine (PATANOL) 0.1 % ophthalmic solution 1 drop 2 (two) times daily.       promethazine-dextromethorphan (PROMETHAZINE-DM) 6.25-15 mg/5 mL Syrp Take 10 mLs by mouth every 6 (six) hours as needed for cough  200 mL 1    rivaroxaban (XARELTO) 20 mg Tab Take 1 tablet (20 mg total) by mouth once daily.  90 tablet 3    traMADoL (ULTRAM) 50 mg tablet Take 50 mg by mouth every 12 (twelve) hours as needed for Pain.       vit C/E/Zn/coppr/lutein/zeaxan (PRESERVISION AREDS-2 ORAL)        [DISCONTINUED] levothyroxine (SYNTHROID) 25 MCG tablet Take 1 tablet (25 mcg total) by mouth once daily.  90 tablet 3     Facility-Administered  "Encounter Medications as of 1/4/2022   Medication Dose Route Frequency Provider Last Rate Last Admin    sodium chloride 0.9% flush 5 mL  5 mL Intravenous PRN Aleksandra Taylor NP           Allergies:  Review of patient's allergies indicates:   Allergen Reactions    Morphine Nausea And Vomiting    Succinylcholine      Other reaction(s): v tach       Health Maintenance:  Immunization History   Administered Date(s) Administered    COVID-19 MRNA, LN-S PF (MODERNA HALF 0.25 ML DOSE) 11/05/2021    COVID-19, MRNA, LN-S, PF (MODERNA FULL 0.5 ML DOSE) 02/11/2021, 03/11/2021    Influenza 11/04/2008, 10/06/2018, 10/09/2020    Influenza (FLUAD) - Quadrivalent - Adjuvanted - PF *Preferred* (65+) 10/09/2020    Influenza (FLUAD) - Trivalent - Adjuvanted - PF (65+) 10/09/2018    Influenza - High Dose - PF (65 years and older) 01/04/2016, 11/03/2016, 09/28/2017, 10/30/2019    Influenza - Quadrivalent - High Dose - PF (65 years and older) 09/23/2021    Pneumococcal Conjugate - 13 Valent 01/04/2016    Tdap 02/13/2020      Health Maintenance   Topic Date Due    DEXA SCAN  Never done    Mammogram  11/22/2022    Lipid Panel  12/28/2026    TETANUS VACCINE  02/13/2030    Hepatitis C Screening  Completed        Physical Exam      Vital Signs  Temp: 98.7 °F (37.1 °C)  Pulse: 71  SpO2: 97 %  BP: 120/70  BP Location: Right arm  Patient Position: Sitting  Pain Score: 0-No pain  Height and Weight  Height: 5' 8" (172.7 cm)  Weight: 75.4 kg (166 lb 1.9 oz)  BSA (Calculated - sq m): 1.9 sq meters  BMI (Calculated): 25.3  Weight in (lb) to have BMI = 25: 164.1]    Physical Exam  Vitals reviewed.   Constitutional:       Appearance: She is well-developed and well-nourished.   HENT:      Head: Normocephalic and atraumatic.      Right Ear: External ear normal.      Left Ear: External ear normal.      Mouth/Throat:      Mouth: Oropharynx is clear and moist.   Cardiovascular:      Rate and Rhythm: Normal rate and regular rhythm.      " Heart sounds: Normal heart sounds. No murmur heard.      Pulmonary:      Effort: Pulmonary effort is normal. No respiratory distress.      Breath sounds: Wheezing and rhonchi present. No rales.   Abdominal:      General: Bowel sounds are normal. There is no distension.      Palpations: Abdomen is soft.      Tenderness: There is no abdominal tenderness.          Laboratory:  CBC:  Recent Labs   Lab 05/28/21  1218 05/28/21  1218 07/26/21  1316 07/26/21  1316 09/14/21  1654   WBC 4.65   < > 4.39   < > 3.60 L   RBC 4.00   < > 3.86 L   < > 4.08   Hemoglobin 12.6   < > 12.3   < > 13.0   Hematocrit 38.8   < > 37.7   < > 38.6   Platelets 145 L   < > 131 L   < > 146 L   MCV 97   < > 98   < > 95   MCH 31.5 H   < > 31.9 H   < > 31.9 H   MCHC 32.5  --  32.6  --  33.7    < > = values in this interval not displayed.     CMP:  Recent Labs   Lab 07/26/21  1316 07/26/21  1316 09/14/21  1654 09/14/21  1654 12/28/21  0908   Glucose 92   < > 105   < > 91   Calcium 9.4   < > 8.9   < > 9.3   Albumin 4.0   < > 4.0   < > 4.0   Total Protein 6.6   < > 6.3   < > 6.6   Sodium 137   < > 140   < > 143   Potassium 4.7   < > 4.5   < > 4.7   CO2 25   < > 28   < > 29   Chloride 106   < > 105   < > 107   BUN 18   < > 17   < > 11   Alkaline Phosphatase 61   < > 64   < > 71   ALT 16   < > 17   < > 28   AST 25   < > 22   < > 35   Total Bilirubin 0.4  --  0.4  --  0.7    < > = values in this interval not displayed.     URINALYSIS:  Recent Labs   Lab 10/09/20  1555   Color, UA Yellow   Specific Gravity, UA 1.030   pH, UA 5.0   Protein, UA Negative   Bacteria Occasional   Nitrite, UA Negative   Leukocytes, UA Negative      LIPIDS:  Recent Labs   Lab 08/12/19  1656 01/23/20  0933 12/28/21  0908   TSH 1.854 2.533 1.584   HDL  --  75 60   Cholesterol  --  210 H 172   Triglycerides  --  80 77   LDL Cholesterol  --  119.0 96.6   HDL/Cholesterol Ratio  --  35.7 34.9   Non-HDL Cholesterol  --  135 112   Total Cholesterol/HDL Ratio  --  2.8 2.9     TSH:  Recent  Labs   Lab 08/12/19  1656 01/23/20  0933 12/28/21  0908   TSH 1.854 2.533 1.584     A1C:        Assessment/Plan     Zoë Enrique is a 71 y.o.female with:    1. Paroxysmal atrial fibrillation  Continue current meds.  F/U with cardiology  2. Hypothyroidism (acquired)  - levothyroxine (SYNTHROID) 25 MCG tablet; Take 1 tablet (25 mcg total) by mouth once daily.  Dispense: 90 tablet; Refill: 3  Continue current meds.  Reviewed labs  3. Osteosarcoma of right femur  Stable.  F/u with Dr. Urban as needed  4. Bronchiectasis without complication  In exacerbation at the moment.  Sending in medrol/levaquin.  Has been having recurring episodes in the past 4-6 months.  May need to follow back u [facility] pulmonology if continues  5. Thrombocytopenia  Stable. Reviewed labs.  6. URTI (acute upper respiratory infection)  - POCT Influenza A/B  Flu swab negative.  COVID test pending.  See above regarding levaquin/medrol.    Chronic conditions status updated as per HPI.  Other than changes above, cont current medications and maintain follow up with specialists.  No follow-ups on file.    Future Appointments   Date Time Provider Department Center   1/6/2022  1:20 PM Toñito Adame Jr., MD [facility] ORTHO [facility]   3/14/2022  2:30 PM Franklin Ortiz MD [facility]  [facility]   4/13/2022 10:20 AM Moe Seymour MD [facility]       Praveen Jeffery MD  [facility]                Answers for HPI/ROS submitted by the patient on 1/4/2022  activity change: No  unexpected weight change: No  rhinorrhea: Yes  trouble swallowing: No  visual disturbance: No  chest tightness: No  polyuria: No  difficulty urinating: No  menstrual problem: No  joint swelling: No  arthralgias: Yes  confusion: No  dysphoric mood: No

## 2022-01-05 ENCOUNTER — PATIENT OUTREACH (OUTPATIENT)
Dept: ADMINISTRATIVE | Facility: OTHER | Age: 72
End: 2022-01-05
Payer: MEDICARE

## 2022-01-05 ENCOUNTER — IMMUNIZATION (OUTPATIENT)
Dept: INTERNAL MEDICINE | Facility: CLINIC | Age: 72
End: 2022-01-05
Payer: MEDICARE

## 2022-01-05 ENCOUNTER — HOSPITAL ENCOUNTER (OUTPATIENT)
Dept: RADIOLOGY | Facility: HOSPITAL | Age: 72
Discharge: HOME | End: 2022-01-05
Attending: INTERNAL MEDICINE
Payer: MEDICARE

## 2022-01-05 ENCOUNTER — PATIENT MESSAGE (OUTPATIENT)
Dept: INTERNAL MEDICINE | Facility: CLINIC | Age: 72
End: 2022-01-05

## 2022-01-05 DIAGNOSIS — R07.81 RIB PAIN ON RIGHT SIDE: ICD-10-CM

## 2022-01-05 DIAGNOSIS — R07.81 RIB PAIN ON RIGHT SIDE: Primary | ICD-10-CM

## 2022-01-05 DIAGNOSIS — R05.9 COUGH: Primary | ICD-10-CM

## 2022-01-05 LAB
CTP QC/QA: YES
SARS-COV-2 RDRP RESP QL NAA+PROBE: NEGATIVE

## 2022-01-05 PROCEDURE — U0002 COVID-19 LAB TEST NON-CDC: HCPCS | Mod: QW,HCNC,S$GLB, | Performed by: INTERNAL MEDICINE

## 2022-01-05 PROCEDURE — 71100 X-RAY EXAM RIBS UNI 2 VIEWS: CPT | Mod: 26,HCNC,RT, | Performed by: RADIOLOGY

## 2022-01-05 PROCEDURE — 71100 X-RAY EXAM RIBS UNI 2 VIEWS: CPT | Mod: TC,HCNC,RT

## 2022-01-05 PROCEDURE — 71100 XR RIBS 2 VIEW RIGHT: ICD-10-PCS | Mod: 26,HCNC,RT, | Performed by: RADIOLOGY

## 2022-01-05 PROCEDURE — U0002: ICD-10-PCS | Mod: QW,HCNC,S$GLB, | Performed by: INTERNAL MEDICINE

## 2022-01-05 NOTE — PROGRESS NOTES
Health Maintenance Due   Topic Date Due    DEXA SCAN  Never done    Shingles Vaccine (1 of 2) Never done    Pneumococcal Vaccines (Age 65+) (2 of 4 - PPSV23) 02/29/2016     Updates were requested from care everywhere.  Chart was reviewed for overdue Proactive [facility] Encounters (TO) topics (CRS, Breast Cancer Screening, Eye exam)  Health Maintenance has been updated.  LINKS immunization registry triggered.  Immunizations were reconciled.

## 2022-01-06 ENCOUNTER — PATIENT MESSAGE (OUTPATIENT)
Dept: INTERNAL MEDICINE | Facility: CLINIC | Age: 72
End: 2022-01-06
Payer: MEDICARE

## 2022-01-06 ENCOUNTER — OFFICE VISIT (OUTPATIENT)
Dept: ORTHOPEDICS | Facility: CLINIC | Age: 72
End: 2022-01-06
Payer: MEDICARE

## 2022-01-06 VITALS — HEIGHT: 68 IN | BODY MASS INDEX: 25.2 KG/M2 | WEIGHT: 166.25 LBS

## 2022-01-06 DIAGNOSIS — M65.4 DE QUERVAIN'S TENOSYNOVITIS, LEFT: Primary | ICD-10-CM

## 2022-01-06 PROCEDURE — 1125F AMNT PAIN NOTED PAIN PRSNT: CPT | Mod: HCNC,CPTII,S$GLB, | Performed by: ORTHOPAEDIC SURGERY

## 2022-01-06 PROCEDURE — 1101F PR PT FALLS ASSESS DOC 0-1 FALLS W/OUT INJ PAST YR: ICD-10-PCS | Mod: HCNC,CPTII,S$GLB, | Performed by: ORTHOPAEDIC SURGERY

## 2022-01-06 PROCEDURE — 99499 NO LOS: ICD-10-PCS | Mod: HCNC,S$GLB,, | Performed by: ORTHOPAEDIC SURGERY

## 2022-01-06 PROCEDURE — 3008F BODY MASS INDEX DOCD: CPT | Mod: HCNC,CPTII,S$GLB, | Performed by: ORTHOPAEDIC SURGERY

## 2022-01-06 PROCEDURE — 1101F PT FALLS ASSESS-DOCD LE1/YR: CPT | Mod: HCNC,CPTII,S$GLB, | Performed by: ORTHOPAEDIC SURGERY

## 2022-01-06 PROCEDURE — 1159F PR MEDICATION LIST DOCUMENTED IN MEDICAL RECORD: ICD-10-PCS | Mod: HCNC,CPTII,S$GLB, | Performed by: ORTHOPAEDIC SURGERY

## 2022-01-06 PROCEDURE — 3008F PR BODY MASS INDEX (BMI) DOCUMENTED: ICD-10-PCS | Mod: HCNC,CPTII,S$GLB, | Performed by: ORTHOPAEDIC SURGERY

## 2022-01-06 PROCEDURE — 3288F FALL RISK ASSESSMENT DOCD: CPT | Mod: HCNC,CPTII,S$GLB, | Performed by: ORTHOPAEDIC SURGERY

## 2022-01-06 PROCEDURE — 99499 UNLISTED E&M SERVICE: CPT | Mod: HCNC,S$GLB,, | Performed by: ORTHOPAEDIC SURGERY

## 2022-01-06 PROCEDURE — 1159F MED LIST DOCD IN RCRD: CPT | Mod: HCNC,CPTII,S$GLB, | Performed by: ORTHOPAEDIC SURGERY

## 2022-01-06 PROCEDURE — 99999 PR PBB SHADOW E&M-EST. PATIENT-LVL III: ICD-10-PCS | Mod: PBBFAC,HCNC,, | Performed by: ORTHOPAEDIC SURGERY

## 2022-01-06 PROCEDURE — 1125F PR PAIN SEVERITY QUANTIFIED, PAIN PRESENT: ICD-10-PCS | Mod: HCNC,CPTII,S$GLB, | Performed by: ORTHOPAEDIC SURGERY

## 2022-01-06 PROCEDURE — 3288F PR FALLS RISK ASSESSMENT DOCUMENTED: ICD-10-PCS | Mod: HCNC,CPTII,S$GLB, | Performed by: ORTHOPAEDIC SURGERY

## 2022-01-06 PROCEDURE — 99999 PR PBB SHADOW E&M-EST. PATIENT-LVL III: CPT | Mod: PBBFAC,HCNC,, | Performed by: ORTHOPAEDIC SURGERY

## 2022-01-06 NOTE — PROGRESS NOTES
Subjective:      Patient ID: Zoë Enrique is a 71 y.o. female.  Chief Complaint: Finger Pain (left - 3rd digit ) and Follow-up (2 month )      HPI  Zoë Enrique is a  71 y.o. female presenting today for follow up of de Quervain left wrist and triggering left middle finger.  She reports that she is improved both areas no further symptoms.    Review of patient's allergies indicates:   Allergen Reactions    Morphine Nausea And Vomiting    Succinylcholine      Other reaction(s): v tach         Current Outpatient Medications   Medication Sig Dispense Refill    acetaminophen (TYLENOL) 650 MG TbSR Take 650 mg by mouth every 8 (eight) hours.      benzonatate (TESSALON) 200 MG capsule Take 1 capsule (200 mg total) by mouth 3 (three) times daily until directed to stop. Take only as needed. 30 capsule 0    CALCIUM CARB/VIT D3/MINERALS (CALTRATE PLUS ORAL) Take by mouth. Takes two chews daily      docusate sodium (COLACE) 250 MG capsule Take 250 mg by mouth 2 (two) times daily.       estradioL (ESTRACE) 0.01 % (0.1 mg/gram) vaginal cream Place 1 g vaginally every other day. 42 g 3    HYDROcodone-acetaminophen (NORCO) 5-325 mg per tablet Take 1 tablet by mouth every 6 (six) hours as needed for Pain. Pt states takes PRN for headaches 28 tablet 0    levoFLOXacin (LEVAQUIN) 500 MG tablet Take 1 tablet (500 mg total) by mouth once daily. 7 tablet 0    levothyroxine (SYNTHROID) 25 MCG tablet Take 1 tablet (25 mcg total) by mouth once daily. 90 tablet 3    melatonin 5 mg Cap Take 5 mg by mouth as needed.       methylPREDNISolone (MEDROL DOSEPACK) 4 mg tablet use as directed 21 each 0    olopatadine (PATANOL) 0.1 % ophthalmic solution 1 drop 2 (two) times daily.      promethazine-dextromethorphan (PROMETHAZINE-DM) 6.25-15 mg/5 mL Syrp Take 10 mLs by mouth every 6 (six) hours as needed for cough 200 mL 1    rivaroxaban (XARELTO) 20 mg Tab Take 1 tablet (20 mg total) by mouth once daily. 90 tablet 3    traMADoL (ULTRAM)  50 mg tablet Take 50 mg by mouth every 12 (twelve) hours as needed for Pain.      vit C/E/Zn/coppr/lutein/zeaxan (PRESERVISION AREDS-2 ORAL)       acyclovir (ZOVIRAX) 400 MG tablet Take 1 tablet (400 mg total) by mouth 3 (three) times daily as needed. (Patient not taking: Reported on 1/6/2022) 180 tablet 1    albuterol (PROVENTIL) 2.5 mg /3 mL (0.083 %) nebulizer solution Take 1 vial (3 ml) by nebulization every 6 (six) hours until directed to stop. Take only as needed. (Patient not taking: Reported on 1/6/2022) 75 mL 0    amoxicillin (AMOXIL) 500 MG Tab Take six tablets by mouth one hour prior to colonoscopy then take two tablets by mouth 6 hours after colonoscopy for antibiotic prophylaxis due to orthopedic history 8 tablet 0    ID NOW COVID-19 TEST KIT Kit TEST AS DIRECTED TODAY       No current facility-administered medications for this visit.     Facility-Administered Medications Ordered in Other Visits   Medication Dose Route Frequency Provider Last Rate Last Admin    sodium chloride 0.9% flush 5 mL  5 mL Intravenous PRN Aleksandra Taylor NP           Past Medical History:   Diagnosis Date    Abnormal Pap smear of cervix     Atrial fibrillation 09/2017    Atrial flutter     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    Diverticulosis 1998    Hx of atrial flutter     Hx of mitral valve prolapse 1990    Hypothyroidism     Migraines        Past Surgical History:   Procedure Laterality Date    ABLATION OF ARRHYTHMOGENIC FOCUS FOR ATRIAL FIBRILLATION N/A 11/24/2020    Procedure: ABLATION, ARRHYTHMOGENIC FOCUS, FOR ATRIAL FIBRILLATION;  Surgeon: Moe Seymour MD;  Location: [facility] EP LAB;  Service: Cardiology;  Laterality: N/A;  AFL/AF, KHANH, CTI, PVI, RFA, CARTO, GEN, DM, 3 PREP    AUGMENTATION OF BREAST Bilateral 1984    35 yrs     BREAST SURGERY  1984    augmentation    CARDIOVERSION  11/24/2020    Procedure: Cardioversion;  Surgeon: Moe Seymour MD;  Location: [facility] EP LAB;  Service:  "Cardiology;;     SECTION  1988    COLON SURGERY      partial colon resection    COLONOSCOPY N/A 3/19/2021    Procedure: COLONOSCOPY;  Surgeon: Sahara Salgado MD;  Location: [facility] ([address] [place] [address]);  Service: Endoscopy;  Laterality: N/A;  constipation prep- 7 days of miralax, 2 fulls of fulls, then day bf clears and split prep   covid test 3/ pcw, prep instr emailed, antibiotics prophylactically, Xarelto hold x 2 days per Dr. Seymour-see telephone encounter 2/10 -ml    COLPOSCOPY      COSMETIC SURGERY      ESOPHAGOGASTRODUODENOSCOPY N/A 3/19/2021    Procedure: EGD (ESOPHAGOGASTRODUODENOSCOPY);  Surgeon: Sahara Salgado MD;  Location: [facility] ([address] [place] [address]);  Service: Endoscopy;  Laterality: N/A;    EYE SURGERY Bilateral     Lasik, then cataracts extraction    JOINT REPLACEMENT Right , ,     KNEE ARTHROPLASTY Right     revison in     KNEE SURGERY Right     distal femoral replacing TK    OOPHORECTOMY      TOTAL ABDOMINAL HYSTERECTOMY      TAHBSO    TRANSESOPHAGEAL ECHOCARDIOGRAPHY N/A 2020    Procedure: ECHOCARDIOGRAM, TRANSESOPHAGEAL;  Surgeon: Ramírez Ortez III, MD;  Location: [facility] EP LAB;  Service: Cardiology;  Laterality: N/A;    TREATMENT OF CARDIAC ARRHYTHMIA N/A 2019    Procedure: CARDIOVERSION;  Surgeon: Moe Seymour MD;  Location: [facility] EP LAB;  Service: Cardiology;  Laterality: N/A;  KHANH/DCCV/MAC/DM/3PREP/*ADMIT FOR TIKOSYN*       OBJECTIVE:   PHYSICAL EXAM:  Height: 5' 8" (172.7 cm) Weight: 75.4 kg (166 lb 3.6 oz)  Vitals:    22 1316   Weight: 75.4 kg (166 lb 3.6 oz)   Height: 5' 8" (1.727 m)   PainSc:   2   PainLoc: Finger     Ortho/SPM Exam  Examination left hand no swelling no tenderness full range of motion no triggering    RADIOGRAPHS:  None  Comments: I have personally reviewed the imaging and I agree with the above radiologist's report.    ASSESSMENT/PLAN:     IMPRESSION:  Left de Quervain and triggering " improved    PLAN:  Continue regular activities keep an eye on symptoms    FOLLOW UP:  If symptoms recur    Disclaimer: This note has been generated using voice-recognition software. There may be typographical errors that have been missed during proof-reading.

## 2022-01-12 ENCOUNTER — PATIENT MESSAGE (OUTPATIENT)
Dept: INTERNAL MEDICINE | Facility: CLINIC | Age: 72
End: 2022-01-12
Payer: MEDICARE

## 2022-01-12 DIAGNOSIS — R05.9 COUGH: Primary | ICD-10-CM

## 2022-01-12 DIAGNOSIS — R07.89 CHEST WALL PAIN: ICD-10-CM

## 2022-01-12 NOTE — TELEPHONE ENCOUNTER
I am ordering a CT chest to see if the xray missed a slight rib fracture to explain her pain.  Otherwise, I don't know what could be causing it

## 2022-01-13 ENCOUNTER — PATIENT MESSAGE (OUTPATIENT)
Dept: INTERNAL MEDICINE | Facility: CLINIC | Age: 72
End: 2022-01-13
Payer: MEDICARE

## 2022-01-13 NOTE — TELEPHONE ENCOUNTER
Changing the order to stat.  To answer her question: if it is a rib fracture, treatment is just rest and giving it time.  Usually takes 4-6 weeks to heal.

## 2022-01-14 ENCOUNTER — HOSPITAL ENCOUNTER (OUTPATIENT)
Dept: RADIOLOGY | Facility: HOSPITAL | Age: 72
Discharge: HOME | End: 2022-01-14
Attending: INTERNAL MEDICINE
Payer: MEDICARE

## 2022-01-14 DIAGNOSIS — R07.89 CHEST WALL PAIN: ICD-10-CM

## 2022-01-14 DIAGNOSIS — T85.43XA BREAST IMPLANT RUPTURE, INITIAL ENCOUNTER: Primary | ICD-10-CM

## 2022-01-14 DIAGNOSIS — R05.9 COUGH: ICD-10-CM

## 2022-01-14 PROCEDURE — 71250 CT CHEST WITHOUT CONTRAST: ICD-10-PCS | Mod: 26,HCNC,, | Performed by: RADIOLOGY

## 2022-01-14 PROCEDURE — 71250 CT THORAX DX C-: CPT | Mod: TC,HCNC

## 2022-01-14 PROCEDURE — 71250 CT THORAX DX C-: CPT | Mod: 26,HCNC,, | Performed by: RADIOLOGY

## 2022-02-01 ENCOUNTER — PATIENT MESSAGE (OUTPATIENT)
Dept: PULMONOLOGY | Facility: CLINIC | Age: 72
End: 2022-02-01
Payer: MEDICARE

## 2022-02-01 ENCOUNTER — TELEPHONE (OUTPATIENT)
Dept: OBSTETRICS AND GYNECOLOGY | Facility: CLINIC | Age: 72
End: 2022-02-01
Payer: MEDICARE

## 2022-02-01 NOTE — TELEPHONE ENCOUNTER
Pt requesting hormone pellets. Let her know that we do not do those in this clinic. Suggested her to call Dr Alfaro in the big ob group. Pt gave verbal understanding and agreed.

## 2022-02-01 NOTE — TELEPHONE ENCOUNTER
----- Message from [facility] sent at 2/1/2022  3:13 PM CST -----  Type:  Needs Medical Advice    Who Called: PT   Would the patient rather a call back or a response via [facility]? Either  Best Call Back Number: [phone]  Additional Information: Pt requesting callback to discuss if doctor performs specific procedures

## 2022-02-05 ENCOUNTER — PATIENT OUTREACH (OUTPATIENT)
Dept: ADMINISTRATIVE | Facility: OTHER | Age: 72
End: 2022-02-05
Payer: MEDICARE

## 2022-02-07 ENCOUNTER — OFFICE VISIT (OUTPATIENT)
Dept: ORTHOPEDICS | Facility: CLINIC | Age: 72
End: 2022-02-07
Payer: MEDICARE

## 2022-02-07 VITALS — WEIGHT: 166 LBS | HEIGHT: 68 IN | BODY MASS INDEX: 25.16 KG/M2

## 2022-02-07 DIAGNOSIS — M65.332 TRIGGER MIDDLE FINGER OF LEFT HAND: ICD-10-CM

## 2022-02-07 PROCEDURE — 99213 OFFICE O/P EST LOW 20 MIN: CPT | Mod: HCNC,25,S$GLB, | Performed by: ORTHOPAEDIC SURGERY

## 2022-02-07 PROCEDURE — 99999 PR PBB SHADOW E&M-EST. PATIENT-LVL III: ICD-10-PCS | Mod: PBBFAC,HCNC,, | Performed by: ORTHOPAEDIC SURGERY

## 2022-02-07 PROCEDURE — 99213 PR OFFICE/OUTPT VISIT, EST, LEVL III, 20-29 MIN: ICD-10-PCS | Mod: HCNC,25,S$GLB, | Performed by: ORTHOPAEDIC SURGERY

## 2022-02-07 PROCEDURE — 1101F PR PT FALLS ASSESS DOC 0-1 FALLS W/OUT INJ PAST YR: ICD-10-PCS | Mod: HCNC,CPTII,S$GLB, | Performed by: ORTHOPAEDIC SURGERY

## 2022-02-07 PROCEDURE — 20550 NJX 1 TENDON SHEATH/LIGAMENT: CPT | Mod: HCNC,F7,S$GLB, | Performed by: ORTHOPAEDIC SURGERY

## 2022-02-07 PROCEDURE — 1125F PR PAIN SEVERITY QUANTIFIED, PAIN PRESENT: ICD-10-PCS | Mod: HCNC,CPTII,S$GLB, | Performed by: ORTHOPAEDIC SURGERY

## 2022-02-07 PROCEDURE — 3288F PR FALLS RISK ASSESSMENT DOCUMENTED: ICD-10-PCS | Mod: HCNC,CPTII,S$GLB, | Performed by: ORTHOPAEDIC SURGERY

## 2022-02-07 PROCEDURE — 3008F PR BODY MASS INDEX (BMI) DOCUMENTED: ICD-10-PCS | Mod: HCNC,CPTII,S$GLB, | Performed by: ORTHOPAEDIC SURGERY

## 2022-02-07 PROCEDURE — 1159F PR MEDICATION LIST DOCUMENTED IN MEDICAL RECORD: ICD-10-PCS | Mod: HCNC,CPTII,S$GLB, | Performed by: ORTHOPAEDIC SURGERY

## 2022-02-07 PROCEDURE — 1101F PT FALLS ASSESS-DOCD LE1/YR: CPT | Mod: HCNC,CPTII,S$GLB, | Performed by: ORTHOPAEDIC SURGERY

## 2022-02-07 PROCEDURE — 1159F MED LIST DOCD IN RCRD: CPT | Mod: HCNC,CPTII,S$GLB, | Performed by: ORTHOPAEDIC SURGERY

## 2022-02-07 PROCEDURE — 99999 PR PBB SHADOW E&M-EST. PATIENT-LVL III: CPT | Mod: PBBFAC,HCNC,, | Performed by: ORTHOPAEDIC SURGERY

## 2022-02-07 PROCEDURE — 1125F AMNT PAIN NOTED PAIN PRSNT: CPT | Mod: HCNC,CPTII,S$GLB, | Performed by: ORTHOPAEDIC SURGERY

## 2022-02-07 PROCEDURE — 3008F BODY MASS INDEX DOCD: CPT | Mod: HCNC,CPTII,S$GLB, | Performed by: ORTHOPAEDIC SURGERY

## 2022-02-07 PROCEDURE — 20550 PR INJECT TENDON SHEATH/LIGAMENT: ICD-10-PCS | Mod: HCNC,F7,S$GLB, | Performed by: ORTHOPAEDIC SURGERY

## 2022-02-07 PROCEDURE — 3288F FALL RISK ASSESSMENT DOCD: CPT | Mod: HCNC,CPTII,S$GLB, | Performed by: ORTHOPAEDIC SURGERY

## 2022-02-07 RX ORDER — TRIAMCINOLONE ACETONIDE 40 MG/ML
20 INJECTION, SUSPENSION INTRA-ARTICULAR; INTRAMUSCULAR
Status: COMPLETED | OUTPATIENT
Start: 2022-02-07 | End: 2022-02-07

## 2022-02-07 RX ADMIN — TRIAMCINOLONE ACETONIDE 20 MG: 40 INJECTION, SUSPENSION INTRA-ARTICULAR; INTRAMUSCULAR at 02:02

## 2022-02-07 NOTE — PROGRESS NOTES
Subjective:      Patient ID: Zoë Enrique is a 71 y.o. female.  Chief Complaint: Finger Pain (Left middle trigger)      HPI  Zoë Enrique is a  71 y.o. female presenting today for follow up of painful locking of the left middle finger.  She reports that she is having a flare-up again  The wrist is much better however.    Review of patient's allergies indicates:   Allergen Reactions    Morphine Nausea And Vomiting    Succinylcholine      Other reaction(s): v tach         Current Outpatient Medications   Medication Sig Dispense Refill    acetaminophen (TYLENOL) 650 MG TbSR Take 650 mg by mouth every 8 (eight) hours.      CALCIUM CARB/VIT D3/MINERALS (CALTRATE PLUS ORAL) Take by mouth. Takes two chews daily      docusate sodium (COLACE) 250 MG capsule Take 250 mg by mouth 2 (two) times daily.       estradioL (ESTRACE) 0.01 % (0.1 mg/gram) vaginal cream Place 1 g vaginally every other day. 42 g 3    HYDROcodone-acetaminophen (NORCO) 5-325 mg per tablet Take 1 tablet by mouth every 6 (six) hours as needed for Pain. Pt states takes PRN for headaches 28 tablet 0    levothyroxine (SYNTHROID) 25 MCG tablet Take 1 tablet (25 mcg total) by mouth once daily. 90 tablet 3    melatonin 5 mg Cap Take 5 mg by mouth as needed.       olopatadine (PATANOL) 0.1 % ophthalmic solution 1 drop 2 (two) times daily.      rivaroxaban (XARELTO) 20 mg Tab Take 1 tablet (20 mg total) by mouth once daily. 90 tablet 3    traMADoL (ULTRAM) 50 mg tablet Take 1 tablet (50 mg total) by mouth every 12 (twelve) hours as needed for Pain. 60 tablet 5    vit C/E/Zn/coppr/lutein/zeaxan (PRESERVISION AREDS-2 ORAL)       acyclovir (ZOVIRAX) 400 MG tablet Take 1 tablet (400 mg total) by mouth 3 (three) times daily as needed. (Patient not taking: No sig reported) 180 tablet 1     No current facility-administered medications for this visit.     Facility-Administered Medications Ordered in Other Visits   Medication Dose Route Frequency Provider  Last Rate Last Admin    sodium chloride 0.9% flush 5 mL  5 mL Intravenous PRN Aleksandra Taylor NP           Past Medical History:   Diagnosis Date    Abnormal Pap smear of cervix     Atrial fibrillation 2017    Atrial flutter     Cancer     osteosarcoma of right femur    Diverticulosis     Hx of atrial flutter     Hx of mitral valve prolapse     Hypothyroidism     Migraines        Past Surgical History:   Procedure Laterality Date    ABLATION OF ARRHYTHMOGENIC FOCUS FOR ATRIAL FIBRILLATION N/A 2020    Procedure: ABLATION, ARRHYTHMOGENIC FOCUS, FOR ATRIAL FIBRILLATION;  Surgeon: Moe Seymour MD;  Location: [facility] EP LAB;  Service: Cardiology;  Laterality: N/A;  AFL/AF, KHANH, CTI, PVI, RFA, CARTO, GEN, DM, 3 PREP    AUGMENTATION OF BREAST Bilateral     35 yrs     BREAST SURGERY      augmentation    CARDIOVERSION  2020    Procedure: Cardioversion;  Surgeon: Moe Seymour MD;  Location: [facility] EP LAB;  Service: Cardiology;;     SECTION      COLON SURGERY      partial colon resection    COLONOSCOPY N/A 3/19/2021    Procedure: COLONOSCOPY;  Surgeon: Sahara Salgado MD;  Location: [facility] VALDO (4TH FLR);  Service: Endoscopy;  Laterality: N/A;  constipation prep- 7 days of miralax, 2 fulls of fulls, then day bf clears and split prep   covid test 3/5 pcw, prep instr emailed, antibiotics prophylactically, Xarelto hold x 2 days per Dr. Seymour-see telephone encounter 2/10 -ml    COLPOSCOPY      COSMETIC SURGERY      ESOPHAGOGASTRODUODENOSCOPY N/A 3/19/2021    Procedure: EGD (ESOPHAGOGASTRODUODENOSCOPY);  Surgeon: Sahara Salgado MD;  Location: [facility] VALDO (4TH FLR);  Service: Endoscopy;  Laterality: N/A;    EYE SURGERY Bilateral 2016    Lasik, then cataracts extraction    JOINT REPLACEMENT Right , ,     KNEE ARTHROPLASTY Right     revison in     KNEE SURGERY Right     distal femoral replacing TK    OOPHORECTOMY       "TOTAL ABDOMINAL HYSTERECTOMY  1994    TAHBSO    TRANSESOPHAGEAL ECHOCARDIOGRAPHY N/A 11/24/2020    Procedure: ECHOCARDIOGRAM, TRANSESOPHAGEAL;  Surgeon: Ramírez Ortez III, MD;  Location: [facility] EP LAB;  Service: Cardiology;  Laterality: N/A;    TREATMENT OF CARDIAC ARRHYTHMIA N/A 8/12/2019    Procedure: CARDIOVERSION;  Surgeon: Moe Seymour MD;  Location: [facility] EP LAB;  Service: Cardiology;  Laterality: N/A;  KHANH/DCCV/MAC/DM/3PREP/*ADMIT FOR TIKOSYN*       OBJECTIVE:   PHYSICAL EXAM:  Height: 5' 8" (172.7 cm) Weight: 75.3 kg (166 lb)  Vitals:    02/07/22 1418   Weight: 75.3 kg (166 lb)   Height: 5' 8" (1.727 m)   PainSc:   1     Ortho/SPM Exam  Examination left hand no tenderness over the 1st dorsal compartment of the wrist Finkelstein test negative  Mild strength decreased  Tinel sign negative   triggering left middle finger      RADIOGRAPHS:  None  Comments: I have personally reviewed the imaging and I agree with the above radiologist's report.    ASSESSMENT/PLAN:     IMPRESSION:  Triggering of the left middle finger    PLAN:  We discussed options including injection versus surgery  She would like try injection today  After pause for time-out identified the left middle finger injected flexor tendon sheath with combination Kenalog 20 mg 0.5 cc xylocaine sterile technique  Tolerated the procedure well without complication  Gentle range of motion prevent stiffness  Follow-up 4-6 weeks or as needed    FOLLOW UP:  As needed    Disclaimer: This note has been generated using voice-recognition software. There may be typographical errors that have been missed during proof-reading.    "

## 2022-03-03 ENCOUNTER — PATIENT MESSAGE (OUTPATIENT)
Dept: INTERNAL MEDICINE | Facility: CLINIC | Age: 72
End: 2022-03-03
Payer: MEDICARE

## 2022-03-03 DIAGNOSIS — B00.9 HERPES: ICD-10-CM

## 2022-03-03 RX ORDER — ACYCLOVIR 400 MG/1
400 TABLET ORAL 3 TIMES DAILY PRN
Qty: 90 TABLET | Refills: 3 | Status: SHIPPED | OUTPATIENT
Start: 2022-03-03 | End: 2023-01-05 | Stop reason: SDUPTHER

## 2022-03-03 NOTE — TELEPHONE ENCOUNTER
No new care gaps identified.  Powered by [employer]. Reference number: [id number].   3/03/2022 12:00:46 PM CST

## 2022-03-06 ENCOUNTER — HOSPITAL ENCOUNTER (EMERGENCY)
Facility: HOSPITAL | Age: 72
Discharge: HOME | End: 2022-03-06
Attending: EMERGENCY MEDICINE
Payer: MEDICARE

## 2022-03-06 VITALS
BODY MASS INDEX: 24.71 KG/M2 | OXYGEN SATURATION: 99 % | TEMPERATURE: 99 F | HEART RATE: 75 BPM | RESPIRATION RATE: 17 BRPM | SYSTOLIC BLOOD PRESSURE: 99 MMHG | WEIGHT: 163 LBS | DIASTOLIC BLOOD PRESSURE: 65 MMHG | HEIGHT: 68 IN

## 2022-03-06 DIAGNOSIS — M25.461 EFFUSION OF RIGHT KNEE: ICD-10-CM

## 2022-03-06 DIAGNOSIS — M25.561 ACUTE PAIN OF RIGHT KNEE: ICD-10-CM

## 2022-03-06 DIAGNOSIS — M25.569 KNEE PAIN: ICD-10-CM

## 2022-03-06 DIAGNOSIS — R51.9 ACUTE NONINTRACTABLE HEADACHE, UNSPECIFIED HEADACHE TYPE: Primary | ICD-10-CM

## 2022-03-06 LAB
ALBUMIN SERPL BCP-MCNC: 4 G/DL (ref 3.5–5.2)
ALP SERPL-CCNC: 63 U/L (ref 55–135)
ALT SERPL W/O P-5'-P-CCNC: 13 U/L (ref 10–44)
ANION GAP SERPL CALC-SCNC: 10 MMOL/L (ref 8–16)
APPEARANCE FLD: NORMAL
AST SERPL-CCNC: 19 U/L (ref 10–40)
BASOPHILS # BLD AUTO: 0.01 K/UL (ref 0–0.2)
BASOPHILS NFR BLD: 0.2 % (ref 0–1.9)
BILIRUB SERPL-MCNC: 1 MG/DL (ref 0.1–1)
BODY FLD TYPE: NORMAL
BODY FLD TYPE: NORMAL
BUN SERPL-MCNC: 14 MG/DL (ref 8–23)
CALCIUM SERPL-MCNC: 9.5 MG/DL (ref 8.7–10.5)
CHLORIDE SERPL-SCNC: 102 MMOL/L (ref 95–110)
CO2 SERPL-SCNC: 24 MMOL/L (ref 23–29)
COLOR FLD: NORMAL
CREAT SERPL-MCNC: 0.6 MG/DL (ref 0.5–1.4)
CRP SERPL-MCNC: 9.2 MG/L (ref 0–8.2)
CRYSTALS FLD MICRO: NEGATIVE
DIFFERENTIAL METHOD: ABNORMAL
EOSINOPHIL # BLD AUTO: 0 K/UL (ref 0–0.5)
EOSINOPHIL NFR BLD: 0.4 % (ref 0–8)
ERYTHROCYTE [DISTWIDTH] IN BLOOD BY AUTOMATED COUNT: 13.5 % (ref 11.5–14.5)
ERYTHROCYTE [SEDIMENTATION RATE] IN BLOOD BY WESTERGREN METHOD: 7 MM/HR (ref 0–36)
EST. GFR  (AFRICAN AMERICAN): >60 ML/MIN/1.73 M^2
EST. GFR  (NON AFRICAN AMERICAN): >60 ML/MIN/1.73 M^2
GLUCOSE SERPL-MCNC: 109 MG/DL (ref 70–110)
GRAM STN SPEC: NORMAL
GRAM STN SPEC: NORMAL
HCT VFR BLD AUTO: 40.7 % (ref 37–48.5)
HGB BLD-MCNC: 13.1 G/DL (ref 12–16)
IMM GRANULOCYTES # BLD AUTO: 0.01 K/UL (ref 0–0.04)
IMM GRANULOCYTES NFR BLD AUTO: 0.2 % (ref 0–0.5)
LYMPHOCYTES # BLD AUTO: 0.5 K/UL (ref 1–4.8)
LYMPHOCYTES NFR BLD: 10.6 % (ref 18–48)
LYMPHOCYTES NFR FLD MANUAL: 12 %
MCH RBC QN AUTO: 31 PG (ref 27–31)
MCHC RBC AUTO-ENTMCNC: 32.2 G/DL (ref 32–36)
MCV RBC AUTO: 96 FL (ref 82–98)
MONOCYTES # BLD AUTO: 0.4 K/UL (ref 0.3–1)
MONOCYTES NFR BLD: 7.6 % (ref 4–15)
MONOS+MACROS NFR FLD MANUAL: 3 %
NEUTROPHILS # BLD AUTO: 3.8 K/UL (ref 1.8–7.7)
NEUTROPHILS NFR BLD: 81 % (ref 38–73)
NEUTROPHILS NFR FLD MANUAL: 85 %
NRBC BLD-RTO: 0 /100 WBC
PATH INTERP FLD-IMP: NORMAL
PLATELET # BLD AUTO: 138 K/UL (ref 150–450)
PMV BLD AUTO: 11 FL (ref 9.2–12.9)
POTASSIUM SERPL-SCNC: 4.2 MMOL/L (ref 3.5–5.1)
PROT SERPL-MCNC: 6.5 G/DL (ref 6–8.4)
RBC # BLD AUTO: 4.23 M/UL (ref 4–5.4)
SODIUM SERPL-SCNC: 136 MMOL/L (ref 136–145)
WBC # BLD AUTO: 4.71 K/UL (ref 3.9–12.7)
WBC # FLD: 666 /CU MM

## 2022-03-06 PROCEDURE — 80053 COMPREHEN METABOLIC PANEL: CPT | Performed by: EMERGENCY MEDICINE

## 2022-03-06 PROCEDURE — 87206 SMEAR FLUORESCENT/ACID STAI: CPT | Performed by: STUDENT IN AN ORGANIZED HEALTH CARE EDUCATION/TRAINING PROGRAM

## 2022-03-06 PROCEDURE — 99283 EMERGENCY DEPT VISIT LOW MDM: CPT | Mod: 25,,, | Performed by: ORTHOPAEDIC SURGERY

## 2022-03-06 PROCEDURE — 96375 TX/PRO/DX INJ NEW DRUG ADDON: CPT | Mod: 59

## 2022-03-06 PROCEDURE — 85652 RBC SED RATE AUTOMATED: CPT | Performed by: EMERGENCY MEDICINE

## 2022-03-06 PROCEDURE — 99285 EMERGENCY DEPT VISIT HI MDM: CPT | Mod: ,,, | Performed by: EMERGENCY MEDICINE

## 2022-03-06 PROCEDURE — 86803 HEPATITIS C AB TEST: CPT | Performed by: EMERGENCY MEDICINE

## 2022-03-06 PROCEDURE — 20610 DRAIN/INJ JOINT/BURSA W/O US: CPT | Mod: RT

## 2022-03-06 PROCEDURE — 20610 PR DRAIN/INJECT LARGE JOINT/BURSA: ICD-10-PCS | Mod: RT,,, | Performed by: ORTHOPAEDIC SURGERY

## 2022-03-06 PROCEDURE — 89051 BODY FLUID CELL COUNT: CPT | Performed by: STUDENT IN AN ORGANIZED HEALTH CARE EDUCATION/TRAINING PROGRAM

## 2022-03-06 PROCEDURE — 87070 CULTURE OTHR SPECIMN AEROBIC: CPT | Performed by: STUDENT IN AN ORGANIZED HEALTH CARE EDUCATION/TRAINING PROGRAM

## 2022-03-06 PROCEDURE — 99284 EMERGENCY DEPT VISIT MOD MDM: CPT | Mod: 25

## 2022-03-06 PROCEDURE — 63600175 PHARM REV CODE 636 W HCPCS: Performed by: EMERGENCY MEDICINE

## 2022-03-06 PROCEDURE — 87102 FUNGUS ISOLATION CULTURE: CPT | Performed by: STUDENT IN AN ORGANIZED HEALTH CARE EDUCATION/TRAINING PROGRAM

## 2022-03-06 PROCEDURE — 86140 C-REACTIVE PROTEIN: CPT | Performed by: EMERGENCY MEDICINE

## 2022-03-06 PROCEDURE — 87116 MYCOBACTERIA CULTURE: CPT | Performed by: STUDENT IN AN ORGANIZED HEALTH CARE EDUCATION/TRAINING PROGRAM

## 2022-03-06 PROCEDURE — 87075 CULTR BACTERIA EXCEPT BLOOD: CPT | Performed by: STUDENT IN AN ORGANIZED HEALTH CARE EDUCATION/TRAINING PROGRAM

## 2022-03-06 PROCEDURE — 99285 PR EMERGENCY DEPT VISIT,LEVEL V: ICD-10-PCS | Mod: ,,, | Performed by: EMERGENCY MEDICINE

## 2022-03-06 PROCEDURE — 85025 COMPLETE CBC W/AUTO DIFF WBC: CPT | Performed by: EMERGENCY MEDICINE

## 2022-03-06 PROCEDURE — 99283 PR EMERGENCY DEPT VISIT,LEVEL III: ICD-10-PCS | Mod: 25,,, | Performed by: ORTHOPAEDIC SURGERY

## 2022-03-06 PROCEDURE — 25000003 PHARM REV CODE 250: Performed by: EMERGENCY MEDICINE

## 2022-03-06 PROCEDURE — 89060 EXAM SYNOVIAL FLUID CRYSTALS: CPT | Performed by: STUDENT IN AN ORGANIZED HEALTH CARE EDUCATION/TRAINING PROGRAM

## 2022-03-06 PROCEDURE — 96374 THER/PROPH/DIAG INJ IV PUSH: CPT

## 2022-03-06 PROCEDURE — 20610 DRAIN/INJ JOINT/BURSA W/O US: CPT | Mod: RT,,, | Performed by: ORTHOPAEDIC SURGERY

## 2022-03-06 PROCEDURE — 87205 SMEAR GRAM STAIN: CPT | Performed by: STUDENT IN AN ORGANIZED HEALTH CARE EDUCATION/TRAINING PROGRAM

## 2022-03-06 RX ORDER — ONDANSETRON 2 MG/ML
4 INJECTION INTRAMUSCULAR; INTRAVENOUS
Status: COMPLETED | OUTPATIENT
Start: 2022-03-06 | End: 2022-03-06

## 2022-03-06 RX ORDER — HYDROCODONE BITARTRATE AND ACETAMINOPHEN 5; 325 MG/1; MG/1
1 TABLET ORAL
Status: COMPLETED | OUTPATIENT
Start: 2022-03-06 | End: 2022-03-06

## 2022-03-06 RX ORDER — KETOROLAC TROMETHAMINE 30 MG/ML
10 INJECTION, SOLUTION INTRAMUSCULAR; INTRAVENOUS
Status: COMPLETED | OUTPATIENT
Start: 2022-03-06 | End: 2022-03-06

## 2022-03-06 RX ADMIN — KETOROLAC TROMETHAMINE 10 MG: 30 INJECTION, SOLUTION INTRAMUSCULAR at 09:03

## 2022-03-06 RX ADMIN — ONDANSETRON 4 MG: 2 INJECTION INTRAMUSCULAR; INTRAVENOUS at 09:03

## 2022-03-06 RX ADMIN — HYDROCODONE BITARTRATE AND ACETAMINOPHEN 1 TABLET: 5; 325 TABLET ORAL at 12:03

## 2022-03-06 NOTE — SUBJECTIVE & OBJECTIVE
Past Medical History:   Diagnosis Date    Abnormal Pap smear of cervix     Atrial fibrillation 2017    Atrial flutter     Cancer     osteosarcoma of right femur    Diverticulosis     Hx of atrial flutter     Hx of mitral valve prolapse     Hypothyroidism     Migraines        Past Surgical History:   Procedure Laterality Date    ABLATION OF ARRHYTHMOGENIC FOCUS FOR ATRIAL FIBRILLATION N/A 2020    Procedure: ABLATION, ARRHYTHMOGENIC FOCUS, FOR ATRIAL FIBRILLATION;  Surgeon: Moe Seymour MD;  Location: [facility] EP LAB;  Service: Cardiology;  Laterality: N/A;  AFL/AF, KHANH, CTI, PVI, RFA, CARTO, GEN, DM, 3 PREP    AUGMENTATION OF BREAST Bilateral     35 yrs     BREAST SURGERY      augmentation    CARDIOVERSION  2020    Procedure: Cardioversion;  Surgeon: Moe Seymour MD;  Location: [facility] EP LAB;  Service: Cardiology;;     SECTION      COLON SURGERY      partial colon resection    COLONOSCOPY N/A 3/19/2021    Procedure: COLONOSCOPY;  Surgeon: Sahara Salgado MD;  Location: [facility] ENDO (4TH FLR);  Service: Endoscopy;  Laterality: N/A;  constipation prep- 7 days of miralax, 2 fulls of fulls, then day bf clears and split prep   covid test 3/5 pcw, prep instr emailed, antibiotics prophylactically, Xarelto hold x 2 days per Dr. Seymour-see telephone encounter 2/10 -ml    COLPOSCOPY      COSMETIC SURGERY      ESOPHAGOGASTRODUODENOSCOPY N/A 3/19/2021    Procedure: EGD (ESOPHAGOGASTRODUODENOSCOPY);  Surgeon: Sahara Salgado MD;  Location: [facility] (4TH FLR);  Service: Endoscopy;  Laterality: N/A;    EYE SURGERY Bilateral 2016    Lasik, then cataracts extraction    JOINT REPLACEMENT Right , ,     KNEE ARTHROPLASTY Right     revison in     KNEE SURGERY Right     distal femoral replacing TK    OOPHORECTOMY      TOTAL ABDOMINAL HYSTERECTOMY      TAHBSO    TRANSESOPHAGEAL ECHOCARDIOGRAPHY N/A 2020    Procedure: ECHOCARDIOGRAM,  TRANSESOPHAGEAL;  Surgeon: Ramírez Ortez III, MD;  Location: [facility] EP LAB;  Service: Cardiology;  Laterality: N/A;    TREATMENT OF CARDIAC ARRHYTHMIA N/A 8/12/2019    Procedure: CARDIOVERSION;  Surgeon: Moe Seymour MD;  Location: [facility] EP LAB;  Service: Cardiology;  Laterality: N/A;  KHANH/DCCV/MAC/DM/3PREP/*ADMIT FOR TIKOSYN*       Review of patient's allergies indicates:   Allergen Reactions    Morphine Nausea And Vomiting    Succinylcholine      Other reaction(s): v tach       No current facility-administered medications for this encounter.     Current Outpatient Medications   Medication Sig    HYDROcodone-acetaminophen (NORCO) 5-325 mg per tablet Take 1 tablet by mouth every 6 (six) hours as needed for Pain. Pt states takes PRN for headaches    levothyroxine (SYNTHROID) 25 MCG tablet Take 1 tablet (25 mcg total) by mouth once daily.    rivaroxaban (XARELTO) 20 mg Tab Take 1 tablet (20 mg total) by mouth once daily.    acetaminophen (TYLENOL) 650 MG TbSR Take 650 mg by mouth every 8 (eight) hours.    acyclovir (ZOVIRAX) 400 MG tablet Take 1 tablet (400 mg total) by mouth 3 (three) times daily as needed.    CALCIUM CARB/VIT D3/MINERALS (CALTRATE PLUS ORAL) Take by mouth. Takes two chews daily    docusate sodium (COLACE) 250 MG capsule Take 250 mg by mouth 2 (two) times daily.     estradioL (ESTRACE) 0.01 % (0.1 mg/gram) vaginal cream Place 1 g vaginally every other day.    melatonin 5 mg Cap Take 5 mg by mouth as needed.     olopatadine (PATANOL) 0.1 % ophthalmic solution 1 drop 2 (two) times daily.    vit C/E/Zn/coppr/lutein/zeaxan (PRESERVISION AREDS-2 ORAL)      Facility-Administered Medications Ordered in Other Encounters   Medication    sodium chloride 0.9% flush 5 mL     Family History       Problem Relation (Age of Onset)    Asthma Brother    Diabetes Sister, Mother    Diverticulosis Brother, Brother    Hearing loss Father    Heart disease Sister, Father, Mother, Brother, Brother    No Known Problems  "Daughter    Stroke Father (93)          Tobacco Use    Smoking status: Never Smoker    Smokeless tobacco: Never Used   Substance and Sexual Activity    Alcohol use: Yes     Alcohol/week: 1.0 standard drink     Types: 1 Standard drinks or equivalent per week     Comment: 1-2 per month    Drug use: No     Comment: CBD oils for headache    Sexual activity: Not Currently     Partners: Male     Birth control/protection: Post-menopausal, See Surgical Hx, None     Comment: , together x 34 years     ROS  Constitutional: Denies fever/chills  Neurological: Denies numbness/tingling (any exceptions noted in orthopaedic exam)   Psychiatric/Behavioral: Denies change in normal mood  Eyes: Denies change in vision  Cardiovascular: Denies chest pain  Respiratory: Denies shortness of breath  Hematologic/Lymphatic: Denies easy bleeding/bruising   Skin: Denies new rash or skin lesions   Gastrointestinal: Denies nausea/vomitting/diarrhea, change in bowel habits, abdominal pain   Allergic/Immunologic: Denies adverse reactions to current medications  Musculoskeletal: see HPI    Objective:     Vital Signs (Most Recent):  Temp: 98.8 °F (37.1 °C) (03/06/22 0838)  Pulse: 75 (03/06/22 1229)  Resp: 17 (03/06/22 1246)  BP: 99/65 (03/06/22 1229)  SpO2: 99 % (03/06/22 1229) Vital Signs (24h Range):  Temp:  [98.8 °F (37.1 °C)] 98.8 °F (37.1 °C)  Pulse:  [75-92] 75  Resp:  [16-17] 17  SpO2:  [98 %-99 %] 99 %  BP: ()/(65-93) 99/65     Weight: 73.9 kg (163 lb)  Height: 5' 8" (172.7 cm)  Body mass index is 24.78 kg/m².    No intake or output data in the 24 hours ending 03/06/22 1455    Ortho/SPM Exam  PE:  General: No acute distress.  HEENT: Normocephalic. Atraumatic. Sclera anicteric  Cardio: Regular rate.  Chest: No increased work of breathing.  Abdominal: Nondistended.  Extremities: No cyanosis.  No clubbing.    Skin: No generalized rash.  Neuro: Awake. Alert. Oriented to person, place, time, and situation.  Psych: Normal appearance. " Cooperative.  Appropriate mood.  Appropriate affect.     RLE:  Skin intact, anterior knee incision fully healed without SOI  Mild edema about knee, small effusion present  TTP about knee  Compartments soft  Pain through arc of knee motion   No pain with hip or ankle ROM  SILT Sa/Moore/DP/SP/T  Motor intact EHL/FHL/TA/Gastroc  2+ DP, 2+ PT      Significant Labs: All pertinent labs within the past 24 hours have been reviewed.    Significant Imaging: I have reviewed all pertinent imaging results/findings.  XR of R knee showing distal femur replacement; frank GMRS without signs of loosening or fracture

## 2022-03-06 NOTE — ASSESSMENT & PLAN NOTE
Zoë Enrique is a 71 y.o. 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 ESR is normal and CRP is mildly elevated, however given exam and hx of prior PJI, decision was made to aspirate the joint. Aspirate results are below and not consistent with PJI, however samples have also been sent to [facility] which will provide the definitive answer regarding PJI in this patient.    -WBAT, pt offered crutches but reports she has them at home  - We will fu synovasure results on Tuesday. If positive, patient will need to return to ED for admission for DAIR and likely 6 weeks of IV abx.  -anti-inflammatories OTC for likely musculoskeletal origin of pain  - We will arrange fu appt with Dr. Urban for further evaluation

## 2022-03-06 NOTE — ED PROVIDER NOTES
Encounter Date: 3/6/2022       History     Chief Complaint   Patient presents with    Knee Pain     Pt with pain and swelling to right knee that started yesterday, denies injury.  Pt also with a headache and nausea.  Pt reports hx of migraines and usually takes Norco, did not take any today.     72 yo female presenting with right knee pain.    PMH:  Paroxysmal AFib on Xarelto, PSVT, migraines, tension headaches, osteosarcoma of right femur status post joint replacement/multiple surgeries and c/b infection requiring inpatient abx    Context:  The patient states her knee began hurting yesterday.  She that she stood up strangely on it, but today appeared more swollen and warm to touch, prompting her visit to the emergency department.  Onset:  Gradual  Location:  Right knee  Duration:  Constant since yesterday  Modifiers:  Unable to take prescribed norco due to nausea, pain is worse when bearing weight  Associated Symptoms:  Denies subjective fevers or chills, endorses nausea, no vomiting    Patient is also complaining of a headache.  She is unable to take her prescribed Norco.  Headache is on the sides and posteriorly.  She has a history of similar migraine/tension headaches.  Denies vision changes, extremity numbness or weakness. H/a not WOL, not thunderclap in onset.     The history is provided by the patient and medical records. No  was used.     Review of patient's allergies indicates:   Allergen Reactions    Morphine Nausea And Vomiting    Succinylcholine      Other reaction(s): v tach     Past Medical History:   Diagnosis Date    Abnormal Pap smear of cervix     Atrial fibrillation 09/2017    Atrial flutter     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    Diverticulosis 1998    Hx of atrial flutter     Hx of mitral valve prolapse 1990    Hypothyroidism     Migraines      Past Surgical History:   Procedure Laterality Date    ABLATION OF ARRHYTHMOGENIC FOCUS FOR ATRIAL FIBRILLATION  N/A 2020    Procedure: ABLATION, ARRHYTHMOGENIC FOCUS, FOR ATRIAL FIBRILLATION;  Surgeon: Moe Seymour MD;  Location: [facility] EP LAB;  Service: Cardiology;  Laterality: N/A;  AFL/AF, KHANH, CTI, PVI, RFA, CARTO, GEN, DM, 3 PREP    AUGMENTATION OF BREAST Bilateral 1984    35 yrs     BREAST SURGERY      augmentation    CARDIOVERSION  2020    Procedure: Cardioversion;  Surgeon: Moe Seymour MD;  Location: [facility] EP LAB;  Service: Cardiology;;     SECTION      COLON SURGERY      partial colon resection    COLONOSCOPY N/A 3/19/2021    Procedure: COLONOSCOPY;  Surgeon: Sahara Salgado MD;  Location: [facility] ENDO (4TH FLR);  Service: Endoscopy;  Laterality: N/A;  constipation prep- 7 days of miralax, 2 fulls of fulls, then day bf clears and split prep   covid test 3/5 pcw, prep instr emailed, antibiotics prophylactically, Xarelto hold x 2 days per Dr. Seymour-see telephone encounter 2/10 -ml    COLPOSCOPY      COSMETIC SURGERY      ESOPHAGOGASTRODUODENOSCOPY N/A 3/19/2021    Procedure: EGD (ESOPHAGOGASTRODUODENOSCOPY);  Surgeon: Sahara Salgado MD;  Location: [facility] (4TH FLR);  Service: Endoscopy;  Laterality: N/A;    EYE SURGERY Bilateral 2016    Lasik, then cataracts extraction    JOINT REPLACEMENT Right , ,     KNEE ARTHROPLASTY Right     revison in     KNEE SURGERY Right     distal femoral replacing TK    OOPHORECTOMY      TOTAL ABDOMINAL HYSTERECTOMY      TAHBSO    TRANSESOPHAGEAL ECHOCARDIOGRAPHY N/A 2020    Procedure: ECHOCARDIOGRAM, TRANSESOPHAGEAL;  Surgeon: Ramírez Ortez III, MD;  Location: [facility] EP LAB;  Service: Cardiology;  Laterality: N/A;    TREATMENT OF CARDIAC ARRHYTHMIA N/A 2019    Procedure: CARDIOVERSION;  Surgeon: Moe Seymour MD;  Location: [facility] EP LAB;  Service: Cardiology;  Laterality: N/A;  KHANH/DCCV/MAC/DM/3PREP/*ADMIT FOR TIKOSYN*     Family History   Problem Relation Age of Onset    Diabetes  Sister         Type I    Heart disease Sister         Atrrial fib, mitral valve repair    Hearing loss Father     Heart disease Father         Angina, AAA    Stroke Father 93    Heart disease Mother         Atrial fib    Diabetes Mother         Type II    Heart disease Brother         Atrial fib    Asthma Brother     Diverticulosis Brother     Heart disease Brother         Atrial fib    Diverticulosis Brother     No Known Problems Daughter     Breast cancer Neg Hx     Ovarian cancer Neg Hx     Colon cancer Neg Hx     Cancer Neg Hx     Hypertension Neg Hx      Social History     Tobacco Use    Smoking status: Never Smoker    Smokeless tobacco: Never Used   Substance Use Topics    Alcohol use: Yes     Alcohol/week: 1.0 standard drink     Types: 1 Standard drinks or equivalent per week     Comment: 1-2 per month    Drug use: No     Comment: CBD oils for headache     Review of Systems   Constitutional: Negative for chills and fever.   Eyes: Negative for visual disturbance.   Respiratory: Negative for shortness of breath.    Cardiovascular: Negative for chest pain.   Gastrointestinal: Positive for nausea.   Musculoskeletal: Positive for arthralgias and joint swelling.   Skin: Negative for wound.   Allergic/Immunologic: Negative for food allergies.   Neurological: Positive for headaches.   Hematological: Bruises/bleeds easily.       Physical Exam     Initial Vitals [03/06/22 0838]   BP Pulse Resp Temp SpO2   (!) 127/93 92 16 98.8 °F (37.1 °C) 98 %      MAP       --         Physical Exam    Nursing note and vitals reviewed.  Constitutional: She is not diaphoretic. No distress.   HENT:   Head: Normocephalic and atraumatic.   Eyes: Right eye exhibits no discharge. Left eye exhibits no discharge.   Neck: Neck supple. No tracheal deviation present.   Cardiovascular: Normal rate, regular rhythm and intact distal pulses.   Pulmonary/Chest: Breath sounds normal. No respiratory distress.   Musculoskeletal:       Cervical back: Neck supple.      Comments: Right knee:  All compartments soft, intact distal pulses  Right knee prior surgical incision, slightly warm to touch, appreciable effusion, no ligament laxity, can straight leg raise off bed, sensation to light touch intact throughout lower extremity, flexion/extension limited secondary to pain     Neurological: She is alert and oriented to person, place, and time.   Skin: Skin is warm. No rash noted.   Psychiatric: She has a normal mood and affect. Her behavior is normal.         ED Course   Procedures  Labs Reviewed   CBC W/ AUTO DIFFERENTIAL - Abnormal; Notable for the following components:       Result Value    Platelets 138 (*)     Lymph # 0.5 (*)     Gran % 81.0 (*)     Lymph % 10.6 (*)     All other components within normal limits   C-REACTIVE PROTEIN - Abnormal; Notable for the following components:    CRP 9.2 (*)     All other components within normal limits   GRAM STAIN   AFB CULTURE & SMEAR   CULTURE, FUNGUS   CULTURE, ANAEROBIC   CULTURE, AEROBIC  (SPECIFY SOURCE)   COMPREHENSIVE METABOLIC PANEL   SEDIMENTATION RATE   WBC & DIFF, BODY FLUID   BODY FLUID CRYSTAL   PATHOLOGIST REVIEW, BODY FLUID   HEPATITIS C ANTIBODY          Imaging Results          X-Ray Knee 1 or 2 View Right (Final result)  Result time 03/06/22 09:55:42    Final result by Priyanka Leo MD (03/06/22 09:55:42)                 Impression:      Postsurgical changes as above without evidence of complication.      Electronically signed by: Priyanka Leo  Date:    03/06/2022  Time:    09:55             Narrative:    EXAMINATION:  XR KNEE 1 OR 2 VIEW RIGHT    CLINICAL HISTORY:  Pain in unspecified knee    TECHNIQUE:  AP and lateral views of the right knee were performed.    COMPARISON:  02/19/2020    FINDINGS:  distal femur and knee prosthesis in good alignment without evidence of complication. No fracture.  No joint effusion. Surgical clips are again seen in the posterior knee.                                  Medications   ondansetron injection 4 mg (4 mg Intravenous Given 3/6/22 0927)   ketorolac injection 9.999 mg (9.999 mg Intravenous Given 3/6/22 0927)   HYDROcodone-acetaminophen 5-325 mg per tablet 1 tablet (1 tablet Oral Given 3/6/22 1246)     Medical Decision Making:   History:   Old Medical Records: I decided to obtain old medical records.  Initial Assessment:   71-year-old female presenting with atraumatic right knee pain.  On arrival, she is afebrile, no distress.      In terms of her headache, it is not worse or life, not thunderclap in onset, nonfocal neurologic exam.  Do not believe advanced imaging is indicated, but will treat symptoms.  I suspect her headache is secondary to chronic migraine/tension headaches.  Differential Diagnosis:   Including, but not limited to:  Septic arthritis  Osteoarthritis  Ligament injury  Fracture  Migraine  Tension headache  Doubt meningitis, no fever meningismus  History not consistent with SAH  Clinical Tests:   Lab Tests: Ordered and Reviewed       <> Summary of Lab: No leukocytosis, no elevated ESR, CRP 9.2  Radiological Study: Reviewed and Ordered  ED Management:  No acute findings on x-ray of right knee.  I discussed the case with Orthopedics.  Orthopedics performed arthrocentesis at the bedside.  She may have a musculoskeletal injury as the etiology of her symptoms, as opposed to septic arthritis.  Orthopedics recommend outpatient orthopedic follow-up, will follow culture data.  Patient offered crutches, but has them at home.  Headache improved.  Patient understands and agrees with the plan for outpatient PCP and Ortho follow-up.  All questions answered prior to discharge.  Return precautions given.  Instructed to continue over-the-counter and prescribed pain medications at home.   Other:   I have discussed this case with another health care provider.             ED Course as of 03/06/22 1756   Sun Mar 06, 2022   0953 CRP(!): 9.2 [AB]   0953 Sed  Rate: 7  Normal  [AB]   0953 Temp: 98.8 °F (37.1 °C)  No fever  [AB]   0953 WBC: 4.71  No leukocytosis  [AB]      ED Course User Index  [AB] Andrew Villareal MD             Clinical Impression:   Final diagnoses:  [M25.569] Knee pain  [R51.9] Acute nonintractable headache, unspecified headache type (Primary)  [M25.461] Effusion of right knee          ED Disposition Condition    Discharge Stable        ED Prescriptions     None        Follow-up Information     Follow up With Specialties Details Why Contact Info Additional Information    Praveen Jeffery MD Internal Medicine In 1 week  [address] [facility]  [place] [address]  [phone]       [facility] - Emergency Dept Emergency Medicine  As needed, If symptoms worsen [address] [facility] [address] [phone]     [facility] - Orthopedics [facility] Orthopedics In 1 week  [address] [facility]th [address]  [facility] [address] [phone] [facility] - Main Building, 5th Floor Please park in [facility] and take Atrium elevator           Andrew Villareal MD  03/06/22 4732

## 2022-03-06 NOTE — ED NOTES
Patient identifiers verified and correct for Ms Enrique  C/C:Right knee SEE NN  APPEARANCE: awake and alert in NAD.  SKIN: warm, dry and intact. No breakdown or bruising. Swelling to knee, min redness   MUSCULOSKELETAL: Patient moving all extremities spontaneously, no obvious swelling or deformities noted. Ambulates independently.  RESPIRATORY: Denies shortness of breath.Respirations unlabored. Denies fevers.   CARDIAC: Denies CP, 2+ distal pulses; no peripheral edema  ABDOMEN: S/ND/NT, positive nausea  : voids spontaneously, denies difficulty  Neurologic: AAO x 4; follows commands equal strength in all extremities; denies numbness/tingling. Denies dizziness Deneis weakness, reports knee swelling and pain, positive pedal pulse, reports headache

## 2022-03-06 NOTE — ED NOTES
Patient states pain and swelling to right knee onset yesterday, Norco yesterday for headache, Denies fevers. No current antibiotics.

## 2022-03-06 NOTE — CONSULTS
[facility] - Emergency Dept  Orthopedics  Consult Note    Patient Name: Zoë Enrique  MRN: [id number]  Admission Date: 3/6/2022  Hospital Length of Stay: 0 days  Attending Provider: No att. providers found  Primary Care Provider: Praveen Jeffery MD    Patient information was obtained from patient and ER records.     Inpatient consult to Orthopedic Surgery  Consult performed by: Allan Quinatna MD  Consult ordered by: Andrew Villareal MD        Subjective:     Principal Problem:Right knee pain    Chief Complaint:   Chief Complaint   Patient presents with    Knee Pain     Pt with pain and swelling to right knee that started yesterday, denies injury.  Pt also with a headache and nausea.  Pt reports hx of migraines and usually takes Norco, did not take any today.        HPI: Zoë Enrique is a 71 y.o. 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 Patient reports she may have stepped on her knee wrong on Friday, and over the following 2 days began having worsening pain and swelling to right knee. Pain is diffuse around the right knee with radiation proximally and distally. She reports she has not been able to bear weight to RLE since yesterday. She reports similar sx following her previous PJI. She denies f,c,ns. Denies recent open wounds or dental procedures.     Relevant surgical history: She has a history of right femur parosteal osteosarcoma in 1989, resected with massive allograft over an intramedullary arnaldo. No adjuvant therapies, wide resection. She had a right knee arthroplasty in February 2007, this collapsed and was revised to a distal femur replacement in 11/2007 at the [facility] by Dr. Vides. She had an acute prosthetic joint infection 2 years later that was managed with DAIR 2009, 8 wks IV antibiotics. Pt reports cultures were  negative from aspiration at time of PJI and she was told this was because she had already been on abx. She is unsure what IV abx she was treated with. She reports no issues with her right knee since that time.       Past Medical History:   Diagnosis Date    Abnormal Pap smear of cervix     Atrial fibrillation 2017    Atrial flutter     Cancer     osteosarcoma of right femur    Diverticulosis     Hx of atrial flutter     Hx of mitral valve prolapse     Hypothyroidism     Migraines        Past Surgical History:   Procedure Laterality Date    ABLATION OF ARRHYTHMOGENIC FOCUS FOR ATRIAL FIBRILLATION N/A 2020    Procedure: ABLATION, ARRHYTHMOGENIC FOCUS, FOR ATRIAL FIBRILLATION;  Surgeon: Moe Seymour MD;  Location: [facility] EP LAB;  Service: Cardiology;  Laterality: N/A;  AFL/AF, KHANH, CTI, PVI, RFA, CARTO, GEN, DM, 3 PREP    AUGMENTATION OF BREAST Bilateral     35 yrs     BREAST SURGERY      augmentation    CARDIOVERSION  2020    Procedure: Cardioversion;  Surgeon: Moe Seymour MD;  Location: [facility] EP LAB;  Service: Cardiology;;     SECTION      COLON SURGERY      partial colon resection    COLONOSCOPY N/A 3/19/2021    Procedure: COLONOSCOPY;  Surgeon: Sahara Salgado MD;  Location: [facility] (4TH FLR);  Service: Endoscopy;  Laterality: N/A;  constipation prep- 7 days of miralax, 2 fulls of fulls, then day bf clears and split prep   covid test 3/5 pcw, prep instr emailed, antibiotics prophylactically, Xarelto hold x 2 days per Dr. Seymour-see telephone encounter 2/10 -ml    COLPOSCOPY      COSMETIC SURGERY      ESOPHAGOGASTRODUODENOSCOPY N/A 3/19/2021    Procedure: EGD (ESOPHAGOGASTRODUODENOSCOPY);  Surgeon: Sahara Salgado MD;  Location: [facility] ENDO (4TH FLR);  Service: Endoscopy;  Laterality: N/A;    EYE SURGERY Bilateral 2016    Lasik, then cataracts extraction    JOINT REPLACEMENT Right , ,     KNEE ARTHROPLASTY Right      revison in 2009    KNEE SURGERY Right 1989    distal femoral replacing TK    OOPHORECTOMY      TOTAL ABDOMINAL HYSTERECTOMY  1994    TAHBSO    TRANSESOPHAGEAL ECHOCARDIOGRAPHY N/A 11/24/2020    Procedure: ECHOCARDIOGRAM, TRANSESOPHAGEAL;  Surgeon: Ramírez Ortez III, MD;  Location: [facility] EP LAB;  Service: Cardiology;  Laterality: N/A;    TREATMENT OF CARDIAC ARRHYTHMIA N/A 8/12/2019    Procedure: CARDIOVERSION;  Surgeon: Moe Seymour MD;  Location: [facility] EP LAB;  Service: Cardiology;  Laterality: N/A;  KHANH/DCCV/MAC/DM/3PREP/*ADMIT FOR TIKOSYN*       Review of patient's allergies indicates:   Allergen Reactions    Morphine Nausea And Vomiting    Succinylcholine      Other reaction(s): v tach       No current facility-administered medications for this encounter.     Current Outpatient Medications   Medication Sig    HYDROcodone-acetaminophen (NORCO) 5-325 mg per tablet Take 1 tablet by mouth every 6 (six) hours as needed for Pain. Pt states takes PRN for headaches    levothyroxine (SYNTHROID) 25 MCG tablet Take 1 tablet (25 mcg total) by mouth once daily.    rivaroxaban (XARELTO) 20 mg Tab Take 1 tablet (20 mg total) by mouth once daily.    acetaminophen (TYLENOL) 650 MG TbSR Take 650 mg by mouth every 8 (eight) hours.    acyclovir (ZOVIRAX) 400 MG tablet Take 1 tablet (400 mg total) by mouth 3 (three) times daily as needed.    CALCIUM CARB/VIT D3/MINERALS (CALTRATE PLUS ORAL) Take by mouth. Takes two chews daily    docusate sodium (COLACE) 250 MG capsule Take 250 mg by mouth 2 (two) times daily.     estradioL (ESTRACE) 0.01 % (0.1 mg/gram) vaginal cream Place 1 g vaginally every other day.    melatonin 5 mg Cap Take 5 mg by mouth as needed.     olopatadine (PATANOL) 0.1 % ophthalmic solution 1 drop 2 (two) times daily.    vit C/E/Zn/coppr/lutein/zeaxan (PRESERVISION AREDS-2 ORAL)      Facility-Administered Medications Ordered in Other Encounters   Medication    sodium chloride 0.9% flush 5  "mL     Family History       Problem Relation (Age of Onset)    Asthma Brother    Diabetes Sister, Mother    Diverticulosis Brother, Brother    Hearing loss Father    Heart disease Sister, Father, Mother, Brother, Brother    No Known Problems Daughter    Stroke Father (93)          Tobacco Use    Smoking status: Never Smoker    Smokeless tobacco: Never Used   Substance and Sexual Activity    Alcohol use: Yes     Alcohol/week: 1.0 standard drink     Types: 1 Standard drinks or equivalent per week     Comment: 1-2 per month    Drug use: No     Comment: CBD oils for headache    Sexual activity: Not Currently     Partners: Male     Birth control/protection: Post-menopausal, See Surgical Hx, None     Comment: , together x 34 years     ROS  Constitutional: Denies fever/chills  Neurological: Denies numbness/tingling (any exceptions noted in orthopaedic exam)   Psychiatric/Behavioral: Denies change in normal mood  Eyes: Denies change in vision  Cardiovascular: Denies chest pain  Respiratory: Denies shortness of breath  Hematologic/Lymphatic: Denies easy bleeding/bruising   Skin: Denies new rash or skin lesions   Gastrointestinal: Denies nausea/vomitting/diarrhea, change in bowel habits, abdominal pain   Allergic/Immunologic: Denies adverse reactions to current medications  Musculoskeletal: see HPI    Objective:     Vital Signs (Most Recent):  Temp: 98.8 °F (37.1 °C) (03/06/22 0838)  Pulse: 75 (03/06/22 1229)  Resp: 17 (03/06/22 1246)  BP: 99/65 (03/06/22 1229)  SpO2: 99 % (03/06/22 1229) Vital Signs (24h Range):  Temp:  [98.8 °F (37.1 °C)] 98.8 °F (37.1 °C)  Pulse:  [75-92] 75  Resp:  [16-17] 17  SpO2:  [98 %-99 %] 99 %  BP: ()/(65-93) 99/65     Weight: 73.9 kg (163 lb)  Height: 5' 8" (172.7 cm)  Body mass index is 24.78 kg/m².    No intake or output data in the 24 hours ending 03/06/22 1455    Ortho/SPM Exam  PE:  General: No acute distress.  HEENT: Normocephalic. Atraumatic. Sclera anicteric  Cardio: " Regular rate.  Chest: No increased work of breathing.  Abdominal: Nondistended.  Extremities: No cyanosis.  No clubbing.    Skin: No generalized rash.  Neuro: Awake. Alert. Oriented to person, place, time, and situation.  Psych: Normal appearance. Cooperative.  Appropriate mood.  Appropriate affect.     RLE:  Skin intact, anterior knee incision fully healed without SOI  Mild edema about knee, small effusion present  TTP about knee  Compartments soft  Pain through arc of knee motion   No pain with hip or ankle ROM  SILT Sa/Moore/DP/SP/T  Motor intact EHL/FHL/TA/Gastroc  2+ DP, 2+ PT      Significant Labs: All pertinent labs within the past 24 hours have been reviewed.    Significant Imaging: I have reviewed all pertinent imaging results/findings.  XR of R knee showing distal femur replacement; frank GMRS without signs of loosening or fracture    Assessment/Plan:     * Right knee pain  Zoë Enrique is a 71 y.o. 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 ESR is normal and CRP is mildly elevated, however given exam and hx of prior PJI, decision was made to aspirate the joint. Aspirate results are below and not consistent with PJI, however samples have also been sent to synovasure which will provide the definitive answer regarding PJI in this patient.    -WBAT, pt offered crutches but reports she has them at home  - We will fu synovasure results on Tuesday. If positive, patient will need to return to ED for admission for DAIR and likely 6 weeks of IV abx.  -anti-inflammatories OTC for likely musculoskeletal origin of pain  - We will arrange fu appt with Dr. Urban for further evaluation         Thank you for your consult. I will follow-up with patient. Please contact us if  you have any additional questions.    Allan Quintana MD  Orthopedics  [facility] - Emergency Dept

## 2022-03-06 NOTE — PROCEDURES
"Zoë Enrique is a 71 y.o. female patient.    Temp: 98.8 °F (37.1 °C) (03/06/22 0838)  Pulse: 75 (03/06/22 1229)  Resp: 17 (03/06/22 1246)  BP: 99/65 (03/06/22 1229)  SpO2: 99 % (03/06/22 1229)  Weight: 73.9 kg (163 lb) (03/06/22 0838)  Height: 5' 8" (172.7 cm) (03/06/22 0838)       Arthrocentesis    Date/Time: 3/6/2022 3:14 PM  Location procedure was performed: [facility]  Performed by: Lui Franklin MD  Authorized by: Lui Franklin MD   Assisting provider: Allan Quintana MD  Pre-op diagnosis: right knee pain  Post-op diagnosis: right knee pain  Consent Done: Yes  Consent: Verbal consent obtained.  Consent given by: patient  Indications: joint swelling   Body area: knee  Joint: right knee  Needle size: 18 G  Approach: anterior  Aspirate amount: 10 mL  Aspirate: blood-tinged  Complications: No          I agree with resident procedure note and was present or immediately available for all critical portions of the procedure.  Procedure - aspiration right knee  Performed 03/06/2022  Patient tolerated well    3/6/2022    "

## 2022-03-06 NOTE — HPI
Zoë Enrique is a 71 y.o. 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 Patient reports she may have stepped on her knee wrong on Friday, and over the following 2 days began having worsening pain and swelling to right knee. Pain is diffuse around the right knee with radiation proximally and distally. She reports she has not been able to bear weight to RLE since yesterday. She reports similar sx following her previous PJI. She denies f,c,ns. Denies recent open wounds or dental procedures.     Relevant surgical history: She has a history of right femur parosteal osteosarcoma in 1989, resected with massive allograft over an intramedullary arnaldo. No adjuvant therapies, wide resection. She had a right knee arthroplasty in February 2007, this collapsed and was revised to a distal femur replacement in 11/2007 at the [facility] by Dr. Vides. She had an acute prosthetic joint infection 2 years later that was managed with DAIR 2009, 8 wks IV antibiotics. Pt reports cultures were negative from aspiration at time of PJI and she was told this was because she had already been on abx. She is unsure what IV abx she was treated with. She reports no issues with her right knee since that time.

## 2022-03-07 ENCOUNTER — PATIENT MESSAGE (OUTPATIENT)
Dept: ORTHOPEDICS | Facility: CLINIC | Age: 72
End: 2022-03-07
Payer: MEDICARE

## 2022-03-08 ENCOUNTER — PES CALL (OUTPATIENT)
Dept: ADMINISTRATIVE | Facility: CLINIC | Age: 72
End: 2022-03-08
Payer: MEDICARE

## 2022-03-09 LAB
BACTERIA SPEC AEROBE CULT: NO GROWTH
HCV AB SERPL QL IA: NEGATIVE

## 2022-03-11 NOTE — PROGRESS NOTES
CC:  Bronchiectasis  Last seen 3/15/21     PI:  69 yo F referred for bronchiectasis.    There is no family history of pulmonary disease.There is no prior history of pneumonia, bronchitis, emphysema, TB, pneumothorax, rib fracture, or pleural effusion.  There is no Hx of asthma, allergies, or sinus dz. She had CT prior to ablation which showed RML bronchiectasis.  She had pneumonia as a child <12 years requiring hosp.  No pneumonia as adult.  No Hx TB but had positive TB skin test years ago and CXR was neg.  No Tx. She has periodic bronchitis.  No Hx emphysema, COPD.  No PTX, rib fx.  Tobacco- LLNS.  Hse has a many year Hx of chronic cough which she attributes to sinus drainage.  Cough is worse with recumbency.  Sputum is clear.  No blood.  She notes CHAIDEZ at 1 block walking.  Thought it would improve with ablation but didn't.  No nocuturnal SOB.  No Hx wt loss.  She has noted hot flashes over past 2 years.  No Hx HIV.    1/5/21-  Reviewed PFTs, sputum AFB. .  Cultures pending.    2/8/21-  Her main complaint is CHAIDEZ.  Cough is minimal.  Long discussion about MAC and possible bronchoscopy.  Does not want to try albuterol because it may affect heart.  Will try spiriva  3/15/21-  Had covid shots.  No change with spiriva.  Coughs about 2 x/day.  Nonproductive.    3/14/22-  A lot has happended since last visit.  Seen by cardiology for paroxy AF.  On xarelto.  Seen by rheumatology fo OA. developeed pleuritic chest pain.  CT scan (below) showed breast implant rupture.  Referred to plastic surgery.   No new AFB cultures.  Has had 5 ablations.  Apparently worked.  Off amiodarone.  Has seen plastic surgery.  Not sure whether she will get srugical removal.  Pleuritic pain has lessened on right.  Cough has gone.  No sputum.  No hemotpysis.  No longer on nebulizer.  No fever, chills, wt loss.        PH:  Long standing atrial fibrillation.  Had ablation procedure 11/24/20.  Apparetnly had cardiac cath showing no sign CAD 2014.    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  No history of toxic exposures.  Patient is currently unemployed.     Meds:  Amiodarone on x 2 weeks. 200 mg daily.    Review of Systems   Constitution: Negative. Negative for malaise/fatigue.   HENT: Negative.  Negative for ear pain and tinnitus.    Eyes: Negative for blurred vision.   Cardiovascular: Positive for palpitations. Negative for near-syncope and syncope.   Respiratory: above    Endocrine: Negative.  Negative for polyuria.   Hematologic/Lymphatic: Does not bruise/bleed easily.   Skin: Negative.  Negative for rash.   Musculoskeletal: Negative.  Negative for joint pain and muscle weakness.   Gastrointestinal: Negative.  Negative for abdominal pain and change in bowel habit.   Genitourinary: Negative for frequency.   Neurological: Negative.  Negative for dizziness and weakness.   Psychiatric/Behavioral: Negative.  Negative for depression. The patient is not nervous/anxious.    Allergic/Immunologic: Negative for environmental allergies.          Radiology:  CTA chest 11/3/20:  Aorta: Left-sided aortic arch with 3 arterial branches. The aorta maintains normal caliber, contour and course. There is no detectable calcification in the thoracic aorta or coronary arteries of this 70-year-old woman noting that small amounts of calcium could be obscured by the presence of contrast medium..     Heart/pericardium, mediastinum and brendan:     -The heart has a transverse configuration with the interventricular septum in paracoronal orientation (axial series 2, images 300 through 342).  The left ventricular apex is directed at the left anterior axillary line. This represents a normal anatomic variant reflecting the patient's narrow sagittal dimension.     -By  echocardiography there is left and right atrial enlargement.     -There is a small amount of pericardial fluid in the sleeve of pericardium that lies along the right inferior pulmonary vein.  This pericardial fluid extends cephalad, medial to the bronchus intermedius and posterior to the right pulmonary artery (axial series 2, image 233.     -No significant pericardial fluid elsewhere.  No pericardial calcification.     -No mediastinal or hilar darlene enlargement.     Pulmonary arteries: Pulmonary arteries distribute normally. Pulmonary arteries are sufficiently opacified for diagnostic assessment.  There no pulmonary thromboembolism or other filling defect and no pulmonary infarct..     Pulmonary veins, left atrium and esophagus:     There are four pulmonary veins that return to the left atrium. Measurements of the pulmonary veins:     LSPV + LIPV: Left pulmonary veins form a common confluence, a normal congenital variant.     1.6 cm x 2.0 cm.  (Ax SE 2 image #233; cor  image # 99).     RIPV:1.6 cm x 1.7 cm.  (Ax SE 2 image #266; cor  image # 95).     RSPV:1.6 cm x 1.8 cm.  (Ax SE 2 image #254; cor  image # 85).     Please note that the SVC is filled with concentrated contrast medium on images acquired during pulmonary venous enhancement.  There is bright opacification in or overlying the left atrium at the expected location of the atrial septum between the cephalad right atrium and left atrium (see axial series 2, image 251 and coronal series 602, image 85).  This may represent artifact especially given the lack of enlargement of pulmonary vessels and would be expected in the setting of ASD.  There is also no evidence of partial anomalous pulmonary venous return.  However if there is concern for possible ASD and particularly sinus venosus type ASD, consider echocardiogram with bubble study for further assessment.     Bright contrast medium also courses through the right atrium and posterior to the  "left atrium (axial series 2, image 292); this may represent streaming of refluxed contrast within the cephalad aspect of the IVC.     Esophagus: The esophagus is closely applied to the posterior wall of the left superior and inferior pulmonary veins and their common confluence as well as the posterior wall of the left atrium.  Is remote from the right inferior and superior pulmonary veins.     Pleura: No pleural fluid.No pleural calcification.     Upper Abdomen: No abnormality of the partially imaged upper abdomen.     Thoracic soft tissues: Normal. Both breasts are present.  There are bilateral breast implants with capsular calcification.     Bones: No acute fracture. No suspicious lytic or sclerotic lesion.     Central airways: Patent with normal distribution.     Lungs:     -There is tubular bronchiectasis in the right middle lobe with retained secretions.  Appearance and location are typical for nontuberculous mycobacterium avium infection of these airways ("[place] syndrome").  This infection is often associated with cough.  Otherwise intraparenchymal airways appear unremarkable.     -Mild subsegmental atelectasis in the periphery of the middle lobe and the inferior segment of the lingula, likely not significant.    1/14/22-  Chest CT.    CLINICAL HISTORY:  Respiratory illness, nondiagnostic xray; Cough, unspecified     TECHNIQUE:  Low dose axial images, sagittal and coronal reformations were obtained from the thoracic inlet to the lung bases. Contrast was not administered.     COMPARISON:  Chest ribs two view right 01/05/2022 CTA cardiac 11/23/2020, CTA chest non coronary 11/19/2020     FINDINGS:  Base of Neck: No significant abnormality.     Thoracic soft tissues: There is a 4 cm defect in the lateral aspect of the left breast implant, without infiltration into the adjacent soft tissues.  There may also be a smaller, right medial, intracapsular implant rupture.     Aorta: Left-sided aortic arch.  No " aneurysmal dilatation.  Scant atherosclerosis.     Heart: Normal size.  There is a small pericardial effusion.  The heart is borderline enlarged, with a prominent left atrial appendage.  The latter makes up the majority of the superior left heart border..  There are 2 small foci of atrial posterior and right lateral wall calcifications.  The     Pulmonary vasculature: Pulmonary arteries distribute normally.  There are four pulmonary veins.     Shireen/Mediastinum: No pathologic darlene enlargement.     Airways: Trachea is midline.  Central airways are patent.  There is mild cylindrical bronchiectasis and bronchial wall thickening with hyperdense material in the lumen of the segmental and subsegmental bronchi, predominantly within the right lower lobe.     Lungs/Pleura: Lungs are symmetrically expanded.  There are scattered bandlike opacities, most consistent with atelectasis.  Additionally, there are innumerable bronchovascular distributed punctate micronodules predominantly within the right middle and lower lobes with subpleural sparing.  Additional scattered calcified and noncalcified nodules, largest within the left upper lobe measuring approximately 0.5 cm, (series 4, image 290).  No significant volume pleural fluid.  No pneumothorax     Esophagus: Unremarkable.     Upper Abdomen: No abnormality of the partially imaged upper abdomen.     Bones: No acute fracture. No suspicious lytic or sclerotic lesions.     Impression:     1.  Tubular bronchiectasis with retained secretions and innumerable peribronchovascular distributed punctate micro nodules, predominantly within the right middle and lower lobes, concerning for an atypical pneumonia, such as non tuberculous mycobacterium or chronic bronchiolitis.  Findings are increased compared to prior studies.  (Note:  These are trivial increases in bronchiectasis to my interpretation.)     2.  Scattered bandlike opacities compatible with subsegmental atelectasis.     3.   Additional scattered calcified and noncalcified pulmonary nodules, largest measuring 0.5 cm.  For multiple solid nodules all <6 mm, Fleischner Society 2017 guidelines recommend no routine follow up for a low risk patient, or follow up with non-contrast chest CT at 12 months after discovery in a high risk patient.     4.  Dilated left atrial appendage, small foci of atrial wall calcifications.  Correlate with chronic left atrial dysfunction.     5.  Left, and possibly small right, implant rupture without infiltration into adjacent breast tissue        Pulmonary Function  12/16/20-                Ratio 81%              TLC 84% pred              DLCO 94% pred     Labs:  Quantiferon neg 2/5/20 12/11/20 Sputum AFB neg on smear, culture negatvie.  Not clear that this is 3 specimens or same report x3.      Physical Exam  Vitals     HEENT Clear     Neck Supple .  No jvd     Chest clear A and P, even with FVC.  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  Other possibilities include HIV, alpha 1 AT defic.     Plan:  Long discussion of w/u for bronchiecasis.  I am still concerned abotu MAC.  Lung cancer unlikely as she is nonsmoker.  She is relatively asymptomatic and main concern is CHAIDEZ and palpitations.  I told her that lungs are not likely limiting factor in exercise.  She is willing to follow bronchiectais with repeat CT in 1 year.  RTC sooner if hemoptyiss, increased sputum    3/14/22-  She is aware of possible NTM infection and does not wish to start Rx until she develops symptoms.  I am in agreement with this.  Will collect 1 sputum for AFB.  RTC in 1 year to discuss need for further CT.     RTC 1 jyear

## 2022-03-14 ENCOUNTER — OFFICE VISIT (OUTPATIENT)
Dept: PULMONOLOGY | Facility: CLINIC | Age: 72
End: 2022-03-14
Payer: MEDICARE

## 2022-03-14 VITALS
WEIGHT: 161.81 LBS | SYSTOLIC BLOOD PRESSURE: 115 MMHG | OXYGEN SATURATION: 98 % | RESPIRATION RATE: 18 BRPM | BODY MASS INDEX: 24.52 KG/M2 | DIASTOLIC BLOOD PRESSURE: 70 MMHG | HEIGHT: 68 IN | HEART RATE: 63 BPM | TEMPERATURE: 98 F

## 2022-03-14 DIAGNOSIS — J47.9 BRONCHIECTASIS WITHOUT COMPLICATION: Primary | ICD-10-CM

## 2022-03-14 LAB — BACTERIA SPEC ANAEROBE CULT: NORMAL

## 2022-03-14 PROCEDURE — 99213 PR OFFICE/OUTPT VISIT, EST, LEVL III, 20-29 MIN: ICD-10-PCS | Mod: S$GLB,,, | Performed by: INTERNAL MEDICINE

## 2022-03-14 PROCEDURE — 1101F PR PT FALLS ASSESS DOC 0-1 FALLS W/OUT INJ PAST YR: ICD-10-PCS | Mod: CPTII,S$GLB,, | Performed by: INTERNAL MEDICINE

## 2022-03-14 PROCEDURE — 1159F PR MEDICATION LIST DOCUMENTED IN MEDICAL RECORD: ICD-10-PCS | Mod: CPTII,S$GLB,, | Performed by: INTERNAL MEDICINE

## 2022-03-14 PROCEDURE — 3008F BODY MASS INDEX DOCD: CPT | Mod: CPTII,S$GLB,, | Performed by: INTERNAL MEDICINE

## 2022-03-14 PROCEDURE — 3288F PR FALLS RISK ASSESSMENT DOCUMENTED: ICD-10-PCS | Mod: CPTII,S$GLB,, | Performed by: INTERNAL MEDICINE

## 2022-03-14 PROCEDURE — 3078F PR MOST RECENT DIASTOLIC BLOOD PRESSURE < 80 MM HG: ICD-10-PCS | Mod: CPTII,S$GLB,, | Performed by: INTERNAL MEDICINE

## 2022-03-14 PROCEDURE — 3074F PR MOST RECENT SYSTOLIC BLOOD PRESSURE < 130 MM HG: ICD-10-PCS | Mod: CPTII,S$GLB,, | Performed by: INTERNAL MEDICINE

## 2022-03-14 PROCEDURE — 99999 PR PBB SHADOW E&M-EST. PATIENT-LVL IV: ICD-10-PCS | Mod: PBBFAC,,, | Performed by: INTERNAL MEDICINE

## 2022-03-14 PROCEDURE — 3008F PR BODY MASS INDEX (BMI) DOCUMENTED: ICD-10-PCS | Mod: CPTII,S$GLB,, | Performed by: INTERNAL MEDICINE

## 2022-03-14 PROCEDURE — 3074F SYST BP LT 130 MM HG: CPT | Mod: CPTII,S$GLB,, | Performed by: INTERNAL MEDICINE

## 2022-03-14 PROCEDURE — 1159F MED LIST DOCD IN RCRD: CPT | Mod: CPTII,S$GLB,, | Performed by: INTERNAL MEDICINE

## 2022-03-14 PROCEDURE — 99499 RISK ADDL DX/OHS AUDIT: ICD-10-PCS | Mod: S$GLB,,, | Performed by: INTERNAL MEDICINE

## 2022-03-14 PROCEDURE — 3288F FALL RISK ASSESSMENT DOCD: CPT | Mod: CPTII,S$GLB,, | Performed by: INTERNAL MEDICINE

## 2022-03-14 PROCEDURE — 1101F PT FALLS ASSESS-DOCD LE1/YR: CPT | Mod: CPTII,S$GLB,, | Performed by: INTERNAL MEDICINE

## 2022-03-14 PROCEDURE — 99499 UNLISTED E&M SERVICE: CPT | Mod: S$GLB,,, | Performed by: INTERNAL MEDICINE

## 2022-03-14 PROCEDURE — 99213 OFFICE O/P EST LOW 20 MIN: CPT | Mod: S$GLB,,, | Performed by: INTERNAL MEDICINE

## 2022-03-14 PROCEDURE — 99999 PR PBB SHADOW E&M-EST. PATIENT-LVL IV: CPT | Mod: PBBFAC,,, | Performed by: INTERNAL MEDICINE

## 2022-03-14 PROCEDURE — 1126F AMNT PAIN NOTED NONE PRSNT: CPT | Mod: CPTII,S$GLB,, | Performed by: INTERNAL MEDICINE

## 2022-03-14 PROCEDURE — 3078F DIAST BP <80 MM HG: CPT | Mod: CPTII,S$GLB,, | Performed by: INTERNAL MEDICINE

## 2022-03-14 PROCEDURE — 1126F PR PAIN SEVERITY QUANTIFIED, NO PAIN PRESENT: ICD-10-PCS | Mod: CPTII,S$GLB,, | Performed by: INTERNAL MEDICINE

## 2022-03-23 ENCOUNTER — OFFICE VISIT (OUTPATIENT)
Dept: PLASTIC SURGERY | Facility: CLINIC | Age: 72
End: 2022-03-23
Payer: MEDICARE

## 2022-03-23 VITALS — SYSTOLIC BLOOD PRESSURE: 120 MMHG | TEMPERATURE: 97 F | DIASTOLIC BLOOD PRESSURE: 56 MMHG | HEART RATE: 79 BPM

## 2022-03-23 DIAGNOSIS — N64.4 BREAST PAIN, RIGHT: Primary | ICD-10-CM

## 2022-03-23 PROCEDURE — 99213 OFFICE O/P EST LOW 20 MIN: CPT | Mod: S$GLB,,, | Performed by: SURGERY

## 2022-03-23 PROCEDURE — 99999 PR PBB SHADOW E&M-EST. PATIENT-LVL III: ICD-10-PCS | Mod: PBBFAC,,, | Performed by: SURGERY

## 2022-03-23 PROCEDURE — 1101F PR PT FALLS ASSESS DOC 0-1 FALLS W/OUT INJ PAST YR: ICD-10-PCS | Mod: CPTII,S$GLB,, | Performed by: SURGERY

## 2022-03-23 PROCEDURE — 3288F PR FALLS RISK ASSESSMENT DOCUMENTED: ICD-10-PCS | Mod: CPTII,S$GLB,, | Performed by: SURGERY

## 2022-03-23 PROCEDURE — 3078F PR MOST RECENT DIASTOLIC BLOOD PRESSURE < 80 MM HG: ICD-10-PCS | Mod: CPTII,S$GLB,, | Performed by: SURGERY

## 2022-03-23 PROCEDURE — 3078F DIAST BP <80 MM HG: CPT | Mod: CPTII,S$GLB,, | Performed by: SURGERY

## 2022-03-23 PROCEDURE — 1160F RVW MEDS BY RX/DR IN RCRD: CPT | Mod: CPTII,S$GLB,, | Performed by: SURGERY

## 2022-03-23 PROCEDURE — 3074F SYST BP LT 130 MM HG: CPT | Mod: CPTII,S$GLB,, | Performed by: SURGERY

## 2022-03-23 PROCEDURE — 1126F AMNT PAIN NOTED NONE PRSNT: CPT | Mod: CPTII,S$GLB,, | Performed by: SURGERY

## 2022-03-23 PROCEDURE — 99213 PR OFFICE/OUTPT VISIT, EST, LEVL III, 20-29 MIN: ICD-10-PCS | Mod: S$GLB,,, | Performed by: SURGERY

## 2022-03-23 PROCEDURE — 1101F PT FALLS ASSESS-DOCD LE1/YR: CPT | Mod: CPTII,S$GLB,, | Performed by: SURGERY

## 2022-03-23 PROCEDURE — 99999 PR PBB SHADOW E&M-EST. PATIENT-LVL III: CPT | Mod: PBBFAC,,, | Performed by: SURGERY

## 2022-03-23 PROCEDURE — 1159F MED LIST DOCD IN RCRD: CPT | Mod: CPTII,S$GLB,, | Performed by: SURGERY

## 2022-03-23 PROCEDURE — 3074F PR MOST RECENT SYSTOLIC BLOOD PRESSURE < 130 MM HG: ICD-10-PCS | Mod: CPTII,S$GLB,, | Performed by: SURGERY

## 2022-03-23 PROCEDURE — 3288F FALL RISK ASSESSMENT DOCD: CPT | Mod: CPTII,S$GLB,, | Performed by: SURGERY

## 2022-03-23 PROCEDURE — 1160F PR REVIEW ALL MEDS BY PRESCRIBER/CLIN PHARMACIST DOCUMENTED: ICD-10-PCS | Mod: CPTII,S$GLB,, | Performed by: SURGERY

## 2022-03-23 PROCEDURE — 1126F PR PAIN SEVERITY QUANTIFIED, NO PAIN PRESENT: ICD-10-PCS | Mod: CPTII,S$GLB,, | Performed by: SURGERY

## 2022-03-23 PROCEDURE — 1159F PR MEDICATION LIST DOCUMENTED IN MEDICAL RECORD: ICD-10-PCS | Mod: CPTII,S$GLB,, | Performed by: SURGERY

## 2022-03-23 NOTE — LETTER
[facility]  [address]  [facility] [address]  Phone: [phone]  Fax: [phone] April 14, 2022      Praveen Jeffery MD  [address] [address] [place] [address]    Patient: Zoë Enrique   MR Number: [id number]   YOB: 1950   Date of Visit: 3/23/2022     Dear Dr. Jeffery:    Thank you for referring Zoë Enrique to me for evaluation. Attached are the relevant portions of my assessment and plan of care.    If you have questions, please do not hesitate to call me. I look forward to following Zoë along with you.    Sincerely,    Lui Brewer MD   Section of Plastic Surgery  Department of Surgery  [facility]    CRB/hcr

## 2022-03-23 NOTE — PROGRESS NOTES
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  Patient had implants placed in 1984 they were silicone I believe underneath the muscle.  Patient is previously 2 other plastic surgeons regarding this problem and declined to have any surgical procedures.  She presents today with a chief complaint of pain in the right lateral breast.  I looked at the CT scan which shows bilateral calcified silicone implants both of which are ruptured.  I sat down with the patient and discussed with her the options.  I told her she does have ruptured silicone implants.  I stated that we recommend but it is not mandatory that she has them removed.  She has a grade 4 capsular contracture.  I told her that I could not guarantee that I would relieve her pain by removing the implants.  The I answered all the patient's questions.  I went over all surgical options.  The patient has declined any surgical intervention.  Thus this is the 3rd Plastic surgery consult patient still is not interested in having her ruptured silicone implants removed.  The

## 2022-03-25 ENCOUNTER — PATIENT MESSAGE (OUTPATIENT)
Dept: ELECTROPHYSIOLOGY | Facility: CLINIC | Age: 72
End: 2022-03-25
Payer: MEDICARE

## 2022-03-28 DIAGNOSIS — E03.9 HYPOTHYROIDISM (ACQUIRED): ICD-10-CM

## 2022-03-28 RX ORDER — LEVOTHYROXINE SODIUM 25 UG/1
25 TABLET ORAL DAILY
Qty: 90 TABLET | Refills: 3 | Status: SHIPPED | OUTPATIENT
Start: 2022-03-28 | End: 2023-01-05 | Stop reason: SDUPTHER

## 2022-03-28 NOTE — TELEPHONE ENCOUNTER
No new care gaps identified.  Powered by [employer]. Reference number: [id number].   3/28/2022 4:09:09 PM CDT

## 2022-04-01 ENCOUNTER — LAB VISIT (OUTPATIENT)
Dept: LAB | Facility: HOSPITAL | Age: 72
End: 2022-04-01
Attending: ORTHOPAEDIC SURGERY
Payer: MEDICARE

## 2022-04-01 ENCOUNTER — OFFICE VISIT (OUTPATIENT)
Dept: ORTHOPEDICS | Facility: CLINIC | Age: 72
End: 2022-04-01
Payer: MEDICARE

## 2022-04-01 VITALS
DIASTOLIC BLOOD PRESSURE: 84 MMHG | SYSTOLIC BLOOD PRESSURE: 124 MMHG | BODY MASS INDEX: 24.29 KG/M2 | HEIGHT: 68 IN | HEART RATE: 68 BPM | WEIGHT: 160.25 LBS

## 2022-04-01 DIAGNOSIS — M25.561 ACUTE PAIN OF RIGHT KNEE: Primary | ICD-10-CM

## 2022-04-01 DIAGNOSIS — Z87.39 HISTORY OF INFECTION OF TOTAL JOINT PROSTHESIS OF KNEE: ICD-10-CM

## 2022-04-01 DIAGNOSIS — Z96.651 HISTORY OF ARTHROPLASTY OF RIGHT KNEE: ICD-10-CM

## 2022-04-01 DIAGNOSIS — M25.561 ACUTE PAIN OF RIGHT KNEE: ICD-10-CM

## 2022-04-01 LAB
CRP SERPL-MCNC: 1.3 MG/L (ref 0–8.2)
ERYTHROCYTE [SEDIMENTATION RATE] IN BLOOD BY WESTERGREN METHOD: 21 MM/HR (ref 0–36)

## 2022-04-01 PROCEDURE — 99214 OFFICE O/P EST MOD 30 MIN: CPT | Mod: S$GLB,,, | Performed by: ORTHOPAEDIC SURGERY

## 2022-04-01 PROCEDURE — 99999 PR PBB SHADOW E&M-EST. PATIENT-LVL III: CPT | Mod: PBBFAC,,, | Performed by: ORTHOPAEDIC SURGERY

## 2022-04-01 PROCEDURE — 99999 PR PBB SHADOW E&M-EST. PATIENT-LVL III: ICD-10-PCS | Mod: PBBFAC,,, | Performed by: ORTHOPAEDIC SURGERY

## 2022-04-01 PROCEDURE — 86140 C-REACTIVE PROTEIN: CPT | Performed by: ORTHOPAEDIC SURGERY

## 2022-04-01 PROCEDURE — 3008F PR BODY MASS INDEX (BMI) DOCUMENTED: ICD-10-PCS | Mod: CPTII,S$GLB,, | Performed by: ORTHOPAEDIC SURGERY

## 2022-04-01 PROCEDURE — 3079F PR MOST RECENT DIASTOLIC BLOOD PRESSURE 80-89 MM HG: ICD-10-PCS | Mod: CPTII,S$GLB,, | Performed by: ORTHOPAEDIC SURGERY

## 2022-04-01 PROCEDURE — 99214 PR OFFICE/OUTPT VISIT, EST, LEVL IV, 30-39 MIN: ICD-10-PCS | Mod: S$GLB,,, | Performed by: ORTHOPAEDIC SURGERY

## 2022-04-01 PROCEDURE — 85652 RBC SED RATE AUTOMATED: CPT | Performed by: ORTHOPAEDIC SURGERY

## 2022-04-01 PROCEDURE — 3074F PR MOST RECENT SYSTOLIC BLOOD PRESSURE < 130 MM HG: ICD-10-PCS | Mod: CPTII,S$GLB,, | Performed by: ORTHOPAEDIC SURGERY

## 2022-04-01 PROCEDURE — 3074F SYST BP LT 130 MM HG: CPT | Mod: CPTII,S$GLB,, | Performed by: ORTHOPAEDIC SURGERY

## 2022-04-01 PROCEDURE — 3079F DIAST BP 80-89 MM HG: CPT | Mod: CPTII,S$GLB,, | Performed by: ORTHOPAEDIC SURGERY

## 2022-04-01 PROCEDURE — 1159F PR MEDICATION LIST DOCUMENTED IN MEDICAL RECORD: ICD-10-PCS | Mod: CPTII,S$GLB,, | Performed by: ORTHOPAEDIC SURGERY

## 2022-04-01 PROCEDURE — 3008F BODY MASS INDEX DOCD: CPT | Mod: CPTII,S$GLB,, | Performed by: ORTHOPAEDIC SURGERY

## 2022-04-01 PROCEDURE — 36415 COLL VENOUS BLD VENIPUNCTURE: CPT | Performed by: ORTHOPAEDIC SURGERY

## 2022-04-01 PROCEDURE — 1159F MED LIST DOCD IN RCRD: CPT | Mod: CPTII,S$GLB,, | Performed by: ORTHOPAEDIC SURGERY

## 2022-04-01 NOTE — PROGRESS NOTES
Subjective:      Patient ID: Zoë Enrique is a 72 y.o. female.    Chief Complaint: Pain of the Right Knee      HPI: Zoë Enrique is a 72 y.o. female presents for f/u of her right knee. [place] [facility] [place] pain in her right knee.     She has a history of right femur parosteal osteosarcoma in 1989, resected with massive allograft over an intramedullary arnaldo. No adjuvant therapies, wide resection. She had a right knee arthroplasty in February 2007, this collapsed and was revised to a distal femur replacement in 11/2007 at the [facility] by Dr. Vides. She had an acute prosthetic joint infection 2 years later that was managed with DAIR 2009, 8 wks IV antibiotics. Pt reports cultures were negative from aspiration at time of PJI and she was told this was because she had already been on abx. She is unsure what IV abx she was treated with. She reports no issues with her right knee since that time.     Since her ER visit, pain and swelling have improved, but still mild chronic pain of the knee.  Denies bucking or giving way.    Synoviosure sent was negative for alpha defensin, positive for staph panel, but negative on culture after 7 days.  Neutrophil elastase positive.    Lab Results   Component Value Date    SEDRATE 21 04/01/2022     Lab Results   Component Value Date    CRP 1.3 04/01/2022     On 3/6/22 CRP was 9.2, and ESR was 7.    No constitutional symptoms, no F/C/NS.    Past Medical History:   Diagnosis Date    Abnormal Pap smear of cervix     Atrial fibrillation 09/2017    Atrial flutter     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    Diverticulosis 1998    Hx of atrial flutter     Hx of mitral valve prolapse 1990    Hypothyroidism     Migraines      Past Surgical History:   Procedure Laterality Date    ABLATION OF ARRHYTHMOGENIC FOCUS FOR ATRIAL FIBRILLATION N/A 11/24/2020    Procedure: ABLATION, ARRHYTHMOGENIC FOCUS, FOR ATRIAL FIBRILLATION;  Surgeon: Moe Seymour MD;   Location: [facility] EP [facility];  Service: Cardiology;  Laterality: N/A;  AFL/AF, KHANH, CTI, PVI, RFA, CARTO, GEN, DM, 3 PREP    AUGMENTATION OF BREAST Bilateral 1984    35 yrs     BREAST SURGERY  1984    augmentation    CARDIOVERSION  2020    Procedure: Cardioversion;  Surgeon: Moe Seymour MD;  Location: [facility];  Service: Cardiology;;     SECTION      COLON SURGERY      partial colon resection    COLONOSCOPY N/A 3/19/2021    Procedure: COLONOSCOPY;  Surgeon: Sahara Salgado MD;  Location: [facility] (4TH FLR);  Service: Endoscopy;  Laterality: N/A;  constipation prep- 7 days of miralax, 2 fulls of fulls, then day bf clears and split prep   covid test 3/5 pcw, prep instr emailed, antibiotics prophylactically, Xarelto hold x 2 days per Dr. Seymour-see telephone encounter 2/10 -ml    COLPOSCOPY      COSMETIC SURGERY      ESOPHAGOGASTRODUODENOSCOPY N/A 3/19/2021    Procedure: EGD (ESOPHAGOGASTRODUODENOSCOPY);  Surgeon: Sahara Salgado MD;  Location: [facility] (4TH FLR);  Service: Endoscopy;  Laterality: N/A;    EYE SURGERY Bilateral     Lasik, then cataracts extraction    JOINT REPLACEMENT Right , ,     KNEE ARTHROPLASTY Right     revison in     KNEE SURGERY Right     distal femoral replacing TK    OOPHORECTOMY      TOTAL ABDOMINAL HYSTERECTOMY      TAHBSO    TRANSESOPHAGEAL ECHOCARDIOGRAPHY N/A 2020    Procedure: ECHOCARDIOGRAM, TRANSESOPHAGEAL;  Surgeon: Ramírez Ortez III, MD;  Location: [facility];  Service: Cardiology;  Laterality: N/A;    TREATMENT OF CARDIAC ARRHYTHMIA N/A 2019    Procedure: CARDIOVERSION;  Surgeon: Moe Seymour MD;  Location: [facility];  Service: Cardiology;  Laterality: N/A;  KHANH/DCCV/MAC/DM/3PREP/*ADMIT FOR TIKOSYN*     Social History     Socioeconomic History    Marital status:    Tobacco Use    Smoking status: Never Smoker    Smokeless tobacco: Never Used   Substance and Sexual  Activity    Alcohol use: Yes     Alcohol/week: 1.0 standard drink     Types: 1 Standard drinks or equivalent per week     Comment: 1-2 per month    Drug use: No     Comment: CBD oils for headache    Sexual activity: Not Currently     Partners: Male     Birth control/protection: Post-menopausal, See Surgical Hx, None     Comment: , together x 34 years     Social Determinants of Health     Financial Resource Strain: Low Risk     Difficulty of Paying Living Expenses: Not very hard   Food Insecurity: No Food Insecurity    Worried About Running Out of Food in the Last Year: Never true    Ran Out of Food in the Last Year: Never true   Transportation Needs: No Transportation Needs    Lack of Transportation (Medical): No    Lack of Transportation (Non-Medical): No   Physical Activity: Insufficiently Active    Days of Exercise per Week: 2 days    Minutes of Exercise per Session: 10 min   Stress: No Stress Concern Present    Feeling of Stress : Not at all   Social Connections: Unknown    Frequency of Communication with Friends and Family: More than three times a week    Frequency of Social Gatherings with Friends and Family: More than three times a week    Active Member of Clubs or Organizations: No    Attends Club or Organization Meetings: Patient refused    Marital Status:    Housing Stability: Low Risk     Unable to Pay for Housing in the Last Year: No    Number of Places Lived in the Last Year: 1    Unstable Housing in the Last Year: No         Current Outpatient Medications:     acetaminophen (TYLENOL) 650 MG TbSR, Take 650 mg by mouth every 8 (eight) hours., Disp: , Rfl:     acyclovir (ZOVIRAX) 400 MG tablet, Take 1 tablet (400 mg total) by mouth 3 (three) times daily as needed., Disp: 90 tablet, Rfl: 3    CALCIUM CARB/VIT D3/MINERALS (CALTRATE PLUS ORAL), Take by mouth. Takes two chews daily, Disp: , Rfl:     docusate sodium (COLACE) 250 MG capsule, Take 250 mg by mouth 2 (two)  "times daily. , Disp: , Rfl:     estradioL (ESTRACE) 0.01 % (0.1 mg/gram) vaginal cream, Place 1 g vaginally every other day., Disp: 42 g, Rfl: 3    HYDROcodone-acetaminophen (NORCO) 5-325 mg per tablet, Take 1 tablet by mouth every 6 (six) hours as needed for Pain. Pt states takes PRN for headaches, Disp: 28 tablet, Rfl: 0    levothyroxine (SYNTHROID) 25 MCG tablet, Take 1 tablet (25 mcg total) by mouth once daily., Disp: 90 tablet, Rfl: 3    olopatadine (PATANOL) 0.1 % ophthalmic solution, 1 drop 2 (two) times daily., Disp: , Rfl:     rivaroxaban (XARELTO) 20 mg Tab, Take 1 tablet (20 mg total) by mouth once daily., Disp: 90 tablet, Rfl: 3    vit C/E/Zn/coppr/lutein/zeaxan (PRESERVISION AREDS-2 ORAL), , Disp: , Rfl:     melatonin 5 mg Cap, Take 5 mg by mouth as needed. , Disp: , Rfl:   No current facility-administered medications for this visit.    Facility-Administered Medications Ordered in Other Visits:     sodium chloride 0.9% flush 5 mL, 5 mL, Intravenous, PRN, Aleksandra Taylor NP  Review of patient's allergies indicates:   Allergen Reactions    Morphine Nausea And Vomiting    Succinylcholine      Other reaction(s): v tach       /84 (BP Location: Left arm, Patient Position: Sitting, BP Method: Medium (Automatic))   Pulse 68   Ht 5' 8" (1.727 m)   Wt 72.7 kg (160 lb 4.4 oz)   BMI 24.37 kg/m²     ROS      Objective:    Right Knee Exam     Tenderness   The patient is experiencing tenderness in the patella.    Range of Motion   Extension: normal   Flexion: 120     Other   Erythema: absent  Scars: present  Sensation: normal  Pulse: present  Swelling: mild  Effusion: no effusion present    Comments:  No suprapatellar fluid on exam.      Left Knee Exam   Left knee exam is normal.               Assessment:     Imaging:X-Ray Knee 1 or 2 View Right  Narrative: EXAMINATION:  XR KNEE 1 OR 2 VIEW RIGHT    CLINICAL HISTORY:  Pain in unspecified knee    TECHNIQUE:  AP and lateral views of the right " knee were performed.    COMPARISON:  02/19/2020    FINDINGS:  distal femur and knee prosthesis in good alignment without evidence of complication. No fracture.  No joint effusion. Surgical clips are again seen in the posterior knee.  Impression: Postsurgical changes as above without evidence of complication.    Electronically signed by: Priyanka Leo  Date:    03/06/2022  Time:    09:55            1. Acute pain of right knee    2. History of arthroplasty of right knee    3. 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   I can't explain the negative alpha defensin but positive staph panel and negative cultures.  It could be residual / false positive from her prior infection 12 years ago.  She would like to avoid a surgery if possible.  Imaging shows no evidence of loosening.  Her physical exam is stable      Plan:       Orders Placed This Encounter    Sedimentation rate    C-REACTIVE PROTEIN     No follow-ups on file.  Repeat ESR/CRP today are back to within normal limits.  Clinically improved  Followup in 6 weeks with repeat labs, if persistent pain consider Co/Cr labs for potential bushing revision.  It has been 12 yrs after her previous revision.    I spent more than 30 minutes reviewing this patient's medical records, imaging studies, and taking a full history and physical, and discussing his treatment plan and expected prognosis.  More than 50% of this time was spent face to face with the patient.  20 minutes of face to face consultation and 10 minutes of chart review and coordination of care.

## 2022-04-07 LAB — FUNGUS SPEC CULT: NORMAL

## 2022-04-08 ENCOUNTER — IMMUNIZATION (OUTPATIENT)
Dept: PHARMACY | Facility: CLINIC | Age: 72
End: 2022-04-08
Payer: MEDICARE

## 2022-04-08 DIAGNOSIS — Z23 NEED FOR VACCINATION: Primary | ICD-10-CM

## 2022-04-18 ENCOUNTER — HOSPITAL ENCOUNTER (OUTPATIENT)
Dept: CARDIOLOGY | Facility: CLINIC | Age: 72
Discharge: HOME | End: 2022-04-18
Payer: MEDICARE

## 2022-04-18 DIAGNOSIS — I48.0 PAROXYSMAL ATRIAL FIBRILLATION: ICD-10-CM

## 2022-04-18 PROCEDURE — 93010 ELECTROCARDIOGRAM REPORT: CPT | Mod: S$GLB,,, | Performed by: INTERNAL MEDICINE

## 2022-04-18 PROCEDURE — 93005 ELECTROCARDIOGRAM TRACING: CPT | Mod: S$GLB,,, | Performed by: INTERNAL MEDICINE

## 2022-04-18 PROCEDURE — 93010 RHYTHM STRIP: ICD-10-PCS | Mod: S$GLB,,, | Performed by: INTERNAL MEDICINE

## 2022-04-18 PROCEDURE — 93005 RHYTHM STRIP: ICD-10-PCS | Mod: S$GLB,,, | Performed by: INTERNAL MEDICINE

## 2022-04-20 ENCOUNTER — OFFICE VISIT (OUTPATIENT)
Dept: ELECTROPHYSIOLOGY | Facility: CLINIC | Age: 72
End: 2022-04-20
Payer: MEDICARE

## 2022-04-20 VITALS
SYSTOLIC BLOOD PRESSURE: 126 MMHG | WEIGHT: 158.75 LBS | HEIGHT: 68 IN | HEART RATE: 70 BPM | DIASTOLIC BLOOD PRESSURE: 84 MMHG | BODY MASS INDEX: 24.06 KG/M2

## 2022-04-20 DIAGNOSIS — I48.0 PAROXYSMAL ATRIAL FIBRILLATION: ICD-10-CM

## 2022-04-20 DIAGNOSIS — I47.10 PSVT (PAROXYSMAL SUPRAVENTRICULAR TACHYCARDIA): Primary | ICD-10-CM

## 2022-04-20 PROCEDURE — 3008F PR BODY MASS INDEX (BMI) DOCUMENTED: ICD-10-PCS | Mod: CPTII,95,, | Performed by: INTERNAL MEDICINE

## 2022-04-20 PROCEDURE — 99499 UNLISTED E&M SERVICE: CPT | Mod: 95,,, | Performed by: INTERNAL MEDICINE

## 2022-04-20 PROCEDURE — 3288F FALL RISK ASSESSMENT DOCD: CPT | Mod: CPTII,95,, | Performed by: INTERNAL MEDICINE

## 2022-04-20 PROCEDURE — 99499 RISK ADDL DX/OHS AUDIT: ICD-10-PCS | Mod: 95,,, | Performed by: INTERNAL MEDICINE

## 2022-04-20 PROCEDURE — 1126F AMNT PAIN NOTED NONE PRSNT: CPT | Mod: CPTII,95,, | Performed by: INTERNAL MEDICINE

## 2022-04-20 PROCEDURE — 3288F PR FALLS RISK ASSESSMENT DOCUMENTED: ICD-10-PCS | Mod: CPTII,95,, | Performed by: INTERNAL MEDICINE

## 2022-04-20 PROCEDURE — 1126F PR PAIN SEVERITY QUANTIFIED, NO PAIN PRESENT: ICD-10-PCS | Mod: CPTII,95,, | Performed by: INTERNAL MEDICINE

## 2022-04-20 PROCEDURE — 3008F BODY MASS INDEX DOCD: CPT | Mod: CPTII,95,, | Performed by: INTERNAL MEDICINE

## 2022-04-20 PROCEDURE — 3074F PR MOST RECENT SYSTOLIC BLOOD PRESSURE < 130 MM HG: ICD-10-PCS | Mod: CPTII,95,, | Performed by: INTERNAL MEDICINE

## 2022-04-20 PROCEDURE — 3079F DIAST BP 80-89 MM HG: CPT | Mod: CPTII,95,, | Performed by: INTERNAL MEDICINE

## 2022-04-20 PROCEDURE — 1160F PR REVIEW ALL MEDS BY PRESCRIBER/CLIN PHARMACIST DOCUMENTED: ICD-10-PCS | Mod: CPTII,95,, | Performed by: INTERNAL MEDICINE

## 2022-04-20 PROCEDURE — 3079F PR MOST RECENT DIASTOLIC BLOOD PRESSURE 80-89 MM HG: ICD-10-PCS | Mod: CPTII,95,, | Performed by: INTERNAL MEDICINE

## 2022-04-20 PROCEDURE — 99214 PR OFFICE/OUTPT VISIT, EST, LEVL IV, 30-39 MIN: ICD-10-PCS | Mod: 95,,, | Performed by: INTERNAL MEDICINE

## 2022-04-20 PROCEDURE — 1101F PT FALLS ASSESS-DOCD LE1/YR: CPT | Mod: CPTII,95,, | Performed by: INTERNAL MEDICINE

## 2022-04-20 PROCEDURE — 1160F RVW MEDS BY RX/DR IN RCRD: CPT | Mod: CPTII,95,, | Performed by: INTERNAL MEDICINE

## 2022-04-20 PROCEDURE — 3074F SYST BP LT 130 MM HG: CPT | Mod: CPTII,95,, | Performed by: INTERNAL MEDICINE

## 2022-04-20 PROCEDURE — 1101F PR PT FALLS ASSESS DOC 0-1 FALLS W/OUT INJ PAST YR: ICD-10-PCS | Mod: CPTII,95,, | Performed by: INTERNAL MEDICINE

## 2022-04-20 PROCEDURE — 1159F MED LIST DOCD IN RCRD: CPT | Mod: CPTII,95,, | Performed by: INTERNAL MEDICINE

## 2022-04-20 PROCEDURE — 1159F PR MEDICATION LIST DOCUMENTED IN MEDICAL RECORD: ICD-10-PCS | Mod: CPTII,95,, | Performed by: INTERNAL MEDICINE

## 2022-04-20 PROCEDURE — 99214 OFFICE O/P EST MOD 30 MIN: CPT | Mod: 95,,, | Performed by: INTERNAL MEDICINE
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
## 2022-04-21 ENCOUNTER — PATIENT MESSAGE (OUTPATIENT)
Dept: RHEUMATOLOGY | Facility: CLINIC | Age: 72
End: 2022-04-21
Payer: MEDICARE

## 2022-04-24 LAB
ACID FAST MOD KINY STN SPEC: NORMAL
MYCOBACTERIUM SPEC QL CULT: NORMAL

## 2022-05-12 DIAGNOSIS — Z87.39 HISTORY OF INFECTION OF TOTAL JOINT PROSTHESIS OF KNEE: Primary | ICD-10-CM

## 2022-05-13 ENCOUNTER — OFFICE VISIT (OUTPATIENT)
Dept: ORTHOPEDICS | Facility: CLINIC | Age: 72
End: 2022-05-13
Payer: MEDICARE

## 2022-05-13 ENCOUNTER — LAB VISIT (OUTPATIENT)
Dept: LAB | Facility: HOSPITAL | Age: 72
End: 2022-05-13
Attending: ORTHOPAEDIC SURGERY
Payer: MEDICARE

## 2022-05-13 VITALS — BODY MASS INDEX: 24.26 KG/M2 | WEIGHT: 160.06 LBS | HEIGHT: 68 IN

## 2022-05-13 DIAGNOSIS — Z96.651 S/P TOTAL KNEE ARTHROPLASTY, RIGHT: Primary | ICD-10-CM

## 2022-05-13 DIAGNOSIS — Z87.39 HISTORY OF INFECTION OF TOTAL JOINT PROSTHESIS OF KNEE: ICD-10-CM

## 2022-05-13 LAB — CRP SERPL-MCNC: 3.1 MG/L (ref 0–8.2)

## 2022-05-13 PROCEDURE — 99214 OFFICE O/P EST MOD 30 MIN: CPT | Mod: S$GLB,,, | Performed by: ORTHOPAEDIC SURGERY

## 2022-05-13 PROCEDURE — 1126F PR PAIN SEVERITY QUANTIFIED, NO PAIN PRESENT: ICD-10-PCS | Mod: CPTII,S$GLB,, | Performed by: ORTHOPAEDIC SURGERY

## 2022-05-13 PROCEDURE — 99999 PR PBB SHADOW E&M-EST. PATIENT-LVL II: CPT | Mod: PBBFAC,,, | Performed by: ORTHOPAEDIC SURGERY

## 2022-05-13 PROCEDURE — 36415 COLL VENOUS BLD VENIPUNCTURE: CPT | Performed by: ORTHOPAEDIC SURGERY

## 2022-05-13 PROCEDURE — 86140 C-REACTIVE PROTEIN: CPT | Performed by: ORTHOPAEDIC SURGERY

## 2022-05-13 PROCEDURE — 3008F BODY MASS INDEX DOCD: CPT | Mod: CPTII,S$GLB,, | Performed by: ORTHOPAEDIC SURGERY

## 2022-05-13 PROCEDURE — 99999 PR PBB SHADOW E&M-EST. PATIENT-LVL II: ICD-10-PCS | Mod: PBBFAC,,, | Performed by: ORTHOPAEDIC SURGERY

## 2022-05-13 PROCEDURE — 3008F PR BODY MASS INDEX (BMI) DOCUMENTED: ICD-10-PCS | Mod: CPTII,S$GLB,, | Performed by: ORTHOPAEDIC SURGERY

## 2022-05-13 PROCEDURE — 1126F AMNT PAIN NOTED NONE PRSNT: CPT | Mod: CPTII,S$GLB,, | Performed by: ORTHOPAEDIC SURGERY

## 2022-05-13 PROCEDURE — 99214 PR OFFICE/OUTPT VISIT, EST, LEVL IV, 30-39 MIN: ICD-10-PCS | Mod: S$GLB,,, | Performed by: ORTHOPAEDIC SURGERY

## 2022-05-13 RX ORDER — ONDANSETRON 2 MG/ML
INJECTION INTRAMUSCULAR; INTRAVENOUS
COMMUNITY
Start: 2022-03-06 | End: 2022-06-16

## 2022-05-13 RX ORDER — KETOROLAC TROMETHAMINE 30 MG/ML
INJECTION, SOLUTION INTRAMUSCULAR; INTRAVENOUS
COMMUNITY
Start: 2022-03-06 | End: 2022-06-16

## 2022-05-13 NOTE — PROGRESS NOTES
Subjective:      Patient ID: Zoë Enrique is a 72 y.o. female.    Chief Complaint: Post-op Evaluation of the Right Knee    Interval History 5/13/22: Returns to clinic for 6 wk follow up. Pain and swelling has resolved. No fevers/chills. No issues. Did not have labs drawn prior to visit today.    HPI: Zoë Enrique is a 72 y.o. female presents for f/u of her right knee. She presented to [place] at [facility] [place] 3/6/22 for pain in her right knee.     She has a history of right femur parosteal osteosarcoma in 1989, resected with massive allograft over an intramedullary arnaldo. No adjuvant therapies, wide resection. She had a right knee arthroplasty in February 2007, this collapsed and was revised to a distal femur replacement in 11/2007 at the [facility] by Dr. Vides. She had an acute prosthetic joint infection 2 years later that was managed with DAIR 2009, 8 wks IV antibiotics. Pt reports cultures were negative from aspiration at time of PJI and she was told this was because she had already been on abx. She is unsure what IV abx she was treated with. She reports no issues with her right knee since that time.     Since her ER visit, pain and swelling have improved, but still mild chronic pain of the knee.  Denies bucking or giving way.    Synoviosure sent was negative for alpha defensin, positive for staph panel, but negative on culture after 7 days.  Neutrophil elastase positive.    Lab Results   Component Value Date    SEDRATE 21 04/01/2022     Lab Results   Component Value Date    CRP 3.1 05/13/2022     On 3/6/22 CRP was 9.2, and ESR was 7.    No constitutional symptoms, no F/C/NS.    Past Medical History:   Diagnosis Date    Abnormal Pap smear of cervix     Atrial fibrillation 09/2017    Atrial flutter     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    Diverticulosis 1998    Hx of atrial flutter     Hx of mitral valve prolapse 1990    Hypothyroidism     Migraines     Supraventricular tachycardia      Past Surgical  History:   Procedure Laterality Date    ABLATION OF ARRHYTHMOGENIC FOCUS FOR ATRIAL FIBRILLATION N/A 2020    Procedure: ABLATION, ARRHYTHMOGENIC FOCUS, FOR ATRIAL FIBRILLATION;  Surgeon: Moe Seymour MD;  Location: [facility];  Service: Cardiology;  Laterality: N/A;  AFL/AF, KHANH, CTI, PVI, RFA, CARTO, GEN, DM, 3 PREP    AUGMENTATION OF BREAST Bilateral     35 yrs     BREAST SURGERY      augmentation    CARDIOVERSION  2020    Procedure: Cardioversion;  Surgeon: Moe Seymour MD;  Location: [facility];  Service: Cardiology;;     SECTION      COLON SURGERY      partial colon resection    COLONOSCOPY N/A 3/19/2021    Procedure: COLONOSCOPY;  Surgeon: Sahara Salgado MD;  Location: [facility] (4TH FLR);  Service: Endoscopy;  Laterality: N/A;  constipation prep- 7 days of miralax, 2 fulls of fulls, then day bf clears and split prep   covid test 3/5 pcw, prep instr emailed, antibiotics prophylactically, Xarelto hold x 2 days per Dr. Seymour-see telephone encounter 2/10 -ml    COLPOSCOPY      COSMETIC SURGERY      ESOPHAGOGASTRODUODENOSCOPY N/A 3/19/2021    Procedure: EGD (ESOPHAGOGASTRODUODENOSCOPY);  Surgeon: Sahara Salgado MD;  [facility]  Service: Endoscopy;  Laterality: N/A;    EYE SURGERY Bilateral 2016    Lasik, then cataracts extraction    JOINT REPLACEMENT Right , ,     KNEE ARTHROPLASTY Right     revison in     KNEE SURGERY Right     distal femoral replacing TK    OOPHORECTOMY      TOTAL ABDOMINAL HYSTERECTOMY      TAHBSO    TRANSESOPHAGEAL ECHOCARDIOGRAPHY N/A 2020    Procedure: ECHOCARDIOGRAM, TRANSESOPHAGEAL;  Surgeon: Ramírez Ortez III, MD;  Location: [facility];  Service: Cardiology;  Laterality: N/A;    TREATMENT OF CARDIAC ARRHYTHMIA N/A 2019    Procedure: CARDIOVERSION;  Surgeon: Moe Seymour MD;  Location: [facility];  Service: Cardiology;  Laterality: N/A;   KHANH/DCCV/MAC/DM/3PREP/*ADMIT FOR TIKOSYN*     Social History     Socioeconomic History    Marital status:    Tobacco Use    Smoking status: Never Smoker    Smokeless tobacco: Never Used   Substance and Sexual Activity    Alcohol use: Yes     Alcohol/week: 1.0 standard drink     Types: 1 Standard drinks or equivalent per week     Comment: 1-2 per month    Drug use: No     Comment: CBD oils for headache    Sexual activity: Not Currently     Partners: Male     Birth control/protection: Post-menopausal, See Surgical Hx, None     Comment: , together x 34 years     Social Determinants of Health     Financial Resource Strain: Low Risk     Difficulty of Paying Living Expenses: Not very hard   Food Insecurity: No Food Insecurity    Worried About Running Out of Food in the Last Year: Never true    Ran Out of Food in the Last Year: Never true   Transportation Needs: No Transportation Needs    Lack of Transportation (Medical): No    Lack of Transportation (Non-Medical): No   Physical Activity: Insufficiently Active    Days of Exercise per Week: 1 day    Minutes of Exercise per Session: 20 min   Stress: No Stress Concern Present    Feeling of Stress : Only a little   Social Connections: Unknown    Frequency of Communication with Friends and Family: More than three times a week    Frequency of Social Gatherings with Friends and Family: More than three times a week    Active Member of Clubs or Organizations: No    Attends Club or Organization Meetings: Never    Marital Status:    Housing Stability: Low Risk     Unable to Pay for Housing in the Last Year: No    Number of Places Lived in the Last Year: 1    Unstable Housing in the Last Year: No         Current Outpatient Medications:     acetaminophen (TYLENOL) 650 MG TbSR, Take 650 mg by mouth every 8 (eight) hours., Disp: , Rfl:     CALCIUM CARB/VIT D3/MINERALS (CALTRATE PLUS ORAL), Take by mouth. Takes two chews daily, Disp: , Rfl:     " docusate sodium (COLACE) 250 MG capsule, Take 250 mg by mouth 2 (two) times daily. , Disp: , Rfl:     estradioL (ESTRACE) 0.01 % (0.1 mg/gram) vaginal cream, Place 1 g vaginally every other day., Disp: 42 g, Rfl: 3    HYDROcodone-acetaminophen (NORCO) 5-325 mg per tablet, Take 1 tablet by mouth every 6 (six) hours as needed for Pain. Pt states takes PRN for headaches, Disp: 28 tablet, Rfl: 0    levothyroxine (SYNTHROID) 25 MCG tablet, Take 1 tablet (25 mcg total) by mouth once daily., Disp: 90 tablet, Rfl: 3    olopatadine (PATANOL) 0.1 % ophthalmic solution, 1 drop 2 (two) times daily., Disp: , Rfl:     rivaroxaban (XARELTO) 20 mg Tab, Take 1 tablet (20 mg total) by mouth once daily., Disp: 90 tablet, Rfl: 3    vit C/E/Zn/coppr/lutein/zeaxan (PRESERVISION AREDS-2 ORAL), , Disp: , Rfl:     acyclovir (ZOVIRAX) 400 MG tablet, Take 1 tablet (400 mg total) by mouth 3 (three) times daily as needed., Disp: 90 tablet, Rfl: 3    ketorolac (TORADOL) 30 mg/mL (1 mL) injection, , Disp: , Rfl:     ondansetron 4 mg/2 mL Soln, , Disp: , Rfl:   No current facility-administered medications for this visit.    Facility-Administered Medications Ordered in Other Visits:     sodium chloride 0.9% flush 5 mL, 5 mL, Intravenous, PRN, Aleksandra Taylor NP  Review of patient's allergies indicates:   Allergen Reactions    Morphine Nausea And Vomiting    Succinylcholine      Other reaction(s): v tach       Ht 5' 8" (1.727 m)   Wt 72.6 kg (160 lb 0.9 oz)   BMI 24.34 kg/m²     ROS      Objective:    Right Knee Exam     Tenderness   Right knee tenderness location: none.    Range of Motion   Extension: normal   Flexion: 120     Other   Erythema: absent  Scars: present  Sensation: normal  Pulse: present  Swelling: none  Effusion: no effusion present    Comments:  No suprapatellar fluid on exam.      Left Knee Exam   Left knee exam is normal.               Assessment:     Imaging:X-Ray Knee 1 or 2 View Right  Narrative: " EXAMINATION:  XR KNEE 1 OR 2 VIEW RIGHT    CLINICAL HISTORY:  Pain in unspecified knee    TECHNIQUE:  AP and lateral views of the right knee were performed.    COMPARISON:  02/19/2020    FINDINGS:  distal femur and knee prosthesis in good alignment without evidence of complication. No fracture.  No joint effusion. Surgical clips are again seen in the posterior knee.  Impression: Postsurgical changes as above without evidence of complication.    Electronically signed by: Priyanka Leo  Date:    03/06/2022  Time:    09:55            1. S/P total knee arthroplasty, right      Pain resolved. Clinically back to baseline.  Prior history of infection managed with DAIR of distal femur replacement.        Plan:          No follow-ups on file.  Recheck CRP/ESR today. (only enough blood drawn for CRP, so ESR unable to be done).    If pain or mechanical symptoms return, consider repeat aspiration and potential surgery for bushing revision.  It has been 12 yrs after her previous revision/poly exchange.    Counseled to follow up if has another flare would re-aspirate and consider long term suppressive abx.    I spent more than 30 minutes reviewing this patient's medical records, imaging studies, and taking a full history and physical, and discussing his treatment plan and expected prognosis.  More than 50% of this time was spent face to face with the patient.  20 minutes of face to face consultation and 10 minutes of chart review and coordination of care.

## 2022-05-27 ENCOUNTER — PATIENT MESSAGE (OUTPATIENT)
Dept: INTERNAL MEDICINE | Facility: CLINIC | Age: 72
End: 2022-05-27
Payer: MEDICARE

## 2022-05-27 DIAGNOSIS — G44.229 CHRONIC TENSION-TYPE HEADACHE, NOT INTRACTABLE: ICD-10-CM

## 2022-05-27 RX ORDER — HYDROCODONE BITARTRATE AND ACETAMINOPHEN 5; 325 MG/1; MG/1
1 TABLET ORAL EVERY 6 HOURS PRN
Qty: 28 TABLET | Refills: 0 | Status: SHIPPED | OUTPATIENT
Start: 2022-05-27 | End: 2023-01-05 | Stop reason: SDUPTHER

## 2022-05-27 NOTE — TELEPHONE ENCOUNTER
No new care gaps identified.  Health [facility] Embedded Care Gaps. Reference number: [id number]. 5/27/2022   7:45:41 AM KEILYT

## 2022-06-03 ENCOUNTER — LAB VISIT (OUTPATIENT)
Dept: LAB | Facility: HOSPITAL | Age: 72
End: 2022-06-03
Attending: INTERNAL MEDICINE
Payer: MEDICARE

## 2022-06-03 DIAGNOSIS — J47.9 BRONCHIECTASIS WITHOUT COMPLICATION: ICD-10-CM

## 2022-06-03 PROCEDURE — 87015 SPECIMEN INFECT AGNT CONCNTJ: CPT | Performed by: INTERNAL MEDICINE

## 2022-06-03 PROCEDURE — 87206 SMEAR FLUORESCENT/ACID STAI: CPT | Performed by: INTERNAL MEDICINE

## 2022-06-03 PROCEDURE — 87116 MYCOBACTERIA CULTURE: CPT | Performed by: INTERNAL MEDICINE

## 2022-06-14 ENCOUNTER — OFFICE VISIT (OUTPATIENT)
Dept: INTERNAL MEDICINE | Facility: CLINIC | Age: 72
End: 2022-06-14
Payer: MEDICARE

## 2022-06-14 VITALS
HEART RATE: 78 BPM | BODY MASS INDEX: 23.64 KG/M2 | DIASTOLIC BLOOD PRESSURE: 72 MMHG | SYSTOLIC BLOOD PRESSURE: 126 MMHG | HEIGHT: 68 IN | WEIGHT: 156 LBS | OXYGEN SATURATION: 98 %

## 2022-06-14 DIAGNOSIS — J32.9 SINUSITIS, UNSPECIFIED CHRONICITY, UNSPECIFIED LOCATION: Primary | ICD-10-CM

## 2022-06-14 DIAGNOSIS — J31.0 CHRONIC RHINITIS: ICD-10-CM

## 2022-06-14 PROCEDURE — 99999 PR PBB SHADOW E&M-EST. PATIENT-LVL III: ICD-10-PCS | Mod: PBBFAC,,, | Performed by: INTERNAL MEDICINE

## 2022-06-14 PROCEDURE — 1160F PR REVIEW ALL MEDS BY PRESCRIBER/CLIN PHARMACIST DOCUMENTED: ICD-10-PCS | Mod: CPTII,S$GLB,, | Performed by: INTERNAL MEDICINE

## 2022-06-14 PROCEDURE — 3008F PR BODY MASS INDEX (BMI) DOCUMENTED: ICD-10-PCS | Mod: CPTII,S$GLB,, | Performed by: INTERNAL MEDICINE

## 2022-06-14 PROCEDURE — 3008F BODY MASS INDEX DOCD: CPT | Mod: CPTII,S$GLB,, | Performed by: INTERNAL MEDICINE

## 2022-06-14 PROCEDURE — 1126F AMNT PAIN NOTED NONE PRSNT: CPT | Mod: CPTII,S$GLB,, | Performed by: INTERNAL MEDICINE

## 2022-06-14 PROCEDURE — 1159F MED LIST DOCD IN RCRD: CPT | Mod: CPTII,S$GLB,, | Performed by: INTERNAL MEDICINE

## 2022-06-14 PROCEDURE — 99999 PR PBB SHADOW E&M-EST. PATIENT-LVL III: CPT | Mod: PBBFAC,,, | Performed by: INTERNAL MEDICINE

## 2022-06-14 PROCEDURE — 3288F FALL RISK ASSESSMENT DOCD: CPT | Mod: CPTII,S$GLB,, | Performed by: INTERNAL MEDICINE

## 2022-06-14 PROCEDURE — 3074F SYST BP LT 130 MM HG: CPT | Mod: CPTII,S$GLB,, | Performed by: INTERNAL MEDICINE

## 2022-06-14 PROCEDURE — 1101F PT FALLS ASSESS-DOCD LE1/YR: CPT | Mod: CPTII,S$GLB,, | Performed by: INTERNAL MEDICINE

## 2022-06-14 PROCEDURE — 99213 PR OFFICE/OUTPT VISIT, EST, LEVL III, 20-29 MIN: ICD-10-PCS | Mod: S$GLB,,, | Performed by: INTERNAL MEDICINE

## 2022-06-14 PROCEDURE — 99213 OFFICE O/P EST LOW 20 MIN: CPT | Mod: S$GLB,,, | Performed by: INTERNAL MEDICINE

## 2022-06-14 PROCEDURE — 3074F PR MOST RECENT SYSTOLIC BLOOD PRESSURE < 130 MM HG: ICD-10-PCS | Mod: CPTII,S$GLB,, | Performed by: INTERNAL MEDICINE

## 2022-06-14 PROCEDURE — 3078F DIAST BP <80 MM HG: CPT | Mod: CPTII,S$GLB,, | Performed by: INTERNAL MEDICINE

## 2022-06-14 PROCEDURE — 3288F PR FALLS RISK ASSESSMENT DOCUMENTED: ICD-10-PCS | Mod: CPTII,S$GLB,, | Performed by: INTERNAL MEDICINE

## 2022-06-14 PROCEDURE — 3078F PR MOST RECENT DIASTOLIC BLOOD PRESSURE < 80 MM HG: ICD-10-PCS | Mod: CPTII,S$GLB,, | Performed by: INTERNAL MEDICINE

## 2022-06-14 PROCEDURE — 1159F PR MEDICATION LIST DOCUMENTED IN MEDICAL RECORD: ICD-10-PCS | Mod: CPTII,S$GLB,, | Performed by: INTERNAL MEDICINE

## 2022-06-14 PROCEDURE — 1101F PR PT FALLS ASSESS DOC 0-1 FALLS W/OUT INJ PAST YR: ICD-10-PCS | Mod: CPTII,S$GLB,, | Performed by: INTERNAL MEDICINE

## 2022-06-14 PROCEDURE — 1160F RVW MEDS BY RX/DR IN RCRD: CPT | Mod: CPTII,S$GLB,, | Performed by: INTERNAL MEDICINE

## 2022-06-14 PROCEDURE — 1126F PR PAIN SEVERITY QUANTIFIED, NO PAIN PRESENT: ICD-10-PCS | Mod: CPTII,S$GLB,, | Performed by: INTERNAL MEDICINE

## 2022-06-14 RX ORDER — AMOXICILLIN AND CLAVULANATE POTASSIUM 875; 125 MG/1; MG/1
1 TABLET, FILM COATED ORAL 2 TIMES DAILY
Qty: 20 TABLET | Refills: 0 | Status: SHIPPED | OUTPATIENT
Start: 2022-06-14 | End: 2022-06-24 | Stop reason: ALTCHOICE

## 2022-06-14 RX ORDER — FLUTICASONE PROPIONATE 50 MCG
2 SPRAY, SUSPENSION (ML) NASAL DAILY
Qty: 16 G | Refills: 1 | Status: SHIPPED | OUTPATIENT
Start: 2022-06-14 | End: 2022-09-19 | Stop reason: SDUPTHER

## 2022-06-14 NOTE — PROGRESS NOTES
72-year-old female  Since the beginning of May or for about 5-6 weeks she has been having persistent sinus head congestion, blowing out yellow green mucus, throat is irritated, feel short of breath but does not feel tighter congested in the chest on the wheezing.  During this time has been no fever.  She was treated for respiratory infection December January.  But once a month she gets a head cold.    Medical history includes bronchiectasis and possible MAC, right middle lobe, being followed by pulmonologist.    Persistent atrial fibrillation  Hypothyroid    Medication list per med card    Examination  Weight 156 lb  Pulse 76  Blood pressure 110/72  Tympanic membranes normal  Nasal [place] is congested with cloudy mucus  Oropharynx hyperemic  Neck no adenopathy  Tender to palpation over the frontal maxillary sinuses  Chest clear breath sounds no wheezes or rales  Heart irregular regular      Impression  Persistent sinusitis    Plan  Augmentin 875 mg b.i.d. 10 days  Use of Flonase 2 puffs each nostril once a day  Use of Mucinex or Mucinex DM

## 2022-06-16 ENCOUNTER — OFFICE VISIT (OUTPATIENT)
Dept: ALLERGY | Facility: CLINIC | Age: 72
End: 2022-06-16
Payer: MEDICARE

## 2022-06-16 ENCOUNTER — LAB VISIT (OUTPATIENT)
Dept: LAB | Facility: HOSPITAL | Age: 72
End: 2022-06-16
Payer: MEDICARE

## 2022-06-16 VITALS
BODY MASS INDEX: 23.59 KG/M2 | DIASTOLIC BLOOD PRESSURE: 63 MMHG | HEIGHT: 68 IN | SYSTOLIC BLOOD PRESSURE: 109 MMHG | HEART RATE: 86 BPM | WEIGHT: 155.63 LBS

## 2022-06-16 DIAGNOSIS — J31.0 CHRONIC RHINITIS: ICD-10-CM

## 2022-06-16 DIAGNOSIS — J47.9 BRONCHIECTASIS WITHOUT COMPLICATION: ICD-10-CM

## 2022-06-16 DIAGNOSIS — G44.229 CHRONIC TENSION-TYPE HEADACHE, NOT INTRACTABLE: ICD-10-CM

## 2022-06-16 DIAGNOSIS — E03.9 HYPOTHYROIDISM (ACQUIRED): ICD-10-CM

## 2022-06-16 DIAGNOSIS — R09.89 THROAT CLEARING: Primary | ICD-10-CM

## 2022-06-16 DIAGNOSIS — Z87.39 HISTORY OF INFECTION OF TOTAL JOINT PROSTHESIS OF KNEE: ICD-10-CM

## 2022-06-16 DIAGNOSIS — C40.21 OSTEOSARCOMA OF RIGHT FEMUR: ICD-10-CM

## 2022-06-16 DIAGNOSIS — R05.9 COUGH: ICD-10-CM

## 2022-06-16 DIAGNOSIS — I48.0 PAROXYSMAL ATRIAL FIBRILLATION: ICD-10-CM

## 2022-06-16 DIAGNOSIS — J32.9 CHRONIC SINUSITIS, UNSPECIFIED LOCATION: ICD-10-CM

## 2022-06-16 LAB
IGA SERPL-MCNC: 114 MG/DL (ref 40–350)
IGE SERPL-ACNC: <35 IU/ML (ref 0–100)
IGG SERPL-MCNC: 721 MG/DL (ref 650–1600)
IGM SERPL-MCNC: 66 MG/DL (ref 50–300)

## 2022-06-16 PROCEDURE — 86003 ALLG SPEC IGE CRUDE XTRC EA: CPT | Mod: 59 | Performed by: ALLERGY & IMMUNOLOGY

## 2022-06-16 PROCEDURE — 1126F PR PAIN SEVERITY QUANTIFIED, NO PAIN PRESENT: ICD-10-PCS | Mod: CPTII,S$GLB,, | Performed by: ALLERGY & IMMUNOLOGY

## 2022-06-16 PROCEDURE — 86317 IMMUNOASSAY INFECTIOUS AGENT: CPT | Mod: 59 | Performed by: ALLERGY & IMMUNOLOGY

## 2022-06-16 PROCEDURE — 3074F SYST BP LT 130 MM HG: CPT | Mod: CPTII,S$GLB,, | Performed by: ALLERGY & IMMUNOLOGY

## 2022-06-16 PROCEDURE — 86003 ALLG SPEC IGE CRUDE XTRC EA: CPT | Performed by: ALLERGY & IMMUNOLOGY

## 2022-06-16 PROCEDURE — 3078F DIAST BP <80 MM HG: CPT | Mod: CPTII,S$GLB,, | Performed by: ALLERGY & IMMUNOLOGY

## 2022-06-16 PROCEDURE — 1126F AMNT PAIN NOTED NONE PRSNT: CPT | Mod: CPTII,S$GLB,, | Performed by: ALLERGY & IMMUNOLOGY

## 2022-06-16 PROCEDURE — 3078F PR MOST RECENT DIASTOLIC BLOOD PRESSURE < 80 MM HG: ICD-10-PCS | Mod: CPTII,S$GLB,, | Performed by: ALLERGY & IMMUNOLOGY

## 2022-06-16 PROCEDURE — 82784 ASSAY IGA/IGD/IGG/IGM EACH: CPT | Mod: 59 | Performed by: ALLERGY & IMMUNOLOGY

## 2022-06-16 PROCEDURE — 82785 ASSAY OF IGE: CPT | Performed by: ALLERGY & IMMUNOLOGY

## 2022-06-16 PROCEDURE — 1159F MED LIST DOCD IN RCRD: CPT | Mod: CPTII,S$GLB,, | Performed by: ALLERGY & IMMUNOLOGY

## 2022-06-16 PROCEDURE — 3008F PR BODY MASS INDEX (BMI) DOCUMENTED: ICD-10-PCS | Mod: CPTII,S$GLB,, | Performed by: ALLERGY & IMMUNOLOGY

## 2022-06-16 PROCEDURE — 3008F BODY MASS INDEX DOCD: CPT | Mod: CPTII,S$GLB,, | Performed by: ALLERGY & IMMUNOLOGY

## 2022-06-16 PROCEDURE — 99999 PR PBB SHADOW E&M-EST. PATIENT-LVL IV: CPT | Mod: PBBFAC,,, | Performed by: ALLERGY & IMMUNOLOGY

## 2022-06-16 PROCEDURE — 99204 PR OFFICE/OUTPT VISIT, NEW, LEVL IV, 45-59 MIN: ICD-10-PCS | Mod: S$GLB,,, | Performed by: ALLERGY & IMMUNOLOGY

## 2022-06-16 PROCEDURE — 1160F PR REVIEW ALL MEDS BY PRESCRIBER/CLIN PHARMACIST DOCUMENTED: ICD-10-PCS | Mod: CPTII,S$GLB,, | Performed by: ALLERGY & IMMUNOLOGY

## 2022-06-16 PROCEDURE — 3074F PR MOST RECENT SYSTOLIC BLOOD PRESSURE < 130 MM HG: ICD-10-PCS | Mod: CPTII,S$GLB,, | Performed by: ALLERGY & IMMUNOLOGY

## 2022-06-16 PROCEDURE — 1159F PR MEDICATION LIST DOCUMENTED IN MEDICAL RECORD: ICD-10-PCS | Mod: CPTII,S$GLB,, | Performed by: ALLERGY & IMMUNOLOGY

## 2022-06-16 PROCEDURE — 1160F RVW MEDS BY RX/DR IN RCRD: CPT | Mod: CPTII,S$GLB,, | Performed by: ALLERGY & IMMUNOLOGY

## 2022-06-16 PROCEDURE — 36415 COLL VENOUS BLD VENIPUNCTURE: CPT | Performed by: ALLERGY & IMMUNOLOGY

## 2022-06-16 PROCEDURE — 99204 OFFICE O/P NEW MOD 45 MIN: CPT | Mod: S$GLB,,, | Performed by: ALLERGY & IMMUNOLOGY

## 2022-06-16 PROCEDURE — 82784 ASSAY IGA/IGD/IGG/IGM EACH: CPT | Performed by: ALLERGY & IMMUNOLOGY

## 2022-06-16 PROCEDURE — 99999 PR PBB SHADOW E&M-EST. PATIENT-LVL IV: ICD-10-PCS | Mod: PBBFAC,,, | Performed by: ALLERGY & IMMUNOLOGY

## 2022-06-16 NOTE — PROGRESS NOTES
Zoë Enrique is referred by Dr. Ramírez Peñaloza for a consult regarding recurrent upper respiratory infections. She is here alone. 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    She grew up and lived in [place], [place] until moving to [place] in 2015. Her daughter lives here and they now have a 2-year-old grandson.    She did have rhinitis with clear rhinorrhea when she was living in [place]. She saw an allergist Dr. Hector Knight about 15 years ago who did skin tests that were negative.    She also has chronic headaches that are intermittent. They are usually over her maxillary and frontal areas and described as sinus. She has been evaluated in Neurology here and diagnosed with migraine tension headaches.    She has will see her ophthalmologist Dr. Harman on June 27, 2022. She is currently taking Pataday    She has had recurrent upper respiratory infections particularly in the last six months. She is currently taking Augmentin for another one.    She has sneezing, clear to yellow mucus, postnasal drip, sore throat, frequent throat clearing, a sensation that something is in the back of the throat, difficulty swallowing, and a cough that is usually nonproductive. Her cough is worse when she lies down at night.    She has been taking Allegra as needed and Flonase daily. Last night she took a Benadryl to sleep.    She denies any indigestion or heartburn.    She is being followed by Dr. Franklin Ortiz in Pulmonary Medicine for right middle lobe bronchiectasis. She had an abnormal chest CT.    She has recurrent atrial fibrillation and has had four ablations. Her last one was November 2020. She is currently on Xarelto.    She has hypothyroidism and takes levothyroxine.    She had an osteosarcoma in 1989 that was removed. She had a total right knee replacement.    She had a hysterectomy in 1994.    She had diverticulosis and a resection in 2011.    [facility] PEQ ALLERGY QUESTIONNAIRE LONG 6/15/2022   Head or  facial pain: Headaches   Please describe your head and/or facial pain.  Pressure in temples, face and teeth   Eyes: Itching, Tearing, Redness   Which kind of eye drops do you use, if applicable? Pataday   Ears: Difficulty hearing   Do you have ear infections? No   Do you have ear tubes? No   Did you have ear surgery? No   Nose: Post nasal drip, Runny nose, Snoring   Did you have a blocked nose? No   Did you have nasal surgery? No   Has your nose ever been broken? No   Throat: Sore throat, Frequent clearing   Sinuses: Sinus pressure or pain, Sinus infections   Have you had x-rays done for your sinuses? No   Have you had a CT scan done for your sinuses? No   Lungs: Cough, Bronchitis, Pneumonia   Is your cough productive of mucus and/or phlegm? Yes   When was your last chest x-ray, if known and applicable? Jan 2022   Was your last chest x-ray normal or abnormal, if applicable? Normal   Have you ever has a tuberculosis skin test?  Yes   When did you have a tuberculosis skin test? Unknown   Was your tuberculosis skin test positive or negative? Positive   Have you ever had a lung-function test? Yes   When was your lung-function test? Dec 2020   Have you had a flu shot this year? Yes   When was your flu shot? Fall 2021   Have you had the pneumonia vaccine?  Yes   When did you have the pneumonia vaccine? 2011, 2016   Do you have any known problems with your immune system? No   Do you suspect you may have problems with your immune system? Yes   Do you have frequent infections? Yes   What type of frequent infection(s) do you have? Colds, resp infections, sinus infections   Skin: Bruising   When did these symptoms first occur? Frequent resp/sinus infections since moving to [place] in 2015   Are they getting worse or better? Worse   How often do these symptoms occur? Weekly to monthly   When do these symptoms occur? Anytime   Do they occur year round? Yes   If there is any seasonal variation in your symptoms, when are they worse?  Havent noticed   Is there a particular time of the day or night when the symptoms are worse? No   Is there anything you have identified, which can cause symptoms or make them worse? (such as dust, grass, plant or animal products, mold, heat, cold, strong odors, exercise) No   Is there anything you have identified, which can make symptoms better?  Medication   What medications have you tried in the past to help control these symptoms?  Antihistamines, allergy meds, nose spray   Please list all the vitamins or herbal medications you are taking. Preservision Areds 2   Have you ever seen an allergist for these symptoms? Yes   When did you see the allergist? In [place] years ago   Have you ever had skin tests? Yes   When did you have skin tests? Years ago   Have you ever had any other type of allergy testing? No   Have you ever had allergy shots? No   Do you have food allergies? No   Do you have insect allergies? No   Do you have latex allergies? No   Constitution: Activity change, Fatigue   Cardiovascular: No symptoms   Gastrointestinal: No symptoms   Genital/ urinary: Urination at night more than twice   Musculoskeletal: Joint pain   Endocrine: Headaches   Hematologic: Bruises/ bleeds easily   Please note which family members have allergies or asthma and specify which they have. Brother has asthma   How long have you lived at your current address? 3 years   Has your residence ever had water or flood damage? No   Is there any evidence of mold in the house? No   Does your house have: Central air conditioning, Gas heat   Does your bedroom have: Carpeting, Ceiling fan, Venetian blinds, Stuffed animals, Potted plants   What type of pillow do you have, for example feather, foam and fiberfill?  Foam   Do you have pets? Yes   Please list the type of pet(s), how many, how long you have had the pet(s), whether or not the pet(s) are living inside or outside, and whether the pet(s) aggravate your symptoms.  Daughter has 2 dogs  that we frquently keep at our home.   Does anyone in the house smoke? 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  2 yr old grandson attends  and has frequent runny nose and cough., , I have had a persistent runny nose for years. Clear, thin drainage.   Did you find this questionnaire helpful in addressing your symptoms?  Yes     Physical Examination:  General: Well-developed, well-nourished, no acute distress. Coughing and clearing throat throughout interview.  Head: No sinus tenderness.  Eyes: Conjunctivae:  No bulbar or palpebral conjunctival injection.  Ears: EAC's clear.  TM's clear.  No pre-auricular nodes.  Nose: Nasal Mucosa:  Pink.  Septum: No apparent deviation.  Turbinates:  No significant edema.  Polyps/Mass:  None visible.  Teeth/Gums:  No bleeding noted.  Oropharynx: No exudates.  Neck: Supple without thyromegaly. No cervical lymphadenopathy.    Respiratory/Chest: Effort: Good.  Auscultation:  Clear bilaterally.  Cardiovascular:  No murmur, rubs, or gallop heard.   GI:  Non-tender.  No masses.  No organomegaly.  Extremities:  No cyanosis, clubbing, or edema.  Skin: Good turgor.  No urticaria or angioedema.  Neuro/Psych: Oriented x 3.    Spirometry 12/16/2020:  Spirometry is normal. Lung volume determination is normal. DLCO is normal.    Chest CT 01/14/2022:  1.  Tubular bronchiectasis with retained secretions and innumerable peribronchovascular distributed punctate micro nodules, predominantly within the right middle and lower lobes, concerning for an atypical pneumonia, such as non tuberculous mycobacterium or chronic bronchiolitis.  Findings are increased compared to prior studies.  2.  Scattered bandlike opacities compatible with subsegmental atelectasis.  3.  Additional scattered calcified and noncalcified pulmonary nodules, largest measuring 0.5 cm.  For multiple solid nodules all <6 mm, Fleischner Society  2017 guidelines recommend no routine follow up for a low risk patient, or follow up with non-contrast chest CT at 12 months after discovery in a high risk patient.  4.  Dilated left atrial appendage, small foci of atrial wall calcifications.  Correlate with chronic left atrial dysfunction.  5.  Left, and possibly small right, implant rupture without infiltration into adjacent breast tissue    Assessment:  1. Chronic rhinitis, consider allergic.  2. Chronic conjunctivitis, consider allergic.  3. Chronic cough, consider allergic component.  4. Recurrent upper respiratory infections, consider chronic sinusitis.  5. Chronic throat clearing, consider LPR.  6. Right middle lobe bronchiectasis.  7. S/P four ablation for atrial fibrillation currently on Xarelto.  8. Hypothyroidism on levothyroxine.  9. Dry eye syndrome.  10. Osteosarcoma of right [id number] with right knee replacement.  11. S/P hysterectomy 1994.  12. S/P diverticulosis with resection 2011.      Recommendations:  1. Laboratory as ordered.  2. Continue Augmentin.  3. Continue Flonase daily.  4. Hold Benadryl.  Bowel  5. Hold Allegra three days before return to clinic.  6. Consider skin testing on return to clinic.  7. Consider ENT evaluation for LPR.  8. Sinus CT with next sinus infection.  9. Consider neurology evaluation.    Patient education June 16, 2022: LPR and web site were reviewed. Relationship between sinus headaches and migraines was reviewed.

## 2022-06-18 ENCOUNTER — PATIENT MESSAGE (OUTPATIENT)
Dept: ORTHOPEDICS | Facility: CLINIC | Age: 72
End: 2022-06-18
Payer: MEDICARE

## 2022-06-20 LAB
A ALTERNATA IGE QN: <0.1 KU/L
A FUMIGATUS IGE QN: <0.1 KU/L
BERMUDA GRASS IGE QN: <0.1 KU/L
CAT DANDER IGE QN: <0.1 KU/L
CEDAR IGE QN: <0.1 KU/L
D FARINAE IGE QN: <0.1 KU/L
D PTERONYSS IGE QN: <0.1 KU/L
DEPRECATED A ALTERNATA IGE RAST QL: NORMAL
DEPRECATED A FUMIGATUS IGE RAST QL: NORMAL
DEPRECATED BERMUDA GRASS IGE RAST QL: NORMAL
DEPRECATED CAT DANDER IGE RAST QL: NORMAL
DEPRECATED CEDAR IGE RAST QL: NORMAL
DEPRECATED D FARINAE IGE RAST QL: NORMAL
DEPRECATED D PTERONYSS IGE RAST QL: NORMAL
DEPRECATED DOG DANDER IGE RAST QL: NORMAL
DEPRECATED ELDER IGE RAST QL: NORMAL
DEPRECATED ENGL PLANTAIN IGE RAST QL: NORMAL
DEPRECATED PECAN/HICK TREE IGE RAST QL: NORMAL
DEPRECATED ROACH IGE RAST QL: NORMAL
DEPRECATED TIMOTHY IGE RAST QL: NORMAL
DEPRECATED WEST RAGWEED IGE RAST QL: NORMAL
DEPRECATED WHITE OAK IGE RAST QL: NORMAL
DOG DANDER IGE QN: <0.1 KU/L
ELDER IGE QN: <0.1 KU/L
ENGL PLANTAIN IGE QN: <0.1 KU/L
PECAN/HICK TREE IGE QN: <0.1 KU/L
ROACH IGE QN: <0.1 KU/L
TIMOTHY IGE QN: <0.1 KU/L
WEST RAGWEED IGE QN: <0.1 KU/L
WHITE OAK IGE QN: <0.1 KU/L

## 2022-06-21 LAB
DEPRECATED S PNEUM12 IGG SER-MCNC: 0.3 UG/ML
DEPRECATED S PNEUM23 IGG SER-MCNC: <0.3 UG/ML
DEPRECATED S PNEUM4 IGG SER-MCNC: 0.6 UG/ML
DEPRECATED S PNEUM8 IGG SER-MCNC: <0.3 UG/ML
DEPRECATED S PNEUM9 IGG SER-MCNC: 1 UG/ML
S PN DA SERO 19F IGG SER-MCNC: 1.3 UG/ML
S PNEUM DA 1 IGG SER-MCNC: 0.3 UG/ML
S PNEUM DA 14 IGG SER-MCNC: 0.5
S PNEUM DA 18C IGG SER-MCNC: 0.7
S PNEUM DA 3 IGG SER-MCNC: 0.4 UG/ML
S PNEUM DA 5 IGG SER-MCNC: <0.3 UG/ML
S PNEUM DA 6B IGG SER-MCNC: 1.3 UG/ML
S PNEUM DA 7F IGG SER-MCNC: 1.7 UG/ML
S PNEUM DA 9V IGG SER-MCNC: 0.4 UG/ML

## 2022-06-22 ENCOUNTER — PATIENT MESSAGE (OUTPATIENT)
Dept: UROLOGY | Facility: CLINIC | Age: 72
End: 2022-06-22
Payer: MEDICARE

## 2022-06-24 ENCOUNTER — OFFICE VISIT (OUTPATIENT)
Dept: ALLERGY | Facility: CLINIC | Age: 72
End: 2022-06-24
Payer: MEDICARE

## 2022-06-24 ENCOUNTER — TELEPHONE (OUTPATIENT)
Dept: UROLOGY | Facility: CLINIC | Age: 72
End: 2022-06-24
Payer: MEDICARE

## 2022-06-24 VITALS — HEART RATE: 84 BPM | HEIGHT: 68 IN | OXYGEN SATURATION: 96 % | BODY MASS INDEX: 23.69 KG/M2 | WEIGHT: 156.31 LBS

## 2022-06-24 DIAGNOSIS — I48.0 PAROXYSMAL ATRIAL FIBRILLATION: ICD-10-CM

## 2022-06-24 DIAGNOSIS — J47.9 BRONCHIECTASIS WITHOUT COMPLICATION: ICD-10-CM

## 2022-06-24 DIAGNOSIS — R05.9 COUGH: ICD-10-CM

## 2022-06-24 DIAGNOSIS — G44.229 CHRONIC TENSION-TYPE HEADACHE, NOT INTRACTABLE: ICD-10-CM

## 2022-06-24 DIAGNOSIS — I47.10 PSVT (PAROXYSMAL SUPRAVENTRICULAR TACHYCARDIA): ICD-10-CM

## 2022-06-24 DIAGNOSIS — Z86.19 FREQUENT INFECTIONS: ICD-10-CM

## 2022-06-24 DIAGNOSIS — J31.0 CHRONIC RHINITIS: Primary | ICD-10-CM

## 2022-06-24 PROCEDURE — G0009 ADMIN PNEUMOCOCCAL VACCINE: HCPCS | Mod: S$GLB,,, | Performed by: ALLERGY & IMMUNOLOGY

## 2022-06-24 PROCEDURE — 1159F MED LIST DOCD IN RCRD: CPT | Mod: CPTII,S$GLB,, | Performed by: ALLERGY & IMMUNOLOGY

## 2022-06-24 PROCEDURE — 90732 PPSV23 VACC 2 YRS+ SUBQ/IM: CPT | Mod: S$GLB,,, | Performed by: ALLERGY & IMMUNOLOGY

## 2022-06-24 PROCEDURE — 99214 PR OFFICE/OUTPT VISIT, EST, LEVL IV, 30-39 MIN: ICD-10-PCS | Mod: 25,S$GLB,, | Performed by: ALLERGY & IMMUNOLOGY

## 2022-06-24 PROCEDURE — 1160F RVW MEDS BY RX/DR IN RCRD: CPT | Mod: CPTII,S$GLB,, | Performed by: ALLERGY & IMMUNOLOGY

## 2022-06-24 PROCEDURE — 1160F PR REVIEW ALL MEDS BY PRESCRIBER/CLIN PHARMACIST DOCUMENTED: ICD-10-PCS | Mod: CPTII,S$GLB,, | Performed by: ALLERGY & IMMUNOLOGY

## 2022-06-24 PROCEDURE — 99214 OFFICE O/P EST MOD 30 MIN: CPT | Mod: 25,S$GLB,, | Performed by: ALLERGY & IMMUNOLOGY

## 2022-06-24 PROCEDURE — 90732 PNEUMOCOCCAL POLYSACCHARIDE VACCINE 23-VALENT =>2YO SQ IM: ICD-10-PCS | Mod: S$GLB,,, | Performed by: ALLERGY & IMMUNOLOGY

## 2022-06-24 PROCEDURE — 1126F AMNT PAIN NOTED NONE PRSNT: CPT | Mod: CPTII,S$GLB,, | Performed by: ALLERGY & IMMUNOLOGY

## 2022-06-24 PROCEDURE — 1126F PR PAIN SEVERITY QUANTIFIED, NO PAIN PRESENT: ICD-10-PCS | Mod: CPTII,S$GLB,, | Performed by: ALLERGY & IMMUNOLOGY

## 2022-06-24 PROCEDURE — 99999 PR PBB SHADOW E&M-EST. PATIENT-LVL III: CPT | Mod: PBBFAC,,, | Performed by: ALLERGY & IMMUNOLOGY

## 2022-06-24 PROCEDURE — G0009 PNEUMOCOCCAL POLYSACCHARIDE VACCINE 23-VALENT =>2YO SQ IM: ICD-10-PCS | Mod: S$GLB,,, | Performed by: ALLERGY & IMMUNOLOGY

## 2022-06-24 PROCEDURE — 3008F PR BODY MASS INDEX (BMI) DOCUMENTED: ICD-10-PCS | Mod: CPTII,S$GLB,, | Performed by: ALLERGY & IMMUNOLOGY

## 2022-06-24 PROCEDURE — 3008F BODY MASS INDEX DOCD: CPT | Mod: CPTII,S$GLB,, | Performed by: ALLERGY & IMMUNOLOGY

## 2022-06-24 PROCEDURE — 1159F PR MEDICATION LIST DOCUMENTED IN MEDICAL RECORD: ICD-10-PCS | Mod: CPTII,S$GLB,, | Performed by: ALLERGY & IMMUNOLOGY

## 2022-06-24 PROCEDURE — 99999 PR PBB SHADOW E&M-EST. PATIENT-LVL III: ICD-10-PCS | Mod: PBBFAC,,, | Performed by: ALLERGY & IMMUNOLOGY

## 2022-06-24 NOTE — TELEPHONE ENCOUNTER
----- Message from Elie Rajput sent at 6/24/2022  2:12 PM CDT -----  Regarding: resched appt from yesterday      The Pt states that she resched an appt, but didn't know if she sched it correctly due to the fact that it's a bladder study     Please contact the Pt    Ph # [phone]

## 2022-06-24 NOTE — PROGRESS NOTES
Zoë Enrique returns to clinic today for continued evaluation recurrent upper respiratory infections. She is here alone. She was last seen June 16, 2022. 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    After her last visit, this past Tuesday night she developed nausea and vomiting. Her grandchild, daughter, and  all had nausea and vomiting with diarrhea. It lasted about 24 hours before resolving.    She has not had any other infections.    Her rhinitis is now controlled. She takes Flonase daily and Allegra.    She had a Prevnar 13 on January 4, 2016.     She had a Pneumovax she thinks over 10 years ago when she was still working.    She had skin tests done in [place] about 15 years ago that were negative.    She is currently in the [facility] and is concerned about insurance coverage.    [facility] PEQ ALLERGY QUESTIONNAIRE SHORT 6/23/2022   Head or facial pain: No symptoms   Ears: No symptoms   Hearing loss? -   Nosebleeds? No   Postnasal drip? Yes   Sneezing? No   Runny nose? Yes   Congestion? Yes   Throat: No symptoms   Trouble swallowing? -   Eye itching? Yes   Eye redness? Yes   Eye discharge? No   Eye pain?  No   Light sensitivity / light hurts the eyes? No   Lungs: No symptoms   Cough? -   Wheezing? -   Shortness of breath? -   Chest tightness? -   Skin: No symptoms     Physical Examination:  General: Well-developed, well-nourished, no acute distress. Coughing and clearing throat throughout interview.  Head: No sinus tenderness.  Eyes: Conjunctivae:  No bulbar or palpebral conjunctival injection.  Ears: EAC's clear.  TM's clear.  No pre-auricular nodes.  Nose: Nasal Mucosa:  Pink.  Septum: No apparent deviation.  Turbinates:  No significant edema.  Polyps/Mass:  None visible.  Teeth/Gums:  No bleeding noted.  Oropharynx: No exudates.  Neck: Supple without thyromegaly. No cervical lymphadenopathy.    Respiratory/Chest: Effort: Good.  Auscultation:  Clear bilaterally.  Skin: Good turgor.  No urticaria or  angioedema.  Neuro/Psych: Oriented x 3.    Spirometry 2020:  Spirometry is normal. Lung volume determination is normal. DLCO is normal.    Chest CT 2022:  1.  Tubular bronchiectasis with retained secretions and innumerable peribronchovascular distributed punctate micro nodules, predominantly within the right middle and lower lobes, concerning for an atypical pneumonia, such as non tuberculous mycobacterium or chronic bronchiolitis.  Findings are increased compared to prior studies.  2.  Scattered bandlike opacities compatible with subsegmental atelectasis.  3.  Additional scattered calcified and noncalcified pulmonary nodules, largest measuring 0.5 cm.  For multiple solid nodules all <6 mm, Fleischner Society 2017 guidelines recommend no routine follow up for a low risk patient, or follow up with non-contrast chest CT at 12 months after discovery in a high risk patient.  4.  Dilated left atrial appendage, small foci of atrial wall calcifications.  Correlate with chronic left atrial dysfunction.  5.  Left, and possibly small right, implant rupture without infiltration into adjacent breast tissue.    Laboratory 2022:  IgE level: Less than 35.  ImmunoCAP:  Negative.  IgA: 114.  Ig.  IgM: 66.  Pneumococcal titers: Not protective.    Assessment:  1. Chronic rhinitis, consider allergic.  2. Chronic conjunctivitis, consider allergic.  3. Chronic cough, consider allergic component.  4. Recurrent upper respiratory infections, consider chronic sinusitis.  5. Chronic throat clearing, consider LPR.  6. Right middle lobe bronchiectasis.  7. S/P four ablation for atrial fibrillation currently on Xarelto.  8. Hypothyroidism on levothyroxine.  9. Dry eye syndrome.  10. 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  11. S/P hysterectomy .  12. S/P diverticulosis with resection .      Recommendations:  1. Pneumovax today.  2. Recheck titers in six weeks.  3. Continue Flonase daily.  4.  Allegra as needed.  5. Number given to [employer].  6. Consider skin testing on return to clinic.  7. Consider ENT evaluation for LPR.  8. Sinus CT with next sinus infection.  9. Consider neurology evaluation.  10. Return to clinic in the future for skin testing.    Patient education June 16, 2022: LPR and web site were reviewed. Relationship between sinus headaches and migraines was reviewed.

## 2022-06-27 ENCOUNTER — OFFICE VISIT (OUTPATIENT)
Dept: OPHTHALMOLOGY | Facility: CLINIC | Age: 72
End: 2022-06-27
Payer: MEDICARE

## 2022-06-27 DIAGNOSIS — H04.123 DRY EYE SYNDROME OF BOTH EYES: Primary | ICD-10-CM

## 2022-06-27 DIAGNOSIS — H52.7 REFRACTIVE ERROR: ICD-10-CM

## 2022-06-27 DIAGNOSIS — Z96.1 PSEUDOPHAKIA: ICD-10-CM

## 2022-06-27 DIAGNOSIS — H35.363 DRUSEN OF MACULA OF BOTH EYES: ICD-10-CM

## 2022-06-27 DIAGNOSIS — H10.13 ALLERGIC CONJUNCTIVITIS OF BOTH EYES: ICD-10-CM

## 2022-06-27 PROCEDURE — 99999 PR PBB SHADOW E&M-EST. PATIENT-LVL II: CPT | Mod: PBBFAC,,, | Performed by: OPHTHALMOLOGY

## 2022-06-27 PROCEDURE — 92004 PR EYE EXAM, NEW PATIENT,COMPREHESV: ICD-10-PCS | Mod: S$GLB,,, | Performed by: OPHTHALMOLOGY

## 2022-06-27 PROCEDURE — 92004 COMPRE OPH EXAM NEW PT 1/>: CPT | Mod: S$GLB,,, | Performed by: OPHTHALMOLOGY

## 2022-06-27 PROCEDURE — 1160F PR REVIEW ALL MEDS BY PRESCRIBER/CLIN PHARMACIST DOCUMENTED: ICD-10-PCS | Mod: CPTII,S$GLB,, | Performed by: OPHTHALMOLOGY

## 2022-06-27 PROCEDURE — 1159F PR MEDICATION LIST DOCUMENTED IN MEDICAL RECORD: ICD-10-PCS | Mod: CPTII,S$GLB,, | Performed by: OPHTHALMOLOGY

## 2022-06-27 PROCEDURE — 99999 PR PBB SHADOW E&M-EST. PATIENT-LVL II: ICD-10-PCS | Mod: PBBFAC,,, | Performed by: OPHTHALMOLOGY

## 2022-06-27 PROCEDURE — 1159F MED LIST DOCD IN RCRD: CPT | Mod: CPTII,S$GLB,, | Performed by: OPHTHALMOLOGY

## 2022-06-27 PROCEDURE — 1160F RVW MEDS BY RX/DR IN RCRD: CPT | Mod: CPTII,S$GLB,, | Performed by: OPHTHALMOLOGY

## 2022-06-28 NOTE — PROGRESS NOTES
Subjective:       Patient ID: Zoë Enrique is a 72 y.o. female.    Chief Complaint: Concerns About Ocular Health    HPI     DLS 6/1/2021    71 y/o female presents to clinic for annual eye exam    +LASIK OU  +PCIOL  +MONO VISION OD-NEAR, OS - DISTANCE  +blepharoplasty OU  +punctal plugs OU 2017    Pt states ALECIA has been really bad. Still has punctal plugs in place. Using   pataday and refresh throughout the day. Refresh is anywhere between 3-7x   day. VA seems to be getting worse. Doesn't feel comfortable driving   without glasses on even though she has had monovision and could drive   before.     Pataday qD OU   Refresh 3-7x day OU    Last edited by Kelly Mitchell on 6/27/2022  3:00 PM. (History)             Assessment:       1. Dry eye syndrome of both eyes    2. Allergic conjunctivitis of both eyes    3. Drusen of macula of both eyes    4. Refractive error    5. Pseudophakia        Plan:       ALECIA-Needs trial of Systane Complete (PF).  Allergic conj OU-Stable.  Drusen of macula OU-Stable.  RE-Pt wants MRx.      Systane Complete (PF).  Pataday OU.  AREDS.  Give MRx.  RTC Dr Cruz in 1 yr.

## 2022-07-07 ENCOUNTER — OFFICE VISIT (OUTPATIENT)
Dept: UROLOGY | Facility: CLINIC | Age: 72
End: 2022-07-07
Payer: MEDICARE

## 2022-07-07 ENCOUNTER — RESEARCH ENCOUNTER (OUTPATIENT)
Dept: RESEARCH | Facility: HOSPITAL | Age: 72
End: 2022-07-07
Payer: MEDICARE

## 2022-07-07 VITALS
BODY MASS INDEX: 23.76 KG/M2 | HEIGHT: 68 IN | DIASTOLIC BLOOD PRESSURE: 67 MMHG | SYSTOLIC BLOOD PRESSURE: 105 MMHG | WEIGHT: 156.75 LBS | HEART RATE: 64 BPM

## 2022-07-07 DIAGNOSIS — N32.81 OAB (OVERACTIVE BLADDER): Primary | ICD-10-CM

## 2022-07-07 PROCEDURE — 3008F BODY MASS INDEX DOCD: CPT | Mod: CPTII,S$GLB,, | Performed by: UROLOGY

## 2022-07-07 PROCEDURE — 3008F PR BODY MASS INDEX (BMI) DOCUMENTED: ICD-10-PCS | Mod: CPTII,S$GLB,, | Performed by: UROLOGY

## 2022-07-07 PROCEDURE — 99999 PR PBB SHADOW E&M-EST. PATIENT-LVL III: CPT | Mod: PBBFAC,,, | Performed by: UROLOGY

## 2022-07-07 PROCEDURE — 3288F PR FALLS RISK ASSESSMENT DOCUMENTED: ICD-10-PCS | Mod: CPTII,S$GLB,, | Performed by: UROLOGY

## 2022-07-07 PROCEDURE — 3074F SYST BP LT 130 MM HG: CPT | Mod: CPTII,S$GLB,, | Performed by: UROLOGY

## 2022-07-07 PROCEDURE — 99204 PR OFFICE/OUTPT VISIT, NEW, LEVL IV, 45-59 MIN: ICD-10-PCS | Mod: S$GLB,,, | Performed by: UROLOGY

## 2022-07-07 PROCEDURE — 3078F DIAST BP <80 MM HG: CPT | Mod: CPTII,S$GLB,, | Performed by: UROLOGY

## 2022-07-07 PROCEDURE — 1101F PR PT FALLS ASSESS DOC 0-1 FALLS W/OUT INJ PAST YR: ICD-10-PCS | Mod: CPTII,S$GLB,, | Performed by: UROLOGY

## 2022-07-07 PROCEDURE — 1159F MED LIST DOCD IN RCRD: CPT | Mod: CPTII,S$GLB,, | Performed by: UROLOGY

## 2022-07-07 PROCEDURE — 1101F PT FALLS ASSESS-DOCD LE1/YR: CPT | Mod: CPTII,S$GLB,, | Performed by: UROLOGY

## 2022-07-07 PROCEDURE — 1126F AMNT PAIN NOTED NONE PRSNT: CPT | Mod: CPTII,S$GLB,, | Performed by: UROLOGY

## 2022-07-07 PROCEDURE — 3074F PR MOST RECENT SYSTOLIC BLOOD PRESSURE < 130 MM HG: ICD-10-PCS | Mod: CPTII,S$GLB,, | Performed by: UROLOGY

## 2022-07-07 PROCEDURE — 1126F PR PAIN SEVERITY QUANTIFIED, NO PAIN PRESENT: ICD-10-PCS | Mod: CPTII,S$GLB,, | Performed by: UROLOGY

## 2022-07-07 PROCEDURE — 1159F PR MEDICATION LIST DOCUMENTED IN MEDICAL RECORD: ICD-10-PCS | Mod: CPTII,S$GLB,, | Performed by: UROLOGY

## 2022-07-07 PROCEDURE — 99999 PR PBB SHADOW E&M-EST. PATIENT-LVL III: ICD-10-PCS | Mod: PBBFAC,,, | Performed by: UROLOGY

## 2022-07-07 PROCEDURE — 3078F PR MOST RECENT DIASTOLIC BLOOD PRESSURE < 80 MM HG: ICD-10-PCS | Mod: CPTII,S$GLB,, | Performed by: UROLOGY

## 2022-07-07 PROCEDURE — 3288F FALL RISK ASSESSMENT DOCD: CPT | Mod: CPTII,S$GLB,, | Performed by: UROLOGY

## 2022-07-07 PROCEDURE — 99204 OFFICE O/P NEW MOD 45 MIN: CPT | Mod: S$GLB,,, | Performed by: UROLOGY

## 2022-07-07 NOTE — PROGRESS NOTES
Avation: REDUCEOAB- 2022.027     Patient was consented today, she is currently not taking any medications (meds in past) and we used her iphone to download valentín and she was trained on how to complete her baseline diary, she will do her dairy 9-11Jul.      We plan to see her on 13 Jul for her enrollment visit at 9:30 am

## 2022-07-07 NOTE — PROGRESS NOTES
[employer] Avation- REDUCEOAB study   IRB# [id number]  PI: Dr. Monster Smallwood  Subject Number:      Date: 07-Jul-2022     ICF version (date of IRB approval stamp): 05-Apr-2022  Clinical Investigation Plan version: 2.0        The patient was consented for the Avation study in clinic today. Patient was not accompanied.     The patient read through the consent form and the  went through it with them. The purpose of the study, length of the study, study visit and procedures, risks, benefits, responsibilities, alternative treatments, costs, compensation for injury, withdrawal notices, HIPAA authorization were fully discussed.      Patient was given ample time to ask questions. All questions were answered and the patient agreed to participate in the study. Patient then signed the consent form. A copy of the signed consent was given to the patient for their records and a copy has been scanned in to Epic, see Media tab. No study related procedures were performed prior to consent.

## 2022-07-07 NOTE — PROGRESS NOTES
[facility] Department of U[facility]      New Overactive Bladder (OAB) Note    7/7/2022    Referred by:  Praveen Jeffery MD    HPI: Zoë Enrique is a very pleasant 72 y.o. female who is a new patient to our department referred for evaluation of urinary incontinence of several years duration. She reports bother is associated with urinary daytime frequency (10-12x daily), with nocturia (2-3x per night) and with urgency that results in urinary incontinence rarely. She reports no stress urinary incontinence associated with exertion. She does not require daily pad use.  She has decreased overall fluid intake.  She reports urinary incontinence is day and night but more predominant during the day. Patient reports urgency is independent of position.     She denies symptoms of irritative voiding including dysuria. She denies symptoms of obstructive voiding including decreased stream, hesitancy, intermittency, post void dribbling and sense of incomplete emptying. Bladder scan PVR was 0 mL. Her history includes no notation of urolithiasis, hematuria, prior pelvic surgery, previous prolapse or incontinence procedures or neurological symptoms/diagnoses. She denies all other prior pelvic surgeries or neurologic diagnoses. She does not report symptoms suggestive of advanced POP. She denies a history of constipation.     Previous incontinence therapies:   Medication: (oral oxybutynin IR (provided no benefit)     A review of 10+ systems was conducted with pertinent positive and negative findings documented in HPI with all other systems reviewed and negative.    Past medical, family, surgical and social history reviewed as documented in chart with pertinent positive medical, family, surgical and social history detailed in HPI.    Exam Findings:    Const: no acute distress, conversant and alert  Eyes: anicteric, extraocular muscles intact  ENMT: normocephalic, Nl oral membranes  Cardio: no cyanosis, nl cap refill  Pulm: no tachypnea; no  resp distress  Musc: no laceration, no tenderness  Neuro: alert; oriented x 3  Normal upper and lower extremity strength, sensation and reflex. No focal neurologic deficits.   Skin: warm, dry; no petichiae  Psych: no anxiety; normal speech     Assessment/Plan:     Overactive Bladder with Urgency Incontinence (new, addt'l workup): Her history is suggestive of Overactive Bladder (OAB) with predominantly Urgency Urinary Incontinence (UUI). The presence or absence of incontinence as well as the relative contribution of stress or urgency incontinence can be further clarified with a 3-day volume-based voiding/incontinence diary provided today.

## 2022-07-18 ENCOUNTER — OFFICE VISIT (OUTPATIENT)
Dept: ALLERGY | Facility: CLINIC | Age: 72
End: 2022-07-18
Payer: MEDICARE

## 2022-07-18 ENCOUNTER — PATIENT MESSAGE (OUTPATIENT)
Dept: ALLERGY | Facility: CLINIC | Age: 72
End: 2022-07-18

## 2022-07-18 VITALS — OXYGEN SATURATION: 95 % | HEART RATE: 75 BPM | HEIGHT: 68 IN | WEIGHT: 157.44 LBS | BODY MASS INDEX: 23.86 KG/M2

## 2022-07-18 DIAGNOSIS — R09.89 CHRONIC THROAT CLEARING: ICD-10-CM

## 2022-07-18 DIAGNOSIS — J47.9 BRONCHIECTASIS WITHOUT COMPLICATION: ICD-10-CM

## 2022-07-18 DIAGNOSIS — I48.11 LONGSTANDING PERSISTENT ATRIAL FIBRILLATION: ICD-10-CM

## 2022-07-18 DIAGNOSIS — R05.9 COUGH: ICD-10-CM

## 2022-07-18 DIAGNOSIS — Z86.19 FREQUENT INFECTIONS: ICD-10-CM

## 2022-07-18 DIAGNOSIS — H10.403 CHRONIC CONJUNCTIVITIS OF BOTH EYES, UNSPECIFIED CHRONIC CONJUNCTIVITIS TYPE: ICD-10-CM

## 2022-07-18 DIAGNOSIS — J31.0 CHRONIC RHINITIS: Primary | ICD-10-CM

## 2022-07-18 PROCEDURE — 99214 PR OFFICE/OUTPT VISIT, EST, LEVL IV, 30-39 MIN: ICD-10-PCS | Mod: 25,S$GLB,, | Performed by: ALLERGY & IMMUNOLOGY

## 2022-07-18 PROCEDURE — 99999 PR PBB SHADOW E&M-EST. PATIENT-LVL III: CPT | Mod: PBBFAC,,, | Performed by: ALLERGY & IMMUNOLOGY

## 2022-07-18 PROCEDURE — 3008F PR BODY MASS INDEX (BMI) DOCUMENTED: ICD-10-PCS | Mod: CPTII,S$GLB,, | Performed by: ALLERGY & IMMUNOLOGY

## 2022-07-18 PROCEDURE — 99999 PR PBB SHADOW E&M-EST. PATIENT-LVL III: ICD-10-PCS | Mod: PBBFAC,,, | Performed by: ALLERGY & IMMUNOLOGY

## 2022-07-18 PROCEDURE — 95004 PERQ TESTS W/ALRGNC XTRCS: CPT | Mod: S$GLB,,, | Performed by: ALLERGY & IMMUNOLOGY

## 2022-07-18 PROCEDURE — 3008F BODY MASS INDEX DOCD: CPT | Mod: CPTII,S$GLB,, | Performed by: ALLERGY & IMMUNOLOGY

## 2022-07-18 PROCEDURE — 99214 OFFICE O/P EST MOD 30 MIN: CPT | Mod: 25,S$GLB,, | Performed by: ALLERGY & IMMUNOLOGY

## 2022-07-18 PROCEDURE — 1126F AMNT PAIN NOTED NONE PRSNT: CPT | Mod: CPTII,S$GLB,, | Performed by: ALLERGY & IMMUNOLOGY

## 2022-07-18 PROCEDURE — 1126F PR PAIN SEVERITY QUANTIFIED, NO PAIN PRESENT: ICD-10-PCS | Mod: CPTII,S$GLB,, | Performed by: ALLERGY & IMMUNOLOGY

## 2022-07-18 PROCEDURE — 95004 PR ALLERGY SKIN TESTS,ALLERGENS: ICD-10-PCS | Mod: S$GLB,,, | Performed by: ALLERGY & IMMUNOLOGY

## 2022-07-18 PROCEDURE — 1160F PR REVIEW ALL MEDS BY PRESCRIBER/CLIN PHARMACIST DOCUMENTED: ICD-10-PCS | Mod: CPTII,S$GLB,, | Performed by: ALLERGY & IMMUNOLOGY

## 2022-07-18 PROCEDURE — 1159F MED LIST DOCD IN RCRD: CPT | Mod: CPTII,S$GLB,, | Performed by: ALLERGY & IMMUNOLOGY

## 2022-07-18 PROCEDURE — 1159F PR MEDICATION LIST DOCUMENTED IN MEDICAL RECORD: ICD-10-PCS | Mod: CPTII,S$GLB,, | Performed by: ALLERGY & IMMUNOLOGY

## 2022-07-18 PROCEDURE — 1160F RVW MEDS BY RX/DR IN RCRD: CPT | Mod: CPTII,S$GLB,, | Performed by: ALLERGY & IMMUNOLOGY

## 2022-07-18 NOTE — PROGRESS NOTES
Zoë Enrique returns to clinic today for continued evaluation of recurrent upper respiratory infections. She is here alone. She was last seen June 24, 2022.    Last week she had another respiratory infection with productive sputum.     She did not have any facial discomfort her pain. She did not call to get her sinus CT is she did not feel that this was in her sinuses.    She has seen Dr. Linda Oneill in early September.    She has been able to stop her antihistamines.    She had her Pneumovax on June 24, 2022. She has not had repeat titers.    She has seen orthopedics on July 25th for possible left wrist surgery. She will get her in repeat pneumococcal titers then.    [facility] PE[facility] ALLERGY QUESTIONNAIRE SHORT 7/17/2022   Head or facial pain: No symptoms   Ears: No symptoms   Hearing loss? -   Nosebleeds? No   Postnasal drip? Yes   Sneezing? Yes   Runny nose? Yes   Congestion? No   Throat: No symptoms   Trouble swallowing? -   Eye itching? Yes   Eye redness? Yes   Eye discharge? No   Eye pain?  No   Light sensitivity / light hurts the eyes? No   Lungs: -   Cough? Yes   Wheezing? No   Shortness of breath? No   Apnea? No   Choking? No   Chest tightness? No   Skin: No symptoms     Physical Examination:  General: Well-developed, well-nourished, no acute distress.   Head: No sinus tenderness.  Eyes: Conjunctivae:  No bulbar or palpebral conjunctival injection.  Ears: EAC's clear.  TM's clear.  No pre-auricular nodes.  Nose: Nasal Mucosa:  Pink.  Septum: No apparent deviation.  Turbinates:  No significant edema.  Polyps/Mass:  None visible.  Teeth/Gums:  No bleeding noted.  Oropharynx: No exudates.  Neck: Supple without thyromegaly. No cervical lymphadenopathy.    Respiratory/Chest: Effort: Good.  Auscultation:  Clear bilaterally.  Skin: Good turgor.  No urticaria or angioedema.  Neuro/Psych: Oriented x 3.    Spirometry 12/16/2020:  Spirometry is normal. Lung volume determination is normal. DLCO is normal.    Chest  CT 2022:  1.  Tubular bronchiectasis with retained secretions and innumerable peribronchovascular distributed punctate micro nodules, predominantly within the right middle and lower lobes, concerning for an atypical pneumonia, such as non tuberculous mycobacterium or chronic bronchiolitis.  Findings are increased compared to prior studies.  2.  Scattered bandlike opacities compatible with subsegmental atelectasis.  3.  Additional scattered calcified and noncalcified pulmonary nodules, largest measuring 0.5 cm.  For multiple solid nodules all <6 mm, Fleischner Society 2017 guidelines recommend no routine follow up for a low risk patient, or follow up with non-contrast chest CT at 12 months after discovery in a high risk patient.  4.  Dilated left atrial appendage, small foci of atrial wall calcifications.  Correlate with chronic left atrial dysfunction.  5.  Left, and possibly small right, implant rupture without infiltration into adjacent breast tissue.    Laboratory 2022:  IgE level: Less than 35.  ImmunoCAP:  Negative.  IgA: 114.  Ig.  IgM: 66.  Pneumococcal titers: Not protective.    Inhalant skin tests 2022: 3+ histamine control. All tests are negative.    Assessment:  1. Chronic rhinitis, not allergic.  2. Chronic conjunctivitis, not allergic.  3. Chronic cough, without allergic component.  4. Recurrent upper respiratory infections, consider chronic sinusitis.  5. Chronic throat clearing, consider LPR.  6. Right middle lobe bronchiectasis.  7. S/P four ablations for atrial fibrillation currently on Xarelto.  8. Hypothyroidism on levothyroxine.  9. Dry eye syndrome.  10. Osteosarcoma of right [id number] with right knee replacement.  11. S/P hysterectomy .  12. S/P diverticulosis with resection .      Recommendations:  1. Repeat pneumococcal titers in several weeks.  2. ENT evaluation for possible LPR.  3. Continue Flonase daily.  4. Allegra as needed.  5. Sinus CT with next sinus  infection.  6. Consider neurology evaluation.  7. Return to clinic as needed.    Patient education June 16, 2022:   LPR and web site were reviewed.   Relationship between sinus headaches and migraines was reviewed.

## 2022-07-20 ENCOUNTER — OFFICE VISIT (OUTPATIENT)
Dept: UROLOGY | Facility: CLINIC | Age: 72
End: 2022-07-20
Payer: MEDICARE

## 2022-07-20 ENCOUNTER — RESEARCH ENCOUNTER (OUTPATIENT)
Dept: RESEARCH | Facility: HOSPITAL | Age: 72
End: 2022-07-20
Payer: MEDICARE

## 2022-07-20 VITALS
HEART RATE: 67 BPM | HEIGHT: 68 IN | DIASTOLIC BLOOD PRESSURE: 63 MMHG | SYSTOLIC BLOOD PRESSURE: 107 MMHG | BODY MASS INDEX: 23.64 KG/M2 | TEMPERATURE: 98 F | WEIGHT: 156 LBS

## 2022-07-20 DIAGNOSIS — N32.81 OAB (OVERACTIVE BLADDER): Primary | ICD-10-CM

## 2022-07-20 PROCEDURE — 1160F RVW MEDS BY RX/DR IN RCRD: CPT | Mod: CPTII,S$GLB,, | Performed by: UROLOGY

## 2022-07-20 PROCEDURE — 3078F PR MOST RECENT DIASTOLIC BLOOD PRESSURE < 80 MM HG: ICD-10-PCS | Mod: CPTII,S$GLB,, | Performed by: UROLOGY

## 2022-07-20 PROCEDURE — 3074F SYST BP LT 130 MM HG: CPT | Mod: CPTII,S$GLB,, | Performed by: UROLOGY

## 2022-07-20 PROCEDURE — 1159F MED LIST DOCD IN RCRD: CPT | Mod: CPTII,S$GLB,, | Performed by: UROLOGY

## 2022-07-20 PROCEDURE — 1159F PR MEDICATION LIST DOCUMENTED IN MEDICAL RECORD: ICD-10-PCS | Mod: CPTII,S$GLB,, | Performed by: UROLOGY

## 2022-07-20 PROCEDURE — 99999 PR PBB SHADOW E&M-EST. PATIENT-LVL III: ICD-10-PCS | Mod: PBBFAC,,, | Performed by: UROLOGY

## 2022-07-20 PROCEDURE — 3008F BODY MASS INDEX DOCD: CPT | Mod: CPTII,S$GLB,, | Performed by: UROLOGY

## 2022-07-20 PROCEDURE — 1126F AMNT PAIN NOTED NONE PRSNT: CPT | Mod: CPTII,S$GLB,, | Performed by: UROLOGY

## 2022-07-20 PROCEDURE — 3078F DIAST BP <80 MM HG: CPT | Mod: CPTII,S$GLB,, | Performed by: UROLOGY

## 2022-07-20 PROCEDURE — 99214 OFFICE O/P EST MOD 30 MIN: CPT | Mod: S$GLB,,, | Performed by: UROLOGY

## 2022-07-20 PROCEDURE — 1160F PR REVIEW ALL MEDS BY PRESCRIBER/CLIN PHARMACIST DOCUMENTED: ICD-10-PCS | Mod: CPTII,S$GLB,, | Performed by: UROLOGY

## 2022-07-20 PROCEDURE — 3008F PR BODY MASS INDEX (BMI) DOCUMENTED: ICD-10-PCS | Mod: CPTII,S$GLB,, | Performed by: UROLOGY

## 2022-07-20 PROCEDURE — 99214 PR OFFICE/OUTPT VISIT, EST, LEVL IV, 30-39 MIN: ICD-10-PCS | Mod: S$GLB,,, | Performed by: UROLOGY

## 2022-07-20 PROCEDURE — 99999 PR PBB SHADOW E&M-EST. PATIENT-LVL III: CPT | Mod: PBBFAC,,, | Performed by: UROLOGY

## 2022-07-20 PROCEDURE — 1126F PR PAIN SEVERITY QUANTIFIED, NO PAIN PRESENT: ICD-10-PCS | Mod: CPTII,S$GLB,, | Performed by: UROLOGY

## 2022-07-20 PROCEDURE — 3074F PR MOST RECENT SYSTOLIC BLOOD PRESSURE < 130 MM HG: ICD-10-PCS | Mod: CPTII,S$GLB,, | Performed by: UROLOGY

## 2022-07-20 NOTE — PROGRESS NOTES
[facility]        Overactive Bladder (OAB) Return Note     7/20/2022     Referred by:  Praveen Jeffery MD     HPI: Zoë Enrique is a very pleasant 72 y.o. female who is a return patient to our department referred for evaluation of urinary incontinence of several years duration. Her voiding diary demonstrated 36 voids over the 3-day total. She has little UUI.      She denies symptoms of irritative voiding including dysuria. She denies symptoms of obstructive voiding including decreased stream, hesitancy, intermittency, post void dribbling and sense of incomplete emptying. Bladder scan PVR was 0 mL. Her history includes no notation of urolithiasis, hematuria, prior pelvic surgery, previous prolapse or incontinence procedures or neurological symptoms/diagnoses. She denies all other prior pelvic surgeries or neurologic diagnoses. She does not report symptoms suggestive of advanced POP. She denies a history of constipation.      Previous incontinence therapies:  · Medication: (oral oxybutynin IR (provided no benefit) - stopped this medication in the distant past     A review of 10+ systems was conducted with pertinent positive and negative findings documented in HPI with all other systems reviewed and negative.     Past medical, family, surgical and social history reviewed as documented in chart with pertinent positive medical, family, surgical and social history detailed in HPI.     Exam Findings:     Const: no acute distress, conversant and alert  Eyes: anicteric, extraocular muscles intact  ENMT: normocephalic, Nl oral membranes  Cardio: no cyanosis, nl cap refill  Pulm: no tachypnea; no resp distress  Musc: no laceration, no tenderness  Neuro: alert; oriented x 3  Normal upper and lower extremity strength, sensation and reflex. No focal neurologic deficits.   Skin: warm, dry; no petichiae  Psych: no anxiety; normal speech      Assessment/Plan:     · Overactive Bladder with Urgency Incontinence  (established,not meeting goals): Her diary confirms urinary frequency averaging 12x per day. She will be randomized in the REDUCE-OAB trial today.

## 2022-07-20 NOTE — PROGRESS NOTES
[employer] REDUCEOAB study  IRB# 2022.027  PI: Dr. Smallwood  Subject number:      Date: 20-Jul-22     Enrollment visit      I. Patient's eligibility was reviewed and confirmed by PI prior to enrollment  Jul 16: 12 voids    Jul 15: 11 voids   Jul 14: 13 voids             She qualifies for dry  OAB - 3 days closest to visit used      II. Confirmed- no new adverse events to report     III. Change in medication- none, she is not taking any medications currently               relevant medical history was collected      IV. Baseline physical exam was performed by PI - normal      V. Vitals and demographics information was collected- pregnancy test -NA hysterectomy in 1994  and questionnaires were completed on the [employer] valentín     VI. Patient was randomized and was fitted by the [facility] staff for a Therapy Garment of the Small size. Subjects was given instructions by the [facility] staff as to how to use their System.     She will be doing 3X per week sessions for next 12 weeks         Next visit- 3 week      Device information: [id number]     Stimulator: [id number]  Garment: [id number]

## 2022-07-23 LAB
ACID FAST MOD KINY STN SPEC: NORMAL
MYCOBACTERIUM SPEC QL CULT: NORMAL

## 2022-07-25 ENCOUNTER — OFFICE VISIT (OUTPATIENT)
Dept: ORTHOPEDICS | Facility: CLINIC | Age: 72
End: 2022-07-25
Payer: MEDICARE

## 2022-07-25 ENCOUNTER — HOSPITAL ENCOUNTER (OUTPATIENT)
Dept: RADIOLOGY | Facility: HOSPITAL | Age: 72
Discharge: HOME | End: 2022-07-25
Attending: ORTHOPAEDIC SURGERY
Payer: MEDICARE

## 2022-07-25 VITALS — BODY MASS INDEX: 23.64 KG/M2 | HEIGHT: 68 IN | WEIGHT: 156 LBS

## 2022-07-25 DIAGNOSIS — M65.332 TRIGGER MIDDLE FINGER OF LEFT HAND: ICD-10-CM

## 2022-07-25 DIAGNOSIS — M25.532 LEFT WRIST PAIN: ICD-10-CM

## 2022-07-25 DIAGNOSIS — M25.532 LEFT WRIST PAIN: Primary | ICD-10-CM

## 2022-07-25 DIAGNOSIS — M65.4 DE QUERVAIN'S TENOSYNOVITIS, LEFT: ICD-10-CM

## 2022-07-25 PROCEDURE — 1101F PR PT FALLS ASSESS DOC 0-1 FALLS W/OUT INJ PAST YR: ICD-10-PCS | Mod: CPTII,S$GLB,, | Performed by: ORTHOPAEDIC SURGERY

## 2022-07-25 PROCEDURE — 99214 PR OFFICE/OUTPT VISIT, EST, LEVL IV, 30-39 MIN: ICD-10-PCS | Mod: S$GLB,,, | Performed by: ORTHOPAEDIC SURGERY

## 2022-07-25 PROCEDURE — 1101F PT FALLS ASSESS-DOCD LE1/YR: CPT | Mod: CPTII,S$GLB,, | Performed by: ORTHOPAEDIC SURGERY

## 2022-07-25 PROCEDURE — 3288F FALL RISK ASSESSMENT DOCD: CPT | Mod: CPTII,S$GLB,, | Performed by: ORTHOPAEDIC SURGERY

## 2022-07-25 PROCEDURE — 3008F BODY MASS INDEX DOCD: CPT | Mod: CPTII,S$GLB,, | Performed by: ORTHOPAEDIC SURGERY

## 2022-07-25 PROCEDURE — 1159F PR MEDICATION LIST DOCUMENTED IN MEDICAL RECORD: ICD-10-PCS | Mod: CPTII,S$GLB,, | Performed by: ORTHOPAEDIC SURGERY

## 2022-07-25 PROCEDURE — 1159F MED LIST DOCD IN RCRD: CPT | Mod: CPTII,S$GLB,, | Performed by: ORTHOPAEDIC SURGERY

## 2022-07-25 PROCEDURE — 73100 XR WRIST 2 VIEW LEFT: ICD-10-PCS | Mod: 26,LT,, | Performed by: RADIOLOGY

## 2022-07-25 PROCEDURE — 99214 OFFICE O/P EST MOD 30 MIN: CPT | Mod: S$GLB,,, | Performed by: ORTHOPAEDIC SURGERY

## 2022-07-25 PROCEDURE — 73100 X-RAY EXAM OF WRIST: CPT | Mod: 26,LT,, | Performed by: RADIOLOGY

## 2022-07-25 PROCEDURE — 3008F PR BODY MASS INDEX (BMI) DOCUMENTED: ICD-10-PCS | Mod: CPTII,S$GLB,, | Performed by: ORTHOPAEDIC SURGERY

## 2022-07-25 PROCEDURE — 1125F AMNT PAIN NOTED PAIN PRSNT: CPT | Mod: CPTII,S$GLB,, | Performed by: ORTHOPAEDIC SURGERY

## 2022-07-25 PROCEDURE — 3288F PR FALLS RISK ASSESSMENT DOCUMENTED: ICD-10-PCS | Mod: CPTII,S$GLB,, | Performed by: ORTHOPAEDIC SURGERY

## 2022-07-25 PROCEDURE — 99999 PR PBB SHADOW E&M-EST. PATIENT-LVL III: ICD-10-PCS | Mod: PBBFAC,,, | Performed by: ORTHOPAEDIC SURGERY

## 2022-07-25 PROCEDURE — 73100 X-RAY EXAM OF WRIST: CPT | Mod: TC,PN,LT

## 2022-07-25 PROCEDURE — 99999 PR PBB SHADOW E&M-EST. PATIENT-LVL III: CPT | Mod: PBBFAC,,, | Performed by: ORTHOPAEDIC SURGERY

## 2022-07-25 PROCEDURE — 1125F PR PAIN SEVERITY QUANTIFIED, PAIN PRESENT: ICD-10-PCS | Mod: CPTII,S$GLB,, | Performed by: ORTHOPAEDIC SURGERY

## 2022-07-25 RX ORDER — TRAMADOL HYDROCHLORIDE 50 MG/1
50 TABLET ORAL EVERY 6 HOURS PRN
COMMUNITY
End: 2023-02-02

## 2022-07-25 RX ORDER — CEFAZOLIN SODIUM 2 G/50ML
2 SOLUTION INTRAVENOUS
Status: CANCELLED | OUTPATIENT
Start: 2022-07-25

## 2022-07-25 NOTE — PROGRESS NOTES
CC:  De Quervain tendinitis left wrist with triggering of the left middle finger        HPI:  Zoë Enrique is a very pleasant 72 y.o. female with ongoing symptoms left hand including de Quervain tendinitis and triggering of the left middle finger  She has been through s is reported  Symptoms worse with use  She would like to consider surgery for both problems everal rounds of injections with temporary improvement but symptoms have recurred  No numbness or tingling         PAST MEDICAL HISTORY:   Past Medical History:   Diagnosis Date    Abnormal Pap smear of cervix     Allergy     Atrial fibrillation 2017    Atrial flutter     Cancer     osteosarcoma of right femur    Diverticulosis     Hx of atrial flutter     Hx of mitral valve prolapse     Hypothyroidism     Migraines     Recurrent upper respiratory infection (URI)     Supraventricular tachycardia      PAST SURGICAL HISTORY:   Past Surgical History:   Procedure Laterality Date    ABLATION OF ARRHYTHMOGENIC FOCUS FOR ATRIAL FIBRILLATION N/A 2020    Procedure: ABLATION, ARRHYTHMOGENIC FOCUS, FOR ATRIAL FIBRILLATION;  Surgeon: Moe Seymour MD;  Location: [facility] EP LAB;  Service: Cardiology;  Laterality: N/A;  AFL/AF, KHANH, CTI, PVI, RFA, CARTO, GEN, DM, 3 PREP    AUGMENTATION OF BREAST Bilateral     35 yrs     BREAST SURGERY      augmentation    CARDIOVERSION  2020    Procedure: Cardioversion;  Surgeon: Moe Seymour MD;  Location: [facility] EP LAB;  Service: Cardiology;;     SECTION      COLON SURGERY      partial colon resection    COLONOSCOPY N/A 3/19/2021    Procedure: COLONOSCOPY;  Surgeon: Sahara Salgado MD;  Location: [facility] ENDO ([address] [place] [address]);  Service: Endoscopy;  Laterality: N/A;  constipation prep- 7 days of miralax, 2 fulls of fulls, then day bf clears and split prep   covid test 3/5 pcw, prep instr emailed, antibiotics prophylactically, Xarelto hold x 2 days per Dr. Seymour-see telephone  encounter 2/10 -ml    COLPOSCOPY      COSMETIC SURGERY  1987    ESOPHAGOGASTRODUODENOSCOPY N/A 3/19/2021    Procedure: EGD (ESOPHAGOGASTRODUODENOSCOPY);  Surgeon: Sahara Salgado MD;  Location: [facility] ENDO (4TH FLR);  Service: Endoscopy;  Laterality: N/A;    EYE SURGERY Bilateral 2016    Lasik, then cataracts extraction    JOINT REPLACEMENT Right 2/07, 11/07, 2/09    KNEE ARTHROPLASTY Right 2007    revison in 2009    KNEE SURGERY Right 1989    distal femoral replacing TK    OOPHORECTOMY      TOTAL ABDOMINAL HYSTERECTOMY  1994    TAHBSO    TRANSESOPHAGEAL ECHOCARDIOGRAPHY N/A 11/24/2020    Procedure: ECHOCARDIOGRAM, TRANSESOPHAGEAL;  Surgeon: Ramírez Ortez III, MD;  Location: [facility] EP LAB;  Service: Cardiology;  Laterality: N/A;    TREATMENT OF CARDIAC ARRHYTHMIA N/A 8/12/2019    Procedure: CARDIOVERSION;  Surgeon: Moe Seymour MD;  Location: [facility] EP LAB;  Service: Cardiology;  Laterality: N/A;  KHANH/DCCV/MAC/DM/3PREP/*ADMIT FOR TIKOSYN*     FAMILY HISTORY:   Family History   Problem Relation Age of Onset    Diabetes Sister         Type I    Heart disease Sister         Atrrial fib, mitral valve repair    Hearing loss Father     Heart disease Father         Angina, AAA    Stroke Father 93    Heart disease Mother         Atrial fib    Diabetes Mother         Type II    Heart disease Brother         Atrial fib    Asthma Brother     Diverticulosis Brother     Heart disease Brother         Atrial fib    Diverticulosis Brother     No Known Problems Daughter     Breast cancer Neg Hx     Ovarian cancer Neg Hx     Colon cancer Neg Hx     Cancer Neg Hx     Hypertension Neg Hx      SOCIAL HISTORY:   Social History     Socioeconomic History    Marital status:    Tobacco Use    Smoking status: Never Smoker    Smokeless tobacco: Never Used   Substance and Sexual Activity    Alcohol use: Yes     Alcohol/week: 1.0 standard drink     Types: 1 Standard drinks or equivalent per week      Comment: 1-2 per month    Drug use: No     Comment: CBD oils for headache    Sexual activity: Not Currently     Partners: Male     Birth control/protection: Post-menopausal, See Surgical Hx, None     Comment: , together x 34 years     Social Determinants of Health     Financial Resource Strain: Low Risk     Difficulty of Paying Living Expenses: Not hard at all   Food Insecurity: No Food Insecurity    Worried About Running Out of Food in the Last Year: Never true    Ran Out of Food in the Last Year: Never true   Transportation Needs: No Transportation Needs    Lack of Transportation (Medical): No    Lack of Transportation (Non-Medical): No   Physical Activity: Insufficiently Active    Days of Exercise per Week: 1 day    Minutes of Exercise per Session: 20 min   Stress: No Stress Concern Present    Feeling of Stress : Not at all   Social Connections: Unknown    Frequency of Communication with Friends and Family: More than three times a week    Frequency of Social Gatherings with Friends and Family: More than three times a week    Active Member of Clubs or Organizations: No    Attends Club or Organization Meetings: Patient refused    Marital Status:    Housing Stability: Low Risk     Unable to Pay for Housing in the Last Year: No    Number of Places Lived in the Last Year: 1    Unstable Housing in the Last Year: No       MEDICATIONS:   Current Outpatient Medications:     acetaminophen (TYLENOL) 650 MG TbSR, Take 650 mg by mouth every 8 (eight) hours., Disp: , Rfl:     acyclovir (ZOVIRAX) 400 MG tablet, Take 1 tablet (400 mg total) by mouth 3 (three) times daily as needed., Disp: 90 tablet, Rfl: 3    CALCIUM CARB/VIT D3/MINERALS (CALTRATE PLUS ORAL), Take by mouth. Takes two chews daily, Disp: , Rfl:     docusate sodium (COLACE) 250 MG capsule, Take 250 mg by mouth 2 (two) times daily. , Disp: , Rfl:     estradioL (ESTRACE) 0.01 % (0.1 mg/gram) vaginal cream, Place 1 g vaginally  "every other day., Disp: 42 g, Rfl: 3    fluticasone propionate (FLONASE) 50 mcg/actuation nasal spray, 2 sprays (100 mcg total) by Each Nostril route once daily., Disp: 16 g, Rfl: 1    levothyroxine (SYNTHROID) 25 MCG tablet, Take 1 tablet (25 mcg total) by mouth once daily., Disp: 90 tablet, Rfl: 3    olopatadine (PATANOL) 0.1 % ophthalmic solution, 1 drop 2 (two) times daily., Disp: , Rfl:     rivaroxaban (XARELTO) 20 mg Tab, Take 1 tablet (20 mg total) by mouth once daily., Disp: 90 tablet, Rfl: 3    traMADoL (ULTRAM) 50 mg tablet, Take 50 mg by mouth every 6 (six) hours as needed for Pain., Disp: , Rfl:     vit C/E/Zn/coppr/lutein/zeaxan (PRESERVISION AREDS-2 ORAL), , Disp: , Rfl:     HYDROcodone-acetaminophen (NORCO) 5-325 mg per tablet, Take 1 tablet by mouth every 6 (six) hours as needed for Pain. Pt states takes PRN for headaches (Patient not taking: Reported on 7/25/2022), Disp: 28 tablet, Rfl: 0  No current facility-administered medications for this visit.    Facility-Administered Medications Ordered in Other Visits:     sodium chloride 0.9% flush 5 mL, 5 mL, Intravenous, PRN, Aleksandra Taylor NP  ALLERGIES:   Review of patient's allergies indicates:   Allergen Reactions    Morphine Nausea And Vomiting    Succinylcholine      Other reaction(s): v tach       Review of Systems:  Constitutional: no fever or chills  ENT: no nasal congestion or sore throat  Respiratory: no cough or shortness of breath  Cardiovascular: no chest pain or palpitations  Gastrointestinal: no nausea or vomiting, PUD, GERD, NSAID intolerance  Genitourinary: no hematuria or dysuria  Integument/Breast: no rash or pruritis  Hematologic/Lymphatic: no easy bruising or lymphadenopathy  Musculoskeletal: see HPI  Neurological: no seizures or tremors  Behavioral/Psych: no auditory or visual hallucinations      Physical Exam   Vitals:    07/25/22 1348   Weight: 70.8 kg (156 lb)   Height: 5' 8" (1.727 m)   PainSc:   9   PainLoc: " "Wrist       Constitutional: Oriented to person, place, and time. Appears well-developed and well-nourished.   HENT:   Head: Normocephalic and atraumatic.   Nose: Nose normal.   Eyes: No scleral icterus.   Neck: Normal range of motion.   Cardiovascular: Normal rate and regular rhythm.    Pulses:       Radial pulses are 2+ on the right side, and 2+ on the left side.   Pulmonary/Chest: Effort normal and breath sounds normal.   Abdominal: Soft.   Neurological: Alert and oriented to person, place, and time.   Skin: Skin is warm.   Psychiatric: Normal mood and affect.     MUSCULOSKELETAL UPPER EXTREMITY:  Examination left hand there is tenderness swelling over the 1st dorsal compartment the wrist  Positive Finkelstein test  Tinel sign negative  Range of motion wrist limited due to pain  There is also some mild tenderness over the CMC joint but grind test negative  There is triggering of the left middle finger with limited flexion and tenderness over the A1 pulley            Diagnostic Studies:  AP lateral x-ray left hand demonstrates some mild degenerative changes CMC joint left thumb        Assessment:  1. De Quervain tendinitis left wrist.    2. CMC arthritis left thumb mild.    3. Triggering of the left middle finger     Plan:  Explained the nature of the problem to the patient  I have recommended surgical treatment to include combined released de Quervain left wrist and trigger release left middle finger the risk and benefits of surgery explained to the patient she understands  I did explain that she would still have some symptoms related to the thumb arthritis she understands      The risks and benefits of surgery were discussed with the patient today and they understand.  The consent was signed in the office for surgery.      Toñito Adame MD (Jay)  [facility]  Orthopedic Upper Extremity Surgery      "

## 2022-07-25 NOTE — H&P (VIEW-ONLY)
CC:  De Quervain tendinitis left wrist with triggering of the left middle finger        HPI:  Zoë Enrique is a very pleasant 72 y.o. female with ongoing symptoms left hand including de Quervain tendinitis and triggering of the left middle finger  She has been through s is reported  Symptoms worse with use  She would like to consider surgery for both problems everal rounds of injections with temporary improvement but symptoms have recurred  No numbness or tingling         PAST MEDICAL HISTORY:   Past Medical History:   Diagnosis Date    Abnormal Pap smear of cervix     Allergy     Atrial fibrillation 2017    Atrial flutter     Cancer     osteosarcoma of right femur    Diverticulosis     Hx of atrial flutter     Hx of mitral valve prolapse     Hypothyroidism     Migraines     Recurrent upper respiratory infection (URI)     Supraventricular tachycardia      PAST SURGICAL HISTORY:   Past Surgical History:   Procedure Laterality Date    ABLATION OF ARRHYTHMOGENIC FOCUS FOR ATRIAL FIBRILLATION N/A 2020    Procedure: ABLATION, ARRHYTHMOGENIC FOCUS, FOR ATRIAL FIBRILLATION;  Surgeon: Moe Seymour MD;  Location: [facility] EP LAB;  Service: Cardiology;  Laterality: N/A;  AFL/AF, KHANH, CTI, PVI, RFA, CARTO, GEN, DM, 3 PREP    AUGMENTATION OF BREAST Bilateral     35 yrs     BREAST SURGERY      augmentation    CARDIOVERSION  2020    Procedure: Cardioversion;  Surgeon: Moe Seymour MD;  Location: [facility] EP LAB;  Service: Cardiology;;     SECTION      COLON SURGERY      partial colon resection    COLONOSCOPY N/A 3/19/2021    Procedure: COLONOSCOPY;  Surgeon: Sahara Salgado MD;  Location: [facility] ENDO ([address] [place] [address]);  Service: Endoscopy;  Laterality: N/A;  constipation prep- 7 days of miralax, 2 fulls of fulls, then day bf clears and split prep   covid test 3/5 pcw, prep instr emailed, antibiotics prophylactically, Xarelto hold x 2 days per Dr. Seymour-see telephone  encounter 2/10 -ml    COLPOSCOPY      COSMETIC SURGERY  1987    ESOPHAGOGASTRODUODENOSCOPY N/A 3/19/2021    Procedure: EGD (ESOPHAGOGASTRODUODENOSCOPY);  Surgeon: Sahara Salgado MD;  Location: [facility] ENDO (4TH FLR);  Service: Endoscopy;  Laterality: N/A;    EYE SURGERY Bilateral 2016    Lasik, then cataracts extraction    JOINT REPLACEMENT Right 2/07, 11/07, 2/09    KNEE ARTHROPLASTY Right 2007    revison in 2009    KNEE SURGERY Right 1989    distal femoral replacing TK    OOPHORECTOMY      TOTAL ABDOMINAL HYSTERECTOMY  1994    TAHBSO    TRANSESOPHAGEAL ECHOCARDIOGRAPHY N/A 11/24/2020    Procedure: ECHOCARDIOGRAM, TRANSESOPHAGEAL;  Surgeon: Ramírez Ortez III, MD;  Location: [facility] EP LAB;  Service: Cardiology;  Laterality: N/A;    TREATMENT OF CARDIAC ARRHYTHMIA N/A 8/12/2019    Procedure: CARDIOVERSION;  Surgeon: Moe Seymour MD;  Location: [facility] EP LAB;  Service: Cardiology;  Laterality: N/A;  KHANH/DCCV/MAC/DM/3PREP/*ADMIT FOR TIKOSYN*     FAMILY HISTORY:   Family History   Problem Relation Age of Onset    Diabetes Sister         Type I    Heart disease Sister         Atrrial fib, mitral valve repair    Hearing loss Father     Heart disease Father         Angina, AAA    Stroke Father 93    Heart disease Mother         Atrial fib    Diabetes Mother         Type II    Heart disease Brother         Atrial fib    Asthma Brother     Diverticulosis Brother     Heart disease Brother         Atrial fib    Diverticulosis Brother     No Known Problems Daughter     Breast cancer Neg Hx     Ovarian cancer Neg Hx     Colon cancer Neg Hx     Cancer Neg Hx     Hypertension Neg Hx      SOCIAL HISTORY:   Social History     Socioeconomic History    Marital status:    Tobacco Use    Smoking status: Never Smoker    Smokeless tobacco: Never Used   Substance and Sexual Activity    Alcohol use: Yes     Alcohol/week: 1.0 standard drink     Types: 1 Standard drinks or equivalent per week      Comment: 1-2 per month    Drug use: No     Comment: CBD oils for headache    Sexual activity: Not Currently     Partners: Male     Birth control/protection: Post-menopausal, See Surgical Hx, None     Comment: , together x 34 years     Social Determinants of Health     Financial Resource Strain: Low Risk     Difficulty of Paying Living Expenses: Not hard at all   Food Insecurity: No Food Insecurity    Worried About Running Out of Food in the Last Year: Never true    Ran Out of Food in the Last Year: Never true   Transportation Needs: No Transportation Needs    Lack of Transportation (Medical): No    Lack of Transportation (Non-Medical): No   Physical Activity: Insufficiently Active    Days of Exercise per Week: 1 day    Minutes of Exercise per Session: 20 min   Stress: No Stress Concern Present    Feeling of Stress : Not at all   Social Connections: Unknown    Frequency of Communication with Friends and Family: More than three times a week    Frequency of Social Gatherings with Friends and Family: More than three times a week    Active Member of Clubs or Organizations: No    Attends Club or Organization Meetings: Patient refused    Marital Status:    Housing Stability: Low Risk     Unable to Pay for Housing in the Last Year: No    Number of Places Lived in the Last Year: 1    Unstable Housing in the Last Year: No       MEDICATIONS:   Current Outpatient Medications:     acetaminophen (TYLENOL) 650 MG TbSR, Take 650 mg by mouth every 8 (eight) hours., Disp: , Rfl:     acyclovir (ZOVIRAX) 400 MG tablet, Take 1 tablet (400 mg total) by mouth 3 (three) times daily as needed., Disp: 90 tablet, Rfl: 3    CALCIUM CARB/VIT D3/MINERALS (CALTRATE PLUS ORAL), Take by mouth. Takes two chews daily, Disp: , Rfl:     docusate sodium (COLACE) 250 MG capsule, Take 250 mg by mouth 2 (two) times daily. , Disp: , Rfl:     estradioL (ESTRACE) 0.01 % (0.1 mg/gram) vaginal cream, Place 1 g vaginally  "every other day., Disp: 42 g, Rfl: 3    fluticasone propionate (FLONASE) 50 mcg/actuation nasal spray, 2 sprays (100 mcg total) by Each Nostril route once daily., Disp: 16 g, Rfl: 1    levothyroxine (SYNTHROID) 25 MCG tablet, Take 1 tablet (25 mcg total) by mouth once daily., Disp: 90 tablet, Rfl: 3    olopatadine (PATANOL) 0.1 % ophthalmic solution, 1 drop 2 (two) times daily., Disp: , Rfl:     rivaroxaban (XARELTO) 20 mg Tab, Take 1 tablet (20 mg total) by mouth once daily., Disp: 90 tablet, Rfl: 3    traMADoL (ULTRAM) 50 mg tablet, Take 50 mg by mouth every 6 (six) hours as needed for Pain., Disp: , Rfl:     vit C/E/Zn/coppr/lutein/zeaxan (PRESERVISION AREDS-2 ORAL), , Disp: , Rfl:     HYDROcodone-acetaminophen (NORCO) 5-325 mg per tablet, Take 1 tablet by mouth every 6 (six) hours as needed for Pain. Pt states takes PRN for headaches (Patient not taking: Reported on 7/25/2022), Disp: 28 tablet, Rfl: 0  No current facility-administered medications for this visit.    Facility-Administered Medications Ordered in Other Visits:     sodium chloride 0.9% flush 5 mL, 5 mL, Intravenous, PRN, Aleksandra Taylor NP  ALLERGIES:   Review of patient's allergies indicates:   Allergen Reactions    Morphine Nausea And Vomiting    Succinylcholine      Other reaction(s): v tach       Review of Systems:  Constitutional: no fever or chills  ENT: no nasal congestion or sore throat  Respiratory: no cough or shortness of breath  Cardiovascular: no chest pain or palpitations  Gastrointestinal: no nausea or vomiting, PUD, GERD, NSAID intolerance  Genitourinary: no hematuria or dysuria  Integument/Breast: no rash or pruritis  Hematologic/Lymphatic: no easy bruising or lymphadenopathy  Musculoskeletal: see HPI  Neurological: no seizures or tremors  Behavioral/Psych: no auditory or visual hallucinations      Physical Exam   Vitals:    07/25/22 1348   Weight: 70.8 kg (156 lb)   Height: 5' 8" (1.727 m)   PainSc:   9   PainLoc: " "Wrist       Constitutional: Oriented to person, place, and time. Appears well-developed and well-nourished.   HENT:   Head: Normocephalic and atraumatic.   Nose: Nose normal.   Eyes: No scleral icterus.   Neck: Normal range of motion.   Cardiovascular: Normal rate and regular rhythm.    Pulses:       Radial pulses are 2+ on the right side, and 2+ on the left side.   Pulmonary/Chest: Effort normal and breath sounds normal.   Abdominal: Soft.   Neurological: Alert and oriented to person, place, and time.   Skin: Skin is warm.   Psychiatric: Normal mood and affect.     MUSCULOSKELETAL UPPER EXTREMITY:  Examination left hand there is tenderness swelling over the 1st dorsal compartment the wrist  Positive Finkelstein test  Tinel sign negative  Range of motion wrist limited due to pain  There is also some mild tenderness over the CMC joint but grind test negative  There is triggering of the left middle finger with limited flexion and tenderness over the A1 pulley            Diagnostic Studies:  AP lateral x-ray left hand demonstrates some mild degenerative changes CMC joint left thumb        Assessment:  1. De Quervain tendinitis left wrist.    2. CMC arthritis left thumb mild.    3. Triggering of the left middle finger     Plan:  Explained the nature of the problem to the patient  I have recommended surgical treatment to include combined released de Quervain left wrist and trigger release left middle finger the risk and benefits of surgery explained to the patient she understands  I did explain that she would still have some symptoms related to the thumb arthritis she understands      The risks and benefits of surgery were discussed with the patient today and they understand.  The consent was signed in the office for surgery.      Toñito Adame MD (Jay)  [facility]  Orthopedic Upper Extremity Surgery      "

## 2022-07-26 ENCOUNTER — PATIENT MESSAGE (OUTPATIENT)
Dept: ELECTROPHYSIOLOGY | Facility: CLINIC | Age: 72
End: 2022-07-26
Payer: MEDICARE

## 2022-07-26 ENCOUNTER — RESEARCH ENCOUNTER (OUTPATIENT)
Dept: RESEARCH | Facility: HOSPITAL | Age: 72
End: 2022-07-26
Payer: MEDICARE

## 2022-07-26 NOTE — PROGRESS NOTES
Week 1        Collect any relevant AEs & Cms- none  Review Stimulation sessions & schedule-done  Document any comments on Stimulation Device- none

## 2022-07-29 ENCOUNTER — TELEPHONE (OUTPATIENT)
Dept: ORTHOPEDICS | Facility: CLINIC | Age: 72
End: 2022-07-29
Payer: MEDICARE

## 2022-07-29 NOTE — TELEPHONE ENCOUNTER
Spoke with patient. Surgery moved to 8/2. She was told by her physician to stop Xarelto 2 days prior to surgery.

## 2022-08-01 ENCOUNTER — PATIENT MESSAGE (OUTPATIENT)
Dept: INTERNAL MEDICINE | Facility: CLINIC | Age: 72
End: 2022-08-01
Payer: MEDICARE

## 2022-08-01 RX ORDER — LEVOFLOXACIN 750 MG/1
750 TABLET ORAL DAILY
Qty: 7 TABLET | Refills: 0 | Status: SHIPPED | OUTPATIENT
Start: 2022-08-01 | End: 2022-12-01

## 2022-08-02 ENCOUNTER — RESEARCH ENCOUNTER (OUTPATIENT)
Dept: RESEARCH | Facility: HOSPITAL | Age: 72
End: 2022-08-02
Payer: MEDICARE

## 2022-08-02 ENCOUNTER — ANESTHESIA EVENT (OUTPATIENT)
Dept: SURGERY | Facility: HOSPITAL | Age: 72
End: 2022-08-02
Payer: MEDICARE

## 2022-08-02 ENCOUNTER — ANESTHESIA (OUTPATIENT)
Dept: SURGERY | Facility: HOSPITAL | Age: 72
End: 2022-08-02
Payer: MEDICARE

## 2022-08-02 ENCOUNTER — PATIENT MESSAGE (OUTPATIENT)
Dept: SURGERY | Facility: HOSPITAL | Age: 72
End: 2022-08-02
Payer: MEDICARE

## 2022-08-02 ENCOUNTER — HOSPITAL ENCOUNTER (OUTPATIENT)
Facility: HOSPITAL | Age: 72
Discharge: HOME | End: 2022-08-02
Attending: ORTHOPAEDIC SURGERY | Admitting: ORTHOPAEDIC SURGERY
Payer: MEDICARE

## 2022-08-02 VITALS
BODY MASS INDEX: 23.49 KG/M2 | DIASTOLIC BLOOD PRESSURE: 76 MMHG | RESPIRATION RATE: 18 BRPM | TEMPERATURE: 98 F | WEIGHT: 155 LBS | HEIGHT: 68 IN | HEART RATE: 54 BPM | SYSTOLIC BLOOD PRESSURE: 141 MMHG | OXYGEN SATURATION: 98 %

## 2022-08-02 DIAGNOSIS — M65.332 TRIGGER MIDDLE FINGER OF LEFT HAND: ICD-10-CM

## 2022-08-02 DIAGNOSIS — M65.4 DE QUERVAIN'S TENOSYNOVITIS, LEFT: ICD-10-CM

## 2022-08-02 DIAGNOSIS — M25.532 LEFT WRIST PAIN: ICD-10-CM

## 2022-08-02 PROCEDURE — 71000015 HC POSTOP RECOV 1ST HR: Performed by: ORTHOPAEDIC SURGERY

## 2022-08-02 PROCEDURE — 26055 INCISE FINGER TENDON SHEATH: CPT | Mod: 51,F2,, | Performed by: ORTHOPAEDIC SURGERY

## 2022-08-02 PROCEDURE — 26055 PR INCISE FINGER TENDON SHEATH: ICD-10-PCS | Mod: 51,F2,, | Performed by: ORTHOPAEDIC SURGERY

## 2022-08-02 PROCEDURE — 25000 INCISION OF TENDON SHEATH: CPT | Mod: LT,,, | Performed by: ORTHOPAEDIC SURGERY

## 2022-08-02 PROCEDURE — 63600175 PHARM REV CODE 636 W HCPCS: Performed by: NURSE ANESTHETIST, CERTIFIED REGISTERED

## 2022-08-02 PROCEDURE — 63600175 PHARM REV CODE 636 W HCPCS: Performed by: ORTHOPAEDIC SURGERY

## 2022-08-02 PROCEDURE — 36000707: Performed by: ORTHOPAEDIC SURGERY

## 2022-08-02 PROCEDURE — 25000003 PHARM REV CODE 250: Performed by: ORTHOPAEDIC SURGERY

## 2022-08-02 PROCEDURE — 37000008 HC ANESTHESIA 1ST 15 MINUTES: Performed by: ORTHOPAEDIC SURGERY

## 2022-08-02 PROCEDURE — 71000016 HC POSTOP RECOV ADDL HR: Performed by: ORTHOPAEDIC SURGERY

## 2022-08-02 PROCEDURE — 37000009 HC ANESTHESIA EA ADD 15 MINS: Performed by: ORTHOPAEDIC SURGERY

## 2022-08-02 PROCEDURE — 36000706: Performed by: ORTHOPAEDIC SURGERY

## 2022-08-02 PROCEDURE — 25000 PR INCIS TENDON SHEATH,RADIAL STYLOID: ICD-10-PCS | Mod: LT,,, | Performed by: ORTHOPAEDIC SURGERY

## 2022-08-02 PROCEDURE — 25000003 PHARM REV CODE 250: Performed by: NURSE ANESTHETIST, CERTIFIED REGISTERED

## 2022-08-02 RX ORDER — HYDROCODONE BITARTRATE AND ACETAMINOPHEN 5; 325 MG/1; MG/1
1 TABLET ORAL EVERY 4 HOURS PRN
Qty: 12 TABLET | Refills: 0 | Status: SHIPPED | OUTPATIENT
Start: 2022-08-02 | End: 2022-08-15 | Stop reason: SDUPTHER

## 2022-08-02 RX ORDER — CEFAZOLIN SODIUM 2 G/50ML
2 SOLUTION INTRAVENOUS
Status: DISCONTINUED | OUTPATIENT
Start: 2022-08-02 | End: 2022-08-02 | Stop reason: HOSPADM

## 2022-08-02 RX ORDER — FENTANYL CITRATE 50 UG/ML
INJECTION, SOLUTION INTRAMUSCULAR; INTRAVENOUS
Status: DISCONTINUED | OUTPATIENT
Start: 2022-08-02 | End: 2022-08-02

## 2022-08-02 RX ORDER — LIDOCAINE HYDROCHLORIDE 10 MG/ML
INJECTION INFILTRATION; PERINEURAL
Status: DISCONTINUED | OUTPATIENT
Start: 2022-08-02 | End: 2022-08-02 | Stop reason: HOSPADM

## 2022-08-02 RX ORDER — BUPIVACAINE HYDROCHLORIDE 2.5 MG/ML
INJECTION, SOLUTION EPIDURAL; INFILTRATION; INTRACAUDAL
Status: DISCONTINUED | OUTPATIENT
Start: 2022-08-02 | End: 2022-08-02 | Stop reason: HOSPADM

## 2022-08-02 RX ORDER — PROPOFOL 10 MG/ML
VIAL (ML) INTRAVENOUS
Status: DISCONTINUED | OUTPATIENT
Start: 2022-08-02 | End: 2022-08-02

## 2022-08-02 RX ORDER — KETOROLAC TROMETHAMINE 30 MG/ML
15 INJECTION, SOLUTION INTRAMUSCULAR; INTRAVENOUS ONCE
Status: COMPLETED | OUTPATIENT
Start: 2022-08-02 | End: 2022-08-02

## 2022-08-02 RX ORDER — ACETAMINOPHEN 325 MG/1
650 TABLET ORAL EVERY 4 HOURS PRN
Status: DISCONTINUED | OUTPATIENT
Start: 2022-08-02 | End: 2022-08-02 | Stop reason: HOSPADM

## 2022-08-02 RX ORDER — OXYCODONE HYDROCHLORIDE 5 MG/1
10 TABLET ORAL EVERY 4 HOURS PRN
Status: DISCONTINUED | OUTPATIENT
Start: 2022-08-02 | End: 2022-08-02 | Stop reason: HOSPADM

## 2022-08-02 RX ORDER — LIDOCAINE HYDROCHLORIDE 20 MG/ML
INJECTION, SOLUTION EPIDURAL; INFILTRATION; INTRACAUDAL; PERINEURAL
Status: DISCONTINUED | OUTPATIENT
Start: 2022-08-02 | End: 2022-08-02

## 2022-08-02 RX ORDER — MIDAZOLAM HYDROCHLORIDE 1 MG/ML
INJECTION, SOLUTION INTRAMUSCULAR; INTRAVENOUS
Status: DISCONTINUED | OUTPATIENT
Start: 2022-08-02 | End: 2022-08-02

## 2022-08-02 RX ORDER — ONDANSETRON 8 MG/1
8 TABLET, ORALLY DISINTEGRATING ORAL EVERY 8 HOURS PRN
Status: DISCONTINUED | OUTPATIENT
Start: 2022-08-02 | End: 2022-08-02 | Stop reason: HOSPADM

## 2022-08-02 RX ADMIN — PROPOFOL 50 MG: 10 INJECTION, EMULSION INTRAVENOUS at 08:08

## 2022-08-02 RX ADMIN — CEFAZOLIN SODIUM 2 G: 2 SOLUTION INTRAVENOUS at 08:08

## 2022-08-02 RX ADMIN — FENTANYL CITRATE 50 MCG: 50 INJECTION, SOLUTION INTRAMUSCULAR; INTRAVENOUS at 08:08

## 2022-08-02 RX ADMIN — LIDOCAINE HYDROCHLORIDE 100 MG: 20 INJECTION, SOLUTION EPIDURAL; INFILTRATION; INTRACAUDAL; PERINEURAL at 08:08

## 2022-08-02 RX ADMIN — FENTANYL CITRATE 25 MCG: 50 INJECTION, SOLUTION INTRAMUSCULAR; INTRAVENOUS at 09:08

## 2022-08-02 RX ADMIN — PROPOFOL 125 MCG/KG/MIN: 10 INJECTION, EMULSION INTRAVENOUS at 08:08

## 2022-08-02 RX ADMIN — KETOROLAC TROMETHAMINE 15 MG: 30 INJECTION, SOLUTION INTRAMUSCULAR; INTRAVENOUS at 10:08

## 2022-08-02 RX ADMIN — SODIUM CHLORIDE, SODIUM LACTATE, POTASSIUM CHLORIDE, AND CALCIUM CHLORIDE: .6; .31; .03; .02 INJECTION, SOLUTION INTRAVENOUS at 08:08

## 2022-08-02 RX ADMIN — MIDAZOLAM 2 MG: 1 INJECTION INTRAMUSCULAR; INTRAVENOUS at 08:08

## 2022-08-02 NOTE — PLAN OF CARE
Pt asked about OK to receive toradol since she is on xarelto, checked with Dr. Adame, he still wants her to get the toradol. Explained to pt and she voiced agreement.

## 2022-08-02 NOTE — TRANSFER OF CARE
"Anesthesia Transfer of Care Note    Patient: Zoë Enrique    Procedure(s) Performed: Procedure(s) (LRB):  RELEASE, HAND, FOR DEQUERVAIN'S TENOSYNOVITIS (Left)  RELEASE, TRIGGER FINGER (Left)    Patient location: PACU    Anesthesia Type: MAC    Transport from OR: Transported from OR on room air with adequate spontaneous ventilation    Post pain: adequate analgesia    Post assessment: no apparent anesthetic complications and tolerated procedure well    Post vital signs: stable    Level of consciousness: awake, alert and oriented    Nausea/Vomiting: no nausea/vomiting    Complications: none    Transfer of care protocol was followed      Last vitals:   Visit Vitals  /67 (BP Location: Left arm, Patient Position: Lying)   Pulse (!) 59   Temp 36.3 °C (97.3 °F) (Temporal)   Resp 16   Ht 5' 8" (1.727 m)   Wt 70.3 kg (155 lb)   SpO2 96%   Breastfeeding No   BMI 23.57 kg/m²     "

## 2022-08-02 NOTE — ANESTHESIA POSTPROCEDURE EVALUATION
Anesthesia Post Evaluation    Patient: Zoë Enrique    Procedure(s) Performed: Procedure(s) (LRB):  RELEASE, HAND, FOR DEQUERVAIN'S TENOSYNOVITIS (Left)  RELEASE, TRIGGER FINGER (Left)    Final Anesthesia Type: MAC      Patient location during evaluation: OPS  Patient participation: Yes- Able to Participate  Level of consciousness: awake and alert and oriented  Post-procedure vital signs: reviewed and stable  Pain management: adequate  Airway patency: patent    PONV status at discharge: No PONV  Anesthetic complications: no      Cardiovascular status: blood pressure returned to baseline and hemodynamically stable  Respiratory status: unassisted, spontaneous ventilation and room air  Hydration status: euvolemic  Follow-up not needed.          Vitals Value Taken Time   /71 08/02/22 0920   Temp 97.5 08/02/22 0920   Pulse 80 08/02/22 0920   Resp 16 08/02/22 0920   SpO2 99 08/02/22 0920         No case tracking events are documented in the log.      Pain/Kim Score: No data recorded

## 2022-08-02 NOTE — OP NOTE
[facility] - Surgery (Hospital)  Operative Note      Date of Procedure: 8/2/2022     Procedure: Procedure(s) (LRB):  RELEASE, HAND, FOR DEQUERVAIN'S TENOSYNOVITIS (Left)  RELEASE, TRIGGER FINGER (Left)     Surgeon(s) and Role:     * Toñito Adame Jr., MD - Primary    Assisting Surgeon: None    Pre-Operative Diagnosis: De Quervain's tenosynovitis, left [M65.4]  Trigger middle finger of left hand [M65.332]    Post-Operative Diagnosis: Post-Op Diagnosis Codes:     * De Quervain's tenosynovitis, left [M65.4]     * Trigger middle finger of left hand [M65.332]    Anesthesia: Local MAC    Operative Findings (including complications, if any):  De Quervain tendinitis left wrist and triggering left middle finger     Description of Technical Procedures:     Preop diagnosis:  1. De Quervain tendinitis left wrist.    2. Triggering of the left middle finger.    Postop diagnosis:  Same.    Operative procedure:  1. De Quervain release left wrist.    2. Trigger finger release left middle finger.    Surgeon:  Deep.    Anesthesia:  Mac.    EBL:  Minimal.    Complications:  None.    Operative procedure in detail as follows:    After operative consent was obtained the patient brought the operating room placed supine operating table.  Anesthesia by IV sedation followed by injection of xylocaine Marcaine combination operative site left wrist and left middle finger.  Tourniquet applied left arm left upper extremity prepped and draped out in the normal sterile fashion.  The Esmarch used to exsanguinate the limb tourniquet inflated 225 mmHg.    The wrist was approached 1st with a transverse radial incision made with 15 blade.  Careful dissection used to expose the 1st dorsal compartment of the wrist.  The superficial branch of the radial nerve was identified protected.  The sheath was opened dorsally and longitudinally exposing all 3 tendons.  Range of motion was checked noted to be full without triggering or subluxation.  After complete  release of all 3 tendons the wound irrigated and closed with a running 5 0 nylon horizontal mattress suture on the skin.  Attention then turned to the middle fi nger where a volar oblique incision made at the base of left middle finger with a 15 blade.  Careful dissection used to expose the A1 pulley protecting the digital nerves.  A1 pulley released longitudinally freeing the tendons which were checked throughout a full range of motion no triggering.  The wound irrigated and closed with interrupted 5 O nylon horizontal mattress suture on the skin.  Sterile dressings applied to both incisions followed by light wrap.  Tourniquet deflated patient brought to the recovery room stable condition all sponge needle counts reported as correct no complications           Significant Surgical Tasks Conducted by the Assistant(s), if Applicable: none    Estimated Blood Loss (EBL): * No values recorded between 8/2/2022  8:45 AM and 8/2/2022  9:11 AM *           Implants: * No implants in log *    Specimens:   Specimen (24h ago, onward)            None                  Condition: Good    Disposition: PACU - hemodynamically stable.    Attestation: I was present and scrubbed for the entire procedure.    Discharge Note    OUTCOME: Patient tolerated treatment/procedure well without complication and is now ready for discharge.    DISPOSITION: Home or Self Care    FINAL DIAGNOSIS:  De Quervain's tenosynovitis, left    FOLLOWUP: In clinic    DISCHARGE INSTRUCTIONS:    Discharge Procedure Orders   Diet general     Call MD for:  temperature >100.4     Call MD for:  persistent nausea and vomiting     Call MD for:  severe uncontrolled pain     Keep surgical extremity elevated     Remove dressing in 72 hours

## 2022-08-02 NOTE — PROGRESS NOTES
Week 2     Collect any relevant AEs & Cms- worsening sinus infection started yesterday, she may start Abx treatment, she stated she has been having symptoms of sinus infection since June but symptoms have been worsening recently.   Also, she had her Left wrist surgery for (planned prior to enrollment) - added to patients medical history today   Review Stimulation sessions & schedule-done  Document any comments on Stimulation Device- none

## 2022-08-02 NOTE — ANESTHESIA PREPROCEDURE EVALUATION
2022  Zoë Enrique is a 72 y.o., female.    Patient Active Problem List   Diagnosis    Chronic tension-type headache, not intractable    Paroxysmal atrial fibrillation    Hypothyroidism (acquired)    Osteosarcoma of right femur    Acute pain of left wrist    Diffuse arthralgia    History of arthroplasty of right knee    Greater trochanteric bursitis of right hip    Weakness of hip    Hip pain    Longstanding persistent atrial fibrillation    Bronchiectasis without complication    Cough    Thrombocytopenia    Fecal occult blood test positive    Dysphagia    PSVT (paroxysmal supraventricular tachycardia)    Urge incontinence    Nocturia    Pelvic floor weakness    De Quervain's tenosynovitis, left    Trigger middle finger of left hand    Right knee pain    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     Past Medical History:   Diagnosis Date    Abnormal Pap smear of cervix     Allergy     Atrial fibrillation 2017    Atrial flutter     Cancer     osteosarcoma of right femur    Diverticulosis     Hx of atrial flutter     Hx of mitral valve prolapse     Hypothyroidism     Migraines     Recurrent upper respiratory infection (URI)     Supraventricular tachycardia      Past Surgical History:   Procedure Laterality Date    ABLATION OF ARRHYTHMOGENIC FOCUS FOR ATRIAL FIBRILLATION N/A 2020    Procedure: ABLATION, ARRHYTHMOGENIC FOCUS, FOR ATRIAL FIBRILLATION;  Surgeon: Moe Seymour MD;  Location: [facility] EP LAB;  Service: Cardiology;  Laterality: N/A;  AFL/AF, KHANH, CTI, PVI, RFA, CARTO, GEN, DM, 3 PREP    AUGMENTATION OF BREAST Bilateral 1984    35 yrs     BREAST SURGERY      augmentation    CARDIOVERSION  2020    Procedure: Cardioversion;  Surgeon: Moe Seymour MD;  Location: [facility] EP LAB;  Service: Cardiology;;      SECTION  1988    COLON SURGERY  2011    partial colon resection    COLONOSCOPY N/A 3/19/2021    Procedure: COLONOSCOPY;  Surgeon: Sahara Salgado MD;  [facility]  Service: Endoscopy;  Laterality: N/A;  constipation prep- 7 days of miralax, 2 fulls of fulls, then day bf clears and split prep   covid test 3/5 pcw, prep instr emailed, antibiotics prophylactically, Xarelto hold x 2 days per Dr. Seymour-see telephone encounter 2/10 -ml    COLPOSCOPY      COSMETIC SURGERY  1987    ESOPHAGOGASTRODUODENOSCOPY N/A 3/19/2021    Procedure: EGD (ESOPHAGOGASTRODUODENOSCOPY);  Surgeon: Sahara Salgado MD;  Location: [address]);  Service: Endoscopy;  Laterality: N/A;    EYE SURGERY Bilateral 2016    Lasik, then cataracts extraction    JOINT REPLACEMENT Right 2/07, 11/07, 2/09    KNEE ARTHROPLASTY Right 2007    revison in 2009    KNEE SURGERY Right 1989    distal femoral replacing TK    OOPHORECTOMY      TOTAL ABDOMINAL HYSTERECTOMY  1994    TAHBSO    TRANSESOPHAGEAL ECHOCARDIOGRAPHY N/A 11/24/2020    Procedure: ECHOCARDIOGRAM, TRANSESOPHAGEAL;  Surgeon: Ramírez Ortez III, MD;  Location: [facility] EP LAB;  Service: Cardiology;  Laterality: N/A;    TREATMENT OF CARDIAC ARRHYTHMIA N/A 8/12/2019    Procedure: CARDIOVERSION;  Surgeon: Moe Seymour MD;  Location: [facility] EP LAB;  Service: Cardiology;  Laterality: N/A;  KHANH/DCCV/MAC/DM/3PREP/*ADMIT FOR TIKOSYN*         Pre-op Assessment    I have reviewed the Patient Summary Reports.    I have reviewed the Nursing Notes. I have reviewed the NPO Status.   I have reviewed the Medications.     Review of Systems  Anesthesia Hx:  No problems with previous Anesthesia    Neurological:   Headaches    Endocrine:   Hypothyroidism        Physical Exam  General:  Well nourished      Airway/Jaw/Neck:  Airway Findings: Mouth Opening: Normal   Tongue: Normal   General Airway Assessment: Adult Mallampati: II  Improves to II with phonation.  TM Distance:  Normal, at least 6 cm   Jaw/Neck Findings:  Neck ROM: Normal ROM       Dental:  Dental Findings: In tact               Anesthesia Plan  Type of Anesthesia, risks & benefits discussed:  Anesthesia Type:  general    Patient's Preference:   Plan Factors:          Intra-op Monitoring Plan: standard ASA monitors  Intra-op Monitoring Plan Comments:   Post Op Pain Control Plan:   Post Op Pain Control Plan Comments:     Induction:   IV  Beta Blocker:  Patient is not currently on a Beta-Blocker (No further documentation required).       Informed Consent: Informed consent signed with the Patient and all parties understand the risks and agree with anesthesia plan.  All questions answered.  Anesthesia consent signed with patient.  ASA Score: 2     Day of Surgery Review of History & Physical: I have interviewed and examined the patient. I have reviewed the patient's H&P dated:  There are no significant changes.            Ready For Surgery From Anesthesia Perspective.           Physical Exam  General: Well nourished    Airway:  Mallampati: II / II  Mouth Opening: Normal  TM Distance: Normal, at least 6 cm  Tongue: Normal  Neck ROM: Normal ROM    Dental:  In tact          Anesthesia Plan  Type of Anesthesia, risks & benefits discussed:    Anesthesia Type: general  Intra-op Monitoring Plan: standard ASA monitors  Induction:  IV  Informed Consent: Informed consent signed with the Patient and all parties understand the risks and agree with anesthesia plan.  All questions answered.   ASA Score: 2  Day of Surgery Review of History & Physical: I have interviewed and examined the patient. I have reviewed the patient's H&P dated:     Ready For Surgery From Anesthesia Perspective.       .

## 2022-08-03 ENCOUNTER — PATIENT MESSAGE (OUTPATIENT)
Dept: ORTHOPEDICS | Facility: CLINIC | Age: 72
End: 2022-08-03
Payer: MEDICARE

## 2022-08-09 ENCOUNTER — RESEARCH ENCOUNTER (OUTPATIENT)
Dept: RESEARCH | Facility: HOSPITAL | Age: 72
End: 2022-08-09
Payer: MEDICARE

## 2022-08-09 NOTE — PROGRESS NOTES
[employer] REDUCEOAB study  IRB# [id number]  PI: Dr. Smallwood  Subject number:       today 10 Aug     Week 3- Unblinded      Reminded patient to start diary and complete 3 week questionnaire last week       Collect any AEs & Cms- worsening sinus infection took antibiotic Levaquin from 3- 9 aug, AE resolved   Review Stimulation sessions & schedule-done   Document any comments on Stimulation Device- none   Questionnaires completed PGIC and study visit form   Continuing with study

## 2022-08-10 ENCOUNTER — RESEARCH ENCOUNTER (OUTPATIENT)
Dept: RESEARCH | Facility: HOSPITAL | Age: 72
End: 2022-08-10
Payer: MEDICARE

## 2022-08-10 NOTE — PROGRESS NOTES
"[employer] REDUCE OAB, IRB 2022.027  PI: TUSHAR VILLEGAS MD  SUBJECT:.  DATE:. August 10, 2022    VISIT: 3 WEEKS    As "blinded" CRC, the following activities were completed:    1. Reviewed the subject's 3 day voiding diary which was complete. The dates recorded were August 7,8 and 9th, 2022.        Subject will follow up as per protocol.        "

## 2022-08-15 ENCOUNTER — OFFICE VISIT (OUTPATIENT)
Dept: ORTHOPEDICS | Facility: CLINIC | Age: 72
End: 2022-08-15
Payer: MEDICARE

## 2022-08-15 VITALS — WEIGHT: 155 LBS | BODY MASS INDEX: 23.49 KG/M2 | HEIGHT: 68 IN

## 2022-08-15 DIAGNOSIS — M65.332 TRIGGER MIDDLE FINGER OF LEFT HAND: ICD-10-CM

## 2022-08-15 DIAGNOSIS — M65.4 DE QUERVAIN'S TENOSYNOVITIS, LEFT: Primary | ICD-10-CM

## 2022-08-15 PROCEDURE — 99024 PR POST-OP FOLLOW-UP VISIT: ICD-10-PCS | Mod: S$GLB,,, | Performed by: ORTHOPAEDIC SURGERY

## 2022-08-15 PROCEDURE — 3288F FALL RISK ASSESSMENT DOCD: CPT | Mod: CPTII,S$GLB,, | Performed by: ORTHOPAEDIC SURGERY

## 2022-08-15 PROCEDURE — 1101F PR PT FALLS ASSESS DOC 0-1 FALLS W/OUT INJ PAST YR: ICD-10-PCS | Mod: CPTII,S$GLB,, | Performed by: ORTHOPAEDIC SURGERY

## 2022-08-15 PROCEDURE — 1159F PR MEDICATION LIST DOCUMENTED IN MEDICAL RECORD: ICD-10-PCS | Mod: CPTII,S$GLB,, | Performed by: ORTHOPAEDIC SURGERY

## 2022-08-15 PROCEDURE — 3008F PR BODY MASS INDEX (BMI) DOCUMENTED: ICD-10-PCS | Mod: CPTII,S$GLB,, | Performed by: ORTHOPAEDIC SURGERY

## 2022-08-15 PROCEDURE — 1125F PR PAIN SEVERITY QUANTIFIED, PAIN PRESENT: ICD-10-PCS | Mod: CPTII,S$GLB,, | Performed by: ORTHOPAEDIC SURGERY

## 2022-08-15 PROCEDURE — 1159F MED LIST DOCD IN RCRD: CPT | Mod: CPTII,S$GLB,, | Performed by: ORTHOPAEDIC SURGERY

## 2022-08-15 PROCEDURE — 1101F PT FALLS ASSESS-DOCD LE1/YR: CPT | Mod: CPTII,S$GLB,, | Performed by: ORTHOPAEDIC SURGERY

## 2022-08-15 PROCEDURE — 3008F BODY MASS INDEX DOCD: CPT | Mod: CPTII,S$GLB,, | Performed by: ORTHOPAEDIC SURGERY

## 2022-08-15 PROCEDURE — 3288F PR FALLS RISK ASSESSMENT DOCUMENTED: ICD-10-PCS | Mod: CPTII,S$GLB,, | Performed by: ORTHOPAEDIC SURGERY

## 2022-08-15 PROCEDURE — 99999 PR PBB SHADOW E&M-EST. PATIENT-LVL III: ICD-10-PCS | Mod: PBBFAC,,, | Performed by: ORTHOPAEDIC SURGERY

## 2022-08-15 PROCEDURE — 99024 POSTOP FOLLOW-UP VISIT: CPT | Mod: S$GLB,,, | Performed by: ORTHOPAEDIC SURGERY

## 2022-08-15 PROCEDURE — 1125F AMNT PAIN NOTED PAIN PRSNT: CPT | Mod: CPTII,S$GLB,, | Performed by: ORTHOPAEDIC SURGERY

## 2022-08-15 PROCEDURE — 99999 PR PBB SHADOW E&M-EST. PATIENT-LVL III: CPT | Mod: PBBFAC,,, | Performed by: ORTHOPAEDIC SURGERY

## 2022-08-15 RX ORDER — CELECOXIB 200 MG/1
200 CAPSULE ORAL DAILY
Qty: 20 CAPSULE | Refills: 0 | Status: SHIPPED | OUTPATIENT
Start: 2022-08-15 | End: 2022-09-14

## 2022-08-15 RX ORDER — HYDROCODONE BITARTRATE AND ACETAMINOPHEN 5; 325 MG/1; MG/1
1 TABLET ORAL EVERY 12 HOURS PRN
Qty: 30 TABLET | Refills: 0 | Status: SHIPPED | OUTPATIENT
Start: 2022-08-15 | End: 2022-08-25

## 2022-08-15 NOTE — PROGRESS NOTES
Subjective:      Patient ID: Zoë Enrique is a 72 y.o. female.  Chief Complaint: Post-op Evaluation (Left trigger/ De Quervain's, (Sx 8/2))      HPI  Zoë Enrique is a  72 y.o. female presenting today for post op visit.  She is s/p de Quervain release and trigger release left hand she is 2 weeks postop having quite a bit of pain postoperatively no numbness or tingling reported.     Review of patient's allergies indicates:   Allergen Reactions    Morphine Nausea And Vomiting    Succinylcholine      Other reaction(s): v tach         Current Outpatient Medications   Medication Sig Dispense Refill    acetaminophen (TYLENOL) 650 MG TbSR Take 650 mg by mouth every 8 (eight) hours.      acyclovir (ZOVIRAX) 400 MG tablet Take 1 tablet (400 mg total) by mouth 3 (three) times daily as needed. 90 tablet 3    CALCIUM CARB/VIT D3/MINERALS (CALTRATE PLUS ORAL) Take by mouth. Takes two chews daily      docusate sodium (COLACE) 250 MG capsule Take 250 mg by mouth 2 (two) times daily.       estradioL (ESTRACE) 0.01 % (0.1 mg/gram) vaginal cream Place 1 g vaginally every other day. 42 g 3    fluticasone propionate (FLONASE) 50 mcg/actuation nasal spray 2 sprays (100 mcg total) by Each Nostril route once daily. 16 g 1    HYDROcodone-acetaminophen (NORCO) 5-325 mg per tablet Take 1 tablet by mouth every 6 (six) hours as needed for Pain. Pt states takes PRN for headaches 28 tablet 0    levothyroxine (SYNTHROID) 25 MCG tablet Take 1 tablet (25 mcg total) by mouth once daily. 90 tablet 3    olopatadine (PATANOL) 0.1 % ophthalmic solution 1 drop 2 (two) times daily.      rivaroxaban (XARELTO) 20 mg Tab Take 1 tablet (20 mg total) by mouth once daily. 90 tablet 3    traMADoL (ULTRAM) 50 mg tablet Take 50 mg by mouth every 6 (six) hours as needed for Pain.      vit C/E/Zn/coppr/lutein/zeaxan (PRESERVISION AREDS-2 ORAL)       celecoxib (CELEBREX) 200 MG capsule Take 1 capsule (200 mg total) by mouth once daily. 20 capsule 0     HYDROcodone-acetaminophen (NORCO) 5-325 mg per tablet Take 1 tablet by mouth every 12 (twelve) hours as needed for Pain. 30 tablet 0    levoFLOXacin (LEVAQUIN) 750 MG tablet Take 1 tablet (750 mg total) by mouth once daily. (Patient not taking: Reported on 8/15/2022) 7 tablet 0     No current facility-administered medications for this visit.     Facility-Administered Medications Ordered in Other Visits   Medication Dose Route Frequency Provider Last Rate Last Admin    sodium chloride 0.9% flush 5 mL  5 mL Intravenous PRN Aleksandra Taylor NP           Past Medical History:   Diagnosis Date    Abnormal Pap smear of cervix     Allergy     Atrial fibrillation 2017    Atrial flutter     Cancer     osteosarcoma of right femur    Diverticulosis     Hx of atrial flutter     Hx of mitral valve prolapse     Hypothyroidism     Migraines     Recurrent upper respiratory infection (URI)     Supraventricular tachycardia        Past Surgical History:   Procedure Laterality Date    ABLATION OF ARRHYTHMOGENIC FOCUS FOR ATRIAL FIBRILLATION N/A 2020    Procedure: ABLATION, ARRHYTHMOGENIC FOCUS, FOR ATRIAL FIBRILLATION;  Surgeon: Moe Seymour MD;  Location: [facility] EP LAB;  Service: Cardiology;  Laterality: N/A;  AFL/AF, KHANH, CTI, PVI, RFA, CARTO, GEN, DM, 3 PREP    AUGMENTATION OF BREAST Bilateral     35 yrs     BREAST SURGERY      augmentation    CARDIOVERSION  2020    Procedure: Cardioversion;  Surgeon: Moe Seymour MD;  Location: [facility] EP LAB;  Service: Cardiology;;     SECTION      COLON SURGERY      partial colon resection    COLONOSCOPY N/A 3/19/2021    Procedure: COLONOSCOPY;  Surgeon: Sahara Salgado MD;  Location: [facility] ENDO ([address] [place] [address]);  Service: Endoscopy;  Laterality: N/A;  constipation prep- 7 days of miralax, 2 fulls of fulls, then day bf clears and split prep   covid test 3/5 pcw, prep instr emailed, antibiotics prophylactically, Xarelto  "hold x 2 days per Dr. Seymour-see telephone encounter 2/10 -ml    COLPOSCOPY      COSMETIC SURGERY  1987    DE QUERVAIN'S RELEASE Left 8/2/2022    Procedure: RELEASE, HAND, FOR DEQUERVAIN'S TENOSYNOVITIS;  Surgeon: Toñito Adame Jr., MD;  Location: [facility];  Service: Orthopedics;  Laterality: Left;    ESOPHAGOGASTRODUODENOSCOPY N/A 3/19/2021    Procedure: EGD (ESOPHAGOGASTRODUODENOSCOPY);  Surgeon: Sahara Salgado MD;  Location: [address]);  Service: Endoscopy;  Laterality: N/A;    EYE SURGERY Bilateral 2016    Lasik, then cataracts extraction    JOINT REPLACEMENT Right 2/07, 11/07, 2/09    KNEE ARTHROPLASTY Right 2007    revison in 2009    KNEE SURGERY Right 1989    distal femoral replacing TK    OOPHORECTOMY      TOTAL ABDOMINAL HYSTERECTOMY  1994    TAHBSO    TRANSESOPHAGEAL ECHOCARDIOGRAPHY N/A 11/24/2020    Procedure: ECHOCARDIOGRAM, TRANSESOPHAGEAL;  Surgeon: Ramírez Ortez III, MD;  Location: [facility] EP LAB;  Service: Cardiology;  Laterality: N/A;    TREATMENT OF CARDIAC ARRHYTHMIA N/A 8/12/2019    Procedure: CARDIOVERSION;  Surgeon: Moe Seymour MD;  Location: [facility] EP LAB;  Service: Cardiology;  Laterality: N/A;  KHANH/DCCV/MAC/DM/3PREP/*ADMIT FOR TIKOSYN*    TRIGGER FINGER RELEASE Left 8/2/2022    Procedure: RELEASE, TRIGGER FINGER;  Surgeon: Toñito Adame Jr., MD;  Location: [facility];  Service: Orthopedics;  Laterality: Left;  left middle finger       OBJECTIVE:   PHYSICAL EXAM:  Height: 5' 8" (172.7 cm) Weight: 70.3 kg (155 lb)  Vitals:    08/15/22 1303   Weight: 70.3 kg (155 lb)   Height: 5' 8" (1.727 m)   PainSc:   7   PainLoc: Hand     Ortho/SPM Exam  Examination left hand both incisions healing well no evidence of infection sutures in place range of motion wrist fingers full  strength decreased sensation intact    RADIOGRAPHS:  None  Comments: I have personally reviewed the imaging and I agree with the above radiologist's report.    ASSESSMENT/PLAN:     IMPRESSION:  " Status post de Quervain release left wrist and trigger release left middle finger    PLAN:  Sutures removed  Instructed in appropriate wound care   She is not wearing her wrist brace so I think going back into the wrist brace would be helpful for the next 2 weeks went over that with her today   Continue gentle soap and water gentle range of motion wrist fingers   I have refilled Norco for occasional use only and Celebrex 200 mg once a day with food   Avoid heavy lifting    FOLLOW UP:  2-3 weeks if she still having problems we might consider some physical therapy    Disclaimer: This note has been generated using voice-recognition software. There may be typographical errors that have been missed during proof-reading.

## 2022-08-16 ENCOUNTER — RESEARCH ENCOUNTER (OUTPATIENT)
Dept: RESEARCH | Facility: HOSPITAL | Age: 72
End: 2022-08-16
Payer: MEDICARE

## 2022-08-17 NOTE — PROGRESS NOTES
Week 4     Collect any relevant AEs & Cms- none, post surgery hand discomfort started on celebrex and norco dose increased (does not affect OAB symptoms)   Review Stimulation sessions & schedule-done  Document any comments on Stimulation Device- none

## 2022-08-23 ENCOUNTER — RESEARCH ENCOUNTER (OUTPATIENT)
Dept: RESEARCH | Facility: HOSPITAL | Age: 72
End: 2022-08-23
Payer: MEDICARE

## 2022-08-23 NOTE — PROGRESS NOTES
Week 5     Collect any relevant AEs & Cms- none  Review Stimulation sessions & schedule-done  Document any comments on Stimulation Device- none

## 2022-08-25 ENCOUNTER — TELEPHONE (OUTPATIENT)
Dept: DERMATOLOGY | Facility: CLINIC | Age: 72
End: 2022-08-25
Payer: MEDICARE

## 2022-08-25 NOTE — TELEPHONE ENCOUNTER
Offered the pt an appt with resident clinic. She denied the appt. Would seek care with her PCP.      Shanel       ----- Message from Gi Garcia sent at 8/25/2022  3:19 PM CDT -----  Regarding: Appt  Contact: pt @ [phone]  Pt is calling to get appt asap, pt has bites on torsol. Asking for a call back

## 2022-08-30 ENCOUNTER — RESEARCH ENCOUNTER (OUTPATIENT)
Dept: RESEARCH | Facility: HOSPITAL | Age: 72
End: 2022-08-30
Payer: MEDICARE

## 2022-08-30 NOTE — PROGRESS NOTES
Week 6     Collect any relevant AEs & Cms- none  Review Stimulation sessions & schedule-done  Document any comments on Stimulation Device- none

## 2022-08-31 DIAGNOSIS — Z78.0 MENOPAUSE: ICD-10-CM

## 2022-09-01 NOTE — PROGRESS NOTES
Chief Complaint   Patient presents with    Other     Clear drainage, daily headaches and throat clearing    .    HPI:     Zoë Enrique is a 72 y.o. female who presents for evaluation of a several month history of rhinorrhea and headaches. These symptoms have been present for years. Has seen neurologist in [place] years ago and had MRI at that time.  She relays that she has been having frequent upper respiratory infections.  She was most recently on Augmentin and Levaquin.  Last infection was August 2022. She did see Dr. Adorno and has had negative immunoCAP testing. She did have low pneumococcal titers and had pneumovax booster subsequently. Her allergist was concerned about possible LPR and referred for further evaluation. Relays that she has had headaches behind and around her eyes and her forehead. Describes pressure around her cheek region.    She does use sinus rinses or nasal sprays. She is taking Flonase daily.  She does not feel it helps the clear rhinorrhea. She admits to midface pain and pressure. She has not had sinus or nasal surgery. There is no history of sinonasal trauma. She denies hoarseness, she has occasional dysphagia to pills/some solids.  She notes globus sensation with eating.  She did have EGD in 2021 which was normal. She denies heartburn or indigestion.       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    Diverticulosis 1998    Hx of atrial flutter     Hx of mitral valve prolapse 1990    Hypothyroidism     Migraines     Recurrent upper respiratory infection (URI)     Supraventricular tachycardia      Social History     Socioeconomic History    Marital status:    Tobacco Use    Smoking status: Never    Smokeless tobacco: Never   Substance and Sexual Activity    Alcohol use: Yes     Alcohol/week: 1.0 standard drink     Types: 1 Standard drinks or  equivalent per week     Comment: 1-2 per month    Drug use: No     Comment: CBD oils for headache    Sexual activity: Not Currently     Partners: Male     Birth control/protection: Post-menopausal, See Surgical Hx, None     Comment: , together x 34 years     Social Determinants of Health     Financial Resource Strain: Low Risk     Difficulty of Paying Living Expenses: Not hard at all   Food Insecurity: No Food Insecurity    Worried About Running Out of Food in the Last Year: Never true    Ran Out of Food in the Last Year: Never true   Transportation Needs: No Transportation Needs    Lack of Transportation (Medical): No    Lack of Transportation (Non-Medical): No   Physical Activity: Insufficiently Active    Days of Exercise per Week: 1 day    Minutes of Exercise per Session: 20 min   Stress: No Stress Concern Present    Feeling of Stress : Not at all   Social Connections: Unknown    Frequency of Communication with Friends and Family: More than three times a week    Frequency of Social Gatherings with Friends and Family: More than three times a week    Active Member of Clubs or Organizations: No    Attends Club or Organization Meetings: Patient refused    Marital Status:    Housing Stability: Low Risk     Unable to Pay for Housing in the Last Year: No    Number of Places Lived in the Last Year: 1    Unstable Housing in the Last Year: No     Past Surgical History:   Procedure Laterality Date    ABLATION OF ARRHYTHMOGENIC FOCUS FOR ATRIAL FIBRILLATION N/A 11/24/2020    Procedure: ABLATION, ARRHYTHMOGENIC FOCUS, FOR ATRIAL FIBRILLATION;  Surgeon: Moe Seymour MD;  Location: [facility] EP LAB;  Service: Cardiology;  Laterality: N/A;  AFL/AF, KHANH, CTI, PVI, RFA, CARTO, GEN, DM, 3 PREP    AUGMENTATION OF BREAST Bilateral 1984    35 yrs     BREAST SURGERY  1984    augmentation    CARDIOVERSION  11/24/2020    Procedure: Cardioversion;  Surgeon: Moe Seymour MD;  Location: [facility] EP LAB;  Service: Cardiology;;      SECTION  1988    COLON SURGERY      partial colon resection    COLONOSCOPY N/A 3/19/2021    Procedure: COLONOSCOPY;  Surgeon: Sahara Salgado MD;  Location: [facility] ([facility]);  Service: Endoscopy;  Laterality: N/A;  constipation prep- 7 days of miralax, 2 fulls of fulls, then day bf clears and split prep   covid test 3/5 pcw, prep instr emailed, antibiotics prophylactically, Xarelto hold x 2 days per Dr. Seymour-see telephone encounter /10 -ml    COLPOSCOPY      COSMETIC SURGERY      DE QUERVAIN'S RELEASE Left 2022    Procedure: RELEASE, HAND, FOR DEQUERVAIN'S TENOSYNOVITIS;  Surgeon: Toñito Adame Jr., MD;  Location: [facility] OR;  Service: Orthopedics;  Laterality: Left;    ESOPHAGOGASTRODUODENOSCOPY N/A 3/19/2021    Procedure: EGD (ESOPHAGOGASTRODUODENOSCOPY);  Surgeon: Sahara Salgado MD;  [facility]  Service: Endoscopy;  Laterality: N/A;    EYE SURGERY Bilateral 2016    Lasik, then cataracts extraction    JOINT REPLACEMENT Right , ,     KNEE ARTHROPLASTY Right     revison in     KNEE SURGERY Right     distal femoral replacing TK    OOPHORECTOMY      TOTAL ABDOMINAL HYSTERECTOMY      TAHBSO    TRANSESOPHAGEAL ECHOCARDIOGRAPHY N/A 2020    Procedure: ECHOCARDIOGRAM, TRANSESOPHAGEAL;  Surgeon: Ramírez Ortez III, MD;  Location: [facility] EP LAB;  Service: Cardiology;  Laterality: N/A;    TREATMENT OF CARDIAC ARRHYTHMIA N/A 2019    Procedure: CARDIOVERSION;  Surgeon: Moe Seymour MD;  Location: [facility] EP LAB;  Service: Cardiology;  Laterality: N/A;  KHANH/DCCV/MAC/DM/3PREP/*ADMIT FOR TIKOSYN*    TRIGGER FINGER RELEASE Left 2022    Procedure: RELEASE, TRIGGER FINGER;  Surgeon: Toñito Adame Jr., MD;  Location: [facility] OR;  Service: Orthopedics;  Laterality: Left;  left middle finger     Family History   Problem Relation Age of Onset    Diabetes Sister         Type I    Heart disease Sister         Atrrial fib, mitral valve repair     Hearing loss Father     Heart disease Father         Angina, AAA    Stroke Father 93    Heart disease Mother         Atrial fib    Diabetes Mother         Type II    Heart disease Brother         Atrial fib    Asthma Brother     Diverticulosis Brother     Heart disease Brother         Atrial fib    Diverticulosis Brother     No Known Problems Daughter     Breast cancer Neg Hx     Ovarian cancer Neg Hx     Colon cancer Neg Hx     Cancer Neg Hx     Hypertension Neg Hx            Review of Systems  General: negative for chills, fever or weight loss  Psychological: negative for mood changes or depression  Ophthalmic: negative for blurry vision, photophobia or eye pain  ENT: see HPI  Respiratory: no cough, shortness of breath, or wheezing  Cardiovascular: no chest pain or dyspnea on exertion  Gastrointestinal: no abdominal pain, change in bowel habits, or black/ bloody stools  Musculoskeletal: negative for gait disturbance or muscular weakness  Neurological: no syncope or seizures; no ataxia  Dermatological: negative for puritis,  rash and jaundice  Hematologic/lymphatic: no easy bruising, no new lumps or bumps      Physical Exam:    Vitals:    09/02/22 0925   BP: 123/70   Pulse: (!) 56       Constitutional: Well appearing / communicating without difficutly.  NAD.  Eyes: EOM I Bilaterally  Head/Face: Normocephalic.  Negative paranasal sinus pressure/tenderness.  Salivary glands WNL.  House Brackmann I Bilaterally.    Right Ear: Auricle normal appearance. External Auditory Canal within normal limits,TM w/o masses/lesions/perforations. TM mobility noted.   Left Ear: Auricle normal appearance. External Auditory Canal WNL,TM w/o masses/lesions/perforations. TM mobility noted.  Nose: No gross nasal septal deviation. Inferior Turbinates 3+ bilaterally. No septal perforation. No masses/lesions. External nasal skin appears normal without masses/lesions.  Oral Cavity: Gingiva/lips within normal limits.  Dentition/gingiva  healthy appearing. Mucus membranes moist. Floor of mouth soft, no masses palpated. Oral Tongue mobile. Hard Palate appears normal.    Oropharynx: Base of tongue appears normal. No masses/lesions noted. Tonsillar fossa/pharyngeal wall without lesions. Posterior oropharynx WNL.  Soft palate without masses. Midline uvula.   Neck/Lymphatic: No LAD I-VI bilaterally.  No thyromegaly.  No masses noted on exam.          Diagnostic studies:  Laboratory 2022:  IgE level: Less than 35.  ImmunoCAP:  Negative.  IgA: 114.  Ig.  IgM: 66.  Pneumococcal titers: Not protective.    Assessment:    ICD-10-CM ICD-9-CM    1. Laryngopharyngeal reflux (LPR)  K21.9 478.79       2. Throat clearing  R68.89 784.99 Ambulatory referral/consult to ENT      3. Chronic recurrent sinusitis  J32.9 473.9       4. Chronic rhinitis  J31.0 472.0         The primary encounter diagnosis was Laryngopharyngeal reflux (LPR). Diagnoses of Throat clearing, Chronic recurrent sinusitis, and Chronic rhinitis were also pertinent to this visit.      Plan:  No orders of the defined types were placed in this encounter.    Suspect chronic clear rhinorrhea is likely due to vasomotor rhinitis.  I have asked the patient to continue Flonase 2 sprays per nostril daily and I have added Atrovent 2 sprays per nostril b.i.d..  Start Protonix 40 mg p.o. daily  We also discussed that we will monitor for recurrence sinusitis/upper respiratory tract infections in the future.  Currently she is asymptomatic.  We discussed that she has had the Pneumovax booster which will likely help decrease the recurrence URI infection is as well.     Linda Yu MD

## 2022-09-02 ENCOUNTER — OFFICE VISIT (OUTPATIENT)
Dept: OTOLARYNGOLOGY | Facility: CLINIC | Age: 72
End: 2022-09-02
Payer: MEDICARE

## 2022-09-02 ENCOUNTER — PATIENT MESSAGE (OUTPATIENT)
Dept: PHARMACY | Facility: CLINIC | Age: 72
End: 2022-09-02
Payer: MEDICARE

## 2022-09-02 VITALS
BODY MASS INDEX: 23.1 KG/M2 | DIASTOLIC BLOOD PRESSURE: 70 MMHG | HEIGHT: 68 IN | SYSTOLIC BLOOD PRESSURE: 123 MMHG | HEART RATE: 56 BPM | WEIGHT: 152.44 LBS

## 2022-09-02 DIAGNOSIS — K21.9 LARYNGOPHARYNGEAL REFLUX (LPR): Primary | Chronic | ICD-10-CM

## 2022-09-02 DIAGNOSIS — J32.9 CHRONIC RECURRENT SINUSITIS: Chronic | ICD-10-CM

## 2022-09-02 DIAGNOSIS — R09.89 THROAT CLEARING: ICD-10-CM

## 2022-09-02 DIAGNOSIS — J31.0 CHRONIC RHINITIS: Chronic | ICD-10-CM

## 2022-09-02 DIAGNOSIS — R09.A2 GLOBUS SENSATION: ICD-10-CM

## 2022-09-02 PROCEDURE — 1101F PT FALLS ASSESS-DOCD LE1/YR: CPT | Mod: CPTII,S$GLB,, | Performed by: OTOLARYNGOLOGY

## 2022-09-02 PROCEDURE — 1101F PR PT FALLS ASSESS DOC 0-1 FALLS W/OUT INJ PAST YR: ICD-10-PCS | Mod: CPTII,S$GLB,, | Performed by: OTOLARYNGOLOGY

## 2022-09-02 PROCEDURE — 31575 DIAGNOSTIC LARYNGOSCOPY: CPT | Mod: S$GLB,,, | Performed by: OTOLARYNGOLOGY

## 2022-09-02 PROCEDURE — 1159F PR MEDICATION LIST DOCUMENTED IN MEDICAL RECORD: ICD-10-PCS | Mod: CPTII,S$GLB,, | Performed by: OTOLARYNGOLOGY

## 2022-09-02 PROCEDURE — 3074F PR MOST RECENT SYSTOLIC BLOOD PRESSURE < 130 MM HG: ICD-10-PCS | Mod: CPTII,S$GLB,, | Performed by: OTOLARYNGOLOGY

## 2022-09-02 PROCEDURE — 99999 PR PBB SHADOW E&M-EST. PATIENT-LVL IV: CPT | Mod: PBBFAC,,, | Performed by: OTOLARYNGOLOGY

## 2022-09-02 PROCEDURE — 3008F PR BODY MASS INDEX (BMI) DOCUMENTED: ICD-10-PCS | Mod: CPTII,S$GLB,, | Performed by: OTOLARYNGOLOGY

## 2022-09-02 PROCEDURE — 3078F DIAST BP <80 MM HG: CPT | Mod: CPTII,S$GLB,, | Performed by: OTOLARYNGOLOGY

## 2022-09-02 PROCEDURE — 3074F SYST BP LT 130 MM HG: CPT | Mod: CPTII,S$GLB,, | Performed by: OTOLARYNGOLOGY

## 2022-09-02 PROCEDURE — 99204 OFFICE O/P NEW MOD 45 MIN: CPT | Mod: 25,S$GLB,, | Performed by: OTOLARYNGOLOGY

## 2022-09-02 PROCEDURE — 3008F BODY MASS INDEX DOCD: CPT | Mod: CPTII,S$GLB,, | Performed by: OTOLARYNGOLOGY

## 2022-09-02 PROCEDURE — 1126F PR PAIN SEVERITY QUANTIFIED, NO PAIN PRESENT: ICD-10-PCS | Mod: CPTII,S$GLB,, | Performed by: OTOLARYNGOLOGY

## 2022-09-02 PROCEDURE — 99204 PR OFFICE/OUTPT VISIT, NEW, LEVL IV, 45-59 MIN: ICD-10-PCS | Mod: 25,S$GLB,, | Performed by: OTOLARYNGOLOGY

## 2022-09-02 PROCEDURE — 3288F PR FALLS RISK ASSESSMENT DOCUMENTED: ICD-10-PCS | Mod: CPTII,S$GLB,, | Performed by: OTOLARYNGOLOGY

## 2022-09-02 PROCEDURE — 31575 LARYNGOSCOPY: ICD-10-PCS | Mod: S$GLB,,, | Performed by: OTOLARYNGOLOGY

## 2022-09-02 PROCEDURE — 1160F PR REVIEW ALL MEDS BY PRESCRIBER/CLIN PHARMACIST DOCUMENTED: ICD-10-PCS | Mod: CPTII,S$GLB,, | Performed by: OTOLARYNGOLOGY

## 2022-09-02 PROCEDURE — 1160F RVW MEDS BY RX/DR IN RCRD: CPT | Mod: CPTII,S$GLB,, | Performed by: OTOLARYNGOLOGY

## 2022-09-02 PROCEDURE — 1159F MED LIST DOCD IN RCRD: CPT | Mod: CPTII,S$GLB,, | Performed by: OTOLARYNGOLOGY

## 2022-09-02 PROCEDURE — 1126F AMNT PAIN NOTED NONE PRSNT: CPT | Mod: CPTII,S$GLB,, | Performed by: OTOLARYNGOLOGY

## 2022-09-02 PROCEDURE — 3288F FALL RISK ASSESSMENT DOCD: CPT | Mod: CPTII,S$GLB,, | Performed by: OTOLARYNGOLOGY

## 2022-09-02 PROCEDURE — 99999 PR PBB SHADOW E&M-EST. PATIENT-LVL IV: ICD-10-PCS | Mod: PBBFAC,,, | Performed by: OTOLARYNGOLOGY

## 2022-09-02 PROCEDURE — 3078F PR MOST RECENT DIASTOLIC BLOOD PRESSURE < 80 MM HG: ICD-10-PCS | Mod: CPTII,S$GLB,, | Performed by: OTOLARYNGOLOGY

## 2022-09-02 RX ORDER — IPRATROPIUM BROMIDE 21 UG/1
2 SPRAY, METERED NASAL 2 TIMES DAILY
Qty: 30 ML | Refills: 3 | Status: SHIPPED | OUTPATIENT
Start: 2022-09-02 | End: 2023-01-23 | Stop reason: SDUPTHER

## 2022-09-02 RX ORDER — PANTOPRAZOLE SODIUM 40 MG/1
40 TABLET, DELAYED RELEASE ORAL DAILY
Qty: 30 TABLET | Refills: 3 | Status: SHIPPED | OUTPATIENT
Start: 2022-09-02 | End: 2023-01-23 | Stop reason: SDUPTHER

## 2022-09-02 NOTE — PROCEDURES
Laryngoscopy    Date/Time: 9/2/2022 9:20 AM  Performed by: Linda Yu MD  Authorized by: Linda Yu MD     Consent Done?:  Yes (Verbal)  Anesthesia:     Local anesthetic:  Lidocaine 2% and [place]-Synephrine 1/2%  Laryngoscopy:     Areas examined:  Nasal cavities, nasopharynx, oropharynx, hypopharynx, larynx and vocal cords  Nose External:      No external nasal deformity  Nose Intranasal:      Mucosa no polyps     Mucosa ulcers not present     No mucosa lesions present     No septum gross deformity     Turbinates not enlarged  Nasopharynx:      No mucosa lesions     Adenoids not present     Posterior choanae patent     Eustachian tube patent  Larynx/hypopharynx:      No epiglottis lesions     No epiglottis edema     No AE folds lesions     No vocal cord polyps     Equal and normal bilateral     No hypopharynx lesions     No piriform sinus pooling     No piriform sinus lesions     Post cricoid edema (mild)     Post cricoid erythema (mild)

## 2022-09-06 ENCOUNTER — RESEARCH ENCOUNTER (OUTPATIENT)
Dept: RESEARCH | Facility: HOSPITAL | Age: 72
End: 2022-09-06
Payer: MEDICARE

## 2022-09-06 NOTE — PROGRESS NOTES
Week 7     Collect any relevant AEs & Cms- none (atorvent and protonix added- not related to OAB)   Review Stimulation sessions & schedule-done  Document any comments on Stimulation Device- none

## 2022-09-12 ENCOUNTER — RESEARCH ENCOUNTER (OUTPATIENT)
Dept: RESEARCH | Facility: HOSPITAL | Age: 72
End: 2022-09-12
Payer: MEDICARE

## 2022-09-14 ENCOUNTER — RESEARCH ENCOUNTER (OUTPATIENT)
Dept: RESEARCH | Facility: HOSPITAL | Age: 72
End: 2022-09-14
Payer: MEDICARE

## 2022-09-14 NOTE — PROGRESS NOTES
[employer] REDUCEOAB study  IRB# [id number]  PI: Dr. Smallwood  Subject number:       today 14 Sep     Week 8- Unblinded      Reminded patient to start diary and complete 8 week questionnaire last week       Collect any AEs & Cms- patient reported that since last week her joint pain has returned, tramadol, once a day and acetaminophen once a day- AE continues but patient doing better   Review Stimulation sessions & schedule-done   Document any comments on Stimulation Device- none   Questionnaires completed PGIC and study visit form   Continuing with study

## 2022-09-15 ENCOUNTER — PATIENT MESSAGE (OUTPATIENT)
Dept: RHEUMATOLOGY | Facility: CLINIC | Age: 72
End: 2022-09-15
Payer: MEDICARE

## 2022-09-19 ENCOUNTER — OFFICE VISIT (OUTPATIENT)
Dept: ORTHOPEDICS | Facility: CLINIC | Age: 72
End: 2022-09-19
Payer: MEDICARE

## 2022-09-19 ENCOUNTER — PATIENT MESSAGE (OUTPATIENT)
Dept: OTOLARYNGOLOGY | Facility: CLINIC | Age: 72
End: 2022-09-19
Payer: MEDICARE

## 2022-09-19 VITALS — WEIGHT: 153 LBS | HEIGHT: 68 IN | BODY MASS INDEX: 23.19 KG/M2

## 2022-09-19 DIAGNOSIS — M65.332 TRIGGER MIDDLE FINGER OF LEFT HAND: ICD-10-CM

## 2022-09-19 DIAGNOSIS — M65.4 DE QUERVAIN'S TENOSYNOVITIS: Primary | ICD-10-CM

## 2022-09-19 DIAGNOSIS — M65.4 DE QUERVAIN'S TENOSYNOVITIS, LEFT: ICD-10-CM

## 2022-09-19 PROCEDURE — 3288F FALL RISK ASSESSMENT DOCD: CPT | Mod: CPTII,S$GLB,, | Performed by: ORTHOPAEDIC SURGERY

## 2022-09-19 PROCEDURE — 99999 PR PBB SHADOW E&M-EST. PATIENT-LVL III: CPT | Mod: PBBFAC,,, | Performed by: ORTHOPAEDIC SURGERY

## 2022-09-19 PROCEDURE — 1125F PR PAIN SEVERITY QUANTIFIED, PAIN PRESENT: ICD-10-PCS | Mod: CPTII,S$GLB,, | Performed by: ORTHOPAEDIC SURGERY

## 2022-09-19 PROCEDURE — 3008F BODY MASS INDEX DOCD: CPT | Mod: CPTII,S$GLB,, | Performed by: ORTHOPAEDIC SURGERY

## 2022-09-19 PROCEDURE — 1101F PR PT FALLS ASSESS DOC 0-1 FALLS W/OUT INJ PAST YR: ICD-10-PCS | Mod: CPTII,S$GLB,, | Performed by: ORTHOPAEDIC SURGERY

## 2022-09-19 PROCEDURE — 99999 PR PBB SHADOW E&M-EST. PATIENT-LVL III: ICD-10-PCS | Mod: PBBFAC,,, | Performed by: ORTHOPAEDIC SURGERY

## 2022-09-19 PROCEDURE — 99024 PR POST-OP FOLLOW-UP VISIT: ICD-10-PCS | Mod: S$GLB,,, | Performed by: ORTHOPAEDIC SURGERY

## 2022-09-19 PROCEDURE — 1125F AMNT PAIN NOTED PAIN PRSNT: CPT | Mod: CPTII,S$GLB,, | Performed by: ORTHOPAEDIC SURGERY

## 2022-09-19 PROCEDURE — 1159F MED LIST DOCD IN RCRD: CPT | Mod: CPTII,S$GLB,, | Performed by: ORTHOPAEDIC SURGERY

## 2022-09-19 PROCEDURE — 3008F PR BODY MASS INDEX (BMI) DOCUMENTED: ICD-10-PCS | Mod: CPTII,S$GLB,, | Performed by: ORTHOPAEDIC SURGERY

## 2022-09-19 PROCEDURE — 1159F PR MEDICATION LIST DOCUMENTED IN MEDICAL RECORD: ICD-10-PCS | Mod: CPTII,S$GLB,, | Performed by: ORTHOPAEDIC SURGERY

## 2022-09-19 PROCEDURE — 99024 POSTOP FOLLOW-UP VISIT: CPT | Mod: S$GLB,,, | Performed by: ORTHOPAEDIC SURGERY

## 2022-09-19 PROCEDURE — 3288F PR FALLS RISK ASSESSMENT DOCUMENTED: ICD-10-PCS | Mod: CPTII,S$GLB,, | Performed by: ORTHOPAEDIC SURGERY

## 2022-09-19 PROCEDURE — 1101F PT FALLS ASSESS-DOCD LE1/YR: CPT | Mod: CPTII,S$GLB,, | Performed by: ORTHOPAEDIC SURGERY

## 2022-09-19 RX ORDER — FLUTICASONE PROPIONATE 50 MCG
2 SPRAY, SUSPENSION (ML) NASAL DAILY
Qty: 16 G | Refills: 1 | Status: SHIPPED | OUTPATIENT
Start: 2022-09-19 | End: 2023-01-23

## 2022-09-19 NOTE — PROGRESS NOTES
Subjective:      Patient ID: Zoë Enrique is a 72 y.o. female.  Chief Complaint: Post-op Evaluation of the Right Hand      HPI  Zoë Enrique is a  72 y.o. female presenting today for post op visit.  She is s/p de Quervain release and trigger release left hand she is now 6 weeks postop   Still complaining of pain in the left hand and seems quite resistant to start using the left hand   We had talked about therapy before and I think at this point we should go ahead and proceed with formal therapy for her left hand.     Review of patient's allergies indicates:   Allergen Reactions    Morphine Nausea And Vomiting    Succinylcholine      Other reaction(s): v tach         Current Outpatient Medications   Medication Sig Dispense Refill    acetaminophen (TYLENOL) 650 MG TbSR Take 650 mg by mouth every 8 (eight) hours.      docusate sodium (COLACE) 250 MG capsule Take 250 mg by mouth 2 (two) times daily.       estradioL (ESTRACE) 0.01 % (0.1 mg/gram) vaginal cream Place 1 g vaginally every other day. 42 g 3    fluticasone propionate (FLONASE) 50 mcg/actuation nasal spray 2 sprays (100 mcg total) by Each Nostril route once daily. 16 g 1    HYDROcodone-acetaminophen (NORCO) 5-325 mg per tablet Take 1 tablet by mouth every 6 (six) hours as needed for Pain. Pt states takes PRN for headaches 28 tablet 0    ipratropium (ATROVENT) 21 mcg (0.03 %) nasal spray 2 sprays by Nasal route 2 (two) times daily. 30 mL 3    levothyroxine (SYNTHROID) 25 MCG tablet Take 1 tablet (25 mcg total) by mouth once daily. 90 tablet 3    olopatadine (PATANOL) 0.1 % ophthalmic solution 1 drop 2 (two) times daily.      pantoprazole (PROTONIX) 40 MG tablet Take 1 tablet (40 mg total) by mouth once daily. 30 tablet 3    rivaroxaban (XARELTO) 20 mg Tab Take 1 tablet (20 mg total) by mouth once daily. 90 tablet 3    traMADoL (ULTRAM) 50 mg tablet Take 50 mg by mouth every 6 (six) hours as needed for Pain.      vit C/E/Zn/coppr/lutein/zeaxan (PRESERVISION  AREDS-2 ORAL)       acyclovir (ZOVIRAX) 400 MG tablet Take 1 tablet (400 mg total) by mouth 3 (three) times daily as needed. 90 tablet 3    CALCIUM CARB/VIT D3/MINERALS (CALTRATE PLUS ORAL) Take by mouth. Takes two chews daily      levoFLOXacin (LEVAQUIN) 750 MG tablet Take 1 tablet (750 mg total) by mouth once daily. (Patient not taking: No sig reported) 7 tablet 0     No current facility-administered medications for this visit.     Facility-Administered Medications Ordered in Other Visits   Medication Dose Route Frequency Provider Last Rate Last Admin    sodium chloride 0.9% flush 5 mL  5 mL Intravenous PRN Aleksandra Taylor NP           Past Medical History:   Diagnosis Date    Abnormal Pap smear of cervix     Allergy     Atrial fibrillation 2017    Atrial flutter     Cancer     osteosarcoma of right femur    Diverticulosis     Hx of atrial flutter     Hx of mitral valve prolapse     Hypothyroidism     Migraines     Recurrent upper respiratory infection (URI)     Supraventricular tachycardia        Past Surgical History:   Procedure Laterality Date    ABLATION OF ARRHYTHMOGENIC FOCUS FOR ATRIAL FIBRILLATION N/A 2020    Procedure: ABLATION, ARRHYTHMOGENIC FOCUS, FOR ATRIAL FIBRILLATION;  Surgeon: Moe Seymour MD;  Location: [facility] EP LAB;  Service: Cardiology;  Laterality: N/A;  AFL/AF, KHANH, CTI, PVI, RFA, CARTO, GEN, DM, 3 PREP    AUGMENTATION OF BREAST Bilateral 1984    35 yrs     BREAST SURGERY      augmentation    CARDIOVERSION  2020    Procedure: Cardioversion;  Surgeon: Moe Seymour MD;  Location: [facility] EP LAB;  Service: Cardiology;;     SECTION      COLON SURGERY      partial colon resection    COLONOSCOPY N/A 3/19/2021    Procedure: COLONOSCOPY;  Surgeon: Sahara Salgado MD;  Location: [facility] ENDO ([address] [place] [address]);  Service: Endoscopy;  Laterality: N/A;  constipation prep- 7 days of miralax, 2 fulls of fulls, then day bf clears and split prep   covid test 3/5  "pcw, prep instr emailed, antibiotics prophylactically, Xarelto hold x 2 days per Dr. Seymour-see telephone encounter 2/10 -ml    COLPOSCOPY      COSMETIC SURGERY  1987    DE QUERVAIN'S RELEASE Left 8/2/2022    Procedure: RELEASE, HAND, FOR DEQUERVAIN'S TENOSYNOVITIS;  Surgeon: Toñito Adame Jr., MD;  Location: [facility];  Service: Orthopedics;  Laterality: Left;    ESOPHAGOGASTRODUODENOSCOPY N/A 3/19/2021    Procedure: EGD (ESOPHAGOGASTRODUODENOSCOPY);  Surgeon: Sahara Salgado MD;  Location: [address]);  Service: Endoscopy;  Laterality: N/A;    EYE SURGERY Bilateral 2016    Lasik, then cataracts extraction    JOINT REPLACEMENT Right 2/07, 11/07, 2/09    KNEE ARTHROPLASTY Right 2007    revison in 2009    KNEE SURGERY Right 1989    distal femoral replacing TK    OOPHORECTOMY      TOTAL ABDOMINAL HYSTERECTOMY  1994    TAHBSO    TRANSESOPHAGEAL ECHOCARDIOGRAPHY N/A 11/24/2020    Procedure: ECHOCARDIOGRAM, TRANSESOPHAGEAL;  Surgeon: Ramírez Ortez III, MD;  Location: [facility] EP LAB;  Service: Cardiology;  Laterality: N/A;    TREATMENT OF CARDIAC ARRHYTHMIA N/A 8/12/2019    Procedure: CARDIOVERSION;  Surgeon: Moe Seymour MD;  Location: [facility] EP LAB;  Service: Cardiology;  Laterality: N/A;  KHANH/DCCV/MAC/DM/3PREP/*ADMIT FOR TIKOSYN*    TRIGGER FINGER RELEASE Left 8/2/2022    Procedure: RELEASE, TRIGGER FINGER;  Surgeon: Toñito Adame Jr., MD;  Location: [facility];  Service: Orthopedics;  Laterality: Left;  left middle finger       OBJECTIVE:   PHYSICAL EXAM:  Height: 5' 8" (172.7 cm) Weight: 69.4 kg (153 lb)  Vitals:    09/19/22 1337   Weight: 69.4 kg (153 lb)   Height: 5' 8" (1.727 m)   PainSc:   6     Ortho/SPM Exam  Examination left hand the incision is well healed there is some mild swelling no evidence of infection she is sensitive to touch around the incision site in the palm  The range of motion fingers is full  strength is decreased sensation intact all digits    RADIOGRAPHS:  None  Comments: I " have personally reviewed the imaging and I agree with the above radiologist's report.    ASSESSMENT/PLAN:     IMPRESSION:  Status post de Quervain release and trigger release left hand    PLAN:  I am little concerned that she may be developing a little bit of RSD so I have ordered OT for her left hand and wrist to work on range of motion stretching strengthening and modality treatments   I have also started her on a compound cream which I would like her rub into the areas to help with desensitization   I have given her squeeze ball if she can use at home and I have encouraged her to start using her left hand as much as possible   Continue Advil or Tylenol for pain       FOLLOW UP:  4-6 weeks    Disclaimer: This note has been generated using voice-recognition software. There may be typographical errors that have been missed during proof-reading.

## 2022-09-20 ENCOUNTER — PATIENT MESSAGE (OUTPATIENT)
Dept: RHEUMATOLOGY | Facility: CLINIC | Age: 72
End: 2022-09-20

## 2022-09-20 ENCOUNTER — OFFICE VISIT (OUTPATIENT)
Dept: RHEUMATOLOGY | Facility: CLINIC | Age: 72
End: 2022-09-20
Payer: MEDICARE

## 2022-09-20 ENCOUNTER — RESEARCH ENCOUNTER (OUTPATIENT)
Dept: RESEARCH | Facility: HOSPITAL | Age: 72
End: 2022-09-20
Payer: MEDICARE

## 2022-09-20 VITALS
BODY MASS INDEX: 23.43 KG/M2 | HEART RATE: 60 BPM | SYSTOLIC BLOOD PRESSURE: 125 MMHG | WEIGHT: 154.56 LBS | DIASTOLIC BLOOD PRESSURE: 71 MMHG | HEIGHT: 68 IN

## 2022-09-20 DIAGNOSIS — M15.4 EROSIVE OSTEOARTHRITIS OF BOTH HANDS: Primary | ICD-10-CM

## 2022-09-20 PROCEDURE — 1125F AMNT PAIN NOTED PAIN PRSNT: CPT | Mod: CPTII,S$GLB,, | Performed by: INTERNAL MEDICINE

## 2022-09-20 PROCEDURE — 3074F PR MOST RECENT SYSTOLIC BLOOD PRESSURE < 130 MM HG: ICD-10-PCS | Mod: CPTII,S$GLB,, | Performed by: INTERNAL MEDICINE

## 2022-09-20 PROCEDURE — 3078F DIAST BP <80 MM HG: CPT | Mod: CPTII,S$GLB,, | Performed by: INTERNAL MEDICINE

## 2022-09-20 PROCEDURE — 99999 PR PBB SHADOW E&M-EST. PATIENT-LVL III: ICD-10-PCS | Mod: PBBFAC,,, | Performed by: INTERNAL MEDICINE

## 2022-09-20 PROCEDURE — 3074F SYST BP LT 130 MM HG: CPT | Mod: CPTII,S$GLB,, | Performed by: INTERNAL MEDICINE

## 2022-09-20 PROCEDURE — 99214 PR OFFICE/OUTPT VISIT, EST, LEVL IV, 30-39 MIN: ICD-10-PCS | Mod: S$GLB,,, | Performed by: INTERNAL MEDICINE

## 2022-09-20 PROCEDURE — 3078F PR MOST RECENT DIASTOLIC BLOOD PRESSURE < 80 MM HG: ICD-10-PCS | Mod: CPTII,S$GLB,, | Performed by: INTERNAL MEDICINE

## 2022-09-20 PROCEDURE — 3008F PR BODY MASS INDEX (BMI) DOCUMENTED: ICD-10-PCS | Mod: CPTII,S$GLB,, | Performed by: INTERNAL MEDICINE

## 2022-09-20 PROCEDURE — 1159F MED LIST DOCD IN RCRD: CPT | Mod: CPTII,S$GLB,, | Performed by: INTERNAL MEDICINE

## 2022-09-20 PROCEDURE — 99214 OFFICE O/P EST MOD 30 MIN: CPT | Mod: S$GLB,,, | Performed by: INTERNAL MEDICINE

## 2022-09-20 PROCEDURE — 1159F PR MEDICATION LIST DOCUMENTED IN MEDICAL RECORD: ICD-10-PCS | Mod: CPTII,S$GLB,, | Performed by: INTERNAL MEDICINE

## 2022-09-20 PROCEDURE — 1125F PR PAIN SEVERITY QUANTIFIED, PAIN PRESENT: ICD-10-PCS | Mod: CPTII,S$GLB,, | Performed by: INTERNAL MEDICINE

## 2022-09-20 PROCEDURE — 99999 PR PBB SHADOW E&M-EST. PATIENT-LVL III: CPT | Mod: PBBFAC,,, | Performed by: INTERNAL MEDICINE

## 2022-09-20 PROCEDURE — 3008F BODY MASS INDEX DOCD: CPT | Mod: CPTII,S$GLB,, | Performed by: INTERNAL MEDICINE

## 2022-09-20 RX ORDER — METHYLPREDNISOLONE 4 MG/1
TABLET ORAL
Qty: 21 EACH | Refills: 0 | Status: SHIPPED | OUTPATIENT
Start: 2022-09-20 | End: 2022-10-11

## 2022-09-20 ASSESSMENT — ROUTINE ASSESSMENT OF PATIENT INDEX DATA (RAPID3)
PAIN SCORE: 7
TOTAL RAPID3 SCORE: 5.5
PATIENT GLOBAL ASSESSMENT SCORE: 6.5
WHEN YOU AWAKENED IN THE MORNING OVER THE LAST WEEK, PLEASE INDICATE THE AMOUNT OF TIME IT TAKES UNTIL YOU ARE AS LIMBER AS YOU WILL BE FOR THE DAY: 6-8 HOURS
PSYCHOLOGICAL DISTRESS SCORE: 5.5
MDHAQ FUNCTION SCORE: 0.9
AM STIFFNESS SCORE: 1, YES
FATIGUE SCORE: 1.5

## 2022-09-20 NOTE — PROGRESS NOTES
Subjective:       Patient ID: Zoë Enrique is a 69 y.o. female.    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  After yadira, she developed pain in right CMC region.  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 She woke up on Sunday the  Jan 25th, she had pain in hands, wrists, and ankles.  There was swelling in hands and wrists.  Reports significant pain in let wrist.  Sometimes she has shooting pain in left wrist. She has been using voltaren gel for her pain. Pain level can be 8/10, aching. Pain is sharp and burning.   She was given medrol dose pack with some improvement. She still has pain and stiffness in the hands.  She has pain in left wrist. She has pain in left ankle.  She reports she has stiffness worse in the morning for a few hours.  Denies any changes in diet. Denies history of smoking.  Denies changes in weight, rashes, oral ulcers,photosensitivity.    Family hx:niece: PILY      Interval history: She has worsening pain in hands for several weeks.  She is s/p de Quervain release and trigger release left hand.  She reports breast implants are leaking.She is having pain in hands, hips, knees, ankles, left wrist, and feet.  She is getting swelling in hands.  She tried taking tylenol/tramadol at night and it helped her with her pain.    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 Negative for activity change, appetite change, chills, diaphoresis, fever and unexpected weight change.   HENT: Negative for congestion, ear  "discharge, ear pain, facial swelling, mouth sores, sinus pressure, sneezing, sore throat, tinnitus and trouble swallowing.    Eyes: Negative for photophobia, pain, discharge, redness, itching and visual disturbance.   Respiratory: Negative for apnea, chest tightness, shortness of breath, wheezing and stridor.    Cardiovascular: Negative for leg swelling.   Gastrointestinal: Negative for abdominal distention, abdominal pain, anal bleeding, blood in stool, constipation, diarrhea and nausea.   Endocrine: Negative for cold intolerance and heat intolerance.   Genitourinary: Negative for difficulty urinating and dysuria.   Musculoskeletal: Positive for arthralgias and joint swelling. Negative for back pain, gait problem, myalgias, neck pain and neck stiffness.   Skin: Negative for color change, pallor, rash and wound.   Neurological: Negative for dizziness, seizures, light-headedness and numbness.   Hematological: Negative for adenopathy. Does not bruise/bleed easily.   Psychiatric/Behavioral: Negative for sleep disturbance. The patient is not nervous/anxious.            Objective:   /71   Pulse 85   Ht 5' 8" (1.727 m)   Wt 73.9 kg (163 lb 0.5 oz)   BMI 24.79 kg/m²      Physical Exam   Constitutional: She is oriented to person, place, and time.   HENT:   Head: Normocephalic and atraumatic.   Right Ear: External ear normal.   Left Ear: External ear normal.   Nose: Nose normal.   Mouth/Throat: Oropharynx is clear and moist. No oropharyngeal exudate.   Eyes: Conjunctivae and EOM are normal. Pupils are equal, round, and reactive to light. Right eye exhibits no discharge. Left eye exhibits no discharge. No scleral icterus.   Neck: Neck supple. No JVD present. No thyromegaly present.   Cardiovascular: Normal rate, regular rhythm, normal heart sounds and intact distal pulses.  Exam reveals no gallop and no friction rub.    No murmur heard.  Pulmonary/Chest: Effort normal and breath sounds normal. No respiratory " distress. She has no wheezes. She has no rales. She exhibits no tenderness.   Abdominal: Soft. Bowel sounds are normal. She exhibits no distension and no mass. There is no tenderness. There is no rebound and no guarding.   Lymphadenopathy:     She has no cervical adenopathy.   Neurological: She is alert and oriented to person, place, and time. No cranial nerve deficit. Gait normal. Coordination normal.   Skin: Skin is dry. No rash noted. No erythema. No pallor.     Psychiatric: Affect and judgment normal.   Musculoskeletal: She exhibits edema and tenderness. She exhibits no deformity.   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 .  She is s/p de Quervain release and trigger release left hand.  She is coming in with flare so will give her medrol pack.  Consider repeating MRI of hands.  Discussed trial of plaquenil but she declines.    Plan:       Problem List Items Addressed This Visit       None        Medrol pack  continue tramadol 50mg po BID prn  Continue tylenol arthritis BID PRN  Note is made of leukopenia/thrombocytopenia      30 * minutes of total time spent on the encounter, which includes face to face time and non-face to face time preparing to see the patient (eg, review of tests), Obtaining and/or reviewing separately obtained history, Documenting clinical information in the electronic or other health record, Independently interpreting results (not separately reported) and communicating results to the  patient/family/caregiver, or Care coordination (not separately reported).

## 2022-09-22 ENCOUNTER — CLINICAL SUPPORT (OUTPATIENT)
Dept: REHABILITATION | Facility: HOSPITAL | Age: 72
End: 2022-09-22
Attending: ORTHOPAEDIC SURGERY
Payer: MEDICARE

## 2022-09-22 DIAGNOSIS — R29.898 DECREASED GRIP STRENGTH OF LEFT HAND: ICD-10-CM

## 2022-09-22 DIAGNOSIS — M65.4 DE QUERVAIN'S TENOSYNOVITIS: ICD-10-CM

## 2022-09-22 DIAGNOSIS — M25.632 DECREASED RANGE OF MOTION OF LEFT WRIST: ICD-10-CM

## 2022-09-22 DIAGNOSIS — R20.1 IMPAIRED SENSATION: ICD-10-CM

## 2022-09-22 PROCEDURE — 97022 WHIRLPOOL THERAPY: CPT

## 2022-09-22 PROCEDURE — 97110 THERAPEUTIC EXERCISES: CPT

## 2022-09-22 PROCEDURE — 97165 OT EVAL LOW COMPLEX 30 MIN: CPT

## 2022-09-22 RX ORDER — TRAMADOL HYDROCHLORIDE 50 MG/1
50 TABLET ORAL EVERY 8 HOURS PRN
Qty: 90 TABLET | Refills: 5 | Status: SHIPPED | OUTPATIENT
Start: 2022-09-22 | End: 2022-09-30 | Stop reason: SDUPTHER

## 2022-09-22 NOTE — PATIENT INSTRUCTIONS
"  [facility], OCCUPATIONAL THERAPY  HOME EXERCISE PROGRAM      Complete the following massages for 2-3min each, 3-5x/day.                                                Hold for 30 sec, 3 sets; 3x/day    Complete the following exercises with 15 repetitions each, 3-5x/day.     AROM: Wrist Flexion / Extension               Bend your wrist forward and back as far as possible.      AROM: Wrist Radial / Ulnar Deviation  Bend your wrist from side to side as far as possible.    AROM: Wrist Flexion / Extension  Make a fist, then bend your wrist forward then back as far as possible.         AROM: Wrist Radial / Ulnar Deviation   Make a fist then bend your wrist toward your body, then away.         Copyright © I. All rights reserved.           hold 30 seconds, repeat 3 times. Stretch should be gentle and painfree. If needed, complete with elbow bent, progressing to elbow straight as tolerable      Complete the following exercises with 15 repetitions each, 4x/day.     AROM: DIP Flexion / Extension  Pinch middle knuckle to prevent bending. Bend end knuckle until stretch is felt.   Hold 3 seconds. Relax. Straighten finger as far as possible.    AROM: PIP Flexion / Extension  Pinch bottom knuckle  to prevent bending. Actively bend middle knuckle until stretch is felt.   Hold 3 seconds. Relax. Straighten finger as far as possible.    AROM: Isolated PIP Flexion  Bend only middle joint of your finger, keeping other fingers straight with other hand.      AROM: Composte Extension ("Finger Lifts")  Lift your finger off of the table one at a time. Hold 3 seconds. Relax your finger.    AROM: Abduction / Adduction  With hand flat on table, spread all fingers apart, then bring them together as close as possible.    Copyright © [employer]. All rights reserved.               "

## 2022-09-25 PROBLEM — R20.1 IMPAIRED SENSATION: Status: ACTIVE | Noted: 2022-09-25

## 2022-09-25 PROBLEM — M25.632 DECREASED RANGE OF MOTION OF LEFT WRIST: Status: ACTIVE | Noted: 2022-09-25

## 2022-09-25 PROBLEM — R29.898 DECREASED GRIP STRENGTH OF LEFT HAND: Status: ACTIVE | Noted: 2022-09-25

## 2022-09-25 NOTE — PLAN OF CARE
[facility]  Occupational Therapy Initial Evaluation    Date: 9/22/2022  Name: Zoë Enrique  Clinic Number: [id number]    Therapy Diagnosis:   Encounter Diagnoses   Name Primary?    De Quervain's tenosynovitis     Decreased range of motion of left wrist     Decreased  strength of left hand     Impaired sensation      Physician: Toñito Adame Jr., *    Physician Orders: evaluation and treat; OT for her left hand and wrist to work on range of motion stretching strengthening and modality treatments   Medical Diagnosis: M65.4 (ICD-10-CM) - De Quervain's tenosynovitis  Surgical Procedure and Date: 1. De Quervain release left wrist.     2. Trigger finger release left middle finger., 8/2/22 / Date of Injury/Onset: about 6 + months  Evaluation Date: 9/22/22  Insurance Authorization Period Expiration: 9/19/23  Plan of Care Expiration: 12/1/22  Progress Note Due: 11/7/22   Date of Return to MD: 11/7/22  Visit # / Visits authorized: 1 / 1  FOTO: initial eval     Precautions:  Standard    Time In: 5:00 pm   Time Out: 6:00  Total Appointment Time (timed & untimed codes): 60 minutes      SUBJECTIVE     Date of Onset: about 6 + months     History of Current Condition/Mechanism of Injury: Zoë reports: Pt reports having hypersensitivity at dorsal hand and radial wrist. She states having more stiffness in the morning and overall in the wrist/finger. She also has pain with end-range wrist and digit movements. She states her  helps her with daily activities that are heavier.     Falls: 0    Involved Side: left  Dominant Side: Right  Imaging: x-ray 7/25/22 Wrist complete two views left: There is remote trauma the distal ulna.  No acute fracture dislocation bone destruction seen.  There is DJD.  No foreign body seen.     Prior Therapy: none  Occupation:  retired  Working presently: retired  Duties:     Functional Limitations/Social History:    Previous functional status includes:  Independent with all ADLs.     Current Functional Status   Home/Living environment: lives with their spouse      Limitation of Functional Status as follows:   ADLs/IADLs:     - Feeding: ind     - Bathing: trouble with wringing out washcloth    - Dressing/Grooming: trouble with bra and doing hair    - Driving: trouble with steering and gripping at times     Leisure: trouble holding phone, gardening, yard work    - trouble and pain with cooking     Pain:  Functional Pain Scale Rating 0-10: Current 1/10, worst 8/10, best 1/10   Location: left hand  Description: radiating, shooting, sharp, aching   Aggravating Factors: Lifting and bending wrist, gripping   Easing Factors: rest and brace wear, medication     Patient's Goals for Therapy: return to normal activity and functional use of hand, reduce abnormal sensation, decrease pain     Medical History:   Past Medical History:   Diagnosis Date    Abnormal Pap smear of cervix     Allergy     Atrial fibrillation 2017    Atrial flutter     Cancer     osteosarcoma of right femur    Diverticulosis     Hx of atrial flutter     Hx of mitral valve prolapse     Hypothyroidism     Migraines     Recurrent upper respiratory infection (URI)     Supraventricular tachycardia        Surgical History:    has a past surgical history that includes Knee surgery (Right, ); Knee Arthroplasty (Right, ); Treatment of cardiac arrhythmia (N/A, 2019); Augmentation of breast (Bilateral, );  section (); Cosmetic surgery (); Colposcopy; Oophorectomy; Total abdominal hysterectomy (); Ablation of arrhythmogenic focus for atrial fibrillation (N/A, 2020); Cardioversion (2020); Transesophageal echocardiography (N/A, 2020); Colon surgery (); Breast surgery (); Eye surgery (Bilateral, ); Joint replacement (Right, , , ); Esophagogastroduodenoscopy (N/A, 3/19/2021); Colonoscopy (N/A, 3/19/2021); De Quervain's release  (Left, 8/2/2022); and Trigger finger release (Left, 8/2/2022).    Medications:   has a current medication list which includes the following prescription(s): acetaminophen, acyclovir, calcium carb/vit d3/minerals, docusate sodium, estradiol, fluticasone propionate, hydrocodone-acetaminophen, ipratropium, levofloxacin, levothyroxine, methylprednisolone, olopatadine, pantoprazole, rivaroxaban, tramadol, tramadol, and vit c/e/zn/coppr/lutein/zeaxan, and the following Facility-Administered Medications: sodium chloride 0.9%.    Allergies:   Review of patient's allergies indicates:   Allergen Reactions    Morphine Nausea And Vomiting    Succinylcholine      Other reaction(s): v tach          OBJECTIVE     Observation/Appearance: hypersensitivity at scar, min swelling noted, scar thickness at trigger finger release and wrist    Edema. Measured in centimeters.   9/25/2022 9/25/2022    Left Right   Wrist Crease 17.4 15.9     Elbow and Wrist ROM. Measured in degrees.   9/25/2022 9/25/2022    Left Right   Wrist Ext/Flex 60/51 73/77   Wrist RD/UD 15/26 23/45     Hand ROM. Measured in degrees.  - bilateral fist and bilateral thumb to DPC     Strength (Dynamometer) and Pinch Strength (Pinch Gauge)  Measured in pounds.   9/25/2022 9/25/2022    Left Right   Rung II 34 58   Key Pinch 13 14   3pt Pinch 7 (pain) 15   2pt Pinch 5 (pain) 10     Sensation: intact    Manual Muscle Test   9/25/2022 9/25/2022    Left Right   Wrist Extension  4/5    Wrist Flexion 4/5    Radial Deviation 4/5    Ulnar Deviation 4/5         Limitation/Restriction for FOTO Hand Survey    Therapist reviewed FOTO scores for Zoë Enrique on 9/22/2022.   FOTO documents entered into [employer] - see Media section.    Limitation Score: 61%         Treatment   Total Treatment time (time-based codes) separate from Evaluation: 10 minutes    Zoë received the treatments listed below:     Supervised modalities after being cleared for contradictions: Patient received  fluidotherapy to left hand(s) for 10 minutes to increase blood flow, circulation, desensitization, sensory re-education and for pain management.       Therapeutic exercises to develop endurance and ROM for 10 minutes, including:  -Pt provided with education, demonstration, and participation in HEP including:     Pain-free HEP participation  Retrograde and scar massage daily  Heat and ice modality use as needed    Exercises Reps/times   Prayer stretch X 30 secs   Wrist flex/ext/RD/UD 2 ways X 15   Finkelstein stretch X 15   IPJ blocking, finger lifts, spreads X 15   Desensitization program daily at scar and hypersensitive regions 2-3 min           Patient Education and Home Exercises      Education provided:   - see above    Written Home Exercises Provided: yes.  Exercises were reviewed and Zoë was able to demonstrate them prior to the end of the session.  Zoë demonstrated good  understanding of the education provided. See EMR under Patient Instructions for exercises provided during therapy sessions.     Pt was advised to perform these exercises free of pain, and to stop performing them if pain occurs.    Patient/Family Education: role of OT, goals for OT, scheduling/cancellations - pt verbalized understanding. Discussed insurance limitations with patient.    ASSESSMENT     Zoë Enrique is a 72 y.o. female referred to outpatient occupational therapy and presents with a medical diagnosis of left hand dequervains release and trigger middle finger release.  Patient presents with the following therapy deficits: Decreased ROM, Decreased  strength, Decreased pinch strength, Decreased muscle strength, Decreased functional hand use, Increased pain, Edema, Scar Adhesions, and Diminished/Impaired Sensation and demonstrates limitations as described in the chart below. Following medical record review it is determined that pt will benefit from occupational therapy services in order to maximize pain free and/or functional  use of left hand. The following goals were discussed with the patient and patient is in agreement with them as to be addressed in the treatment plan. The patient's rehab potential is Excellent.     Anticipated barriers to occupational therapy: delay into therapy  Pt has no cultural, educational or language barriers to learning provided.    Profile and History Assessment of Occupational Performance Level of Clinical Decision Making Complexity Score   Occupational Profile:   Zoë Enrique is a 72 y.o. female who lives with their spouse and is retired Zoë Enrique has difficulty with  ADLs and IADLs as listed previously, which  Affecting her daily functional abilities.      Comorbidities:    has a past medical history of Abnormal Pap smear of cervix, Allergy, Atrial fibrillation, Atrial flutter, Cancer, Diverticulosis, atrial flutter, mitral valve prolapse, Hypothyroidism, Migraines, Recurrent upper respiratory infection (URI), and Supraventricular tachycardia.    Medical and Therapy History Review:   Brief               Performance Deficits    Physical:  Joint Mobility  Muscle Power/Strength  Muscle Endurance  Skin Integrity/Scar Formation  Edema   Strength  Pinch Strength  Tactile Functions  Pain    Cognitive:  No Deficits    Psychosocial:    Habits  Routines  Rituals     Clinical Decision Making:  low    Assessment Process:  Problem-Focused Assessments    Modification/Need for Assistance:  Not Necessary    Intervention Selection:  Limited Treatment Options       low  Based on PMHX, co morbidities , data from assessments and functional level of assistance required with task and clinical presentation directly impacting function.         Goals:   The following goals were discussed with the patient and patient is in agreement with them as to be addressed in the treatment plan.   Long Term Goals (LTGs); to be met by discharge.  LTG #1: Pt will report a pain level of 1 out of 10 with functional activity  participation.   LTG #2: Pt will demo improved FOTO score by 20 points.   LTG #3: Pt will return to prior level of function for ADLs, phone use, gardening, cooking, and household management.     Short Term Goals (STGs); to be met within 4 weeks (10/20/22).  STG #1: Pt will report 3 out of 10 pain level with activities in therapy session.  STG #2: Pt will demo improved edema by at least 0.3 cm to improve pain and motion.  STG #3: Pt will demo improved wrist FORREST by 15 degrees to improve independence with ADLs.  STG #4: Pt will demonstrate independence with issued HEP.   STG #5: Pt will demo improved  strength by at least 5lbs to improve independence with cooking.  STG #6: Pt will demo improve pinch strength in each position by at least 1-3# in each limited position to improve dressing/grooming.     PLAN   Plan of Care Certification: 9/22/2022 to 12/1/22.     Outpatient Occupational Therapy 1 times weekly for 10 weeks to include the following interventions: Paraffin, Fluidotherapy, Manual therapy/joint mobilizations, Modalities for pain management, US 3 mhz, Therapeutic exercises/activities., Iontophoresis with 2.0 cc Dexamethasone, Strengthening, Orthotic Fabrication/Fit/Training, Edema Control, Scar Management, Electrical Modalities, Joint Protection, and Energy Conservation.      Duran Stafford OT      I CERTIFY THE NEED FOR THESE SERVICES FURNISHED UNDER THIS PLAN OF TREATMENT AND WHILE UNDER MY CARE  Physician's comments:      Physician's Signature: ___________________________________________________

## 2022-09-28 ENCOUNTER — CLINICAL SUPPORT (OUTPATIENT)
Dept: REHABILITATION | Facility: HOSPITAL | Age: 72
End: 2022-09-28
Payer: MEDICARE

## 2022-09-28 ENCOUNTER — RESEARCH ENCOUNTER (OUTPATIENT)
Dept: RESEARCH | Facility: HOSPITAL | Age: 72
End: 2022-09-28
Payer: MEDICARE

## 2022-09-28 DIAGNOSIS — R29.898 DECREASED GRIP STRENGTH OF LEFT HAND: ICD-10-CM

## 2022-09-28 DIAGNOSIS — R20.1 IMPAIRED SENSATION: ICD-10-CM

## 2022-09-28 DIAGNOSIS — M25.632 DECREASED RANGE OF MOTION OF LEFT WRIST: Primary | ICD-10-CM

## 2022-09-28 PROCEDURE — 97110 THERAPEUTIC EXERCISES: CPT | Mod: CO

## 2022-09-28 PROCEDURE — 97140 MANUAL THERAPY 1/> REGIONS: CPT | Mod: CO

## 2022-09-28 PROCEDURE — 97022 WHIRLPOOL THERAPY: CPT | Mod: CO

## 2022-09-28 NOTE — PROGRESS NOTES
Week10     Collect any relevant AEs & Cms- none   Review Stimulation sessions & schedule-done  Document any comments on Stimulation Device- none     12 week visit on oct 12

## 2022-09-28 NOTE — PROGRESS NOTES
"  [facility]  Occupational Therapy Treatment Note    Date: 9/28/2022  Name: Zoë Enrique  Clinic Number: [id number]    Therapy Diagnosis:   Encounter Diagnoses   Name Primary?    Decreased range of motion of left wrist Yes    Decreased  strength of left hand     Impaired sensation      Physician: Toñito Adame Jr., *    Physician Orders: evaluation and treat; OT for her left hand and wrist to work on range of motion stretching strengthening and modality treatments   Medical Diagnosis: M65.4 (ICD-10-CM) - De Quervain's tenosynovitis  Surgical Procedure and Date: 1. De Quervain release left wrist.     2. Trigger finger release left middle finger., 8/2/22 / Date of Injury/Onset: about 6 + months  Evaluation Date: 9/22/22  Insurance Authorization Period Expiration: 9/19/23  Plan of Care Expiration: 12/1/22  Progress Note Due: 11/7/22   Date of Return to MD: 11/7/22  Visit # / Visits authorized: 1 / 12  FOTO: initial eval      Precautions:  Standard     Time In: 10:13 am   Time Out: 9:08 am  Total Appointment Time (timed & untimed codes): 55 minutes       SUBJECTIVE     Pt reports: "The swelling sensitivity has gotten better."  She was compliant with home exercise program given last session.   Response to previous treatment: first tx  Functional change: able to do more Light ADLs and IADLs (open jars, thomas bra)    Pain: 1/10  Location: left hands      OBJECTIVE   Objective Measures updated at progress report unless specified.    Observation/Appearance: hypersensitivity at scar, min swelling noted, scar thickness at trigger finger release and wrist     Edema. Measured in centimeters.    9/25/2022 9/25/2022     Left Right   Wrist Crease 17.4 15.9      Elbow and Wrist ROM. Measured in degrees.    9/25/2022 9/25/2022     Left Right   Wrist Ext/Flex 60/51 73/77   Wrist RD/UD 15/26 23/45      Hand ROM. Measured in degrees.  - bilateral fist and bilateral thumb to DPC      Strength " (Dynamometer) and Pinch Strength (Pinch Gauge)  Measured in pounds.    9/25/2022 9/25/2022     Left Right   Rung II 34 58   Key Pinch 13 14   3pt Pinch 7 (pain) 15   2pt Pinch 5 (pain) 10      Sensation: intact     Manual Muscle Test    9/25/2022 9/25/2022     Left Right   Wrist Extension  4/5     Wrist Flexion 4/5     Radial Deviation 4/5     Ulnar Deviation 4/5            Limitation/Restriction for FOTO Hand Survey     Therapist reviewed FOTO scores for Zoë Enrique on 9/22/2022.   FOTO documents entered into [employer] - see Media section.     Limitation Score: 61%             Treatment     Zoë received the treatments listed below:     Supervised modalities after being cleared for contradictions: Fluidotherapy - Fluidotherapy: To L hand for 10 min, continuous air, 110 deg, air speed 100 to decrease pain, edema & scar tissue and increased tissue extensibility.    Manual therapy techniques: Manual Lymphatic Drainage and Soft tissue Mobilization were applied to the: L hand and wrist  for 10 minutes, including:  -Performed Retrograde massage to fingers/hand/wrist to decrease edema, improve joint mobility, decrease pain and improve lymphatic drainage to increase hand function.   -Performed scar massage to decrease adhesions and improve tensile glide.     Therapeutic exercises to develop strength and ROM for 35 minutes, including:      Exercises Reps/times   Prayer stretch X 30 secs (3 reps)   Wrist flex/ext/RD/UD 2 ways X 15   Finkelstein stretch X 30 secs (3 sets)   IPJ blocking, finger lifts, spreads    Thumb AROM: circumduction, opposition and pinky slides  X 15      x10   Desensitization program daily at scar and hypersensitive regions 2-3 min (NT)    TGE   x10 ea    isospheres X2 min   In hand manip with med poms  3 sets    Wrist maze  X 2 min              Patient Education and Home Exercises      Education provided:   - HEP  - Progress towards goals     Written Home Exercises Provided: Patient instructed to cont  prior HEP.  Exercises were reviewed and Zoë was able to demonstrate them prior to the end of the session.  Zoë demonstrated good  understanding of the HEP provided. See EMR under Patient Instructions for exercises provided during therapy sessions.      ASSESSMENT     Pt would continue to benefit from skilled OT. Pt tolerated session well with little to no pain. Most discomfort noted on radial wrist with deviation movements. Will continue as tolerated      Zoë is progressing well towards her goals and there are no updates to goals at this time. Pt prognosis is Good.     Pt will continue to benefit from skilled outpatient occupational therapy to address the deficits listed in the problem list on initial evaluation, provide pt/family education and to maximize pt's level of independence in the home and community environment.     Pt's spiritual, cultural and educational needs considered and pt agreeable to plan of care and goals.    Anticipated barriers to occupational therapy: delay into therapy     Goals:   The following goals were discussed with the patient and patient is in agreement with them as to be addressed in the treatment plan.   Long Term Goals (LTGs); to be met by discharge.  LTG #1: Pt will report a pain level of 1 out of 10 with functional activity participation. ------progressing not met 9/28/2022           LTG #2: Pt will demo improved FOTO score by 20 points. ------progressing not met 9/28/2022  LTG #3: Pt will return to prior level of function for ADLs, phone use, gardening, cooking, and household management. ------progressing not met 9/28/2022     Short Term Goals (STGs); to be met within 4 weeks (10/20/22).  STG #1: Pt will report 3 out of 10 pain level with activities in therapy session. ------progressing not met 9/28/2022  STG #2: Pt will demo improved edema by at least 0.3 cm to improve pain and motion. ------progressing not met 9/28/2022  STG #3: Pt will demo improved wrist FORREST by 15  degrees to improve independence with ADLs. ------progressing not met 9/28/2022  STG #4: Pt will demonstrate independence with issued HEP. ------progressing not met 9/28/2022   STG #5: Pt will demo improved  strength by at least 5lbs to improve independence with cooking. ------progressing not met 9/28/2022  STG #6: Pt will demo improve pinch strength in each position by at least 1-3# in each limited position to improve dressing/grooming. ------progressing not met 9/28/2022         PLAN     Continue skilled occupational therapy with individualized plan of care focusing on increasing ROM and strength to increase participation in meaningful daily activities.     Updates/Grading for next session: continue as tolerated     GA Mckenna        Client Care conference completed with evaluating therapist in regards to this patients POC as evidenced by co signature of supervising therapist.

## 2022-09-30 DIAGNOSIS — M15.4 EROSIVE OSTEOARTHRITIS OF BOTH HANDS: ICD-10-CM

## 2022-09-30 RX ORDER — TRAMADOL HYDROCHLORIDE 50 MG/1
50 TABLET ORAL EVERY 8 HOURS PRN
Qty: 90 TABLET | Refills: 5 | Status: SHIPPED | OUTPATIENT
Start: 2022-09-30 | End: 2022-12-29

## 2022-10-04 NOTE — PROGRESS NOTES
"  [facility]  Occupational Therapy Treatment Note    Date: 10/5/2022  Name: Zoë Enrique  Clinic Number: [id number]    Therapy Diagnosis:   Encounter Diagnoses   Name Primary?    Decreased range of motion of left wrist Yes    Decreased  strength of left hand     Impaired sensation        Physician: Toñito Adame Jr., *    Physician Orders: evaluation and treat; OT for her left hand and wrist to work on range of motion stretching strengthening and modality treatments   Medical Diagnosis: M65.4 (ICD-10-CM) - De Quervain's tenosynovitis  Surgical Procedure and Date: 1. De Quervain release left wrist.     2. Trigger finger release left middle finger., 8/2/22 / Date of Injury/Onset: about 6 + months  Evaluation Date: 9/22/22  Insurance Authorization Period Expiration: 9/19/23  Plan of Care Expiration: 12/1/22  Progress Note Due: 11/7/22   Date of Return to MD: 11/7/22  Visit # / Visits authorized: 1 / 12  FOTO: initial eval      Precautions:  Standard     Time In: 9:02 am   Time Out: 9:58 am  Total Appointment Time (timed & untimed codes): 56 minutes       SUBJECTIVE     Pt reports: "The sensitivity has gotten better, but I am still have joint pain in all my joints. I also still get some nerve pain that radiates up my arm"   She was compliant with home exercise program given last session.   Response to previous treatment: first tx  Functional change: able to do more Light ADLs and IADLs (open jars, thomas bra)    Pain: 1/10 at rest; 3-4/10 with movement and use   Location: left hands      OBJECTIVE   Objective Measures updated at progress report unless specified.    Observation/Appearance: hypersensitivity at scar, min swelling noted, scar thickness at trigger finger release and wrist     Edema. Measured in centimeters.    9/25/2022 9/25/2022     Left Right   Wrist Crease 17.4 15.9      Elbow and Wrist ROM. Measured in degrees.    9/25/2022 9/25/2022     Left Right   Wrist Ext/Flex 60/51 " 73/77   Wrist RD/UD 15/26 23/45      Hand ROM. Measured in degrees.  - bilateral fist and bilateral thumb to DPC      Strength (Dynamometer) and Pinch Strength (Pinch Gauge)  Measured in pounds.    9/25/2022 9/25/2022     Left Right   Rung II 34 58   Key Pinch 13 14   3pt Pinch 7 (pain) 15   2pt Pinch 5 (pain) 10      Sensation: intact     Manual Muscle Test    9/25/2022 9/25/2022     Left Right   Wrist Extension  4/5     Wrist Flexion 4/5     Radial Deviation 4/5     Ulnar Deviation 4/5            Limitation/Restriction for FOTO Hand Survey     Therapist reviewed FOTO scores for Zoë Enrique on 9/22/2022.   FOTO documents entered into [employer] - see Media section.     Limitation Score: 61%             Treatment     Zoë received the treatments listed below:     Supervised modalities after being cleared for contradictions: Fluidotherapy - Fluidotherapy: To L hand for 10 min, continuous air, 110 deg, air speed 100 to decrease pain, edema & scar tissue and increased tissue extensibility.    Manual therapy techniques: Manual Lymphatic Drainage and Soft tissue Mobilization were applied to the: L hand and wrist  for 10 minutes, including:  -Performed Retrograde massage to fingers/hand/wrist to decrease edema, improve joint mobility, decrease pain and improve lymphatic drainage to increase hand function. NT  -Performed scar massage to decrease adhesions and improve tensile glide at left wrist and palm scars with radha tool    Patient receives ultrasound  for pain control and remold scar tissue to increase tissue elasticity @ 100 duty cycle, 3.3 Mhz, applied to left wrist and middle finger scar , intensity = 0.8 w/cm2 for 8 minutes.    Therapeutic exercises to develop strength and ROM for 28 minutes, including:      Exercises Reps/times   Prayer stretch X 30 secs (3 reps)   Wrist flex/ext/RD/UD 2 ways X 15 NT   Finkelstein stretch X 30 secs (3 sets) NT   IPJ blocking, finger lifts, spreads    Thumb AROM: circumduction,  opposition and pinky slides  X 15      x10   Desensitization program daily at scar and hypersensitive regions 2-3 min (NT)    TGE   x10 ea    isospheres X2 min nt   In hand manip with med poms  3 sets nt   Wrist maze  X 2 min    Wrist wheel flex/ext X 2 min    Wrist flex/ext/RD/UD 1# wt X 10 reps    Yellow power web pushes and pulls X 10    Graded wall push ups X 10 reps              Patient Education and Home Exercises      Education provided:   - HEP  - Progress towards goals     Written Home Exercises Provided: yes.  Exercises were reviewed and Zoë was able to demonstrate them prior to the end of the session.  Zoë demonstrated good  understanding of the HEP provided. See EMR under Patient Instructions for exercises provided during therapy sessions.      ASSESSMENT     Pt would continue to benefit from skilled OT. Pt tolerated session well with little to no pain. Hypersensitivity and swelling has improved per patient report of being able to wear rings and touch scars. Most discomfort noted on joints of fingers due to arthritis and radial wrist with deviation and wrist flexion movements. Pt HEP upgraded with wrist strengthening and pt verbalized participation pain-free. Will continue as tolerated      Zoë is progressing well towards her goals and there are no updates to goals at this time. Pt prognosis is Good.     Pt will continue to benefit from skilled outpatient occupational therapy to address the deficits listed in the problem list on initial evaluation, provide pt/family education and to maximize pt's level of independence in the home and community environment.     Pt's spiritual, cultural and educational needs considered and pt agreeable to plan of care and goals.    Anticipated barriers to occupational therapy: delay into therapy     Goals:   The following goals were discussed with the patient and patient is in agreement with them as to be addressed in the treatment plan.   Long Term Goals (LTGs); to  be met by discharge.  LTG #1: Pt will report a pain level of 1 out of 10 with functional activity participation. ------progressing not met 10/5/2022           LTG #2: Pt will demo improved FOTO score by 20 points. ------progressing not met 10/5/2022  LTG #3: Pt will return to prior level of function for ADLs, phone use, gardening, cooking, and household management. ------progressing not met 10/5/2022     Short Term Goals (STGs); to be met within 4 weeks (10/20/22).  STG #1: Pt will report 3 out of 10 pain level with activities in therapy session. ------progressing not met 10/5/2022  STG #2: Pt will demo improved edema by at least 0.3 cm to improve pain and motion. ------progressing not met 10/5/2022  STG #3: Pt will demo improved wrist FORREST by 15 degrees to improve independence with ADLs. ------progressing not met 10/5/2022  STG #4: Pt will demonstrate independence with issued HEP. ------progressing not met 10/5/2022   STG #5: Pt will demo improved  strength by at least 5lbs to improve independence with cooking. ------progressing not met 10/5/2022  STG #6: Pt will demo improve pinch strength in each position by at least 1-3# in each limited position to improve dressing/grooming. ------progressing not met 10/5/2022         PLAN     Continue skilled occupational therapy with individualized plan of care focusing on increasing ROM and strength to increase participation in meaningful daily activities.     Updates/Grading for next session: continue as tolerated     Duran Stafford, OT

## 2022-10-05 ENCOUNTER — CLINICAL SUPPORT (OUTPATIENT)
Dept: REHABILITATION | Facility: HOSPITAL | Age: 72
End: 2022-10-05
Payer: MEDICARE

## 2022-10-05 ENCOUNTER — RESEARCH ENCOUNTER (OUTPATIENT)
Dept: RESEARCH | Facility: HOSPITAL | Age: 72
End: 2022-10-05
Payer: MEDICARE

## 2022-10-05 DIAGNOSIS — R20.1 IMPAIRED SENSATION: ICD-10-CM

## 2022-10-05 DIAGNOSIS — R29.898 DECREASED GRIP STRENGTH OF LEFT HAND: ICD-10-CM

## 2022-10-05 DIAGNOSIS — M25.632 DECREASED RANGE OF MOTION OF LEFT WRIST: Primary | ICD-10-CM

## 2022-10-05 PROCEDURE — 97110 THERAPEUTIC EXERCISES: CPT

## 2022-10-05 PROCEDURE — 97035 APP MDLTY 1+ULTRASOUND EA 15: CPT

## 2022-10-05 PROCEDURE — 97140 MANUAL THERAPY 1/> REGIONS: CPT

## 2022-10-05 PROCEDURE — 97022 WHIRLPOOL THERAPY: CPT

## 2022-10-05 NOTE — PATIENT INSTRUCTIONS
[facility]  OCCUPATIONAL THERAPY  HOME EXERCISE PROGRAM     Complete the following strengthening exercises using 1-2 pound weight.  Do 15 repetitions of each, 1-2x per day.     Resisted Forearm Rotation  Use a weight or a hammer. Slowly rotate hand to one side then the other.      Resisted Wrist Flexion  With palm up and weight in hand, bend wrist up. Return slowly.      Resisted Wrist Extension  With palm down and weight in hand, bend wrist up. Then bend wrist down.      Resisted Wrist Deviation  With thumb up and weight in hand, bend wrist up. Return slowly.    Copyright © I. All rights reserved.     Therapist: Duran Stafford OT  10/5/2022

## 2022-10-06 NOTE — PROGRESS NOTES
Week11     Collect any relevant AEs & Cms- none   Review Stimulation sessions & schedule-done  Document any comments on Stimulation Device- none     Reminded patient to start diary      12 week visit on oct 12

## 2022-10-12 ENCOUNTER — RESEARCH ENCOUNTER (OUTPATIENT)
Dept: RESEARCH | Facility: HOSPITAL | Age: 72
End: 2022-10-12
Payer: MEDICARE

## 2022-10-12 ENCOUNTER — CLINICAL SUPPORT (OUTPATIENT)
Dept: REHABILITATION | Facility: HOSPITAL | Age: 72
End: 2022-10-12
Payer: MEDICARE

## 2022-10-12 ENCOUNTER — OFFICE VISIT (OUTPATIENT)
Dept: UROLOGY | Facility: CLINIC | Age: 72
End: 2022-10-12
Payer: MEDICARE

## 2022-10-12 DIAGNOSIS — M25.632 DECREASED RANGE OF MOTION OF LEFT WRIST: Primary | ICD-10-CM

## 2022-10-12 DIAGNOSIS — R29.898 DECREASED GRIP STRENGTH OF LEFT HAND: ICD-10-CM

## 2022-10-12 DIAGNOSIS — N32.81 OAB (OVERACTIVE BLADDER): Primary | ICD-10-CM

## 2022-10-12 DIAGNOSIS — R20.1 IMPAIRED SENSATION: ICD-10-CM

## 2022-10-12 PROCEDURE — 97110 THERAPEUTIC EXERCISES: CPT

## 2022-10-12 PROCEDURE — 99214 OFFICE O/P EST MOD 30 MIN: CPT | Mod: S$GLB,,, | Performed by: UROLOGY

## 2022-10-12 PROCEDURE — 99214 PR OFFICE/OUTPT VISIT, EST, LEVL IV, 30-39 MIN: ICD-10-PCS | Mod: S$GLB,,, | Performed by: UROLOGY

## 2022-10-12 PROCEDURE — 97022 WHIRLPOOL THERAPY: CPT

## 2022-10-12 PROCEDURE — 97140 MANUAL THERAPY 1/> REGIONS: CPT

## 2022-10-12 NOTE — PROGRESS NOTES
"  [facility]  Occupational Therapy Treatment Note    Date: 10/12/2022  Name: Zoë Enrique  Clinic Number: [id number]    Therapy Diagnosis:   Encounter Diagnoses   Name Primary?    Decreased range of motion of left wrist Yes    Decreased  strength of left hand     Impaired sensation        Physician: Toñito Adame Jr., *    Physician Orders: evaluation and treat; OT for her left hand and wrist to work on range of motion stretching strengthening and modality treatments   Medical Diagnosis: M65.4 (ICD-10-CM) - De Quervain's tenosynovitis  Surgical Procedure and Date: 1. De Quervain release left wrist.     2. Trigger finger release left middle finger., 8/2/22 / Date of Injury/Onset: about 6 + months  Evaluation Date: 9/22/22  Insurance Authorization Period Expiration: 9/19/23  Plan of Care Expiration: 12/1/22  Progress Note Due: 11/7/22   Date of Return to MD: 11/7/22  Visit # / Visits authorized: 3 / 12  FOTO: initial eval      Precautions:  Standard     Time In: 10:30 am   Time Out: 11:25 am  Total Appointment Time (timed & untimed codes): 55 minutes       SUBJECTIVE     Pt reports: "I was really hurting yesterday. I had to take Tylenol, and then a Tramadol." Pt reports some improvement in pain today.   She was compliant with home exercise program given last session.   Response to previous treatment: increased pain yesterday, but improved today  Functional change: able to do more Light ADLs and IADLs (open jars, thomas bra)    Pain: 2/10 at rest;   Location: left hands      OBJECTIVE   Objective Measures updated at progress report unless specified.    Observation/Appearance: hypersensitivity at scar, min swelling noted, scar thickness at trigger finger release and wrist     Edema. Measured in centimeters.    9/25/2022 9/25/2022     Left Right   Wrist Crease 17.4 15.9      Elbow and Wrist ROM. Measured in degrees.    9/25/2022 9/25/2022     Left Right   Wrist Ext/Flex 60/51 73/77 "   Wrist RD/UD 15/26 23/45      Hand ROM. Measured in degrees.  - bilateral fist and bilateral thumb to DPC      Strength (Dynamometer) and Pinch Strength (Pinch Gauge)  Measured in pounds.    9/25/2022 9/25/2022     Left Right   Rung II 34 58   Key Pinch 13 14   3pt Pinch 7 (pain) 15   2pt Pinch 5 (pain) 10      Sensation: intact     Manual Muscle Test    9/25/2022 9/25/2022     Left Right   Wrist Extension  4/5     Wrist Flexion 4/5     Radial Deviation 4/5     Ulnar Deviation 4/5            Limitation/Restriction for FOTO Hand Survey     Therapist reviewed FOTO scores for Zoë Enrique on 9/22/2022.   FOTO documents entered into [employer] - see Media section.     Limitation Score: 61%             Treatment     Zoë received the treatments listed below:     Supervised modalities after being cleared for contradictions: Fluidotherapy - Fluidotherapy: To L hand for 10 min, continuous air, 110 deg, air speed 100 to decrease pain, edema & scar tissue and increased tissue extensibility.    Manual therapy techniques: Manual Lymphatic Drainage and Soft tissue Mobilization were applied to the: L hand and wrist  for 10 minutes, including:  -Performed Retrograde massage to fingers/hand/wrist to decrease edema, improve joint mobility, decrease pain and improve lymphatic drainage to increase hand function. NT  -Performed scar massage to decrease adhesions and improve tensile glide at left wrist and palm scars with radha tool    Patient receives ultrasound  for pain control and remold scar tissue to increase tissue elasticity @ 100 duty cycle, 3.3 Mhz, applied to left wrist and middle finger scar , intensity = 0.6 w/cm2 for 8 minutes.    Therapeutic exercises to develop strength and ROM for 27 minutes, including: (15 min supervised)      Exercises Reps/times   Prayer stretch X 30 secs (3 reps)   Wrist flex/ext/RD/UD 2 ways X 15 NT   Finkelstein stretch X 30 secs (3 sets) NT   IPJ blocking, finger lifts, spreads    Thumb AROM:  circumduction, opposition and pinky slides  X 15      x10   Desensitization program daily at scar and hypersensitive regions 2-3 min (NT)    TGE   x10 ea    isospheres X2 min    In hand manip with coins 1 set   Wrist maze  X 2 min    Wrist flex/ext/RD/UD 1# wt 2 X 10 reps    Yellow power web pushes and pulls X 10    Graded wall push ups X 10 reps NT             Patient Education and Home Exercises      Education provided:   - HEP, Reviewed modalities and resting as able. Reviewed scar massage.   - Progress towards goals     Written Home Exercises Provided: yes.  Exercises were reviewed and Zoë was able to demonstrate them prior to the end of the session.  Zoë demonstrated good  understanding of the HEP provided. See EMR under Patient Instructions for exercises provided during therapy sessions.      ASSESSMENT     Pt would continue to benefit from skilled OT. Pt tolerated session well with little c/o pain. She does cont to report soreness and pain in radial wrist. She had reported increased pain yesterday, but improved today. Most discomfort noted on joints of fingers due to arthritis and radial wrist with deviation and wrist flexion movements still. Pt participated well. Will continue as tolerated      Zoë is progressing well towards her goals and there are no updates to goals at this time. Pt prognosis is Good.     Pt will continue to benefit from skilled outpatient occupational therapy to address the deficits listed in the problem list on initial evaluation, provide pt/family education and to maximize pt's level of independence in the home and community environment.     Pt's spiritual, cultural and educational needs considered and pt agreeable to plan of care and goals.    Anticipated barriers to occupational therapy: delay into therapy     Goals:   The following goals were discussed with the patient and patient is in agreement with them as to be addressed in the treatment plan.   Long Term Goals (LTGs); to  be met by discharge.  LTG #1: Pt will report a pain level of 1 out of 10 with functional activity participation. ------progressing not met 10/12/2022           LTG #2: Pt will demo improved FOTO score by 20 points. ------progressing not met 10/12/2022  LTG #3: Pt will return to prior level of function for ADLs, phone use, gardening, cooking, and household management. ------progressing not met 10/12/2022     Short Term Goals (STGs); to be met within 4 weeks (10/20/22).  STG #1: Pt will report 3 out of 10 pain level with activities in therapy session. ------progressing not met 10/12/2022  STG #2: Pt will demo improved edema by at least 0.3 cm to improve pain and motion. ------progressing not met 10/12/2022  STG #3: Pt will demo improved wrist FORREST by 15 degrees to improve independence with ADLs. ------progressing not met 10/12/2022  STG #4: Pt will demonstrate independence with issued HEP. ------progressing not met 10/12/2022   STG #5: Pt will demo improved  strength by at least 5lbs to improve independence with cooking. ------progressing not met 10/12/2022  STG #6: Pt will demo improve pinch strength in each position by at least 1-3# in each limited position to improve dressing/grooming. ------progressing not met 10/12/2022         PLAN     Continue skilled occupational therapy with individualized plan of care focusing on increasing ROM and strength to increase participation in meaningful daily activities.     Updates/Grading for next session: continue as tolerated. Measure next session.     EVA Queen, CHT

## 2022-10-12 NOTE — PROGRESS NOTES
"[employer] REDUCE OAB, IRB 2022.027  PI: TUSHAR VILLEGAS MD  SUBJECT:......   DATE:......... October 12, 2022    VISIT: 12  WEEKS    As "blinded" CRC, the following activities were completed:    Reviewed the subject's 3 day voiding diary which was complete. Reported dates were October 9, 10, and 11, 2022.  Reviewed the 3 questionnaires-- which were complete.     Subject will follow up as per protocol.         "

## 2022-10-12 NOTE — PROGRESS NOTES
[facility]        Overactive Bladder (OAB) Return Note     10/12/2022     Referred by:  Praveen Jeffery MD     HPI: Zoë Enrique is a very pleasant 72 y.o. female who is a return patient to our department referred for evaluation of urinary incontinence of several years duration. Her voiding diary demonstrated 36 voids over the 3-day total. She has little UUI.      She denies symptoms of irritative voiding including dysuria. She denies symptoms of obstructive voiding including decreased stream, hesitancy, intermittency, post void dribbling and sense of incomplete emptying. Bladder scan PVR was 0 mL. Her history includes no notation of urolithiasis, hematuria, prior pelvic surgery, previous prolapse or incontinence procedures or neurological symptoms/diagnoses. She denies all other prior pelvic surgeries or neurologic diagnoses. She does not report symptoms suggestive of advanced POP. She denies a history of constipation.      Previous incontinence therapies:  Medication: (oral oxybutynin IR (provided no benefit) - stopped this medication in the distant past  REDUCE trial: no improvement in treatment arm; exited from the study at conclusion      A review of 10+ systems was conducted with pertinent positive and negative findings documented in HPI with all other systems reviewed and negative.     Past medical, family, surgical and social history reviewed as documented in chart with pertinent positive medical, family, surgical and social history detailed in HPI.     Exam Findings:     Const: no acute distress, conversant and alert  Eyes: anicteric, extraocular muscles intact  ENMT: normocephalic, Nl oral membranes  Cardio: no cyanosis, nl cap refill  Pulm: no tachypnea; no resp distress  Musc: no laceration, no tenderness  Neuro: alert; oriented x 3  Normal upper and lower extremity strength, sensation and reflex. No focal neurologic deficits.   Skin: warm, dry; no petichiae  Psych: no anxiety;  normal speech      Assessment/Plan:     Overactive Bladder with Urgency Incontinence (established,not meeting goals): Her diary confirms urinary frequency averaging 11x per day. She has existing dry eyes which forced discontinuation of antimuscarinic therapy in the past. We will begin Myrbetriq 50 mg. Return in 4-6 weeks.

## 2022-10-12 NOTE — PROGRESS NOTES
[employer] REDUCEOAB study  IRB# 2022.027  PI: Dr. Smallwood  Subject number:            Week 12- Unblinded      3 day diary completed     Collect any AEs & Cms- no changes - resolved joint pain   Review Stimulation sessions & schedule- 100%  Questionnaires done - PGIC (no change) and study visit   Document any comments on Stimulation Device- none      Patient exited from main study today

## 2022-10-13 ENCOUNTER — IMMUNIZATION (OUTPATIENT)
Dept: PHARMACY | Facility: CLINIC | Age: 72
End: 2022-10-13
Payer: MEDICARE

## 2022-10-19 ENCOUNTER — CLINICAL SUPPORT (OUTPATIENT)
Dept: REHABILITATION | Facility: HOSPITAL | Age: 72
End: 2022-10-19
Payer: MEDICARE

## 2022-10-19 ENCOUNTER — PATIENT MESSAGE (OUTPATIENT)
Dept: OTOLARYNGOLOGY | Facility: CLINIC | Age: 72
End: 2022-10-19
Payer: MEDICARE

## 2022-10-19 ENCOUNTER — PATIENT MESSAGE (OUTPATIENT)
Dept: ORTHOPEDICS | Facility: CLINIC | Age: 72
End: 2022-10-19
Payer: MEDICARE

## 2022-10-19 DIAGNOSIS — M25.632 DECREASED RANGE OF MOTION OF LEFT WRIST: Primary | ICD-10-CM

## 2022-10-19 DIAGNOSIS — R29.898 DECREASED GRIP STRENGTH OF LEFT HAND: ICD-10-CM

## 2022-10-19 DIAGNOSIS — R20.1 IMPAIRED SENSATION: ICD-10-CM

## 2022-10-19 PROCEDURE — 97022 WHIRLPOOL THERAPY: CPT | Mod: CO

## 2022-10-19 PROCEDURE — 97110 THERAPEUTIC EXERCISES: CPT | Mod: CO

## 2022-10-19 PROCEDURE — 97140 MANUAL THERAPY 1/> REGIONS: CPT | Mod: CO

## 2022-10-19 NOTE — PROGRESS NOTES
"  [facility]  Occupational Therapy Treatment Note    Date: 10/19/2022  Name: Zoë Enrique  Clinic Number: [id number]    Therapy Diagnosis:   Encounter Diagnoses   Name Primary?    Decreased range of motion of left wrist Yes    Decreased  strength of left hand     Impaired sensation          Physician: Toñito Adame Jr., *    Physician Orders: evaluation and treat; OT for her left hand and wrist to work on range of motion stretching strengthening and modality treatments   Medical Diagnosis: M65.4 (ICD-10-CM) - De Quervain's tenosynovitis  Surgical Procedure and Date: 1. De Quervain release left wrist.     2. Trigger finger release left middle finger., 8/2/22 / Date of Injury/Onset: about 6 + months  Evaluation Date: 9/22/22  Insurance Authorization Period Expiration: 9/19/23  Plan of Care Expiration: 12/1/22  Progress Note Due: 11/7/22   Date of Return to MD: 11/7/22  Visit # / Visits authorized: 4 / 12  FOTO: initial eval      Precautions:  Standard     Time In: 10:34 am   Time Out: 11:30 am  Total Appointment Time (timed & untimed codes): 56 minutes       SUBJECTIVE     Pt reports: "I'm feeling much better today, I'm finally noticing improvement  She was compliant with home exercise program given last session.   Response to previous treatment: increased pain yesterday, but improved today  Functional change: able to do more Light ADLs and IADLs (open jars, thomas bra)    Pain: 2/10 at rest;   Location: left hands      OBJECTIVE   Objective Measures updated at progress report unless specified.    Observation/Appearance: hypersensitivity at scar, min swelling noted, scar thickness at trigger finger release and wrist     Edema. Measured in centimeters.    9/25/2022 9/25/2022     Left Right   Wrist Crease 17.4 15.9      Elbow and Wrist ROM. Measured in degrees.    9/25/2022 9/25/2022     Left Right   Wrist Ext/Flex 60/51 73/77   Wrist RD/UD 15/26 23/45      Hand ROM. Measured in " degrees.  - bilateral fist and bilateral thumb to DPC      Strength (Dynamometer) and Pinch Strength (Pinch Gauge)  Measured in pounds.    9/25/2022 9/25/2022     Left Right   Rung II 34 58   Key Pinch 13 14   3pt Pinch 7 (pain) 15   2pt Pinch 5 (pain) 10      Sensation: intact     Manual Muscle Test    9/25/2022 9/25/2022     Left Right   Wrist Extension  4/5     Wrist Flexion 4/5     Radial Deviation 4/5     Ulnar Deviation 4/5            Limitation/Restriction for FOTO Hand Survey     Therapist reviewed FOTO scores for oZë Enrique on 9/22/2022.   FOTO documents entered into [email or web address] - see Media section.     Limitation Score: 61%             Treatment     Zoë received the treatments listed below:     Supervised modalities after being cleared for contradictions: Fluidotherapy - Fluidotherapy: To L hand for 10 min, continuous air, 110 deg, air speed 100 to decrease pain, edema & scar tissue and increased tissue extensibility.    Manual therapy techniques: Manual Lymphatic Drainage and Soft tissue Mobilization were applied to the: L hand and wrist  for 8 minutes, including:  -Performed Retrograde massage to fingers/hand/wrist to decrease edema, improve joint mobility, decrease pain and improve lymphatic drainage to increase hand function. NT  -Performed scar massage to decrease adhesions and improve tensile glide at left wrist and palm scars with radha tool    Patient receives ultrasound  for pain control and remold scar tissue to increase tissue elasticity @ 100 duty cycle, 3.3 Mhz, applied to left wrist and middle finger scar , intensity = 0.6 w/cm2 for 8 minutes.    Therapeutic exercises to develop strength and ROM for 30 minutes, including: (15 min supervised)      Exercises Reps/times   Prayer stretch X 30 secs (3 reps)   Wrist flex/ext/RD/UD 2 ways X 15 NT   Finkelstein stretch X 30 secs (3 sets) NT   IPJ blocking, finger lifts, spreads    Thumb AROM: circumduction, opposition and pinky slides  X 15      x10    Desensitization program daily at scar and hypersensitive regions 2-3 min (NT)    TGE   x10 ea  in fluido   isospheres X2 min    In hand manip with coins 1 set (NT)   Wrist maze  X 2 min    Wrist flex/ext/RD/UD 1# wt 2 X 10 reps    Yellow power web pushes and pulls X 15   Graded wall push ups X 10 reps NT   Hand gripper 2 YRB 2 x15   Digi extender yellow  2 x 15             Patient Education and Home Exercises      Education provided:   - HEP, Reviewed modalities and resting as able. Reviewed scar massage.   - Progress towards goals     Written Home Exercises Provided: yes.  Exercises were reviewed and Zoë was able to demonstrate them prior to the end of the session.  Zoë demonstrated good  understanding of the HEP provided. See EMR under Patient Instructions for exercises provided during therapy sessions.      ASSESSMENT     Pt would continue to benefit from skilled OT. Good tolerance to tx and added strengthening today. Pt participated well. Will continue as tolerated      Zoë is progressing well towards her goals and there are no updates to goals at this time. Pt prognosis is Good.     Pt will continue to benefit from skilled outpatient occupational therapy to address the deficits listed in the problem list on initial evaluation, provide pt/family education and to maximize pt's level of independence in the home and community environment.     Pt's spiritual, cultural and educational needs considered and pt agreeable to plan of care and goals.    Anticipated barriers to occupational therapy: delay into therapy     Goals:   The following goals were discussed with the patient and patient is in agreement with them as to be addressed in the treatment plan.   Long Term Goals (LTGs); to be met by discharge.  LTG #1: Pt will report a pain level of 1 out of 10 with functional activity participation. ------progressing not met 10/19/2022           LTG #2: Pt will demo improved FOTO score by 20 points.  ------progressing not met 10/19/2022  LTG #3: Pt will return to prior level of function for ADLs, phone use, gardening, cooking, and household management. ------progressing not met 10/19/2022     Short Term Goals (STGs); to be met within 4 weeks (10/20/22).  STG #1: Pt will report 3 out of 10 pain level with activities in therapy session. ------progressing not met 10/19/2022  STG #2: Pt will demo improved edema by at least 0.3 cm to improve pain and motion. ------progressing not met 10/19/2022  STG #3: Pt will demo improved wrist FORREST by 15 degrees to improve independence with ADLs. ------progressing not met 10/19/2022  STG #4: Pt will demonstrate independence with issued HEP. ------progressing not met 10/19/2022   STG #5: Pt will demo improved  strength by at least 5lbs to improve independence with cooking. ------progressing not met 10/19/2022  STG #6: Pt will demo improve pinch strength in each position by at least 1-3# in each limited position to improve dressing/grooming. ------progressing not met 10/19/2022         PLAN     Continue skilled occupational therapy with individualized plan of care focusing on increasing ROM and strength to increase participation in meaningful daily activities.     Updates/Grading for next session: continue as tolerated. Measure next session.     HECTOR Mckenna/ATUL

## 2022-10-20 ENCOUNTER — TELEPHONE (OUTPATIENT)
Dept: INTERNAL MEDICINE | Facility: CLINIC | Age: 72
End: 2022-10-20
Payer: MEDICARE

## 2022-10-20 ENCOUNTER — HOSPITAL ENCOUNTER (OUTPATIENT)
Dept: RADIOLOGY | Facility: CLINIC | Age: 72
Discharge: HOME | End: 2022-10-20
Attending: INTERNAL MEDICINE
Payer: MEDICARE

## 2022-10-20 DIAGNOSIS — Z12.31 SCREENING MAMMOGRAM, ENCOUNTER FOR: Primary | ICD-10-CM

## 2022-10-20 DIAGNOSIS — Z78.0 MENOPAUSE: ICD-10-CM

## 2022-10-20 PROCEDURE — 77080 DEXA BONE DENSITY SPINE HIP: ICD-10-PCS | Mod: 26,,, | Performed by: INTERNAL MEDICINE

## 2022-10-20 PROCEDURE — 77080 DXA BONE DENSITY AXIAL: CPT | Mod: TC

## 2022-10-20 PROCEDURE — 77080 DXA BONE DENSITY AXIAL: CPT | Mod: 26,,, | Performed by: INTERNAL MEDICINE

## 2022-10-20 NOTE — TELEPHONE ENCOUNTER
----- Message from Carol Crouchaves sent at 10/19/2022  6:29 PM CDT -----  Type:  Patient Requesting Referral    Who Called:[facility]  Does the patient already have the specialty appointment scheduled?:NO  If yes, what is the date of that appointment?:  Referral to What Specialty:Radiology  Reason for Referral:Mammo screening   Does the patient want the referral with a specific physician?:NO  [facility]  Patient Requesting a Response?Yes  Would the patient rather a call back or a response via [facility]? Call  Best Call Back Number:[phone]  Additional Information: Pt need an updated request on file.

## 2022-10-26 ENCOUNTER — CLINICAL SUPPORT (OUTPATIENT)
Dept: REHABILITATION | Facility: HOSPITAL | Age: 72
End: 2022-10-26
Payer: MEDICARE

## 2022-10-26 DIAGNOSIS — R29.898 DECREASED GRIP STRENGTH OF LEFT HAND: ICD-10-CM

## 2022-10-26 DIAGNOSIS — R20.1 IMPAIRED SENSATION: ICD-10-CM

## 2022-10-26 DIAGNOSIS — M25.632 DECREASED RANGE OF MOTION OF LEFT WRIST: Primary | ICD-10-CM

## 2022-10-26 PROCEDURE — 97140 MANUAL THERAPY 1/> REGIONS: CPT | Mod: CO

## 2022-10-26 PROCEDURE — 97022 WHIRLPOOL THERAPY: CPT | Mod: CO

## 2022-10-26 PROCEDURE — 97110 THERAPEUTIC EXERCISES: CPT | Mod: CO

## 2022-10-26 PROCEDURE — 97035 APP MDLTY 1+ULTRASOUND EA 15: CPT | Mod: CO

## 2022-10-26 NOTE — PROGRESS NOTES
"  [facility]  Occupational Therapy Treatment Note    Date: 10/26/2022  Name: Zoë Enrique  Clinic Number: [id number]    Therapy Diagnosis:   Encounter Diagnoses   Name Primary?    Decreased range of motion of left wrist Yes    Decreased  strength of left hand     Impaired sensation          Physician: Toñito Adame Jr., *    Physician Orders: evaluation and treat; OT for her left hand and wrist to work on range of motion stretching strengthening and modality treatments   Medical Diagnosis: M65.4 (ICD-10-CM) - De Quervain's tenosynovitis  Surgical Procedure and Date: 1. De Quervain release left wrist.     2. Trigger finger release left middle finger., 8/2/22 / Date of Injury/Onset: about 6 + months  Evaluation Date: 9/22/22  Insurance Authorization Period Expiration: 9/19/23  Plan of Care Expiration: 12/1/22  Progress Note Due: 11/7/22   Date of Return to MD: 11/7/22  Visit # / Visits authorized: 5/ 12  FOTO: initial eval      Precautions:  Standard     Time In: 9:56 am   Time Out: 10:56 am  Total Appointment Time (timed & untimed codes): 60 minutes       SUBJECTIVE     Pt reports: "Its a little painful."  She was compliant with home exercise program given last session.   Response to previous treatment: increased pain yesterday, but improved today  Functional change: able to do more Light ADLs and IADLs (open jars, thomas bra)    Pain: 1/10   Location: left hands      OBJECTIVE   Objective Measures updated at progress report unless specified.    Observation/Appearance: hypersensitivity at scar, min swelling noted, scar thickness at trigger finger release and wrist     Edema. Measured in centimeters.    9/25/2022 9/25/2022 10/26/22     Left Right Left    Wrist Crease 17.4 15.9 17.3      Elbow and Wrist ROM. Measured in degrees.    9/25/2022 9/25/2022 10/26/2022     Left Right Left   Wrist Ext/Flex 60/51 73/77 65/60   Wrist RD/UD 15/26 23/45 16/22      Hand ROM. Measured in degrees.  - " bilateral fist and bilateral thumb to DPC      Strength (Dynamometer) and Pinch Strength (Pinch Gauge)  Measured in pounds.    9/25/2022 9/25/2022 10/26/22     Left Right Left (A)   Rung II 34 58 40   Khan Pinch 13 14 14   3pt Pinch 7 (pain) 15 10   2pt Pinch 5 (pain) 10 7      Sensation: intact     Manual Muscle Test    9/25/2022 9/25/2022     Left Right   Wrist Extension  4/5     Wrist Flexion 4/5     Radial Deviation 4/5     Ulnar Deviation 4/5            Limitation/Restriction for FOTO Hand Survey     Therapist reviewed FOTO scores for Zoë Enrique on 9/22/2022.   FOTO documents entered into [employer] - see Media section.     Limitation Score: 61%             Treatment     Zoë received the treatments listed below:     Supervised modalities after being cleared for contradictions: Fluidotherapy - Fluidotherapy: To L hand for 10 min, continuous air, 110 deg, air speed 100 to decrease pain, edema & scar tissue and increased tissue extensibility.    Manual therapy techniques: Manual Lymphatic Drainage and Soft tissue Mobilization were applied to the: L hand and wrist  for 8 minutes, including:  -Performed Retrograde massage to fingers/hand/wrist to decrease edema, improve joint mobility, decrease pain and improve lymphatic drainage to increase hand function. NT  -Performed scar massage to decrease adhesions and improve tensile glide at left wrist and palm scars with star and suction     Patient receives ultrasound  for pain control and remold scar tissue to increase tissue elasticity @ 100 duty cycle, 3.3 Mhz, applied to left wrist and middle finger scar , intensity = 0.6 w/cm2 for 8 minutes.    Therapeutic exercises to develop strength and ROM for 34 minutes, including: (34 min supervised)      Exercises Reps/times   Prayer stretch X 30 secs (3 reps) (NT)   Wrist flex/ext/RD/UD 2 ways X 15 NT   Finkelstein stretch X 30 secs (3 sets) NT   IPJ blocking, finger lifts, spreads    Thumb AROM: circumduction, opposition  and pinky slides  X 15  (NT)    X10(NT)   Desensitization program daily at scar and hypersensitive regions 2-3 min (NT)    TGE   x10 ea  in fluido   isospheres X2 min    In hand manip with coins 1 set (NT)   Wrist maze  X 2 min (NT)   Wrist flex/ext/RD/UD 2# wt 2 X 10 reps    Red power web pushes and pulls X 15   Graded wall push ups X 10 reps NT   Hand gripper 2 YRB 2 x15 (NT)   Digi extender yellow  2 x 15(NT)   Yellow flexbar   Oscillations  Frowns/smiles  Isometric flex/ext    X2 min  X15   x15             Patient Education and Home Exercises      Education provided:   - HEP, Reviewed modalities and resting as able. Reviewed scar massage.   - Progress towards goals     Written Home Exercises Provided: yes.  Exercises were reviewed and Zoë was able to demonstrate them prior to the end of the session.  Zoë demonstrated good  understanding of the HEP provided. See EMR under Patient Instructions for exercises provided during therapy sessions.      ASSESSMENT     Pt would continue to benefit from skilled OT. Tolerated session well. Mild discomfort with pinch and  today. Updated objective measures display improving ROM and strength. Pt participated well. Will continue as tolerated      Zoë is progressing well towards her goals and there are no updates to goals at this time. Pt prognosis is Good.     Pt will continue to benefit from skilled outpatient occupational therapy to address the deficits listed in the problem list on initial evaluation, provide pt/family education and to maximize pt's level of independence in the home and community environment.     Pt's spiritual, cultural and educational needs considered and pt agreeable to plan of care and goals.    Anticipated barriers to occupational therapy: delay into therapy     Goals:   The following goals were discussed with the patient and patient is in agreement with them as to be addressed in the treatment plan.   Long Term Goals (LTGs); to be met by  discharge.  LTG #1: Pt will report a pain level of 1 out of 10 with functional activity participation. ------progressing not met 10/26/2022           LTG #2: Pt will demo improved FOTO score by 20 points. ------progressing not met 10/26/2022  LTG #3: Pt will return to prior level of function for ADLs, phone use, gardening, cooking, and household management. ------progressing not met 10/26/2022     Short Term Goals (STGs); to be met within 4 weeks (10/20/22).  STG #1: Pt will report 3 out of 10 pain level with activities in therapy session. ------progressing not met 10/26/2022  STG #2: Pt will demo improved edema by at least 0.3 cm to improve pain and motion. ------progressing not met 10/26/2022  STG #3: Pt will demo improved wrist FORREST by 15 degrees to improve independence with ADLs. ------progressing not met 10/26/2022  STG #4: Pt will demonstrate independence with issued HEP. ------progressing not met 10/26/2022   STG #5: Pt will demo improved  strength by at least 5lbs to improve independence with cooking. ------progressing not met 10/26/2022  STG #6: Pt will demo improve pinch strength in each position by at least 1-3# in each limited position to improve dressing/grooming. ------progressing not met 10/26/2022         PLAN     Continue skilled occupational therapy with individualized plan of care focusing on increasing ROM and strength to increase participation in meaningful daily activities.     Updates/Grading for next session: continue as tolerated.    HECTOR Mckenna/ATUL

## 2022-11-02 ENCOUNTER — CLINICAL SUPPORT (OUTPATIENT)
Dept: REHABILITATION | Facility: HOSPITAL | Age: 72
End: 2022-11-02
Payer: MEDICARE

## 2022-11-02 DIAGNOSIS — R20.1 IMPAIRED SENSATION: ICD-10-CM

## 2022-11-02 DIAGNOSIS — R29.898 DECREASED GRIP STRENGTH OF LEFT HAND: ICD-10-CM

## 2022-11-02 DIAGNOSIS — M25.632 DECREASED RANGE OF MOTION OF LEFT WRIST: Primary | ICD-10-CM

## 2022-11-02 PROCEDURE — 97140 MANUAL THERAPY 1/> REGIONS: CPT

## 2022-11-02 PROCEDURE — 97110 THERAPEUTIC EXERCISES: CPT

## 2022-11-02 PROCEDURE — 97022 WHIRLPOOL THERAPY: CPT

## 2022-11-02 NOTE — PROGRESS NOTES
"  [facility]  Occupational Therapy Treatment Note    Date: 11/2/2022  Name: Zoë Enrique  Clinic Number: [id number]    Therapy Diagnosis:   Encounter Diagnoses   Name Primary?    Decreased range of motion of left wrist Yes    Decreased  strength of left hand     Impaired sensation        Physician: Toñito Adame Jr., *    Physician Orders: evaluation and treat; OT for her left hand and wrist to work on range of motion stretching strengthening and modality treatments   Medical Diagnosis: M65.4 (ICD-10-CM) - De Quervain's tenosynovitis  Surgical Procedure and Date: 1. De Quervain release left wrist.     2. Trigger finger release left middle finger., 8/2/22 / Date of Injury/Onset: about 6 + months  Evaluation Date: 9/22/22  Insurance Authorization Period Expiration: 9/19/23  Plan of Care Expiration: 12/1/22  Progress Note Due: 11/7/22   Date of Return to MD: 11/7/22  Visit # / Visits authorized: 6/ 12  FOTO: initial eval 9/22/22  61% limitation, 10/19/22  43% limitation     Precautions:  Standard     Time In: 9:50 am   Time Out: 10:45 am  Total Appointment Time (timed & untimed codes): 55 minutes       SUBJECTIVE     Pt reports: "it's doing better. I still have some pain with trying to open jars and with clasping my bra."  She was compliant with home exercise program.   Response to previous treatment: increased pain yesterday, but improved today  Functional change: able to do more Light ADLs and IADLs (open jars, thomas bra)    Pain: 1/10   Location: left hands      OBJECTIVE   Objective Measures updated at progress report unless specified.    Observation/Appearance: hypersensitivity at scar, min swelling noted, scar thickness at trigger finger release and wrist     Edema. Measured in centimeters.    9/25/2022 9/25/2022 10/26/22     Left Right Left    Wrist Crease 17.4 15.9 17.3      Elbow and Wrist ROM. Measured in degrees.    9/25/2022 9/25/2022 10/26/2022     Left Right Left   Wrist " Ext/Flex 60/51 73/77 65/60   Wrist RD/UD 15/26 23/45 16/22      Hand ROM. Measured in degrees.  - bilateral fist and bilateral thumb to DPC      Strength (Dynamometer) and Pinch Strength (Pinch Gauge)  Measured in pounds.    9/25/2022 9/25/2022 10/26/22     Left Right Left (A)   Rung II 34 58 40   Khan Pinch 13 14 14   3pt Pinch 7 (pain) 15 10   2pt Pinch 5 (pain) 10 7      Sensation: intact     Manual Muscle Test    9/25/2022 9/25/2022     Left Right   Wrist Extension  4/5     Wrist Flexion 4/5     Radial Deviation 4/5     Ulnar Deviation 4/5            Limitation/Restriction for FOTO Hand Survey     Therapist reviewed FOTO scores for Zoë Enrique on 9/22/2022.   FOTO documents entered into [employer] - see Media section.     Limitation Score: 61%             Treatment     Zoë received the treatments listed below:     Supervised modalities after being cleared for contradictions: Fluidotherapy - Fluidotherapy: To L hand for 10 min, continuous air, 110 deg, air speed 100 to decrease pain, edema & scar tissue and increased tissue extensibility.    Manual therapy techniques: Manual Lymphatic Drainage and Soft tissue Mobilization were applied to the: L hand and wrist  for 8 minutes, including:  -Performed scar massage to decrease adhesions and improve tensile glide at left wrist and palm scars with star and suction     Patient receives ultrasound  for pain control and remold scar tissue to increase tissue elasticity @ 100 duty cycle, 3.3 Mhz, applied to left wrist and middle finger scar , intensity = 0.6 w/cm2 for 8 minutes.    Therapeutic exercises to develop strength and ROM for 29 minutes, including:       Exercises Reps/times   Wrist flex/ext/RD/UD 2 ways X 15 NT   Finkelstein stretch X 30 secs (3 sets) NT   finger lifts, spreads    Thumb AROM: circumduction, opposition and pinky slides  X 15      X10(NT)    TGE   x10 ea NT, in fluido   isospheres X2 min    In hand manip with grooved pegs 1 set    Wrist maze  X 2  min   Wrist flex/ext/RD/UD 2# wt 2 X 10 reps    Red power web pushes and pulls X 15   Graded wall push ups X 10 reps NT   Red digiflex, LF and composite X 15 reps each   Digi extender yellow  2 x 15   Red flexbar   Oscillations  Frowns/smiles  Isometric flex/ext    X2 min  X15   x15             Patient Education and Home Exercises      Education provided:   - HEP, Reviewed modalities and resting as able. Reviewed scar massage.   - Progress towards goals     Written Home Exercises Provided: yes.  Exercises were reviewed and Zoë was able to demonstrate them prior to the end of the session.  Zoë demonstrated good  understanding of the HEP provided. See EMR under Patient Instructions for exercises provided during therapy sessions.      ASSESSMENT     Pt would continue to benefit from skilled OT. Tolerated session well. She demonstrates good ROM and has overall improving  and pinch strength. She cont to report pain with certain activities such as opening a jar and trying to clasp her bra.  Pt participated well. Will continue as tolerated. Pt has follow up with MD on 11/7/22 and will determine further plan for therapy after that. She may come for 1 more visit.      Zoë is progressing well towards her goals and there are no updates to goals at this time. Pt prognosis is Good.     Pt will continue to benefit from skilled outpatient occupational therapy to address the deficits listed in the problem list on initial evaluation, provide pt/family education and to maximize pt's level of independence in the home and community environment.     Pt's spiritual, cultural and educational needs considered and pt agreeable to plan of care and goals.    Anticipated barriers to occupational therapy: delay into therapy     Goals:   The following goals were discussed with the patient and patient is in agreement with them as to be addressed in the treatment plan.   Long Term Goals (LTGs); to be met by discharge.  LTG #1: Pt will  report a pain level of 1 out of 10 with functional activity participation. ------progressing not met 11/2/2022           LTG #2: Pt will demo improved FOTO score by 20 points. ------progressing not met 11/2/2022  LTG #3: Pt will return to prior level of function for ADLs, phone use, gardening, cooking, and household management. ------progressing not met 11/2/2022     Short Term Goals (STGs); to be met within 4 weeks (10/20/22).  STG #1: Pt will report 3 out of 10 pain level with activities in therapy session. ------ met 11/2/2022  STG #2: Pt will demo improved edema by at least 0.3 cm to improve pain and motion. ------progressing not met 11/2/2022  STG #3: Pt will demo improved wrist FORREST by 15 degrees to improve independence with ADLs. ------ met 11/2/2022  STG #4: Pt will demonstrate independence with issued HEP. ------met 11/2/2022   STG #5: Pt will demo improved  strength by at least 5lbs to improve independence with cooking. ------met 11/2/2022  STG #6: Pt will demo improve pinch strength in each position by at least 1-3# in each limited position to improve dressing/grooming. ------ met 11/2/2022         PLAN     Continue skilled occupational therapy with individualized plan of care focusing on increasing ROM and strength to increase participation in meaningful daily activities.     Updates/Grading for next session: continue as tolerated.  FOTO next session and possible discharge from therapy    EVA Queen, T

## 2022-11-07 ENCOUNTER — OFFICE VISIT (OUTPATIENT)
Dept: ORTHOPEDICS | Facility: CLINIC | Age: 72
End: 2022-11-07
Payer: MEDICARE

## 2022-11-07 VITALS — HEIGHT: 68 IN | WEIGHT: 154 LBS | BODY MASS INDEX: 23.34 KG/M2

## 2022-11-07 DIAGNOSIS — M65.4 DE QUERVAIN'S TENOSYNOVITIS, LEFT: Primary | ICD-10-CM

## 2022-11-07 PROCEDURE — 1159F MED LIST DOCD IN RCRD: CPT | Mod: CPTII,S$GLB,, | Performed by: ORTHOPAEDIC SURGERY

## 2022-11-07 PROCEDURE — 1125F AMNT PAIN NOTED PAIN PRSNT: CPT | Mod: CPTII,S$GLB,, | Performed by: ORTHOPAEDIC SURGERY

## 2022-11-07 PROCEDURE — 1125F PR PAIN SEVERITY QUANTIFIED, PAIN PRESENT: ICD-10-PCS | Mod: CPTII,S$GLB,, | Performed by: ORTHOPAEDIC SURGERY

## 2022-11-07 PROCEDURE — 1101F PT FALLS ASSESS-DOCD LE1/YR: CPT | Mod: CPTII,S$GLB,, | Performed by: ORTHOPAEDIC SURGERY

## 2022-11-07 PROCEDURE — 99999 PR PBB SHADOW E&M-EST. PATIENT-LVL III: CPT | Mod: PBBFAC,,, | Performed by: ORTHOPAEDIC SURGERY

## 2022-11-07 PROCEDURE — 3288F PR FALLS RISK ASSESSMENT DOCUMENTED: ICD-10-PCS | Mod: CPTII,S$GLB,, | Performed by: ORTHOPAEDIC SURGERY

## 2022-11-07 PROCEDURE — 99999 PR PBB SHADOW E&M-EST. PATIENT-LVL III: ICD-10-PCS | Mod: PBBFAC,,, | Performed by: ORTHOPAEDIC SURGERY

## 2022-11-07 PROCEDURE — 3008F PR BODY MASS INDEX (BMI) DOCUMENTED: ICD-10-PCS | Mod: CPTII,S$GLB,, | Performed by: ORTHOPAEDIC SURGERY

## 2022-11-07 PROCEDURE — 99024 POSTOP FOLLOW-UP VISIT: CPT | Mod: S$GLB,,, | Performed by: ORTHOPAEDIC SURGERY

## 2022-11-07 PROCEDURE — 1159F PR MEDICATION LIST DOCUMENTED IN MEDICAL RECORD: ICD-10-PCS | Mod: CPTII,S$GLB,, | Performed by: ORTHOPAEDIC SURGERY

## 2022-11-07 PROCEDURE — 3008F BODY MASS INDEX DOCD: CPT | Mod: CPTII,S$GLB,, | Performed by: ORTHOPAEDIC SURGERY

## 2022-11-07 PROCEDURE — 1101F PR PT FALLS ASSESS DOC 0-1 FALLS W/OUT INJ PAST YR: ICD-10-PCS | Mod: CPTII,S$GLB,, | Performed by: ORTHOPAEDIC SURGERY

## 2022-11-07 PROCEDURE — 99024 PR POST-OP FOLLOW-UP VISIT: ICD-10-PCS | Mod: S$GLB,,, | Performed by: ORTHOPAEDIC SURGERY

## 2022-11-07 PROCEDURE — 3288F FALL RISK ASSESSMENT DOCD: CPT | Mod: CPTII,S$GLB,, | Performed by: ORTHOPAEDIC SURGERY

## 2022-11-07 NOTE — PROGRESS NOTES
Subjective:      Patient ID: Zoë Enrique is a 72 y.o. female.  Chief Complaint: Follow-up of the Left Hand      HPI  Zoë Enrique is a  72 y.o. female presenting today for post op visit.  She is s/p left de Quervain release and trigger release now about 3 months postop   She seems to be progressing slowly but recently started having some paresthesias in the left hand and wrist area   She is currently in therapy.     Review of patient's allergies indicates:   Allergen Reactions    Morphine Nausea And Vomiting    Succinylcholine      Other reaction(s): v tach         Current Outpatient Medications   Medication Sig Dispense Refill    acetaminophen (TYLENOL) 650 MG TbSR Take 650 mg by mouth every 8 (eight) hours.      docusate sodium (COLACE) 250 MG capsule Take 250 mg by mouth 2 (two) times daily.       estradioL (ESTRACE) 0.01 % (0.1 mg/gram) vaginal cream Place 1 g vaginally every other day. 42 g 3    flu vac 2022 65up-[id number],PF, (FLUAD QUAD 2022-23,65Y UP,,PF,) 60 mcg (15 mcg x 4)/0.5 mL Syrg Inject into the muscle. 0.5 mL 0    fluticasone propionate (FLONASE) 50 mcg/actuation nasal spray 2 sprays (100 mcg total) by Each Nostril route once daily. 16 g 1    HYDROcodone-acetaminophen (NORCO) 5-325 mg per tablet Take 1 tablet by mouth every 6 (six) hours as needed for Pain. Pt states takes PRN for headaches 28 tablet 0    ipratropium (ATROVENT) 21 mcg (0.03 %) nasal spray 2 sprays by Nasal route 2 (two) times daily. 30 mL 3    levothyroxine (SYNTHROID) 25 MCG tablet Take 1 tablet (25 mcg total) by mouth once daily. 90 tablet 3    mirabegron (MYRBETRIQ) 50 mg Tb24 Take 1 tablet (50 mg total) by mouth once daily. 30 tablet 11    olopatadine (PATANOL) 0.1 % ophthalmic solution 1 drop 2 (two) times daily.      pantoprazole (PROTONIX) 40 MG tablet Take 1 tablet (40 mg total) by mouth once daily. 30 tablet 3    rivaroxaban (XARELTO) 20 mg Tab Take 1 tablet (20 mg total) by mouth once daily. 90 tablet 3    traMADoL  (ULTRAM) 50 mg tablet Take 50 mg by mouth every 6 (six) hours as needed for Pain.      traMADoL (ULTRAM) 50 mg tablet Take 1 tablet (50 mg total) by mouth every 8 (eight) hours as needed for Pain. 90 tablet 5    varicella-zoster [id number]-[id number], PF, (SHINGRIX, PF,) 50 mcg/0.5 mL injection Inject 0.5 mLs into the muscle once. for 1 dose 1 each 1    vit C/E/Zn/coppr/lutein/zeaxan (PRESERVISION AREDS-2 ORAL)       acyclovir (ZOVIRAX) 400 MG tablet Take 1 tablet (400 mg total) by mouth 3 (three) times daily as needed. (Patient not taking: Reported on 9/20/2022) 90 tablet 3    CALCIUM CARB/VIT D3/MINERALS (CALTRATE PLUS ORAL) Take by mouth. Takes two chews daily      levoFLOXacin (LEVAQUIN) 750 MG tablet Take 1 tablet (750 mg total) by mouth once daily. 7 tablet 0     No current facility-administered medications for this visit.     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    Diverticulosis 1998    Hx of atrial flutter     Hx of mitral valve prolapse 1990    Hypothyroidism     Migraines     Recurrent upper respiratory infection (URI)     Supraventricular tachycardia        Past Surgical History:   Procedure Laterality Date    ABLATION OF ARRHYTHMOGENIC FOCUS FOR ATRIAL FIBRILLATION N/A 11/24/2020    Procedure: ABLATION, ARRHYTHMOGENIC FOCUS, FOR ATRIAL FIBRILLATION;  Surgeon: Moe Seymour MD;  Location: [facility] EP LAB;  Service: Cardiology;  Laterality: N/A;  AFL/AF, KHANH, CTI, PVI, RFA, CARTO, GEN, DM, 3 PREP    AUGMENTATION OF BREAST Bilateral 1984    35 yrs     BREAST SURGERY  1984    augmentation    CARDIOVERSION  11/24/2020    Procedure: Cardioversion;  Surgeon: Moe Seymour,  "MD;  Location: [facility] EP LAB;  Service: Cardiology;;     SECTION  1988    COLON SURGERY      partial colon resection    COLONOSCOPY N/A 3/19/2021    Procedure: COLONOSCOPY;  Surgeon: Sahara Salgado MD;  Location: [facility] (4TH FLR);  Service: Endoscopy;  Laterality: N/A;  constipation prep- 7 days of miralax, 2 fulls of fulls, then day bf clears and split prep   covid test 3/5 pcw, prep instr emailed, antibiotics prophylactically, Xarelto hold x 2 days per Dr. Seymour-see telephone encounter 2/10 -ml    COLPOSCOPY      COSMETIC SURGERY      DE QUERVAIN'S RELEASE Left 2022    Procedure: RELEASE, HAND, FOR DEQUERVAIN'S TENOSYNOVITIS;  Surgeon: Toñito Adame Jr., MD;  Location: [facility] OR;  Service: Orthopedics;  Laterality: Left;    ESOPHAGOGASTRODUODENOSCOPY N/A 3/19/2021    Procedure: EGD (ESOPHAGOGASTRODUODENOSCOPY);  Surgeon: Sahara Salgado MD;  Location: [facility] (4TH FLR);  Service: Endoscopy;  Laterality: N/A;    EYE SURGERY Bilateral     Lasik, then cataracts extraction    JOINT REPLACEMENT Right , ,     KNEE ARTHROPLASTY Right     revison in     KNEE SURGERY Right     distal femoral replacing TK    OOPHORECTOMY      TOTAL ABDOMINAL HYSTERECTOMY      TAHBSO    TRANSESOPHAGEAL ECHOCARDIOGRAPHY N/A 2020    Procedure: ECHOCARDIOGRAM, TRANSESOPHAGEAL;  Surgeon: Ramírez Ortez III, MD;  Location: [facility] EP LAB;  Service: Cardiology;  Laterality: N/A;    TREATMENT OF CARDIAC ARRHYTHMIA N/A 2019    Procedure: CARDIOVERSION;  Surgeon: Moe Seymour MD;  Location: [facility] EP LAB;  Service: Cardiology;  Laterality: N/A;  KHANH/DCCV/MAC/DM/3PREP/*ADMIT FOR TIKOSYN*    TRIGGER FINGER RELEASE Left 2022    Procedure: RELEASE, TRIGGER FINGER;  Surgeon: Toñito Adame Jr., MD;  Location: [facility] OR;  Service: Orthopedics;  Laterality: Left;  left middle finger       OBJECTIVE:   PHYSICAL EXAM:  Height: 5' 8" (172.7 cm) Weight: 69.9 kg (154 lb)  Vitals:    " "11/07/22 1349   Weight: 69.9 kg (154 lb)   Height: 5' 8" (1.727 m)   PainSc:   2     Ortho/SPM Exam  Examination left hand both incisions well-healed no evidence of infection minimal swelling range of motion wrist fingers full de Quervain test negative Tinel sign negative   No triggering noted   Sensation intact all areas    RADIOGRAPHS:  None  Comments: I have personally reviewed the imaging and I agree with the above radiologist's report.    ASSESSMENT/PLAN:     IMPRESSION:  Mild symptoms left hand and wrist after previous de Quervain release    PLAN:  Think some of her symptoms may be related to the superficial nerves in the wrist I recommended that she continue with the compounding cream for topical use and continue therapy  She is not using the brace so we really can not blame the brace for compression so I think just regular activities would be fine    FOLLOW UP:  4-6 weeks    Disclaimer: This note has been generated using voice-recognition software. There may be typographical errors that have been missed during proof-reading.     "

## 2022-11-09 ENCOUNTER — CLINICAL SUPPORT (OUTPATIENT)
Dept: REHABILITATION | Facility: HOSPITAL | Age: 72
End: 2022-11-09
Payer: MEDICARE

## 2022-11-09 DIAGNOSIS — M25.632 DECREASED RANGE OF MOTION OF LEFT WRIST: Primary | ICD-10-CM

## 2022-11-09 DIAGNOSIS — R20.1 IMPAIRED SENSATION: ICD-10-CM

## 2022-11-09 DIAGNOSIS — R29.898 DECREASED GRIP STRENGTH OF LEFT HAND: ICD-10-CM

## 2022-11-09 PROCEDURE — 97140 MANUAL THERAPY 1/> REGIONS: CPT

## 2022-11-09 PROCEDURE — 97022 WHIRLPOOL THERAPY: CPT

## 2022-11-09 PROCEDURE — 97110 THERAPEUTIC EXERCISES: CPT

## 2022-11-09 NOTE — PROGRESS NOTES
"  [facility]  Occupational Therapy Treatment Note    Date: 11/9/2022  Name: Zoë Enrique  Clinic Number: [id number]    Therapy Diagnosis:   Encounter Diagnoses   Name Primary?    Decreased range of motion of left wrist Yes    Decreased  strength of left hand     Impaired sensation        Physician: Toñito Adame Jr., *    Physician Orders: evaluation and treat; OT for her left hand and wrist to work on range of motion stretching strengthening and modality treatments   Medical Diagnosis: M65.4 (ICD-10-CM) - De Quervain's tenosynovitis  Surgical Procedure and Date: 1. De Quervain release left wrist.     2. Trigger finger release left middle finger., 8/2/22 / Date of Injury/Onset: about 6 + months  Evaluation Date: 9/22/22  Insurance Authorization Period Expiration: 9/19/23  Plan of Care Expiration: 12/1/22  Progress Note Due: 11/7/22   Date of Return to MD: 12/19/22  Visit # / Visits authorized: 7/ 12  FOTO: initial eval 9/22/22  61% limitation, 10/19/22  43% limitation     Precautions:  Standard     Time In: 10:30 am   Time Out: 11:25 am  Total Appointment Time (timed & untimed codes): 55 minutes       SUBJECTIVE     Pt reports: "it's feeling ok today but it was bothering me a little" Pt reported some occasional shooting pain. Pt reports she would like to cont therapy a bit longer.  She was compliant with home exercise program.   Response to previous treatment: increased pain yesterday, but improved today  Functional change: able to do more Light ADLs and IADLs (open jars, thomas bra)    Pain: 1/10   Location: left hands      OBJECTIVE   Objective Measures updated at progress report unless specified.    Observation/Appearance: hypersensitivity at scar, min swelling noted, scar thickness at trigger finger release and wrist     Edema. Measured in centimeters.    9/25/2022 9/25/2022 10/26/22     Left Right Left    Wrist Crease 17.4 15.9 17.3      Elbow and Wrist ROM. Measured in " degrees.    9/25/2022 9/25/2022 10/26/2022     Left Right Left   Wrist Ext/Flex 60/51 73/77 65/60   Wrist RD/UD 15/26 23/45 16/22      Hand ROM. Measured in degrees.  - bilateral fist and bilateral thumb to DPC      Strength (Dynamometer) and Pinch Strength (Pinch Gauge)  Measured in pounds.    9/25/2022 9/25/2022 10/26/22     Left Right Left (A)   Rung II 34 58 40   Khan Pinch 13 14 14   3pt Pinch 7 (pain) 15 10   2pt Pinch 5 (pain) 10 7      Sensation: intact     Manual Muscle Test    9/25/2022 9/25/2022     Left Right   Wrist Extension  4/5     Wrist Flexion 4/5     Radial Deviation 4/5     Ulnar Deviation 4/5            Limitation/Restriction for FOTO Hand Survey     Therapist reviewed FOTO scores for Zoë Enrique on 9/22/2022.   FOTO documents entered into [employer] - see Media section.     Limitation Score: 61%             Treatment     Zoë received the treatments listed below:     Supervised modalities after being cleared for contradictions: Fluidotherapy - Fluidotherapy: To L hand for 10 min, continuous air, 110 deg, air speed 100 to decrease pain, edema & scar tissue and increased tissue extensibility.    Manual therapy techniques: Manual Lymphatic Drainage and Soft tissue Mobilization were applied to the: L hand and wrist  for 8 minutes, including:  -Performed scar massage to decrease adhesions and improve tensile glide at left wrist and palm scars with star and suction     Patient receives ultrasound  for pain control and remold scar tissue to increase tissue elasticity @ 100 duty cycle, 3.3 Mhz, applied to left wrist and middle finger scar , intensity = 0.6 w/cm2 for 8 minutes.    Therapeutic exercises to develop strength and ROM for 29 minutes, including: (15 min supervised)      Exercises Reps/times   Wrist flex/ext/RD/UD 2 ways X 15 NT   Finkelstein stretch X 30 secs (3 sets) NT   finger lifts, spreads    Thumb AROM: circumduction, opposition and pinky slides  X 15      X10(NT)    TGE   x10 ea NT, in  fluido   isospheres X2 min    In hand manip with grooved pegs 1 set    Wrist maze  X 2 min   Wrist flex/ext/RD/UD 2# wt 2 X 10 reps    Red power web pushes and pulls X 15 (NT)   Graded wall push ups X 10 reps NT   Red digiflex, LF and composite X 15 reps each   Digi extender yellow  2 x 15   Red flexbar   Oscillations  Frowns/smiles  Isometric flex/ext    X2 min  X15   x15             Patient Education and Home Exercises      Education provided:   - HEP, Reviewed modalities and resting as able. Reviewed scar massage.   - Progress towards goals     Written Home Exercises Provided: yes.  Exercises were reviewed and Zoë was able to demonstrate them prior to the end of the session.  Zoë demonstrated good  understanding of the HEP provided. See EMR under Patient Instructions for exercises provided during therapy sessions.      ASSESSMENT     Pt would continue to benefit from skilled OT. Tolerated session fairly well. She cont to present with some swelling still at radial wrist. However good ROM noted. She cont to report pain with certain activities such as opening a jar and trying to clasp her bra. Certain pinching activities cause pain.  Pt participated well. Will continue as tolerated.      Zoë is progressing well towards her goals and there are no updates to goals at this time. Pt prognosis is Good.     Pt will continue to benefit from skilled outpatient occupational therapy to address the deficits listed in the problem list on initial evaluation, provide pt/family education and to maximize pt's level of independence in the home and community environment.     Pt's spiritual, cultural and educational needs considered and pt agreeable to plan of care and goals.    Anticipated barriers to occupational therapy: delay into therapy     Goals:   The following goals were discussed with the patient and patient is in agreement with them as to be addressed in the treatment plan.   Long Term Goals (LTGs); to be met by  discharge.  LTG #1: Pt will report a pain level of 1 out of 10 with functional activity participation. ------progressing not met 11/9/2022           LTG #2: Pt will demo improved FOTO score by 20 points. ------progressing not met 11/9/2022  LTG #3: Pt will return to prior level of function for ADLs, phone use, gardening, cooking, and household management. ------progressing not met 11/9/2022     Short Term Goals (STGs); to be met within 4 weeks (10/20/22).  STG #1: Pt will report 3 out of 10 pain level with activities in therapy session. ------ met 11/2/2022  STG #2: Pt will demo improved edema by at least 0.3 cm to improve pain and motion. ------progressing not met 11/9/2022  STG #3: Pt will demo improved wrist FORREST by 15 degrees to improve independence with ADLs. ------ met 11/2/2022  STG #4: Pt will demonstrate independence with issued HEP. ------met 11/2/2022   STG #5: Pt will demo improved  strength by at least 5lbs to improve independence with cooking. ------met 11/2/2022  STG #6: Pt will demo improve pinch strength in each position by at least 1-3# in each limited position to improve dressing/grooming. ------ met 11/2/2022         PLAN     Continue skilled occupational therapy with individualized plan of care focusing on increasing ROM and strength to increase participation in meaningful daily activities.     Updates/Grading for next session: continue as tolerated.  Take measurements next session    EVA Queen, CHT

## 2022-11-16 ENCOUNTER — CLINICAL SUPPORT (OUTPATIENT)
Dept: REHABILITATION | Facility: HOSPITAL | Age: 72
End: 2022-11-16
Payer: MEDICARE

## 2022-11-16 DIAGNOSIS — R20.1 IMPAIRED SENSATION: ICD-10-CM

## 2022-11-16 DIAGNOSIS — R29.898 DECREASED GRIP STRENGTH OF LEFT HAND: ICD-10-CM

## 2022-11-16 DIAGNOSIS — M25.632 DECREASED RANGE OF MOTION OF LEFT WRIST: Primary | ICD-10-CM

## 2022-11-16 PROCEDURE — 97035 APP MDLTY 1+ULTRASOUND EA 15: CPT | Mod: CO

## 2022-11-16 PROCEDURE — 97022 WHIRLPOOL THERAPY: CPT | Mod: CO

## 2022-11-16 PROCEDURE — 97110 THERAPEUTIC EXERCISES: CPT | Mod: CO

## 2022-11-16 NOTE — PROGRESS NOTES
[facility]  Occupational Therapy Treatment Note    Date: 11/16/2022  Name: Zoë Enrique  Clinic Number: [id number]    Therapy Diagnosis:   Encounter Diagnoses   Name Primary?    Decreased range of motion of left wrist Yes    Decreased  strength of left hand     Impaired sensation          Physician: Toñito Adame Jr., *    Physician Orders: evaluation and treat; OT for her left hand and wrist to work on range of motion stretching strengthening and modality treatments   Medical Diagnosis: M65.4 (ICD-10-CM) - De Quervain's tenosynovitis  Surgical Procedure and Date: 1. De Quervain release left wrist.     2. Trigger finger release left middle finger., 8/2/22 / Date of Injury/Onset: about 6 + months  Evaluation Date: 9/22/22  Insurance Authorization Period Expiration: 9/19/23  Plan of Care Expiration: 12/1/22  Progress Note Due: 11/7/22   Date of Return to MD: 12/19/22  Visit # / Visits authorized: 8/ 12  FOTO: initial eval 9/22/22  61% limitation, 10/19/22  43% limitation     Precautions:  Standard     Time In: 10:47 am   Time Out: 11:46 am  Total Appointment Time (timed & untimed codes): 59 minutes       SUBJECTIVE     Pt reports: continues to have difficulty pushing self up off of her hand and feels weak  She was compliant with home exercise program.   Response to previous treatment: increased pain yesterday, but improved today  Functional change: able to do more Light ADLs and IADLs (open jars, thomas bra)    Pain: 1/10   Location: left hands      OBJECTIVE   Objective Measures updated at progress report unless specified.    Observation/Appearance: hypersensitivity at scar, min swelling noted, scar thickness at trigger finger release and wrist     Edema. Measured in centimeters.    9/25/2022 9/25/2022 10/26/22     Left Right Left    Wrist Crease 17.4 15.9 17.3      Elbow and Wrist ROM. Measured in degrees.    9/25/2022 9/25/2022 10/26/2022     Left Right Left   Wrist Ext/Flex  60/51 73/77 65/60   Wrist RD/UD 15/26 23/45 16/22      Hand ROM. Measured in degrees.  - bilateral fist and bilateral thumb to DPC      Strength (Dynamometer) and Pinch Strength (Pinch Gauge)  Measured in pounds.    9/25/2022 9/25/2022 10/26/22     Left Right Left (A)   Rung II 34 58 40   Khan Pinch 13 14 14   3pt Pinch 7 (pain) 15 10   2pt Pinch 5 (pain) 10 7      Sensation: intact     Manual Muscle Test    9/25/2022 9/25/2022     Left Right   Wrist Extension  4/5     Wrist Flexion 4/5     Radial Deviation 4/5     Ulnar Deviation 4/5            Limitation/Restriction for FOTO Hand Survey     Therapist reviewed FOTO scores for Zoë Enrique on 9/22/2022.   FOTO documents entered into [employer] - see Media section.     Limitation Score: 61%             Treatment     Zoë received the treatments listed below:     Supervised modalities after being cleared for contradictions: Fluidotherapy - Fluidotherapy: To L hand for 10 min, continuous air, 110 deg, air speed 100 to decrease pain, edema & scar tissue and increased tissue extensibility.    Manual therapy techniques: Manual Lymphatic Drainage and Soft tissue Mobilization were applied to the: L hand and wrist  for 5 minutes, including:  -Performed scar massage to decrease adhesions and improve tensile glide at left palm scars with star    Patient receives ultrasound  for pain control and remold scar tissue to increase tissue elasticity @ 100 duty cycle, 3.3 Mhz, applied to left wrist and middle finger scar , intensity = 0.6 w/cm2 for 8 minutes.    Therapeutic exercises to develop strength and ROM for 36 minutes, including: (36 min supervised)      Exercises Reps/times   Wrist flex/ext/RD/UD 2 ways X 15 NT   Finkelstein stretch X 30 secs (3 sets) NT   finger lifts, spreads    Thumb AROM: circumduction, opposition and pinky slides  X 15      X10(NT)    TGE   x10 ea NT, in fluido   isospheres X2 min    In hand manip with grooved pegs 1 set    Wrist maze  X 2 min   Wrist  flex/ext/RD/UD 2.2# yellow ball  2 X 10 reps    Green power web pushes and pulls X 15    Graded wall push ups X 10 reps (3 sets)   Red digiflex, LF and composite X 15 reps each   Digi extender yellow  2 x 15   Green flexbar   Oscillations  Frowns/smiles  Isometric flex/ext    X2 min  X15   x15             Patient Education and Home Exercises      Education provided:   - HEP, Reviewed modalities and resting as able. Reviewed scar massage.   - Progress towards goals     Written Home Exercises Provided: yes.  Exercises were reviewed and Zoë was able to demonstrate them prior to the end of the session.  Zoë demonstrated good  understanding of the HEP provided. See EMR under Patient Instructions for exercises provided during therapy sessions.      ASSESSMENT     Pt would continue to benefit from skilled OT. Tolerated session well. Upgraded with strengthening activities today. Some cues for adjustments for proper technique of exercises to limit strain. Reviewed wall push up and ed on performing with HEP. Pt participated well. Will continue as tolerated.      Zoë is progressing well towards her goals and there are no updates to goals at this time. Pt prognosis is Good.     Pt will continue to benefit from skilled outpatient occupational therapy to address the deficits listed in the problem list on initial evaluation, provide pt/family education and to maximize pt's level of independence in the home and community environment.     Pt's spiritual, cultural and educational needs considered and pt agreeable to plan of care and goals.    Anticipated barriers to occupational therapy: delay into therapy     Goals:   The following goals were discussed with the patient and patient is in agreement with them as to be addressed in the treatment plan.   Long Term Goals (LTGs); to be met by discharge.  LTG #1: Pt will report a pain level of 1 out of 10 with functional activity participation. ------progressing not met 11/16/2022            LTG #2: Pt will demo improved FOTO score by 20 points. ------progressing not met 11/16/2022  LTG #3: Pt will return to prior level of function for ADLs, phone use, gardening, cooking, and household management. ------progressing not met 11/16/2022     Short Term Goals (STGs); to be met within 4 weeks (10/20/22).  STG #1: Pt will report 3 out of 10 pain level with activities in therapy session. ------ met 11/2/2022  STG #2: Pt will demo improved edema by at least 0.3 cm to improve pain and motion. ------progressing not met 11/16/2022  STG #3: Pt will demo improved wrist FORREST by 15 degrees to improve independence with ADLs. ------ met 11/2/2022  STG #4: Pt will demonstrate independence with issued HEP. ------met 11/2/2022   STG #5: Pt will demo improved  strength by at least 5lbs to improve independence with cooking. ------met 11/2/2022  STG #6: Pt will demo improve pinch strength in each position by at least 1-3# in each limited position to improve dressing/grooming. ------ met 11/2/2022         PLAN     Continue skilled occupational therapy with individualized plan of care focusing on increasing ROM and strength to increase participation in meaningful daily activities.     Updates/Grading for next session: continue as tolerated.  Take measurements next session, GA Santos

## 2022-11-17 ENCOUNTER — TELEPHONE (OUTPATIENT)
Dept: OTOLARYNGOLOGY | Facility: CLINIC | Age: 72
End: 2022-11-17
Payer: MEDICARE

## 2022-11-17 NOTE — TELEPHONE ENCOUNTER
Called patient in regards to appointment coming up with Dr. Freeman. Patient will need to be rescheduled due to provider being out of the office on upcoming scheduled appointment. Offered patient first available in [facility] and the patient elected to be seen in [place] on 01/23. Was thanked for call

## 2022-11-23 ENCOUNTER — CLINICAL SUPPORT (OUTPATIENT)
Dept: REHABILITATION | Facility: HOSPITAL | Age: 72
End: 2022-11-23
Payer: MEDICARE

## 2022-11-23 DIAGNOSIS — M25.632 DECREASED RANGE OF MOTION OF LEFT WRIST: Primary | ICD-10-CM

## 2022-11-23 DIAGNOSIS — R20.1 IMPAIRED SENSATION: ICD-10-CM

## 2022-11-23 DIAGNOSIS — R29.898 DECREASED GRIP STRENGTH OF LEFT HAND: ICD-10-CM

## 2022-11-23 PROCEDURE — 97110 THERAPEUTIC EXERCISES: CPT | Mod: CO

## 2022-11-23 PROCEDURE — 97022 WHIRLPOOL THERAPY: CPT | Mod: CO

## 2022-11-23 NOTE — PROGRESS NOTES
[facility]  Occupational Therapy Treatment Note/ Discharge    Date: 11/23/2022  Name: Zoë Enrique  Clinic Number: [id number]    Therapy Diagnosis:   Encounter Diagnoses   Name Primary?    Decreased range of motion of left wrist Yes    Decreased  strength of left hand     Impaired sensation            Physician: Toñito Adame Jr., *    Physician Orders: evaluation and treat; OT for her left hand and wrist to work on range of motion stretching strengthening and modality treatments   Medical Diagnosis: M65.4 (ICD-10-CM) - De Quervain's tenosynovitis  Surgical Procedure and Date: 1. De Quervain release left wrist.     2. Trigger finger release left middle finger., 8/2/22 / Date of Injury/Onset: about 6 + months  Evaluation Date: 9/22/22  Insurance Authorization Period Expiration: 9/19/23  Plan of Care Expiration: 12/1/22  Progress Note Due: 11/7/22   Date of Return to MD: 12/19/22  Visit # / Visits authorized: 9/ 12  FOTO: initial eval 9/22/22  61% limitation, 10/19/22  43% limitation     Precautions:  Standard     Time In: 9:45 am   Time Out: 10:25 am  Total Appointment Time (timed & untimed codes): 40 minutes       SUBJECTIVE     Pt reports: continues to have difficulty pushing self up off the ground however feels she has improved a lot and can do the exercises at home    She was compliant with home exercise program.   Response to previous treatment: increased pain yesterday, but improved today  Functional change: able to do more Light ADLs and IADLs (open jars, thomas bra)    Pain: 1/10   Location: left hands      OBJECTIVE   Objective Measures updated at progress report unless specified.    Observation/Appearance: hypersensitivity at scar, min swelling noted, scar thickness at trigger finger release and wrist     Edema. Measured in centimeters.    9/25/2022 9/25/2022 10/26/22 11/26/22     Left Right Left  Left   Wrist Crease 17.4 15.9 17.3 17.8      Elbow and Wrist ROM. Measured  in degrees.    9/25/2022 9/25/2022 10/26/2022 11/26/22     Left Right Left Left   Wrist Ext/Flex 60/51 73/77 65/60 71/71   Wrist RD/UD 15/26 23/45 16/22 18/27      Hand ROM. Measured in degrees.  - bilateral fist and bilateral thumb to DPC      Strength (Dynamometer) and Pinch Strength (Pinch Gauge)  Measured in pounds.    9/25/2022 9/25/2022 10/26/22 11/23/22     Left Right Left (A) Left (A)   Rung II 34 58 40 43   Khan Pinch 13 14 14 14   3pt Pinch 7 (pain) 15 10 13   2pt Pinch 5 (pain) 10 7 10      Sensation: intact     Manual Muscle Test    9/25/2022 9/25/2022     Left Right   Wrist Extension  4/5     Wrist Flexion 4/5     Radial Deviation 4/5     Ulnar Deviation 4/5            Limitation/Restriction for FOTO Hand Survey     Therapist reviewed FOTO scores for Zoë Enrique on 9/22/2022.   FOTO documents entered into [employer] - see Media section.     Limitation Score: 61%             Treatment     Zoë received the treatments listed below:     Supervised modalities after being cleared for contradictions: Fluidotherapy - Fluidotherapy: To L hand for 10 min, continuous air, 110 deg, air speed 100 to decrease pain, edema & scar tissue and increased tissue extensibility.    NT- Manual therapy techniques: Manual Lymphatic Drainage and Soft tissue Mobilization were applied to the: L hand and wrist  for 5 minutes, including:  -Performed scar massage to decrease adhesions and improve tensile glide at left palm scars with star    NT -Patient receives ultrasound  for pain control and remold scar tissue to increase tissue elasticity @ 100 duty cycle, 3.3 Mhz, applied to left wrist and middle finger scar , intensity = 0.6 w/cm2 for 8 minutes.    Therapeutic exercises to develop strength and ROM for 30 minutes, including:    Updated objective measures   Green tputty squeezes   Wrist PRE with 2#   Wrist wobble board x1 min ea way    Patient Education and Home Exercises      Education provided:   - HEP, Reviewed modalities and  resting as able. Reviewed scar massage.   - Progress towards goals     Written Home Exercises Provided: yes.  Exercises were reviewed and Zoë was able to demonstrate them prior to the end of the session.  Zoë demonstrated good  understanding of the HEP provided. See EMR under Patient Instructions for exercises provided during therapy sessions.      ASSESSMENT   Zoë has demo'd excellent progress with her strength and ROM. She has returned to participating in her prior activities and is independent in her HEP. She will be discharged today 2* meeting her goals. Discussion was held with supervising OT for discharge plan as evidence by cece.     Zoë is progressing well towards her goals and updates to goals at this time are noted below. Pt prognosis is Good.     Pt's spiritual, cultural and educational needs considered and pt agreeable to plan of care and goals.    Anticipated barriers to occupational therapy: delay into therapy     Goals:   The following goals were discussed with the patient and patient is in agreement with them as to be addressed in the treatment plan.   Long Term Goals (LTGs); to be met by discharge.  LTG #1: Pt will report a pain level of 1 out of 10 with functional activity participation. ------ met 11/23/2022           LTG #2: Pt will demo improved FOTO score by 20 points. ------ met 11/23/2022  LTG #3: Pt will return to prior level of function for ADLs, phone use, gardening, cooking, and household management. ----- met 11/23/2022     Short Term Goals (STGs); to be met within 4 weeks (10/20/22).  STG #1: Pt will report 3 out of 10 pain level with activities in therapy session. ------ met 11/2/2022  STG #2: Pt will demo improved edema by at least 0.3 cm to improve pain and motion. ------not met 11/23/2022  STG #3: Pt will demo improved wrist FORREST by 15 degrees to improve independence with ADLs. ------ met 11/2/2022  STG #4: Pt will demonstrate independence with issued HEP. ------met  11/2/2022   STG #5: Pt will demo improved  strength by at least 5lbs to improve independence with cooking. ------met 11/2/2022  STG #6: Pt will demo improve pinch strength in each position by at least 1-3# in each limited position to improve dressing/grooming. ------ met 11/2/2022         PLAN     Discontinue skilled occupational therapy with individualized plan of care focusing on increasing ROM and strength to increase participation in meaningful daily activities.     HECTOR Mckenna/ATUL

## 2022-11-25 ENCOUNTER — IMMUNIZATION (OUTPATIENT)
Dept: PHARMACY | Facility: CLINIC | Age: 72
End: 2022-11-25
Payer: MEDICARE

## 2022-11-25 DIAGNOSIS — Z23 NEED FOR VACCINATION: Primary | ICD-10-CM

## 2022-12-01 ENCOUNTER — OFFICE VISIT (OUTPATIENT)
Dept: UROLOGY | Facility: CLINIC | Age: 72
End: 2022-12-01
Payer: MEDICARE

## 2022-12-01 VITALS
HEART RATE: 81 BPM | DIASTOLIC BLOOD PRESSURE: 63 MMHG | HEIGHT: 68 IN | WEIGHT: 160.94 LBS | SYSTOLIC BLOOD PRESSURE: 109 MMHG | BODY MASS INDEX: 24.39 KG/M2

## 2022-12-01 DIAGNOSIS — N32.81 OAB (OVERACTIVE BLADDER): Primary | ICD-10-CM

## 2022-12-01 PROCEDURE — 1126F PR PAIN SEVERITY QUANTIFIED, NO PAIN PRESENT: ICD-10-PCS | Mod: CPTII,S$GLB,, | Performed by: UROLOGY

## 2022-12-01 PROCEDURE — 3008F PR BODY MASS INDEX (BMI) DOCUMENTED: ICD-10-PCS | Mod: CPTII,S$GLB,, | Performed by: UROLOGY

## 2022-12-01 PROCEDURE — 1159F PR MEDICATION LIST DOCUMENTED IN MEDICAL RECORD: ICD-10-PCS | Mod: CPTII,S$GLB,, | Performed by: UROLOGY

## 2022-12-01 PROCEDURE — 99213 PR OFFICE/OUTPT VISIT, EST, LEVL III, 20-29 MIN: ICD-10-PCS | Mod: S$GLB,,, | Performed by: UROLOGY

## 2022-12-01 PROCEDURE — 99213 OFFICE O/P EST LOW 20 MIN: CPT | Mod: S$GLB,,, | Performed by: UROLOGY

## 2022-12-01 PROCEDURE — 1101F PR PT FALLS ASSESS DOC 0-1 FALLS W/OUT INJ PAST YR: ICD-10-PCS | Mod: CPTII,S$GLB,, | Performed by: UROLOGY

## 2022-12-01 PROCEDURE — 1126F AMNT PAIN NOTED NONE PRSNT: CPT | Mod: CPTII,S$GLB,, | Performed by: UROLOGY

## 2022-12-01 PROCEDURE — 3074F SYST BP LT 130 MM HG: CPT | Mod: CPTII,S$GLB,, | Performed by: UROLOGY

## 2022-12-01 PROCEDURE — 1159F MED LIST DOCD IN RCRD: CPT | Mod: CPTII,S$GLB,, | Performed by: UROLOGY

## 2022-12-01 PROCEDURE — 3078F PR MOST RECENT DIASTOLIC BLOOD PRESSURE < 80 MM HG: ICD-10-PCS | Mod: CPTII,S$GLB,, | Performed by: UROLOGY

## 2022-12-01 PROCEDURE — 1101F PT FALLS ASSESS-DOCD LE1/YR: CPT | Mod: CPTII,S$GLB,, | Performed by: UROLOGY

## 2022-12-01 PROCEDURE — 99999 PR PBB SHADOW E&M-EST. PATIENT-LVL III: CPT | Mod: PBBFAC,,, | Performed by: UROLOGY

## 2022-12-01 PROCEDURE — 3288F PR FALLS RISK ASSESSMENT DOCUMENTED: ICD-10-PCS | Mod: CPTII,S$GLB,, | Performed by: UROLOGY

## 2022-12-01 PROCEDURE — 3288F FALL RISK ASSESSMENT DOCD: CPT | Mod: CPTII,S$GLB,, | Performed by: UROLOGY

## 2022-12-01 PROCEDURE — 99999 PR PBB SHADOW E&M-EST. PATIENT-LVL III: ICD-10-PCS | Mod: PBBFAC,,, | Performed by: UROLOGY

## 2022-12-01 PROCEDURE — 3078F DIAST BP <80 MM HG: CPT | Mod: CPTII,S$GLB,, | Performed by: UROLOGY

## 2022-12-01 PROCEDURE — 3074F PR MOST RECENT SYSTOLIC BLOOD PRESSURE < 130 MM HG: ICD-10-PCS | Mod: CPTII,S$GLB,, | Performed by: UROLOGY

## 2022-12-01 PROCEDURE — 3008F BODY MASS INDEX DOCD: CPT | Mod: CPTII,S$GLB,, | Performed by: UROLOGY

## 2022-12-01 NOTE — PROGRESS NOTES
[facility]        Overactive Bladder (OAB) Return Note     12/1/2022     Referred by:  Praveen Jeffery MD     HPI: Zoë Enrique is a very pleasant 72 y.o. female who is a return patient to our department referred for evaluation of urinary incontinence of several years duration. Her voiding diary demonstrated 36 voids over the 3-day total. She had little UUI.      She denies symptoms of irritative voiding including dysuria. She denies symptoms of obstructive voiding including decreased stream, hesitancy, intermittency, post void dribbling and sense of incomplete emptying. Bladder scan PVR was 0 mL. Her history includes no notation of urolithiasis, hematuria, prior pelvic surgery, previous prolapse or incontinence procedures or neurological symptoms/diagnoses. She denies all other prior pelvic surgeries or neurologic diagnoses. She does not report symptoms suggestive of advanced POP. She denies a history of constipation.      Previous incontinence therapies:  oral oxybutynin IR (provided no benefit) - stopped this medication in the distant past after worsened her issues with dry eyes.   REDUCE trial: no improvement in treatment arm; exited from the study at conclusion   Myrbetriq 50 mg, 2 months, No real change in symptoms     A review of 10+ systems was conducted with pertinent positive and negative findings documented in HPI with all other systems reviewed and negative.     Past medical, family, surgical and social history reviewed as documented in chart with pertinent positive medical, family, surgical and social history detailed in HPI.     Exam Findings:     Const: no acute distress, conversant and alert  Eyes: anicteric, extraocular muscles intact  ENMT: normocephalic, Nl oral membranes  Cardio: no cyanosis, nl cap refill  Pulm: no tachypnea; no resp distress  Musc: no laceration, no tenderness  Neuro: alert; oriented x 3  Normal upper and lower extremity strength, sensation and reflex. No  focal neurologic deficits.   Skin: warm, dry; no petichiae  Psych: no anxiety; normal speech      Assessment/Plan:     Overactive Bladder with Urgency Incontinence (established,not meeting goals): Her diary confirms urinary frequency averaging 11x per day. She has existing dry eyes which forced discontinuation of antimuscarinic therapy in the past. She tried Myrbetriq without any improvement. She would prefer to manage her symptoms with just behavioral therapies at this time.   Return as needed.

## 2022-12-07 ENCOUNTER — PATIENT MESSAGE (OUTPATIENT)
Dept: INTERNAL MEDICINE | Facility: CLINIC | Age: 72
End: 2022-12-07
Payer: MEDICARE

## 2022-12-07 DIAGNOSIS — E03.9 HYPOTHYROIDISM (ACQUIRED): Primary | ICD-10-CM

## 2022-12-07 DIAGNOSIS — E78.2 HYPERLIPIDEMIA, MIXED: ICD-10-CM

## 2022-12-07 DIAGNOSIS — D69.6 THROMBOCYTOPENIA: ICD-10-CM

## 2022-12-09 ENCOUNTER — PES CALL (OUTPATIENT)
Dept: ADMINISTRATIVE | Facility: CLINIC | Age: 72
End: 2022-12-09
Payer: MEDICARE

## 2022-12-13 ENCOUNTER — HOSPITAL ENCOUNTER (OUTPATIENT)
Dept: RADIOLOGY | Facility: HOSPITAL | Age: 72
Discharge: HOME | End: 2022-12-13
Attending: INTERNAL MEDICINE
Payer: MEDICARE

## 2022-12-13 VITALS — BODY MASS INDEX: 24.25 KG/M2 | WEIGHT: 160 LBS | HEIGHT: 68 IN

## 2022-12-13 DIAGNOSIS — Z12.31 SCREENING MAMMOGRAM, ENCOUNTER FOR: ICD-10-CM

## 2022-12-13 PROCEDURE — 77067 SCR MAMMO BI INCL CAD: CPT | Mod: 26,HCNC,, | Performed by: RADIOLOGY

## 2022-12-13 PROCEDURE — 77067 SCR MAMMO BI INCL CAD: CPT | Mod: TC,HCNC,PO

## 2022-12-13 PROCEDURE — 77067 MAMMO DIGITAL SCREENING BILAT WITH TOMO: ICD-10-PCS | Mod: 26,HCNC,, | Performed by: RADIOLOGY

## 2022-12-13 PROCEDURE — 77063 BREAST TOMOSYNTHESIS BI: CPT | Mod: TC,HCNC,PO

## 2022-12-13 PROCEDURE — 77063 MAMMO DIGITAL SCREENING BILAT WITH TOMO: ICD-10-PCS | Mod: 26,HCNC,, | Performed by: RADIOLOGY

## 2022-12-13 PROCEDURE — 77063 BREAST TOMOSYNTHESIS BI: CPT | Mod: 26,HCNC,, | Performed by: RADIOLOGY

## 2022-12-20 ENCOUNTER — LAB VISIT (OUTPATIENT)
Dept: LAB | Facility: HOSPITAL | Age: 72
End: 2022-12-20
Attending: INTERNAL MEDICINE
Payer: MEDICARE

## 2022-12-20 DIAGNOSIS — D69.6 THROMBOCYTOPENIA: ICD-10-CM

## 2022-12-20 DIAGNOSIS — E78.2 HYPERLIPIDEMIA, MIXED: ICD-10-CM

## 2022-12-20 DIAGNOSIS — E03.9 HYPOTHYROIDISM (ACQUIRED): ICD-10-CM

## 2022-12-20 LAB
ALBUMIN SERPL BCP-MCNC: 4 G/DL (ref 3.5–5.2)
ALP SERPL-CCNC: 63 U/L (ref 55–135)
ALT SERPL W/O P-5'-P-CCNC: 20 U/L (ref 10–44)
ANION GAP SERPL CALC-SCNC: 11 MMOL/L (ref 8–16)
AST SERPL-CCNC: 26 U/L (ref 10–40)
BASOPHILS # BLD AUTO: 0.02 K/UL (ref 0–0.2)
BASOPHILS NFR BLD: 0.5 % (ref 0–1.9)
BILIRUB SERPL-MCNC: 0.5 MG/DL (ref 0.1–1)
BUN SERPL-MCNC: 13 MG/DL (ref 8–23)
CALCIUM SERPL-MCNC: 8.8 MG/DL (ref 8.7–10.5)
CHLORIDE SERPL-SCNC: 105 MMOL/L (ref 95–110)
CHOLEST SERPL-MCNC: 166 MG/DL (ref 120–199)
CHOLEST/HDLC SERPL: 2.8 {RATIO} (ref 2–5)
CO2 SERPL-SCNC: 24 MMOL/L (ref 23–29)
CREAT SERPL-MCNC: 0.7 MG/DL (ref 0.5–1.4)
DIFFERENTIAL METHOD: ABNORMAL
EOSINOPHIL # BLD AUTO: 0.1 K/UL (ref 0–0.5)
EOSINOPHIL NFR BLD: 1.9 % (ref 0–8)
ERYTHROCYTE [DISTWIDTH] IN BLOOD BY AUTOMATED COUNT: 13.4 % (ref 11.5–14.5)
EST. GFR  (NO RACE VARIABLE): >60 ML/MIN/1.73 M^2
GLUCOSE SERPL-MCNC: 99 MG/DL (ref 70–110)
HCT VFR BLD AUTO: 42 % (ref 37–48.5)
HDLC SERPL-MCNC: 60 MG/DL (ref 40–75)
HDLC SERPL: 36.1 % (ref 20–50)
HGB BLD-MCNC: 13.6 G/DL (ref 12–16)
IMM GRANULOCYTES # BLD AUTO: 0 K/UL (ref 0–0.04)
IMM GRANULOCYTES NFR BLD AUTO: 0 % (ref 0–0.5)
LDLC SERPL CALC-MCNC: 84.6 MG/DL (ref 63–159)
LYMPHOCYTES # BLD AUTO: 0.7 K/UL (ref 1–4.8)
LYMPHOCYTES NFR BLD: 16 % (ref 18–48)
MCH RBC QN AUTO: 31.2 PG (ref 27–31)
MCHC RBC AUTO-ENTMCNC: 32.4 G/DL (ref 32–36)
MCV RBC AUTO: 96 FL (ref 82–98)
MONOCYTES # BLD AUTO: 0.5 K/UL (ref 0.3–1)
MONOCYTES NFR BLD: 11.1 % (ref 4–15)
NEUTROPHILS # BLD AUTO: 2.9 K/UL (ref 1.8–7.7)
NEUTROPHILS NFR BLD: 70.5 % (ref 38–73)
NONHDLC SERPL-MCNC: 106 MG/DL
NRBC BLD-RTO: 0 /100 WBC
PLATELET # BLD AUTO: 132 K/UL (ref 150–450)
PMV BLD AUTO: 11.4 FL (ref 9.2–12.9)
POTASSIUM SERPL-SCNC: 4.1 MMOL/L (ref 3.5–5.1)
PROT SERPL-MCNC: 6.5 G/DL (ref 6–8.4)
RBC # BLD AUTO: 4.36 M/UL (ref 4–5.4)
SODIUM SERPL-SCNC: 140 MMOL/L (ref 136–145)
T4 FREE SERPL-MCNC: 0.94 NG/DL (ref 0.71–1.51)
TRIGL SERPL-MCNC: 107 MG/DL (ref 30–150)
TSH SERPL DL<=0.005 MIU/L-ACNC: 1.72 UIU/ML (ref 0.4–4)
WBC # BLD AUTO: 4.13 K/UL (ref 3.9–12.7)

## 2022-12-20 PROCEDURE — 84443 ASSAY THYROID STIM HORMONE: CPT | Mod: HCNC | Performed by: INTERNAL MEDICINE

## 2022-12-20 PROCEDURE — 36415 COLL VENOUS BLD VENIPUNCTURE: CPT | Mod: HCNC | Performed by: INTERNAL MEDICINE

## 2022-12-20 PROCEDURE — 84439 ASSAY OF FREE THYROXINE: CPT | Mod: HCNC | Performed by: INTERNAL MEDICINE

## 2022-12-20 PROCEDURE — 80061 LIPID PANEL: CPT | Mod: HCNC | Performed by: INTERNAL MEDICINE

## 2022-12-20 PROCEDURE — 80053 COMPREHEN METABOLIC PANEL: CPT | Mod: HCNC | Performed by: INTERNAL MEDICINE

## 2022-12-20 PROCEDURE — 85025 COMPLETE CBC W/AUTO DIFF WBC: CPT | Mod: HCNC | Performed by: INTERNAL MEDICINE

## 2023-01-05 ENCOUNTER — OFFICE VISIT (OUTPATIENT)
Dept: INTERNAL MEDICINE | Facility: CLINIC | Age: 73
End: 2023-01-05
Payer: MEDICARE

## 2023-01-05 VITALS
HEIGHT: 68 IN | SYSTOLIC BLOOD PRESSURE: 110 MMHG | HEART RATE: 70 BPM | BODY MASS INDEX: 24.48 KG/M2 | DIASTOLIC BLOOD PRESSURE: 66 MMHG | OXYGEN SATURATION: 97 % | WEIGHT: 161.5 LBS

## 2023-01-05 DIAGNOSIS — E03.9 HYPOTHYROIDISM (ACQUIRED): ICD-10-CM

## 2023-01-05 DIAGNOSIS — Z00.00 ANNUAL PHYSICAL EXAM: Primary | ICD-10-CM

## 2023-01-05 DIAGNOSIS — I48.0 PAROXYSMAL ATRIAL FIBRILLATION: ICD-10-CM

## 2023-01-05 DIAGNOSIS — B00.9 HERPES: ICD-10-CM

## 2023-01-05 DIAGNOSIS — G44.229 CHRONIC TENSION-TYPE HEADACHE, NOT INTRACTABLE: ICD-10-CM

## 2023-01-05 DIAGNOSIS — C40.21 OSTEOSARCOMA OF RIGHT FEMUR: ICD-10-CM

## 2023-01-05 PROCEDURE — 3074F SYST BP LT 130 MM HG: CPT | Mod: HCNC,CPTII,S$GLB, | Performed by: INTERNAL MEDICINE

## 2023-01-05 PROCEDURE — 1126F AMNT PAIN NOTED NONE PRSNT: CPT | Mod: HCNC,CPTII,S$GLB, | Performed by: INTERNAL MEDICINE

## 2023-01-05 PROCEDURE — 99397 PR PREVENTIVE VISIT,EST,65 & OVER: ICD-10-PCS | Mod: HCNC,S$GLB,, | Performed by: INTERNAL MEDICINE

## 2023-01-05 PROCEDURE — 3074F PR MOST RECENT SYSTOLIC BLOOD PRESSURE < 130 MM HG: ICD-10-PCS | Mod: HCNC,CPTII,S$GLB, | Performed by: INTERNAL MEDICINE

## 2023-01-05 PROCEDURE — 3008F PR BODY MASS INDEX (BMI) DOCUMENTED: ICD-10-PCS | Mod: HCNC,CPTII,S$GLB, | Performed by: INTERNAL MEDICINE

## 2023-01-05 PROCEDURE — 3078F PR MOST RECENT DIASTOLIC BLOOD PRESSURE < 80 MM HG: ICD-10-PCS | Mod: HCNC,CPTII,S$GLB, | Performed by: INTERNAL MEDICINE

## 2023-01-05 PROCEDURE — 3078F DIAST BP <80 MM HG: CPT | Mod: HCNC,CPTII,S$GLB, | Performed by: INTERNAL MEDICINE

## 2023-01-05 PROCEDURE — 3008F BODY MASS INDEX DOCD: CPT | Mod: HCNC,CPTII,S$GLB, | Performed by: INTERNAL MEDICINE

## 2023-01-05 PROCEDURE — 1159F PR MEDICATION LIST DOCUMENTED IN MEDICAL RECORD: ICD-10-PCS | Mod: HCNC,CPTII,S$GLB, | Performed by: INTERNAL MEDICINE

## 2023-01-05 PROCEDURE — 99397 PER PM REEVAL EST PAT 65+ YR: CPT | Mod: HCNC,S$GLB,, | Performed by: INTERNAL MEDICINE

## 2023-01-05 PROCEDURE — 99999 PR PBB SHADOW E&M-EST. PATIENT-LVL IV: CPT | Mod: PBBFAC,HCNC,, | Performed by: INTERNAL MEDICINE

## 2023-01-05 PROCEDURE — 99999 PR PBB SHADOW E&M-EST. PATIENT-LVL IV: ICD-10-PCS | Mod: PBBFAC,HCNC,, | Performed by: INTERNAL MEDICINE

## 2023-01-05 PROCEDURE — 1126F PR PAIN SEVERITY QUANTIFIED, NO PAIN PRESENT: ICD-10-PCS | Mod: HCNC,CPTII,S$GLB, | Performed by: INTERNAL MEDICINE

## 2023-01-05 PROCEDURE — 1159F MED LIST DOCD IN RCRD: CPT | Mod: HCNC,CPTII,S$GLB, | Performed by: INTERNAL MEDICINE

## 2023-01-05 RX ORDER — AMOXICILLIN 500 MG/1
CAPSULE ORAL
COMMUNITY
Start: 2022-09-30 | End: 2023-05-03

## 2023-01-05 RX ORDER — HYDROCODONE BITARTRATE AND ACETAMINOPHEN 5; 325 MG/1; MG/1
1 TABLET ORAL EVERY 6 HOURS PRN
Qty: 28 TABLET | Refills: 0 | Status: SHIPPED | OUTPATIENT
Start: 2023-01-05 | End: 2023-11-17 | Stop reason: SDUPTHER

## 2023-01-05 RX ORDER — ACYCLOVIR 400 MG/1
400 TABLET ORAL 3 TIMES DAILY PRN
Qty: 90 TABLET | Refills: 3 | Status: SHIPPED | OUTPATIENT
Start: 2023-01-05 | End: 2023-05-03

## 2023-01-05 RX ORDER — LEVOTHYROXINE SODIUM 25 UG/1
25 TABLET ORAL DAILY
Qty: 90 TABLET | Refills: 3 | Status: SHIPPED | OUTPATIENT
Start: 2023-01-05 | End: 2024-04-01

## 2023-01-05 NOTE — PROGRESS NOTES
[facility] Note    Chief Complaint      Chief Complaint   Patient presents with    Annual Exam       History of Present Illness      Zoë Enrique is a 72 y.o. female with chronic conditions of A fib, hypothyroidism, hx of osteosarcoma of right femur, herpes who presents today for: annual preventative visit.  Since last visit, following with Dr. Adame s/p left dequervains/trigger finger release.  Symptoms doing much better.    A fib: Sees Dr. Seymour EP.    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  Surveillance exams deferred until changes.  Pt has not noticed any new symptoms.    Off tikosyn since ablation. Does not need rate control.  Hypothyroidism: Controlled on synthroid.  TSH at goal.    Herpes: Takes acyclovir as needed  Diet: Prepares own food. Limits sugars and carbs.  Drinks plenty water  Exercise: Limited by A fib.    Denies drinking and driving, drinking more than 4 drinks on occasion, drug use.  Flu shot UTD.  TdAP UTD.  Prevnar , pneumovax .  Zostavax UTD. Shingrix discussed.  Mammogram 2022.  DEXA  normal  Cscope , Dr. Salgado, polyp, 7 year interval.    Past Medical History:  Past Medical History:   Diagnosis Date    Abnormal Pap smear of cervix     Allergy     Atrial fibrillation 2017    Atrial flutter     Cancer     osteosarcoma of right femur    Diverticulosis     Hx of atrial flutter     Hx of mitral valve prolapse     Hypothyroidism     Migraines     Recurrent upper respiratory infection (URI)     Supraventricular tachycardia        Past Surgical History:   has a past surgical history that includes Knee surgery (Right, ); Knee Arthroplasty (Right, ); Treatment of cardiac arrhythmia (N/A, 2019); Augmentation of breast (Bilateral, );  section (); Cosmetic surgery (); Colposcopy; Oophorectomy; Total abdominal hysterectomy (); Ablation of arrhythmogenic focus for atrial fibrillation (N/A, 2020);  Cardioversion (11/24/2020); Transesophageal echocardiography (N/A, 11/24/2020); Colon surgery (2011); Breast surgery (1984); Eye surgery (Bilateral, 2016); Joint replacement (Right, 2/07, 11/07, 2/09); Esophagogastroduodenoscopy (N/A, 3/19/2021); Colonoscopy (N/A, 3/19/2021); De Quervain's release (Left, 8/2/2022); and Trigger finger release (Left, 8/2/2022).    Family History:  family history includes Asthma in her brother; Diabetes in her mother and sister; Diverticulosis in her brother and brother; Hearing loss in her father; Heart disease in her brother, brother, father, mother, and sister; No Known Problems in her daughter; Stroke (age of onset: 93) in her father.     Social History:  Social History     Tobacco Use    Smoking status: Never    Smokeless tobacco: Never   Substance Use Topics    Alcohol use: Yes     Alcohol/week: 1.0 standard drink     Types: 1 Standard drinks or equivalent per week     Comment: 1-2 per month    Drug use: No     Comment: CBD oils for headache       I personally reviewed all past medical, surgical, social and family history.    Review of Systems   Constitutional:  Negative for chills, fever and malaise/fatigue.   HENT:  Negative for hearing loss.    Eyes:  Negative for discharge.   Respiratory:  Negative for shortness of breath and wheezing.    Cardiovascular:  Negative for chest pain and palpitations.   Gastrointestinal:  Negative for blood in stool, constipation, diarrhea, nausea and vomiting.   Genitourinary:  Negative for dysuria and hematuria.   Musculoskeletal:  Negative for neck pain.   Skin:  Negative for rash.   Neurological:  Positive for headaches. Negative for weakness.   Endo/Heme/Allergies:  Negative for polydipsia.   All other systems reviewed and are negative.     Medications:  Outpatient Encounter Medications as of 1/5/2023   Medication Sig Note Dispense Refill    acetaminophen (TYLENOL) 650 MG TbSR Take 650 mg by mouth every 8 (eight) hours. 9/20/2022: PRN         acyclovir (ZOVIRAX) 400 MG tablet Take 1 tablet (400 mg total) by mouth 3 (three) times daily as needed.  90 tablet 3    amoxicillin (AMOXIL) 500 MG capsule TAKE 6 CAPSULES BY MOUTH 1 HOUR BEFORE DENTAL APPOINTMENT AND 2 CAPSULES 6 HOURS AFTER DENTAL APPOINTMENT       CALCIUM CARB/VIT D3/MINERALS (CALTRATE PLUS ORAL) Take by mouth. Takes two chews daily       docusate sodium (COLACE) 250 MG capsule Take 250 mg by mouth 2 (two) times daily.        estradioL (ESTRACE) 0.01 % (0.1 mg/gram) vaginal cream Place 1 g vaginally every other day. 9/20/2022: PRN   42 g 3    flu vac 2022 65up-hydKV80K,PF, (FLUAD QUAD 2022-23,65Y UP,,PF,) 60 mcg (15 mcg x 4)/0.5 mL Syrg Inject into the muscle. (Patient not taking: Reported on 1/5/2023)  0.5 mL 0    fluticasone propionate (FLONASE) 50 mcg/actuation nasal spray 2 sprays (100 mcg total) by Each Nostril route once daily. 9/20/2022: PRN   16 g 1    HYDROcodone-acetaminophen (NORCO) 5-325 mg per tablet Take 1 tablet by mouth every 6 (six) hours as needed for Pain. Pt states takes PRN for headaches  28 tablet 0    ipratropium (ATROVENT) 21 mcg (0.03 %) nasal spray 2 sprays by Nasal route 2 (two) times daily.  30 mL 3    ketoprofen 10 % CrPk APPLY 2 GRAMS 3-4 TIMES DAILY FOR TREATMENT OF PAIN       levothyroxine (SYNTHROID) 25 MCG tablet Take 1 tablet (25 mcg total) by mouth once daily.  90 tablet 3    mirabegron (MYRBETRIQ) 50 mg Tb24 Take 1 tablet (50 mg total) by mouth once daily. (Patient not taking: Reported on 1/5/2023)  30 tablet 11    olopatadine (PATANOL) 0.1 % ophthalmic solution 1 drop 2 (two) times daily.       pantoprazole (PROTONIX) 40 MG tablet Take 1 tablet (40 mg total) by mouth once daily.  30 tablet 3    rivaroxaban (XARELTO) 20 mg Tab Take 1 tablet (20 mg total) by mouth once daily.  90 tablet 3    sars-cov-2, covid-19 omicron, (MODERNA COVID-19) 50 mcg/0.5 mL injection Inject into the muscle. (Patient not taking: Reported on 1/5/2023)  0.5 mL 0    traMADoL (ULTRAM)  50 mg tablet Take 50 mg by mouth every 6 (six) hours as needed for Pain. 2022: PRN        [] traMADoL (ULTRAM) 50 mg tablet Take 1 tablet (50 mg total) by mouth every 8 (eight) hours as needed for Pain.  90 tablet 5    vit C/E/Zn/coppr/lutein/zeaxan (PRESERVISION AREDS-2 ORAL)        [DISCONTINUED] acyclovir (ZOVIRAX) 400 MG tablet Take 1 tablet (400 mg total) by mouth 3 (three) times daily as needed. (Patient not taking: Reported on 2022) 2022: PRN 90 tablet 3    [DISCONTINUED] HYDROcodone-acetaminophen (NORCO) 5-325 mg per tablet Take 1 tablet by mouth every 6 (six) hours as needed for Pain. Pt states takes PRN for headaches  28 tablet 0    [DISCONTINUED] levothyroxine (SYNTHROID) 25 MCG tablet Take 1 tablet (25 mcg total) by mouth once daily.  90 tablet 3     Facility-Administered Encounter Medications as of 2023   Medication Dose Route Frequency Provider Last Rate Last Admin    sodium chloride 0.9% flush 5 mL  5 mL Intravenous PRN Aleksandra Taylor NP           Allergies:  Review of patient's allergies indicates:   Allergen Reactions    Morphine Nausea And Vomiting    Succinylcholine      Other reaction(s): v tach       Health Maintenance:  Immunization History   Administered Date(s) Administered    COVID-19 MRNA, LN-S PF (MODERNA HALF 0.25 ML DOSE) 2021, 2022    COVID-19, MRNA, LN-S, PF (MODERNA FULL 0.5 ML DOSE) 2021, 2021    COVID-19, MRNA, LN-S, PF (Pfizer) (Purple Cap) 2022    COVID-19, mRNA, LNP-S, bivalent booster, PF (Moderna Omicron) 2022    Influenza 2008, 10/06/2018, 10/09/2020    Influenza (FLUAD) - Quadrivalent - Adjuvanted - PF *Preferred* (65+) 10/09/2020, 10/12/2022    Influenza (FLUAD) - Trivalent - Adjuvanted - PF (65+) 10/09/2018    Influenza - High Dose - PF (65 years and older) 2016, 2016, 2017, 10/30/2019    Influenza - Quadrivalent - High Dose - PF (65 years and older) 2021    Pneumococcal  "Conjugate - 13 Valent 01/04/2016    Pneumococcal Polysaccharide - 23 Valent 06/24/2022    Tdap 02/13/2020    Zoster Recombinant 09/02/2022, 11/07/2022      Health Maintenance   Topic Date Due    Mammogram  12/13/2023    DEXA Scan  10/20/2026    Lipid Panel  12/20/2027    TETANUS VACCINE  02/13/2030    Hepatitis C Screening  Completed        Physical Exam      Vital Signs  Pulse: 70  SpO2: 97 %  BP: 110/66  BP Location: Right arm  Patient Position: Sitting  Pain Score: 0-No pain  Height and Weight  Height: 5' 8" (172.7 cm)  Weight: 73.2 kg (161 lb 7.8 oz)  BSA (Calculated - sq m): 1.87 sq meters  BMI (Calculated): 24.6  Weight in (lb) to have BMI = 25: 164.1]    Physical Exam  Vitals reviewed.   Constitutional:       Appearance: She is well-developed.   HENT:      Head: Normocephalic and atraumatic.      Right Ear: External ear normal.      Left Ear: External ear normal.   Cardiovascular:      Rate and Rhythm: Normal rate and regular rhythm.      Heart sounds: Normal heart sounds. No murmur heard.  Pulmonary:      Effort: Pulmonary effort is normal.      Breath sounds: Normal breath sounds. No wheezing or rales.   Abdominal:      General: Bowel sounds are normal. There is no distension.      Palpations: Abdomen is soft.      Tenderness: There is no abdominal tenderness.        Laboratory:  CBC:  Recent Labs   Lab 09/14/21  1654 03/06/22  0916 12/20/22  0910   WBC 3.60 L 4.71 4.13   RBC 4.08 4.23 4.36   Hemoglobin 13.0 13.1 13.6   Hematocrit 38.6 40.7 42.0   Platelets 146 L 138 L 132 L   MCV 95 96 96   MCH 31.9 H 31.0 31.2 H   MCHC 33.7 32.2 32.4     CMP:  Recent Labs   Lab 12/28/21  0908 03/06/22  0916 12/20/22  0910   Glucose 91 109 99   Calcium 9.3 9.5 8.8   Albumin 4.0 4.0 4.0   Total Protein 6.6 6.5 6.5   Sodium 143 136 140   Potassium 4.7 4.2 4.1   CO2 29 24 24   Chloride 107 102 105   BUN 11 14 13   Alkaline Phosphatase 71 63 63   ALT 28 13 20   AST 35 19 26   Total Bilirubin 0.7 1.0 0.5     URINALYSIS:  Recent " Labs   Lab 10/09/20  1555   Color, UA Yellow   Specific Gravity, UA 1.030   pH, UA 5.0   Protein, UA Negative   Bacteria Occasional   Nitrite, UA Negative   Leukocytes, UA Negative      LIPIDS:  Recent Labs   Lab 01/23/20  0933 12/28/21  0908 12/20/22  0910   TSH 2.533 1.584 1.718   HDL 75 60 60   Cholesterol 210 H 172 166   Triglycerides 80 77 107   LDL Cholesterol 119.0 96.6 84.6   HDL/Cholesterol Ratio 35.7 34.9 36.1   Non-HDL Cholesterol 135 112 106   Total Cholesterol/HDL Ratio 2.8 2.9 2.8     TSH:  Recent Labs   Lab 01/23/20  0933 12/28/21  0908 12/20/22  0910   TSH 2.533 1.584 1.718     A1C:        Assessment/Plan     Zoë Enrique is a 72 y.o.female with:    1. Annual physical exam  Discussed diet and exercise, vaccines and cancer screening, risk factors.  Screening labs reviewed  2. Paroxysmal atrial fibrillation  Continue current meds.    3. Hypothyroidism (acquired)  - levothyroxine (SYNTHROID) 25 MCG tablet; Take 1 tablet (25 mcg total) by mouth once daily.  Dispense: 90 tablet; Refill: 3  Continue current meds.    4. Osteosarcoma of right femur  Cont surveillance.  5. Herpes  - acyclovir (ZOVIRAX) 400 MG tablet; Take 1 tablet (400 mg total) by mouth 3 (three) times daily as needed.  Dispense: 90 tablet; Refill: 3    6. Chronic tension-type headache, not intractable  - HYDROcodone-acetaminophen (NORCO) 5-325 mg per tablet; Take 1 tablet by mouth every 6 (six) hours as needed for Pain. Pt states takes PRN for headaches  Dispense: 28 tablet; Refill: 0       Chronic conditions status updated as per HPI.  Other than changes above, cont current medications and maintain follow up with specialists.  No follow-ups on file.    Future Appointments   Date Time Provider Department Center   1/18/2023 10:30 AM Nabila Keller MD [facility] OBGYNF [facility]   1/23/2023 11:00 AM Linda Yu MD [facility] [place]   2/2/2023  3:00 PM Santos Rivas MD [facility] PULMSVC [facility]   2/9/2023 11:20 AM Toñito Adame  MD [facility] ORTHO [facility]   3/14/2023  2:30 PM Zoë Montesinos MD [facility] RHEUM [facility]       Praveen Jeffery MD  [facility]

## 2023-01-06 NOTE — PROGRESS NOTES
Patient, Zoë Enrique (MRN #[id number]), presented with a recent Platelet count less than 150 K/uL consistent with the definition of thrombocytopenia (ICD10 - D69.6).    Platelets   Date Value Ref Range Status   12/20/2022 132 (L) 150 - 450 K/uL Final     The patient's thrombocytopenia was monitored, evaluated, addressed and/or treated. This addendum to the medical record is made on 01/05/2023.

## 2023-01-18 ENCOUNTER — OFFICE VISIT (OUTPATIENT)
Dept: OBSTETRICS AND GYNECOLOGY | Facility: CLINIC | Age: 73
End: 2023-01-18
Payer: MEDICARE

## 2023-01-18 VITALS
WEIGHT: 158.75 LBS | DIASTOLIC BLOOD PRESSURE: 68 MMHG | HEIGHT: 68 IN | SYSTOLIC BLOOD PRESSURE: 124 MMHG | BODY MASS INDEX: 24.06 KG/M2

## 2023-01-18 DIAGNOSIS — Z01.419 ENCOUNTER FOR GYNECOLOGICAL EXAMINATION WITHOUT ABNORMAL FINDING: Primary | ICD-10-CM

## 2023-01-18 DIAGNOSIS — Z12.31 SCREENING MAMMOGRAM, ENCOUNTER FOR: ICD-10-CM

## 2023-01-18 DIAGNOSIS — N95.2 POSTMENOPAUSAL ATROPHIC VAGINITIS: ICD-10-CM

## 2023-01-18 PROCEDURE — 1126F AMNT PAIN NOTED NONE PRSNT: CPT | Mod: CPTII,S$GLB,, | Performed by: OBSTETRICS & GYNECOLOGY

## 2023-01-18 PROCEDURE — G0101 CA SCREEN;PELVIC/BREAST EXAM: HCPCS | Mod: S$GLB,,, | Performed by: OBSTETRICS & GYNECOLOGY

## 2023-01-18 PROCEDURE — 1159F MED LIST DOCD IN RCRD: CPT | Mod: CPTII,S$GLB,, | Performed by: OBSTETRICS & GYNECOLOGY

## 2023-01-18 PROCEDURE — 1160F RVW MEDS BY RX/DR IN RCRD: CPT | Mod: CPTII,S$GLB,, | Performed by: OBSTETRICS & GYNECOLOGY

## 2023-01-18 PROCEDURE — 1160F PR REVIEW ALL MEDS BY PRESCRIBER/CLIN PHARMACIST DOCUMENTED: ICD-10-PCS | Mod: CPTII,S$GLB,, | Performed by: OBSTETRICS & GYNECOLOGY

## 2023-01-18 PROCEDURE — G0101 PR CA SCREEN;PELVIC/BREAST EXAM: ICD-10-PCS | Mod: S$GLB,,, | Performed by: OBSTETRICS & GYNECOLOGY

## 2023-01-18 PROCEDURE — 3008F BODY MASS INDEX DOCD: CPT | Mod: CPTII,S$GLB,, | Performed by: OBSTETRICS & GYNECOLOGY

## 2023-01-18 PROCEDURE — 99999 PR PBB SHADOW E&M-EST. PATIENT-LVL III: CPT | Mod: PBBFAC,,, | Performed by: OBSTETRICS & GYNECOLOGY

## 2023-01-18 PROCEDURE — 3008F PR BODY MASS INDEX (BMI) DOCUMENTED: ICD-10-PCS | Mod: CPTII,S$GLB,, | Performed by: OBSTETRICS & GYNECOLOGY

## 2023-01-18 PROCEDURE — 99999 PR PBB SHADOW E&M-EST. PATIENT-LVL III: ICD-10-PCS | Mod: PBBFAC,,, | Performed by: OBSTETRICS & GYNECOLOGY

## 2023-01-18 PROCEDURE — 3288F PR FALLS RISK ASSESSMENT DOCUMENTED: ICD-10-PCS | Mod: CPTII,S$GLB,, | Performed by: OBSTETRICS & GYNECOLOGY

## 2023-01-18 PROCEDURE — 1101F PT FALLS ASSESS-DOCD LE1/YR: CPT | Mod: CPTII,S$GLB,, | Performed by: OBSTETRICS & GYNECOLOGY

## 2023-01-18 PROCEDURE — 1159F PR MEDICATION LIST DOCUMENTED IN MEDICAL RECORD: ICD-10-PCS | Mod: CPTII,S$GLB,, | Performed by: OBSTETRICS & GYNECOLOGY

## 2023-01-18 PROCEDURE — 3074F PR MOST RECENT SYSTOLIC BLOOD PRESSURE < 130 MM HG: ICD-10-PCS | Mod: CPTII,S$GLB,, | Performed by: OBSTETRICS & GYNECOLOGY

## 2023-01-18 PROCEDURE — 1101F PR PT FALLS ASSESS DOC 0-1 FALLS W/OUT INJ PAST YR: ICD-10-PCS | Mod: CPTII,S$GLB,, | Performed by: OBSTETRICS & GYNECOLOGY

## 2023-01-18 PROCEDURE — 1126F PR PAIN SEVERITY QUANTIFIED, NO PAIN PRESENT: ICD-10-PCS | Mod: CPTII,S$GLB,, | Performed by: OBSTETRICS & GYNECOLOGY

## 2023-01-18 PROCEDURE — 3288F FALL RISK ASSESSMENT DOCD: CPT | Mod: CPTII,S$GLB,, | Performed by: OBSTETRICS & GYNECOLOGY

## 2023-01-18 PROCEDURE — 3078F DIAST BP <80 MM HG: CPT | Mod: CPTII,S$GLB,, | Performed by: OBSTETRICS & GYNECOLOGY

## 2023-01-18 PROCEDURE — 3074F SYST BP LT 130 MM HG: CPT | Mod: CPTII,S$GLB,, | Performed by: OBSTETRICS & GYNECOLOGY

## 2023-01-18 PROCEDURE — 3078F PR MOST RECENT DIASTOLIC BLOOD PRESSURE < 80 MM HG: ICD-10-PCS | Mod: CPTII,S$GLB,, | Performed by: OBSTETRICS & GYNECOLOGY

## 2023-01-18 RX ORDER — ESTRADIOL 0.1 MG/G
1 CREAM VAGINAL EVERY OTHER DAY
Qty: 42 G | Refills: 3 | Status: SHIPPED | OUTPATIENT
Start: 2023-01-18 | End: 2024-01-22 | Stop reason: SDUPTHER

## 2023-01-18 NOTE — PROGRESS NOTES
"  Subjective:       Patient ID: Zoë Enrique is a 72 y.o. female.    Chief Complaint:  Annual Exam and Gynecologic Exam      History of Present Illness  Gynecologic Exam  Pertinent negatives include no abdominal pain, back pain or headaches.   Zoë Enrique is a 72 y.o. female  here for her annual GYN exam.  She is inquiring about taking /beginning "bio-identical hormones" because her friend who takes them has nice skin and more energy. . We discussed risks /benefits of starting HRT more than 15 years past the age of menopause, as well as that hormone therapy will not reverse skin changes  from excess sun exposure and aging. Discussed that will need Cardiology clearance to consider beginning HRT. She declined. We also discussed need to continue use of Vaginal Estradiol for VVS of menopause as she has had Nocturia  and some urinary frequency which can be improved with local treatment..     denies vaginal itching or irritation.  Denies vaginal discharge.  She is not sexually active. She has had1 partner for 37 years .   History of abnormal pap: Yes - Many years ago  Last Pap:  had a hysterectomy  Last MMG: normal--: BI-RADS Category 1: Negative-routine follow-up in 12 months  Last Colonoscopy:  colonoscopy 2 years ago with abnormalities.  denies domestic violence. She does feel safe at home.     Past Medical History:   Diagnosis Date    Abnormal Pap smear of cervix     Allergy     Atrial fibrillation 2017    Atrial flutter     Cancer     osteosarcoma of right femur    Diverticulosis     Hx of atrial flutter     Hx of mitral valve prolapse     Hypothyroidism     Migraines     Recurrent upper respiratory infection (URI)     Supraventricular tachycardia      Past Surgical History:   Procedure Laterality Date    ABLATION OF ARRHYTHMOGENIC FOCUS FOR ATRIAL FIBRILLATION N/A 2020    Procedure: ABLATION, ARRHYTHMOGENIC FOCUS, FOR ATRIAL FIBRILLATION;  Surgeon: Moe Seymour MD;  " Location: [facility] EP LAB;  Service: Cardiology;  Laterality: N/A;  AFL/AF, KHAHN, CTI, PVI, RFA, CARTO, GEN, DM, 3 PREP    AUGMENTATION OF BREAST Bilateral 1984    35 yrs     BREAST SURGERY  1984    augmentation    CARDIOVERSION  2020    Procedure: Cardioversion;  Surgeon: Moe Seymour MD;  Location: [facility] EP LAB;  Service: Cardiology;;     SECTION      COLON SURGERY      partial colon resection    COLONOSCOPY N/A 2021    Procedure: COLONOSCOPY;  Surgeon: Sahara Salgado MD;  Location: [facility] ENDO (4TH FLR);  Service: Endoscopy;  Laterality: N/A;  constipation prep- 7 days of miralax, 2 fulls of fulls, then day bf clears and split prep   covid test 3/5 pcw, prep instr emailed, antibiotics prophylactically, Xarelto hold x 2 days per Dr. Seymour-see telephone encounter 2/10 -ml    COLPOSCOPY      COSMETIC SURGERY      DE QUERVAIN'S RELEASE Left 2022    Procedure: RELEASE, HAND, FOR DEQUERVAIN'S TENOSYNOVITIS;  Surgeon: Toñito Adame Jr., MD;  Location: [facility] OR;  Service: Orthopedics;  Laterality: Left;    ESOPHAGOGASTRODUODENOSCOPY N/A 2021    Procedure: EGD (ESOPHAGOGASTRODUODENOSCOPY);  Surgeon: Sahara Salgado MD;  Location: [facility] (4TH FLR);  Service: Endoscopy;  Laterality: N/A;    EYE SURGERY Bilateral     Lasik, then cataracts extraction    JOINT REPLACEMENT Right , ,     KNEE ARTHROPLASTY Right     revison in     KNEE SURGERY Right     distal femoral replacing TK    OOPHORECTOMY      TOTAL ABDOMINAL HYSTERECTOMY      TAHBSO    TRANSESOPHAGEAL ECHOCARDIOGRAPHY N/A 2020    Procedure: ECHOCARDIOGRAM, TRANSESOPHAGEAL;  Surgeon: Ramírez Ortez III, MD;  Location: [facility] LAB;  Service: Cardiology;  Laterality: N/A;    TREATMENT OF CARDIAC ARRHYTHMIA N/A 2019    Procedure: CARDIOVERSION;  Surgeon: Moe Seymour MD;  Location: [facility] LAB;  Service: Cardiology;  Laterality: N/A;  KHANH/DCCV/MAC/DM/3PREP/*ADMIT FOR  TIKOSYN*    TRIGGER FINGER RELEASE Left 08/02/2022    Procedure: RELEASE, TRIGGER FINGER;  Surgeon: Toñito Adame Jr., MD;  Location: [facility];  Service: Orthopedics;  Laterality: Left;  left middle finger     Social History     Socioeconomic History    Marital status:    Tobacco Use    Smoking status: Never    Smokeless tobacco: Never   Substance and Sexual Activity    Alcohol use: Yes     Alcohol/week: 1.0 standard drink     Types: 1 Standard drinks or equivalent per week     Comment: 1-2 per month    Drug use: No     Comment: CBD oils for headache    Sexual activity: Not Currently     Partners: Male     Birth control/protection: Post-menopausal, See Surgical Hx, None     Comment: , together x 37 years     Social Determinants of Health     Financial Resource Strain: Low Risk     Difficulty of Paying Living Expenses: Not hard at all   Food Insecurity: No Food Insecurity    Worried About Running Out of Food in the Last Year: Never true    Ran Out of Food in the Last Year: Never true   Transportation Needs: No Transportation Needs    Lack of Transportation (Medical): No    Lack of Transportation (Non-Medical): No   Physical Activity: Insufficiently Active    Days of Exercise per Week: 1 day    Minutes of Exercise per Session: 20 min   Stress: No Stress Concern Present    Feeling of Stress : Not at all   Social Connections: Unknown    Frequency of Communication with Friends and Family: More than three times a week    Frequency of Social Gatherings with Friends and Family: More than three times a week    Active Member of Clubs or Organizations: No    Attends Club or Organization Meetings: Patient refused    Marital Status:    Housing Stability: Low Risk     Unable to Pay for Housing in the Last Year: No    Number of Places Lived in the Last Year: 1    Unstable Housing in the Last Year: No     Family History   Problem Relation Age of Onset    Diabetes Sister         Type I    Heart disease Sister   "       Atrrial fib, mitral valve repair    Hearing loss Father     Heart disease Father         Angina, AAA    Stroke Father 93    Heart disease Mother         Atrial fib    Diabetes Mother         Type II    Heart disease Brother         Atrial fib    Asthma Brother     Diverticulosis Brother     Heart disease Brother         Atrial fib    Diverticulosis Brother     No Known Problems Daughter     Breast cancer Neg Hx     Ovarian cancer Neg Hx     Colon cancer Neg Hx     Cancer Neg Hx     Hypertension Neg Hx      OB History          1    Para   1    Term   1            AB        Living   1         SAB        IAB        Ectopic        Multiple        Live Births   1                 /68   Ht 5' 8" (1.727 m)   Wt 72 kg (158 lb 11.7 oz)   BMI 24.14 kg/m²         GYN & OB History    Date of Last Pap: No result found    OB History    Para Term  AB Living   1 1 1     1   SAB IAB Ectopic Multiple Live Births           1      # Outcome Date GA Lbr Michael/2nd Weight Sex Delivery Anes PTL Lv   1 Term      CS-LTranv   EDWINA       Review of Systems  Review of Systems   Constitutional:  Negative for activity change, appetite change, fatigue and unexpected weight change.   HENT: Negative.     Eyes:  Negative for visual disturbance.   Respiratory:  Negative for shortness of breath and wheezing.    Cardiovascular:  Negative for chest pain, palpitations and leg swelling.   Gastrointestinal:  Negative for abdominal pain, bloating and blood in stool.   Endocrine: Positive for hypothyroidism. Negative for diabetes and hair loss.   Genitourinary:  Positive for vaginal dryness.   Musculoskeletal:  Negative for back pain and joint swelling.   Integumentary:  Negative for acne, hair changes and nipple discharge.   Neurological:  Negative for headaches.   Hematological:  Does not bruise/bleed easily.   Psychiatric/Behavioral:  Negative for depression and sleep disturbance. The patient is not nervous/anxious.  "   Breast: Negative for mastodynia and nipple discharge        Objective:      Physical Exam:        Pulmonary/Chest:   BREASTS:  no mass, no tenderness, no deformity and no retraction. Right breast exhibits no inverted nipple, no mass, no nipple discharge, no skin change, no tenderness, no bleeding and no swelling. Left breast exhibits no inverted nipple, no mass, no nipple discharge, no skin change, no tenderness, no bleeding and no swelling. Breasts are symmetrical.       Encapsulated implants bilaterally.          Genitourinary:    Pelvic exam was performed with patient supine.      Genitourinary Comments: PELVIC: Normal external female genitalia without lesions. Normal hair distribution. Adequate perineal body, normal urethral meatus. Vagina atrophic without lesions or discharge. No significant cystocele or rectocele. Bimanual exam shows uterus and cervix to be surgically absent. Adnexa without masses or tenderness.  RECTAL: Deferred                                Assessment:        1. Encounter for gynecological examination without abnormal finding    2. Postmenopausal atrophic vaginitis    3. Screening mammogram, encounter for                Plan:      Problem List Items Addressed This Visit    None  Visit Diagnoses       Encounter for gynecological examination without abnormal finding    -  Primary    Postmenopausal atrophic vaginitis        Relevant Medications    estradioL (ESTRACE) 0.01 % (0.1 mg/gram) vaginal cream    Screening mammogram, encounter for        Relevant Orders    Mammo Digital Screening Bilat w/ Rinku             Follow up in about 1 year (around 1/18/2024).

## 2023-01-23 ENCOUNTER — OFFICE VISIT (OUTPATIENT)
Dept: OTOLARYNGOLOGY | Facility: CLINIC | Age: 73
End: 2023-01-23
Payer: MEDICARE

## 2023-01-23 VITALS
DIASTOLIC BLOOD PRESSURE: 70 MMHG | WEIGHT: 161.69 LBS | SYSTOLIC BLOOD PRESSURE: 114 MMHG | BODY MASS INDEX: 24.5 KG/M2 | HEART RATE: 71 BPM | HEIGHT: 68 IN

## 2023-01-23 DIAGNOSIS — K21.9 LARYNGOPHARYNGEAL REFLUX (LPR): ICD-10-CM

## 2023-01-23 DIAGNOSIS — J32.9 CHRONIC RECURRENT SINUSITIS: Primary | ICD-10-CM

## 2023-01-23 DIAGNOSIS — J31.0 CHRONIC RHINITIS: ICD-10-CM

## 2023-01-23 PROCEDURE — 31575 LARYNGOSCOPY: ICD-10-PCS | Mod: S$GLB,,, | Performed by: OTOLARYNGOLOGY

## 2023-01-23 PROCEDURE — 99999 PR PBB SHADOW E&M-EST. PATIENT-LVL III: ICD-10-PCS | Mod: PBBFAC,,, | Performed by: OTOLARYNGOLOGY

## 2023-01-23 PROCEDURE — 3288F FALL RISK ASSESSMENT DOCD: CPT | Mod: CPTII,S$GLB,, | Performed by: OTOLARYNGOLOGY

## 2023-01-23 PROCEDURE — 1159F PR MEDICATION LIST DOCUMENTED IN MEDICAL RECORD: ICD-10-PCS | Mod: CPTII,S$GLB,, | Performed by: OTOLARYNGOLOGY

## 2023-01-23 PROCEDURE — 99214 OFFICE O/P EST MOD 30 MIN: CPT | Mod: 25,S$GLB,, | Performed by: OTOLARYNGOLOGY

## 2023-01-23 PROCEDURE — 99214 PR OFFICE/OUTPT VISIT, EST, LEVL IV, 30-39 MIN: ICD-10-PCS | Mod: 25,S$GLB,, | Performed by: OTOLARYNGOLOGY

## 2023-01-23 PROCEDURE — 3288F PR FALLS RISK ASSESSMENT DOCUMENTED: ICD-10-PCS | Mod: CPTII,S$GLB,, | Performed by: OTOLARYNGOLOGY

## 2023-01-23 PROCEDURE — 1126F PR PAIN SEVERITY QUANTIFIED, NO PAIN PRESENT: ICD-10-PCS | Mod: CPTII,S$GLB,, | Performed by: OTOLARYNGOLOGY

## 2023-01-23 PROCEDURE — 1101F PR PT FALLS ASSESS DOC 0-1 FALLS W/OUT INJ PAST YR: ICD-10-PCS | Mod: CPTII,S$GLB,, | Performed by: OTOLARYNGOLOGY

## 2023-01-23 PROCEDURE — 1160F PR REVIEW ALL MEDS BY PRESCRIBER/CLIN PHARMACIST DOCUMENTED: ICD-10-PCS | Mod: CPTII,S$GLB,, | Performed by: OTOLARYNGOLOGY

## 2023-01-23 PROCEDURE — 1101F PT FALLS ASSESS-DOCD LE1/YR: CPT | Mod: CPTII,S$GLB,, | Performed by: OTOLARYNGOLOGY

## 2023-01-23 PROCEDURE — 3078F PR MOST RECENT DIASTOLIC BLOOD PRESSURE < 80 MM HG: ICD-10-PCS | Mod: CPTII,S$GLB,, | Performed by: OTOLARYNGOLOGY

## 2023-01-23 PROCEDURE — 1159F MED LIST DOCD IN RCRD: CPT | Mod: CPTII,S$GLB,, | Performed by: OTOLARYNGOLOGY

## 2023-01-23 PROCEDURE — 3008F PR BODY MASS INDEX (BMI) DOCUMENTED: ICD-10-PCS | Mod: CPTII,S$GLB,, | Performed by: OTOLARYNGOLOGY

## 2023-01-23 PROCEDURE — 3008F BODY MASS INDEX DOCD: CPT | Mod: CPTII,S$GLB,, | Performed by: OTOLARYNGOLOGY

## 2023-01-23 PROCEDURE — 3078F DIAST BP <80 MM HG: CPT | Mod: CPTII,S$GLB,, | Performed by: OTOLARYNGOLOGY

## 2023-01-23 PROCEDURE — 3074F PR MOST RECENT SYSTOLIC BLOOD PRESSURE < 130 MM HG: ICD-10-PCS | Mod: CPTII,S$GLB,, | Performed by: OTOLARYNGOLOGY

## 2023-01-23 PROCEDURE — 31575 DIAGNOSTIC LARYNGOSCOPY: CPT | Mod: S$GLB,,, | Performed by: OTOLARYNGOLOGY

## 2023-01-23 PROCEDURE — 3074F SYST BP LT 130 MM HG: CPT | Mod: CPTII,S$GLB,, | Performed by: OTOLARYNGOLOGY

## 2023-01-23 PROCEDURE — 1126F AMNT PAIN NOTED NONE PRSNT: CPT | Mod: CPTII,S$GLB,, | Performed by: OTOLARYNGOLOGY

## 2023-01-23 PROCEDURE — 99999 PR PBB SHADOW E&M-EST. PATIENT-LVL III: CPT | Mod: PBBFAC,,, | Performed by: OTOLARYNGOLOGY

## 2023-01-23 PROCEDURE — 1160F RVW MEDS BY RX/DR IN RCRD: CPT | Mod: CPTII,S$GLB,, | Performed by: OTOLARYNGOLOGY

## 2023-01-23 RX ORDER — IPRATROPIUM BROMIDE 21 UG/1
2 SPRAY, METERED NASAL 2 TIMES DAILY
Qty: 30 ML | Refills: 3 | Status: SHIPPED | OUTPATIENT
Start: 2023-01-23 | End: 2023-04-25

## 2023-01-23 RX ORDER — PANTOPRAZOLE SODIUM 40 MG/1
40 TABLET, DELAYED RELEASE ORAL DAILY
Qty: 30 TABLET | Refills: 3 | Status: SHIPPED | OUTPATIENT
Start: 2023-01-23 | End: 2023-04-25

## 2023-01-23 NOTE — PROCEDURES
Laryngoscopy    Date/Time: 1/23/2023 11:00 AM  Performed by: Linda Yu MD  Authorized by: Linda Yu MD     Consent Done?:  Yes (Verbal)  Anesthesia:     Local anesthetic:  Lidocaine 2% and [place]-Synephrine 1/2%  Laryngoscopy:     Areas examined:  Nasal cavities, nasopharynx, oropharynx, hypopharynx, larynx and vocal cords  Nose External:      No external nasal deformity  Nose Intranasal:      Mucosa no polyps     Mucosa ulcers not present     No mucosa lesions present     No septum gross deformity     Turbinates not enlarged  Nasopharynx:      No mucosa lesions     Adenoids not present     Posterior choanae patent     Eustachian tube patent  Larynx/hypopharynx:      No epiglottis lesions     No epiglottis edema     No AE folds lesions     No vocal cord polyps     Equal and normal bilateral     No hypopharynx lesions     No piriform sinus pooling     No piriform sinus lesions     Post cricoid edema (mild)     Post cricoid erythema (mild)     + clear mucus in bilateral inferior meatus

## 2023-01-23 NOTE — PROGRESS NOTES
Chief Complaint   Patient presents with    Follow-up     8 week follow up--no improvement    .    HPI:     Zoë Enrique is a 72 y.o. female who presents for evaluation of a several month history of rhinorrhea and headaches. These symptoms have been present for years. Has seen neurologist in [place] years ago and had MRI at that time.  She relays that she has been having frequent upper respiratory infections.  She was most recently on Augmentin and Levaquin.  Last infection was August 2022. She did see Dr. Adorno and has had negative immunoCAP testing. She did have low pneumococcal titers and had pneumovax booster subsequently. Her allergist was concerned about possible LPR and referred for further evaluation. Relays that she has had headaches behind and around her eyes and her forehead. Describes pressure around her cheek region.    She does use sinus rinses or nasal sprays. She is taking Flonase daily.  She does not feel it helps the clear rhinorrhea. She admits to midface pain and pressure. She has not had sinus or nasal surgery. There is no history of sinonasal trauma. She denies hoarseness, she has occasional dysphagia to pills/some solids.  She notes globus sensation with eating.  She did have EGD in 2021 which was normal. She denies heartburn or indigestion.     Interval HPI 1/23/2023:  Follow up visit. She has been taking Protonix 40 daily. She has restarted the Atrovent for 1 month and has been on the Flonase daily. She states that nothing improve her clear rhinorrhea and post nasal drip. Denies acid reflux. She states that her cough is more from the post nasal drip.   She is also c/o frontal headaches, sinus pressure and tooth pain with changes in the weather. She usually would take a Norco for pain relief. She hasn't seen neurologist in years.        Past Medical History:   Diagnosis Date    Abnormal Pap smear of cervix     Allergy     Atrial fibrillation 09/2017    Atrial flutter     Cancer 1989     osteosarcoma of right femur    Diverticulosis 1998    Hx of atrial flutter     Hx of mitral valve prolapse 1990    Hypothyroidism     Migraines     Recurrent upper respiratory infection (URI)     Supraventricular tachycardia      Social History     Socioeconomic History    Marital status:    Tobacco Use    Smoking status: Never    Smokeless tobacco: Never   Substance and Sexual Activity    Alcohol use: Yes     Alcohol/week: 1.0 standard drink     Types: 1 Standard drinks or equivalent per week     Comment: 1-2 per month    Drug use: No     Comment: CBD oils for headache    Sexual activity: Not Currently     Partners: Male     Birth control/protection: Post-menopausal, See Surgical Hx, None     Comment: , together x 37 years     Social Determinants of Health     Financial Resource Strain: Low Risk     Difficulty of Paying Living Expenses: Not hard at all   Food Insecurity: No Food Insecurity    Worried About Running Out of Food in the Last Year: Never true    Ran Out of Food in the Last Year: Never true   Transportation Needs: No Transportation Needs    Lack of Transportation (Medical): No    Lack of Transportation (Non-Medical): No   Physical Activity: Insufficiently Active    Days of Exercise per Week: 1 day    Minutes of Exercise per Session: 20 min   Stress: No Stress Concern Present    Feeling of Stress : Not at all   Social Connections: Unknown    Frequency of Communication with Friends and Family: More than three times a week    Frequency of Social Gatherings with Friends and Family: More than three times a week    Active Member of Clubs or Organizations: No    Attends Club or Organization Meetings: Patient refused    Marital Status:    Housing Stability: Low Risk     Unable to Pay for Housing in the Last Year: No    Number of Places Lived in the Last Year: 1    Unstable Housing in the Last Year: No     Past Surgical History:   Procedure Laterality Date    ABLATION OF ARRHYTHMOGENIC FOCUS  FOR ATRIAL FIBRILLATION N/A 2020    Procedure: ABLATION, ARRHYTHMOGENIC FOCUS, FOR ATRIAL FIBRILLATION;  Surgeon: Moe Seymour MD;  Location: [facility] EP LAB;  Service: Cardiology;  Laterality: N/A;  AFL/AF, KHANH, CTI, PVI, RFA, CARTO, GEN, DM, 3 PREP    AUGMENTATION OF BREAST Bilateral 1984    35 yrs     BREAST SURGERY  1984    augmentation    CARDIOVERSION  2020    Procedure: Cardioversion;  Surgeon: Moe Seymour MD;  Location: [facility] EP LAB;  Service: Cardiology;;     SECTION  1988    COLON SURGERY      partial colon resection    COLONOSCOPY N/A 2021    Procedure: COLONOSCOPY;  Surgeon: Sahara Salgado MD;  Location: [facility] ENDO (4TH FLR);  Service: Endoscopy;  Laterality: N/A;  constipation prep- 7 days of miralax, 2 fulls of fulls, then day bf clears and split prep   covid test 3/5 pcw, prep instr emailed, antibiotics prophylactically, Xarelto hold x 2 days per Dr. Seymour-see telephone encounter 2/10 -ml    COLPOSCOPY      COSMETIC SURGERY  1987    DE QUERVAIN'S RELEASE Left 2022    Procedure: RELEASE, HAND, FOR DEQUERVAIN'S TENOSYNOVITIS;  Surgeon: Toñito Adame Jr., MD;  Location: [facility];  Service: Orthopedics;  Laterality: Left;    ESOPHAGOGASTRODUODENOSCOPY N/A 2021    Procedure: EGD (ESOPHAGOGASTRODUODENOSCOPY);  Surgeon: Sahara Salgado MD;  Location: [facility] ENDO (4TH FLR);  Service: Endoscopy;  Laterality: N/A;    EYE SURGERY Bilateral 2016    Lasik, then cataracts extraction    JOINT REPLACEMENT Right , ,     KNEE ARTHROPLASTY Right     revison in     KNEE SURGERY Right     distal femoral replacing TK    OOPHORECTOMY      TOTAL ABDOMINAL HYSTERECTOMY      TAHBSO    TRANSESOPHAGEAL ECHOCARDIOGRAPHY N/A 2020    Procedure: ECHOCARDIOGRAM, TRANSESOPHAGEAL;  Surgeon: Ramírez Ortez III, MD;  Location: [facility] EP LAB;  Service: Cardiology;  Laterality: N/A;    TREATMENT OF CARDIAC ARRHYTHMIA N/A 2019    Procedure:  CARDIOVERSION;  Surgeon: Moe Seymour MD;  Location: [facility] EP LAB;  Service: Cardiology;  Laterality: N/A;  KHANH/DCCV/MAC/DM/3PREP/*ADMIT FOR TIKOSYN*    TRIGGER FINGER RELEASE Left 08/02/2022    Procedure: RELEASE, TRIGGER FINGER;  Surgeon: Toñito Adame Jr., MD;  Location: [facility] OR;  Service: Orthopedics;  Laterality: Left;  left middle finger     Family History   Problem Relation Age of Onset    Diabetes Sister         Type I    Heart disease Sister         Atrrial fib, mitral valve repair    Hearing loss Father     Heart disease Father         Angina, AAA    Stroke Father 93    Heart disease Mother         Atrial fib    Diabetes Mother         Type II    Heart disease Brother         Atrial fib    Asthma Brother     Diverticulosis Brother     Heart disease Brother         Atrial fib    Diverticulosis Brother     No Known Problems Daughter     Breast cancer Neg Hx     Ovarian cancer Neg Hx     Colon cancer Neg Hx     Cancer Neg Hx     Hypertension Neg Hx            Review of Systems  General: negative for chills, fever or weight loss  Psychological: negative for mood changes or depression  Ophthalmic: negative for blurry vision, photophobia or eye pain  ENT: see HPI  Respiratory: no cough, shortness of breath, or wheezing  Cardiovascular: no chest pain or dyspnea on exertion  Gastrointestinal: no abdominal pain, change in bowel habits, or black/ bloody stools  Musculoskeletal: negative for gait disturbance or muscular weakness  Neurological: no syncope or seizures; no ataxia  Dermatological: negative for puritis,  rash and jaundice  Hematologic/lymphatic: no easy bruising, no new lumps or bumps      Physical Exam:    Vitals:    01/23/23 1124   BP: 114/70   Pulse: 71         Constitutional: Well appearing / communicating without difficutly.  NAD.  Eyes: EOM I Bilaterally  Head/Face: Normocephalic.  Negative paranasal sinus pressure/tenderness.  Salivary glands WNL.  House Brackmann I Bilaterally.    Right Ear:  Auricle normal appearance. External Auditory Canal within normal limits,TM w/o masses/lesions/perforations. TM mobility noted.   Left Ear: Auricle normal appearance. External Auditory Canal WNL,TM w/o masses/lesions/perforations. TM mobility noted.  Nose: No gross nasal septal deviation. Inferior Turbinates 3+ bilaterally. No septal perforation. No masses/lesions. External nasal skin appears normal without masses/lesions.  Oral Cavity: Gingiva/lips within normal limits.  Dentition/gingiva healthy appearing. Mucus membranes moist. Floor of mouth soft, no masses palpated. Oral Tongue mobile. Hard Palate appears normal.    Oropharynx: Base of tongue appears normal. No masses/lesions noted. Tonsillar fossa/pharyngeal wall without lesions. Posterior oropharynx WNL.  Soft palate without masses. Midline uvula.   Neck/Lymphatic: No LAD I-VI bilaterally.  No thyromegaly.  No masses noted on exam.          Diagnostic studies:  Laboratory 2022:  IgE level: Less than 35.  ImmunoCAP:  Negative.  IgA: 114.  Ig.  IgM: 66.  Pneumococcal titers: Not protective.    Assessment:    ICD-10-CM ICD-9-CM    1. Chronic recurrent sinusitis  J32.9 473.9       2. Chronic rhinitis  J31.0 472.0       3. Laryngopharyngeal reflux (LPR)  K21.9 478.79           The primary encounter diagnosis was Chronic recurrent sinusitis. Diagnoses of Chronic rhinitis and Laryngopharyngeal reflux (LPR) were also pertinent to this visit.      Plan:  No orders of the defined types were placed in this encounter.    Start budesonide nasal saline rinses BID.   Continue  Atrovent 2 sprays per nostril b.i.d.  Obtain CT scan of the sinuses to further assess intraluminal patency.   Continue Protonix 40 mg p.o. daily    Linda Yu MD

## 2023-01-24 ENCOUNTER — HOSPITAL ENCOUNTER (OUTPATIENT)
Dept: RADIOLOGY | Facility: HOSPITAL | Age: 73
Discharge: HOME | End: 2023-01-24
Attending: OTOLARYNGOLOGY
Payer: MEDICARE

## 2023-01-24 DIAGNOSIS — J32.9 CHRONIC RECURRENT SINUSITIS: ICD-10-CM

## 2023-01-24 PROCEDURE — 70486 CT MAXILLOFACIAL W/O DYE: CPT | Mod: TC

## 2023-01-24 PROCEDURE — 70486 CT MEDTRONIC SINUSES WITHOUT: ICD-10-PCS | Mod: 26,,, | Performed by: RADIOLOGY

## 2023-01-24 PROCEDURE — 70486 CT MAXILLOFACIAL W/O DYE: CPT | Mod: 26,,, | Performed by: RADIOLOGY

## 2023-01-26 ENCOUNTER — TELEPHONE (OUTPATIENT)
Dept: OTOLARYNGOLOGY | Facility: CLINIC | Age: 73
End: 2023-01-26
Payer: MEDICARE

## 2023-01-26 NOTE — TELEPHONE ENCOUNTER
Called patient regarding scheduling follow up from CT results. Scheduled patient for 02/03/2023 at 11:20 for follow up with Dr. Freeman. Was thanked for call   ----- Message from Linda Yu MD sent at 1/26/2023  7:39 AM CST -----  Can we see about bringing her in any sooner for follow up of CT? There isn't anything overwhelmingly urgent on it but I did want to go over it with her and discuss option of septoplasty/SMRT.

## 2023-01-30 ENCOUNTER — OFFICE VISIT (OUTPATIENT)
Dept: OPHTHALMOLOGY | Facility: CLINIC | Age: 73
End: 2023-01-30
Payer: MEDICARE

## 2023-01-30 DIAGNOSIS — H10.13 ALLERGIC CONJUNCTIVITIS OF BOTH EYES: ICD-10-CM

## 2023-01-30 DIAGNOSIS — H04.123 DRY EYE SYNDROME OF BOTH EYES: Primary | ICD-10-CM

## 2023-01-30 DIAGNOSIS — Z96.1 PSEUDOPHAKIA: ICD-10-CM

## 2023-01-30 PROCEDURE — 99999 PR PBB SHADOW E&M-EST. PATIENT-LVL III: CPT | Mod: PBBFAC,,, | Performed by: OPHTHALMOLOGY

## 2023-01-30 PROCEDURE — 1160F PR REVIEW ALL MEDS BY PRESCRIBER/CLIN PHARMACIST DOCUMENTED: ICD-10-PCS | Mod: CPTII,S$GLB,, | Performed by: OPHTHALMOLOGY

## 2023-01-30 PROCEDURE — 1159F PR MEDICATION LIST DOCUMENTED IN MEDICAL RECORD: ICD-10-PCS | Mod: CPTII,S$GLB,, | Performed by: OPHTHALMOLOGY

## 2023-01-30 PROCEDURE — 92012 INTRM OPH EXAM EST PATIENT: CPT | Mod: S$GLB,,, | Performed by: OPHTHALMOLOGY

## 2023-01-30 PROCEDURE — 99999 PR PBB SHADOW E&M-EST. PATIENT-LVL III: ICD-10-PCS | Mod: PBBFAC,,, | Performed by: OPHTHALMOLOGY

## 2023-01-30 PROCEDURE — 1160F RVW MEDS BY RX/DR IN RCRD: CPT | Mod: CPTII,S$GLB,, | Performed by: OPHTHALMOLOGY

## 2023-01-30 PROCEDURE — 1159F MED LIST DOCD IN RCRD: CPT | Mod: CPTII,S$GLB,, | Performed by: OPHTHALMOLOGY

## 2023-01-30 PROCEDURE — 92012 PR EYE EXAM, EST PATIENT,INTERMED: ICD-10-PCS | Mod: S$GLB,,, | Performed by: OPHTHALMOLOGY

## 2023-01-31 ENCOUNTER — PATIENT MESSAGE (OUTPATIENT)
Dept: OTOLARYNGOLOGY | Facility: CLINIC | Age: 73
End: 2023-01-31
Payer: MEDICARE

## 2023-01-31 NOTE — PROGRESS NOTES
Subjective:       Patient ID: Zoë Enrique is a 72 y.o. female.    Chief Complaint: Eye Problem (Patient Zoë Ernique is a 72 year old female.)    HPI     Eye Problem     Additional comments: Patient Zoë Enrique is a 72 year old female.           Comments    Pt here for problem-focused visit. Pt states that she has itchy, irritated   eyes OU that have become worse over the past few months. Pt states that   she has dry eyes and is concerned about punctal plugs. Pt states that she   does not feel comfortable driving without her glasses. Pt states that she   was told by her family that she sleeps with her eyes open. Pt states that   she has trouble with tension headaches due to weather changes.    DLS: 06/27/2022 with Dr. Kimani Villalobos:  1. Pataday qAM OU  2. PF AT's PRN OU  3. AREDS po BID    POHx:  1. Dry eye syndrome of both eyes   2. Allergic conjunctivitis of both eyes   3. Drusen of macula of both eyes   4. Refractive error   5. Pseudophakia (monovision OD: near and OS: distance)  6. S/p LASIK OU            Last edited by Robyn Samuels on 1/30/2023  9:11 AM.             Assessment:       1. Dry eye syndrome of both eyes    2. Allergic conjunctivitis of both eyes    3. Pseudophakia          Plan:       ALECIA-Pt is still symptomatic despite AT's. Pt wants to try Xiidra.  Allergic conj OU-Stable OU.      Start Xiidra bid OU.  AT's.  AREDS.  RTC Dr Cruz in 6/2023 as scheduled.

## 2023-02-02 ENCOUNTER — PATIENT MESSAGE (OUTPATIENT)
Dept: OPHTHALMOLOGY | Facility: CLINIC | Age: 73
End: 2023-02-02
Payer: MEDICARE

## 2023-02-02 ENCOUNTER — OFFICE VISIT (OUTPATIENT)
Dept: PULMONOLOGY | Facility: CLINIC | Age: 73
End: 2023-02-02
Payer: MEDICARE

## 2023-02-02 VITALS
OXYGEN SATURATION: 96 % | DIASTOLIC BLOOD PRESSURE: 80 MMHG | HEIGHT: 68 IN | SYSTOLIC BLOOD PRESSURE: 122 MMHG | BODY MASS INDEX: 24.63 KG/M2 | HEART RATE: 79 BPM | WEIGHT: 162.5 LBS

## 2023-02-02 DIAGNOSIS — J47.9 BRONCHIECTASIS WITHOUT COMPLICATION: Primary | ICD-10-CM

## 2023-02-02 DIAGNOSIS — R05.3 CHRONIC COUGH: ICD-10-CM

## 2023-02-02 PROCEDURE — 3079F DIAST BP 80-89 MM HG: CPT | Mod: CPTII,S$GLB,, | Performed by: STUDENT IN AN ORGANIZED HEALTH CARE EDUCATION/TRAINING PROGRAM

## 2023-02-02 PROCEDURE — 3008F PR BODY MASS INDEX (BMI) DOCUMENTED: ICD-10-PCS | Mod: CPTII,S$GLB,, | Performed by: STUDENT IN AN ORGANIZED HEALTH CARE EDUCATION/TRAINING PROGRAM

## 2023-02-02 PROCEDURE — 3008F BODY MASS INDEX DOCD: CPT | Mod: CPTII,S$GLB,, | Performed by: STUDENT IN AN ORGANIZED HEALTH CARE EDUCATION/TRAINING PROGRAM

## 2023-02-02 PROCEDURE — 1159F PR MEDICATION LIST DOCUMENTED IN MEDICAL RECORD: ICD-10-PCS | Mod: CPTII,S$GLB,, | Performed by: STUDENT IN AN ORGANIZED HEALTH CARE EDUCATION/TRAINING PROGRAM

## 2023-02-02 PROCEDURE — 99999 PR PBB SHADOW E&M-EST. PATIENT-LVL III: CPT | Mod: PBBFAC,,, | Performed by: STUDENT IN AN ORGANIZED HEALTH CARE EDUCATION/TRAINING PROGRAM

## 2023-02-02 PROCEDURE — 99999 PR PBB SHADOW E&M-EST. PATIENT-LVL III: ICD-10-PCS | Mod: PBBFAC,,, | Performed by: STUDENT IN AN ORGANIZED HEALTH CARE EDUCATION/TRAINING PROGRAM

## 2023-02-02 PROCEDURE — 99213 PR OFFICE/OUTPT VISIT, EST, LEVL III, 20-29 MIN: ICD-10-PCS | Mod: S$GLB,,, | Performed by: STUDENT IN AN ORGANIZED HEALTH CARE EDUCATION/TRAINING PROGRAM

## 2023-02-02 PROCEDURE — 3288F PR FALLS RISK ASSESSMENT DOCUMENTED: ICD-10-PCS | Mod: CPTII,S$GLB,, | Performed by: STUDENT IN AN ORGANIZED HEALTH CARE EDUCATION/TRAINING PROGRAM

## 2023-02-02 PROCEDURE — 1126F PR PAIN SEVERITY QUANTIFIED, NO PAIN PRESENT: ICD-10-PCS | Mod: CPTII,S$GLB,, | Performed by: STUDENT IN AN ORGANIZED HEALTH CARE EDUCATION/TRAINING PROGRAM

## 2023-02-02 PROCEDURE — 3288F FALL RISK ASSESSMENT DOCD: CPT | Mod: CPTII,S$GLB,, | Performed by: STUDENT IN AN ORGANIZED HEALTH CARE EDUCATION/TRAINING PROGRAM

## 2023-02-02 PROCEDURE — 1126F AMNT PAIN NOTED NONE PRSNT: CPT | Mod: CPTII,S$GLB,, | Performed by: STUDENT IN AN ORGANIZED HEALTH CARE EDUCATION/TRAINING PROGRAM

## 2023-02-02 PROCEDURE — 1101F PT FALLS ASSESS-DOCD LE1/YR: CPT | Mod: CPTII,S$GLB,, | Performed by: STUDENT IN AN ORGANIZED HEALTH CARE EDUCATION/TRAINING PROGRAM

## 2023-02-02 PROCEDURE — 1159F MED LIST DOCD IN RCRD: CPT | Mod: CPTII,S$GLB,, | Performed by: STUDENT IN AN ORGANIZED HEALTH CARE EDUCATION/TRAINING PROGRAM

## 2023-02-02 PROCEDURE — 1101F PR PT FALLS ASSESS DOC 0-1 FALLS W/OUT INJ PAST YR: ICD-10-PCS | Mod: CPTII,S$GLB,, | Performed by: STUDENT IN AN ORGANIZED HEALTH CARE EDUCATION/TRAINING PROGRAM

## 2023-02-02 PROCEDURE — 99213 OFFICE O/P EST LOW 20 MIN: CPT | Mod: S$GLB,,, | Performed by: STUDENT IN AN ORGANIZED HEALTH CARE EDUCATION/TRAINING PROGRAM

## 2023-02-02 PROCEDURE — 3074F PR MOST RECENT SYSTOLIC BLOOD PRESSURE < 130 MM HG: ICD-10-PCS | Mod: CPTII,S$GLB,, | Performed by: STUDENT IN AN ORGANIZED HEALTH CARE EDUCATION/TRAINING PROGRAM

## 2023-02-02 PROCEDURE — 3074F SYST BP LT 130 MM HG: CPT | Mod: CPTII,S$GLB,, | Performed by: STUDENT IN AN ORGANIZED HEALTH CARE EDUCATION/TRAINING PROGRAM

## 2023-02-02 PROCEDURE — 3079F PR MOST RECENT DIASTOLIC BLOOD PRESSURE 80-89 MM HG: ICD-10-PCS | Mod: CPTII,S$GLB,, | Performed by: STUDENT IN AN ORGANIZED HEALTH CARE EDUCATION/TRAINING PROGRAM

## 2023-02-02 NOTE — PROGRESS NOTES
"Subjective:     Reason for visit:  Follow-up after previous physician retired    Patient ID:  Zoë Enrique is a 72 y.o. female with AFib on Xarelto s/p ablation x5 (previous history of amiodarone), chronically current sinusitis/rhinitis with laryngopharyngeal reflux, and bronchiectasis    Patient is new to me.  Last seen 03/11/2022 by Dr. Ortiz    Interval History:  Doing well since last visit.  Patient asserts that after her 2nd Pneumovax shot, her recurrent bronchitis improved.  No respiratory limitations at this time.  Does have a cough but this seems related to postnasal gtt, rather than complication bronchiectasis.  No fever, chills, weight loss or sputum production.      Additional Pulmonary History:  Childhood Illnesses:  Pneumonias a child < 12 years that required hospitalization  Occupational History:  No asbestos or silica exposure.  Currently unemployed.  Tobacco/Smoking History:  Never    Review of Systems   Constitutional:  Negative for chills, fever, malaise/fatigue and weight loss.   HENT:  Positive for congestion.         Rhinorrhea   Postnasal gtt   Respiratory:  Positive for cough. Negative for sputum production, shortness of breath and wheezing.    Cardiovascular:  Negative for chest pain, palpitations, orthopnea and leg swelling.   Gastrointestinal:  Negative for abdominal pain, heartburn, nausea and vomiting.   Neurological:  Positive for headaches.   Endo/Heme/Allergies:  Negative for environmental allergies.      Objective:     Vitals:    02/02/23 1456   BP: 122/80   BP Location: Left arm   Patient Position: Sitting   Pulse: 79   SpO2: 96%   Weight: 73.7 kg (162 lb 7.7 oz)   Height: 5' 8" (1.727 m)         Physical Exam  Vitals and nursing note reviewed.   Constitutional:       General: She is not in acute distress.     Appearance: She is not ill-appearing, toxic-appearing or diaphoretic.   HENT:      Head: Normocephalic and atraumatic.      Nose: No rhinorrhea.      Mouth/Throat:      " Mouth: Mucous membranes are moist.      Pharynx: Posterior oropharyngeal erythema present.   Eyes:      General: No scleral icterus.     Extraocular Movements: Extraocular movements intact.   Cardiovascular:      Rate and Rhythm: Normal rate and regular rhythm.      Heart sounds: No murmur heard.  Pulmonary:      Effort: No tachypnea, accessory muscle usage, respiratory distress or retractions.      Breath sounds: No decreased breath sounds, wheezing, rhonchi or rales.   Abdominal:      General: There is no distension.      Palpations: Abdomen is soft.      Tenderness: There is no abdominal tenderness.   Musculoskeletal:      Right lower leg: Edema present.      Left lower leg: Edema present.   Skin:     General: Skin is warm and dry.      Coloration: Skin is not jaundiced.      Findings: No rash.   Neurological:      General: No focal deficit present.      Mental Status: Mental status is at baseline.        Personal Diagnostic Review and Interpretation  01/14/2022 CT chest:  RLL cylindrical bronchiectasis with innumerable micro nodules in the right middle and RLL; other scattered calcified and noncalcified nodules      Pertinent Studies Reviewed and Interpreted:     Pulmonary Function Tests:   12/16/2020:  FEV1 84, FVC 79.  TLC N2 84.  DLCO 94    6 Minute Walk Tests:   None    Echocardiograms:   11/24/2020 KHANH:  LVEF 65% with LAE, GEOFF    Other pertinent laboratories:  06/16/2022 IgE, IgM, IgG, IgA all WNL    06/16/2022 Aspergillus IgE negative    02/05/2020 QuantiFERON GOLD negative    Assessment:     1. Bronchiectasis without complication  Spirometry with/without bronchodilator    Lung Volumes    DLCO-Carbon Monoxide Diffusing Capacity      2. Chronic cough  Spirometry with/without bronchodilator    Lung Volumes    DLCO-Carbon Monoxide Diffusing Capacity          Current Outpatient Medications   Medication Instructions    acetaminophen (TYLENOL) 650 mg, Oral, Every 8 hours    acyclovir (ZOVIRAX) 400 mg, Oral, 3  times daily PRN    amoxicillin (AMOXIL) 500 MG capsule TAKE 6 CAPSULES BY MOUTH 1 HOUR BEFORE DENTAL APPOINTMENT AND 2 CAPSULES 6 HOURS AFTER DENTAL APPOINTMENT    docusate sodium (COLACE) 250 mg, Oral, 2 times daily    estradioL (ESTRACE) 1 g, Vaginal, Every other day    HYDROcodone-acetaminophen (NORCO) 5-325 mg per tablet 1 tablet, Oral, Every 6 hours PRN, Pt states takes PRN for headaches    ipratropium (ATROVENT) 21 mcg (0.03 %) nasal spray 2 sprays, Each Nostril, 2 times daily    levothyroxine (SYNTHROID) 25 mcg, Oral, Daily    lifitegrast (XIIDRA) 5 % Dpet 1 drop, Both Eyes, 2 times daily    olopatadine (PATANOL) 0.1 % ophthalmic solution 1 drop, 2 times daily    pantoprazole (PROTONIX) 40 mg, Oral, Daily    rivaroxaban (XARELTO) 20 mg, Oral, Daily    traMADoL (ULTRAM) 50 mg, Oral, Every 6 hours PRN    vit C/E/Zn/coppr/lutein/zeaxan (PRESERVISION AREDS-2 ORAL) No dose, route, or frequency recorded.      Zoë Enrique had no medications administered during this visit.     Orders Placed This Encounter   Procedures    Spirometry with/without bronchodilator     Standing Status:   Future     Standing Expiration Date:   2/2/2024     Order Specific Question:   Release to patient     Answer:   Immediate    Lung Volumes     Standing Status:   Future     Standing Expiration Date:   2/2/2024     Order Specific Question:   Release to patient     Answer:   Immediate    DLCO-Carbon Monoxide Diffusing Capacity     Standing Status:   Future     Standing Expiration Date:   2/2/2024     Order Specific Question:   Release to patient     Answer:   Immediate      Plan:       Problem List Items Addressed This Visit          Pulmonary    Bronchiectasis without complication - Primary    Overview     Discovered incidentally.  No environmental, occupational or social risk factors.  PNA as a young child but no PNA throughout adulthood.  2022 immunoglobulins normal.  Aspergillus IgE normal.  Quant GOLD normal.  Notable tree-in-bud  opacities on CT.  Previous AFB negative.  Currently asymptomatic.  Cough attributable postnasal gtt.  No sputum production.  2020 PFTs unremarkable.  Suspect there may be some underlying NTM infection, however asymptomatic without abnormal PFTs.  No indication for further investigation or treatment at this time.  Repeat PFTs prior to next visit.         Relevant Orders    Spirometry with/without bronchodilator    Lung Volumes    DLCO-Carbon Monoxide Diffusing Capacity    Cough    Overview     Appears appears related to chronic sinusitis rather than complication of bronchiectasis.  Following with ENT.  Currently on nasal saline rinse BID, Atrovent 2 sprays BID         Relevant Orders    Spirometry with/without bronchodilator    Lung Volumes    DLCO-Carbon Monoxide Diffusing Capacity        20 minutes of total time spent on the encounter, which includes face-to-face time and non-face-to-face time preparing to see the patient (eg, review of tests), obtaining and/or reviewing separately obtained history, documenting clinical information in the electronic or other health record, independently interpreting results (not separately reported), and communicating results to the patient/family/caregiver, or care coordination (not separately reported).     Portions of the record may have been created with voice-recognition software. Occasional wrong-word or sound-a-like substitutions may have occurred due to the inherent limitations of voice-recognition software. Read the chart carefully and recognize, using context, where substitutions have occurred.

## 2023-02-03 ENCOUNTER — OFFICE VISIT (OUTPATIENT)
Dept: OTOLARYNGOLOGY | Facility: CLINIC | Age: 73
End: 2023-02-03
Payer: MEDICARE

## 2023-02-03 VITALS
WEIGHT: 159.63 LBS | HEART RATE: 88 BPM | TEMPERATURE: 98 F | DIASTOLIC BLOOD PRESSURE: 64 MMHG | BODY MASS INDEX: 24.19 KG/M2 | SYSTOLIC BLOOD PRESSURE: 97 MMHG | HEIGHT: 68 IN

## 2023-02-03 DIAGNOSIS — J31.0 CHRONIC RHINITIS: Chronic | ICD-10-CM

## 2023-02-03 DIAGNOSIS — R51.9 CHRONIC NONINTRACTABLE HEADACHE, UNSPECIFIED HEADACHE TYPE: ICD-10-CM

## 2023-02-03 DIAGNOSIS — J32.9 CHRONIC RECURRENT SINUSITIS: Primary | Chronic | ICD-10-CM

## 2023-02-03 DIAGNOSIS — K21.9 LARYNGOPHARYNGEAL REFLUX (LPR): Chronic | ICD-10-CM

## 2023-02-03 DIAGNOSIS — H04.123 DRY EYE SYNDROME OF BOTH EYES: Primary | ICD-10-CM

## 2023-02-03 DIAGNOSIS — G89.29 CHRONIC NONINTRACTABLE HEADACHE, UNSPECIFIED HEADACHE TYPE: ICD-10-CM

## 2023-02-03 PROCEDURE — 3008F PR BODY MASS INDEX (BMI) DOCUMENTED: ICD-10-PCS | Mod: CPTII,S$GLB,, | Performed by: OTOLARYNGOLOGY

## 2023-02-03 PROCEDURE — 1101F PR PT FALLS ASSESS DOC 0-1 FALLS W/OUT INJ PAST YR: ICD-10-PCS | Mod: CPTII,S$GLB,, | Performed by: OTOLARYNGOLOGY

## 2023-02-03 PROCEDURE — 3078F DIAST BP <80 MM HG: CPT | Mod: CPTII,S$GLB,, | Performed by: OTOLARYNGOLOGY

## 2023-02-03 PROCEDURE — 1159F PR MEDICATION LIST DOCUMENTED IN MEDICAL RECORD: ICD-10-PCS | Mod: CPTII,S$GLB,, | Performed by: OTOLARYNGOLOGY

## 2023-02-03 PROCEDURE — 99999 PR PBB SHADOW E&M-EST. PATIENT-LVL IV: ICD-10-PCS | Mod: PBBFAC,,, | Performed by: OTOLARYNGOLOGY

## 2023-02-03 PROCEDURE — 1125F AMNT PAIN NOTED PAIN PRSNT: CPT | Mod: CPTII,S$GLB,, | Performed by: OTOLARYNGOLOGY

## 2023-02-03 PROCEDURE — 3074F PR MOST RECENT SYSTOLIC BLOOD PRESSURE < 130 MM HG: ICD-10-PCS | Mod: CPTII,S$GLB,, | Performed by: OTOLARYNGOLOGY

## 2023-02-03 PROCEDURE — 99214 PR OFFICE/OUTPT VISIT, EST, LEVL IV, 30-39 MIN: ICD-10-PCS | Mod: S$GLB,,, | Performed by: OTOLARYNGOLOGY

## 2023-02-03 PROCEDURE — 99999 PR PBB SHADOW E&M-EST. PATIENT-LVL IV: CPT | Mod: PBBFAC,,, | Performed by: OTOLARYNGOLOGY

## 2023-02-03 PROCEDURE — 99214 OFFICE O/P EST MOD 30 MIN: CPT | Mod: S$GLB,,, | Performed by: OTOLARYNGOLOGY

## 2023-02-03 PROCEDURE — 3008F BODY MASS INDEX DOCD: CPT | Mod: CPTII,S$GLB,, | Performed by: OTOLARYNGOLOGY

## 2023-02-03 PROCEDURE — 3288F FALL RISK ASSESSMENT DOCD: CPT | Mod: CPTII,S$GLB,, | Performed by: OTOLARYNGOLOGY

## 2023-02-03 PROCEDURE — 1101F PT FALLS ASSESS-DOCD LE1/YR: CPT | Mod: CPTII,S$GLB,, | Performed by: OTOLARYNGOLOGY

## 2023-02-03 PROCEDURE — 1159F MED LIST DOCD IN RCRD: CPT | Mod: CPTII,S$GLB,, | Performed by: OTOLARYNGOLOGY

## 2023-02-03 PROCEDURE — 3288F PR FALLS RISK ASSESSMENT DOCUMENTED: ICD-10-PCS | Mod: CPTII,S$GLB,, | Performed by: OTOLARYNGOLOGY

## 2023-02-03 PROCEDURE — 3078F PR MOST RECENT DIASTOLIC BLOOD PRESSURE < 80 MM HG: ICD-10-PCS | Mod: CPTII,S$GLB,, | Performed by: OTOLARYNGOLOGY

## 2023-02-03 PROCEDURE — 1125F PR PAIN SEVERITY QUANTIFIED, PAIN PRESENT: ICD-10-PCS | Mod: CPTII,S$GLB,, | Performed by: OTOLARYNGOLOGY

## 2023-02-03 PROCEDURE — 3074F SYST BP LT 130 MM HG: CPT | Mod: CPTII,S$GLB,, | Performed by: OTOLARYNGOLOGY

## 2023-02-03 RX ORDER — CYCLOSPORINE 0.5 MG/ML
1 EMULSION OPHTHALMIC 2 TIMES DAILY
Qty: 30 EACH | Refills: 11 | Status: SHIPPED | OUTPATIENT
Start: 2023-02-03 | End: 2023-03-21

## 2023-02-03 RX ORDER — BROMPHENIRAMINE MALEATE, PSEUDOEPHEDRINE HYDROCHLORIDE, AND DEXTROMETHORPHAN HYDROBROMIDE 2; 30; 10 MG/5ML; MG/5ML; MG/5ML
5 SYRUP ORAL EVERY 8 HOURS PRN
Qty: 118 ML | Refills: 0 | Status: SHIPPED | OUTPATIENT
Start: 2023-02-03 | End: 2023-02-13

## 2023-02-03 NOTE — PROGRESS NOTES
Chief Complaint   Patient presents with    Sore Throat    Headache    Nasal Congestion   .    HPI:     Zoë Enrique is a 72 y.o. female who presents for evaluation of a several month history of rhinorrhea and headaches. These symptoms have been present for years. Has seen neurologist in [place] years ago and had MRI at that time.  She relays that she has been having frequent upper respiratory infections.  She was most recently on Augmentin and Levaquin.  Last infection was August 2022. She did see Dr. Adorno and has had negative immunoCAP testing. She did have low pneumococcal titers and had pneumovax booster subsequently. Her allergist was concerned about possible LPR and referred for further evaluation. Relays that she has had headaches behind and around her eyes and her forehead. Describes pressure around her cheek region.    She does use sinus rinses or nasal sprays. She is taking Flonase daily.  She does not feel it helps the clear rhinorrhea. She admits to midface pain and pressure. She has not had sinus or nasal surgery. There is no history of sinonasal trauma. She denies hoarseness, she has occasional dysphagia to pills/some solids.  She notes globus sensation with eating.  She did have EGD in 2021 which was normal. She denies heartburn or indigestion.     Interval HPI 1/23/2023:  Follow up visit. She has been taking Protonix 40 daily. She has restarted the Atrovent for 1 month and has been on the Flonase daily. She states that nothing improves her clear rhinorrhea and post nasal drip. Denies acid reflux. She states that her cough is more from the post nasal drip.   She is also c/o frontal headaches, sinus pressure and tooth pain with changes in the weather. She usually would take a Norco for pain relief. She hasn't seen neurologist in years.    Interval HPI 2/3/2023:  Follow up visit. Reports similar symptoms to last visit. Notes that she has had increased nasal congestion, sore throat, postnasal drip  over the last day.  She has recently been her grandson who has been sick as well  she has taken Sudafed and acetaminophen with seem to with her symptoms.  She reports that she just obtain the budesonide saline rinses today and started them this morning.      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    Diverticulosis 1998    Hx of atrial flutter     Hx of mitral valve prolapse 1990    Hypothyroidism     Migraines     Recurrent upper respiratory infection (URI)     Supraventricular tachycardia      Social History     Socioeconomic History    Marital status:    Tobacco Use    Smoking status: Never    Smokeless tobacco: Never   Substance and Sexual Activity    Alcohol use: Yes     Alcohol/week: 1.0 standard drink     Types: 1 Standard drinks or equivalent per week     Comment: 1-2 per month    Drug use: No     Comment: CBD oils for headache    Sexual activity: Not Currently     Partners: Male     Birth control/protection: Post-menopausal, See Surgical Hx, None     Comment: , together x 37 years     Social Determinants of Health     Financial Resource Strain: Low Risk     Difficulty of Paying Living Expenses: Not hard at all   Food Insecurity: No Food Insecurity    Worried About Running Out of Food in the Last Year: Never true    Ran Out of Food in the Last Year: Never true   Transportation Needs: No Transportation Needs    Lack of Transportation (Medical): No    Lack of Transportation (Non-Medical): No   Physical Activity: Insufficiently Active    Days of Exercise per Week: 1 day    Minutes of Exercise per Session: 20 min   Stress: No Stress Concern Present    Feeling of Stress : Not at all   Social Connections: Unknown    Frequency of Communication with Friends and Family: More than three times a week    Frequency of Social Gatherings with Friends and Family: More than  three times a week    Active Member of Clubs or Organizations: No    Attends Club or Organization Meetings: Patient refused    Marital Status:    Housing Stability: Low Risk     Unable to Pay for Housing in the Last Year: No    Number of Places Lived in the Last Year: 1    Unstable Housing in the Last Year: No     Past Surgical History:   Procedure Laterality Date    ABLATION OF ARRHYTHMOGENIC FOCUS FOR ATRIAL FIBRILLATION N/A 2020    Procedure: ABLATION, ARRHYTHMOGENIC FOCUS, FOR ATRIAL FIBRILLATION;  Surgeon: Moe Seymour MD;  Location: [facility] EP LAB;  Service: Cardiology;  Laterality: N/A;  AFL/AF, KHANH, CTI, PVI, RFA, CARTO, GEN, DM, 3 PREP    AUGMENTATION OF BREAST Bilateral     35 yrs     BREAST SURGERY      augmentation    CARDIOVERSION  2020    Procedure: Cardioversion;  Surgeon: Moe Seymour MD;  Location: [facility] EP LAB;  Service: Cardiology;;     SECTION      COLON SURGERY      partial colon resection    COLONOSCOPY N/A 2021    Procedure: COLONOSCOPY;  Surgeon: Sahara Salgado MD;  Location: [facility] VALDO (4TH FLR);  Service: Endoscopy;  Laterality: N/A;  constipation prep- 7 days of miralax, 2 fulls of fulls, then day bf clears and split prep   covid test 3/5 pcw, prep instr emailed, antibiotics prophylactically, Xarelto hold x 2 days per Dr. Seymour-see telephone encounter 2/10 -ml    COLPOSCOPY      COSMETIC SURGERY      DE QUERVAIN'S RELEASE Left 2022    Procedure: RELEASE, HAND, FOR DEQUERVAIN'S TENOSYNOVITIS;  Surgeon: Toñito Adame Jr., MD;  Location: [facility] OR;  Service: Orthopedics;  Laterality: Left;    ESOPHAGOGASTRODUODENOSCOPY N/A 2021    Procedure: EGD (ESOPHAGOGASTRODUODENOSCOPY);  Surgeon: Sahara Salgado MD;  Location: [facility] (4TH FLR);  Service: Endoscopy;  Laterality: N/A;    EYE SURGERY Bilateral 2016    Lasik, then cataracts extraction    JOINT REPLACEMENT Right , ,     KNEE ARTHROPLASTY Right      revison in 2009    KNEE SURGERY Right 1989    distal femoral replacing TK    OOPHORECTOMY      TOTAL ABDOMINAL HYSTERECTOMY  1994    TAHBSO    TRANSESOPHAGEAL ECHOCARDIOGRAPHY N/A 11/24/2020    Procedure: ECHOCARDIOGRAM, TRANSESOPHAGEAL;  Surgeon: Ramírez Ortez III, MD;  Location: [facility] EP LAB;  Service: Cardiology;  Laterality: N/A;    TREATMENT OF CARDIAC ARRHYTHMIA N/A 08/12/2019    Procedure: CARDIOVERSION;  Surgeon: Moe Seymour MD;  Location: [facility] EP LAB;  Service: Cardiology;  Laterality: N/A;  KHANH/DCCV/MAC/DM/3PREP/*ADMIT FOR TIKOSYN*    TRIGGER FINGER RELEASE Left 08/02/2022    Procedure: RELEASE, TRIGGER FINGER;  Surgeon: Toñito Adame Jr., MD;  Location: [facility] OR;  Service: Orthopedics;  Laterality: Left;  left middle finger     Family History   Problem Relation Age of Onset    Diabetes Sister         Type I    Heart disease Sister         Atrrial fib, mitral valve repair    Hearing loss Father     Heart disease Father         Angina, AAA    Stroke Father 93    Heart disease Mother         Atrial fib    Diabetes Mother         Type II    Heart disease Brother         Atrial fib    Asthma Brother     Diverticulosis Brother     Heart disease Brother         Atrial fib    Diverticulosis Brother     No Known Problems Daughter     Breast cancer Neg Hx     Ovarian cancer Neg Hx     Colon cancer Neg Hx     Cancer Neg Hx     Hypertension Neg Hx            Review of Systems  General: negative for chills, fever or weight loss  Psychological: negative for mood changes or depression  Ophthalmic: negative for blurry vision, photophobia or eye pain  ENT: see HPI  Respiratory: no cough, shortness of breath, or wheezing  Cardiovascular: no chest pain or dyspnea on exertion  Gastrointestinal: no abdominal pain, change in bowel habits, or black/ bloody stools  Musculoskeletal: negative for gait disturbance or muscular weakness  Neurological: no syncope or seizures; no ataxia  Dermatological: negative for puritis,   rash and jaundice  Hematologic/lymphatic: no easy bruising, no new lumps or bumps      Physical Exam:    There were no vitals filed for this visit.        Constitutional: Well appearing / communicating without difficutly.  NAD.  Eyes: EOM I Bilaterally  Head/Face: Normocephalic.  Negative paranasal sinus pressure/tenderness.  Salivary glands WNL.  House Brackmann I Bilaterally.    Right Ear: Auricle normal appearance. External Auditory Canal within normal limits,TM w/o masses/lesions/perforations. TM mobility noted.   Left Ear: Auricle normal appearance. External Auditory Canal WNL,TM w/o masses/lesions/perforations. TM mobility noted.  Nose: No gross nasal septal deviation. Inferior Turbinates 3+ bilaterally. No septal perforation. No masses/lesions. External nasal skin appears normal without masses/lesions.  Oral Cavity: Gingiva/lips within normal limits.  Dentition/gingiva healthy appearing. Mucus membranes moist. Floor of mouth soft, no masses palpated. Oral Tongue mobile. Hard Palate appears normal.    Oropharynx: Base of tongue appears normal. No masses/lesions noted. Tonsillar fossa/pharyngeal wall without lesions. Posterior oropharynx WNL.  Soft palate without masses. Midline uvula.   Neck/Lymphatic: No LAD I-VI bilaterally.  No thyromegaly.  No masses noted on exam.          Diagnostic studies:  Laboratory 2022:  IgE level: Less than 35.  ImmunoCAP:  Negative.  IgA: 114.  Ig.  IgM: 66.  Pneumococcal titers: Not protective.    CT sinus 2023: Images interpreted by me and discussed with patient in detail today.   Right pedro bullosa.  Paradoxical curvature of the middle turbinates anteriorly.  Leftward nasal septal deviation with a 3 mm bony spur abutting the left inferior nasal turbinate.  No nasal polyp formation is identified.     Ostiomeatal units are patent with some anatomic narrowing of the distal infundibulum on the left.  The frontal recesses and sphenoid sinus ostia are patent.      The paranasal sinuses are currently clear other than for minimal mucosal thickening at the floor the left maxillary sinus..     Sellar pneumatization of the sphenoid.  There is some pneumatization overlying the grooves of the anterior ethmoid arteries bilaterally.  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     The middle ears and mastoid air cells are clear.     No acute intracranial process.          Assessment:    ICD-10-CM ICD-9-CM    1. Chronic recurrent sinusitis  J32.9 473.9       2. Chronic rhinitis  J31.0 472.0       3. Laryngopharyngeal reflux (LPR)  K21.9 478.79             The primary encounter diagnosis was Chronic recurrent sinusitis. Diagnoses of Chronic rhinitis and Laryngopharyngeal reflux (LPR) were also pertinent to this visit.      Plan:  No orders of the defined types were placed in this encounter.    Continue budesonide nasal saline rinses BID.   Continue  Atrovent 2 sprays per nostril b.i.d.  Refer to neurology for chronic headaches. Had been treated for migraine HA in the past with last neurology visit in 2018.   Continue Protonix 40 mg p.o. daily  Bromfed given for acute URI for symptomatic relief.   Discussed option of septoplasty/SMRT versus medical management.  We will continue with medical management for now.  She is on anticoagulation for atrial fibrillation and would need cardiac clearance prior to consideration of this procedure.    Linda Yu MD

## 2023-02-03 NOTE — PROGRESS NOTES
Chief Complaint   Patient presents with    Sore Throat    Headache    Nasal Congestion   .    HPI:     Zoë Enrique is a 72 y.o. female who presents for evaluation of a several month history of rhinorrhea and headaches. These symptoms have been present for years. Has seen neurologist in [place] years ago and had MRI at that time.  She relays that she has been having frequent upper respiratory infections.  She was most recently on Augmentin and Levaquin.  Last infection was August 2022. She did see Dr. Adorno and has had negative immunoCAP testing. She did have low pneumococcal titers and had pneumovax booster subsequently. Her allergist was concerned about possible LPR and referred for further evaluation. Relays that she has had headaches behind and around her eyes and her forehead. Describes pressure around her cheek region.    She does use sinus rinses or nasal sprays. She is taking Flonase daily.  She does not feel it helps the clear rhinorrhea. She admits to midface pain and pressure. She has not had sinus or nasal surgery. There is no history of sinonasal trauma. She denies hoarseness, she has occasional dysphagia to pills/some solids.  She notes globus sensation with eating.  She did have EGD in 2021 which was normal. She denies heartburn or indigestion.     Interval HPI 1/23/2023:  Follow up visit. She has been taking Protonix 40 daily. She has restarted the Atrovent for 1 month and has been on the Flonase daily. She states that nothing improve her clear rhinorrhea and post nasal drip. Denies acid reflux. She states that her cough is more from the post nasal drip.   She is also c/o frontal headaches, sinus pressure and tooth pain with changes in the weather. She usually would take a Norco for pain relief. She hasn't seen neurologist in years.        Past Medical History:   Diagnosis Date    Abnormal Pap smear of cervix     Allergy     Atrial fibrillation 09/2017    Atrial flutter     Cancer 1989     osteosarcoma of right femur    Diverticulosis 1998    Hx of atrial flutter     Hx of mitral valve prolapse 1990    Hypothyroidism     Migraines     Recurrent upper respiratory infection (URI)     Supraventricular tachycardia      Social History     Socioeconomic History    Marital status:    Tobacco Use    Smoking status: Never    Smokeless tobacco: Never   Substance and Sexual Activity    Alcohol use: Yes     Alcohol/week: 1.0 standard drink     Types: 1 Standard drinks or equivalent per week     Comment: 1-2 per month    Drug use: No     Comment: CBD oils for headache    Sexual activity: Not Currently     Partners: Male     Birth control/protection: Post-menopausal, See Surgical Hx, None     Comment: , together x 37 years     Social Determinants of Health     Financial Resource Strain: Low Risk     Difficulty of Paying Living Expenses: Not hard at all   Food Insecurity: No Food Insecurity    Worried About Running Out of Food in the Last Year: Never true    Ran Out of Food in the Last Year: Never true   Transportation Needs: No Transportation Needs    Lack of Transportation (Medical): No    Lack of Transportation (Non-Medical): No   Physical Activity: Insufficiently Active    Days of Exercise per Week: 1 day    Minutes of Exercise per Session: 20 min   Stress: No Stress Concern Present    Feeling of Stress : Not at all   Social Connections: Unknown    Frequency of Communication with Friends and Family: More than three times a week    Frequency of Social Gatherings with Friends and Family: More than three times a week    Active Member of Clubs or Organizations: No    Attends Club or Organization Meetings: Patient refused    Marital Status:    Housing Stability: Low Risk     Unable to Pay for Housing in the Last Year: No    Number of Places Lived in the Last Year: 1    Unstable Housing in the Last Year: No     Past Surgical History:   Procedure Laterality Date    ABLATION OF ARRHYTHMOGENIC FOCUS  FOR ATRIAL FIBRILLATION N/A 2020    Procedure: ABLATION, ARRHYTHMOGENIC FOCUS, FOR ATRIAL FIBRILLATION;  Surgeon: Moe Seymour MD;  Location: [facility] EP LAB;  Service: Cardiology;  Laterality: N/A;  AFL/AF, KHANH, CTI, PVI, RFA, CARTO, GEN, DM, 3 PREP    AUGMENTATION OF BREAST Bilateral 1984    35 yrs     BREAST SURGERY  1984    augmentation    CARDIOVERSION  2020    Procedure: Cardioversion;  Surgeon: Moe Seymour MD;  Location: [facility] EP LAB;  Service: Cardiology;;     SECTION  1988    COLON SURGERY      partial colon resection    COLONOSCOPY N/A 2021    Procedure: COLONOSCOPY;  Surgeon: Sahara Salgado MD;  Location: [facility] ENDO (4TH FLR);  Service: Endoscopy;  Laterality: N/A;  constipation prep- 7 days of miralax, 2 fulls of fulls, then day bf clears and split prep   covid test 3/5 pcw, prep instr emailed, antibiotics prophylactically, Xarelto hold x 2 days per Dr. Seymour-see telephone encounter 2/10 -ml    COLPOSCOPY      COSMETIC SURGERY  1987    DE QUERVAIN'S RELEASE Left 2022    Procedure: RELEASE, HAND, FOR DEQUERVAIN'S TENOSYNOVITIS;  Surgeon: Toñito Adame Jr., MD;  Location: [facility];  Service: Orthopedics;  Laterality: Left;    ESOPHAGOGASTRODUODENOSCOPY N/A 2021    Procedure: EGD (ESOPHAGOGASTRODUODENOSCOPY);  Surgeon: Sahara Salgado MD;  Location: [facility] ENDO (4TH FLR);  Service: Endoscopy;  Laterality: N/A;    EYE SURGERY Bilateral 2016    Lasik, then cataracts extraction    JOINT REPLACEMENT Right , ,     KNEE ARTHROPLASTY Right     revison in     KNEE SURGERY Right     distal femoral replacing TK    OOPHORECTOMY      TOTAL ABDOMINAL HYSTERECTOMY      TAHBSO    TRANSESOPHAGEAL ECHOCARDIOGRAPHY N/A 2020    Procedure: ECHOCARDIOGRAM, TRANSESOPHAGEAL;  Surgeon: Ramírez Ortez III, MD;  Location: [facility] EP LAB;  Service: Cardiology;  Laterality: N/A;    TREATMENT OF CARDIAC ARRHYTHMIA N/A 2019    Procedure:  CARDIOVERSION;  Surgeon: Moe Seymour MD;  Location: [facility] EP LAB;  Service: Cardiology;  Laterality: N/A;  KHANH/DCCV/MAC/DM/3PREP/*ADMIT FOR TIKOSYN*    TRIGGER FINGER RELEASE Left 08/02/2022    Procedure: RELEASE, TRIGGER FINGER;  Surgeon: Toñito Adame Jr., MD;  Location: [facility] OR;  Service: Orthopedics;  Laterality: Left;  left middle finger     Family History   Problem Relation Age of Onset    Diabetes Sister         Type I    Heart disease Sister         Atrrial fib, mitral valve repair    Hearing loss Father     Heart disease Father         Angina, AAA    Stroke Father 93    Heart disease Mother         Atrial fib    Diabetes Mother         Type II    Heart disease Brother         Atrial fib    Asthma Brother     Diverticulosis Brother     Heart disease Brother         Atrial fib    Diverticulosis Brother     No Known Problems Daughter     Breast cancer Neg Hx     Ovarian cancer Neg Hx     Colon cancer Neg Hx     Cancer Neg Hx     Hypertension Neg Hx            Review of Systems  General: negative for chills, fever or weight loss  Psychological: negative for mood changes or depression  Ophthalmic: negative for blurry vision, photophobia or eye pain  ENT: see HPI  Respiratory: no cough, shortness of breath, or wheezing  Cardiovascular: no chest pain or dyspnea on exertion  Gastrointestinal: no abdominal pain, change in bowel habits, or black/ bloody stools  Musculoskeletal: negative for gait disturbance or muscular weakness  Neurological: no syncope or seizures; no ataxia  Dermatological: negative for puritis,  rash and jaundice  Hematologic/lymphatic: no easy bruising, no new lumps or bumps      Physical Exam:    There were no vitals filed for this visit.        Constitutional: Well appearing / communicating without difficutly.  NAD.  Eyes: EOM I Bilaterally  Head/Face: Normocephalic.  Negative paranasal sinus pressure/tenderness.  Salivary glands WNL.  House Brackmann I Bilaterally.    Right Ear: Auricle  normal appearance. External Auditory Canal within normal limits,TM w/o masses/lesions/perforations. TM mobility noted.   Left Ear: Auricle normal appearance. External Auditory Canal WNL,TM w/o masses/lesions/perforations. TM mobility noted.  Nose: No gross nasal septal deviation. Inferior Turbinates 3+ bilaterally. No septal perforation. No masses/lesions. External nasal skin appears normal without masses/lesions.  Oral Cavity: Gingiva/lips within normal limits.  Dentition/gingiva healthy appearing. Mucus membranes moist. Floor of mouth soft, no masses palpated. Oral Tongue mobile. Hard Palate appears normal.    Oropharynx: Base of tongue appears normal. No masses/lesions noted. Tonsillar fossa/pharyngeal wall without lesions. Posterior oropharynx WNL.  Soft palate without masses. Midline uvula.   Neck/Lymphatic: No LAD I-VI bilaterally.  No thyromegaly.  No masses noted on exam.          Diagnostic studies:  Laboratory 2022:  IgE level: Less than 35.  ImmunoCAP:  Negative.  IgA: 114.  Ig.  IgM: 66.  Pneumococcal titers: Not protective.        Assessment:    ICD-10-CM ICD-9-CM    1. Chronic recurrent sinusitis  J32.9 473.9       2. Chronic rhinitis  J31.0 472.0       3. Laryngopharyngeal reflux (LPR)  K21.9 478.79             The primary encounter diagnosis was Chronic recurrent sinusitis. Diagnoses of Chronic rhinitis and Laryngopharyngeal reflux (LPR) were also pertinent to this visit.      Plan:  No orders of the defined types were placed in this encounter.    Start budesonide nasal saline rinses BID.   Continue  Atrovent 2 sprays per nostril b.i.d.  Obtain CT scan of the sinuses to further assess intraluminal patency.   Continue Protonix 40 mg p.o. daily    Linda Yu MD

## 2023-02-09 ENCOUNTER — OFFICE VISIT (OUTPATIENT)
Dept: ORTHOPEDICS | Facility: CLINIC | Age: 73
End: 2023-02-09
Payer: MEDICARE

## 2023-02-09 VITALS — HEIGHT: 68 IN | BODY MASS INDEX: 24.1 KG/M2 | WEIGHT: 159 LBS

## 2023-02-09 DIAGNOSIS — M65.4 DE QUERVAIN'S TENOSYNOVITIS, LEFT: Primary | ICD-10-CM

## 2023-02-09 PROCEDURE — 1159F PR MEDICATION LIST DOCUMENTED IN MEDICAL RECORD: ICD-10-PCS | Mod: CPTII,S$GLB,, | Performed by: ORTHOPAEDIC SURGERY

## 2023-02-09 PROCEDURE — 99213 OFFICE O/P EST LOW 20 MIN: CPT | Mod: S$GLB,,, | Performed by: ORTHOPAEDIC SURGERY

## 2023-02-09 PROCEDURE — 3288F PR FALLS RISK ASSESSMENT DOCUMENTED: ICD-10-PCS | Mod: CPTII,S$GLB,, | Performed by: ORTHOPAEDIC SURGERY

## 2023-02-09 PROCEDURE — 1126F PR PAIN SEVERITY QUANTIFIED, NO PAIN PRESENT: ICD-10-PCS | Mod: CPTII,S$GLB,, | Performed by: ORTHOPAEDIC SURGERY

## 2023-02-09 PROCEDURE — 99999 PR PBB SHADOW E&M-EST. PATIENT-LVL III: ICD-10-PCS | Mod: PBBFAC,,, | Performed by: ORTHOPAEDIC SURGERY

## 2023-02-09 PROCEDURE — 3008F PR BODY MASS INDEX (BMI) DOCUMENTED: ICD-10-PCS | Mod: CPTII,S$GLB,, | Performed by: ORTHOPAEDIC SURGERY

## 2023-02-09 PROCEDURE — 99999 PR PBB SHADOW E&M-EST. PATIENT-LVL III: CPT | Mod: PBBFAC,,, | Performed by: ORTHOPAEDIC SURGERY

## 2023-02-09 PROCEDURE — 1101F PR PT FALLS ASSESS DOC 0-1 FALLS W/OUT INJ PAST YR: ICD-10-PCS | Mod: CPTII,S$GLB,, | Performed by: ORTHOPAEDIC SURGERY

## 2023-02-09 PROCEDURE — 99213 PR OFFICE/OUTPT VISIT, EST, LEVL III, 20-29 MIN: ICD-10-PCS | Mod: S$GLB,,, | Performed by: ORTHOPAEDIC SURGERY

## 2023-02-09 PROCEDURE — 1159F MED LIST DOCD IN RCRD: CPT | Mod: CPTII,S$GLB,, | Performed by: ORTHOPAEDIC SURGERY

## 2023-02-09 PROCEDURE — 1101F PT FALLS ASSESS-DOCD LE1/YR: CPT | Mod: CPTII,S$GLB,, | Performed by: ORTHOPAEDIC SURGERY

## 2023-02-09 PROCEDURE — 3008F BODY MASS INDEX DOCD: CPT | Mod: CPTII,S$GLB,, | Performed by: ORTHOPAEDIC SURGERY

## 2023-02-09 PROCEDURE — 1126F AMNT PAIN NOTED NONE PRSNT: CPT | Mod: CPTII,S$GLB,, | Performed by: ORTHOPAEDIC SURGERY

## 2023-02-09 PROCEDURE — 3288F FALL RISK ASSESSMENT DOCD: CPT | Mod: CPTII,S$GLB,, | Performed by: ORTHOPAEDIC SURGERY

## 2023-02-09 NOTE — PROGRESS NOTES
Subjective:      Patient ID: Zoë Enrique is a 72 y.o. female.  Chief Complaint: Follow-up of the Left Wrist      HPI  Zoë Enrique is a  72 y.o. female presenting today for follow up of de Quervain release left wrist and trigger release left hand.  She reports that she is now little over 5 months postop   She is doing well symptoms have improved significantly she has completed physical therapy.    Review of patient's allergies indicates:   Allergen Reactions    Morphine Nausea And Vomiting    Succinylcholine      Other reaction(s): v tach         Current Outpatient Medications   Medication Sig Dispense Refill    acetaminophen (TYLENOL) 650 MG TbSR Take 650 mg by mouth every 8 (eight) hours.      acyclovir (ZOVIRAX) 400 MG tablet Take 1 tablet (400 mg total) by mouth 3 (three) times daily as needed. 90 tablet 3    amoxicillin (AMOXIL) 500 MG capsule TAKE 6 CAPSULES BY MOUTH 1 HOUR BEFORE DENTAL APPOINTMENT AND 2 CAPSULES 6 HOURS AFTER DENTAL APPOINTMENT      brompheniramine-pseudoeph-DM (BROMFED DM) 2-30-10 mg/5 mL Syrp Take 5 mLs by mouth every 8 (eight) hours as needed. 118 mL 0    cycloSPORINE (RESTASIS) 0.05 % ophthalmic emulsion Place 1 drop into both eyes 2 (two) times daily. 30 each 11    docusate sodium (COLACE) 250 MG capsule Take 250 mg by mouth 2 (two) times daily.       estradioL (ESTRACE) 0.01 % (0.1 mg/gram) vaginal cream Place 1 g vaginally every other day. 42 g 3    HYDROcodone-acetaminophen (NORCO) 5-325 mg per tablet Take 1 tablet by mouth every 6 (six) hours as needed for Pain. Pt states takes PRN for headaches 28 tablet 0    ipratropium (ATROVENT) 21 mcg (0.03 %) nasal spray 2 sprays by Each Nostril route 2 (two) times daily. 30 mL 3    levothyroxine (SYNTHROID) 25 MCG tablet Take 1 tablet (25 mcg total) by mouth once daily. 90 tablet 3    olopatadine (PATANOL) 0.1 % ophthalmic solution 1 drop 2 (two) times daily.      pantoprazole (PROTONIX) 40 MG tablet Take 1 tablet (40 mg total) by mouth  once daily. 30 tablet 3    rivaroxaban (XARELTO) 20 mg Tab Take 1 tablet (20 mg total) by mouth once daily. 90 tablet 3    vit C/E/Zn/coppr/lutein/zeaxan (PRESERVISION AREDS-2 ORAL)        No current facility-administered medications for this visit.     Facility-Administered Medications Ordered in Other Visits   Medication Dose Route Frequency Provider Last Rate Last Admin    sodium chloride 0.9% flush 5 mL  5 mL Intravenous PRN Aleksandra Taylor NP           Past Medical History:   Diagnosis Date    Abnormal Pap smear of cervix     Allergy     Atrial fibrillation 2017    Atrial flutter     Cancer     osteosarcoma of right femur    Diverticulosis     Hx of atrial flutter     Hx of mitral valve prolapse     Hypothyroidism     Migraines     Recurrent upper respiratory infection (URI)     Supraventricular tachycardia        Past Surgical History:   Procedure Laterality Date    ABLATION OF ARRHYTHMOGENIC FOCUS FOR ATRIAL FIBRILLATION N/A 2020    Procedure: ABLATION, ARRHYTHMOGENIC FOCUS, FOR ATRIAL FIBRILLATION;  Surgeon: Moe Seymour MD;  Location: [facility] EP LAB;  Service: Cardiology;  Laterality: N/A;  AFL/AF, KHANH, CTI, PVI, RFA, CARTO, GEN, DM, 3 PREP    AUGMENTATION OF BREAST Bilateral     35 yrs     BREAST SURGERY      augmentation    CARDIOVERSION  2020    Procedure: Cardioversion;  Surgeon: Moe Seymour MD;  Location: [facility] EP LAB;  Service: Cardiology;;     SECTION      COLON SURGERY      partial colon resection    COLONOSCOPY N/A 2021    Procedure: COLONOSCOPY;  Surgeon: Sahara Salgado MD;  Location: [facility] ENDO ([address] [place] [address]);  Service: Endoscopy;  Laterality: N/A;  constipation prep- 7 days of miralax, 2 fulls of fulls, then day bf clears and split prep   covid test 3/5 pcw, prep instr emailed, antibiotics prophylactically, Xarelto hold x 2 days per Dr. Seymour-see telephone encounter 2/10 -ml    COLPOSCOPY      COSMETIC SURGERY      DE ROSALIE'S  "RELEASE Left 08/02/2022    Procedure: RELEASE, HAND, FOR DEQUERVAIN'S TENOSYNOVITIS;  Surgeon: Toñito Adame Jr., MD;  Location: [facility] OR;  Service: Orthopedics;  Laterality: Left;    ESOPHAGOGASTRODUODENOSCOPY N/A 03/19/2021    Procedure: EGD (ESOPHAGOGASTRODUODENOSCOPY);  Surgeon: Sahara Salgado MD;  [facility]  Service: Endoscopy;  Laterality: N/A;    EYE SURGERY Bilateral 2016    Lasik, then cataracts extraction    JOINT REPLACEMENT Right 2/07, 11/07, 2/09    KNEE ARTHROPLASTY Right 2007    revison in 2009    KNEE SURGERY Right 1989    distal femoral replacing TK    OOPHORECTOMY      TOTAL ABDOMINAL HYSTERECTOMY  1994    TAHBSO    TRANSESOPHAGEAL ECHOCARDIOGRAPHY N/A 11/24/2020    Procedure: ECHOCARDIOGRAM, TRANSESOPHAGEAL;  Surgeon: Ramírez Ortez III, MD;  Location: [facility] EP LAB;  Service: Cardiology;  Laterality: N/A;    TREATMENT OF CARDIAC ARRHYTHMIA N/A 08/12/2019    Procedure: CARDIOVERSION;  Surgeon: Moe Seymour MD;  Location: [facility] EP LAB;  Service: Cardiology;  Laterality: N/A;  KHANH/DCCV/MAC/DM/3PREP/*ADMIT FOR TIKOSYN*    TRIGGER FINGER RELEASE Left 08/02/2022    Procedure: RELEASE, TRIGGER FINGER;  Surgeon: Toñito Adame Jr., MD;  Location: [facility] OR;  Service: Orthopedics;  Laterality: Left;  left middle finger       OBJECTIVE:   PHYSICAL EXAM:  Height: 5' 8" (172.7 cm) Weight: 72.1 kg (159 lb)  Vitals:    02/09/23 1059   Weight: 72.1 kg (159 lb)   Height: 5' 8" (1.727 m)   PainSc: 0-No pain   PainLoc: Wrist     Ortho/SPM Exam  Examination left hand both incisions well healed no significant swelling range of motion wrist fingers full without pain no triggering sensation intact    RADIOGRAPHS:  None  Comments: I have personally reviewed the imaging and I agree with the above radiologist's report.    ASSESSMENT/PLAN:     IMPRESSION:  Status post de Quervain release and trigger release left hand    PLAN:  Return to full activities    FOLLOW UP:  As needed " only    Disclaimer: This note has been generated using voice-recognition software. There may be typographical errors that have been missed during proof-reading.

## 2023-03-13 ENCOUNTER — PATIENT MESSAGE (OUTPATIENT)
Dept: OPTOMETRY | Facility: CLINIC | Age: 73
End: 2023-03-13
Payer: MEDICARE

## 2023-03-14 ENCOUNTER — OFFICE VISIT (OUTPATIENT)
Dept: RHEUMATOLOGY | Facility: CLINIC | Age: 73
End: 2023-03-14
Payer: MEDICARE

## 2023-03-14 ENCOUNTER — PATIENT MESSAGE (OUTPATIENT)
Dept: OTOLARYNGOLOGY | Facility: CLINIC | Age: 73
End: 2023-03-14
Payer: MEDICARE

## 2023-03-14 VITALS
DIASTOLIC BLOOD PRESSURE: 73 MMHG | HEART RATE: 68 BPM | SYSTOLIC BLOOD PRESSURE: 111 MMHG | HEIGHT: 68 IN | BODY MASS INDEX: 24.76 KG/M2 | WEIGHT: 163.38 LBS

## 2023-03-14 DIAGNOSIS — M15.4 EROSIVE OSTEOARTHRITIS OF BOTH HANDS: Primary | ICD-10-CM

## 2023-03-14 PROCEDURE — 99214 PR OFFICE/OUTPT VISIT, EST, LEVL IV, 30-39 MIN: ICD-10-PCS | Mod: S$GLB,,, | Performed by: INTERNAL MEDICINE

## 2023-03-14 PROCEDURE — 99999 PR PBB SHADOW E&M-EST. PATIENT-LVL III: CPT | Mod: PBBFAC,,, | Performed by: INTERNAL MEDICINE

## 2023-03-14 PROCEDURE — 1125F PR PAIN SEVERITY QUANTIFIED, PAIN PRESENT: ICD-10-PCS | Mod: CPTII,S$GLB,, | Performed by: INTERNAL MEDICINE

## 2023-03-14 PROCEDURE — 3074F SYST BP LT 130 MM HG: CPT | Mod: CPTII,S$GLB,, | Performed by: INTERNAL MEDICINE

## 2023-03-14 PROCEDURE — 1159F PR MEDICATION LIST DOCUMENTED IN MEDICAL RECORD: ICD-10-PCS | Mod: CPTII,S$GLB,, | Performed by: INTERNAL MEDICINE

## 2023-03-14 PROCEDURE — 1159F MED LIST DOCD IN RCRD: CPT | Mod: CPTII,S$GLB,, | Performed by: INTERNAL MEDICINE

## 2023-03-14 PROCEDURE — 3074F PR MOST RECENT SYSTOLIC BLOOD PRESSURE < 130 MM HG: ICD-10-PCS | Mod: CPTII,S$GLB,, | Performed by: INTERNAL MEDICINE

## 2023-03-14 PROCEDURE — 3078F PR MOST RECENT DIASTOLIC BLOOD PRESSURE < 80 MM HG: ICD-10-PCS | Mod: CPTII,S$GLB,, | Performed by: INTERNAL MEDICINE

## 2023-03-14 PROCEDURE — 3008F PR BODY MASS INDEX (BMI) DOCUMENTED: ICD-10-PCS | Mod: CPTII,S$GLB,, | Performed by: INTERNAL MEDICINE

## 2023-03-14 PROCEDURE — 3078F DIAST BP <80 MM HG: CPT | Mod: CPTII,S$GLB,, | Performed by: INTERNAL MEDICINE

## 2023-03-14 PROCEDURE — 3008F BODY MASS INDEX DOCD: CPT | Mod: CPTII,S$GLB,, | Performed by: INTERNAL MEDICINE

## 2023-03-14 PROCEDURE — 1125F AMNT PAIN NOTED PAIN PRSNT: CPT | Mod: CPTII,S$GLB,, | Performed by: INTERNAL MEDICINE

## 2023-03-14 PROCEDURE — 99214 OFFICE O/P EST MOD 30 MIN: CPT | Mod: S$GLB,,, | Performed by: INTERNAL MEDICINE

## 2023-03-14 PROCEDURE — 99999 PR PBB SHADOW E&M-EST. PATIENT-LVL III: ICD-10-PCS | Mod: PBBFAC,,, | Performed by: INTERNAL MEDICINE

## 2023-03-14 NOTE — PROGRESS NOTES
Subjective:       Patient ID: Zoë Enrique is a 69 y.o. female.    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  After yadira, she developed pain in right CMC region.  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 She woke up on Sunday the  Jan 25th, she had pain in hands, wrists, and ankles.  There was swelling in hands and wrists.  Reports significant pain in let wrist.  Sometimes she has shooting pain in left wrist. She has been using voltaren gel for her pain. Pain level can be 8/10, aching. Pain is sharp and burning.   She was given medrol dose pack with some improvement. She still has pain and stiffness in the hands.  She has pain in left wrist. She has pain in left ankle.  She reports she has stiffness worse in the morning for a few hours.  Denies any changes in diet. Denies history of smoking.  Denies changes in weight, rashes, oral ulcers,photosensitivity.    Family hx:niece: PILY      Interval history: She has worsening pain in hands for several weeks.  She is s/p de Quervain release and trigger release left hand.  She reports breast implants are leaking.She is having pain in hands, hips, knees, ankles, left wrist, and feet.  She is getting swelling in hands.  She tried taking tylenol/tramadol at night and it helped her with her pain.    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 Negative for activity change, appetite change, chills, diaphoresis, fever and unexpected weight change.   HENT: Negative for congestion, ear  "discharge, ear pain, facial swelling, mouth sores, sinus pressure, sneezing, sore throat, tinnitus and trouble swallowing.    Eyes: Negative for photophobia, pain, discharge, redness, itching and visual disturbance.   Respiratory: Negative for apnea, chest tightness, shortness of breath, wheezing and stridor.    Cardiovascular: Negative for leg swelling.   Gastrointestinal: Negative for abdominal distention, abdominal pain, anal bleeding, blood in stool, constipation, diarrhea and nausea.   Endocrine: Negative for cold intolerance and heat intolerance.   Genitourinary: Negative for difficulty urinating and dysuria.   Musculoskeletal: Positive for arthralgias and joint swelling. Negative for back pain, gait problem, myalgias, neck pain and neck stiffness.   Skin: Negative for color change, pallor, rash and wound.   Neurological: Negative for dizziness, seizures, light-headedness and numbness.   Hematological: Negative for adenopathy. Does not bruise/bleed easily.   Psychiatric/Behavioral: Negative for sleep disturbance. The patient is not nervous/anxious.            Objective:   /71   Pulse 85   Ht 5' 8" (1.727 m)   Wt 73.9 kg (163 lb 0.5 oz)   BMI 24.79 kg/m²      Physical Exam   Constitutional: She is oriented to person, place, and time.   HENT:   Head: Normocephalic and atraumatic.   Right Ear: External ear normal.   Left Ear: External ear normal.   Nose: Nose normal.   Mouth/Throat: Oropharynx is clear and moist. No oropharyngeal exudate.   Eyes: Conjunctivae and EOM are normal. Pupils are equal, round, and reactive to light. Right eye exhibits no discharge. Left eye exhibits no discharge. No scleral icterus.   Neck: Neck supple. No JVD present. No thyromegaly present.   Cardiovascular: Normal rate, regular rhythm, normal heart sounds and intact distal pulses.  Exam reveals no gallop and no friction rub.    No murmur heard.  Pulmonary/Chest: Effort normal and breath sounds normal. No respiratory " distress. She has no wheezes. She has no rales. She exhibits no tenderness.   Abdominal: Soft. Bowel sounds are normal. She exhibits no distension and no mass. There is no tenderness. There is no rebound and no guarding.   Lymphadenopathy:     She has no cervical adenopathy.   Neurological: She is alert and oriented to person, place, and time. No cranial nerve deficit. Gait normal. Coordination normal.   Skin: Skin is dry. No rash noted. No erythema. No pallor.     Psychiatric: Affect and judgment normal.   Musculoskeletal: She exhibits edema and tenderness. She exhibits no deformity.   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 .  She is s/p de Quervain release and trigger release left hand.  Consider repeating MRI of hands but she declines today.  Discussed trial of plaquenil but she declines.    Plan:       Problem List Items Addressed This Visit       None       continue tramadol 50mg po BID prn  Continue tylenol arthritis BID PRN  Note is made of leukopenia/thrombocytopenia      30 * minutes of total time spent on the encounter, which includes face to face time and non-face to face time preparing to see the patient (eg, review of tests), Obtaining and/or reviewing separately obtained history, Documenting clinical information in the electronic or other health record, Independently interpreting results (not separately reported) and communicating results to the patient/family/caregiver, or Care coordination (not separately  reported).

## 2023-03-21 ENCOUNTER — OFFICE VISIT (OUTPATIENT)
Dept: OPTOMETRY | Facility: CLINIC | Age: 73
End: 2023-03-21
Payer: MEDICARE

## 2023-03-21 ENCOUNTER — OFFICE VISIT (OUTPATIENT)
Dept: OPTOMETRY | Facility: CLINIC | Age: 73
End: 2023-03-21
Payer: COMMERCIAL

## 2023-03-21 DIAGNOSIS — Z98.890 HX OF LASIK: ICD-10-CM

## 2023-03-21 DIAGNOSIS — H35.363 DRUSEN OF MACULA OF BOTH EYES: ICD-10-CM

## 2023-03-21 DIAGNOSIS — Z98.890 H/O BLEPHAROPLASTY: ICD-10-CM

## 2023-03-21 DIAGNOSIS — Z46.0 FITTING AND ADJUSTMENT OF SPECTACLES AND CONTACT LENSES: Primary | ICD-10-CM

## 2023-03-21 DIAGNOSIS — H04.123 DRY EYE SYNDROME, BILATERAL: ICD-10-CM

## 2023-03-21 DIAGNOSIS — Z96.1 PSEUDOPHAKIA OF BOTH EYES: ICD-10-CM

## 2023-03-21 DIAGNOSIS — H10.13 CONJUNCTIVITIS, ALLERGIC, BILATERAL: ICD-10-CM

## 2023-03-21 DIAGNOSIS — H52.11 MYOPIA OF RIGHT EYE: Primary | ICD-10-CM

## 2023-03-21 PROCEDURE — 99999 PR PBB SHADOW E&M-EST. PATIENT-LVL II: ICD-10-PCS | Mod: PBBFAC,,, | Performed by: OPTOMETRIST

## 2023-03-21 PROCEDURE — 92310 PR CONTACT LENS FITTING (NO CHANGE): ICD-10-PCS | Mod: S$GLB,,, | Performed by: OPTOMETRIST

## 2023-03-21 PROCEDURE — 99999 PR PBB SHADOW E&M-EST. PATIENT-LVL I: CPT | Mod: PBBFAC,,, | Performed by: OPTOMETRIST

## 2023-03-21 PROCEDURE — 92310 CONTACT LENS FITTING OU: CPT | Mod: S$GLB,,, | Performed by: OPTOMETRIST

## 2023-03-21 PROCEDURE — 92012 PR EYE EXAM, EST PATIENT,INTERMED: ICD-10-PCS | Mod: S$GLB,,, | Performed by: OPTOMETRIST

## 2023-03-21 PROCEDURE — 99999 PR PBB SHADOW E&M-EST. PATIENT-LVL II: CPT | Mod: PBBFAC,,, | Performed by: OPTOMETRIST

## 2023-03-21 PROCEDURE — 92015 DETERMINE REFRACTIVE STATE: CPT | Mod: S$GLB,,, | Performed by: OPTOMETRIST

## 2023-03-21 PROCEDURE — 99999 PR PBB SHADOW E&M-EST. PATIENT-LVL I: ICD-10-PCS | Mod: PBBFAC,,, | Performed by: OPTOMETRIST

## 2023-03-21 PROCEDURE — 92012 INTRM OPH EXAM EST PATIENT: CPT | Mod: S$GLB,,, | Performed by: OPTOMETRIST

## 2023-03-21 PROCEDURE — 92015 PR REFRACTION: ICD-10-PCS | Mod: S$GLB,,, | Performed by: OPTOMETRIST

## 2023-03-21 RX ORDER — VARENICLINE 0.03 MG/.05ML
1 SPRAY NASAL 2 TIMES DAILY
Qty: 8.4 ML | Refills: 11 | Status: SHIPPED | OUTPATIENT
Start: 2023-03-21 | End: 2023-05-03

## 2023-03-21 NOTE — PROGRESS NOTES
"HPI    Dls: 06/01/2021- Dr Cruz   01/30/2023- Dr. Harman     73 Year old is Present for Contact Lens Follow up   Pt states she is having some itching OU, She would like her Plugs checked,   towards the end of the day she feels like " She wants to Scratch the plugs   Out" She add the corners of her eye's are the worst. She had an allergen   test done and it came back normal. Dr. Harman Prescribed Xiidra but   her insurance wanted her to try restasis but she states it causes her   eye's to feel raw with no relief. She states Years ago she tried Restasis   and had the same reaction but that it also " Felt like worms were crawling   in her eye's"  She Complains of her eye's ys being Blood shot red. She was   told about Lumify.       She currently has Glasses that she feels she can not see as well, They are   about two years old she did not update them with last years Rx. She would   like to fitted for contacts today as well. She states she has worn   contacts in the past.     Eye Meds:   Pataday OU Qday   At's OU Prn   Areds PO BID   Restasis     POHx:  1. Dry eye syndrome of both eyes   2. Allergic conjunctivitis of both eyes   3. Drusen of macula of both eyes   4. Refractive error   5. Pseudophakia (monovision OD: near and OS: distance)  6. S/p LASIK OU     Last edited by Gwen Pineda on 3/21/2023  9:50 AM.            Assessment /Plan     For exam results, see Encounter Report.    Myopia of right eye    Monovision OD near, OS distance   Cl fit today: gave trial of BL utra MF for astig OD ghulam   Remove nightly, replace monthly   Ok to order if happy with vision and comfort    Use Clear care daily/ optifree PRN    Hx of LASIK  Pseudophakia of both eyes  H/O blepharoplasty    Dry eye syndrome, bilateral   (+) punctal plugs   Curently using Art tears PRN  Continue with PF systane complete BID/PRN    Failed restasis  Start today: lifitegrast (XIIDRA) 5 % Dpet; Apply 1 drop to eye every 12 (twelve) hours.  " Dispense: 180 each; Refill: 4  -             varenicline (TYRVAYA) 0.03 mg/spray sprm; 1 spray by Each Nostril route 2 (two) times a day.  Dispense: 8.4 mL; Refill: 11    Conjunctivitis, allergic, bilateral   Continue with pataday XS QAM    Drusen of macula of both eyes   Continue with AREDS   RTC 6 mo dfe/oct MAC    -

## 2023-03-22 ENCOUNTER — PATIENT MESSAGE (OUTPATIENT)
Dept: OPTOMETRY | Facility: CLINIC | Age: 73
End: 2023-03-22
Payer: MEDICARE

## 2023-03-22 DIAGNOSIS — H04.123 DRY EYE SYNDROME OF BOTH EYES: Primary | ICD-10-CM

## 2023-03-22 RX ORDER — CYCLOSPORINE 0.5 MG/ML
1 EMULSION OPHTHALMIC 2 TIMES DAILY
Qty: 60 EACH | Refills: 11 | Status: SHIPPED | OUTPATIENT
Start: 2023-03-22 | End: 2023-03-22

## 2023-03-31 ENCOUNTER — PATIENT MESSAGE (OUTPATIENT)
Dept: OPTOMETRY | Facility: CLINIC | Age: 73
End: 2023-03-31
Payer: MEDICARE

## 2023-04-04 ENCOUNTER — OFFICE VISIT (OUTPATIENT)
Dept: NEUROLOGY | Facility: CLINIC | Age: 73
End: 2023-04-04
Payer: MEDICARE

## 2023-04-04 VITALS
BODY MASS INDEX: 24.71 KG/M2 | HEART RATE: 83 BPM | DIASTOLIC BLOOD PRESSURE: 70 MMHG | WEIGHT: 163 LBS | SYSTOLIC BLOOD PRESSURE: 114 MMHG | HEIGHT: 68 IN

## 2023-04-04 DIAGNOSIS — G43.709 CHRONIC MIGRAINE WITHOUT AURA WITHOUT STATUS MIGRAINOSUS, NOT INTRACTABLE: Primary | ICD-10-CM

## 2023-04-04 DIAGNOSIS — I48.0 PAROXYSMAL ATRIAL FIBRILLATION: ICD-10-CM

## 2023-04-04 PROCEDURE — 1160F RVW MEDS BY RX/DR IN RCRD: CPT | Mod: CPTII,S$GLB,, | Performed by: PSYCHIATRY & NEUROLOGY

## 2023-04-04 PROCEDURE — 3074F SYST BP LT 130 MM HG: CPT | Mod: CPTII,S$GLB,, | Performed by: PSYCHIATRY & NEUROLOGY

## 2023-04-04 PROCEDURE — 3288F PR FALLS RISK ASSESSMENT DOCUMENTED: ICD-10-PCS | Mod: CPTII,S$GLB,, | Performed by: PSYCHIATRY & NEUROLOGY

## 2023-04-04 PROCEDURE — 1125F PR PAIN SEVERITY QUANTIFIED, PAIN PRESENT: ICD-10-PCS | Mod: CPTII,S$GLB,, | Performed by: PSYCHIATRY & NEUROLOGY

## 2023-04-04 PROCEDURE — 3078F PR MOST RECENT DIASTOLIC BLOOD PRESSURE < 80 MM HG: ICD-10-PCS | Mod: CPTII,S$GLB,, | Performed by: PSYCHIATRY & NEUROLOGY

## 2023-04-04 PROCEDURE — 3078F DIAST BP <80 MM HG: CPT | Mod: CPTII,S$GLB,, | Performed by: PSYCHIATRY & NEUROLOGY

## 2023-04-04 PROCEDURE — 3008F BODY MASS INDEX DOCD: CPT | Mod: CPTII,S$GLB,, | Performed by: PSYCHIATRY & NEUROLOGY

## 2023-04-04 PROCEDURE — 99999 PR PBB SHADOW E&M-EST. PATIENT-LVL III: ICD-10-PCS | Mod: PBBFAC,,, | Performed by: PSYCHIATRY & NEUROLOGY

## 2023-04-04 PROCEDURE — 1159F PR MEDICATION LIST DOCUMENTED IN MEDICAL RECORD: ICD-10-PCS | Mod: CPTII,S$GLB,, | Performed by: PSYCHIATRY & NEUROLOGY

## 2023-04-04 PROCEDURE — 1125F AMNT PAIN NOTED PAIN PRSNT: CPT | Mod: CPTII,S$GLB,, | Performed by: PSYCHIATRY & NEUROLOGY

## 2023-04-04 PROCEDURE — 99204 OFFICE O/P NEW MOD 45 MIN: CPT | Mod: S$GLB,,, | Performed by: PSYCHIATRY & NEUROLOGY

## 2023-04-04 PROCEDURE — 3008F PR BODY MASS INDEX (BMI) DOCUMENTED: ICD-10-PCS | Mod: CPTII,S$GLB,, | Performed by: PSYCHIATRY & NEUROLOGY

## 2023-04-04 PROCEDURE — 99204 PR OFFICE/OUTPT VISIT, NEW, LEVL IV, 45-59 MIN: ICD-10-PCS | Mod: S$GLB,,, | Performed by: PSYCHIATRY & NEUROLOGY

## 2023-04-04 PROCEDURE — 99999 PR PBB SHADOW E&M-EST. PATIENT-LVL III: CPT | Mod: PBBFAC,,, | Performed by: PSYCHIATRY & NEUROLOGY

## 2023-04-04 PROCEDURE — 1101F PT FALLS ASSESS-DOCD LE1/YR: CPT | Mod: CPTII,S$GLB,, | Performed by: PSYCHIATRY & NEUROLOGY

## 2023-04-04 PROCEDURE — 3074F PR MOST RECENT SYSTOLIC BLOOD PRESSURE < 130 MM HG: ICD-10-PCS | Mod: CPTII,S$GLB,, | Performed by: PSYCHIATRY & NEUROLOGY

## 2023-04-04 PROCEDURE — 1101F PR PT FALLS ASSESS DOC 0-1 FALLS W/OUT INJ PAST YR: ICD-10-PCS | Mod: CPTII,S$GLB,, | Performed by: PSYCHIATRY & NEUROLOGY

## 2023-04-04 PROCEDURE — 1160F PR REVIEW ALL MEDS BY PRESCRIBER/CLIN PHARMACIST DOCUMENTED: ICD-10-PCS | Mod: CPTII,S$GLB,, | Performed by: PSYCHIATRY & NEUROLOGY

## 2023-04-04 PROCEDURE — 3288F FALL RISK ASSESSMENT DOCD: CPT | Mod: CPTII,S$GLB,, | Performed by: PSYCHIATRY & NEUROLOGY

## 2023-04-04 PROCEDURE — 1159F MED LIST DOCD IN RCRD: CPT | Mod: CPTII,S$GLB,, | Performed by: PSYCHIATRY & NEUROLOGY

## 2023-04-04 RX ORDER — UBROGEPANT 100 MG/1
100 TABLET ORAL ONCE AS NEEDED
Qty: 16 TABLET | Refills: 11 | Status: SHIPPED | OUTPATIENT
Start: 2023-04-04 | End: 2023-04-04

## 2023-04-04 NOTE — PROGRESS NOTES
[facility]    CHIEF COMPLAINT?   Ms. Zoë Enrique chief concern is uncontrolled headache.     Name of the referring physician Ruthann Doll*     Name of primary care provider Praveen Jeffery MD      Subjective:    HPI:  Ms. Zoë Enrique presents today to discuss their ongoing uncontrolled headaches.   Patient notes that she follows with ENT for chronic sinus complaints.  She has attributed many of her headaches to sinus and allergy issues in the past.  She is also had significant concerns about contribution from TMJ in the past; wears a  and has received Botox injections for TMJ specifically in the past with no effect on overall headache frequency or intensity.      Patient has been diagnosed with atrial fibrillation and is on chronic anticoagulation with Xarelto.  No known stroke history in the past.  Has undergone multiple cardiac ablations for AFib.    When did they become more of a problem: >10 yrs   # of Total headache days per month: 15  # of Migraine or severe days per month: 8-10  This rate has been present >3 months: yes  Intensity of average and most severe headaches: 5/10, 7-9/10  Duration of headache pain: >4 hrs   Quality of pain: pressure   Laterality: bilateral  Location: face and frontal   Photophobia and phonophobia: no   Nausea and vomiting: yes  Causes avoidance of physical activity: yes  Worsened by physical activity: unsure  Aura: no  Autonomic symptoms: none  Family Hx of migraines: no  Supplements: eye vitamins   Birth Control/Hormones: estrogen cream   Have qualities and features been stable for >2 years: yes    Preventive Medications failed: lyrica (>3 months, ineffective), gabapentin (>3 months, ineffective, dizziness), GONB/TNB - 5 days of relief, lexapro (>3 months, ineffective)  Acute medications failed: NSAIDS contraindicated, norco, sumatriptan (>3 months, ineffective), rizatriptan (>3 months, ineffective)    OTC Medications: tylenol    Is medication overuse contributing to the patient's current migraine burden: <10 days per month    Hx of (Bolded items are positive): depression, anxiety, fibromyalgia, autoimmune disorder, head trauma, neurological infection, glaucoma, asthma, nephrolithiasis, MI, Stroke, Raynaud's phen., atrial fibrillation on chronic anticoagulation (Xarelto)    Current HA Regimen:  Norco  Tylenol     LATEST IMAGING:  Reports normal MRI many years ago for headaches    PAST MEDICAL HISTORY:  Past Medical History:   Diagnosis Date    Abnormal Pap smear of cervix     Allergy     Atrial fibrillation 2017    Atrial flutter     Cancer     osteosarcoma of right femur    Diverticulosis     Hx of atrial flutter     Hx of mitral valve prolapse     Hypothyroidism     Migraines     Recurrent upper respiratory infection (URI)     Supraventricular tachycardia        PAST SURGICAL HISTORY:  Past Surgical History:   Procedure Laterality Date    ABLATION OF ARRHYTHMOGENIC FOCUS FOR ATRIAL FIBRILLATION N/A 2020    Procedure: ABLATION, ARRHYTHMOGENIC FOCUS, FOR ATRIAL FIBRILLATION;  Surgeon: Moe Seymour MD;  Location: [facility] EP LAB;  Service: Cardiology;  Laterality: N/A;  AFL/AF, KHANH, CTI, PVI, RFA, CARTO, GEN, DM, 3 PREP    AUGMENTATION OF BREAST Bilateral 1984    35 yrs     BREAST SURGERY      augmentation    CARDIOVERSION  2020    Procedure: Cardioversion;  Surgeon: Moe Seymour MD;  Location: [facility] EP LAB;  Service: Cardiology;;     SECTION      COLON SURGERY      partial colon resection    COLONOSCOPY N/A 2021    Procedure: COLONOSCOPY;  Surgeon: Sahara Salgado MD;  Location: [facility] ENDO ([address] [place] [address]);  Service: Endoscopy;  Laterality: N/A;  constipation prep- 7 days of miralax, 2 fulls of fulls, then day bf clears and split prep   covid test 3/5 pcw, prep instr emailed, antibiotics prophylactically, Xarelto hold x 2 days per Dr. Seymour-see telephone encounter 2/10 -ml    COLPOSCOPY       COSMETIC SURGERY  1987    DE QUERVAIN'S RELEASE Left 08/02/2022    Procedure: RELEASE, HAND, FOR DEQUERVAIN'S TENOSYNOVITIS;  Surgeon: Toñito Adame Jr., MD;  Location: [facility] OR;  Service: Orthopedics;  Laterality: Left;    ESOPHAGOGASTRODUODENOSCOPY N/A 03/19/2021    Procedure: EGD (ESOPHAGOGASTRODUODENOSCOPY);  Surgeon: Sahara Salgado MD;  Location: [address]);  Service: Endoscopy;  Laterality: N/A;    EYE SURGERY Bilateral 2016    Lasik, then cataracts extraction    JOINT REPLACEMENT Right 2/07, 11/07, 2/09    KNEE ARTHROPLASTY Right 2007    revison in 2009    KNEE SURGERY Right 1989    distal femoral replacing TK    OOPHORECTOMY      TOTAL ABDOMINAL HYSTERECTOMY  1994    TAHBSO    TRANSESOPHAGEAL ECHOCARDIOGRAPHY N/A 11/24/2020    Procedure: ECHOCARDIOGRAM, TRANSESOPHAGEAL;  Surgeon: Ramírez Ortez III, MD;  Location: [facility] EP LAB;  Service: Cardiology;  Laterality: N/A;    TREATMENT OF CARDIAC ARRHYTHMIA N/A 08/12/2019    Procedure: CARDIOVERSION;  Surgeon: Moe Seymour MD;  Location: [facility] EP LAB;  Service: Cardiology;  Laterality: N/A;  KHANH/DCCV/MAC/DM/3PREP/*ADMIT FOR TIKOSYN*    TRIGGER FINGER RELEASE Left 08/02/2022    Procedure: RELEASE, TRIGGER FINGER;  Surgeon: Toñito Adame Jr., MD;  Location: [facility] OR;  Service: Orthopedics;  Laterality: Left;  left middle finger       CURRENT MEDS:  Current Outpatient Medications   Medication Sig Dispense Refill    acetaminophen (TYLENOL) 650 MG TbSR Take 650 mg by mouth every 8 (eight) hours.      amoxicillin (AMOXIL) 500 MG capsule TAKE 6 CAPSULES BY MOUTH 1 HOUR BEFORE DENTAL APPOINTMENT AND 2 CAPSULES 6 HOURS AFTER DENTAL APPOINTMENT      docusate sodium (COLACE) 250 MG capsule Take 250 mg by mouth 2 (two) times daily.       estradioL (ESTRACE) 0.01 % (0.1 mg/gram) vaginal cream Place 1 g vaginally every other day. 42 g 3    HYDROcodone-acetaminophen (NORCO) 5-325 mg per tablet Take 1 tablet by mouth every 6 (six) hours as needed for Pain.  Pt states takes PRN for headaches 28 tablet 0    ipratropium (ATROVENT) 21 mcg (0.03 %) nasal spray 2 sprays by Each Nostril route 2 (two) times daily. 30 mL 3    levothyroxine (SYNTHROID) 25 MCG tablet Take 1 tablet (25 mcg total) by mouth once daily. 90 tablet 3    lifitegrast (XIIDRA) 5 % Dpet Apply 1 drop to eye every 12 (twelve) hours. 180 each 4    olopatadine (PATANOL) 0.1 % ophthalmic solution 1 drop 2 (two) times daily.      pantoprazole (PROTONIX) 40 MG tablet Take 1 tablet (40 mg total) by mouth once daily. 30 tablet 3    rivaroxaban (XARELTO) 20 mg Tab Take 1 tablet (20 mg total) by mouth once daily. 90 tablet 3    varenicline (TYRVAYA) 0.03 mg/spray sprm 1 spray by Each Nostril route 2 (two) times a day. 8.4 mL 11    vit C/E/Zn/coppr/lutein/zeaxan (PRESERVISION AREDS-2 ORAL)       acyclovir (ZOVIRAX) 400 MG tablet Take 1 tablet (400 mg total) by mouth 3 (three) times daily as needed. 90 tablet 3    ubrogepant (UBRELVY) 100 mg tablet Take 1 tablet (100 mg total) by mouth once as needed for Migraine. Repeat in 2 hours if pain remains. Max 200mg/24hrs 16 tablet 11     No current facility-administered medications for this visit.     Facility-Administered Medications Ordered in Other Visits   Medication Dose Route Frequency Provider Last Rate Last Admin    sodium chloride 0.9% flush 5 mL  5 mL Intravenous PRN Aleksandra Taylor NP           ALLERGIES:  Review of patient's allergies indicates:   Allergen Reactions    Morphine Nausea And Vomiting    Succinylcholine      Other reaction(s): v tach       FAMILY HISTORY:  Family History   Problem Relation Age of Onset    Diabetes Sister         Type I    Heart disease Sister         Atrrial fib, mitral valve repair    Hearing loss Father     Heart disease Father         Angina, AAA    Stroke Father 93    Heart disease Mother         Atrial fib    Diabetes Mother         Type II    Heart disease Brother         Atrial fib    Asthma Brother     Diverticulosis  "Brother     Heart disease Brother         Atrial fib    Diverticulosis Brother     No Known Problems Daughter     Breast cancer Neg Hx     Ovarian cancer Neg Hx     Colon cancer Neg Hx     Cancer Neg Hx     Hypertension Neg Hx        SOCIAL HISTORY:  Social History     Tobacco Use    Smoking status: Never    Smokeless tobacco: Never   Substance Use Topics    Alcohol use: Yes     Alcohol/week: 1.0 standard drink     Types: 1 Standard drinks or equivalent per week     Comment: 1-2 per month    Drug use: No     Comment: CBD oils for headache       Review of Systems:  Gen: no fever, no chills, no generalized feeling of weakness   HEENT: no double vision, no blurred vision, no eye pain, no eye exudates.    Heart: no chest pain, no SOB    Lungs: no SOB, no cough    MSK: no weakness of legs, intact ROM    ABD: no abd pain, no N/V/D/C, no difficulty with defecation .    Extremities: No leg pain, no edema.    Objective:  PHYSICAL EXAMINATION??   Zoë Enrique is a 73 y.o. female patient who has the following vital signs with   Vitals:    04/04/23 0759   BP: 114/70   Pulse: 83   Weight: 73.9 kg (163 lb)   Height: 5' 8" (1.727 m)   , body surface area is 1.88 meters squared.     General: female in NAD, alert and awake, Aox3, well groomed. ?    ? ?    HEENT: Head is NC/AT EOMI, pupil size: 4 mm B/L, no nystagmus noted; hearing grossly intact b/l. Mucous membrane moist, uvula midline, no pharyngeal erythema,exudates or discharges.      Neck: Supple. no nuchal rigidity.      Cardiovascular: well perfused, no cyanosis        Respiratory: Symmetric chest rise noted       Musculoskeletal: Muscle tone noted to be adequate for patient age, muscle mass is WNL. No spontaneous movements or fasciculations noted during this examination.       Extremities: No pedal edema or calf tenderness. No cogwheel rigidity noted on B/L UE extremities.       Neurological Examination.    Mental status: AA&O x3; Affect/mood is euthymic/congruent; no " aphasia noted during examination. Patient answers simple questions appropriately & follows simple commands; no dysarthria or expressive aphasia; no benito-neglect or extinction. Vocabulary/word finding: excellent.       Cranial Nerves: II-XII grossly intact bilaterally.     Muscle Function: Tone WNL and Muscle bulk WNL. Full and symmetric strength, 5/5, throughout.      Sensory: ?light touch is grossly intact in bilateral upper and lower extremities and face.     Reflexes: Left and Right biceps, triceps, brachioradialis are 2+/4. patellar 2+/4 on Left and 2+/4 on Right     Coordination: no dysmetria (finger to nose negative)     Gait: adequate casual gait with stride length and arm swing WNL.  Stable without the use of cane or walker    REVIEW   We reviewed their current medical history, medications, allergies, past medical history, surgical history, social history, family history, and review of systems, and updated all the information.     ASSESSMENT AND PLAN   Zohaib is a very pleasant, 73 y.o. with complicated diagnosis of     1. Chronic migraine without aura without status migrainosus, not intractable  Ambulatory referral/consult to Neurology    ubrogepant (UBRELVY) 100 mg tablet      2. Paroxysmal atrial fibrillation            Zoë has worsening headaches that meet International Headache Society Criteria for chronic migraine without aura.  Medication overuse is not likely contributing to this patient's headaches.     Patient feels not only severe disabling pain, but is also unable to function due to headaches when they are present.     My approach to every patient is multimodal.   I focused our discussion on addressing modifiable risk factors and trying to decrease them. After discussion with the patient, I recommend the followin. Preventive medications:    Preventive medications were discussed.  We ultimately decided to hold these options for now as we attempt to transition to more appropriate acute  medication options and do not wish to confused effects from those medicines with any additional new medicines.  Preventive medications will likely need to be initiated at follow-up.     2. Acute (abortive) medications- these medications are to be taken only at the onset of severe headache, and please limit the total of any combination of these medications to less than 10 days per month on average because they can cause medication overuse headaches.   Failed:   sumatriptan (>3 months, ineffective),   rizatriptan (>3 months, ineffective)    START: Ubrelvy  Ubrelvy (urogepant) is a new medication that belongs to the class of anti-CGRP drugs recommended for acute treatment or migraine.    Dose: 100 mg at the first sign of headache  Please take a first dose and if the headache is not significantly improved in 2 hours, take a second dose.    Even though very rare, side effects are possible and include nausea (3%), somnolence (2%), dry mouth (1%). Dose reduction is indicated for severe hepatic impairment. This medication is safe for heart and blood vessels.    Insurance providers usually approve 10-16 tablets a month.     3. I endorse continued use of chronic anticoagulation, Xarelto, for diagnosis of atrial fibrillation for stroke prevention.    4. Patient describes snoring and mild bifrontal headaches present many times upon waking.  This raises suspicion for obstructive sleep apnea.  We will revisit this topic at follow-up as well when the patient has settled on new acute medication.  Consider sleep medicine referral for MICHELLE evaluation.    5. DIET: I recommend a diet that heavily favors fruits and vegetables while reducing carbs. More information is available upon request.     6. EXERCISE: Gentle movement exercises, concentrate on combination of muscle building and aerobic. I also recommend adding gentle Yoga therapy. The goal is 150 minutes per week of moderate to rigorous exercise, but you should start at your own  pace and progress slowly and safely as discussed today.    7. ANXIETY/STRESS- Control stressors with yoga, meditation, counseling, or other methods of mindfulness.     8. SLEEP- aim for 7-8 hrs of restful sleep per night.     9. FUN- Increase fun time spend with friends and family     Benefits, side effects, and alternatives were discussed extensively with the patient.?     Zoë verbally endorsed understanding of all the recommendations.?     she is going to follow up with me in 6 weeks or sooner as needed?     I spent a total of 45 minutes on the day of the visit. This includes face to face time and non-face to face time preparing to see the patient (eg, review of tests), obtaining and/or reviewing separately obtained history, documenting clinical information in the electronic or other health record, independently interpreting results and communicating results to the patient/family/caregiver, or care coordinator.    A dictation device was used to produce this documentation which can sometimes result in grammatical errors or the replacement of similar sounding words.    Minor Thomason, DO  Headache Medicine

## 2023-04-05 ENCOUNTER — PATIENT MESSAGE (OUTPATIENT)
Dept: NEUROLOGY | Facility: CLINIC | Age: 73
End: 2023-04-05
Payer: MEDICARE

## 2023-04-05 DIAGNOSIS — G43.709 CHRONIC MIGRAINE WITHOUT AURA WITHOUT STATUS MIGRAINOSUS, NOT INTRACTABLE: Primary | ICD-10-CM

## 2023-04-07 ENCOUNTER — OFFICE VISIT (OUTPATIENT)
Dept: URGENT CARE | Facility: CLINIC | Age: 73
End: 2023-04-07
Payer: MEDICARE

## 2023-04-07 VITALS
BODY MASS INDEX: 23.95 KG/M2 | HEIGHT: 68 IN | TEMPERATURE: 99 F | WEIGHT: 158 LBS | HEART RATE: 70 BPM | SYSTOLIC BLOOD PRESSURE: 124 MMHG | DIASTOLIC BLOOD PRESSURE: 83 MMHG | OXYGEN SATURATION: 96 % | RESPIRATION RATE: 18 BRPM

## 2023-04-07 DIAGNOSIS — J40 BRONCHITIS: Primary | ICD-10-CM

## 2023-04-07 PROCEDURE — 99203 PR OFFICE/OUTPT VISIT, NEW, LEVL III, 30-44 MIN: ICD-10-PCS | Mod: S$GLB,,, | Performed by: NURSE PRACTITIONER

## 2023-04-07 PROCEDURE — 99203 OFFICE O/P NEW LOW 30 MIN: CPT | Mod: S$GLB,,, | Performed by: NURSE PRACTITIONER

## 2023-04-07 RX ORDER — AMOXICILLIN AND CLAVULANATE POTASSIUM 875; 125 MG/1; MG/1
1 TABLET, FILM COATED ORAL EVERY 12 HOURS
Qty: 14 TABLET | Refills: 0 | Status: SHIPPED | OUTPATIENT
Start: 2023-04-07 | End: 2023-04-14

## 2023-04-07 RX ORDER — BENZONATATE 200 MG/1
200 CAPSULE ORAL 3 TIMES DAILY PRN
Qty: 30 CAPSULE | Refills: 0 | Status: SHIPPED | OUTPATIENT
Start: 2023-04-07 | End: 2023-04-17

## 2023-04-07 RX ORDER — PREDNISONE 20 MG/1
20 TABLET ORAL DAILY
Qty: 4 TABLET | Refills: 0 | Status: SHIPPED | OUTPATIENT
Start: 2023-04-07 | End: 2023-04-11

## 2023-04-07 NOTE — PROGRESS NOTES
"Subjective:      Patient ID: Zoë Enrique is a 73 y.o. female.    Vitals:  height is 5' 8" (1.727 m) and weight is 71.7 kg (158 lb). Her temperature is 98.5 °F (36.9 °C). Her blood pressure is 124/83 and her pulse is 70. Her respiration is 18 and oxygen saturation is 96%.     Chief Complaint: Cough (Wheezing - Entered by patient)    This is a 73 y.o. female who presents today with a chief complaint of   Cough, wheezing. Productive of green to yellow sputum. + SOB. Denies chest pain. Symptoms for a week not sleeping well. Taking Nyquil, daquil, tessalon. Hx bronchiectasis, currently on daily atrovent and budesonide and followed by ENT and Pulm      Cough  This is a new problem. The current episode started in the past 7 days. The problem has been gradually worsening. The problem occurs every few minutes. The cough is Productive of sputum. Associated symptoms include a sore throat, shortness of breath and wheezing. Pertinent negatives include no chest pain, chills, ear pain, fever, headaches, myalgias or rash. She has tried OTC cough suppressant and prescription cough suppressant for the symptoms. The treatment provided mild relief. Her past medical history is significant for bronchitis and pneumonia.     Constitution: Negative for chills, sweating, fatigue and fever.   HENT:  Positive for sore throat. Negative for ear pain, ear discharge, tinnitus, hearing loss, congestion, sinus pain, sinus pressure, trouble swallowing and voice change.    Neck: Negative for neck pain and painful lymph nodes.   Cardiovascular:  Negative for chest pain, palpitations and sob on exertion.   Respiratory:  Positive for cough, shortness of breath and wheezing. Negative for sputum production.    Gastrointestinal:  Negative for abdominal pain, nausea, vomiting and diarrhea.   Musculoskeletal:  Negative for muscle ache.   Skin:  Negative for color change, pale and rash.   Allergic/Immunologic: Negative for sneezing.   Neurological:  Negative " for headaches, numbness and tingling.   Hematologic/Lymphatic: Negative for swollen lymph nodes.    Objective:     Physical Exam   Constitutional: She is oriented to person, place, and time. She appears well-developed. She is cooperative.  Non-toxic appearance. She does not appear ill. No distress.   HENT:   Head: Normocephalic and atraumatic.   Ears:   Right Ear: Hearing, tympanic membrane, external ear and ear canal normal.   Left Ear: Hearing, tympanic membrane, external ear and ear canal normal.   Nose: Nose normal. No mucosal edema, rhinorrhea, nasal deformity or congestion. No epistaxis. Right sinus exhibits no maxillary sinus tenderness and no frontal sinus tenderness. Left sinus exhibits no maxillary sinus tenderness and no frontal sinus tenderness.   Mouth/Throat: Uvula is midline, oropharynx is clear and moist and mucous membranes are normal. No trismus in the jaw. Normal dentition. No uvula swelling. No oropharyngeal exudate, posterior oropharyngeal edema or posterior oropharyngeal erythema.   Eyes: Conjunctivae and lids are normal. No scleral icterus.   Neck: Trachea normal and phonation normal. Neck supple. No edema present. No erythema present. No neck rigidity present.   Cardiovascular: Normal rate, regular rhythm and normal heart sounds.   Pulmonary/Chest: Effort normal. No stridor. No respiratory distress. She has no decreased breath sounds. She has no wheezes. She has no rhonchi. She has rales. She exhibits no tenderness.   Abdominal: Normal appearance.   Musculoskeletal: Normal range of motion.         General: No deformity. Normal range of motion.      Cervical back: She exhibits no tenderness.   Lymphadenopathy:     She has no cervical adenopathy.   Neurological: She is alert and oriented to person, place, and time. She exhibits normal muscle tone. Coordination normal.   Skin: Skin is warm, dry, intact, not diaphoretic and not pale.   Psychiatric: Her speech is normal and behavior is normal.  Judgment and thought content normal.   Nursing note and vitals reviewed.    Assessment:     1. Bronchitis        Plan:       Bronchitis  -     amoxicillin-clavulanate 875-125mg (AUGMENTIN) 875-125 mg per tablet; Take 1 tablet by mouth every 12 (twelve) hours. for 7 days  Dispense: 14 tablet; Refill: 0  -     benzonatate (TESSALON) 200 MG capsule; Take 1 capsule (200 mg total) by mouth 3 (three) times daily as needed for Cough.  Dispense: 30 capsule; Refill: 0  -     predniSONE (DELTASONE) 20 MG tablet; Take 1 tablet (20 mg total) by mouth once daily. for 4 days  Dispense: 4 tablet; Refill: 0                  Patient Instructions   See additional patient Instructions provided    - Rest.    - Drink plenty of fluids.  - Bacterial infections can be treated with oral antibiotics, however, Viral upper respiratory infections will not respond to antibiotics but with simple symptomatic care, typically run their course in 10-14 days.     - Tylenol or Ibuprofen as directed as needed for fever/pain. Avoid tylenol if you have a history of liver disease. Do not take ibuprofen if you have a history of GI bleeding, kidney disease, or if you take blood thinners.     - You can take over-the-counter claritin, zyrtec, allegra, or xyzal as directed. These are antihistamines that can help with runny nose, nasal congestion, sneezing, and helps to dry up post-nasal drip, which usually causes sore throat and cough.   - If you do NOT have high blood pressure, you may use a decongestant form (D)  of this medication (ie. Claritin- D, zyrtec-D, allegra-D) or if you do not take the D form, you can take sudafed (pseudoephedrine) over the counter, which is a decongestant. Do NOT take two decongestant (D) medications at the same time (such as mucinex-D and claritin-D or plain sudafed and claritin D)    - You can use Flonase (fluticasone) nasal spray as directed for sinus congestion and postnasal drip. This is a steroid nasal spray that works  locally over time to decrease the inflammation in your nose/sinuses and help with allergic symptoms. This is not an quick- relief spray like afrin, but it works well if used daily.  Discontinue if you develop nose bleed  - use nasal saline prior to Flonase.  - Use Ocean Spray Nasal Saline 1-3 puffs each nostril every 2-3 hours then blow out onto tissue. This is to irrigate the nasal passage way to clear the sinus openings. Use until sinus problem resolved.    - you can take plain Mucinex (guaifenesin) 1200 mg twice a day to help loosen mucous.     -warm salt water gargles can help with sore throat    - warm tea with honey can help with cough. Honey is a natural cough suppressant.    - Dextromethorphan (DM) is a cough suppressant over the counter (ie. mucinex DM, robitussin, delsym; dayquil/nyquil has DM as well.)    - Follow up with your PCP or specialty clinic as directed in the next 1-2 weeks if not improved or as needed.  You can call [phone] to schedule an appointment with the appropriate provider.      - Go to the ER if you develop new or worsening symptoms.     - You must understand that you have received an Urgent Care treatment only and that you may be released before all of your medical problems are known or treated.   - You, the patient, will arrange for follow up care as instructed.   - If your condition worsens or fails to improve we recommend that you receive another evaluation at the ER immediately or contact your PCP to discuss your concerns or return here.     Patient Instructions   - You must understand that you have received an Urgent Care treatment only and that you may be released before all of your medical problems are known or treated.   - You, the patient, will arrange for follow up care as instructed.   - If your condition worsens or fails to improve we recommend that you receive another evaluation at the ER immediately or contact your PCP to discuss your concerns or return here.      Advised on return/follow-up precautions. Advised on ER precautions. Answered all patient questions. Patient verbalized understanding and voiced agreement with current treatment plan.

## 2023-04-07 NOTE — PATIENT INSTRUCTIONS
See additional patient Instructions provided    - Rest.    - Drink plenty of fluids.  - Bacterial infections can be treated with oral antibiotics, however, Viral upper respiratory infections will not respond to antibiotics but with simple symptomatic care, typically run their course in 10-14 days.     - Tylenol or Ibuprofen as directed as needed for fever/pain. Avoid tylenol if you have a history of liver disease. Do not take ibuprofen if you have a history of GI bleeding, kidney disease, or if you take blood thinners.     - You can take over-the-counter claritin, zyrtec, allegra, or xyzal as directed. These are antihistamines that can help with runny nose, nasal congestion, sneezing, and helps to dry up post-nasal drip, which usually causes sore throat and cough.   - If you do NOT have high blood pressure, you may use a decongestant form (D)  of this medication (ie. Claritin- D, zyrtec-D, allegra-D) or if you do not take the D form, you can take sudafed (pseudoephedrine) over the counter, which is a decongestant. Do NOT take two decongestant (D) medications at the same time (such as mucinex-D and claritin-D or plain sudafed and claritin D)    - You can use Flonase (fluticasone) nasal spray as directed for sinus congestion and postnasal drip. This is a steroid nasal spray that works locally over time to decrease the inflammation in your nose/sinuses and help with allergic symptoms. This is not an quick- relief spray like afrin, but it works well if used daily.  Discontinue if you develop nose bleed  - use nasal saline prior to Flonase.  - Use Ocean Spray Nasal Saline 1-3 puffs each nostril every 2-3 hours then blow out onto tissue. This is to irrigate the nasal passage way to clear the sinus openings. Use until sinus problem resolved.    - you can take plain Mucinex (guaifenesin) 1200 mg twice a day to help loosen mucous.     -warm salt water gargles can help with sore throat    - warm tea with honey can help with  cough. Honey is a natural cough suppressant.    - Dextromethorphan (DM) is a cough suppressant over the counter (ie. mucinex DM, robitussin, delsym; dayquil/nyquil has DM as well.)    - Follow up with your PCP or specialty clinic as directed in the next 1-2 weeks if not improved or as needed.  You can call [phone] to schedule an appointment with the appropriate provider.      - Go to the ER if you develop new or worsening symptoms.     - You must understand that you have received an Urgent Care treatment only and that you may be released before all of your medical problems are known or treated.   - You, the patient, will arrange for follow up care as instructed.   - If your condition worsens or fails to improve we recommend that you receive another evaluation at the ER immediately or contact your PCP to discuss your concerns or return here.     Patient Instructions   - You must understand that you have received an Urgent Care treatment only and that you may be released before all of your medical problems are known or treated.   - You, the patient, will arrange for follow up care as instructed.   - If your condition worsens or fails to improve we recommend that you receive another evaluation at the ER immediately or contact your PCP to discuss your concerns or return here.     Advised on return/follow-up precautions. Advised on ER precautions. Answered all patient questions. Patient verbalized understanding and voiced agreement with current treatment plan.

## 2023-04-11 RX ORDER — RIMEGEPANT SULFATE 75 MG/75MG
75 TABLET, ORALLY DISINTEGRATING ORAL DAILY PRN
Qty: 18 TABLET | Refills: 11 | Status: SHIPPED | OUTPATIENT
Start: 2023-04-11 | End: 2023-04-14

## 2023-04-11 NOTE — TELEPHONE ENCOUNTER
Ubrelvy was found to be cost prohibitive; did not start.  We will now try to get approval for Nurtec.

## 2023-04-13 ENCOUNTER — PATIENT MESSAGE (OUTPATIENT)
Dept: NEUROLOGY | Facility: CLINIC | Age: 73
End: 2023-04-13
Payer: MEDICARE

## 2023-04-13 DIAGNOSIS — G43.709 CHRONIC MIGRAINE WITHOUT AURA WITHOUT STATUS MIGRAINOSUS, NOT INTRACTABLE: Primary | ICD-10-CM

## 2023-04-14 RX ORDER — NARATRIPTAN 2.5 MG/1
TABLET ORAL
Qty: 9 TABLET | Refills: 11 | Status: SHIPPED | OUTPATIENT
Start: 2023-04-14 | End: 2023-05-17

## 2023-04-21 ENCOUNTER — TELEPHONE (OUTPATIENT)
Dept: DERMATOLOGY | Facility: CLINIC | Age: 73
End: 2023-04-21
Payer: MEDICARE

## 2023-04-21 NOTE — TELEPHONE ENCOUNTER
Spoke with pt - scheduled appt in acute derm. Explained the acute derm clinic will be Dr. Giron and the resident doctor. Pt agreed to appointment, and pt thanked me for call      ----- Message -----  From: Katie Pereyra  Sent: 4/18/2023   2:51 PM CDT  To: Mack Us Staff    Zoë Flemion calling regarding Appointment Access for wanting to be seen for having a brown spot on lower lip, call back [phone]

## 2023-04-24 NOTE — PROGRESS NOTES
Chief Complaint   Patient presents with    Sinus Problem   .    HPI:     Zoë Enrique is a 73 y.o. female who presents for evaluation of a several month history of rhinorrhea and headaches. These symptoms have been present for years. Has seen neurologist in [place] years ago and had MRI at that time.  She relays that she has been having frequent upper respiratory infections.  She was most recently on Augmentin and Levaquin.  Last infection was August 2022. She did see Dr. Adorno and has had negative immunoCAP testing. She did have low pneumococcal titers and had pneumovax booster subsequently. Her allergist was concerned about possible LPR and referred for further evaluation. Relays that she has had headaches behind and around her eyes and her forehead. Describes pressure around her cheek region.    She does use sinus rinses or nasal sprays. She is taking Flonase daily.  She does not feel it helps the clear rhinorrhea. She admits to midface pain and pressure. She has not had sinus or nasal surgery. There is no history of sinonasal trauma. She denies hoarseness, she has occasional dysphagia to pills/some solids.  She notes globus sensation with eating.  She did have EGD in 2021 which was normal. She denies heartburn or indigestion.     Interval HPI 1/23/2023:  Follow up visit. She has been taking Protonix 40 daily. She has restarted the Atrovent for 1 month and has been on the Flonase daily. She states that nothing improves her clear rhinorrhea and post nasal drip. Denies acid reflux. She states that her cough is more from the post nasal drip.   She is also c/o frontal headaches, sinus pressure and tooth pain with changes in the weather. She usually would take a Norco for pain relief. She hasn't seen neurologist in years.    Interval HPI 2/3/2023:  Follow up visit. Reports similar symptoms to last visit. Notes that she has had increased nasal congestion, sore throat, postnasal drip over the last day.  She has  recently been her grandson who has been sick as well  she has taken Sudafed and acetaminophen with seem to with her symptoms.  She reports that she just obtain the budesonide saline rinses today and started them this morning.    Interval HPI 4/25/2023:  Follow up visit. Reports since our last visit she did see neurology and was started on naratriptan. She reports that she is having continued headaches. She notes that the naratriptan only helped one time.  She is taking Atrovent nasal spray. She has stopped nasal saline rinses b/c she felt it was causing too much mucus and post-nasal drip.  She is still taking Protonix 40 mg PO daily.  She notes that her family has been noticing she is having a harder time hearing. She has noticed this as well.  She denies tinnitus.       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    Diverticulosis 1998    Hx of atrial flutter     Hx of mitral valve prolapse 1990    Hypothyroidism     Migraines     Recurrent upper respiratory infection (URI)     Supraventricular tachycardia      Social History     Socioeconomic History    Marital status:    Tobacco Use    Smoking status: Never    Smokeless tobacco: Never   Substance and Sexual Activity    Alcohol use: Yes     Alcohol/week: 1.0 standard drink     Types: 1 Standard drinks or equivalent per week     Comment: 1-2 per month    Drug use: No     Comment: CBD oils for headache    Sexual activity: Not Currently     Partners: Male     Birth control/protection: Post-menopausal, See Surgical Hx, None     Comment: , together x 37 years     Social Determinants of Health     Financial Resource Strain: Low Risk     Difficulty of Paying Living Expenses: Not hard at all   Food Insecurity: No Food Insecurity    Worried About Running Out of Food in the Last Year: Never true    Ran Out of Food in the Last  Year: Never true   Transportation Needs: No Transportation Needs    Lack of Transportation (Medical): No    Lack of Transportation (Non-Medical): No   Physical Activity: Insufficiently Active    Days of Exercise per Week: 1 day    Minutes of Exercise per Session: 20 min   Stress: No Stress Concern Present    Feeling of Stress : Not at all   Social Connections: Unknown    Frequency of Communication with Friends and Family: More than three times a week    Frequency of Social Gatherings with Friends and Family: More than three times a week    Active Member of Clubs or Organizations: No    Attends Club or Organization Meetings: Patient refused    Marital Status:    Housing Stability: Low Risk     Unable to Pay for Housing in the Last Year: No    Number of Places Lived in the Last Year: 1    Unstable Housing in the Last Year: No     Past Surgical History:   Procedure Laterality Date    ABLATION OF ARRHYTHMOGENIC FOCUS FOR ATRIAL FIBRILLATION N/A 2020    Procedure: ABLATION, ARRHYTHMOGENIC FOCUS, FOR ATRIAL FIBRILLATION;  Surgeon: Moe Seymour MD;  Location: [facility] EP LAB;  Service: Cardiology;  Laterality: N/A;  AFL/AF, KHANH, CTI, PVI, RFA, CARTO, GEN, DM, 3 PREP    AUGMENTATION OF BREAST Bilateral 1984    35 yrs     BREAST SURGERY      augmentation    CARDIOVERSION  2020    Procedure: Cardioversion;  Surgeon: Moe Seymour MD;  Location: [facility] EP LAB;  Service: Cardiology;;     SECTION      COLON SURGERY      partial colon resection    COLONOSCOPY N/A 2021    Procedure: COLONOSCOPY;  Surgeon: Sahara Salgado MD;  [facility]  Service: Endoscopy;  Laterality: N/A;  constipation prep- 7 days of miralax, 2 fulls of fulls, then day bf clears and split prep   covid test 3/5 pcw, prep instr emailed, antibiotics prophylactically, Xarelto hold x 2 days per Dr. Seymour-see telephone encounter 2/10 -ml    COLPOSCOPY      COSMETIC SURGERY      DE QUERNOREEN'S  RELEASE Left 08/02/2022    Procedure: RELEASE, HAND, FOR DEQUERVAIN'S TENOSYNOVITIS;  Surgeon: Toñito Adame Jr., MD;  Location: [facility] OR;  Service: Orthopedics;  Laterality: Left;    ESOPHAGOGASTRODUODENOSCOPY N/A 03/19/2021    Procedure: EGD (ESOPHAGOGASTRODUODENOSCOPY);  Surgeon: Sahara Salgado MD;  Location: [facility] ENDO ([facility]);  Service: Endoscopy;  Laterality: N/A;    EYE SURGERY Bilateral 2016    Lasik, then cataracts extraction    JOINT REPLACEMENT Right 2/07, 11/07, 2/09    KNEE ARTHROPLASTY Right 2007    revison in 2009    KNEE SURGERY Right 1989    distal femoral replacing TK    OOPHORECTOMY      TOTAL ABDOMINAL HYSTERECTOMY  1994    TAHBSO    TRANSESOPHAGEAL ECHOCARDIOGRAPHY N/A 11/24/2020    Procedure: ECHOCARDIOGRAM, TRANSESOPHAGEAL;  Surgeon: Ramírez Ortez III, MD;  Location: [facility] EP LAB;  Service: Cardiology;  Laterality: N/A;    TREATMENT OF CARDIAC ARRHYTHMIA N/A 08/12/2019    Procedure: CARDIOVERSION;  Surgeon: Moe Seymour MD;  Location: [facility] EP LAB;  Service: Cardiology;  Laterality: N/A;  KHANH/DCCV/MAC/DM/3PREP/*ADMIT FOR TIKOSYN*    TRIGGER FINGER RELEASE Left 08/02/2022    Procedure: RELEASE, TRIGGER FINGER;  Surgeon: Toñito Adame Jr., MD;  Location: [facility] OR;  Service: Orthopedics;  Laterality: Left;  left middle finger     Family History   Problem Relation Age of Onset    Diabetes Sister         Type I    Heart disease Sister         Atrrial fib, mitral valve repair    Hearing loss Father     Heart disease Father         Angina, AAA    Stroke Father 93    Heart disease Mother         Atrial fib    Diabetes Mother         Type II    Heart disease Brother         Atrial fib    Asthma Brother     Diverticulosis Brother     Heart disease Brother         Atrial fib    Diverticulosis Brother     No Known Problems Daughter     Breast cancer Neg Hx     Ovarian cancer Neg Hx     Colon cancer Neg Hx     Cancer Neg Hx     Hypertension Neg Hx            Review of Systems  General:  negative for chills, fever or weight loss  Psychological: negative for mood changes or depression  Ophthalmic: negative for blurry vision, photophobia or eye pain  ENT: see HPI  Respiratory: no cough, shortness of breath, or wheezing  Cardiovascular: no chest pain or dyspnea on exertion  Gastrointestinal: no abdominal pain, change in bowel habits, or black/ bloody stools  Musculoskeletal: negative for gait disturbance or muscular weakness  Neurological: no syncope or seizures; no ataxia  Dermatological: negative for puritis,  rash and jaundice  Hematologic/lymphatic: no easy bruising, no new lumps or bumps      Physical Exam:    Vitals:    04/25/23 0949   BP: 135/82   Pulse: 68           Constitutional: Well appearing / communicating without difficutly.  NAD.  Eyes: EOM I Bilaterally  Head/Face: Normocephalic.  Negative paranasal sinus pressure/tenderness.  Salivary glands WNL.  House Brackmann I Bilaterally.    Right Ear: Auricle normal appearance. External Auditory Canal within normal limits,TM w/o masses/lesions/perforations. TM mobility noted.   Left Ear: Auricle normal appearance. External Auditory Canal WNL,TM w/o masses/lesions/perforations. TM mobility noted.  Nose: No gross nasal septal deviation. Inferior Turbinates 3+ bilaterally. No septal perforation. No masses/lesions. External nasal skin appears normal without masses/lesions.  Oral Cavity: Gingiva/lips within normal limits.  Dentition/gingiva healthy appearing. Mucus membranes moist. Floor of mouth soft, no masses palpated. Oral Tongue mobile. Hard Palate appears normal.    Oropharynx: Base of tongue appears normal. No masses/lesions noted. Tonsillar fossa/pharyngeal wall without lesions. Posterior oropharynx WNL.  Soft palate without masses. Midline uvula.   Neck/Lymphatic: No LAD I-VI bilaterally.  No thyromegaly.  No masses noted on exam.          Diagnostic studies:  Laboratory 06/16/2022:  IgE level: Less than 35.  ImmunoCAP:  Negative.  IgA:  114.  Ig.  IgM: 66.  Pneumococcal titers: Not protective.    CT sinus 2023: Images interpreted by me and discussed with patient in detail today.   Right pedro bullosa.  Paradoxical curvature of the middle turbinates anteriorly.  Leftward nasal septal deviation with a 3 mm bony spur abutting the left inferior nasal turbinate.  No nasal polyp formation is identified.     Ostiomeatal units are patent with some anatomic narrowing of the distal infundibulum on the left.  The frontal recesses and sphenoid sinus ostia are patent.     The paranasal sinuses are currently clear other than for minimal mucosal thickening at the floor the left maxillary sinus..     Sellar pneumatization of the sphenoid.  There is some pneumatization overlying the grooves of the anterior ethmoid arteries bilaterally.  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     The middle ears and mastoid air cells are clear.     No acute intracranial process.          Assessment:    ICD-10-CM ICD-9-CM    1. Nasal septal deviation  J34.2 470       2. Chronic rhinitis  J31.0 472.0       3. Hypertrophy of inferior nasal turbinate  J34.3 478.0       4. Laryngopharyngeal reflux (LPR)  K21.9 478.79 Laryngoscopy      5. Bilateral hearing loss, unspecified hearing loss type  H91.93 389.9               The primary encounter diagnosis was Nasal septal deviation. Diagnoses of Chronic rhinitis, Hypertrophy of inferior nasal turbinate, Laryngopharyngeal reflux (LPR), and Bilateral hearing loss, unspecified hearing loss type were also pertinent to this visit.      Plan:  Orders Placed This Encounter   Procedures    Laryngoscopy   Start nasal saline rinses twice daily.   Continue  Atrovent 2 sprays per nostril 3 times per day.   Keep f/u with  neurology for Migraine headaches. Had been treated for migraine HA in the past with last neurology visit in 2018.   Discontinue Protonix 40 mg p.o. daily  Start Famotidine 20 mg PO BID  Obtain audiogram to further assess hearing loss.        Linda Yu MD

## 2023-04-25 ENCOUNTER — OFFICE VISIT (OUTPATIENT)
Dept: OTOLARYNGOLOGY | Facility: CLINIC | Age: 73
End: 2023-04-25
Payer: MEDICARE

## 2023-04-25 ENCOUNTER — CLINICAL SUPPORT (OUTPATIENT)
Dept: OTOLARYNGOLOGY | Facility: CLINIC | Age: 73
End: 2023-04-25
Payer: MEDICARE

## 2023-04-25 VITALS
BODY MASS INDEX: 24.09 KG/M2 | DIASTOLIC BLOOD PRESSURE: 82 MMHG | HEIGHT: 68 IN | HEART RATE: 68 BPM | SYSTOLIC BLOOD PRESSURE: 135 MMHG | WEIGHT: 158.94 LBS

## 2023-04-25 DIAGNOSIS — J34.2 NASAL SEPTAL DEVIATION: Primary | ICD-10-CM

## 2023-04-25 DIAGNOSIS — J34.3 HYPERTROPHY OF INFERIOR NASAL TURBINATE: ICD-10-CM

## 2023-04-25 DIAGNOSIS — J31.0 CHRONIC RHINITIS: ICD-10-CM

## 2023-04-25 DIAGNOSIS — K21.9 LARYNGOPHARYNGEAL REFLUX (LPR): ICD-10-CM

## 2023-04-25 DIAGNOSIS — H90.3 SENSORINEURAL HEARING LOSS, BILATERAL: Primary | ICD-10-CM

## 2023-04-25 DIAGNOSIS — H91.93 BILATERAL HEARING LOSS, UNSPECIFIED HEARING LOSS TYPE: ICD-10-CM

## 2023-04-25 PROCEDURE — 99999 PR PBB SHADOW E&M-EST. PATIENT-LVL III: ICD-10-PCS | Mod: PBBFAC,,, | Performed by: OTOLARYNGOLOGY

## 2023-04-25 PROCEDURE — 1126F PR PAIN SEVERITY QUANTIFIED, NO PAIN PRESENT: ICD-10-PCS | Mod: CPTII,S$GLB,, | Performed by: OTOLARYNGOLOGY

## 2023-04-25 PROCEDURE — 1159F MED LIST DOCD IN RCRD: CPT | Mod: CPTII,S$GLB,, | Performed by: OTOLARYNGOLOGY

## 2023-04-25 PROCEDURE — 1160F RVW MEDS BY RX/DR IN RCRD: CPT | Mod: CPTII,S$GLB,, | Performed by: OTOLARYNGOLOGY

## 2023-04-25 PROCEDURE — 92567 PR TYMPA2METRY: ICD-10-PCS | Mod: S$GLB,,, | Performed by: AUDIOLOGIST

## 2023-04-25 PROCEDURE — 1101F PR PT FALLS ASSESS DOC 0-1 FALLS W/OUT INJ PAST YR: ICD-10-PCS | Mod: CPTII,S$GLB,, | Performed by: OTOLARYNGOLOGY

## 2023-04-25 PROCEDURE — 3008F PR BODY MASS INDEX (BMI) DOCUMENTED: ICD-10-PCS | Mod: CPTII,S$GLB,, | Performed by: OTOLARYNGOLOGY

## 2023-04-25 PROCEDURE — 1160F PR REVIEW ALL MEDS BY PRESCRIBER/CLIN PHARMACIST DOCUMENTED: ICD-10-PCS | Mod: CPTII,S$GLB,, | Performed by: OTOLARYNGOLOGY

## 2023-04-25 PROCEDURE — 1159F PR MEDICATION LIST DOCUMENTED IN MEDICAL RECORD: ICD-10-PCS | Mod: CPTII,S$GLB,, | Performed by: OTOLARYNGOLOGY

## 2023-04-25 PROCEDURE — 92567 TYMPANOMETRY: CPT | Mod: S$GLB,,, | Performed by: AUDIOLOGIST

## 2023-04-25 PROCEDURE — 3079F PR MOST RECENT DIASTOLIC BLOOD PRESSURE 80-89 MM HG: ICD-10-PCS | Mod: CPTII,S$GLB,, | Performed by: OTOLARYNGOLOGY

## 2023-04-25 PROCEDURE — 31575 DIAGNOSTIC LARYNGOSCOPY: CPT | Mod: S$GLB,,, | Performed by: OTOLARYNGOLOGY

## 2023-04-25 PROCEDURE — 92557 PR COMPREHENSIVE HEARING TEST: ICD-10-PCS | Mod: S$GLB,,, | Performed by: AUDIOLOGIST

## 2023-04-25 PROCEDURE — 3288F PR FALLS RISK ASSESSMENT DOCUMENTED: ICD-10-PCS | Mod: CPTII,S$GLB,, | Performed by: OTOLARYNGOLOGY

## 2023-04-25 PROCEDURE — 1126F AMNT PAIN NOTED NONE PRSNT: CPT | Mod: CPTII,S$GLB,, | Performed by: OTOLARYNGOLOGY

## 2023-04-25 PROCEDURE — 92557 COMPREHENSIVE HEARING TEST: CPT | Mod: S$GLB,,, | Performed by: AUDIOLOGIST

## 2023-04-25 PROCEDURE — 3008F BODY MASS INDEX DOCD: CPT | Mod: CPTII,S$GLB,, | Performed by: OTOLARYNGOLOGY

## 2023-04-25 PROCEDURE — 31575 LARYNGOSCOPY: ICD-10-PCS | Mod: S$GLB,,, | Performed by: OTOLARYNGOLOGY

## 2023-04-25 PROCEDURE — 3075F SYST BP GE 130 - 139MM HG: CPT | Mod: CPTII,S$GLB,, | Performed by: OTOLARYNGOLOGY

## 2023-04-25 PROCEDURE — 3288F FALL RISK ASSESSMENT DOCD: CPT | Mod: CPTII,S$GLB,, | Performed by: OTOLARYNGOLOGY

## 2023-04-25 PROCEDURE — 99999 PR PBB SHADOW E&M-EST. PATIENT-LVL III: CPT | Mod: PBBFAC,,, | Performed by: OTOLARYNGOLOGY

## 2023-04-25 PROCEDURE — 99214 OFFICE O/P EST MOD 30 MIN: CPT | Mod: 25,S$GLB,, | Performed by: OTOLARYNGOLOGY

## 2023-04-25 PROCEDURE — 3079F DIAST BP 80-89 MM HG: CPT | Mod: CPTII,S$GLB,, | Performed by: OTOLARYNGOLOGY

## 2023-04-25 PROCEDURE — 3075F PR MOST RECENT SYSTOLIC BLOOD PRESS GE 130-139MM HG: ICD-10-PCS | Mod: CPTII,S$GLB,, | Performed by: OTOLARYNGOLOGY

## 2023-04-25 PROCEDURE — 99214 PR OFFICE/OUTPT VISIT, EST, LEVL IV, 30-39 MIN: ICD-10-PCS | Mod: 25,S$GLB,, | Performed by: OTOLARYNGOLOGY

## 2023-04-25 PROCEDURE — 1101F PT FALLS ASSESS-DOCD LE1/YR: CPT | Mod: CPTII,S$GLB,, | Performed by: OTOLARYNGOLOGY

## 2023-04-25 RX ORDER — FAMOTIDINE 20 MG/1
20 TABLET, FILM COATED ORAL 2 TIMES DAILY
Qty: 60 TABLET | Refills: 4 | Status: SHIPPED | OUTPATIENT
Start: 2023-04-25 | End: 2023-08-25 | Stop reason: SDUPTHER

## 2023-04-25 RX ORDER — IPRATROPIUM BROMIDE 21 UG/1
2 SPRAY, METERED NASAL 3 TIMES DAILY
Qty: 30 ML | Refills: 3 | Status: SHIPPED | OUTPATIENT
Start: 2023-04-25 | End: 2023-09-28

## 2023-04-25 NOTE — PROGRESS NOTES
Zoë Enrique was seen today in the clinic for an audiologic evaluation.  Her main complaint was hearing loss.    Tympanometry revealed a Type A tympanogram for both ears.  Audiogram results revealed a mild high frequency sensorineural hearing loss bilaterally.  Speech reception thresholds were obtained at 10dB for the right ear and 5dB for the left ear.  Speech discrimination scores were 92% for the right ear and 88% for the left ear.    Recommendations:  Otologic evaluation  Annual evaluation  Consider hearing aids  Hearing protection in noise

## 2023-04-25 NOTE — PROCEDURES
Laryngoscopy    Date/Time: 4/25/2023 10:00 AM  Performed by: Linda Yu MD  Authorized by: Linda Yu MD     Consent Done?:  Yes (Verbal)  Anesthesia:     Local anesthetic:  Lidocaine 2% and [place]-Synephrine 1/2%  Laryngoscopy:     Areas examined:  Nasal cavities, nasopharynx, oropharynx, hypopharynx, larynx and vocal cords  Nose External:      No external nasal deformity  Nose Intranasal:      Mucosa no polyps     Mucosa ulcers not present     No mucosa lesions present     No septum gross deformity     Turbinates not enlarged  Nasopharynx:      No mucosa lesions     Adenoids not present     Posterior choanae patent     Eustachian tube patent  Larynx/hypopharynx:      No epiglottis lesions     No epiglottis edema     No AE folds lesions     No vocal cord polyps     Equal and normal bilateral     No hypopharynx lesions     No piriform sinus pooling     No piriform sinus lesions     Post cricoid edema (moderate)     Post cricoid erythema (moderate)

## 2023-04-28 ENCOUNTER — PATIENT MESSAGE (OUTPATIENT)
Dept: NEUROLOGY | Facility: CLINIC | Age: 73
End: 2023-04-28
Payer: MEDICARE

## 2023-05-01 ENCOUNTER — PATIENT MESSAGE (OUTPATIENT)
Dept: PULMONOLOGY | Facility: CLINIC | Age: 73
End: 2023-05-01
Payer: MEDICARE

## 2023-05-02 ENCOUNTER — OFFICE VISIT (OUTPATIENT)
Dept: DERMATOLOGY | Facility: CLINIC | Age: 73
End: 2023-05-02
Payer: MEDICARE

## 2023-05-02 DIAGNOSIS — L81.2 EPHELIDES: Primary | ICD-10-CM

## 2023-05-02 PROCEDURE — 99999 PR PBB SHADOW E&M-EST. PATIENT-LVL II: CPT | Mod: PBBFAC,,,

## 2023-05-02 PROCEDURE — 1126F AMNT PAIN NOTED NONE PRSNT: CPT | Mod: CPTII,S$GLB,, | Performed by: DERMATOLOGY

## 2023-05-02 PROCEDURE — 1159F MED LIST DOCD IN RCRD: CPT | Mod: CPTII,S$GLB,, | Performed by: DERMATOLOGY

## 2023-05-02 PROCEDURE — 1159F PR MEDICATION LIST DOCUMENTED IN MEDICAL RECORD: ICD-10-PCS | Mod: CPTII,S$GLB,, | Performed by: DERMATOLOGY

## 2023-05-02 PROCEDURE — 1101F PT FALLS ASSESS-DOCD LE1/YR: CPT | Mod: CPTII,S$GLB,, | Performed by: DERMATOLOGY

## 2023-05-02 PROCEDURE — 3288F PR FALLS RISK ASSESSMENT DOCUMENTED: ICD-10-PCS | Mod: CPTII,S$GLB,, | Performed by: DERMATOLOGY

## 2023-05-02 PROCEDURE — 1160F RVW MEDS BY RX/DR IN RCRD: CPT | Mod: CPTII,S$GLB,, | Performed by: DERMATOLOGY

## 2023-05-02 PROCEDURE — 1160F PR REVIEW ALL MEDS BY PRESCRIBER/CLIN PHARMACIST DOCUMENTED: ICD-10-PCS | Mod: CPTII,S$GLB,, | Performed by: DERMATOLOGY

## 2023-05-02 PROCEDURE — 99213 PR OFFICE/OUTPT VISIT, EST, LEVL III, 20-29 MIN: ICD-10-PCS | Mod: S$GLB,,, | Performed by: DERMATOLOGY

## 2023-05-02 PROCEDURE — 1101F PR PT FALLS ASSESS DOC 0-1 FALLS W/OUT INJ PAST YR: ICD-10-PCS | Mod: CPTII,S$GLB,, | Performed by: DERMATOLOGY

## 2023-05-02 PROCEDURE — 1126F PR PAIN SEVERITY QUANTIFIED, NO PAIN PRESENT: ICD-10-PCS | Mod: CPTII,S$GLB,, | Performed by: DERMATOLOGY

## 2023-05-02 PROCEDURE — 99999 PR PBB SHADOW E&M-EST. PATIENT-LVL II: ICD-10-PCS | Mod: PBBFAC,,,

## 2023-05-02 PROCEDURE — 99213 OFFICE O/P EST LOW 20 MIN: CPT | Mod: S$GLB,,, | Performed by: DERMATOLOGY

## 2023-05-02 PROCEDURE — 3288F FALL RISK ASSESSMENT DOCD: CPT | Mod: CPTII,S$GLB,, | Performed by: DERMATOLOGY

## 2023-05-02 NOTE — PROGRESS NOTES
Subjective:      Patient ID:  Zoë Enrique is a 73 y.o. female who presents for   Chief Complaint   Patient presents with    Lesion     73F w h/o superficial BCC to R cheek s/p Efudex in 1/2021 and severely dysplastic nevus to L breast s/p E&S who presents for lesion on her lip.  Patient states that her dentist noticed a pigmented lesion on her lower lip a few weeks ago, and he encouraged her to get it checked out.  She states that she never noticed any pigmented lesions on her lips prior to this time.  Wears sunscreen.  Uses Carmex without SPF on lips.     Lesion      Review of Systems   Constitutional:  Negative for fever, chills, fatigue and night sweats.   HENT:  Negative for mouth sores.    Skin:  Positive for daily sunscreen use and activity-related sunscreen use. Negative for itching and rash.     Objective:   Physical Exam   Constitutional: She appears well-developed and well-nourished. No distress.   HENT:   Mouth/Throat:       Neurological: She is alert and oriented to person, place, and time.   Psychiatric: She has a normal mood and affect.   Skin:             Diagram Legend     Erythematous scaling macule/papule c/w actinic keratosis       Vascular papule c/w angioma      Pigmented verrucoid papule/plaque c/w seborrheic keratosis      Yellow umbilicated papule c/w sebaceous hyperplasia      Irregularly shaped tan macule c/w lentigo     1-2 mm smooth white papules consistent with Milia      Movable subcutaneous cyst with punctum c/w epidermal inclusion cyst      Subcutaneous movable cyst c/w pilar cyst      Firm pink to brown papule c/w dermatofibroma      Pedunculated fleshy papule(s) c/w skin tag(s)      Evenly pigmented macule c/w junctional nevus     Mildly variegated pigmented, slightly irregular-bordered macule c/w mildly atypical nevus      Flesh colored to evenly pigmented papule c/w intradermal nevus       Pink pearly papule/plaque c/w basal cell carcinoma      Erythematous hyperkeratotic  cursted plaque c/w SCC      Surgical scar with no sign of skin cancer recurrence      Open and closed comedones      Inflammatory papules and pustules      Verrucoid papule consistent consistent with wart     Erythematous eczematous patches and plaques     Dystrophic onycholytic nail with subungual debris c/w onychomycosis     Umbilicated papule    Erythematous-base heme-crusted tan verrucoid plaque consistent with inflamed seborrheic keratosis     Erythematous Silvery Scaling Plaque c/w Psoriasis     See annotation      Assessment / Plan:        Ephelides  These are benign hyperpigmented lesions on patients lips darkened by sun exposure. Recommend daily sun protection/avoidance and use of at least SPF 30, lip balm containing broad spectrum sunscreen (OTC drug) will reduce the appearance lesions. Treatment of these benign lesions is considered cosmetic.         Follow up in about 4 months (around 9/2/2023).

## 2023-05-03 ENCOUNTER — PATIENT MESSAGE (OUTPATIENT)
Dept: PRIMARY CARE CLINIC | Facility: CLINIC | Age: 73
End: 2023-05-03

## 2023-05-03 ENCOUNTER — OFFICE VISIT (OUTPATIENT)
Dept: PRIMARY CARE CLINIC | Facility: CLINIC | Age: 73
End: 2023-05-03
Payer: MEDICARE

## 2023-05-03 VITALS
SYSTOLIC BLOOD PRESSURE: 118 MMHG | RESPIRATION RATE: 18 BRPM | HEIGHT: 68 IN | BODY MASS INDEX: 24.02 KG/M2 | OXYGEN SATURATION: 97 % | DIASTOLIC BLOOD PRESSURE: 80 MMHG | HEART RATE: 71 BPM | TEMPERATURE: 99 F | WEIGHT: 158.5 LBS

## 2023-05-03 DIAGNOSIS — D69.6 THROMBOCYTOPENIA: ICD-10-CM

## 2023-05-03 DIAGNOSIS — J47.0 ACUTE BRONCHITIS WITH BRONCHIECTASIS: ICD-10-CM

## 2023-05-03 DIAGNOSIS — R05.3 CHRONIC COUGH: ICD-10-CM

## 2023-05-03 DIAGNOSIS — G44.229 CHRONIC TENSION-TYPE HEADACHE, NOT INTRACTABLE: Primary | ICD-10-CM

## 2023-05-03 DIAGNOSIS — J47.9 BRONCHIECTASIS WITHOUT COMPLICATION: ICD-10-CM

## 2023-05-03 PROCEDURE — 3008F PR BODY MASS INDEX (BMI) DOCUMENTED: ICD-10-PCS | Mod: CPTII,S$GLB,, | Performed by: INTERNAL MEDICINE

## 2023-05-03 PROCEDURE — 99999 PR PBB SHADOW E&M-EST. PATIENT-LVL IV: CPT | Mod: PBBFAC,,, | Performed by: INTERNAL MEDICINE

## 2023-05-03 PROCEDURE — 1101F PR PT FALLS ASSESS DOC 0-1 FALLS W/OUT INJ PAST YR: ICD-10-PCS | Mod: CPTII,S$GLB,, | Performed by: INTERNAL MEDICINE

## 2023-05-03 PROCEDURE — 3079F DIAST BP 80-89 MM HG: CPT | Mod: CPTII,S$GLB,, | Performed by: INTERNAL MEDICINE

## 2023-05-03 PROCEDURE — 3079F PR MOST RECENT DIASTOLIC BLOOD PRESSURE 80-89 MM HG: ICD-10-PCS | Mod: CPTII,S$GLB,, | Performed by: INTERNAL MEDICINE

## 2023-05-03 PROCEDURE — 1159F MED LIST DOCD IN RCRD: CPT | Mod: CPTII,S$GLB,, | Performed by: INTERNAL MEDICINE

## 2023-05-03 PROCEDURE — 1159F PR MEDICATION LIST DOCUMENTED IN MEDICAL RECORD: ICD-10-PCS | Mod: CPTII,S$GLB,, | Performed by: INTERNAL MEDICINE

## 2023-05-03 PROCEDURE — 1126F AMNT PAIN NOTED NONE PRSNT: CPT | Mod: CPTII,S$GLB,, | Performed by: INTERNAL MEDICINE

## 2023-05-03 PROCEDURE — 99999 PR PBB SHADOW E&M-EST. PATIENT-LVL IV: ICD-10-PCS | Mod: PBBFAC,,, | Performed by: INTERNAL MEDICINE

## 2023-05-03 PROCEDURE — 3008F BODY MASS INDEX DOCD: CPT | Mod: CPTII,S$GLB,, | Performed by: INTERNAL MEDICINE

## 2023-05-03 PROCEDURE — 3074F SYST BP LT 130 MM HG: CPT | Mod: CPTII,S$GLB,, | Performed by: INTERNAL MEDICINE

## 2023-05-03 PROCEDURE — 99213 OFFICE O/P EST LOW 20 MIN: CPT | Mod: S$GLB,,, | Performed by: INTERNAL MEDICINE

## 2023-05-03 PROCEDURE — 1101F PT FALLS ASSESS-DOCD LE1/YR: CPT | Mod: CPTII,S$GLB,, | Performed by: INTERNAL MEDICINE

## 2023-05-03 PROCEDURE — 1126F PR PAIN SEVERITY QUANTIFIED, NO PAIN PRESENT: ICD-10-PCS | Mod: CPTII,S$GLB,, | Performed by: INTERNAL MEDICINE

## 2023-05-03 PROCEDURE — 3288F PR FALLS RISK ASSESSMENT DOCUMENTED: ICD-10-PCS | Mod: CPTII,S$GLB,, | Performed by: INTERNAL MEDICINE

## 2023-05-03 PROCEDURE — 99213 PR OFFICE/OUTPT VISIT, EST, LEVL III, 20-29 MIN: ICD-10-PCS | Mod: S$GLB,,, | Performed by: INTERNAL MEDICINE

## 2023-05-03 PROCEDURE — 3288F FALL RISK ASSESSMENT DOCD: CPT | Mod: CPTII,S$GLB,, | Performed by: INTERNAL MEDICINE

## 2023-05-03 PROCEDURE — 3074F PR MOST RECENT SYSTOLIC BLOOD PRESSURE < 130 MM HG: ICD-10-PCS | Mod: CPTII,S$GLB,, | Performed by: INTERNAL MEDICINE

## 2023-05-03 RX ORDER — LEVOFLOXACIN 750 MG/1
750 TABLET ORAL DAILY
Qty: 7 TABLET | Refills: 0 | Status: SHIPPED | OUTPATIENT
Start: 2023-05-03 | End: 2023-08-22

## 2023-05-03 RX ORDER — METHYLPREDNISOLONE 4 MG/1
TABLET ORAL
Qty: 21 EACH | Refills: 0 | Status: SHIPPED | OUTPATIENT
Start: 2023-05-03 | End: 2023-05-24

## 2023-05-03 NOTE — PROGRESS NOTES
HPI:  Zoë Enrique is a 73 y.o. year old female that  presents with   Chief Complaint   Patient presents with    Cough    Headache   .       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    Diverticulosis 1998    Hx of atrial flutter     Hx of mitral valve prolapse 1990    Hypothyroidism     Migraines     Recurrent upper respiratory infection (URI)     Supraventricular tachycardia      Social History     Socioeconomic History    Marital status:    Tobacco Use    Smoking status: Never    Smokeless tobacco: Never   Substance and Sexual Activity    Alcohol use: Yes     Alcohol/week: 1.0 standard drink     Types: 1 Standard drinks or equivalent per week     Comment: 1-2 per month    Drug use: No     Comment: CBD oils for headache    Sexual activity: Not Currently     Partners: Male     Birth control/protection: Post-menopausal, See Surgical Hx, None     Comment: , together x 37 years     Social Determinants of Health     Financial Resource Strain: Low Risk     Difficulty of Paying Living Expenses: Not hard at all   Food Insecurity: No Food Insecurity    Worried About Running Out of Food in the Last Year: Never true    Ran Out of Food in the Last Year: Never true   Transportation Needs: No Transportation Needs    Lack of Transportation (Medical): No    Lack of Transportation (Non-Medical): No   Physical Activity: Insufficiently Active    Days of Exercise per Week: 1 day    Minutes of Exercise per Session: 20 min   Stress: No Stress Concern Present    Feeling of Stress : Not at all   Social Connections: Unknown    Frequency of Communication with Friends and Family: More than three times a week    Frequency of Social Gatherings with Friends and Family: More than three times a week    Active Member of Clubs or Organizations: No    Attends Club or Organization Meetings:  Patient refused    Marital Status:    Housing Stability: Low Risk     Unable to Pay for Housing in the Last Year: No    Number of Places Lived in the Last Year: 1    Unstable Housing in the Last Year: No     Past Surgical History:   Procedure Laterality Date    ABLATION OF ARRHYTHMOGENIC FOCUS FOR ATRIAL FIBRILLATION N/A 2020    Procedure: ABLATION, ARRHYTHMOGENIC FOCUS, FOR ATRIAL FIBRILLATION;  Surgeon: Moe Seymour MD;  Location: [facility] EP LAB;  Service: Cardiology;  Laterality: N/A;  AFL/AF, KHANH, CTI, PVI, RFA, CARTO, GEN, DM, 3 PREP    AUGMENTATION OF BREAST Bilateral     35 yrs     BREAST SURGERY      augmentation    CARDIOVERSION  2020    Procedure: Cardioversion;  Surgeon: Moe Seymour MD;  Location: [facility] EP LAB;  Service: Cardiology;;     SECTION      COLON SURGERY      partial colon resection    COLONOSCOPY N/A 2021    Procedure: COLONOSCOPY;  Surgeon: Sahara Salgado MD;  Location: [facility] ENDO (4TH FLR);  Service: Endoscopy;  Laterality: N/A;  constipation prep- 7 days of miralax, 2 fulls of fulls, then day bf clears and split prep   covid test 3/5 pcw, prep instr emailed, antibiotics prophylactically, Xarelto hold x 2 days per Dr. Seymour-see telephone encounter 2/10 -ml    COLPOSCOPY      COSMETIC SURGERY      DE QUERVAIN'S RELEASE Left 2022    Procedure: RELEASE, HAND, FOR DEQUERVAIN'S TENOSYNOVITIS;  Surgeon: Toñito Adame Jr., MD;  Location: [facility];  Service: Orthopedics;  Laterality: Left;    ESOPHAGOGASTRODUODENOSCOPY N/A 2021    Procedure: EGD (ESOPHAGOGASTRODUODENOSCOPY);  Surgeon: Sahara Salgado MD;  Location: [facility] ENDO (4TH FLR);  Service: Endoscopy;  Laterality: N/A;    EYE SURGERY Bilateral 2016    Lasik, then cataracts extraction    JOINT REPLACEMENT Right , ,     KNEE ARTHROPLASTY Right     revison in     KNEE SURGERY Right     distal femoral replacing TK    OOPHORECTOMY      TOTAL ABDOMINAL  HYSTERECTOMY  1994    TAHBSO    TRANSESOPHAGEAL ECHOCARDIOGRAPHY N/A 11/24/2020    Procedure: ECHOCARDIOGRAM, TRANSESOPHAGEAL;  Surgeon: Ramírez Ortez III, MD;  Location: [facility] EP LAB;  Service: Cardiology;  Laterality: N/A;    TREATMENT OF CARDIAC ARRHYTHMIA N/A 08/12/2019    Procedure: CARDIOVERSION;  Surgeon: Moe Seymour MD;  Location: [facility] EP LAB;  Service: Cardiology;  Laterality: N/A;  KHANH/DCCV/MAC/DM/3PREP/*ADMIT FOR TIKOSYN*    TRIGGER FINGER RELEASE Left 08/02/2022    Procedure: RELEASE, TRIGGER FINGER;  Surgeon: Toñito Adame Jr., MD;  Location: [facility] OR;  Service: Orthopedics;  Laterality: Left;  left middle finger     Family History   Problem Relation Age of Onset    Diabetes Sister         Type I    Heart disease Sister         Atrrial fib, mitral valve repair    Hearing loss Father     Heart disease Father         Angina, AAA    Stroke Father 93    Heart disease Mother         Atrial fib    Diabetes Mother         Type II    Heart disease Brother         Atrial fib    Asthma Brother     Diverticulosis Brother     Heart disease Brother         Atrial fib    Diverticulosis Brother     No Known Problems Daughter     Breast cancer Neg Hx     Ovarian cancer Neg Hx     Colon cancer Neg Hx     Cancer Neg Hx     Hypertension Neg Hx            Pt with known hx of bronchiectasis, went to  recently and rx'd Amox with no improvement.  Pt began with cough at the start of April.  Pt has also had an increase in her tension HA the past couple of days.  Denies any fever or chills.  Does report some nausea but no vomiting.  No CP.  Does not recall a sputum cx in the past for bacteria.        The patient has no Health Maintenance topics of status Overdue, Due On, or Due Soon    Review of Systems  Review of Systems   Constitutional:  Negative for chills and fever.   Respiratory:  Positive for cough and sputum production. Negative for hemoptysis.    Cardiovascular:  Negative for chest pain and palpitations.  "  Gastrointestinal:  Positive for nausea. Negative for vomiting.   Neurological:  Positive for headaches.       Physical Exam:  /80 (BP Location: Right arm, Patient Position: Sitting, BP Method: Small (Manual))   Pulse 71   Temp 98.6 °F (37 °C) (Oral)   Resp 18   Ht 5' 8" (1.727 m)   Wt 71.9 kg (158 lb 8.2 oz)   SpO2 97%   BMI 24.10 kg/m²   Physical Exam  Cardiovascular:      Rate and Rhythm: Normal rate. Rhythm irregular.   Pulmonary:      Effort: Pulmonary effort is normal. No respiratory distress.      Breath sounds: No stridor. No wheezing, rhonchi or rales.   Musculoskeletal:      Cervical back: Neck supple. No tenderness.   Lymphadenopathy:      Cervical: No cervical adenopathy.         LABS:    No results found for this or any previous visit (from the past 2016 hour(s)).    Imaging:        Assessment:    ICD-10-CM ICD-9-CM    1. Chronic tension-type headache, not intractable  G44.229 339.12       2. Thrombocytopenia  D69.6 287.5       3. Bronchiectasis without complication  J47.9 494.0       4. Chronic cough  R05.3 786.2       5. Acute bronchitis with bronchiectasis  J47.0 494.1 levoFLOXacin (LEVAQUIN) 750 MG tablet      methylPREDNISolone (MEDROL DOSEPACK) 4 mg tablet        The primary encounter diagnosis was Chronic tension-type headache, not intractable. Diagnoses of Thrombocytopenia, Bronchiectasis without complication, Chronic cough, and Acute bronchitis with bronchiectasis were also pertinent to this visit.      Plan:  No orders of the defined types were placed in this encounter.    Discussed with pt the treatment plan, if she does not see improvement she should see her pulmonologist earlier than her next scheduled visit.        Jeffrey Bartholomew MD  "

## 2023-05-11 NOTE — PROGRESS NOTES
Patient, Zoë Enrique (MRN #[id number]), presented with a recent Platelet count less than 150 K/uL consistent with the definition of thrombocytopenia (ICD10 - D69.6).    Platelets   Date Value Ref Range Status   08/15/2019 116 (L) 150 - 350 K/uL Final     The patient's thrombocytopenia was monitored, evaluated, addressed and/or treated. This addendum to the medical record is made on 01/23/2020.   Wound Care: Petrolatum

## 2023-05-17 ENCOUNTER — OFFICE VISIT (OUTPATIENT)
Dept: NEUROLOGY | Facility: CLINIC | Age: 73
End: 2023-05-17
Payer: MEDICARE

## 2023-05-17 VITALS
SYSTOLIC BLOOD PRESSURE: 108 MMHG | HEIGHT: 68 IN | DIASTOLIC BLOOD PRESSURE: 72 MMHG | WEIGHT: 158 LBS | HEART RATE: 60 BPM | BODY MASS INDEX: 23.95 KG/M2

## 2023-05-17 DIAGNOSIS — I48.0 PAROXYSMAL ATRIAL FIBRILLATION: ICD-10-CM

## 2023-05-17 DIAGNOSIS — G43.709 CHRONIC MIGRAINE WITHOUT AURA WITHOUT STATUS MIGRAINOSUS, NOT INTRACTABLE: Primary | ICD-10-CM

## 2023-05-17 PROCEDURE — 1160F RVW MEDS BY RX/DR IN RCRD: CPT | Mod: CPTII,,, | Performed by: PSYCHIATRY & NEUROLOGY

## 2023-05-17 PROCEDURE — 1101F PT FALLS ASSESS-DOCD LE1/YR: CPT | Mod: CPTII,,, | Performed by: PSYCHIATRY & NEUROLOGY

## 2023-05-17 PROCEDURE — 3074F SYST BP LT 130 MM HG: CPT | Mod: CPTII,,, | Performed by: PSYCHIATRY & NEUROLOGY

## 2023-05-17 PROCEDURE — 3078F PR MOST RECENT DIASTOLIC BLOOD PRESSURE < 80 MM HG: ICD-10-PCS | Mod: CPTII,,, | Performed by: PSYCHIATRY & NEUROLOGY

## 2023-05-17 PROCEDURE — 3078F DIAST BP <80 MM HG: CPT | Mod: CPTII,,, | Performed by: PSYCHIATRY & NEUROLOGY

## 2023-05-17 PROCEDURE — 3288F FALL RISK ASSESSMENT DOCD: CPT | Mod: CPTII,,, | Performed by: PSYCHIATRY & NEUROLOGY

## 2023-05-17 PROCEDURE — 1125F AMNT PAIN NOTED PAIN PRSNT: CPT | Mod: CPTII,,, | Performed by: PSYCHIATRY & NEUROLOGY

## 2023-05-17 PROCEDURE — 1125F PR PAIN SEVERITY QUANTIFIED, PAIN PRESENT: ICD-10-PCS | Mod: CPTII,,, | Performed by: PSYCHIATRY & NEUROLOGY

## 2023-05-17 PROCEDURE — 3074F PR MOST RECENT SYSTOLIC BLOOD PRESSURE < 130 MM HG: ICD-10-PCS | Mod: CPTII,,, | Performed by: PSYCHIATRY & NEUROLOGY

## 2023-05-17 PROCEDURE — 99999 PR PBB SHADOW E&M-EST. PATIENT-LVL III: ICD-10-PCS | Mod: PBBFAC,,, | Performed by: PSYCHIATRY & NEUROLOGY

## 2023-05-17 PROCEDURE — 1101F PR PT FALLS ASSESS DOC 0-1 FALLS W/OUT INJ PAST YR: ICD-10-PCS | Mod: CPTII,,, | Performed by: PSYCHIATRY & NEUROLOGY

## 2023-05-17 PROCEDURE — 99213 PR OFFICE/OUTPT VISIT, EST, LEVL III, 20-29 MIN: ICD-10-PCS | Mod: ,,, | Performed by: PSYCHIATRY & NEUROLOGY

## 2023-05-17 PROCEDURE — 99999 PR PBB SHADOW E&M-EST. PATIENT-LVL III: CPT | Mod: PBBFAC,,, | Performed by: PSYCHIATRY & NEUROLOGY

## 2023-05-17 PROCEDURE — 99213 OFFICE O/P EST LOW 20 MIN: CPT | Mod: ,,, | Performed by: PSYCHIATRY & NEUROLOGY

## 2023-05-17 PROCEDURE — 1160F PR REVIEW ALL MEDS BY PRESCRIBER/CLIN PHARMACIST DOCUMENTED: ICD-10-PCS | Mod: CPTII,,, | Performed by: PSYCHIATRY & NEUROLOGY

## 2023-05-17 PROCEDURE — 3008F PR BODY MASS INDEX (BMI) DOCUMENTED: ICD-10-PCS | Mod: CPTII,,, | Performed by: PSYCHIATRY & NEUROLOGY

## 2023-05-17 PROCEDURE — 3008F BODY MASS INDEX DOCD: CPT | Mod: CPTII,,, | Performed by: PSYCHIATRY & NEUROLOGY

## 2023-05-17 PROCEDURE — 3288F PR FALLS RISK ASSESSMENT DOCUMENTED: ICD-10-PCS | Mod: CPTII,,, | Performed by: PSYCHIATRY & NEUROLOGY

## 2023-05-17 PROCEDURE — 1159F PR MEDICATION LIST DOCUMENTED IN MEDICAL RECORD: ICD-10-PCS | Mod: CPTII,,, | Performed by: PSYCHIATRY & NEUROLOGY

## 2023-05-17 PROCEDURE — 1159F MED LIST DOCD IN RCRD: CPT | Mod: CPTII,,, | Performed by: PSYCHIATRY & NEUROLOGY

## 2023-05-17 RX ORDER — RIZATRIPTAN BENZOATE 10 MG/1
10 TABLET, ORALLY DISINTEGRATING ORAL
Qty: 12 TABLET | Refills: 11 | Status: SHIPPED | OUTPATIENT
Start: 2023-05-17 | End: 2023-08-22

## 2023-05-17 NOTE — PROGRESS NOTES
[facility] - HEADACHE MEDICINE    CHIEF COMPLAINT?   Ms. Zoë Enrique chief concern is follow up for migraine.     Name of the referring physician No ref. provider found     Name of primary care provider Praveen Jeffery MD      Subjective:    05/17/23: Headaches largely stable in description and frequency until recently. Took antibiotics and steroids for sinus issues. Found that headaches have largely improved since then. 1 severe episode that actually responded well to Tylenol and dissipated in about an hour.  Given this improvement with other treatments, the patient is obviously wanting to improve acute treatment options if possible while continuing to avoid preventive medications if possible.    We went more in depth about sumatriptan and rizatriptan failures in the past.  These were over a decade ago the patient is open to trying them again as she does not recall any severe side effects.  Most recent naratriptan was not helpful.  Ubrelvy was cost prohibitive.    Reports 10 headache days since previous encounter.  One headache days in the last 2 weeks.    No new medical or neurological complaints otherwise.    ==============================================  04/04/23 HPI:  Ms. Zoë Enrique presents today to discuss their ongoing uncontrolled headaches.   Patient notes that she follows with ENT for chronic sinus complaints.  She has attributed many of her headaches to sinus and allergy issues in the past.  She is also had significant concerns about contribution from TMJ in the past; wears a  and has received Botox injections for TMJ specifically in the past with no effect on overall headache frequency or intensity.      Patient has been diagnosed with atrial fibrillation and is on chronic anticoagulation with Xarelto.  No known stroke history in the past.  Has undergone multiple cardiac ablations for AFib.    When did they become more of a problem: >10 yrs   # of Total headache days per month:  15  # of Migraine or severe days per month: 8-10  This rate has been present >3 months: yes  Intensity of average and most severe headaches: 5/10, 7-9/10  Duration of headache pain: >4 hrs   Quality of pain: pressure   Laterality: bilateral  Location: face and frontal   Photophobia and phonophobia: no   Nausea and vomiting: yes  Causes avoidance of physical activity: yes  Worsened by physical activity: unsure  Aura: no  Autonomic symptoms: none  Family Hx of migraines: no  Supplements: eye vitamins   Birth Control/Hormones: estrogen cream   Have qualities and features been stable for >2 years: yes    Preventive Medications failed: lyrica (>3 months, ineffective), gabapentin (>3 months, ineffective, dizziness), GONB/TNB - 5 days of relief, lexapro (>3 months, ineffective)  Acute medications failed: NSAIDS contraindicated, norco, sumatriptan (>3 months, ineffective), rizatriptan (>3 months, ineffective)    OTC Medications: tylenol   Is medication overuse contributing to the patient's current migraine burden: <10 days per month    Hx of (Bolded items are positive): depression, anxiety, fibromyalgia, autoimmune disorder, head trauma, neurological infection, glaucoma, asthma, nephrolithiasis, MI, Stroke, Raynaud's phen., atrial fibrillation on chronic anticoagulation (Xarelto)    Current HA Regimen:  Norco  Tylenol     ==============================================  LATEST IMAGING:  Reports normal MRI many years ago for headaches; nothing available for review    CURRENT MEDS:  Current Outpatient Medications   Medication Sig Dispense Refill    acetaminophen (TYLENOL) 650 MG TbSR Take 650 mg by mouth every 8 (eight) hours.      docusate sodium (COLACE) 250 MG capsule Take 250 mg by mouth 2 (two) times daily.       estradioL (ESTRACE) 0.01 % (0.1 mg/gram) vaginal cream Place 1 g vaginally every other day. 42 g 3    famotidine (PEPCID) 20 MG tablet Take 1 tablet (20 mg total) by mouth 2 (two) times daily. 60 tablet 4     "HYDROcodone-acetaminophen (NORCO) 5-325 mg per tablet Take 1 tablet by mouth every 6 (six) hours as needed for Pain. Pt states takes PRN for headaches 28 tablet 0    ipratropium (ATROVENT) 21 mcg (0.03 %) nasal spray 2 sprays by Each Nostril route 3 (three) times daily. 30 mL 3    levothyroxine (SYNTHROID) 25 MCG tablet Take 1 tablet (25 mcg total) by mouth once daily. 90 tablet 3    olopatadine (PATANOL) 0.1 % ophthalmic solution 1 drop 2 (two) times daily.      rivaroxaban (XARELTO) 20 mg Tab Take 1 tablet (20 mg total) by mouth once daily. 90 tablet 3    vit C/E/Zn/coppr/lutein/zeaxan (PRESERVISION AREDS-2 ORAL)       levoFLOXacin (LEVAQUIN) 750 MG tablet Take 1 tablet (750 mg total) by mouth once daily. 7 tablet 0    methylPREDNISolone (MEDROL DOSEPACK) 4 mg tablet use as directed 21 each 0    rizatriptan (MAXALT-MLT) 10 MG disintegrating tablet Take 1 tablet (10 mg total) by mouth as needed for Migraine. May repeat in 2 hours if needed. Max 20mg/24hrs 12 tablet 11     No current facility-administered medications for this visit.     Facility-Administered Medications Ordered in Other Visits   Medication Dose Route Frequency Provider Last Rate Last Admin    sodium chloride 0.9% flush 5 mL  5 mL Intravenous PRN Aleksandra Taylor NP         ALLERGIES:  Review of patient's allergies indicates:   Allergen Reactions    Morphine Nausea And Vomiting    Succinylcholine      Other reaction(s): v tach     Objective:  PHYSICAL EXAMINATION??   Zoë Enrique is a 73 y.o. female patient who has the following vital signs with   Vitals:    05/17/23 0740   BP: 108/72   Pulse: 60   Weight: 71.7 kg (158 lb)   Height: 5' 8" (1.727 m)     , body surface area is 1.85 meters squared.     General: female in NAD, alert and awake, Aox3, well groomed. ?    ? ?     Neurological Examination.    Mental status: AA&O x3; Affect/mood is euthymic/congruent; no aphasia      Cranial Nerves: II-XII grossly intact bilaterally.     Muscle Function:  " Full and symmetric use of bilateral upper and lower extremities against gravity     Gait: adequate casual gait with stride length and arm swing WNL.      REVIEW   We reviewed their current medical history, medications, allergies, past medical history, surgical history, social history, family history, and review of systems, and updated all the information.     ASSESSMENT AND PLAN   Zohaib is a very pleasant, 73 y.o. with complicated diagnosis of     1. Chronic migraine without aura without status migrainosus, not intractable  rizatriptan (MAXALT-MLT) 10 MG disintegrating tablet      2. Paroxysmal atrial fibrillation          Zoë has improved headaches that met International Headache Society Criteria for chronic migraine without aura at the time of our original encounter.  Medication overuse is not likely contributing to this patient's headaches.     Patient feels not only severe disabling pain, but is also unable to function due to headaches when they are present.     My approach to every patient is multimodal.   I focused our discussion on addressing modifiable risk factors and trying to decrease them. After discussion with the patient, I recommend the followin. Preventive medications:    If headaches return with similar frequency and intensity to previous visits, we will explore options like venlafaxine memantine.  We discussed benefits, risks, side effects of these medications today.     2. Acute (abortive) medications- these medications are to be taken only at the onset of severe headache, and please limit the total of any combination of these medications to less than 10 days per month on average because they can cause medication overuse headaches.   Failed:   sumatriptan (>3 months, ineffective),   Naratriptan - ineffective    RESTART: Rizatriptan  RIZATRIPTAN (MAXALT) is used as migraine abortive used only when you feel a migraine coming. Use 10 mg at the onset of migraine; take another 10 mg in 2  hours if headache pain remains. Rizatriptan is more effective if taken with 2 Aleve blue capsules (Naproxen 440mg).   Do NOT take Maxalt more than 6-8 days per month as this can cause medication overuse headache.   Do NOT take Maxalt within 24 hours of taking any other triptan or within 24 hours of taking any ergotamine medication such as DHE or Migranal.    Pregnancy and Breastfeeding: FDA pregnancy category C. This means that it is not known whether this drug will harm an unborn baby.    It is not known whether this medication passes into breast milk.    Potential side effects:   Check with your doctor right away if any of the following side effects continue for more than 1 hour. Even if they go away in less than 1 hour, check with your doctor before using any more sumatriptan if any of the following side effects occur: chest pain (mild), heaviness, tightness, or pressure in chest and/or neck ,difficulty in swallowing ,pounding heartbeat , rash, hives, itching, or bumps on skin     3. I endorse continued use of chronic anticoagulation, Xarelto, for diagnosis of atrial fibrillation for stroke prevention.    4. Patient describes snoring and mild bifrontal headaches present many times upon waking.  This raises suspicion for obstructive sleep apnea.  We will revisit this topic at follow-up as well when the patient has settled on new acute medication.  Consider sleep medicine referral for MICHELLE evaluation.    5. DIET: I recommend a diet that heavily favors fruits and vegetables while reducing carbs. More information is available upon request.     6. EXERCISE: Gentle movement exercises, concentrate on combination of muscle building and aerobic. I also recommend adding gentle Yoga therapy. The goal is 150 minutes per week of moderate to rigorous exercise, but you should start at your own pace and progress slowly and safely as discussed today.    7. ANXIETY/STRESS- Control stressors with yoga, meditation, counseling, or  other methods of mindfulness.     8. SLEEP- aim for 7-8 hrs of restful sleep per night.     Benefits, side effects, and alternatives were discussed extensively with the patient.?     Zoë verbally endorsed understanding of all the recommendations.?     she is going to follow up with me in the future as needed    I spent a total of 25 minutes on the day of the visit. This includes face to face time and non-face to face time preparing to see the patient (eg, review of tests), obtaining and/or reviewing separately obtained history, documenting clinical information in the electronic or other health record, independently interpreting results and communicating results to the patient/family/caregiver, or care coordinator.    A dictation device was used to produce this documentation which can sometimes result in grammatical errors or the replacement of similar sounding words.    Minor Thomason, DO  Headache Medicine

## 2023-05-18 ENCOUNTER — OFFICE VISIT (OUTPATIENT)
Dept: ORTHOPEDICS | Facility: CLINIC | Age: 73
End: 2023-05-18
Payer: MEDICARE

## 2023-05-18 VITALS — WEIGHT: 155 LBS | HEIGHT: 68 IN | BODY MASS INDEX: 23.49 KG/M2

## 2023-05-18 DIAGNOSIS — M65.352 TRIGGER LITTLE FINGER OF LEFT HAND: Primary | ICD-10-CM

## 2023-05-18 DIAGNOSIS — M65.331 TRIGGER FINGER, RIGHT MIDDLE FINGER: ICD-10-CM

## 2023-05-18 PROCEDURE — 3008F BODY MASS INDEX DOCD: CPT | Mod: CPTII,,, | Performed by: ORTHOPAEDIC SURGERY

## 2023-05-18 PROCEDURE — 99213 PR OFFICE/OUTPT VISIT, EST, LEVL III, 20-29 MIN: ICD-10-PCS | Mod: 25,,, | Performed by: ORTHOPAEDIC SURGERY

## 2023-05-18 PROCEDURE — 99999 PR PBB SHADOW E&M-EST. PATIENT-LVL III: ICD-10-PCS | Mod: PBBFAC,,, | Performed by: ORTHOPAEDIC SURGERY

## 2023-05-18 PROCEDURE — 1159F MED LIST DOCD IN RCRD: CPT | Mod: CPTII,,, | Performed by: ORTHOPAEDIC SURGERY

## 2023-05-18 PROCEDURE — 1125F PR PAIN SEVERITY QUANTIFIED, PAIN PRESENT: ICD-10-PCS | Mod: CPTII,,, | Performed by: ORTHOPAEDIC SURGERY

## 2023-05-18 PROCEDURE — 1159F PR MEDICATION LIST DOCUMENTED IN MEDICAL RECORD: ICD-10-PCS | Mod: CPTII,,, | Performed by: ORTHOPAEDIC SURGERY

## 2023-05-18 PROCEDURE — 3288F PR FALLS RISK ASSESSMENT DOCUMENTED: ICD-10-PCS | Mod: CPTII,,, | Performed by: ORTHOPAEDIC SURGERY

## 2023-05-18 PROCEDURE — 3008F PR BODY MASS INDEX (BMI) DOCUMENTED: ICD-10-PCS | Mod: CPTII,,, | Performed by: ORTHOPAEDIC SURGERY

## 2023-05-18 PROCEDURE — 99213 OFFICE O/P EST LOW 20 MIN: CPT | Mod: 25,,, | Performed by: ORTHOPAEDIC SURGERY

## 2023-05-18 PROCEDURE — 1101F PT FALLS ASSESS-DOCD LE1/YR: CPT | Mod: CPTII,,, | Performed by: ORTHOPAEDIC SURGERY

## 2023-05-18 PROCEDURE — 20550 NJX 1 TENDON SHEATH/LIGAMENT: CPT | Mod: 50,,, | Performed by: ORTHOPAEDIC SURGERY

## 2023-05-18 PROCEDURE — 20550 TENDON SHEATH: ICD-10-PCS | Mod: 50,,, | Performed by: ORTHOPAEDIC SURGERY

## 2023-05-18 PROCEDURE — 99999 PR PBB SHADOW E&M-EST. PATIENT-LVL III: CPT | Mod: PBBFAC,,, | Performed by: ORTHOPAEDIC SURGERY

## 2023-05-18 PROCEDURE — 3288F FALL RISK ASSESSMENT DOCD: CPT | Mod: CPTII,,, | Performed by: ORTHOPAEDIC SURGERY

## 2023-05-18 PROCEDURE — 1101F PR PT FALLS ASSESS DOC 0-1 FALLS W/OUT INJ PAST YR: ICD-10-PCS | Mod: CPTII,,, | Performed by: ORTHOPAEDIC SURGERY

## 2023-05-18 PROCEDURE — 1125F AMNT PAIN NOTED PAIN PRSNT: CPT | Mod: CPTII,,, | Performed by: ORTHOPAEDIC SURGERY

## 2023-05-18 RX ORDER — LIDOCAINE HYDROCHLORIDE 10 MG/ML
0.5 INJECTION INFILTRATION; PERINEURAL
Status: DISCONTINUED | OUTPATIENT
Start: 2023-05-18 | End: 2023-05-18 | Stop reason: HOSPADM

## 2023-05-18 RX ORDER — TRIAMCINOLONE ACETONIDE 40 MG/ML
20 INJECTION, SUSPENSION INTRA-ARTICULAR; INTRAMUSCULAR
Status: DISCONTINUED | OUTPATIENT
Start: 2023-05-18 | End: 2023-05-18 | Stop reason: HOSPADM

## 2023-05-18 RX ADMIN — TRIAMCINOLONE ACETONIDE 20 MG: 40 INJECTION, SUSPENSION INTRA-ARTICULAR; INTRAMUSCULAR at 02:05

## 2023-05-18 RX ADMIN — LIDOCAINE HYDROCHLORIDE 0.5 ML: 10 INJECTION INFILTRATION; PERINEURAL at 02:05

## 2023-05-18 NOTE — PROGRESS NOTES
Subjective:      Patient ID: Zoë Enrique is a 73 y.o. female.    Chief Complaint: Hand Pain      HPI: Zoë Enrique is here with new complaints.  She reports painful locking of the left small finger and the right middle finger x1 month.  Of note she is status post left middle trigger finger release and left de Quervain release and doing very well.  She has tried topical anti-inflammatories, Tylenol and tramadol for this with minimal improvement.  She is also tried warm water soaks with moderate improvement.  She denies any numbness and tingling.    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 , Disp: , Rfl:     estradioL (ESTRACE) 0.01 % (0.1 mg/gram) vaginal cream, Place 1 g vaginally every other day., Disp: 42 g, Rfl: 3    famotidine (PEPCID) 20 MG tablet, Take 1 tablet (20 mg total) by mouth 2 (two) times daily., Disp: 60 tablet, Rfl: 4    HYDROcodone-acetaminophen (NORCO) 5-325 mg per tablet, Take 1 tablet by mouth every 6 (six) hours as needed for Pain. Pt states takes PRN for headaches, Disp: 28 tablet, Rfl: 0    ipratropium (ATROVENT) 21 mcg (0.03 %) nasal spray, 2 sprays by Each Nostril route 3 (three) times daily., Disp: 30 mL, Rfl: 3    levoFLOXacin (LEVAQUIN) 750 MG tablet, Take 1 tablet (750 mg total) by mouth once daily., Disp: 7 tablet, Rfl: 0    levothyroxine (SYNTHROID) 25 MCG tablet, Take 1 tablet (25 mcg total) by mouth once daily., Disp: 90 tablet,  "Rfl: 3    methylPREDNISolone (MEDROL DOSEPACK) 4 mg tablet, use as directed, Disp: 21 each, Rfl: 0    olopatadine (PATANOL) 0.1 % ophthalmic solution, 1 drop 2 (two) times daily., Disp: , Rfl:     rivaroxaban (XARELTO) 20 mg Tab, Take 1 tablet (20 mg total) by mouth once daily., Disp: 90 tablet, Rfl: 3    rizatriptan (MAXALT-MLT) 10 MG disintegrating tablet, Take 1 tablet (10 mg total) by mouth as needed for Migraine. May repeat in 2 hours if needed. Max 20mg/24hrs, Disp: 12 tablet, Rfl: 11    vit C/E/Zn/coppr/lutein/zeaxan (PRESERVISION AREDS-2 ORAL), , Disp: , Rfl:   No current facility-administered medications for this visit.    Facility-Administered Medications Ordered in Other Visits:     sodium chloride 0.9% flush 5 mL, 5 mL, Intravenous, PRN, Aleksandra Taylor NP  Review of patient's allergies indicates:   Allergen Reactions    Morphine Nausea And Vomiting    Succinylcholine      Other reaction(s): v tach       Ht 5' 8" (1.727 m)   Wt 70.3 kg (155 lb)   BMI 23.57 kg/m²     Review of Systems   Constitutional: Negative for chills and fever.   Cardiovascular:  Negative for chest pain and palpitations.   Respiratory:  Negative for shortness of breath and wheezing.    Skin:  Negative for poor wound healing and rash.   Musculoskeletal:  Positive for joint swelling and stiffness.   Gastrointestinal:  Negative for nausea and vomiting.   Genitourinary:  Negative for dysuria and hematuria.   Neurological:  Negative for seizures and tremors.   Psychiatric/Behavioral:  Negative for altered mental status.    Allergic/Immunologic: Negative for environmental allergies and persistent infections.       Objective:    Ortho Exam       GEN: Well developed, well nourished female. AAOX3. No acute distress.   Normocephalic, atraumatic.   LISA  Breathing unlabored.  Mood and affect appropriate.     Bilateral UE: Skin intact. Swelling minimal. TTP flexor tendon of the left small and right middle fingers. ROM full, locking noted " on the small finger. Sensation intact. Tinels negative. Pulses present. Cap refill brisk.  strength intact. Special tests:  None    Assessment:     Imaging:  No new imaging        1. Trigger little finger of left hand    2. Trigger finger, right middle finger          Plan:       Orders Placed This Encounter    Tendon Sheath    Tendon Sheath     I explained the nature of trigger finger to the patient.  I recommend informed corticosteroid injection without complication.  Patient tolerated well.  Continue Tylenol and warm water soaks as needed    Follow up in about 6 weeks (around 6/29/2023).

## 2023-05-18 NOTE — PROCEDURES
Tendon Sheath    Date/Time: 5/18/2023 2:30 PM  Performed by: Aby Green PA-C  Authorized by: Aby Green PA-C     Consent Done?:  Yes (Verbal)  Indications:  Pain  Site marked: the procedure site was marked    Timeout: prior to procedure the correct patient, procedure, and site was verified    Prep: patient was prepped and draped in usual sterile fashion      Location:  Small finger  Site:  L small flexor tendon sheath  Approach:  Volar  Medications:  0.5 mL LIDOcaine HCL 10 mg/ml (1%) 10 mg/mL (1 %); 20 mg triamcinolone acetonide 40 mg/mL  Patient tolerance:  Patient tolerated the procedure well with no immediate complications  Tendon Sheath    Date/Time: 5/18/2023 2:30 PM  Performed by: Aby Green PA-C  Authorized by: Aby Green PA-C     Indications:  Pain  Site marked: the procedure site was marked    Timeout: prior to procedure the correct patient, procedure, and site was verified    Prep: patient was prepped and draped in usual sterile fashion      Location:  Long finger  Site:  R long flexor tendon sheath  Needle size:  25 G  Approach:  Volar  Medications:  0.5 mL LIDOcaine HCL 10 mg/ml (1%) 10 mg/mL (1 %); 20 mg triamcinolone acetonide 40 mg/mL  Patient tolerance:  Patient tolerated the procedure well with no immediate complications

## 2023-05-30 DIAGNOSIS — I47.10 PSVT (PAROXYSMAL SUPRAVENTRICULAR TACHYCARDIA): Primary | ICD-10-CM

## 2023-06-05 ENCOUNTER — HOSPITAL ENCOUNTER (OUTPATIENT)
Dept: CARDIOLOGY | Facility: HOSPITAL | Age: 73
Discharge: HOME | End: 2023-06-05
Attending: INTERNAL MEDICINE
Payer: MEDICARE

## 2023-06-05 VITALS — HEIGHT: 68 IN | HEART RATE: 68 BPM | WEIGHT: 155 LBS | BODY MASS INDEX: 23.49 KG/M2

## 2023-06-05 DIAGNOSIS — I48.0 PAROXYSMAL ATRIAL FIBRILLATION: Primary | ICD-10-CM

## 2023-06-05 DIAGNOSIS — I48.0 PAROXYSMAL ATRIAL FIBRILLATION: ICD-10-CM

## 2023-06-05 LAB
ASCENDING AORTA: 2.88 CM
AV INDEX (PROSTH): 0.59
AV MEAN GRADIENT: 5 MMHG
AV PEAK GRADIENT: 9 MMHG
AV VALVE AREA: 2.49 CM2
AV VELOCITY RATIO: 0.59
BSA FOR ECHO PROCEDURE: 1.84 M2
CV ECHO LV RWT: 0.25 CM
DOP CALC AO PEAK VEL: 1.48 M/S
DOP CALC AO VTI: 34.42 CM
DOP CALC LVOT AREA: 4.2 CM2
DOP CALC LVOT DIAMETER: 2.32 CM
DOP CALC LVOT PEAK VEL: 0.87 M/S
DOP CALC LVOT STROKE VOLUME: 85.64 CM3
DOP CALCLVOT PEAK VEL VTI: 20.27 CM
E WAVE DECELERATION TIME: 178.34 MSEC
E/A RATIO: 2.73
E/E' RATIO: 12.82 M/S
ECHO LV POSTERIOR WALL: 0.68 CM (ref 0.6–1.1)
EJECTION FRACTION: 55 %
FRACTIONAL SHORTENING: 37 % (ref 28–44)
INTERVENTRICULAR SEPTUM: 0.77 CM (ref 0.6–1.1)
IVRT: 88.49 MSEC
LA MAJOR: 5.87 CM
LA MINOR: 5.78 CM
LA WIDTH: 4.97 CM
LEFT ATRIUM SIZE: 4.3 CM
LEFT ATRIUM VOLUME INDEX MOD: 41.3 ML/M2
LEFT ATRIUM VOLUME INDEX: 57.8 ML/M2
LEFT ATRIUM VOLUME MOD: 75.52 CM3
LEFT ATRIUM VOLUME: 105.81 CM3
LEFT INTERNAL DIMENSION IN SYSTOLE: 3.46 CM (ref 2.1–4)
LEFT VENTRICLE DIASTOLIC VOLUME INDEX: 79.05 ML/M2
LEFT VENTRICLE DIASTOLIC VOLUME: 144.66 ML
LEFT VENTRICLE MASS INDEX: 76 G/M2
LEFT VENTRICLE SYSTOLIC VOLUME INDEX: 27 ML/M2
LEFT VENTRICLE SYSTOLIC VOLUME: 49.34 ML
LEFT VENTRICULAR INTERNAL DIMENSION IN DIASTOLE: 5.46 CM (ref 3.5–6)
LEFT VENTRICULAR MASS: 139.68 G
LV LATERAL E/E' RATIO: 12.11 M/S
LV SEPTAL E/E' RATIO: 13.63 M/S
MV A" WAVE DURATION": 10.28 MSEC
MV PEAK A VEL: 0.4 M/S
MV PEAK E VEL: 1.09 M/S
MV STENOSIS PRESSURE HALF TIME: 51.72 MS
MV VALVE AREA P 1/2 METHOD: 4.25 CM2
PISA TR MAX VEL: 2.8 M/S
PULM VEIN S/D RATIO: 0.8
PV PEAK D VEL: 0.65 M/S
PV PEAK S VEL: 0.52 M/S
RA MAJOR: 6.37 CM
RA PRESSURE: 8 MMHG
RA WIDTH: 3.6 CM
RIGHT VENTRICULAR END-DIASTOLIC DIMENSION: 3.33 CM
SINUS: 2.51 CM
STJ: 2.75 CM
TDI LATERAL: 0.09 M/S
TDI SEPTAL: 0.08 M/S
TDI: 0.09 M/S
TR MAX PG: 31 MMHG
TRICUSPID ANNULAR PLANE SYSTOLIC EXCURSION: 1.74 CM
TV REST PULMONARY ARTERY PRESSURE: 39 MMHG

## 2023-06-05 PROCEDURE — 93306 TTE W/DOPPLER COMPLETE: CPT | Mod: 26,,, | Performed by: INTERNAL MEDICINE

## 2023-06-05 PROCEDURE — 93306 ECHO (CUPID ONLY): ICD-10-PCS | Mod: 26,,, | Performed by: INTERNAL MEDICINE

## 2023-06-05 PROCEDURE — 93306 TTE W/DOPPLER COMPLETE: CPT

## 2023-06-21 ENCOUNTER — OFFICE VISIT (OUTPATIENT)
Dept: ELECTROPHYSIOLOGY | Facility: CLINIC | Age: 73
End: 2023-06-21
Payer: MEDICARE

## 2023-06-21 ENCOUNTER — HOSPITAL ENCOUNTER (OUTPATIENT)
Dept: CARDIOLOGY | Facility: CLINIC | Age: 73
Discharge: HOME | End: 2023-06-21
Payer: MEDICARE

## 2023-06-21 VITALS
HEART RATE: 75 BPM | BODY MASS INDEX: 23.56 KG/M2 | SYSTOLIC BLOOD PRESSURE: 126 MMHG | HEIGHT: 68 IN | DIASTOLIC BLOOD PRESSURE: 70 MMHG | WEIGHT: 155.44 LBS

## 2023-06-21 DIAGNOSIS — I47.10 PSVT (PAROXYSMAL SUPRAVENTRICULAR TACHYCARDIA): ICD-10-CM

## 2023-06-21 DIAGNOSIS — I70.0 AORTIC ATHEROSCLEROSIS: Primary | ICD-10-CM

## 2023-06-21 DIAGNOSIS — I48.0 PAROXYSMAL ATRIAL FIBRILLATION: ICD-10-CM

## 2023-06-21 PROCEDURE — 1101F PT FALLS ASSESS-DOCD LE1/YR: CPT | Mod: CPTII,S$GLB,, | Performed by: INTERNAL MEDICINE

## 2023-06-21 PROCEDURE — 93005 ELECTROCARDIOGRAM TRACING: CPT | Mod: S$GLB,,, | Performed by: INTERNAL MEDICINE

## 2023-06-21 PROCEDURE — 1125F PR PAIN SEVERITY QUANTIFIED, PAIN PRESENT: ICD-10-PCS | Mod: CPTII,S$GLB,, | Performed by: INTERNAL MEDICINE

## 2023-06-21 PROCEDURE — 99999 PR PBB SHADOW E&M-EST. PATIENT-LVL III: ICD-10-PCS | Mod: PBBFAC,,, | Performed by: INTERNAL MEDICINE

## 2023-06-21 PROCEDURE — 99215 OFFICE O/P EST HI 40 MIN: CPT | Mod: S$GLB,,, | Performed by: INTERNAL MEDICINE

## 2023-06-21 PROCEDURE — 1159F PR MEDICATION LIST DOCUMENTED IN MEDICAL RECORD: ICD-10-PCS | Mod: CPTII,S$GLB,, | Performed by: INTERNAL MEDICINE

## 2023-06-21 PROCEDURE — 3288F PR FALLS RISK ASSESSMENT DOCUMENTED: ICD-10-PCS | Mod: CPTII,S$GLB,, | Performed by: INTERNAL MEDICINE

## 2023-06-21 PROCEDURE — 93010 RHYTHM STRIP: ICD-10-PCS | Mod: S$GLB,,, | Performed by: INTERNAL MEDICINE

## 2023-06-21 PROCEDURE — 3078F DIAST BP <80 MM HG: CPT | Mod: CPTII,S$GLB,, | Performed by: INTERNAL MEDICINE

## 2023-06-21 PROCEDURE — 99999 PR PBB SHADOW E&M-EST. PATIENT-LVL III: CPT | Mod: PBBFAC,,, | Performed by: INTERNAL MEDICINE

## 2023-06-21 PROCEDURE — 3078F PR MOST RECENT DIASTOLIC BLOOD PRESSURE < 80 MM HG: ICD-10-PCS | Mod: CPTII,S$GLB,, | Performed by: INTERNAL MEDICINE

## 2023-06-21 PROCEDURE — 1160F PR REVIEW ALL MEDS BY PRESCRIBER/CLIN PHARMACIST DOCUMENTED: ICD-10-PCS | Mod: CPTII,S$GLB,, | Performed by: INTERNAL MEDICINE

## 2023-06-21 PROCEDURE — 1160F RVW MEDS BY RX/DR IN RCRD: CPT | Mod: CPTII,S$GLB,, | Performed by: INTERNAL MEDICINE

## 2023-06-21 PROCEDURE — 1159F MED LIST DOCD IN RCRD: CPT | Mod: CPTII,S$GLB,, | Performed by: INTERNAL MEDICINE

## 2023-06-21 PROCEDURE — 93005 RHYTHM STRIP: ICD-10-PCS | Mod: S$GLB,,, | Performed by: INTERNAL MEDICINE

## 2023-06-21 PROCEDURE — 3074F PR MOST RECENT SYSTOLIC BLOOD PRESSURE < 130 MM HG: ICD-10-PCS | Mod: CPTII,S$GLB,, | Performed by: INTERNAL MEDICINE

## 2023-06-21 PROCEDURE — 1125F AMNT PAIN NOTED PAIN PRSNT: CPT | Mod: CPTII,S$GLB,, | Performed by: INTERNAL MEDICINE

## 2023-06-21 PROCEDURE — 3288F FALL RISK ASSESSMENT DOCD: CPT | Mod: CPTII,S$GLB,, | Performed by: INTERNAL MEDICINE

## 2023-06-21 PROCEDURE — 3008F BODY MASS INDEX DOCD: CPT | Mod: CPTII,S$GLB,, | Performed by: INTERNAL MEDICINE

## 2023-06-21 PROCEDURE — 3008F PR BODY MASS INDEX (BMI) DOCUMENTED: ICD-10-PCS | Mod: CPTII,S$GLB,, | Performed by: INTERNAL MEDICINE

## 2023-06-21 PROCEDURE — 99215 PR OFFICE/OUTPT VISIT, EST, LEVL V, 40-54 MIN: ICD-10-PCS | Mod: S$GLB,,, | Performed by: INTERNAL MEDICINE

## 2023-06-21 PROCEDURE — 93010 ELECTROCARDIOGRAM REPORT: CPT | Mod: S$GLB,,, | Performed by: INTERNAL MEDICINE

## 2023-06-21 PROCEDURE — 1101F PR PT FALLS ASSESS DOC 0-1 FALLS W/OUT INJ PAST YR: ICD-10-PCS | Mod: CPTII,S$GLB,, | Performed by: INTERNAL MEDICINE

## 2023-06-21 PROCEDURE — 3074F SYST BP LT 130 MM HG: CPT | Mod: CPTII,S$GLB,, | Performed by: INTERNAL MEDICINE

## 2023-06-21 RX ORDER — VERAPAMIL HYDROCHLORIDE 40 MG/1
TABLET ORAL
Qty: 30 TABLET | Refills: 11 | Status: SHIPPED | OUTPATIENT
Start: 2023-06-21
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
## 2023-06-22 ENCOUNTER — PATIENT MESSAGE (OUTPATIENT)
Dept: NEUROLOGY | Facility: CLINIC | Age: 73
End: 2023-06-22
Payer: MEDICARE

## 2023-06-27 ENCOUNTER — OFFICE VISIT (OUTPATIENT)
Dept: NEUROLOGY | Facility: CLINIC | Age: 73
End: 2023-06-27
Payer: MEDICARE

## 2023-06-27 VITALS
BODY MASS INDEX: 23.49 KG/M2 | DIASTOLIC BLOOD PRESSURE: 70 MMHG | WEIGHT: 155 LBS | HEART RATE: 75 BPM | HEIGHT: 68 IN | SYSTOLIC BLOOD PRESSURE: 126 MMHG

## 2023-06-27 DIAGNOSIS — G43.709 CHRONIC MIGRAINE WITHOUT AURA WITHOUT STATUS MIGRAINOSUS, NOT INTRACTABLE: Primary | ICD-10-CM

## 2023-06-27 PROCEDURE — 99999 PR PBB SHADOW E&M-EST. PATIENT-LVL III: CPT | Mod: PBBFAC,,,

## 2023-06-27 PROCEDURE — 1160F RVW MEDS BY RX/DR IN RCRD: CPT | Mod: CPTII,S$GLB,,

## 2023-06-27 PROCEDURE — 1125F AMNT PAIN NOTED PAIN PRSNT: CPT | Mod: CPTII,S$GLB,,

## 2023-06-27 PROCEDURE — 3288F PR FALLS RISK ASSESSMENT DOCUMENTED: ICD-10-PCS | Mod: CPTII,S$GLB,,

## 2023-06-27 PROCEDURE — 3074F SYST BP LT 130 MM HG: CPT | Mod: CPTII,S$GLB,,

## 2023-06-27 PROCEDURE — 99213 OFFICE O/P EST LOW 20 MIN: CPT | Mod: S$GLB,,,

## 2023-06-27 PROCEDURE — 1159F MED LIST DOCD IN RCRD: CPT | Mod: CPTII,S$GLB,,

## 2023-06-27 PROCEDURE — 3008F PR BODY MASS INDEX (BMI) DOCUMENTED: ICD-10-PCS | Mod: CPTII,S$GLB,,

## 2023-06-27 PROCEDURE — 3288F FALL RISK ASSESSMENT DOCD: CPT | Mod: CPTII,S$GLB,,

## 2023-06-27 PROCEDURE — 1101F PR PT FALLS ASSESS DOC 0-1 FALLS W/OUT INJ PAST YR: ICD-10-PCS | Mod: CPTII,S$GLB,,

## 2023-06-27 PROCEDURE — 3074F PR MOST RECENT SYSTOLIC BLOOD PRESSURE < 130 MM HG: ICD-10-PCS | Mod: CPTII,S$GLB,,

## 2023-06-27 PROCEDURE — 99999 PR PBB SHADOW E&M-EST. PATIENT-LVL III: ICD-10-PCS | Mod: PBBFAC,,,

## 2023-06-27 PROCEDURE — 1159F PR MEDICATION LIST DOCUMENTED IN MEDICAL RECORD: ICD-10-PCS | Mod: CPTII,S$GLB,,

## 2023-06-27 PROCEDURE — 1160F PR REVIEW ALL MEDS BY PRESCRIBER/CLIN PHARMACIST DOCUMENTED: ICD-10-PCS | Mod: CPTII,S$GLB,,

## 2023-06-27 PROCEDURE — 1125F PR PAIN SEVERITY QUANTIFIED, PAIN PRESENT: ICD-10-PCS | Mod: CPTII,S$GLB,,

## 2023-06-27 PROCEDURE — 1101F PT FALLS ASSESS-DOCD LE1/YR: CPT | Mod: CPTII,S$GLB,,

## 2023-06-27 PROCEDURE — 3078F DIAST BP <80 MM HG: CPT | Mod: CPTII,S$GLB,,

## 2023-06-27 PROCEDURE — 99213 PR OFFICE/OUTPT VISIT, EST, LEVL III, 20-29 MIN: ICD-10-PCS | Mod: S$GLB,,,

## 2023-06-27 PROCEDURE — 3078F PR MOST RECENT DIASTOLIC BLOOD PRESSURE < 80 MM HG: ICD-10-PCS | Mod: CPTII,S$GLB,,

## 2023-06-27 PROCEDURE — 3008F BODY MASS INDEX DOCD: CPT | Mod: CPTII,S$GLB,,

## 2023-06-27 RX ORDER — MEMANTINE HYDROCHLORIDE 5 MG/1
5 TABLET ORAL 2 TIMES DAILY
Qty: 60 TABLET | Refills: 11 | Status: SHIPPED | OUTPATIENT
Start: 2023-06-27 | End: 2023-11-17

## 2023-06-27 NOTE — PROGRESS NOTES
[facility]  [facility], [place]    Date: 6/27/23  Patient Name: Zoë Enrique   MRN: [id number]   PCP: Praveen Jeffery  Referring Provider: No ref. provider found    Chief Complaint: Follow up  Subjective:        HPI:   Ms. Zoë Enrique is a 73 y.o. female presenting for follow up migraine. She has been having frequent migraines surrounding the weather changes. Notes some of these will last 5 days. Rizatriptan and other triptans have provided no acute relief. She has on occasion taken a Norco without relief. We discussed that next step would be to initiate preventative regimen to reduce overall headache burden.    =============================================  Per documentation by Dr. Minor Thomason, DO on 5/17/23:  05/17/23: Headaches largely stable in description and frequency until recently. Took antibiotics and steroids for sinus issues. Found that headaches have largely improved since then. 1 severe episode that actually responded well to Tylenol and dissipated in about an hour.  Given this improvement with other treatments, the patient is obviously wanting to improve acute treatment options if possible while continuing to avoid preventive medications if possible.    We went more in depth about sumatriptan and rizatriptan failures in the past.  These were over a decade ago the patient is open to trying them again as she does not recall any severe side effects.  Most recent naratriptan was not helpful.  Ubrelvy was cost prohibitive.    Reports 10 headache days since previous encounter.  One headache days in the last 2 weeks.    No new medical or neurological complaints otherwise.    ==============================================  04/04/23 HPI:  Ms. Zoë Enrique presents today to discuss their ongoing uncontrolled headaches.   Patient notes that she follows with ENT for chronic sinus complaints.  She has attributed many of her headaches to sinus and allergy issues in the past.  She  is also had significant concerns about contribution from TMJ in the past; wears a  and has received Botox injections for TMJ specifically in the past with no effect on overall headache frequency or intensity.      Patient has been diagnosed with atrial fibrillation and is on chronic anticoagulation with Xarelto.  No known stroke history in the past.  Has undergone multiple cardiac ablations for AFib.    When did they become more of a problem: >10 yrs   # of Total headache days per month: 15  # of Migraine or severe days per month: 8-10  This rate has been present >3 months: yes  Intensity of average and most severe headaches: 5/10, 7-9/10  Duration of headache pain: >4 hrs   Quality of pain: pressure   Laterality: bilateral  Location: face and frontal   Photophobia and phonophobia: no   Nausea and vomiting: yes  Causes avoidance of physical activity: yes  Worsened by physical activity: unsure  Aura: no  Autonomic symptoms: none  Family Hx of migraines: no  Supplements: eye vitamins   Birth Control/Hormones: estrogen cream   Have qualities and features been stable for >2 years: yes    Preventive Medications failed: lyrica (>3 months, ineffective), gabapentin (>3 months, ineffective, dizziness), GONB/TNB - 5 days of relief, lexapro (>3 months, ineffective)  Acute medications failed: NSAIDS contraindicated, norco, sumatriptan (>3 months, ineffective), rizatriptan (>3 months, ineffective)    OTC Medications: tylenol   Is medication overuse contributing to the patient's current migraine burden: <10 days per month    Hx of (Bolded items are positive): depression, anxiety, fibromyalgia, autoimmune disorder, head trauma, neurological infection, glaucoma, asthma, nephrolithiasis, MI, Stroke, Raynaud's phen., atrial fibrillation on chronic anticoagulation (Xarelto)    Current HA Regimen:  Norco  Tylenol     PAST MEDICAL HISTORY:  Past Medical History:   Diagnosis Date    Abnormal Pap smear of cervix     Allergy      Atrial fibrillation 2017    Atrial flutter     Cancer     osteosarcoma of right femur    Diverticulosis     Hx of atrial flutter     Hx of mitral valve prolapse     Hypothyroidism     Migraines     Recurrent upper respiratory infection (URI)     Supraventricular tachycardia        PAST SURGICAL HISTORY:  Past Surgical History:   Procedure Laterality Date    ABLATION OF ARRHYTHMOGENIC FOCUS FOR ATRIAL FIBRILLATION N/A 2020    Procedure: ABLATION, ARRHYTHMOGENIC FOCUS, FOR ATRIAL FIBRILLATION;  Surgeon: Moe Seymour MD;  Location: [facility] EP LAB;  Service: Cardiology;  Laterality: N/A;  AFL/AF, KHANH, CTI, PVI, RFA, CARTO, GEN, DM, 3 PREP    AUGMENTATION OF BREAST Bilateral     35 yrs     BREAST SURGERY      augmentation    CARDIOVERSION  2020    Procedure: Cardioversion;  Surgeon: Moe Seymour MD;  Location: [facility] EP LAB;  Service: Cardiology;;     SECTION      COLON SURGERY      partial colon resection    COLONOSCOPY N/A 2021    Procedure: COLONOSCOPY;  Surgeon: Sahara Salgado MD;  Location: [facility] ENDO (4TH FLR);  Service: Endoscopy;  Laterality: N/A;  constipation prep- 7 days of miralax, 2 fulls of fulls, then day bf clears and split prep   covid test 3/5 pcw, prep instr emailed, antibiotics prophylactically, Xarelto hold x 2 days per Dr. Seymour-see telephone encounter 2/10 -ml    COLPOSCOPY      COSMETIC SURGERY      DE QUERVAIN'S RELEASE Left 2022    Procedure: RELEASE, HAND, FOR DEQUERVAIN'S TENOSYNOVITIS;  Surgeon: Toñito Adame Jr., MD;  Location: [facility];  Service: Orthopedics;  Laterality: Left;    ESOPHAGOGASTRODUODENOSCOPY N/A 2021    Procedure: EGD (ESOPHAGOGASTRODUODENOSCOPY);  Surgeon: Sahara Salgado MD;  Location: [facility] ENDO (4TH FLR);  Service: Endoscopy;  Laterality: N/A;    EYE SURGERY Bilateral 2016    Lasik, then cataracts extraction    JOINT REPLACEMENT Right , ,     KNEE ARTHROPLASTY Right      revison in 2009    KNEE SURGERY Right 1989    distal femoral replacing TK    OOPHORECTOMY      TOTAL ABDOMINAL HYSTERECTOMY  1994    TAHBSO    TRANSESOPHAGEAL ECHOCARDIOGRAPHY N/A 11/24/2020    Procedure: ECHOCARDIOGRAM, TRANSESOPHAGEAL;  Surgeon: Ramírez Ortez III, MD;  Location: [facility] EP LAB;  Service: Cardiology;  Laterality: N/A;    TREATMENT OF CARDIAC ARRHYTHMIA N/A 08/12/2019    Procedure: CARDIOVERSION;  Surgeon: Moe Seymour MD;  Location: [facility] EP LAB;  Service: Cardiology;  Laterality: N/A;  KHANH/DCCV/MAC/DM/3PREP/*ADMIT FOR TIKOSYN*    TRIGGER FINGER RELEASE Left 08/02/2022    Procedure: RELEASE, TRIGGER FINGER;  Surgeon: Toñito Adame Jr., MD;  Location: [facility] OR;  Service: Orthopedics;  Laterality: Left;  left middle finger       CURRENT MEDS:  Current Outpatient Medications   Medication Sig Dispense Refill    acetaminophen (TYLENOL) 650 MG TbSR Take 650 mg by mouth every 8 (eight) hours.      docusate sodium (COLACE) 250 MG capsule Take 250 mg by mouth 2 (two) times daily.       estradioL (ESTRACE) 0.01 % (0.1 mg/gram) vaginal cream Place 1 g vaginally every other day. 42 g 3    famotidine (PEPCID) 20 MG tablet Take 1 tablet (20 mg total) by mouth 2 (two) times daily. 60 tablet 4    HYDROcodone-acetaminophen (NORCO) 5-325 mg per tablet Take 1 tablet by mouth every 6 (six) hours as needed for Pain. Pt states takes PRN for headaches 28 tablet 0    ipratropium (ATROVENT) 21 mcg (0.03 %) nasal spray 2 sprays by Each Nostril route 3 (three) times daily. 30 mL 3    levoFLOXacin (LEVAQUIN) 750 MG tablet Take 1 tablet (750 mg total) by mouth once daily. 7 tablet 0    levothyroxine (SYNTHROID) 25 MCG tablet Take 1 tablet (25 mcg total) by mouth once daily. 90 tablet 3    olopatadine (PATANOL) 0.1 % ophthalmic solution 1 drop 2 (two) times daily.      rivaroxaban (XARELTO) 20 mg Tab Take 1 tablet (20 mg total) by mouth once daily. 90 tablet 3    verapamiL (CALAN) 40 MG Tab 1 tab PO prn for palpitations.  Maximum 2 doses per day, at least 6 hours apart. 30 tablet 11    vit C/E/Zn/coppr/lutein/zeaxan (PRESERVISION AREDS-2 ORAL)       memantine (NAMENDA) 5 MG Tab Take 1 tablet (5 mg total) by mouth 2 (two) times daily. Begin by taking one tablet daily for 7 days, the increase to one tablet 2 times daily 60 tablet 11    rizatriptan (MAXALT-MLT) 10 MG disintegrating tablet Take 1 tablet (10 mg total) by mouth as needed for Migraine. May repeat in 2 hours if needed. Max 20mg/24hrs 12 tablet 11     No current facility-administered medications for this visit.     Facility-Administered Medications Ordered in Other Visits   Medication Dose Route Frequency Provider Last Rate Last Admin    sodium chloride 0.9% flush 5 mL  5 mL Intravenous PRN Aleksandra Taylor NP           ALLERGIES:  Review of patient's allergies indicates:   Allergen Reactions    Morphine Nausea And Vomiting    Succinylcholine      Other reaction(s): v tach       FAMILY HISTORY:  Family History   Problem Relation Age of Onset    Diabetes Sister         Type I    Heart disease Sister         Atrrial fib, mitral valve repair    Hearing loss Father     Heart disease Father         Angina, AAA    Stroke Father 93    Heart disease Mother         Atrial fib    Diabetes Mother         Type II    Heart disease Brother         Atrial fib    Asthma Brother     Diverticulosis Brother     Heart disease Brother         Atrial fib    Diverticulosis Brother     No Known Problems Daughter     Breast cancer Neg Hx     Ovarian cancer Neg Hx     Colon cancer Neg Hx     Cancer Neg Hx     Hypertension Neg Hx        SOCIAL HISTORY:  Social History     Tobacco Use    Smoking status: Never    Smokeless tobacco: Never   Substance Use Topics    Alcohol use: Yes     Alcohol/week: 1.0 standard drink     Types: 1 Standard drinks or equivalent per week     Comment: 1-2 per month    Drug use: No     Comment: CBD oils for headache       Review of Systems:  Gen: no fever, no chills, no  "generalized feeling of weakness   HEENT: no double vision, no blurred vision, no eye pain, no eye exudates. no nasal congestion,   no traumatic injury of head, no neck pain, no neck stiffness. no photophobia or phonophobia at this time. ?    Heart: no chest pain, no SOB    Lungs: no SOB, no cough    MSK: no weakness of legs, intact ROM    ABD: no abd pain, no N/V/D/C, no difficulty with defecation.    Extremities: No leg pain, no edema.       Objective:     Vitals:    06/27/23 0756   BP: 126/70   Pulse: 75   Weight: 70.3 kg (155 lb)   Height: 5' 8" (1.727 m)       General: Female in NAD, alert and awake, Aox3, well groomed. ?    ? ?    HEENT: Head is NC/AT EOMI, pupil size: 4 mm B/L, no nystagmus noted; hearing grossly intact b/l. Mucous membrane moist, uvula midline, no pharyngeal erythema, exudates or discharges.      Neck: Supple. no nuchal rigidity.      Cardiovascular: well perfused, no cyanosis        Respiratory: Symmetric chest rise noted       Musculoskeletal: Muscle tone noted to be adequate for patient age, muscle mass is WNL. No spontaneous movements or fasciculations noted during this examination.       Extremities: No pedal edema or calf tenderness. No cogwheel rigidity noted on B/L UE extremities.       Neurological Examination.    Mental status: AA&O x3; Affect/mood is euthymic/congruent; no aphasia noted during examination. Patient answers simple questions appropriately & follows simple commands; no dysarthria or expressive aphasia; no benito-neglect or extinction. Vocabulary/word finding: excellent.       Cranial Nerves: II-XII grossly intact. visual fields intact to confrontation, EOMI, no nystagmus, PERRLA,?facial muscles symmetric and no facial droop noted, patient hearing is grossly intact b/l, ?facial sensation to sharp and light touch grossly intact B/L. Patient smiles, frowns, closes eyes ?forcefully uneventfully. Uvula midline and no difficulty with pronunciation. No SCM/trapezius/muscle of " "mastication weakness noted B/L. Patient has adequate control of tongue and may protrude it and move it adequately.       Muscle Function: Tone WNL and Muscle bulk WNL. Symmetric use of bilateral upper and lower extremities against gravity.     Gait: adequate casual gait with stride length and arm swing WNL.         Assessment:   Zoë Enrique is a 73 y.o. female presenting for follow up chronic migraine.      Plan:     Problem List Items Addressed This Visit    None  Visit Diagnoses       Chronic migraine without aura without status migrainosus, not intractable    -  Primary    Relevant Medications    memantine (NAMENDA) 5 MG Tab            1. Preventive medications:    Start Memantine 5 mg BID  Namenda( Memantine) is a preventive medication for migraine prevention.?   We recommend that you start Namenda 5 mg??   We recommend using namenda (memantine) as preventive therapy for at least 1 year if you have no significant side effects.?   We will prescribe the medication until you are on a stable dose, and than the expectation is for you to have the prescription managed by your primary care provider.     Timmy Blake, et al. "Memantine for Prophylactic Treatment of Migraine Without Aura: A Randomized Double-Blind Placebo-Controlled Study." Headache: The Journal of Head and Face Pain 56.1 (2016): .      2. Acute (abortive) medications- these medications are to be taken only at the onset of severe headache, and please limit the total of any combination of these medications to less than 10 days per month on average because they can cause medication overuse headaches.   Failed:   sumatriptan (>3 months, ineffective),   Naratriptan - ineffective  Rizatriptan ineffective    May continue Tylenol as needed    3. I endorse continued use of chronic anticoagulation, Xarelto, for diagnosis of atrial fibrillation for stroke prevention.    4. Patient describes snoring and mild bifrontal headaches present many times upon " waking.  This raises suspicion for obstructive sleep apnea.  We will revisit this topic at follow-up as well when the patient has settled on new acute medication.  Consider sleep medicine referral for MICHELLE evaluation.    5. DIET: I recommend a diet that heavily favors fruits and vegetables while reducing carbs. More information is available upon request.     6. EXERCISE: Gentle movement exercises, concentrate on combination of muscle building and aerobic. I also recommend adding gentle Yoga therapy. The goal is 150 minutes per week of moderate to rigorous exercise, but you should start at your own pace and progress slowly and safely as discussed today.    7. ANXIETY/STRESS- Control stressors with yoga, meditation, counseling, or other methods of mindfulness.     8. SLEEP- aim for 7-8 hrs of restful sleep per night.     Follow up in 3 months    A dictation device was used to produce this document. Use of such devices sometimes results in grammatical errors or replacement of words that sound similarly.    I spent a total of 21 minutes on the day of the visit. This includes face to face time and non-face to face time preparing to see the patient (eg, review of tests), obtaining and/or reviewing separately obtained history, documenting clinical information in the electronic or other health record, independently interpreting results and communicating results to the patient/family/caregiver, or care coordinator. Minor Thomason DO was available in clinic throughout this encounter.     Pauline Ortega PA-C

## 2023-08-18 ENCOUNTER — OFFICE VISIT (OUTPATIENT)
Dept: URGENT CARE | Facility: CLINIC | Age: 73
End: 2023-08-18
Payer: MEDICARE

## 2023-08-18 VITALS
HEART RATE: 69 BPM | BODY MASS INDEX: 23.57 KG/M2 | SYSTOLIC BLOOD PRESSURE: 147 MMHG | DIASTOLIC BLOOD PRESSURE: 85 MMHG | OXYGEN SATURATION: 95 % | WEIGHT: 155 LBS | RESPIRATION RATE: 16 BRPM | TEMPERATURE: 98 F

## 2023-08-18 DIAGNOSIS — Z79.01 CHRONIC ANTICOAGULATION: ICD-10-CM

## 2023-08-18 DIAGNOSIS — J40 BRONCHITIS: Primary | ICD-10-CM

## 2023-08-18 PROCEDURE — 99213 OFFICE O/P EST LOW 20 MIN: CPT | Mod: S$GLB,,, | Performed by: FAMILY MEDICINE

## 2023-08-18 PROCEDURE — 99213 PR OFFICE/OUTPT VISIT, EST, LEVL III, 20-29 MIN: ICD-10-PCS | Mod: S$GLB,,, | Performed by: FAMILY MEDICINE

## 2023-08-18 RX ORDER — PREDNISONE 10 MG/1
TABLET ORAL
COMMUNITY
Start: 2023-08-15 | End: 2023-09-28

## 2023-08-18 RX ORDER — DOXYCYCLINE 100 MG/1
100 CAPSULE ORAL 2 TIMES DAILY
COMMUNITY
Start: 2023-08-15 | End: 2023-09-28

## 2023-08-18 NOTE — PROGRESS NOTES
Subjective:      Patient ID: Zoë Enrique is a 73 y.o. female.    Vitals:  weight is 70.3 kg (155 lb). Her oral temperature is 98 °F (36.7 °C). Her blood pressure is 147/85 (abnormal) and her pulse is 69. Her respiration is 16 and oxygen saturation is 95%.     Chief Complaint: Cough    This is a 73 y.o. female who presents today with a chief complaint of productive cough with blood, sinus pressure, headache (sinus area) x 8 days. Pt feel constriction in her chest when she tries to take a deep breath - says she cannot take a deep breath. No ear px, ear congestion, nausea, vomiting, diarrhea, abd px, fever, nasal congestion, or sore throat    Home tx: guaifenesin, marquis-seltzer cold and flu,     PMH: bronchitis dx on 8/15/2023 (abx and prednisone dose pack) - 3 bronchitis dx's in past year; sees pulmonologist    Cough  This is a new problem. The current episode started 1 to 4 weeks ago. The problem has been gradually worsening. The problem occurs hourly. The cough is Productive of brown sputum and productive of bloody sputum. Associated symptoms include headaches, postnasal drip and wheezing. Pertinent negatives include no ear congestion, ear pain, fever, nasal congestion or sore throat. Associated symptoms comments: Rib px from cough. Her past medical history is significant for bronchitis and pneumonia. There is no history of asthma or environmental allergies.       Constitution: Negative for fever.   HENT:  Positive for postnasal drip. Negative for ear pain and sore throat.    Respiratory:  Positive for cough and wheezing.    Allergic/Immunologic: Negative for environmental allergies.   Neurological:  Positive for headaches.      Objective:     Physical Exam   Constitutional: She is oriented to person, place, and time.  Non-toxic appearance. She does not appear ill.   HENT:   Head: Normocephalic.   Ears:   Right Ear: External ear normal.   Left Ear: External ear normal.   Nose: Nose normal.   Mouth/Throat: Mucous  membranes are moist.   Eyes: Conjunctivae are normal.   Cardiovascular: Normal rate. An irregular rhythm present.   Pulmonary/Chest: Effort normal. No stridor. No respiratory distress. She has no wheezes. She has no rhonchi. She has no rales. She exhibits no tenderness.   Musculoskeletal: Normal range of motion.         General: Normal range of motion.   Neurological: She is alert and oriented to person, place, and time.   Skin: Skin is dry.   Psychiatric: Her behavior is normal. Her mood appears anxious.       Assessment:     1. Bronchitis    2. Chronic anticoagulation        Plan:       Bronchitis    Chronic anticoagulation      Diagnosed with bronchitis two days ago. Given steroids and doxycycline? Patient drove for the last two days from [place]. On xarelto for pAfib. Coughed up bloody phlegm this AM. Concerned something serious may be going on. Her VSS and she is in NAD. Extensive discussion with the patient about our options and worst case scenario.   If she has a PE, she is already on xarelto and it is a stable PE given her VSS.    Unlikely to have PNA, though possible, her lung exam is clear. During our visit there were no coughing episode.   I suspect using the Atrovent three times a day plus the steroids may have contributed to her anxiety. After our discussion she appears to feel better about our differentials and she has elected to keep her f/u with her pcp on Tuesday. She will go home and stop the doxycycline as she feels it is not helping. Advised if she needs re-eval to return and we can order xray to be taken at another urgent care facility, as they have deferred one today.           Rec to take OTC mucinex with 1L of water per day minimum.

## 2023-08-22 ENCOUNTER — OFFICE VISIT (OUTPATIENT)
Dept: PRIMARY CARE CLINIC | Facility: CLINIC | Age: 73
End: 2023-08-22
Payer: MEDICARE

## 2023-08-22 ENCOUNTER — HOSPITAL ENCOUNTER (OUTPATIENT)
Dept: RADIOLOGY | Facility: HOSPITAL | Age: 73
Discharge: HOME | End: 2023-08-22
Attending: NURSE PRACTITIONER
Payer: MEDICARE

## 2023-08-22 VITALS
HEART RATE: 81 BPM | WEIGHT: 159.63 LBS | BODY MASS INDEX: 24.27 KG/M2 | DIASTOLIC BLOOD PRESSURE: 64 MMHG | OXYGEN SATURATION: 95 % | RESPIRATION RATE: 16 BRPM | SYSTOLIC BLOOD PRESSURE: 122 MMHG

## 2023-08-22 DIAGNOSIS — J40 BRONCHITIS: ICD-10-CM

## 2023-08-22 DIAGNOSIS — J47.9 BRONCHIECTASIS WITHOUT COMPLICATION: ICD-10-CM

## 2023-08-22 DIAGNOSIS — R53.83 FATIGUE, UNSPECIFIED TYPE: ICD-10-CM

## 2023-08-22 DIAGNOSIS — J40 BRONCHITIS: Primary | ICD-10-CM

## 2023-08-22 DIAGNOSIS — I48.0 PAROXYSMAL ATRIAL FIBRILLATION: ICD-10-CM

## 2023-08-22 PROCEDURE — 3008F PR BODY MASS INDEX (BMI) DOCUMENTED: ICD-10-PCS | Mod: CPTII,S$GLB,, | Performed by: NURSE PRACTITIONER

## 2023-08-22 PROCEDURE — 99214 OFFICE O/P EST MOD 30 MIN: CPT | Mod: S$GLB,,, | Performed by: NURSE PRACTITIONER

## 2023-08-22 PROCEDURE — 99999 PR PBB SHADOW E&M-EST. PATIENT-LVL IV: CPT | Mod: PBBFAC,,, | Performed by: NURSE PRACTITIONER

## 2023-08-22 PROCEDURE — 71046 XR CHEST PA AND LATERAL: ICD-10-PCS | Mod: 26,,, | Performed by: RADIOLOGY

## 2023-08-22 PROCEDURE — 99214 PR OFFICE/OUTPT VISIT, EST, LEVL IV, 30-39 MIN: ICD-10-PCS | Mod: S$GLB,,, | Performed by: NURSE PRACTITIONER

## 2023-08-22 PROCEDURE — 1159F MED LIST DOCD IN RCRD: CPT | Mod: CPTII,S$GLB,, | Performed by: NURSE PRACTITIONER

## 2023-08-22 PROCEDURE — 3078F DIAST BP <80 MM HG: CPT | Mod: CPTII,S$GLB,, | Performed by: NURSE PRACTITIONER

## 2023-08-22 PROCEDURE — 71046 X-RAY EXAM CHEST 2 VIEWS: CPT | Mod: TC

## 2023-08-22 PROCEDURE — 99999 PR PBB SHADOW E&M-EST. PATIENT-LVL IV: ICD-10-PCS | Mod: PBBFAC,,, | Performed by: NURSE PRACTITIONER

## 2023-08-22 PROCEDURE — 3074F SYST BP LT 130 MM HG: CPT | Mod: CPTII,S$GLB,, | Performed by: NURSE PRACTITIONER

## 2023-08-22 PROCEDURE — 1159F PR MEDICATION LIST DOCUMENTED IN MEDICAL RECORD: ICD-10-PCS | Mod: CPTII,S$GLB,, | Performed by: NURSE PRACTITIONER

## 2023-08-22 PROCEDURE — 3008F BODY MASS INDEX DOCD: CPT | Mod: CPTII,S$GLB,, | Performed by: NURSE PRACTITIONER

## 2023-08-22 PROCEDURE — 3074F PR MOST RECENT SYSTOLIC BLOOD PRESSURE < 130 MM HG: ICD-10-PCS | Mod: CPTII,S$GLB,, | Performed by: NURSE PRACTITIONER

## 2023-08-22 PROCEDURE — 3078F PR MOST RECENT DIASTOLIC BLOOD PRESSURE < 80 MM HG: ICD-10-PCS | Mod: CPTII,S$GLB,, | Performed by: NURSE PRACTITIONER

## 2023-08-22 PROCEDURE — 71046 X-RAY EXAM CHEST 2 VIEWS: CPT | Mod: 26,,, | Performed by: RADIOLOGY

## 2023-08-22 RX ORDER — ALBUTEROL SULFATE 90 UG/1
1-2 AEROSOL, METERED RESPIRATORY (INHALATION) EVERY 4 HOURS PRN
Qty: 18 G | Refills: 0 | Status: SHIPPED | OUTPATIENT
Start: 2023-08-22 | End: 2023-11-17

## 2023-08-22 NOTE — PROGRESS NOTES
[facility] Note    Chief Complaint      Chief Complaint   Patient presents with    Follow-up     Bronchitis dx on 8/15       History of Present Illness      Zoë Enrique is a 73 y.o. female with chronic conditions of atrial fibrillation, hx of MVP, who presents today for: urgent care follow up for bronchitis. Went to [place] on  started with cough, headache. Went to urgent care on 8/15 was diagnosed with Bronchitis with wheezing. Prescribed prednisone and doxycyline. Came back to [facility] went to urgent care again, for brown sputum mixed with yellow/green mucus and frequent cough. They discontinued doxycycline. No imaging. Deferred Covid testing. Does complain of frequent coughing ribs hurt.  Will get CXR. Does have hx of bronchiectasis, sees Dr. Rivas     Paroxysmal Atrial Fibrillation: on Xarelto       Past Medical History:  Past Medical History:   Diagnosis Date    Abnormal Pap smear of cervix     Allergy     Atrial fibrillation 2017    Atrial flutter     Cancer     osteosarcoma of right femur    Diverticulosis     Hx of atrial flutter     Hx of mitral valve prolapse     Hypothyroidism     Migraines     Recurrent upper respiratory infection (URI)     Supraventricular tachycardia        Past Surgical History:   has a past surgical history that includes Knee surgery (Right, ); Knee Arthroplasty (Right, ); Treatment of cardiac arrhythmia (N/A, 2019); Augmentation of breast (Bilateral, );  section (); Cosmetic surgery (); Colposcopy; Oophorectomy; Total abdominal hysterectomy (); Ablation of arrhythmogenic focus for atrial fibrillation (N/A, 2020); Cardioversion (2020); Transesophageal echocardiography (N/A, 2020); Colon surgery (); Breast surgery (); Eye surgery (Bilateral, ); Joint replacement (Right, , , ); Esophagogastroduodenoscopy (N/A, 2021); Colonoscopy (N/A, 2021); De Quervain's  release (Left, 08/02/2022); and Trigger finger release (Left, 08/02/2022).    Family History:  family history includes Asthma in her brother; Diabetes in her mother and sister; Diverticulosis in her brother and brother; Hearing loss in her father; Heart disease in her brother, brother, father, mother, and sister; No Known Problems in her daughter; Stroke (age of onset: 93) in her father.     Social History:  Social History     Tobacco Use    Smoking status: Never     Passive exposure: Never    Smokeless tobacco: Never   Substance Use Topics    Alcohol use: Yes     Alcohol/week: 2.0 standard drinks of alcohol     Types: 2 Shots of liquor per week     Comment: 1-2 per month    Drug use: No     Comment: CBD oils for headache       Review of Systems   Constitutional:  Positive for malaise/fatigue. Negative for chills, fever and weight loss.   HENT:  Negative for ear pain and sore throat.    Respiratory:  Positive for hemoptysis and wheezing. Negative for cough and shortness of breath.    Cardiovascular:  Negative for chest pain and palpitations.   Gastrointestinal:  Negative for constipation, diarrhea, heartburn, nausea and vomiting.   Genitourinary:  Negative for dysuria and hematuria.   Musculoskeletal:  Positive for myalgias. Negative for falls.   Skin:  Negative for rash.   Neurological:  Positive for dizziness. Negative for headaches.   Endo/Heme/Allergies:  Negative for environmental allergies.     Answers submitted by the patient for this visit:  Cough Questionnaire (Submitted on 8/22/2023)  Chief Complaint: Cough  Chronicity: recurrent  Onset: 1 to 4 weeks ago  Progression since onset: unchanged  Frequency: hourly  Cough characteristics: productive of brown sputum  ear congestion: No  nasal congestion: No  postnasal drip: Yes  rhinorrhea: Yes  sweats: No  Aggravated by: lying down  Risk factors for lung disease: travel  asthma: No  bronchiectasis: Yes  bronchitis: Yes  COPD: No  emphysema: No  pneumonia:  Yes  Treatments tried: OTC cough suppressant, oral steroids, prescription cough suppressant, rest  Improvement on treatment: no relief       Medications:  Outpatient Encounter Medications as of 8/22/2023   Medication Sig Note Dispense Refill    acetaminophen (TYLENOL) 650 MG TbSR Take 650 mg by mouth every 8 (eight) hours. 9/20/2022: PRN        docusate sodium (COLACE) 250 MG capsule Take 250 mg by mouth 2 (two) times daily.        doxycycline (VIBRAMYCIN) 100 MG Cap Take 100 mg by mouth 2 (two) times daily.       estradioL (ESTRACE) 0.01 % (0.1 mg/gram) vaginal cream Place 1 g vaginally every other day.  42 g 3    famotidine (PEPCID) 20 MG tablet Take 1 tablet (20 mg total) by mouth 2 (two) times daily.  60 tablet 4    HYDROcodone-acetaminophen (NORCO) 5-325 mg per tablet Take 1 tablet by mouth every 6 (six) hours as needed for Pain. Pt states takes PRN for headaches 2/3/2023: PRN 28 tablet 0    ipratropium (ATROVENT) 21 mcg (0.03 %) nasal spray 2 sprays by Each Nostril route 3 (three) times daily.  30 mL 3    levothyroxine (SYNTHROID) 25 MCG tablet Take 1 tablet (25 mcg total) by mouth once daily.  90 tablet 3    memantine (NAMENDA) 5 MG Tab Take 1 tablet (5 mg total) by mouth 2 (two) times daily. Begin by taking one tablet daily for 7 days, the increase to one tablet 2 times daily  60 tablet 11    olopatadine (PATANOL) 0.1 % ophthalmic solution 1 drop 2 (two) times daily.       predniSONE (DELTASONE) 10 MG tablet Take by mouth.       rivaroxaban (XARELTO) 20 mg Tab Take 1 tablet (20 mg total) by mouth once daily.  90 tablet 3    verapamiL (CALAN) 40 MG Tab 1 tab PO prn for palpitations. Maximum 2 doses per day, at least 6 hours apart.  30 tablet 11    vit C/E/Zn/coppr/lutein/zeaxan (PRESERVISION AREDS-2 ORAL)        levalbuterol (XOPENEX HFA) 45 mcg/actuation inhaler Inhale 1-2 puffs into the lungs every 4 (four) hours as needed for Wheezing. Rescue  15 g 0    levoFLOXacin (LEVAQUIN) 750 MG tablet Take 1 tablet  (750 mg total) by mouth once daily. (Patient not taking: Reported on 8/18/2023)  7 tablet 0    rizatriptan (MAXALT-MLT) 10 MG disintegrating tablet Take 1 tablet (10 mg total) by mouth as needed for Migraine. May repeat in 2 hours if needed. Max 20mg/24hrs (Patient not taking: Reported on 8/18/2023)  12 tablet 11     Facility-Administered Encounter Medications as of 8/22/2023   Medication Dose Route Frequency Provider Last Rate Last Admin    sodium chloride 0.9% flush 5 mL  5 mL Intravenous PRN Aleksandra Taylor NP           Allergies:  Review of patient's allergies indicates:   Allergen Reactions    Morphine Nausea And Vomiting    Succinylcholine      Other reaction(s): v tach       Health Maintenance:  Immunization History   Administered Date(s) Administered    COVID-19 MRNA, LN-S PF (MODERNA HALF 0.25 ML DOSE) 11/05/2021, 04/08/2022    COVID-19, MRNA, LN-S, PF (MODERNA FULL 0.5 ML DOSE) 02/11/2021, 03/11/2021    COVID-19, MRNA, LN-S, PF (Pfizer) (Purple Cap) 11/25/2022    COVID-19, mRNA, LNP-S, bivalent booster, PF (Moderna Omicron) 11/25/2022    Influenza 11/04/2008, 10/06/2018, 10/09/2020    Influenza (FLUAD) - Quadrivalent - Adjuvanted - PF *Preferred* (65+) 10/09/2020, 10/12/2022    Influenza (FLUAD) - Trivalent - Adjuvanted - PF (65+) 10/09/2018    Influenza - High Dose - PF (65 years and older) 01/04/2016, 11/03/2016, 09/28/2017, 10/30/2019    Influenza - Quadrivalent - High Dose - PF (65 years and older) 09/23/2021    Pneumococcal Conjugate - 13 Valent 01/04/2016    Pneumococcal Polysaccharide - 23 Valent 06/24/2022    Tdap 02/13/2020    Zoster Recombinant 09/02/2022, 11/07/2022      Health Maintenance   Topic Date Due    High Dose Statin  Never done    Mammogram  12/13/2023    DEXA Scan  10/20/2026    Lipid Panel  12/20/2027    Colorectal Cancer Screening  03/19/2028    TETANUS VACCINE  02/13/2030    Hepatitis C Screening  Completed    Shingles Vaccine  Completed        Physical Exam      Vital  Signs  Pulse: 81  Resp: 16  SpO2: 95 %  BP: 122/64  BP Location: Right arm  Patient Position: Sitting  Height and Weight  Weight: 72.4 kg (159 lb 9.8 oz)    Physical Exam  Constitutional:       Appearance: She is well-developed.   HENT:      Head: Normocephalic and atraumatic.      Right Ear: Tympanic membrane normal.      Left Ear: Tympanic membrane normal.      Mouth/Throat:      Mouth: Mucous membranes are moist.   Eyes:      Pupils: Pupils are equal, round, and reactive to light.   Cardiovascular:      Rate and Rhythm: Normal rate and regular rhythm.      Heart sounds: Normal heart sounds. No murmur heard.  Pulmonary:      Effort: Pulmonary effort is normal. No respiratory distress.      Breath sounds: Wheezing (LLL) present.   Abdominal:      General: There is no distension.      Palpations: Abdomen is soft.      Tenderness: There is no abdominal tenderness. There is no guarding.   Musculoskeletal:      Cervical back: Normal range of motion.   Skin:     General: Skin is warm and dry.   Neurological:      Mental Status: She is alert. Mental status is at baseline.   Psychiatric:         Behavior: Behavior normal.          Laboratory:  CBC:  Recent Labs   Lab 09/14/21  1654 03/06/22  0916 12/20/22  0910   WBC 3.60 L 4.71 4.13   RBC 4.08 4.23 4.36   Hemoglobin 13.0 13.1 13.6   Hematocrit 38.6 40.7 42.0   Platelets 146 L 138 L 132 L   MCV 95 96 96   MCH 31.9 H 31.0 31.2 H   MCHC 33.7 32.2 32.4     CMP:  Recent Labs   Lab 12/28/21  0908 03/06/22  0916 12/20/22  0910   Glucose 91 109 99   Calcium 9.3 9.5 8.8   Albumin 4.0 4.0 4.0   Total Protein 6.6 6.5 6.5   Sodium 143 136 140   Potassium 4.7 4.2 4.1   CO2 29 24 24   Chloride 107 102 105   BUN 11 14 13   Alkaline Phosphatase 71 63 63   ALT 28 13 20   AST 35 19 26   Total Bilirubin 0.7 1.0 0.5     URINALYSIS:  Recent Labs   Lab 10/09/20  1555   Color, UA Yellow   Specific Gravity, UA 1.030   pH, UA 5.0   Protein, UA Negative   Bacteria Occasional   Nitrite, UA  Negative   Leukocytes, UA Negative      LIPIDS:  Recent Labs   Lab 12/28/21  0908 12/20/22  0910   TSH 1.584 1.718   HDL 60 60   Cholesterol 172 166   Triglycerides 77 107   LDL Cholesterol 96.6 84.6   HDL/Cholesterol Ratio 34.9 36.1   Non-HDL Cholesterol 112 106   Total Cholesterol/HDL Ratio 2.9 2.8     TSH:  Recent Labs   Lab 12/28/21  0908 12/20/22  0910   TSH 1.584 1.718     A1C:        Assessment/Plan     Zoë Enrique is a 73 y.o.female with:    1. Bronchitis  - X-Ray Chest PA And Lateral; Future  - CBC Without Differential; Future  - BASIC METABOLIC PANEL; Future  Will send in Xopenex for wheezing, and follow up with results. Sats 95%  Stay hydrated. Ok to use tessalon perles.   Defers covid test. Offered CT if persistent cough (Last one 2022 for hx of bronchiectasis) and follow up with pulmonology.   If persistent hemoptysis or sob, recommend ER.     2. Fatigue, unspecified type  - CBC Without Differential; Future  - BASIC METABOLIC PANEL; Future    3. Paroxysmal Atrial Fibrillation  Has monitor at home and Verapamil. Deferred EKG today has monitor at home can upload to cardiology. Has verapamil. Recommended to closely monitor during illness.      Chronic conditions status updated as per HPI.  Other than changes above, cont current medications and maintain follow up with specialists.  Follow up if symptoms worsen or fail to improve.    Future Appointments   Date Time Provider Department Center   8/22/2023 11:00 AM [facility]  XR2 700LB LIMIT [facility] XRAY [place]   8/25/2023 10:00 AM Linda Doll MD [facility] EMMY [facility]   9/18/2023  9:00 AM Abeba Giron MD [facility] DERM [address]   11/17/2023  1:20 PM Minor Thomason, DO [facility] NEURO [facility]   12/14/2023 10:30 AM [facility] OIC-MAMMO2 [facility] MAMMOIC [facility]       Adrienne Cotaya, FNP [facility]

## 2023-08-23 ENCOUNTER — PATIENT MESSAGE (OUTPATIENT)
Dept: PULMONOLOGY | Facility: CLINIC | Age: 73
End: 2023-08-23
Payer: MEDICARE

## 2023-08-25 ENCOUNTER — OFFICE VISIT (OUTPATIENT)
Dept: OTOLARYNGOLOGY | Facility: CLINIC | Age: 73
End: 2023-08-25
Payer: MEDICARE

## 2023-08-25 VITALS
SYSTOLIC BLOOD PRESSURE: 104 MMHG | BODY MASS INDEX: 23.55 KG/M2 | HEART RATE: 69 BPM | WEIGHT: 154.88 LBS | DIASTOLIC BLOOD PRESSURE: 72 MMHG

## 2023-08-25 DIAGNOSIS — J34.2 NASAL SEPTAL DEVIATION: Chronic | ICD-10-CM

## 2023-08-25 DIAGNOSIS — H90.3 SENSORINEURAL HEARING LOSS, BILATERAL: Chronic | ICD-10-CM

## 2023-08-25 DIAGNOSIS — J34.3 HYPERTROPHY OF INFERIOR NASAL TURBINATE: ICD-10-CM

## 2023-08-25 DIAGNOSIS — K21.9 LARYNGOPHARYNGEAL REFLUX (LPR): ICD-10-CM

## 2023-08-25 DIAGNOSIS — J31.0 CHRONIC RHINITIS: Primary | Chronic | ICD-10-CM

## 2023-08-25 PROCEDURE — 3074F PR MOST RECENT SYSTOLIC BLOOD PRESSURE < 130 MM HG: ICD-10-PCS | Mod: CPTII,S$GLB,, | Performed by: OTOLARYNGOLOGY

## 2023-08-25 PROCEDURE — 3008F BODY MASS INDEX DOCD: CPT | Mod: CPTII,S$GLB,, | Performed by: OTOLARYNGOLOGY

## 2023-08-25 PROCEDURE — 3074F SYST BP LT 130 MM HG: CPT | Mod: CPTII,S$GLB,, | Performed by: OTOLARYNGOLOGY

## 2023-08-25 PROCEDURE — 1126F PR PAIN SEVERITY QUANTIFIED, NO PAIN PRESENT: ICD-10-PCS | Mod: CPTII,S$GLB,, | Performed by: OTOLARYNGOLOGY

## 2023-08-25 PROCEDURE — 3078F DIAST BP <80 MM HG: CPT | Mod: CPTII,S$GLB,, | Performed by: OTOLARYNGOLOGY

## 2023-08-25 PROCEDURE — 99214 PR OFFICE/OUTPT VISIT, EST, LEVL IV, 30-39 MIN: ICD-10-PCS | Mod: 25,S$GLB,, | Performed by: OTOLARYNGOLOGY

## 2023-08-25 PROCEDURE — 1160F RVW MEDS BY RX/DR IN RCRD: CPT | Mod: CPTII,S$GLB,, | Performed by: OTOLARYNGOLOGY

## 2023-08-25 PROCEDURE — 1160F PR REVIEW ALL MEDS BY PRESCRIBER/CLIN PHARMACIST DOCUMENTED: ICD-10-PCS | Mod: CPTII,S$GLB,, | Performed by: OTOLARYNGOLOGY

## 2023-08-25 PROCEDURE — 1126F AMNT PAIN NOTED NONE PRSNT: CPT | Mod: CPTII,S$GLB,, | Performed by: OTOLARYNGOLOGY

## 2023-08-25 PROCEDURE — 3008F PR BODY MASS INDEX (BMI) DOCUMENTED: ICD-10-PCS | Mod: CPTII,S$GLB,, | Performed by: OTOLARYNGOLOGY

## 2023-08-25 PROCEDURE — 1101F PT FALLS ASSESS-DOCD LE1/YR: CPT | Mod: CPTII,S$GLB,, | Performed by: OTOLARYNGOLOGY

## 2023-08-25 PROCEDURE — 31575 PR LARYNGOSCOPY, FLEXIBLE; DIAGNOSTIC: ICD-10-PCS | Mod: S$GLB,,, | Performed by: OTOLARYNGOLOGY

## 2023-08-25 PROCEDURE — 99999 PR PBB SHADOW E&M-EST. PATIENT-LVL III: ICD-10-PCS | Mod: PBBFAC,,, | Performed by: OTOLARYNGOLOGY

## 2023-08-25 PROCEDURE — 1159F PR MEDICATION LIST DOCUMENTED IN MEDICAL RECORD: ICD-10-PCS | Mod: CPTII,S$GLB,, | Performed by: OTOLARYNGOLOGY

## 2023-08-25 PROCEDURE — 3078F PR MOST RECENT DIASTOLIC BLOOD PRESSURE < 80 MM HG: ICD-10-PCS | Mod: CPTII,S$GLB,, | Performed by: OTOLARYNGOLOGY

## 2023-08-25 PROCEDURE — 99214 OFFICE O/P EST MOD 30 MIN: CPT | Mod: 25,S$GLB,, | Performed by: OTOLARYNGOLOGY

## 2023-08-25 PROCEDURE — 1101F PR PT FALLS ASSESS DOC 0-1 FALLS W/OUT INJ PAST YR: ICD-10-PCS | Mod: CPTII,S$GLB,, | Performed by: OTOLARYNGOLOGY

## 2023-08-25 PROCEDURE — 99999 PR PBB SHADOW E&M-EST. PATIENT-LVL III: CPT | Mod: PBBFAC,,, | Performed by: OTOLARYNGOLOGY

## 2023-08-25 PROCEDURE — 3288F PR FALLS RISK ASSESSMENT DOCUMENTED: ICD-10-PCS | Mod: CPTII,S$GLB,, | Performed by: OTOLARYNGOLOGY

## 2023-08-25 PROCEDURE — 31575 DIAGNOSTIC LARYNGOSCOPY: CPT | Mod: S$GLB,,, | Performed by: OTOLARYNGOLOGY

## 2023-08-25 PROCEDURE — 3288F FALL RISK ASSESSMENT DOCD: CPT | Mod: CPTII,S$GLB,, | Performed by: OTOLARYNGOLOGY

## 2023-08-25 PROCEDURE — 1159F MED LIST DOCD IN RCRD: CPT | Mod: CPTII,S$GLB,, | Performed by: OTOLARYNGOLOGY

## 2023-08-25 RX ORDER — FAMOTIDINE 20 MG/1
20 TABLET, FILM COATED ORAL 2 TIMES DAILY
Qty: 60 TABLET | Refills: 4 | Status: SHIPPED | OUTPATIENT
Start: 2023-08-25 | End: 2024-08-24

## 2023-08-25 RX ORDER — MOXIFLOXACIN HYDROCHLORIDE 400 MG/1
400 TABLET ORAL DAILY
Qty: 5 TABLET | Refills: 0 | Status: SHIPPED | OUTPATIENT
Start: 2023-08-25 | End: 2023-08-30

## 2023-08-25 RX ORDER — IPRATROPIUM BROMIDE 42 UG/1
2 SPRAY, METERED NASAL 3 TIMES DAILY
Qty: 15 ML | Refills: 0 | Status: SHIPPED | OUTPATIENT
Start: 2023-08-25 | End: 2023-10-02 | Stop reason: SDUPTHER

## 2023-08-25 RX ORDER — CLARITHROMYCIN 500 MG/1
500 TABLET, FILM COATED ORAL 2 TIMES DAILY
Qty: 20 TABLET | Refills: 0 | Status: CANCELLED | OUTPATIENT
Start: 2023-08-25 | End: 2023-09-04

## 2023-08-25 NOTE — PROGRESS NOTES
Chief Complaint   Patient presents with    Follow-up     No improvement     Cough   .    HPI:     Zoë Enrique is a 73 y.o. female who presents for evaluation of a several month history of rhinorrhea and headaches. These symptoms have been present for years. Has seen neurologist in [place] years ago and had MRI at that time.  She relays that she has been having frequent upper respiratory infections.  She was most recently on Augmentin and Levaquin.  Last infection was August 2022. She did see Dr. Adorno and has had negative immunoCAP testing. She did have low pneumococcal titers and had pneumovax booster subsequently. Her allergist was concerned about possible LPR and referred for further evaluation. Relays that she has had headaches behind and around her eyes and her forehead. Describes pressure around her cheek region.    She does use sinus rinses or nasal sprays. She is taking Flonase daily.  She does not feel it helps the clear rhinorrhea. She admits to midface pain and pressure. She has not had sinus or nasal surgery. There is no history of sinonasal trauma. She denies hoarseness, she has occasional dysphagia to pills/some solids.  She notes globus sensation with eating.  She did have EGD in 2021 which was normal. She denies heartburn or indigestion.     Interval HPI 1/23/2023:  Follow up visit. She has been taking Protonix 40 daily. She has restarted the Atrovent for 1 month and has been on the Flonase daily. She states that nothing improves her clear rhinorrhea and post nasal drip. Denies acid reflux. She states that her cough is more from the post nasal drip.   She is also c/o frontal headaches, sinus pressure and tooth pain with changes in the weather. She usually would take a Norco for pain relief. She hasn't seen neurologist in years.    Interval HPI 2/3/2023:  Follow up visit. Reports similar symptoms to last visit. Notes that she has had increased nasal congestion, sore throat, postnasal drip over  the last day.  She has recently been her grandson who has been sick as well  she has taken Sudafed and acetaminophen with seem to with her symptoms.  She reports that she just obtain the budesonide saline rinses today and started them this morning.    Interval HPI 4/25/2023:  Follow up visit. Reports since our last visit she did see neurology and was started on naratriptan. She reports that she is having continued headaches. She notes that the naratriptan only helped one time.  She is taking Atrovent nasal spray. She has stopped nasal saline rinses b/c she felt it was causing too much mucus and post-nasal drip.  She is still taking Protonix 40 mg PO daily.  She notes that her family has been noticing she is having a harder time hearing. She has noticed this as well.  She denies tinnitus.     Interval HPI 8/25/2023:  Reports that she has bronchitis now since 8/11/2023.  She notes that she is coughing more due to this. She was prescribed prednisone taper and doxycycline.  She went to urgent care and doxycyline was discontinued. She was given an inhaler.   She reports that she is having continue rhinorrhea which is worse when she is still having continued feeling of post-nasal drip. She has been been on saline rinses and atrovent without relief.     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    Diverticulosis 1998    Hx of atrial flutter     Hx of mitral valve prolapse 1990    Hypothyroidism     Migraines     Recurrent upper respiratory infection (URI)     Supraventricular tachycardia      Social History     Socioeconomic History    Marital status:    Tobacco Use    Smoking status: Never     Passive exposure: Never    Smokeless tobacco: Never   Substance and Sexual Activity    Alcohol use: Yes     Alcohol/week: 2.0 standard drinks of alcohol     Types: 2 Shots of liquor per  week     Comment: 1-2 per month    Drug use: No     Comment: CBD oils for headache    Sexual activity: Not Currently     Partners: Male     Birth control/protection: Post-menopausal, See Surgical Hx, None     Comment: , together x 37 years     Social Determinants of Health     Financial Resource Strain: Low Risk  (6/20/2023)    Overall Financial Resource Strain (CARDIA)     Difficulty of Paying Living Expenses: Not very hard   Food Insecurity: No Food Insecurity (6/20/2023)    Hunger Vital Sign     Worried About Running Out of Food in the Last Year: Never true     Ran Out of Food in the Last Year: Never true   Transportation Needs: No Transportation Needs (6/20/2023)    PRAPARE - Transportation     Lack of Transportation (Medical): No     Lack of Transportation (Non-Medical): No   Physical Activity: Insufficiently Active (6/20/2023)    Exercise Vital Sign     Days of Exercise per Week: 1 day     Minutes of Exercise per Session: 20 min   Stress: No Stress Concern Present (6/20/2023)    [employer] - Occupational Stress Questionnaire     Feeling of Stress : Not at all   Social Connections: Unknown (6/20/2023)    Social Connection and Isolation Panel [NHANES]     Frequency of Communication with Friends and Family: More than three times a week     Frequency of Social Gatherings with Friends and Family: More than three times a week     Active Member of Clubs or Organizations: No     Attends Club or Organization Meetings: Never     Marital Status:    Housing Stability: Low Risk  (6/20/2023)    Housing Stability Vital Sign     Unable to Pay for Housing in the Last Year: No     Number of Places Lived in the Last Year: 1     Unstable Housing in the Last Year: No     Past Surgical History:   Procedure Laterality Date    ABLATION OF ARRHYTHMOGENIC FOCUS FOR ATRIAL FIBRILLATION N/A 11/24/2020    Procedure: ABLATION, ARRHYTHMOGENIC FOCUS, FOR ATRIAL FIBRILLATION;  Surgeon: Moe FIELDS  MD Dawn;  Location: [facility] EP LAB;  Service: Cardiology;  Laterality: N/A;  AFL/AF, KHANH, CTI, PVI, RFA, CARTO, GEN, DM, 3 PREP    AUGMENTATION OF BREAST Bilateral 1984    35 yrs     BREAST SURGERY  1984    augmentation    CARDIOVERSION  2020    Procedure: Cardioversion;  Surgeon: Moe Seymour MD;  Location: [facility] EP LAB;  Service: Cardiology;;     SECTION  1988    COLON SURGERY      partial colon resection    COLONOSCOPY N/A 2021    Procedure: COLONOSCOPY;  Surgeon: Sahara Salgado MD;  Location: [facility] ENDO (4TH FLR);  Service: Endoscopy;  Laterality: N/A;  constipation prep- 7 days of miralax, 2 fulls of fulls, then day bf clears and split prep   covid test 3/5 pcw, prep instr emailed, antibiotics prophylactically, Xarelto hold x 2 days per Dr. Seymour-see telephone encounter 2/10 -ml    COLPOSCOPY      COSMETIC SURGERY      DE QUERVAIN'S RELEASE Left 2022    Procedure: RELEASE, HAND, FOR DEQUERVAIN'S TENOSYNOVITIS;  Surgeon: Toñito Adame Jr., MD;  Location: [facility] [place] OR;  Service: Orthopedics;  Laterality: Left;    ESOPHAGOGASTRODUODENOSCOPY N/A 2021    Procedure: EGD (ESOPHAGOGASTRODUODENOSCOPY);  Surgeon: Sahara Salgado MD;  Location: [facility] (4TH FLR);  Service: Endoscopy;  Laterality: N/A;    EYE SURGERY Bilateral     Lasik, then cataracts extraction    JOINT REPLACEMENT Right , ,     KNEE ARTHROPLASTY Right     revison in     KNEE SURGERY Right     distal femoral replacing TK    OOPHORECTOMY      TOTAL ABDOMINAL HYSTERECTOMY      TAHBSO    TRANSESOPHAGEAL ECHOCARDIOGRAPHY N/A 2020    Procedure: ECHOCARDIOGRAM, TRANSESOPHAGEAL;  Surgeon: Ramírez Ortez III, MD;  Location: [facility] EP LAB;  Service: Cardiology;  Laterality: N/A;    TREATMENT OF CARDIAC ARRHYTHMIA N/A 2019    Procedure: CARDIOVERSION;  Surgeon: Moe Seymour MD;  Location: [facility] EP LAB;  Service: Cardiology;  Laterality: N/A;  KHANH/DCCV/MAC/DM/3PREP/*ADMIT  FOR TIKOSYN*    TRIGGER FINGER RELEASE Left 08/02/2022    Procedure: RELEASE, TRIGGER FINGER;  Surgeon: Toñito Adame Jr., MD;  Location: [facility] OR;  Service: Orthopedics;  Laterality: Left;  left middle finger     Family History   Problem Relation Age of Onset    Diabetes Sister         Type I    Heart disease Sister         Atrrial fib, mitral valve repair    Hearing loss Father     Heart disease Father         Angina, AAA    Stroke Father 93    Heart disease Mother         Atrial fib    Diabetes Mother         Type II    Heart disease Brother         Atrial fib    Asthma Brother     Diverticulosis Brother     Heart disease Brother         Atrial fib    Diverticulosis Brother     No Known Problems Daughter     Breast cancer Neg Hx     Ovarian cancer Neg Hx     Colon cancer Neg Hx     Cancer Neg Hx     Hypertension Neg Hx            Review of Systems  General: negative for chills, fever or weight loss  Psychological: negative for mood changes or depression  Ophthalmic: negative for blurry vision, photophobia or eye pain  ENT: see HPI  Respiratory: no cough, shortness of breath, or wheezing  Cardiovascular: no chest pain or dyspnea on exertion  Gastrointestinal: no abdominal pain, change in bowel habits, or black/ bloody stools  Musculoskeletal: negative for gait disturbance or muscular weakness  Neurological: no syncope or seizures; no ataxia  Dermatological: negative for puritis,  rash and jaundice  Hematologic/lymphatic: no easy bruising, no new lumps or bumps      Physical Exam:    Vitals:    08/25/23 0948   BP: 104/72   Pulse: 69           Constitutional: Well appearing / communicating without difficutly.  NAD.  Eyes: EOM I Bilaterally  Head/Face: Normocephalic.  Negative paranasal sinus pressure/tenderness.  Salivary glands WNL.  House Brackmann I Bilaterally.    Right Ear: Auricle normal appearance. External Auditory Canal within normal limits,TM w/o masses/lesions/perforations. TM mobility noted.   Left  Ear: Auricle normal appearance. External Auditory Canal WNL,TM w/o masses/lesions/perforations. TM mobility noted.  Nose: No gross nasal septal deviation. Inferior Turbinates 3+ bilaterally. No septal perforation. No masses/lesions. External nasal skin appears normal without masses/lesions.  Oral Cavity: Gingiva/lips within normal limits.  Dentition/gingiva healthy appearing. Mucus membranes moist. Floor of mouth soft, no masses palpated. Oral Tongue mobile. Hard Palate appears normal.    Oropharynx: Base of tongue appears normal. No masses/lesions noted. Tonsillar fossa/pharyngeal wall without lesions. Posterior oropharynx WNL.  Soft palate without masses. Midline uvula.   Neck/Lymphatic: No LAD I-VI bilaterally.  No thyromegaly.  No masses noted on exam.          Diagnostic studies:  PFTs: 2020: normal spirometry  Laboratory 2022:  IgE level: Less than 35.  ImmunoCAP:  Negative.  IgA: 114.  Ig.  IgM: 66.  Pneumococcal titers: Not protective.    CT sinus 2023: Images interpreted by me and discussed with patient in detail today.   Right pedro bullosa.  Paradoxical curvature of the middle turbinates anteriorly.  Leftward nasal septal deviation with a 3 mm bony spur abutting the left inferior nasal turbinate.  No nasal polyp formation is identified.     Ostiomeatal units are patent with some anatomic narrowing of the distal infundibulum on the left.  The frontal recesses and sphenoid sinus ostia are patent.     The paranasal sinuses are currently clear other than for minimal mucosal thickening at the floor the left maxillary sinus..     Sellar pneumatization of the sphenoid.  There is some pneumatization overlying the grooves of the anterior ethmoid arteries bilaterally.  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     The middle ears and mastoid air cells are clear.     No acute intracranial process.        Audiogram interpreted personally by me and discussed in detail with the patient today.            Assessment:    ICD-10-CM ICD-9-CM    1. Chronic rhinitis  J31.0 472.0       2. Nasal septal deviation  J34.2 470       3. Hypertrophy of inferior nasal turbinate  J34.3 478.0       4. Laryngopharyngeal reflux (LPR)  K21.9 478.79       5. Sensorineural hearing loss, bilateral  H90.3 389.18                 The primary encounter diagnosis was Chronic rhinitis. Diagnoses of Nasal septal deviation, Hypertrophy of inferior nasal turbinate, Laryngopharyngeal reflux (LPR), and Sensorineural hearing loss, bilateral were also pertinent to this visit.      Plan:  No orders of the defined types were placed in this encounter.  Start  avelox 400mg PO daily for 10 days   Nasal saline rinses twice daily.   Increase  Atrovent to 0.6%-  2 sprays per nostril 3 times per day.   Keep f/u with  neurology for Migraine headaches. Had been treated for migraine HA in the past with last neurology visit in 2018.   Continue Famotidine 20 mg PO BID  Refer to pulmonology for recurrent bronchitis    Linda Yu MD

## 2023-08-25 NOTE — PROCEDURES
Laryngoscopy    Date/Time: 8/25/2023 10:00 AM    Performed by: Linda Doll MD  Authorized by: Linda Doll MD    Consent Done?:  Yes (Verbal)  Anesthesia:     Local anesthetic:  Lidocaine 2% and [place]-Synephrine 1/2%  Laryngoscopy:     Areas examined:  Nasal cavities, nasopharynx, oropharynx, hypopharynx, larynx and vocal cords  Nose External:      No external nasal deformity  Nose Intranasal:      Mucosa no polyps     Mucosa ulcers not present     No mucosa lesions present     No septum gross deformity     Turbinates not enlarged  Nasopharynx:      No mucosa lesions     Adenoids not present     Posterior choanae patent     Eustachian tube patent  Larynx/hypopharynx:      No epiglottis lesions     No epiglottis edema     No AE folds lesions     No vocal cord polyps     Equal and normal bilateral     No hypopharynx lesions     No piriform sinus pooling     No piriform sinus lesions     Post cricoid edema (moderate)     Post cricoid erythema (moderate)

## 2023-09-07 ENCOUNTER — PATIENT MESSAGE (OUTPATIENT)
Dept: OTOLARYNGOLOGY | Facility: CLINIC | Age: 73
End: 2023-09-07
Payer: MEDICARE

## 2023-09-07 ENCOUNTER — PATIENT MESSAGE (OUTPATIENT)
Dept: PULMONOLOGY | Facility: CLINIC | Age: 73
End: 2023-09-07
Payer: MEDICARE

## 2023-09-12 ENCOUNTER — PATIENT MESSAGE (OUTPATIENT)
Dept: PULMONOLOGY | Facility: CLINIC | Age: 73
End: 2023-09-12
Payer: MEDICARE

## 2023-09-12 ENCOUNTER — PATIENT MESSAGE (OUTPATIENT)
Dept: PRIMARY CARE CLINIC | Facility: CLINIC | Age: 73
End: 2023-09-12
Payer: MEDICARE

## 2023-09-12 NOTE — TELEPHONE ENCOUNTER
Saw you on 8/22 for Bronchitis f/u. Also saw ENT on 8/25. Also looks like she messaged pulm and ENT.

## 2023-09-12 NOTE — TELEPHONE ENCOUNTER
Spoke with patient. Just finished 10 day course of Avelox. Instructed patient to notify Pulm about symptoms prior to her appt. Pt will continue symptomatic treatment and touch base Friday if no improvement for sinuses. No fever or chills.

## 2023-09-14 ENCOUNTER — PATIENT MESSAGE (OUTPATIENT)
Dept: PRIMARY CARE CLINIC | Facility: CLINIC | Age: 73
End: 2023-09-14
Payer: MEDICARE

## 2023-09-14 RX ORDER — ACYCLOVIR 400 MG/1
TABLET ORAL 3 TIMES DAILY
COMMUNITY
End: 2023-09-14 | Stop reason: SDUPTHER

## 2023-09-15 NOTE — TELEPHONE ENCOUNTER
No care due was identified.  [facility] Embedded Care Due Messages. Reference number: [id number].   9/15/2023 7:40:20 AM CDT

## 2023-09-18 ENCOUNTER — OFFICE VISIT (OUTPATIENT)
Dept: DERMATOLOGY | Facility: CLINIC | Age: 73
End: 2023-09-18
Payer: MEDICARE

## 2023-09-18 DIAGNOSIS — Z12.83 SCREENING EXAM FOR SKIN CANCER: Primary | ICD-10-CM

## 2023-09-18 DIAGNOSIS — L81.2 EPHELIDES: ICD-10-CM

## 2023-09-18 DIAGNOSIS — Z85.828 HISTORY OF NONMELANOMA SKIN CANCER: ICD-10-CM

## 2023-09-18 DIAGNOSIS — L30.9 DERMATITIS: ICD-10-CM

## 2023-09-18 PROCEDURE — 1101F PT FALLS ASSESS-DOCD LE1/YR: CPT | Mod: CPTII,S$GLB,, | Performed by: DERMATOLOGY

## 2023-09-18 PROCEDURE — 99214 OFFICE O/P EST MOD 30 MIN: CPT | Mod: S$GLB,,, | Performed by: DERMATOLOGY

## 2023-09-18 PROCEDURE — 99214 PR OFFICE/OUTPT VISIT, EST, LEVL IV, 30-39 MIN: ICD-10-PCS | Mod: S$GLB,,, | Performed by: DERMATOLOGY

## 2023-09-18 PROCEDURE — 1126F PR PAIN SEVERITY QUANTIFIED, NO PAIN PRESENT: ICD-10-PCS | Mod: CPTII,S$GLB,, | Performed by: DERMATOLOGY

## 2023-09-18 PROCEDURE — 1159F MED LIST DOCD IN RCRD: CPT | Mod: CPTII,S$GLB,, | Performed by: DERMATOLOGY

## 2023-09-18 PROCEDURE — 99999 PR PBB SHADOW E&M-EST. PATIENT-LVL III: CPT | Mod: PBBFAC,,, | Performed by: DERMATOLOGY

## 2023-09-18 PROCEDURE — 99999 PR PBB SHADOW E&M-EST. PATIENT-LVL III: ICD-10-PCS | Mod: PBBFAC,,, | Performed by: DERMATOLOGY

## 2023-09-18 PROCEDURE — 1126F AMNT PAIN NOTED NONE PRSNT: CPT | Mod: CPTII,S$GLB,, | Performed by: DERMATOLOGY

## 2023-09-18 PROCEDURE — 1101F PR PT FALLS ASSESS DOC 0-1 FALLS W/OUT INJ PAST YR: ICD-10-PCS | Mod: CPTII,S$GLB,, | Performed by: DERMATOLOGY

## 2023-09-18 PROCEDURE — 1159F PR MEDICATION LIST DOCUMENTED IN MEDICAL RECORD: ICD-10-PCS | Mod: CPTII,S$GLB,, | Performed by: DERMATOLOGY

## 2023-09-18 PROCEDURE — 3288F PR FALLS RISK ASSESSMENT DOCUMENTED: ICD-10-PCS | Mod: CPTII,S$GLB,, | Performed by: DERMATOLOGY

## 2023-09-18 PROCEDURE — 3288F FALL RISK ASSESSMENT DOCD: CPT | Mod: CPTII,S$GLB,, | Performed by: DERMATOLOGY

## 2023-09-18 RX ORDER — ACYCLOVIR 400 MG/1
400 TABLET ORAL 3 TIMES DAILY PRN
Qty: 90 TABLET | Refills: 3 | Status: SHIPPED | OUTPATIENT
Start: 2023-09-18

## 2023-09-18 RX ORDER — TRIAMCINOLONE ACETONIDE 1 MG/G
CREAM TOPICAL 2 TIMES DAILY
Qty: 80 G | Refills: 2 | Status: SHIPPED | OUTPATIENT
Start: 2023-09-18 | End: 2023-11-17

## 2023-09-18 NOTE — PATIENT INSTRUCTIONS
DERMEND fragile skin (OTC for arms)    Capsaicin cream for itchy spot on left hip - only get that if the prescription steroid cream is not effective. (This one targets nerves).

## 2023-09-18 NOTE — PROGRESS NOTES
Subjective:      Patient ID:  Zoë Enrique is a 73 y.o. female who presents for   Chief Complaint   Patient presents with    Skin Check     TBSE     History of Present Illness: The patient presents for TBSE.    The patient was last seen on: 5/2/2023 for ephelides.   Hx of nmsc    Other skin complaints: none     Has itchy spot on left hip ongoing; cannot stop scratching at it. Uses fragranced products soaps and detergents.    Last seen for freckles on lips.  Stable.     Review of Systems   Skin:  Positive for daily sunscreen use (face moisturizer) and activity-related sunscreen use. Negative for recent sunburn and wears hat.   Hematologic/Lymphatic: Bruises/bleeds easily.       Objective:   Physical Exam   Constitutional: She appears well-developed and well-nourished. No distress.   Neurological: She is alert and oriented to person, place, and time. She is not disoriented.   Psychiatric: She has a normal mood and affect.   Skin:   Areas Examined (abnormalities noted in diagram):   Scalp / Hair Palpated and Inspected  Head / Face Inspection Performed  Neck Inspection Performed  Chest / Axilla Inspection Performed  Abdomen Inspection Performed  Genitals / Buttocks / Groin Inspection Performed  Back Inspection Performed  RUE Inspected  LUE Inspection Performed  RLE Inspected  LLE Inspection Performed  Nails and Digits Inspection Performed                 Diagram Legend     Erythematous scaling macule/papule c/w actinic keratosis       Vascular papule c/w angioma      Pigmented verrucoid papule/plaque c/w seborrheic keratosis      Yellow umbilicated papule c/w sebaceous hyperplasia      Irregularly shaped tan macule c/w lentigo     1-2 mm smooth white papules consistent with Milia      Movable subcutaneous cyst with punctum c/w epidermal inclusion cyst      Subcutaneous movable cyst c/w pilar cyst      Firm pink to brown papule c/w dermatofibroma      Pedunculated fleshy papule(s) c/w skin tag(s)      Evenly pigmented  macule c/w junctional nevus     Mildly variegated pigmented, slightly irregular-bordered macule c/w mildly atypical nevus      Flesh colored to evenly pigmented papule c/w intradermal nevus       Pink pearly papule/plaque c/w basal cell carcinoma      Erythematous hyperkeratotic cursted plaque c/w SCC      Surgical scar with no sign of skin cancer recurrence      Open and closed comedones      Inflammatory papules and pustules      Verrucoid papule consistent consistent with wart     Erythematous eczematous patches and plaques     Dystrophic onycholytic nail with subungual debris c/w onychomycosis     Umbilicated papule    Erythematous-base heme-crusted tan verrucoid plaque consistent with inflamed seborrheic keratosis     Erythematous Silvery Scaling Plaque c/w Psoriasis     See annotation      Assessment / Plan:        Screening exam for skin cancer  Area of previous NMSC examined. Site well healed with no signs of recurrence.    Total body skin examination performed today including at least 12 points as noted in physical examination. No lesions suspicious for malignancy noted.    Recommend daily sun protection/avoidance, use of at least SPF 30, broad spectrum sunscreen (OTC drug), skin self examinations, and routine physician surveillance to optimize early detection    Ephelides  This is a benign hyperpigmented sun induced lesion. Recommend daily sun protection/avoidance and use of at least SPF 30, broad spectrum sunscreen (OTC drug) will reduce the number of new lesions. Treatment of these benign lesions are considered cosmetic.    History of nonmelanoma skin cancer  Yearly skin exams advised    Dermatitis  -     triamcinolone acetonide 0.1% (KENALOG) 0.1 % cream; Apply topically 2 (two) times daily. To spot on left hip x 2 wks  Dispense: 80 g; Refill: 2  I advised changing to a fragrance free bar soap or body wash such as Dove, and fragrance free detergent such as Tide Free & Clear, for sensitive skin.         DERMEND fragile skin (OTC for arms)    Capsaicin cream for itchy spot on left hip - only get that if the prescription steroid cream is not effective. (This one targets nerves).         Follow up in about 1 year (around 9/18/2024).

## 2023-09-20 ENCOUNTER — PATIENT MESSAGE (OUTPATIENT)
Dept: OPTOMETRY | Facility: CLINIC | Age: 73
End: 2023-09-20
Payer: MEDICARE

## 2023-09-28 ENCOUNTER — HOSPITAL ENCOUNTER (OUTPATIENT)
Dept: PULMONOLOGY | Facility: CLINIC | Age: 73
Discharge: HOME | End: 2023-09-28
Payer: MEDICARE

## 2023-09-28 ENCOUNTER — OFFICE VISIT (OUTPATIENT)
Dept: PULMONOLOGY | Facility: CLINIC | Age: 73
End: 2023-09-28
Payer: MEDICARE

## 2023-09-28 ENCOUNTER — PATIENT MESSAGE (OUTPATIENT)
Dept: OTOLARYNGOLOGY | Facility: CLINIC | Age: 73
End: 2023-09-28
Payer: MEDICARE

## 2023-09-28 VITALS
WEIGHT: 161.19 LBS | HEIGHT: 68 IN | DIASTOLIC BLOOD PRESSURE: 68 MMHG | OXYGEN SATURATION: 97 % | SYSTOLIC BLOOD PRESSURE: 120 MMHG | HEART RATE: 71 BPM | BODY MASS INDEX: 24.43 KG/M2

## 2023-09-28 DIAGNOSIS — R05.3 CHRONIC COUGH: ICD-10-CM

## 2023-09-28 DIAGNOSIS — J47.9 BRONCHIECTASIS WITHOUT COMPLICATION: ICD-10-CM

## 2023-09-28 DIAGNOSIS — I70.0 AORTIC ATHEROSCLEROSIS: ICD-10-CM

## 2023-09-28 DIAGNOSIS — J47.9 BRONCHIECTASIS WITHOUT COMPLICATION: Primary | ICD-10-CM

## 2023-09-28 PROCEDURE — 3288F PR FALLS RISK ASSESSMENT DOCUMENTED: ICD-10-PCS | Mod: CPTII,S$GLB,, | Performed by: STUDENT IN AN ORGANIZED HEALTH CARE EDUCATION/TRAINING PROGRAM

## 2023-09-28 PROCEDURE — 99999 PR PBB SHADOW E&M-EST. PATIENT-LVL III: ICD-10-PCS | Mod: PBBFAC,,, | Performed by: STUDENT IN AN ORGANIZED HEALTH CARE EDUCATION/TRAINING PROGRAM

## 2023-09-28 PROCEDURE — 1101F PT FALLS ASSESS-DOCD LE1/YR: CPT | Mod: CPTII,S$GLB,, | Performed by: STUDENT IN AN ORGANIZED HEALTH CARE EDUCATION/TRAINING PROGRAM

## 2023-09-28 PROCEDURE — 94727 PR PULM FUNCTION TEST BY GAS: ICD-10-PCS | Mod: S$GLB,,, | Performed by: INTERNAL MEDICINE

## 2023-09-28 PROCEDURE — 1126F AMNT PAIN NOTED NONE PRSNT: CPT | Mod: CPTII,S$GLB,, | Performed by: STUDENT IN AN ORGANIZED HEALTH CARE EDUCATION/TRAINING PROGRAM

## 2023-09-28 PROCEDURE — 94727 GAS DIL/WSHOT DETER LNG VOL: CPT | Mod: S$GLB,,, | Performed by: INTERNAL MEDICINE

## 2023-09-28 PROCEDURE — 99999 PR PBB SHADOW E&M-EST. PATIENT-LVL III: CPT | Mod: PBBFAC,,, | Performed by: STUDENT IN AN ORGANIZED HEALTH CARE EDUCATION/TRAINING PROGRAM

## 2023-09-28 PROCEDURE — 1126F PR PAIN SEVERITY QUANTIFIED, NO PAIN PRESENT: ICD-10-PCS | Mod: CPTII,S$GLB,, | Performed by: STUDENT IN AN ORGANIZED HEALTH CARE EDUCATION/TRAINING PROGRAM

## 2023-09-28 PROCEDURE — 94060 EVALUATION OF WHEEZING: CPT | Mod: S$GLB,,, | Performed by: INTERNAL MEDICINE

## 2023-09-28 PROCEDURE — 1159F MED LIST DOCD IN RCRD: CPT | Mod: CPTII,S$GLB,, | Performed by: STUDENT IN AN ORGANIZED HEALTH CARE EDUCATION/TRAINING PROGRAM

## 2023-09-28 PROCEDURE — 99214 PR OFFICE/OUTPT VISIT, EST, LEVL IV, 30-39 MIN: ICD-10-PCS | Mod: 25,S$GLB,, | Performed by: STUDENT IN AN ORGANIZED HEALTH CARE EDUCATION/TRAINING PROGRAM

## 2023-09-28 PROCEDURE — 3074F SYST BP LT 130 MM HG: CPT | Mod: CPTII,S$GLB,, | Performed by: STUDENT IN AN ORGANIZED HEALTH CARE EDUCATION/TRAINING PROGRAM

## 2023-09-28 PROCEDURE — 94729 DIFFUSING CAPACITY: CPT | Mod: S$GLB,,, | Performed by: INTERNAL MEDICINE

## 2023-09-28 PROCEDURE — 3288F FALL RISK ASSESSMENT DOCD: CPT | Mod: CPTII,S$GLB,, | Performed by: STUDENT IN AN ORGANIZED HEALTH CARE EDUCATION/TRAINING PROGRAM

## 2023-09-28 PROCEDURE — 99214 OFFICE O/P EST MOD 30 MIN: CPT | Mod: 25,S$GLB,, | Performed by: STUDENT IN AN ORGANIZED HEALTH CARE EDUCATION/TRAINING PROGRAM

## 2023-09-28 PROCEDURE — 1101F PR PT FALLS ASSESS DOC 0-1 FALLS W/OUT INJ PAST YR: ICD-10-PCS | Mod: CPTII,S$GLB,, | Performed by: STUDENT IN AN ORGANIZED HEALTH CARE EDUCATION/TRAINING PROGRAM

## 2023-09-28 PROCEDURE — 3008F PR BODY MASS INDEX (BMI) DOCUMENTED: ICD-10-PCS | Mod: CPTII,S$GLB,, | Performed by: STUDENT IN AN ORGANIZED HEALTH CARE EDUCATION/TRAINING PROGRAM

## 2023-09-28 PROCEDURE — 1159F PR MEDICATION LIST DOCUMENTED IN MEDICAL RECORD: ICD-10-PCS | Mod: CPTII,S$GLB,, | Performed by: STUDENT IN AN ORGANIZED HEALTH CARE EDUCATION/TRAINING PROGRAM

## 2023-09-28 PROCEDURE — 94060 PR EVAL OF BRONCHOSPASM: ICD-10-PCS | Mod: S$GLB,,, | Performed by: INTERNAL MEDICINE

## 2023-09-28 PROCEDURE — 3008F BODY MASS INDEX DOCD: CPT | Mod: CPTII,S$GLB,, | Performed by: STUDENT IN AN ORGANIZED HEALTH CARE EDUCATION/TRAINING PROGRAM

## 2023-09-28 PROCEDURE — 3078F DIAST BP <80 MM HG: CPT | Mod: CPTII,S$GLB,, | Performed by: STUDENT IN AN ORGANIZED HEALTH CARE EDUCATION/TRAINING PROGRAM

## 2023-09-28 PROCEDURE — 94729 PR C02/MEMBANE DIFFUSE CAPACITY: ICD-10-PCS | Mod: S$GLB,,, | Performed by: INTERNAL MEDICINE

## 2023-09-28 PROCEDURE — 3078F PR MOST RECENT DIASTOLIC BLOOD PRESSURE < 80 MM HG: ICD-10-PCS | Mod: CPTII,S$GLB,, | Performed by: STUDENT IN AN ORGANIZED HEALTH CARE EDUCATION/TRAINING PROGRAM

## 2023-09-28 PROCEDURE — 3074F PR MOST RECENT SYSTOLIC BLOOD PRESSURE < 130 MM HG: ICD-10-PCS | Mod: CPTII,S$GLB,, | Performed by: STUDENT IN AN ORGANIZED HEALTH CARE EDUCATION/TRAINING PROGRAM

## 2023-09-28 RX ORDER — SODIUM CHLORIDE FOR INHALATION 3 %
4 VIAL, NEBULIZER (ML) INHALATION 2 TIMES DAILY
Qty: 300 ML | Status: SHIPPED | OUTPATIENT
Start: 2023-09-28

## 2023-09-28 NOTE — PROGRESS NOTES
"Subjective:     Reason for visit:  Follow-up after     Patient ID:  Zoë Enriuqe is a 73 y.o. female with AFib on Xarelto s/p ablation x5 (previous history of amiodarone), chronic recurrent sinusitis/rhinitis with laryngopharyngeal reflux, and bronchiectasis    Interval History:  Continues to have ongoing sinus symptoms and frequent bouts of bronchitis.  Feels like she has no time to recover in between.  No night sweats, weight loss or chronic fatigue.  Now following with ENT.  Has undergone several therapeutic trials.    Additional Pulmonary History:  Childhood Illnesses:  Pneumonias a child < 12 years that required hospitalization  Occupational/Environmental:  No asbestos or silica exposure.  Currently unemployed.  Tobacco/Smoking History:  Never       Objective:     Vitals:    09/28/23 1003   BP: 120/68   Pulse: 71   SpO2: 97%   Weight: 73.1 kg (161 lb 2.5 oz)   Height: 5' 8" (1.727 m)           Physical Exam  Vitals and nursing note reviewed.   Constitutional:       General: She is not in acute distress.     Appearance: She is not ill-appearing, toxic-appearing or diaphoretic.   HENT:      Head: Normocephalic and atraumatic.      Nose: No rhinorrhea.      Mouth/Throat:      Mouth: Mucous membranes are moist.   Eyes:      General: No scleral icterus.     Extraocular Movements: Extraocular movements intact.   Cardiovascular:      Rate and Rhythm: Normal rate and regular rhythm.   Pulmonary:      Effort: No tachypnea, accessory muscle usage or retractions.   Abdominal:      General: There is no distension.   Skin:     General: Skin is warm and dry.      Coloration: Skin is not jaundiced.      Findings: No rash.   Neurological:      General: No focal deficit present.      Mental Status: Mental status is at baseline.          Personal Diagnostic Review and Interpretation  08/22/2023 CXR: lungs are clear    01/14/2022 CT chest:  RLL cylindrical bronchiectasis with innumerable micro nodules in the right middle and " RLL; other scattered calcified and noncalcified nodules      Pertinent Studies Reviewed and Interpreted:     Pulmonary Function Tests:   09/28/2023: FEV1 97, FVC 95, FEV1/FVC 78, FEF 25-75 107; no reversibility.  TLC 87.  DLCO 70    12/16/2020:  FEV1 84, FVC 79.  TLC N2 84.  DLCO 94    6 Minute Walk Tests:   None    Echocardiograms:   11/24/2020 KHANH:  LVEF 65% with LAE, GEOFF    Other pertinent laboratories:  06/16/2022 IgE, IgM, IgG, IgA all WNL    06/16/2022 Aspergillus IgE negative    02/05/2020 QuantiFERON GOLD negative       Assessment & Plan:       Problem List Items Addressed This Visit          Pulmonary    Bronchiectasis without complication - Primary    Overview     Discovered incidentally.  No environmental, occupational or social risk factors.  PNA as a young child but no PNA throughout adulthood.  2022 immunoglobulins normal.  Aspergillus IgE normal.  Quant GOLD normal.  Notable tree-in-bud opacities on CT.  Previous AFB negative.   Cough attributable postnasal gtt.  No sputum production.  2020 PFTs unremarkable but no lung volumes.  2023 PFTs improved, FEV1 now 97%.  Starting hypertonic nebs         Relevant Medications    sodium chloride 3% 3 % nebulizer solution    Other Relevant Orders    NEBULIZER FOR HOME USE    NEBULIZER KIT (SUPPLIES) FOR HOME USE    Cough    Overview     PFTs actually much improved from 2020 (FEV1 84 > 97).  Recent CXR without infiltrates.  We discussed the possibility of MAC infection.  Time course and waxing waning complaints with periods of improvement in between would be atypical of MAC symptoms, which is usually more indolent and unrelenting. No night sweats, weight loss.  Symptoms unimproved after course of both prednisone and doxy/Avelox, which I think points further away from pulmonary etiology. Reasonable at this time to get better control of her sinus/LPR issues and see if allergy has any thoughts prior to more invasive testing like bronchoscopy.  Starting pulmonary  hygiene with hypertonic saline nebs            Cardiac/Vascular    Aortic atherosclerosis    Overview     Seen on recent CT.  Not on ASA or statin               Portions of the record may have been created with voice-recognition software. Occasional wrong-word or sound-a-like substitutions may have occurred due to the inherent limitations of voice-recognition software. Read the chart carefully and recognize, using context, where substitutions have occurred.

## 2023-09-29 ENCOUNTER — OFFICE VISIT (OUTPATIENT)
Dept: ALLERGY | Facility: CLINIC | Age: 73
End: 2023-09-29
Payer: MEDICARE

## 2023-09-29 VITALS — WEIGHT: 161.63 LBS | BODY MASS INDEX: 24.57 KG/M2

## 2023-09-29 DIAGNOSIS — C40.21 OSTEOSARCOMA OF RIGHT FEMUR: ICD-10-CM

## 2023-09-29 DIAGNOSIS — K21.9 GASTROESOPHAGEAL REFLUX DISEASE, UNSPECIFIED WHETHER ESOPHAGITIS PRESENT: ICD-10-CM

## 2023-09-29 DIAGNOSIS — I48.0 PAROXYSMAL ATRIAL FIBRILLATION: ICD-10-CM

## 2023-09-29 DIAGNOSIS — K21.9 LARYNGOPHARYNGEAL REFLUX (LPR): ICD-10-CM

## 2023-09-29 DIAGNOSIS — R05.3 CHRONIC COUGH: Primary | ICD-10-CM

## 2023-09-29 DIAGNOSIS — I48.11 LONGSTANDING PERSISTENT ATRIAL FIBRILLATION: ICD-10-CM

## 2023-09-29 DIAGNOSIS — E03.9 HYPOTHYROIDISM (ACQUIRED): ICD-10-CM

## 2023-09-29 DIAGNOSIS — J47.9 BRONCHIECTASIS WITHOUT COMPLICATION: ICD-10-CM

## 2023-09-29 DIAGNOSIS — I70.0 AORTIC ATHEROSCLEROSIS: ICD-10-CM

## 2023-09-29 DIAGNOSIS — M65.4 DE QUERVAIN'S TENOSYNOVITIS, LEFT: ICD-10-CM

## 2023-09-29 PROCEDURE — 1159F MED LIST DOCD IN RCRD: CPT | Mod: CPTII,S$GLB,, | Performed by: ALLERGY & IMMUNOLOGY

## 2023-09-29 PROCEDURE — 1160F RVW MEDS BY RX/DR IN RCRD: CPT | Mod: CPTII,S$GLB,, | Performed by: ALLERGY & IMMUNOLOGY

## 2023-09-29 PROCEDURE — 1126F PR PAIN SEVERITY QUANTIFIED, NO PAIN PRESENT: ICD-10-PCS | Mod: CPTII,S$GLB,, | Performed by: ALLERGY & IMMUNOLOGY

## 2023-09-29 PROCEDURE — 1101F PR PT FALLS ASSESS DOC 0-1 FALLS W/OUT INJ PAST YR: ICD-10-PCS | Mod: CPTII,S$GLB,, | Performed by: ALLERGY & IMMUNOLOGY

## 2023-09-29 PROCEDURE — 90677 PCV20 VACCINE IM: CPT | Mod: S$GLB,,, | Performed by: ALLERGY & IMMUNOLOGY

## 2023-09-29 PROCEDURE — 99215 PR OFFICE/OUTPT VISIT, EST, LEVL V, 40-54 MIN: ICD-10-PCS | Mod: 25,S$GLB,, | Performed by: ALLERGY & IMMUNOLOGY

## 2023-09-29 PROCEDURE — 3288F PR FALLS RISK ASSESSMENT DOCUMENTED: ICD-10-PCS | Mod: CPTII,S$GLB,, | Performed by: ALLERGY & IMMUNOLOGY

## 2023-09-29 PROCEDURE — G0009 ADMIN PNEUMOCOCCAL VACCINE: HCPCS | Mod: S$GLB,,, | Performed by: ALLERGY & IMMUNOLOGY

## 2023-09-29 PROCEDURE — 1159F PR MEDICATION LIST DOCUMENTED IN MEDICAL RECORD: ICD-10-PCS | Mod: CPTII,S$GLB,, | Performed by: ALLERGY & IMMUNOLOGY

## 2023-09-29 PROCEDURE — 3288F FALL RISK ASSESSMENT DOCD: CPT | Mod: CPTII,S$GLB,, | Performed by: ALLERGY & IMMUNOLOGY

## 2023-09-29 PROCEDURE — 1126F AMNT PAIN NOTED NONE PRSNT: CPT | Mod: CPTII,S$GLB,, | Performed by: ALLERGY & IMMUNOLOGY

## 2023-09-29 PROCEDURE — 3008F BODY MASS INDEX DOCD: CPT | Mod: CPTII,S$GLB,, | Performed by: ALLERGY & IMMUNOLOGY

## 2023-09-29 PROCEDURE — 99215 OFFICE O/P EST HI 40 MIN: CPT | Mod: 25,S$GLB,, | Performed by: ALLERGY & IMMUNOLOGY

## 2023-09-29 PROCEDURE — G0009 PNEUMOCOCCAL CONJUGATE VACCINE 20-VALENT: ICD-10-PCS | Mod: S$GLB,,, | Performed by: ALLERGY & IMMUNOLOGY

## 2023-09-29 PROCEDURE — 1101F PT FALLS ASSESS-DOCD LE1/YR: CPT | Mod: CPTII,S$GLB,, | Performed by: ALLERGY & IMMUNOLOGY

## 2023-09-29 PROCEDURE — 1160F PR REVIEW ALL MEDS BY PRESCRIBER/CLIN PHARMACIST DOCUMENTED: ICD-10-PCS | Mod: CPTII,S$GLB,, | Performed by: ALLERGY & IMMUNOLOGY

## 2023-09-29 PROCEDURE — 90677 PNEUMOCOCCAL CONJUGATE VACCINE 20-VALENT: ICD-10-PCS | Mod: S$GLB,,, | Performed by: ALLERGY & IMMUNOLOGY

## 2023-09-29 PROCEDURE — 99999 PR PBB SHADOW E&M-EST. PATIENT-LVL III: CPT | Mod: PBBFAC,,, | Performed by: ALLERGY & IMMUNOLOGY

## 2023-09-29 PROCEDURE — 3008F PR BODY MASS INDEX (BMI) DOCUMENTED: ICD-10-PCS | Mod: CPTII,S$GLB,, | Performed by: ALLERGY & IMMUNOLOGY

## 2023-09-29 PROCEDURE — 99999 PR PBB SHADOW E&M-EST. PATIENT-LVL III: ICD-10-PCS | Mod: PBBFAC,,, | Performed by: ALLERGY & IMMUNOLOGY

## 2023-09-29 RX ORDER — OMEPRAZOLE 40 MG/1
40 CAPSULE, DELAYED RELEASE ORAL
Qty: 60 CAPSULE | Refills: 3 | Status: SHIPPED | OUTPATIENT
Start: 2023-09-29 | End: 2023-11-17

## 2023-09-29 NOTE — PROGRESS NOTES
Zoë Enrique returns to clinic today for continued evaluation of recurrent upper respiratory infections.  She was last seen July 18, 2022. She is here alone.     Since her last visit, she has continued to be followed by Dr. Linda Oneill for LPR.  She has continued to demonstrate changes on her laryngoscopy.  She was taking omeprazole 40 milligrams a day but was changed to famotidine 20 milligrams twice a day on April 25, 2023 when her laryngoscopy that day showed postcricoid edema and erythema.     She has continued to have recurrent upper respiratory infections and bronchitis.  She recently had a sinus infection with increased rhinitis.  She was treated with Avelox for 10 days beginning August 25, 2023.     She has had numerous courses of antibiotics with only minimal improvement.  She may get better for several days before she has another upper respiratory infection.    She continues to have itching and redness of her eyes.  She takes Patanol and Restasis. She does have dry eye syndrome.  Her ophthalmologist is Dr. Roberson.    She has postnasal drip, frequent throat clearing, sensation that something is in the back of the throat, hoarseness, and cough that is worse when she leans back or lies down at night.    She denies any indigestion or heartburn.    She continues to take Atrovent nasal spray 3 times a day.    She was seen by Dr. Santos Rivas in Pulmonary Medicine yesterday.  She was started on nebulized saline.    She does have migraine headaches and continues to have some facial pain.        9/26/2023     9:03 PM   [facility] PEQ ALLERGY QUESTIONNAIRE SHORT   facial swelling No   Sinus pain? Yes   sinus pressure  Yes   ear discharge No   ear pain No   hearing loss No   nosebleeds No   postnasal drip Yes   sneezing No   runny nose Yes   congestion No   sore throat No   trouble swallowing No   voice change No   eye itching Yes   eye redness Yes   eye discharge No   eye pain No   Light sensitivity /  light hurts the eyes? No   cough Yes   wheezing No   shortness of breath Yes   apnea No   choking No   chest tightness Yes   rash No   color change  No      Physical Examination:  General: Well-developed, well-nourished, no acute distress.  Clearing throat and coughing throughout interview.  Head: No sinus tenderness.  Eyes: Conjunctivae:  No bulbar or palpebral conjunctival injection.  Ears: EAC's clear.  TM's clear.  No pre-auricular nodes.  Nose: Nasal Mucosa:  Pink.  Septum: No apparent deviation.  Turbinates:  No significant edema.  Polyps/Mass:  None visible.  Teeth/Gums:  No bleeding noted.  Oropharynx: No exudates.  Neck: Supple without thyromegaly. No cervical lymphadenopathy.    Respiratory/Chest: Effort: Good.  Auscultation:  Clear bilaterally.  Skin: Good turgor.  No urticaria or angioedema.  Neuro/Psych: Oriented x 3.    Spirometry 12/16/2020:  Spirometry is normal. Lung volume determination is normal. DLCO is normal.    Chest CT 01/14/2022:  1.  Tubular bronchiectasis with retained secretions and innumerable peribronchovascular distributed punctate micro nodules, predominantly within the right middle and lower lobes, concerning for an atypical pneumonia, such as non tuberculous mycobacterium or chronic bronchiolitis.  Findings are increased compared to prior studies.  2.  Scattered bandlike opacities compatible with subsegmental atelectasis.  3.  Additional scattered calcified and noncalcified pulmonary nodules, largest measuring 0.5 cm.  For multiple solid nodules all <6 mm, Fleischner Society 2017 guidelines recommend no routine follow up for a low risk patient, or follow up with non-contrast chest CT at 12 months after discovery in a high risk patient.  4.  Dilated left atrial appendage, small foci of atrial wall calcifications.  Correlate with chronic left atrial dysfunction.  5.  Left, and possibly small right, implant rupture without infiltration into adjacent breast tissue.    Laboratory  2022:  IgE level: Less than 35.  ImmunoCAP:  Negative.  IgA: 114.  Ig.  IgM: 66.  Pneumococcal titers: Not protective.    Inhalant skin tests 2022: 3+ histamine control. All tests are negative.    Laboratory 2022:  Pneumococcal titers:  Borderline protective.    Chest CT 2022:  1.  Tubular bronchiectasis with retained secretions and innumerable peribronchovascular distributed punctate micro nodules, predominantly within the right middle and lower lobes, concerning for an atypical pneumonia, such as non tuberculous mycobacterium or chronic bronchiolitis.  Findings are increased compared to prior studies.  2.  Scattered bandlike opacities compatible with subsegmental atelectasis.  3.  Additional scattered calcified and noncalcified pulmonary nodules, largest measuring 0.5 cm.  For multiple solid nodules all <6 mm, Fleischner Society 2017 guidelines recommend no routine follow up for a low risk patient, or follow up with non-contrast chest CT at 12 months after discovery in a high risk patient.  4.  Dilated left atrial appendage, small foci of atrial wall calcifications.  Correlate with chronic left atrial dysfunction.  5.  Left, and possibly small right, implant rupture without infiltration into adjacent breast tissue    Sinus CT 2023:  Nasal septal deviation to the left.  The paranasal sinuses essentially are currently clear.  The ostiomeatal complexes are patent with some anatomic narrowing of the distal infundibulum on the left.    Chest x-ray 2023:  No active cardiac or pulmonary findings.    Complete PFT 2023: Pending.     Assessment:  1. Chronic rhinitis, not allergic.  2. Chronic conjunctivitis, not allergic.  3. Chronic cough, without allergic component.  4. Recurrent upper respiratory infections.  5. LPR, probably not controlled.  6. Right middle lobe bronchiectasis.  7. S/P five ablations for atrial fibrillation currently on Xarelto.  8. Hypothyroidism on  levothyroxine.  9. Dry eye syndrome.  10. 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  11. S/P hysterectomy 1994.  12. S/P diverticulosis with resection 2011.    13. Migraine headaches.    Recommendations:  1. Prevnar 20 today.  2. Discontinue Pepcid.  3. Omeprazole 40 milligrams twice a day.  4. Continue nebulized saline.  5. Return to clinic in 4 to 6 weeks or sooner if needed.    Patient education September 09/20/2023:   LPR and web site were reviewed.    Patient education June 16, 2022:   LPR and web site were reviewed.   Relationship between sinus headaches and migraines was reviewed.    This includes face to face time and non-face to face time preparing to see the patient (eg, review of tests), obtaining and/or reviewing separately obtained history, documenting clinical information in the electronic or other health record, independently interpreting results and communicating results to the patient/family/caregiver, or care coordinator.  60 minutes.

## 2023-10-02 ENCOUNTER — PATIENT MESSAGE (OUTPATIENT)
Dept: PULMONOLOGY | Facility: CLINIC | Age: 73
End: 2023-10-02
Payer: MEDICARE

## 2023-10-02 ENCOUNTER — TELEPHONE (OUTPATIENT)
Dept: PRIMARY CARE CLINIC | Facility: CLINIC | Age: 73
End: 2023-10-02
Payer: MEDICARE

## 2023-10-02 DIAGNOSIS — E03.9 HYPOTHYROIDISM (ACQUIRED): Primary | ICD-10-CM

## 2023-10-02 DIAGNOSIS — E78.2 HYPERLIPIDEMIA, MIXED: ICD-10-CM

## 2023-10-02 DIAGNOSIS — I48.0 PAROXYSMAL ATRIAL FIBRILLATION: ICD-10-CM

## 2023-10-02 RX ORDER — IPRATROPIUM BROMIDE 42 UG/1
2 SPRAY, METERED NASAL 3 TIMES DAILY
Qty: 15 ML | Refills: 0 | Status: SHIPPED | OUTPATIENT
Start: 2023-10-02 | End: 2023-10-04 | Stop reason: SDUPTHER

## 2023-10-02 NOTE — TELEPHONE ENCOUNTER
----- Message from Gala Gibbons sent at 10/2/2023  9:00 AM CDT -----  Regarding: appt  Contact: pt [phone]  PATIENTCALL     Pt is calling to speak with someone in provider office regarding she is trying to schedule her annual visit for January with Dr Jeffery she is asking for a return call please call pt at  [phone]

## 2023-10-04 RX ORDER — IPRATROPIUM BROMIDE 42 UG/1
2 SPRAY, METERED NASAL 3 TIMES DAILY
Qty: 15 ML | Refills: 6 | Status: SHIPPED | OUTPATIENT
Start: 2023-10-04

## 2023-10-29 ENCOUNTER — PATIENT MESSAGE (OUTPATIENT)
Dept: OTOLARYNGOLOGY | Facility: CLINIC | Age: 73
End: 2023-10-29
Payer: MEDICARE

## 2023-11-06 ENCOUNTER — OFFICE VISIT (OUTPATIENT)
Dept: PLASTIC SURGERY | Facility: CLINIC | Age: 73
End: 2023-11-06

## 2023-11-06 DIAGNOSIS — Z41.1 ENCOUNTER FOR COSMETIC PROCEDURE: Primary | ICD-10-CM

## 2023-11-06 PROCEDURE — 99499 UNLISTED E&M SERVICE: CPT | Mod: ,,, | Performed by: OTOLARYNGOLOGY

## 2023-11-06 PROCEDURE — 99499 NO LOS: ICD-10-PCS | Mod: ,,, | Performed by: OTOLARYNGOLOGY

## 2023-11-06 NOTE — PROGRESS NOTES
Patient who is here today for consult for cosmetic myomodulator treatment.   Discussed benefits and risks of botulinum toxin injections, including headache, weakness/paralysis of muscles, asymmetry, eyebrow/lid drooping, pain, bruising, swelling, infection, and rare risk of systemic botulism.  The patient was given the opportunity to ask any questions, and all questions were answered to the patient's satisfaction.  The patient verbalized understanding, elected to proceed, and signed a written informed consent.  Today at her request I have injected 8 units of Dysport in her upper lip.     Patient tolerated well with no complications. She was instructed not to rub or massage the treated areas and that she should avoid lying down, bending upside down, and strenuous exercise for the rest of the day.  Instructed to wait two weeks to assess response before presenting for a touch up injection, if needed.

## 2023-11-10 ENCOUNTER — OFFICE VISIT (OUTPATIENT)
Dept: ALLERGY | Facility: CLINIC | Age: 73
End: 2023-11-10
Payer: MEDICARE

## 2023-11-10 VITALS — HEIGHT: 68 IN | BODY MASS INDEX: 24.49 KG/M2 | WEIGHT: 161.63 LBS | OXYGEN SATURATION: 95 % | HEART RATE: 91 BPM

## 2023-11-10 DIAGNOSIS — R05.3 CHRONIC COUGH: ICD-10-CM

## 2023-11-10 DIAGNOSIS — J31.0 CHRONIC RHINITIS: Primary | ICD-10-CM

## 2023-11-10 DIAGNOSIS — I48.0 PAROXYSMAL ATRIAL FIBRILLATION: ICD-10-CM

## 2023-11-10 DIAGNOSIS — J47.9 BRONCHIECTASIS WITHOUT COMPLICATION: ICD-10-CM

## 2023-11-10 DIAGNOSIS — I47.10 PSVT (PAROXYSMAL SUPRAVENTRICULAR TACHYCARDIA): ICD-10-CM

## 2023-11-10 DIAGNOSIS — G44.229 CHRONIC TENSION-TYPE HEADACHE, NOT INTRACTABLE: ICD-10-CM

## 2023-11-10 PROCEDURE — 1159F MED LIST DOCD IN RCRD: CPT | Mod: CPTII,S$GLB,, | Performed by: ALLERGY & IMMUNOLOGY

## 2023-11-10 PROCEDURE — 1160F RVW MEDS BY RX/DR IN RCRD: CPT | Mod: CPTII,S$GLB,, | Performed by: ALLERGY & IMMUNOLOGY

## 2023-11-10 PROCEDURE — 1126F PR PAIN SEVERITY QUANTIFIED, NO PAIN PRESENT: ICD-10-PCS | Mod: CPTII,S$GLB,, | Performed by: ALLERGY & IMMUNOLOGY

## 2023-11-10 PROCEDURE — 99214 PR OFFICE/OUTPT VISIT, EST, LEVL IV, 30-39 MIN: ICD-10-PCS | Mod: S$GLB,,, | Performed by: ALLERGY & IMMUNOLOGY

## 2023-11-10 PROCEDURE — 1160F PR REVIEW ALL MEDS BY PRESCRIBER/CLIN PHARMACIST DOCUMENTED: ICD-10-PCS | Mod: CPTII,S$GLB,, | Performed by: ALLERGY & IMMUNOLOGY

## 2023-11-10 PROCEDURE — 99999 PR PBB SHADOW E&M-EST. PATIENT-LVL IV: ICD-10-PCS | Mod: PBBFAC,,, | Performed by: ALLERGY & IMMUNOLOGY

## 2023-11-10 PROCEDURE — 99999 PR PBB SHADOW E&M-EST. PATIENT-LVL IV: CPT | Mod: PBBFAC,,, | Performed by: ALLERGY & IMMUNOLOGY

## 2023-11-10 PROCEDURE — 1126F AMNT PAIN NOTED NONE PRSNT: CPT | Mod: CPTII,S$GLB,, | Performed by: ALLERGY & IMMUNOLOGY

## 2023-11-10 PROCEDURE — 3008F PR BODY MASS INDEX (BMI) DOCUMENTED: ICD-10-PCS | Mod: CPTII,S$GLB,, | Performed by: ALLERGY & IMMUNOLOGY

## 2023-11-10 PROCEDURE — 99214 OFFICE O/P EST MOD 30 MIN: CPT | Mod: S$GLB,,, | Performed by: ALLERGY & IMMUNOLOGY

## 2023-11-10 PROCEDURE — 3008F BODY MASS INDEX DOCD: CPT | Mod: CPTII,S$GLB,, | Performed by: ALLERGY & IMMUNOLOGY

## 2023-11-10 PROCEDURE — 1159F PR MEDICATION LIST DOCUMENTED IN MEDICAL RECORD: ICD-10-PCS | Mod: CPTII,S$GLB,, | Performed by: ALLERGY & IMMUNOLOGY

## 2023-11-10 NOTE — PROGRESS NOTES
Zoë Enrique returns to clinic today for continued evaluation of recurrent upper respiratory infections.  She was last seen September 29, 2023.     After her last visit, she started taking omeprazole 40 milligrams twice a day.  Soon thereafter she started having difficulty with her vision, constipation, and abdominal discomfort.  She also started having increased joint pain, headaches, and a dry mouth.  She stopped this medication and her symptoms resolved.     Dr. Linda Yu started her back on Pepcid.  She thinks she is taking 20 milligrams twice a day.     She has not had heartburn in the past but over the past month has has some substernal burning.  She has all started had an increase in her dysphagia. She is now getting food stuck in her esophagus one to 3 times a week.  It does cause increased pain.    Since her last visit, her cough has resolved. She has not had any further upper respiratory infections.  She is not needed her albuterol.  She was using nebulized sodium chloride twice a day.  She now only takes as needed.    She does continue to have intermittent pain across her face and forehead.  She describes these as sinus headaches. She is aware that these may be migraine.    She had her Prevnar 20 vaccination on September 29, 2023. She has not yet had repeat pneumococcal titers.         11/10/2023     8:49 AM   [facility] PEQ ALLERGY QUESTIONNAIRE SHORT   facial swelling No   Sinus pain? Yes   sinus pressure  Yes   Ears: No symptoms   nosebleeds No   postnasal drip Yes   sneezing Yes   runny nose Yes   congestion No   sore throat No   trouble swallowing Yes   voice change No   eye itching Yes   eye redness Yes   eye discharge No   eye pain No   Light sensitivity / light hurts the eyes? No   Lungs: No symptoms   Skin: No symptoms      Physical Examination:  General: Well-developed, well-nourished, no acute distress.  No throat clearing.  Head: No sinus tenderness.  Eyes: Conjunctivae:  No bulbar or  palpebral conjunctival injection.  Ears: EAC's clear.  TM's clear.  No pre-auricular nodes.  Nose: Nasal Mucosa:  Pink.  Septum: No apparent deviation.  Turbinates:  No significant edema.  Polyps/Mass:  None visible.  Teeth/Gums:  No bleeding noted.  Oropharynx: No exudates.  Neck: Supple without thyromegaly. No cervical lymphadenopathy.    Respiratory/Chest: Effort: Good.  Auscultation:  Clear bilaterally.  Skin: Good turgor.  No urticaria or angioedema.  Neuro/Psych: Oriented x 3.    Spirometry 2020:  Spirometry is normal. Lung volume determination is normal. DLCO is normal.    Chest CT 2022:  1.  Tubular bronchiectasis with retained secretions and innumerable peribronchovascular distributed punctate micro nodules, predominantly within the right middle and lower lobes, concerning for an atypical pneumonia, such as non tuberculous mycobacterium or chronic bronchiolitis.  Findings are increased compared to prior studies.  2.  Scattered bandlike opacities compatible with subsegmental atelectasis.  3.  Additional scattered calcified and noncalcified pulmonary nodules, largest measuring 0.5 cm.  For multiple solid nodules all <6 mm, Fleischner Society 2017 guidelines recommend no routine follow up for a low risk patient, or follow up with non-contrast chest CT at 12 months after discovery in a high risk patient.  4.  Dilated left atrial appendage, small foci of atrial wall calcifications.  Correlate with chronic left atrial dysfunction.  5.  Left, and possibly small right, implant rupture without infiltration into adjacent breast tissue.    Laboratory 2022:  IgE level: Less than 35.  ImmunoCAP:  Negative.  IgA: 114.  Ig.  IgM: 66.  Pneumococcal titers: Not protective.    Inhalant skin tests 2022: 3+ histamine control. All tests are negative.    Laboratory 2022:  Pneumococcal titers:  Borderline protective.    Sinus CT 2023:  Nasal septal deviation to the left.  The paranasal  sinuses essentially are currently clear.  The ostiomeatal complexes are patent with some anatomic narrowing of the distal infundibulum on the left.    Chest x-ray 8/22/2023:  No active cardiac or pulmonary findings.    Complete PFT 09/28/2023:  Spirometry is normal.  Remains in improve any bronchodilator. Lung volume determination normal DLCO is mildly decreased.     Assessment:  1. Chronic rhinitis, not allergic.  2. Chronic conjunctivitis, not allergic.  3. Chronic cough, without allergic component, resolved.  4. Recurrent upper respiratory infections, improved.  5. LPR, improved.  6. Right middle lobe bronchiectasis.  7. S/P five ablations for atrial fibrillation currently on Xarelto.  8. Hypothyroidism on levothyroxine.  9. Dry eye syndrome.  10. 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  11. S/P hysterectomy 1994.  12. S/P diverticulosis with resection 2011.    13. Migraine headaches.    Recommendations:  1. Continue Pepcid 20 milligrams twice a day or 4 milligrams once a day.  2. Pneumococcal titers with next blood work scheduled in January.  3. Continue nebulized saline as needed.  4. Return to clinic after above.    Patient education September 09/20/2023:   LPR and web site were reviewed.    Patient education June 16, 2022:   LPR and web site were reviewed.   Relationship between sinus headaches and migraines was reviewed.    This includes face to face time and non-face to face time preparing to see the patient (eg, review of tests), obtaining and/or reviewing separately obtained history, documenting clinical information in the electronic or other health record, independently interpreting results and communicating results to the patient/family/caregiver, or care coordinator.  60 minutes.

## 2023-11-14 ENCOUNTER — PATIENT MESSAGE (OUTPATIENT)
Dept: ALLERGY | Facility: CLINIC | Age: 73
End: 2023-11-14
Payer: MEDICARE

## 2023-11-17 ENCOUNTER — OFFICE VISIT (OUTPATIENT)
Dept: NEUROLOGY | Facility: CLINIC | Age: 73
End: 2023-11-17
Payer: MEDICARE

## 2023-11-17 ENCOUNTER — TELEPHONE (OUTPATIENT)
Dept: NEUROLOGY | Facility: CLINIC | Age: 73
End: 2023-11-17
Payer: MEDICARE

## 2023-11-17 VITALS
HEART RATE: 66 BPM | HEIGHT: 68 IN | WEIGHT: 161.63 LBS | DIASTOLIC BLOOD PRESSURE: 71 MMHG | SYSTOLIC BLOOD PRESSURE: 123 MMHG | BODY MASS INDEX: 24.49 KG/M2

## 2023-11-17 DIAGNOSIS — G44.229 CHRONIC TENSION-TYPE HEADACHE, NOT INTRACTABLE: ICD-10-CM

## 2023-11-17 DIAGNOSIS — G43.709 CHRONIC MIGRAINE WITHOUT AURA WITHOUT STATUS MIGRAINOSUS, NOT INTRACTABLE: Primary | ICD-10-CM

## 2023-11-17 PROCEDURE — 1160F RVW MEDS BY RX/DR IN RCRD: CPT | Mod: CPTII,S$GLB,, | Performed by: PSYCHIATRY & NEUROLOGY

## 2023-11-17 PROCEDURE — 1159F PR MEDICATION LIST DOCUMENTED IN MEDICAL RECORD: ICD-10-PCS | Mod: CPTII,S$GLB,, | Performed by: PSYCHIATRY & NEUROLOGY

## 2023-11-17 PROCEDURE — 3008F BODY MASS INDEX DOCD: CPT | Mod: CPTII,S$GLB,, | Performed by: PSYCHIATRY & NEUROLOGY

## 2023-11-17 PROCEDURE — 1125F AMNT PAIN NOTED PAIN PRSNT: CPT | Mod: CPTII,S$GLB,, | Performed by: PSYCHIATRY & NEUROLOGY

## 2023-11-17 PROCEDURE — 3288F FALL RISK ASSESSMENT DOCD: CPT | Mod: CPTII,S$GLB,, | Performed by: PSYCHIATRY & NEUROLOGY

## 2023-11-17 PROCEDURE — 1160F PR REVIEW ALL MEDS BY PRESCRIBER/CLIN PHARMACIST DOCUMENTED: ICD-10-PCS | Mod: CPTII,S$GLB,, | Performed by: PSYCHIATRY & NEUROLOGY

## 2023-11-17 PROCEDURE — 1125F PR PAIN SEVERITY QUANTIFIED, PAIN PRESENT: ICD-10-PCS | Mod: CPTII,S$GLB,, | Performed by: PSYCHIATRY & NEUROLOGY

## 2023-11-17 PROCEDURE — 3074F SYST BP LT 130 MM HG: CPT | Mod: CPTII,S$GLB,, | Performed by: PSYCHIATRY & NEUROLOGY

## 2023-11-17 PROCEDURE — 99213 OFFICE O/P EST LOW 20 MIN: CPT | Mod: S$GLB,,, | Performed by: PSYCHIATRY & NEUROLOGY

## 2023-11-17 PROCEDURE — 1101F PT FALLS ASSESS-DOCD LE1/YR: CPT | Mod: CPTII,S$GLB,, | Performed by: PSYCHIATRY & NEUROLOGY

## 2023-11-17 PROCEDURE — 1159F MED LIST DOCD IN RCRD: CPT | Mod: CPTII,S$GLB,, | Performed by: PSYCHIATRY & NEUROLOGY

## 2023-11-17 PROCEDURE — 3074F PR MOST RECENT SYSTOLIC BLOOD PRESSURE < 130 MM HG: ICD-10-PCS | Mod: CPTII,S$GLB,, | Performed by: PSYCHIATRY & NEUROLOGY

## 2023-11-17 PROCEDURE — 3078F DIAST BP <80 MM HG: CPT | Mod: CPTII,S$GLB,, | Performed by: PSYCHIATRY & NEUROLOGY

## 2023-11-17 PROCEDURE — 3078F PR MOST RECENT DIASTOLIC BLOOD PRESSURE < 80 MM HG: ICD-10-PCS | Mod: CPTII,S$GLB,, | Performed by: PSYCHIATRY & NEUROLOGY

## 2023-11-17 PROCEDURE — 99213 PR OFFICE/OUTPT VISIT, EST, LEVL III, 20-29 MIN: ICD-10-PCS | Mod: S$GLB,,, | Performed by: PSYCHIATRY & NEUROLOGY

## 2023-11-17 PROCEDURE — 1101F PR PT FALLS ASSESS DOC 0-1 FALLS W/OUT INJ PAST YR: ICD-10-PCS | Mod: CPTII,S$GLB,, | Performed by: PSYCHIATRY & NEUROLOGY

## 2023-11-17 PROCEDURE — 99999 PR PBB SHADOW E&M-EST. PATIENT-LVL IV: ICD-10-PCS | Mod: PBBFAC,,, | Performed by: PSYCHIATRY & NEUROLOGY

## 2023-11-17 PROCEDURE — 3288F PR FALLS RISK ASSESSMENT DOCUMENTED: ICD-10-PCS | Mod: CPTII,S$GLB,, | Performed by: PSYCHIATRY & NEUROLOGY

## 2023-11-17 PROCEDURE — 3008F PR BODY MASS INDEX (BMI) DOCUMENTED: ICD-10-PCS | Mod: CPTII,S$GLB,, | Performed by: PSYCHIATRY & NEUROLOGY

## 2023-11-17 PROCEDURE — 99999 PR PBB SHADOW E&M-EST. PATIENT-LVL IV: CPT | Mod: PBBFAC,,, | Performed by: PSYCHIATRY & NEUROLOGY

## 2023-11-17 RX ORDER — MEMANTINE HYDROCHLORIDE 10 MG/1
10 TABLET ORAL 2 TIMES DAILY
Qty: 60 TABLET | Refills: 11 | Status: SHIPPED | OUTPATIENT
Start: 2023-11-17 | End: 2024-11-16

## 2023-11-17 RX ORDER — HYDROCODONE BITARTRATE AND ACETAMINOPHEN 5; 325 MG/1; MG/1
1 TABLET ORAL EVERY 6 HOURS PRN
Qty: 28 TABLET | Refills: 0 | Status: SHIPPED | OUTPATIENT
Start: 2023-11-17

## 2023-11-17 NOTE — TELEPHONE ENCOUNTER
Jacquelyn will be going on maternity leave soon and wont be back til March. I have placed a recall in Pt's chart.    ----- Message from Minor Thomason DO sent at 11/17/2023  1:32 PM CST -----  Regarding: scheduling  Please assist with scheduling a routine follow up ideally in about 3 months for migraines to re-establish care with a main campus headache provider.    Thanks,  Minor Thomason DO  [facility]  General Neurology - Headache Medicine

## 2023-11-17 NOTE — PROGRESS NOTES
[facility] NEUROLOGY  [facility], [place]    Date: 11/17/23  Patient Name: Zoë Enrique   MRN: [id number]   PCP: Praveen Jeffery  Referring Provider: No ref. provider found    Chief Complaint: Follow up for migraine  Subjective:     11/17/23:  Patient reports that she is been doing well since previous encounter.  No change in headache qualities are features.  Continues to see long-term benefit with use of memantine.  No noted side effects.  Due to recent weather change, had more severe episodes this month.  Has been doing better than numbers reflected below.  Even with this recent exacerbation, total and severe headache days have been reduced by greater than 50% since our original encounter and initiating treatment plan.    Total headache days last month:  6   Total severe/migraine days in the last month:  4    Current headache regimen:  Memantine 5 mg b.i.d.  OTC analgesics such as Tylenol p.r.n. (fewer than 10 days per month of use)   Norco p.r.n. (very sparing use, medication of last resort to avoid emergency or urgent care visits)    ==============================================  As this patient has been seen multiple times by our clinic, extensive chart review conducted prior to today's encounter.    04/04/23 original HPI:  Ms. Zoë Enrique presents today to discuss their ongoing uncontrolled headaches.   Patient notes that she follows with ENT for chronic sinus complaints.  She has attributed many of her headaches to sinus and allergy issues in the past.  She is also had significant concerns about contribution from TMJ in the past; wears a  and has received Botox injections for TMJ specifically in the past with no effect on overall headache frequency or intensity.      Patient has been diagnosed with atrial fibrillation and is on chronic anticoagulation with Xarelto.  No known stroke history in the past.  Has undergone multiple cardiac ablations for AFib.    When did they  become more of a problem: >10 yrs   # of Total headache days per month: 15  # of Migraine or severe days per month: 8-10  This rate has been present >3 months: yes  Intensity of average and most severe headaches: 5/10, 7-9/10  Duration of headache pain: >4 hrs   Quality of pain: pressure   Laterality: bilateral  Location: face and frontal   Photophobia and phonophobia: no   Nausea and vomiting: yes  Causes avoidance of physical activity: yes  Worsened by physical activity: unsure  Aura: no  Autonomic symptoms: none  Family Hx of migraines: no  Supplements: eye vitamins   Birth Control/Hormones: estrogen cream   Have qualities and features been stable for >2 years: yes    Preventive Medications failed: lyrica (>3 months, ineffective), gabapentin (>3 months, ineffective, dizziness), GONB/TNB - 5 days of relief, lexapro (>3 months, ineffective)  Acute medications failed: NSAIDS contraindicated, norco, sumatriptan (>3 months, ineffective), rizatriptan (>3 months, ineffective)    OTC Medications: tylenol   Is medication overuse contributing to the patient's current migraine burden: <10 days per month    Hx of (Bolded items are positive): depression, anxiety, fibromyalgia, autoimmune disorder, head trauma, neurological infection, glaucoma, asthma, nephrolithiasis, MI, Stroke, Raynaud's phen., atrial fibrillation on chronic anticoagulation (Xarelto)    Current HA Regimen:  Norco  Tylenol   ==============================================  CURRENT MEDS:  Current Outpatient Medications   Medication Sig Dispense Refill    acetaminophen (TYLENOL) 650 MG TbSR Take 650 mg by mouth every 8 (eight) hours.      acyclovir (ZOVIRAX) 400 MG tablet Take 1 tablet (400 mg total) by mouth 3 (three) times daily as needed. 90 tablet 3    docusate sodium (COLACE) 250 MG capsule Take 250 mg by mouth 2 (two) times daily.       estradioL (ESTRACE) 0.01 % (0.1 mg/gram) vaginal cream Place 1 g vaginally every other day. 42 g 3    famotidine  "(PEPCID) 20 MG tablet Take 1 tablet (20 mg total) by mouth 2 (two) times daily. 60 tablet 4    ipratropium (ATROVENT) 42 mcg (0.06 %) nasal spray 2 sprays by Each Nostril route 3 (three) times daily. 15 mL 6    levothyroxine (SYNTHROID) 25 MCG tablet Take 1 tablet (25 mcg total) by mouth once daily. 90 tablet 3    olopatadine (PATANOL) 0.1 % ophthalmic solution 1 drop 2 (two) times daily.      rivaroxaban (XARELTO) 20 mg Tab Take 1 tablet (20 mg total) by mouth once daily. 90 tablet 3    sodium chloride 3% 3 % nebulizer solution Take 4 mLs by nebulization 2 (two) times a day. 300 mL PRN    verapamiL (CALAN) 40 MG Tab 1 tab PO prn for palpitations. Maximum 2 doses per day, at least 6 hours apart. 30 tablet 11    vit C/E/Zn/coppr/lutein/zeaxan (PRESERVISION AREDS-2 ORAL)       HYDROcodone-acetaminophen (NORCO) 5-325 mg per tablet Take 1 tablet by mouth every 6 (six) hours as needed for Pain. Pt states takes PRN for headaches 28 tablet 0    memantine (NAMENDA) 10 MG Tab Take 1 tablet (10 mg total) by mouth 2 (two) times daily. 60 tablet 11     No current facility-administered medications for this visit.     Facility-Administered Medications Ordered in Other Visits   Medication Dose Route Frequency Provider Last Rate Last Admin    sodium chloride 0.9% flush 5 mL  5 mL Intravenous PRN Aleksandra Taylor NP         ALLERGIES:  Review of patient's allergies indicates:   Allergen Reactions    Morphine Nausea And Vomiting    Succinylcholine      Other reaction(s): v tach          Objective:     Vitals:    11/17/23 1313   BP: 123/71   BP Location: Left arm   Patient Position: Sitting   Pulse: 66   Weight: 73.3 kg (161 lb 9.6 oz)   Height: 5' 8" (1.727 m)     General: Female in NAD, alert and awake, Aox3, well groomed. ?    ? ?    Neurological Examination.    Mental status: AA&O x3; Affect/mood is euthymic/congruent; no aphasia      Cranial Nerves: II-XII grossly intact bilaterally once again      Muscle Function:  Full and " "symmetric use of bilateral upper and lower extremities against gravity     Gait: adequate casual gait with stride length and arm swing WNL.  No pathological patterns        Assessment:   Zoë Enrique is a 73 y.o. female presenting for follow up for headaches that originally met International Headache Society criteria for chronic migraine without aura now much improved with only for severe headache episodes in the last month.  Doing well with no recent changes in headache qualities or features.  After extensive conversation regarding treatment goals, we did agree to increase memantine to 10 mg twice daily.  We will continue plan otherwise.      Plan:     Problem List Items Addressed This Visit          Neuro    Chronic tension-type headache, not intractable    Relevant Medications    HYDROcodone-acetaminophen (NORCO) 5-325 mg per tablet    memantine (NAMENDA) 10 MG Tab    Other Relevant Orders    Ambulatory consult to Neurology     Other Visit Diagnoses       Chronic migraine without aura without status migrainosus, not intractable    -  Primary    Relevant Orders    Ambulatory consult to Neurology          1. Preventive medications:    Increase:  Memantine to 10 mg twice daily    Timmy Blake, et al. "Memantine for Prophylactic Treatment of Migraine Without Aura: A Randomized Double-Blind Placebo-Controlled Study." Headache: The Journal of Head and Face Pain 56.1 (2016): .      2. Acute (abortive) medications:  Failed:   sumatriptan (>3 months, ineffective),   Naratriptan - ineffective  Rizatriptan ineffective    May continue Tylenol as needed while avoiding use more than 10 days per month in combination with any other OTC analgesics or Norco.      We will provide refill for Norco at this time as the medication is used sparingly and responsibly as a last resort to avoid emergency or urgent care visits.  Extensive safety counseling conducted at the time of today's encounter.       reviewed at length.  " Narcotic score 120, stimulant 0, sedated 50, overdose risk score 300.    3. I endorse continued use of chronic anticoagulation, Xarelto, for diagnosis of atrial fibrillation for stroke prevention.    4. Continued focus on healthy diet, regular physical activity, stress management, and healthy sleep habits.    Follow up in 3 months with next headache provider.  The patient was counseled today that due to upcoming coverage changes at this clinic location, new provider as needed.  Referral placed and message sent to [facility] headache provider for scheduling    A dictation device was used to produce this document. Use of such devices sometimes results in grammatical errors or replacement of words that sound similarly.    I spent a total of 20 minutes on the day of the visit. This includes face to face time and non-face to face time preparing to see the patient (eg, review of tests), obtaining and/or reviewing separately obtained history, documenting clinical information in the electronic or other health record, independently interpreting results and communicating results to the patient/family/caregiver, or care coordinator.

## 2023-12-14 ENCOUNTER — IMMUNIZATION (OUTPATIENT)
Dept: INTERNAL MEDICINE | Facility: CLINIC | Age: 73
End: 2023-12-14
Payer: MEDICARE

## 2023-12-14 ENCOUNTER — HOSPITAL ENCOUNTER (OUTPATIENT)
Dept: RADIOLOGY | Facility: HOSPITAL | Age: 73
Discharge: HOME | End: 2023-12-14
Attending: OBSTETRICS & GYNECOLOGY
Payer: MEDICARE

## 2023-12-14 DIAGNOSIS — Z12.31 SCREENING MAMMOGRAM, ENCOUNTER FOR: ICD-10-CM

## 2023-12-14 DIAGNOSIS — Z23 NEED FOR VACCINATION: Primary | ICD-10-CM

## 2023-12-14 PROCEDURE — 77067 MAMMO DIGITAL SCREENING BILAT WITH TOMO: ICD-10-PCS | Mod: 26,,, | Performed by: RADIOLOGY

## 2023-12-14 PROCEDURE — 77063 MAMMO DIGITAL SCREENING BILAT WITH TOMO: ICD-10-PCS | Mod: 26,,, | Performed by: RADIOLOGY

## 2023-12-14 PROCEDURE — 77067 SCR MAMMO BI INCL CAD: CPT | Mod: TC

## 2023-12-14 PROCEDURE — 90694 FLU VACCINE - QUADRIVALENT - ADJUVANTED: ICD-10-PCS | Mod: S$GLB,,, | Performed by: INTERNAL MEDICINE

## 2023-12-14 PROCEDURE — G0008 FLU VACCINE - QUADRIVALENT - ADJUVANTED: ICD-10-PCS | Mod: S$GLB,,, | Performed by: INTERNAL MEDICINE

## 2023-12-14 PROCEDURE — 77067 SCR MAMMO BI INCL CAD: CPT | Mod: 26,,, | Performed by: RADIOLOGY

## 2023-12-14 PROCEDURE — G0008 ADMIN INFLUENZA VIRUS VAC: HCPCS | Mod: S$GLB,,, | Performed by: INTERNAL MEDICINE

## 2023-12-14 PROCEDURE — 90694 VACC AIIV4 NO PRSRV 0.5ML IM: CPT | Mod: S$GLB,,, | Performed by: INTERNAL MEDICINE

## 2023-12-14 PROCEDURE — 77063 BREAST TOMOSYNTHESIS BI: CPT | Mod: 26,,, | Performed by: RADIOLOGY

## 2023-12-18 ENCOUNTER — PATIENT MESSAGE (OUTPATIENT)
Dept: ELECTROPHYSIOLOGY | Facility: CLINIC | Age: 73
End: 2023-12-18
Payer: MEDICARE

## 2023-12-26 ENCOUNTER — TELEPHONE (OUTPATIENT)
Dept: ORTHOPEDICS | Facility: CLINIC | Age: 73
End: 2023-12-26
Payer: MEDICARE

## 2023-12-26 NOTE — TELEPHONE ENCOUNTER
----- Message from Diamone Speed sent at 12/26/2023  9:11 AM CST -----  Regarding: self  Type: Patient Call Back       Who called: self        What is the request in detail: pt needs a call back to be seen at a earlier time        Can the clinic reply by [facility]? Yes       Would the patient rather a call back or a response via [facility] call back       Best call back number: [phone]

## 2024-01-02 ENCOUNTER — LAB VISIT (OUTPATIENT)
Dept: LAB | Facility: HOSPITAL | Age: 74
End: 2024-01-02
Attending: INTERNAL MEDICINE
Payer: MEDICARE

## 2024-01-02 DIAGNOSIS — E78.2 HYPERLIPIDEMIA, MIXED: ICD-10-CM

## 2024-01-02 DIAGNOSIS — I48.0 PAROXYSMAL ATRIAL FIBRILLATION: ICD-10-CM

## 2024-01-02 DIAGNOSIS — E03.9 HYPOTHYROIDISM (ACQUIRED): ICD-10-CM

## 2024-01-02 DIAGNOSIS — J31.0 CHRONIC RHINITIS: ICD-10-CM

## 2024-01-02 LAB
ALBUMIN SERPL BCP-MCNC: 3.9 G/DL (ref 3.5–5.2)
ALP SERPL-CCNC: 57 U/L (ref 55–135)
ALT SERPL W/O P-5'-P-CCNC: 15 U/L (ref 10–44)
ANION GAP SERPL CALC-SCNC: 7 MMOL/L (ref 8–16)
AST SERPL-CCNC: 20 U/L (ref 10–40)
BASOPHILS # BLD AUTO: 0.01 K/UL (ref 0–0.2)
BASOPHILS NFR BLD: 0.2 % (ref 0–1.9)
BILIRUB SERPL-MCNC: 0.6 MG/DL (ref 0.1–1)
BUN SERPL-MCNC: 13 MG/DL (ref 8–23)
CALCIUM SERPL-MCNC: 9.2 MG/DL (ref 8.7–10.5)
CHLORIDE SERPL-SCNC: 107 MMOL/L (ref 95–110)
CHOLEST SERPL-MCNC: 200 MG/DL (ref 120–199)
CHOLEST/HDLC SERPL: 3.2 {RATIO} (ref 2–5)
CO2 SERPL-SCNC: 28 MMOL/L (ref 23–29)
CREAT SERPL-MCNC: 0.7 MG/DL (ref 0.5–1.4)
DIFFERENTIAL METHOD BLD: ABNORMAL
EOSINOPHIL # BLD AUTO: 0.1 K/UL (ref 0–0.5)
EOSINOPHIL NFR BLD: 1.4 % (ref 0–8)
ERYTHROCYTE [DISTWIDTH] IN BLOOD BY AUTOMATED COUNT: 13.2 % (ref 11.5–14.5)
EST. GFR  (NO RACE VARIABLE): >60 ML/MIN/1.73 M^2
GLUCOSE SERPL-MCNC: 95 MG/DL (ref 70–110)
HCT VFR BLD AUTO: 42 % (ref 37–48.5)
HDLC SERPL-MCNC: 63 MG/DL (ref 40–75)
HDLC SERPL: 31.5 % (ref 20–50)
HGB BLD-MCNC: 13.4 G/DL (ref 12–16)
IMM GRANULOCYTES # BLD AUTO: 0.01 K/UL (ref 0–0.04)
IMM GRANULOCYTES NFR BLD AUTO: 0.2 % (ref 0–0.5)
LDLC SERPL CALC-MCNC: 116.8 MG/DL (ref 63–159)
LYMPHOCYTES # BLD AUTO: 1.1 K/UL (ref 1–4.8)
LYMPHOCYTES NFR BLD: 26.5 % (ref 18–48)
MCH RBC QN AUTO: 31.5 PG (ref 27–31)
MCHC RBC AUTO-ENTMCNC: 31.9 G/DL (ref 32–36)
MCV RBC AUTO: 99 FL (ref 82–98)
MONOCYTES # BLD AUTO: 0.4 K/UL (ref 0.3–1)
MONOCYTES NFR BLD: 9.7 % (ref 4–15)
NEUTROPHILS # BLD AUTO: 2.7 K/UL (ref 1.8–7.7)
NEUTROPHILS NFR BLD: 62 % (ref 38–73)
NONHDLC SERPL-MCNC: 137 MG/DL
NRBC BLD-RTO: 0 /100 WBC
PLATELET # BLD AUTO: 131 K/UL (ref 150–450)
PMV BLD AUTO: 11.9 FL (ref 9.2–12.9)
POTASSIUM SERPL-SCNC: 4.7 MMOL/L (ref 3.5–5.1)
PROT SERPL-MCNC: 6.4 G/DL (ref 6–8.4)
RBC # BLD AUTO: 4.26 M/UL (ref 4–5.4)
SODIUM SERPL-SCNC: 142 MMOL/L (ref 136–145)
T4 FREE SERPL-MCNC: 0.91 NG/DL (ref 0.71–1.51)
TRIGL SERPL-MCNC: 101 MG/DL (ref 30–150)
TSH SERPL DL<=0.005 MIU/L-ACNC: 1.85 UIU/ML (ref 0.4–4)
WBC # BLD AUTO: 4.31 K/UL (ref 3.9–12.7)

## 2024-01-02 PROCEDURE — 85025 COMPLETE CBC W/AUTO DIFF WBC: CPT | Performed by: INTERNAL MEDICINE

## 2024-01-02 PROCEDURE — 84439 ASSAY OF FREE THYROXINE: CPT | Performed by: INTERNAL MEDICINE

## 2024-01-02 PROCEDURE — 84443 ASSAY THYROID STIM HORMONE: CPT | Performed by: INTERNAL MEDICINE

## 2024-01-02 PROCEDURE — 86317 IMMUNOASSAY INFECTIOUS AGENT: CPT | Mod: 59 | Performed by: ALLERGY & IMMUNOLOGY

## 2024-01-02 PROCEDURE — 80053 COMPREHEN METABOLIC PANEL: CPT | Performed by: INTERNAL MEDICINE

## 2024-01-02 PROCEDURE — 80061 LIPID PANEL: CPT | Performed by: INTERNAL MEDICINE

## 2024-01-03 NOTE — PROGRESS NOTES
SUBJECTIVE:      Chief Complaint: Pain of the Right Hand (Trigger Finger )      History of Present Illness:  Patient is  right handed 73 y.o. female who presents today right middle trigger finger follow up.   She last saw Aby Green PA-C on 5/18/2023  CSI given which provided relief for about 5 months  Today, patient notes return of symptoms for the past for 6 weeks including severe pain and triggering which has been affecting her ADLs.  She is very active at home with a young grandson, house chores, and daily cooking, all of which have become very painful  She notes pain to the A1 pulley of all of the fingers in her right hand, but her long finger is the most painful.  No other fingers with triggering    Pain began over 6months ago. no history of trauma.  Pain is intermittent located to A1 pulley and described as sharp, stiffness, and popping/sticking.  Symptoms are aggravated by  gripping activies .  Symptoms are alleviated by rest.  Attempted treatment(s) and/or interventions include warm water soaks, NSAIDs, Tylenol, tramadol without adequate response.    Denies neck pain, numbness, and tingling in fingertips.    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    Diverticulosis 1998    Hx of atrial flutter     Hx of mitral valve prolapse 1990    Hypothyroidism     Migraines     Recurrent upper respiratory infection (URI)     Supraventricular tachycardia      Past Surgical History:   Procedure Laterality Date    ABLATION OF ARRHYTHMOGENIC FOCUS FOR ATRIAL FIBRILLATION N/A 11/24/2020    Procedure: ABLATION, ARRHYTHMOGENIC  FOCUS, FOR ATRIAL FIBRILLATION;  Surgeon: Moe Seymour MD;  Location: [facility] EP LAB;  Service: Cardiology;  Laterality: N/A;  AFL/AF, KHANH, CTI, PVI, RFA, CARTO, GEN, DM, 3 PREP    AUGMENTATION OF BREAST Bilateral 1984    35 yrs     BREAST SURGERY  1984    augmentation    CARDIOVERSION  2020    Procedure: Cardioversion;  Surgeon: Moe Seymour MD;  Location: [facility] EP LAB;  Service: Cardiology;;     SECTION  1988    COLON SURGERY      partial colon resection    COLONOSCOPY N/A 2021    Procedure: COLONOSCOPY;  Surgeon: Sahara Salgado MD;  Location: [facility] ENDO (4TH FLR);  Service: Endoscopy;  Laterality: N/A;  constipation prep- 7 days of miralax, 2 fulls of fulls, then day bf clears and split prep   covid test 3/5 pcw, prep instr emailed, antibiotics prophylactically, Xarelto hold x 2 days per Dr. Seymour-see telephone encounter 2/10 -ml    COLPOSCOPY      COSMETIC SURGERY  1987    DE QUERVAIN'S RELEASE Left 2022    Procedure: RELEASE, HAND, FOR DEQUERVAIN'S TENOSYNOVITIS;  Surgeon: Toñito Adame Jr., MD;  Location: [facility] OR;  Service: Orthopedics;  Laterality: Left;    ESOPHAGOGASTRODUODENOSCOPY N/A 2021    Procedure: EGD (ESOPHAGOGASTRODUODENOSCOPY);  Surgeon: Sahara Salgado MD;  Location: [facility] ENDO (4TH FLR);  Service: Endoscopy;  Laterality: N/A;    EYE SURGERY Bilateral 2016    Lasik, then cataracts extraction    JOINT REPLACEMENT Right , ,     KNEE ARTHROPLASTY Right     revison in     KNEE SURGERY Right     distal femoral replacing TK    OOPHORECTOMY      TOTAL ABDOMINAL HYSTERECTOMY      TAHBSO    TRANSESOPHAGEAL ECHOCARDIOGRAPHY N/A 2020    Procedure: ECHOCARDIOGRAM, TRANSESOPHAGEAL;  Surgeon: Ramírez Ortez III, MD;  Location: [facility] EP LAB;  Service: Cardiology;  Laterality: N/A;    TREATMENT OF CARDIAC ARRHYTHMIA N/A 2019    Procedure: CARDIOVERSION;  Surgeon: Moe Seymour MD;  Location: [facility] EP LAB;  Service:  Cardiology;  Laterality: N/A;  KHANH/DCCV/MAC/DM/3PREP/*ADMIT FOR TIKOSYN*    TRIGGER FINGER RELEASE Left 08/02/2022    Procedure: RELEASE, TRIGGER FINGER;  Surgeon: Toñito Adame Jr., MD;  Location: [facility];  Service: Orthopedics;  Laterality: Left;  left middle finger     Review of patient's allergies indicates:   Allergen Reactions    Morphine Nausea And Vomiting    Succinylcholine      Other reaction(s): v tach     Social History     Social History Narrative    Not on file     Family History   Problem Relation Age of Onset    Heart disease Mother         Atrial fib    Diabetes Mother         Type II    Hearing loss Father     Heart disease Father         Angina, AAA    Stroke Father 93    Diabetes Sister         Type I    Heart disease Sister         Atrrial fib, mitral valve repair    Heart disease Brother         Atrial fib    Asthma Brother     Diverticulosis Brother     Heart disease Brother         Atrial fib    Diverticulosis Brother     No Known Problems Daughter     Breast cancer Neg Hx     Ovarian cancer Neg Hx     Colon cancer Neg Hx     Cancer Neg Hx     Hypertension Neg Hx     Eczema Neg Hx          Current Outpatient Medications:     acetaminophen (TYLENOL) 650 MG TbSR, Take 650 mg by mouth every 8 (eight) hours., Disp: , Rfl:     acyclovir (ZOVIRAX) 400 MG tablet, Take 1 tablet (400 mg total) by mouth 3 (three) times daily as needed., Disp: 90 tablet, Rfl: 3    docusate sodium (COLACE) 250 MG capsule, Take 250 mg by mouth 2 (two) times daily. , Disp: , Rfl:     estradioL (ESTRACE) 0.01 % (0.1 mg/gram) vaginal cream, Place 1 g vaginally every other day., Disp: 42 g, Rfl: 3    famotidine (PEPCID) 20 MG tablet, Take 1 tablet (20 mg total) by mouth 2 (two) times daily., Disp: 60 tablet, Rfl: 4    HYDROcodone-acetaminophen (NORCO) 5-325 mg per tablet, Take 1 tablet by mouth every 6 (six) hours as needed for Pain. Pt states takes PRN for headaches, Disp: 28 tablet, Rfl: 0    ipratropium (ATROVENT) 42  "mcg (0.06 %) nasal spray, 2 sprays by Each Nostril route 3 (three) times daily., Disp: 15 mL, Rfl: 6    levothyroxine (SYNTHROID) 25 MCG tablet, Take 1 tablet (25 mcg total) by mouth once daily., Disp: 90 tablet, Rfl: 3    memantine (NAMENDA) 10 MG Tab, Take 1 tablet (10 mg total) by mouth 2 (two) times daily., Disp: 60 tablet, Rfl: 11    rivaroxaban (XARELTO) 20 mg Tab, Take 1 tablet (20 mg total) by mouth once daily., Disp: 90 tablet, Rfl: 1    sodium chloride 3% 3 % nebulizer solution, Take 4 mLs by nebulization 2 (two) times a day., Disp: 300 mL, Rfl: PRN    verapamiL (CALAN) 40 MG Tab, 1 tab PO prn for palpitations. Maximum 2 doses per day, at least 6 hours apart., Disp: 30 tablet, Rfl: 11    vit C/E/Zn/coppr/lutein/zeaxan (PRESERVISION AREDS-2 ORAL), , Disp: , Rfl:     olopatadine (PATANOL) 0.1 % ophthalmic solution, 1 drop 2 (two) times daily., Disp: , Rfl:   No current facility-administered medications for this visit.    Facility-Administered Medications Ordered in Other Visits:     sodium chloride 0.9% flush 5 mL, 5 mL, Intravenous, PRN, Aleksandra Taylor NP    Review of Systems   Musculoskeletal:  Positive for joint pain.       OBJECTIVE:      Vital Signs (Most Recent):  Vitals:    01/05/24 1436   Weight: 70.3 kg (155 lb)   Height: 5' 8" (1.727 m)     Body mass index is 23.57 kg/m².      Physical Exam:  Right Hand/Wrist Examination:  Observation/Inspection:  Swelling  none       Scars   none    Atrophy  None  1 mm mobile nodule with blue tint noted to volar aspect proximal middle finger.  Not TTP  Strength  5/5  TTP to A1 pulley of all fingers in the right hand.  A1 pulley of middle finger most TTP    Digits WWP, brisk CR < 3s throughout  NVI motor/LTS to M/R/U nerves, radial pulse 2+    ROM fingers full and painless with the exception of the long finger which has full range of motion but is painful with triggering noted  ROM hand full, painless  ROM wrist full, painless  ROM elbow full, " painless    Diagnostic Results:  I independently interpreted the patient's imaging. Xrays of the patient's right hand  demonstrate no evidence of any acute fractures or dislocations or significant degenerative changes.      ASSESSMENT/PLAN:      1. Trigger finger, right middle finger    2. Hand pain, right        73 y.o. y/o female with right middle trigger finger  Plan: The patient and I had a thorough discussion today.  We discussed the working diagnosis as well as several other potential alternative diagnoses.    We discussed conservative and surgical treatment options. Conservative options include rest, activity modifications, workplace modifications, po and topical analgesia (OTC and rx), formal or home PT, and others. Surgical treatment was also mentioned.    At this time, the patient would like to proceed with the following, which I agree with.    Medications: continue  tylenol prn for pain.  He prn  Procedures: CSI given today for right middle tnedon sheath (this is the 2nd CSI to this location- we discussed surgery today, although she is not interested at this time)  DME:  Patient given finger splint today to wear during activities that are painful for the finger as well as night use.  She was cautioned that stiffness can occur if she wears the splint too much  Work status: as tolerated  Follow up: in 3 month(s)..     All of the patient's questions were answered and the patient will contact us if they have any questions or concerns in the interim.      Amalia Denise PA-C  [facility]  Orthopedic Surgery

## 2024-01-04 LAB
DEPRECATED S PNEUM12 IGG SER-MCNC: 0.6 UG/ML
DEPRECATED S PNEUM23 IGG SER-MCNC: <0.3 UG/ML
DEPRECATED S PNEUM4 IGG SER-MCNC: 2.1 UG/ML
DEPRECATED S PNEUM8 IGG SER-MCNC: 0.3 UG/ML
DEPRECATED S PNEUM9 IGG SER-MCNC: 0.6 UG/ML
S PN DA SERO 19F IGG SER-MCNC: 2.4 UG/ML
S PNEUM DA 1 IGG SER-MCNC: <0.3 UG/ML
S PNEUM DA 14 IGG SER-MCNC: 0.6
S PNEUM DA 18C IGG SER-MCNC: 1
S PNEUM DA 3 IGG SER-MCNC: 0.8 UG/ML
S PNEUM DA 5 IGG SER-MCNC: <0.3 UG/ML
S PNEUM DA 6B IGG SER-MCNC: 3 UG/ML
S PNEUM DA 7F IGG SER-MCNC: 1.9 UG/ML
S PNEUM DA 9V IGG SER-MCNC: 0.5 UG/ML

## 2024-01-05 ENCOUNTER — OFFICE VISIT (OUTPATIENT)
Dept: ORTHOPEDICS | Facility: CLINIC | Age: 74
End: 2024-01-05
Payer: MEDICARE

## 2024-01-05 ENCOUNTER — HOSPITAL ENCOUNTER (OUTPATIENT)
Dept: RADIOLOGY | Facility: HOSPITAL | Age: 74
Discharge: HOME | End: 2024-01-05
Payer: MEDICARE

## 2024-01-05 VITALS — WEIGHT: 155 LBS | BODY MASS INDEX: 23.49 KG/M2 | HEIGHT: 68 IN

## 2024-01-05 DIAGNOSIS — M79.641 HAND PAIN, RIGHT: ICD-10-CM

## 2024-01-05 DIAGNOSIS — M65.331 TRIGGER FINGER, RIGHT MIDDLE FINGER: Primary | ICD-10-CM

## 2024-01-05 PROCEDURE — 99214 OFFICE O/P EST MOD 30 MIN: CPT | Mod: 25,S$GLB,,

## 2024-01-05 PROCEDURE — 1101F PT FALLS ASSESS-DOCD LE1/YR: CPT | Mod: CPTII,S$GLB,,

## 2024-01-05 PROCEDURE — 20550 NJX 1 TENDON SHEATH/LIGAMENT: CPT | Mod: RT,S$GLB,,

## 2024-01-05 PROCEDURE — 1125F AMNT PAIN NOTED PAIN PRSNT: CPT | Mod: CPTII,S$GLB,,

## 2024-01-05 PROCEDURE — 1160F RVW MEDS BY RX/DR IN RCRD: CPT | Mod: CPTII,S$GLB,,

## 2024-01-05 PROCEDURE — 3008F BODY MASS INDEX DOCD: CPT | Mod: CPTII,S$GLB,,

## 2024-01-05 PROCEDURE — 73130 X-RAY EXAM OF HAND: CPT | Mod: TC,FY,RT

## 2024-01-05 PROCEDURE — 99999 PR PBB SHADOW E&M-EST. PATIENT-LVL III: CPT | Mod: PBBFAC,,,

## 2024-01-05 PROCEDURE — 73130 X-RAY EXAM OF HAND: CPT | Mod: 26,RT,, | Performed by: RADIOLOGY

## 2024-01-05 PROCEDURE — 1159F MED LIST DOCD IN RCRD: CPT | Mod: CPTII,S$GLB,,

## 2024-01-05 PROCEDURE — 3288F FALL RISK ASSESSMENT DOCD: CPT | Mod: CPTII,S$GLB,,

## 2024-01-05 RX ORDER — TRIAMCINOLONE ACETONIDE 40 MG/ML
20 INJECTION, SUSPENSION INTRA-ARTICULAR; INTRAMUSCULAR
Status: DISCONTINUED | OUTPATIENT
Start: 2024-01-05 | End: 2024-01-05 | Stop reason: HOSPADM

## 2024-01-05 RX ADMIN — TRIAMCINOLONE ACETONIDE 20 MG: 40 INJECTION, SUSPENSION INTRA-ARTICULAR; INTRAMUSCULAR at 03:01

## 2024-01-05 NOTE — PROCEDURES
Tendon Sheath    Date/Time: 1/5/2024 3:00 PM    Performed by: Amalia Denise PA-C  Authorized by: Amalia Denise PA-C    Consent Done?:  Yes (Verbal)  Indications:  Pain  Site marked: the procedure site was marked    Timeout: prior to procedure the correct patient, procedure, and site was verified    Location:  Long finger  Site:  R long flexor tendon sheath  Ultrasonic guidance for needle placement?: No    Needle size:  25 G  Medications:  20 mg triamcinolone acetonide 40 mg/mL  Patient tolerance:  Patient tolerated the procedure well with no immediate complications

## 2024-01-09 ENCOUNTER — OFFICE VISIT (OUTPATIENT)
Dept: PRIMARY CARE CLINIC | Facility: CLINIC | Age: 74
End: 2024-01-09
Payer: MEDICARE

## 2024-01-09 VITALS
HEART RATE: 79 BPM | WEIGHT: 160.5 LBS | SYSTOLIC BLOOD PRESSURE: 120 MMHG | DIASTOLIC BLOOD PRESSURE: 80 MMHG | OXYGEN SATURATION: 98 % | HEIGHT: 68 IN | BODY MASS INDEX: 24.32 KG/M2

## 2024-01-09 DIAGNOSIS — C40.21 OSTEOSARCOMA OF RIGHT FEMUR: ICD-10-CM

## 2024-01-09 DIAGNOSIS — Z00.00 ANNUAL PHYSICAL EXAM: Primary | ICD-10-CM

## 2024-01-09 DIAGNOSIS — E03.9 HYPOTHYROIDISM (ACQUIRED): ICD-10-CM

## 2024-01-09 DIAGNOSIS — I48.0 PAROXYSMAL ATRIAL FIBRILLATION: ICD-10-CM

## 2024-01-09 PROCEDURE — 1159F MED LIST DOCD IN RCRD: CPT | Mod: CPTII,S$GLB,, | Performed by: INTERNAL MEDICINE

## 2024-01-09 PROCEDURE — 99397 PER PM REEVAL EST PAT 65+ YR: CPT | Mod: S$GLB,,, | Performed by: INTERNAL MEDICINE

## 2024-01-09 PROCEDURE — 3074F SYST BP LT 130 MM HG: CPT | Mod: CPTII,S$GLB,, | Performed by: INTERNAL MEDICINE

## 2024-01-09 PROCEDURE — 3008F BODY MASS INDEX DOCD: CPT | Mod: CPTII,S$GLB,, | Performed by: INTERNAL MEDICINE

## 2024-01-09 PROCEDURE — 1126F AMNT PAIN NOTED NONE PRSNT: CPT | Mod: CPTII,S$GLB,, | Performed by: INTERNAL MEDICINE

## 2024-01-09 PROCEDURE — 99999 PR PBB SHADOW E&M-EST. PATIENT-LVL IV: CPT | Mod: PBBFAC,,, | Performed by: INTERNAL MEDICINE

## 2024-01-09 PROCEDURE — 3079F DIAST BP 80-89 MM HG: CPT | Mod: CPTII,S$GLB,, | Performed by: INTERNAL MEDICINE

## 2024-01-09 NOTE — PROGRESS NOTES
[facility] Note    Chief Complaint      Chief Complaint   Patient presents with    Annual Exam       History of Present Illness      Zoë Enrique is a 73 y.o. female with chronic conditions of A fib, hypothyroidism, hx of osteosarcoma of right femur, herpes who presents today for: annual preventative visit.  Sees Dr. Adorno, allergist, and had pneumococcal boosters last year.  Since then, has not had any more respiratory infections. Has been having right hand and wrist pain. Has been seeing Dr. Adame/Amalia Denise for this.     A fib: Sees Dr. Seymour, EP.  Denies palpitations, chest pain, shortness of breath. Off tikosyn since ablation. Does not need rate control.  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  Surveillance exams deferred until changes.  Pt has not noticed any new symptoms.    Hypothyroidism: Controlled on synthroid.  TSH at goal.    Herpes: Takes acyclovir as needed  Diet: Prepares own food. Limits sugars and carbs.  Drinks plenty water  Exercise: Needs to increase.  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      Denies drinking and driving, drinking more than 4 drinks on occasion, drug use.  Flu shot UTD.  TdAP UTD 2020.  Prevnar 2016, pneumovax 2011. Prevnar 20 UTD.  Zostavax UTD. Shingrix UTD.  Mammogram 12/2023.  DEXA 2022 normal  Cscope 2021, Dr. Salgado, polyp, 7 year interval.       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    Diverticulosis 1998    Hx of atrial flutter     Hx of mitral valve prolapse 1990    Hypothyroidism     Migraines     Recurrent upper respiratory infection (URI)     Supraventricular tachycardia        Past Surgical History:   has a past surgical history that includes Knee surgery (Right, 1989); Knee Arthroplasty (Right, 2007); Treatment of cardiac  arrhythmia (N/A, 2019); Augmentation of breast (Bilateral, );  section (); Cosmetic surgery (); Colposcopy; Oophorectomy; Total abdominal hysterectomy (); Ablation of arrhythmogenic focus for atrial fibrillation (N/A, 2020); Cardioversion (2020); Transesophageal echocardiography (N/A, 2020); Colon surgery (); Breast surgery (); Eye surgery (Bilateral, ); Joint replacement (Right, , , ); Esophagogastroduodenoscopy (N/A, 2021); Colonoscopy (N/A, 2021); De Quervain's release (Left, 2022); and Trigger finger release (Left, 2022).    Family History:  family history includes Asthma in her brother; Diabetes in her mother and sister; Diverticulosis in her brother and brother; Hearing loss in her father; Heart disease in her brother, brother, father, mother, and sister; No Known Problems in her daughter; Stroke (age of onset: 93) in her father.     Social History:  Social History     Tobacco Use    Smoking status: Never     Passive exposure: Never    Smokeless tobacco: Never   Substance Use Topics    Alcohol use: Yes     Alcohol/week: 2.0 standard drinks of alcohol     Types: 2 Shots of liquor per week     Comment: 1-2 per month    Drug use: No     Comment: CBD oils for headache       I personally reviewed all past medical, surgical, social and family history.    Review of Systems   Constitutional:  Negative for chills, fever and malaise/fatigue.   Respiratory:  Negative for shortness of breath.    Cardiovascular:  Negative for chest pain.   Gastrointestinal:  Negative for constipation, diarrhea, nausea and vomiting.   Skin:  Negative for rash.   Neurological:  Negative for weakness.   All other systems reviewed and are negative.       Medications:  Outpatient Encounter Medications as of 2024   Medication Sig Note Dispense Refill    acetaminophen (TYLENOL) 650 MG TbSR Take 650 mg by mouth every 8 (eight) hours. 2023: Prn        acyclovir (ZOVIRAX) 400 MG tablet Take 1 tablet (400 mg total) by mouth 3 (three) times daily as needed. 9/29/2023: prn 90 tablet 3    docusate sodium (COLACE) 250 MG capsule Take 250 mg by mouth 2 (two) times daily.        estradioL (ESTRACE) 0.01 % (0.1 mg/gram) vaginal cream Place 1 g vaginally every other day.  42 g 3    famotidine (PEPCID) 20 MG tablet Take 1 tablet (20 mg total) by mouth 2 (two) times daily.  60 tablet 4    HYDROcodone-acetaminophen (NORCO) 5-325 mg per tablet Take 1 tablet by mouth every 6 (six) hours as needed for Pain. Pt states takes PRN for headaches  28 tablet 0    ipratropium (ATROVENT) 42 mcg (0.06 %) nasal spray 2 sprays by Each Nostril route 3 (three) times daily.  15 mL 6    levothyroxine (SYNTHROID) 25 MCG tablet Take 1 tablet (25 mcg total) by mouth once daily.  90 tablet 3    memantine (NAMENDA) 10 MG Tab Take 1 tablet (10 mg total) by mouth 2 (two) times daily.  60 tablet 11    olopatadine (PATANOL) 0.1 % ophthalmic solution 1 drop 2 (two) times daily.       rivaroxaban (XARELTO) 20 mg Tab Take 1 tablet (20 mg total) by mouth once daily.  90 tablet 1    sodium chloride 3% 3 % nebulizer solution Take 4 mLs by nebulization 2 (two) times a day.  300 mL PRN    verapamiL (CALAN) 40 MG Tab 1 tab PO prn for palpitations. Maximum 2 doses per day, at least 6 hours apart. 11/10/2023: prn 30 tablet 11    vit C/E/Zn/coppr/lutein/zeaxan (PRESERVISION AREDS-2 ORAL)        [DISCONTINUED] rivaroxaban (XARELTO) 20 mg Tab Take 1 tablet (20 mg total) by mouth once daily.  90 tablet 3     Facility-Administered Encounter Medications as of 1/9/2024   Medication Dose Route Frequency Provider Last Rate Last Admin    sodium chloride 0.9% flush 5 mL  5 mL Intravenous PRN Aleksandra Taylor NP           Allergies:  Review of patient's allergies indicates:   Allergen Reactions    Morphine Nausea And Vomiting    Succinylcholine      Other reaction(s): v tach       Health Maintenance:  Immunization  "History   Administered Date(s) Administered    COVID-19 MRNA, LN-S PF (MODERNA HALF 0.25 ML DOSE) 11/05/2021, 04/08/2022    COVID-19, MRNA, LN-S, PF (MODERNA FULL 0.5 ML DOSE) 02/11/2021, 03/11/2021    COVID-19, mRNA, LNP-S, bivalent booster, PF (Moderna Omicron)12 + YEARS 11/25/2022    Influenza 11/04/2008, 10/06/2018, 10/09/2020    Influenza (FLUAD) - Quadrivalent - Adjuvanted - PF *Preferred* (65+) 10/09/2020, 10/12/2022, 12/14/2023    Influenza (FLUAD) - Trivalent - Adjuvanted - PF (65+) 10/09/2018    Influenza - High Dose - PF (65 years and older) 01/04/2016, 11/03/2016, 09/28/2017, 10/30/2019    Influenza - Quadrivalent - High Dose - PF (65 years and older) 09/23/2021    Pneumococcal Conjugate - 13 Valent 01/04/2016    Pneumococcal Conjugate - 20 Valent 09/29/2023    Pneumococcal Polysaccharide - 23 Valent 06/24/2022    Tdap 02/13/2020    Zoster Recombinant 09/02/2022, 11/07/2022      Health Maintenance   Topic Date Due    High Dose Statin  Never done    Mammogram  12/14/2024    DEXA Scan  10/20/2026    Colorectal Cancer Screening  03/19/2028    Lipid Panel  01/02/2029    TETANUS VACCINE  02/13/2030    Hepatitis C Screening  Completed    Shingles Vaccine  Completed        Physical Exam      Vital Signs  Pulse: 79  SpO2: 98 %  BP: 120/80  BP Location: Right arm  Patient Position: Sitting  Pain Score: 0-No pain  Height and Weight  Height: 5' 8" (172.7 cm)  Weight: 72.8 kg (160 lb 7.9 oz)  BSA (Calculated - sq m): 1.87 sq meters  BMI (Calculated): 24.4  Weight in (lb) to have BMI = 25: 164.1]    Physical Exam  Vitals reviewed.   Constitutional:       Appearance: She is well-developed.   HENT:      Head: Normocephalic and atraumatic.      Right Ear: External ear normal.      Left Ear: External ear normal.   Cardiovascular:      Rate and Rhythm: Normal rate and regular rhythm.      Heart sounds: Normal heart sounds. No murmur heard.  Pulmonary:      Effort: Pulmonary effort is normal.      Breath sounds: Normal " breath sounds. No wheezing or rales.   Abdominal:      General: Bowel sounds are normal. There is no distension.      Palpations: Abdomen is soft.      Tenderness: There is no abdominal tenderness.          Laboratory:  CBC:  Recent Labs   Lab 12/20/22  0910 08/22/23  1048 01/02/24  0933   WBC 4.13 8.43 4.31   RBC 4.36 4.29 4.26   Hemoglobin 13.6 13.5 13.4   Hematocrit 42.0 42.8 42.0   Platelets 132 L 209 131 L   MCV 96 100 H 99 H   MCH 31.2 H 31.5 H 31.5 H   MCHC 32.4 31.5 L 31.9 L     CMP:  Recent Labs   Lab 03/06/22  0916 12/20/22  0910 08/22/23  1048 01/02/24  0933   Glucose 109 99   < > 95   Calcium 9.5 8.8   < > 9.2   Albumin 4.0 4.0  --  3.9   Total Protein 6.5 6.5  --  6.4   Sodium 136 140   < > 142   Potassium 4.2 4.1   < > 4.7   CO2 24 24   < > 28   Chloride 102 105   < > 107   BUN 14 13   < > 13   Alkaline Phosphatase 63 63  --  57   ALT 13 20  --  15   AST 19 26  --  20   Total Bilirubin 1.0 0.5  --  0.6    < > = values in this interval not displayed.     URINALYSIS:       LIPIDS:  Recent Labs   Lab 12/28/21  0908 12/20/22  0910 01/02/24  0933   TSH 1.584 1.718 1.853   HDL 60 60 63   Cholesterol 172 166 200 H   Triglycerides 77 107 101   LDL Cholesterol 96.6 84.6 116.8   HDL/Cholesterol Ratio 34.9 36.1 31.5   Non-HDL Cholesterol 112 106 137   Total Cholesterol/HDL Ratio 2.9 2.8 3.2     TSH:  Recent Labs   Lab 12/28/21  0908 12/20/22  0910 01/02/24  0933   TSH 1.584 1.718 1.853     A1C:        Assessment/Plan     Zoë Enrique is a 73 y.o.female with:    1. Annual physical exam  Discussed diet and exercise, vaccines and cancer screening, risk factors.  Screening labs reviewed.  2. Paroxysmal atrial fibrillation  Continue to monitor for recurrence.  F/U with cardiology.   3. Hypothyroidism (acquired)  Continue current meds.    4. Osteosarcoma of right femur  F/U with Dr. Urban.    Chronic conditions status updated as per HPI.  Other than changes above, cont current medications and maintain follow up with  specialists.  No follow-ups on file.    Future Appointments   Date Time Provider Department Center   1/22/2024 10:45 AM Nabila Keller MD [facility] OBGYNF [facility]   3/28/2024 10:00 AM Zoë Montesinos MD [facility] RHEUM [facility]   4/8/2024  9:50 AM Toñito Adame Jr., MD [facility] ORTHO [facility]   6/12/2024 10:00 AM [facility]

## 2024-01-12 ENCOUNTER — TELEPHONE (OUTPATIENT)
Dept: PRIMARY CARE CLINIC | Facility: CLINIC | Age: 74
End: 2024-01-12
Payer: MEDICARE

## 2024-01-12 NOTE — TELEPHONE ENCOUNTER
----- Message from Mague Self sent at 1/12/2024  9:28 AM CST -----  Contact: pt/ [phone]  Type:  Patient  Call    Who Called: Pt   Would the patient rather a call back or a response via [facility]? Call    Best Call Back Number:[phone]  Additional Information:  Pt  call  advising she  would  like to schedule  an appointment to  received  RSV vaccine

## 2024-01-22 ENCOUNTER — OFFICE VISIT (OUTPATIENT)
Dept: OBSTETRICS AND GYNECOLOGY | Facility: CLINIC | Age: 74
End: 2024-01-22
Payer: MEDICARE

## 2024-01-22 VITALS
BODY MASS INDEX: 24.52 KG/M2 | DIASTOLIC BLOOD PRESSURE: 66 MMHG | SYSTOLIC BLOOD PRESSURE: 124 MMHG | HEIGHT: 68 IN | WEIGHT: 161.81 LBS

## 2024-01-22 DIAGNOSIS — Z01.419 ENCOUNTER FOR GYNECOLOGICAL EXAMINATION WITHOUT ABNORMAL FINDING: Primary | ICD-10-CM

## 2024-01-22 DIAGNOSIS — N95.2 POSTMENOPAUSAL ATROPHIC VAGINITIS: ICD-10-CM

## 2024-01-22 PROCEDURE — 1101F PT FALLS ASSESS-DOCD LE1/YR: CPT | Mod: CPTII,S$GLB,, | Performed by: OBSTETRICS & GYNECOLOGY

## 2024-01-22 PROCEDURE — 1159F MED LIST DOCD IN RCRD: CPT | Mod: CPTII,S$GLB,, | Performed by: OBSTETRICS & GYNECOLOGY

## 2024-01-22 PROCEDURE — 3074F SYST BP LT 130 MM HG: CPT | Mod: CPTII,S$GLB,, | Performed by: OBSTETRICS & GYNECOLOGY

## 2024-01-22 PROCEDURE — 1160F RVW MEDS BY RX/DR IN RCRD: CPT | Mod: CPTII,S$GLB,, | Performed by: OBSTETRICS & GYNECOLOGY

## 2024-01-22 PROCEDURE — 3078F DIAST BP <80 MM HG: CPT | Mod: CPTII,S$GLB,, | Performed by: OBSTETRICS & GYNECOLOGY

## 2024-01-22 PROCEDURE — 1126F AMNT PAIN NOTED NONE PRSNT: CPT | Mod: CPTII,S$GLB,, | Performed by: OBSTETRICS & GYNECOLOGY

## 2024-01-22 PROCEDURE — G0101 CA SCREEN;PELVIC/BREAST EXAM: HCPCS | Mod: GZ,S$GLB,, | Performed by: OBSTETRICS & GYNECOLOGY

## 2024-01-22 PROCEDURE — 99999 PR PBB SHADOW E&M-EST. PATIENT-LVL III: CPT | Mod: PBBFAC,,, | Performed by: OBSTETRICS & GYNECOLOGY

## 2024-01-22 PROCEDURE — 3288F FALL RISK ASSESSMENT DOCD: CPT | Mod: CPTII,S$GLB,, | Performed by: OBSTETRICS & GYNECOLOGY

## 2024-01-22 RX ORDER — ESTRADIOL 0.1 MG/G
1 CREAM VAGINAL
Qty: 42 G | Refills: 3 | Status: SHIPPED | OUTPATIENT
Start: 2024-01-22 | End: 2025-01-21

## 2024-01-22 NOTE — PROGRESS NOTES
Subjective:       Patient ID: Zoë Enrique is a 73 y.o. female.    Chief Complaint:  Annual Exam and Gynecologic Exam      History of Present Illness  Gynecologic Exam  Pertinent negatives include no abdominal pain, back pain or headaches.     Zoë Enrique is a 73 y.o. female  here for her annual GYN exam.   She is menopausal since age 44 and is not on HRT. She does not have a formal exercise routine, but walks several times a week and cares for her 3 year old grandchild.  denies vaginal itching or irritation.  Denies vaginal discharge.  She is not sexually active. She has had1 partner for 37 years .   History of abnormal pap: Yes - years ago  Last Pap:  had a hysterectomy  Last MMG: normal-: BI-RADS Category 1: Negative-routine follow-up in 12 months  Last Dexa: 10-: *Normal bone mineral density.   Last Colonoscopy:  colonoscopy 3 years ago with abnormalities.  denies domestic violence. She does feel safe at home.     Past Medical History:   Diagnosis Date    Abnormal Pap smear of cervix     Atrial fibrillation 2017    Atrial flutter     Basal cell carcinoma 2021    superficial BCC R cheek s/p Efudex    Cancer     osteosarcoma of right femur    Diverticulosis     Hx of atrial flutter     Hx of mitral valve prolapse     Hypothyroidism     Migraines     Recurrent upper respiratory infection (URI)     Supraventricular tachycardia      Past Surgical History:   Procedure Laterality Date    ABLATION OF ARRHYTHMOGENIC FOCUS FOR ATRIAL FIBRILLATION N/A 2020    Procedure: ABLATION, ARRHYTHMOGENIC FOCUS, FOR ATRIAL FIBRILLATION;  Surgeon: Moe Seymour MD;  Location: [facility];  Service: Cardiology;  Laterality: N/A;  AFL/AF, KHANH, CTI, PVI, RFA, CARTO, GEN, DM, 3 PREP    AUGMENTATION OF BREAST Bilateral     35 yrs     BREAST SURGERY      augmentation    CARDIOVERSION  2020    Procedure: Cardioversion;  Surgeon: Moe Seymour MD;  Location: [facility];  Service: Cardiology;;     SECTION  1988    COLON SURGERY      partial colon resection    COLONOSCOPY N/A 2021    Procedure: COLONOSCOPY;  Surgeon: Sahara Salgado MD;  Location: [facility] (4TH FLR);  Service: Endoscopy;  Laterality: N/A;  constipation prep- 7 days of miralax, 2 fulls of fulls, then day bf clears and split prep   covid test 3/5 pcw, prep instr emailed, antibiotics prophylactically, Xarelto hold x 2 days per Dr. Seymour-see telephone encounter 2/10 -ml    COLPOSCOPY      COSMETIC SURGERY      DE QUERVAIN'S RELEASE Left 2022    Procedure: RELEASE, HAND, FOR DEQUERVAIN'S TENOSYNOVITIS;  Surgeon: Toñito Adame Jr., MD;  Location: [facility] OR;  Service: Orthopedics;  Laterality: Left;    ESOPHAGOGASTRODUODENOSCOPY N/A 2021    Procedure: EGD (ESOPHAGOGASTRODUODENOSCOPY);  Surgeon: Sahara Salgado MD;  Location: [facility] (4TH FLR);  Service: Endoscopy;  Laterality: N/A;    EYE SURGERY Bilateral     Lasik, then cataracts extraction    JOINT REPLACEMENT Right , ,     KNEE ARTHROPLASTY Right     revison in     KNEE SURGERY Right     distal femoral replacing TK    OOPHORECTOMY      TOTAL ABDOMINAL HYSTERECTOMY      TAHBSO    TRANSESOPHAGEAL ECHOCARDIOGRAPHY N/A 2020    Procedure: ECHOCARDIOGRAM, TRANSESOPHAGEAL;  Surgeon: Ramírez Ortez III, MD;  Location: [facility];  Service: Cardiology;  Laterality: N/A;    TREATMENT OF CARDIAC ARRHYTHMIA N/A 2019    Procedure: CARDIOVERSION;  Surgeon: Moe Seymour MD;  Location: [facility];  Service: Cardiology;  Laterality: N/A;  KHANH/DCCV/MAC/DM/3PREP/*ADMIT FOR TIKOSYN*    TRIGGER FINGER RELEASE Left 2022    Procedure: RELEASE, TRIGGER FINGER;  Surgeon: Toñito Adame Jr., MD;  Location: [facility] OR;  Service: Orthopedics;  Laterality: Left;  left middle finger     Social History     Socioeconomic History    Marital status:    Tobacco Use    Smoking status: Never      Passive exposure: Never    Smokeless tobacco: Never   Substance and Sexual Activity    Alcohol use: Yes     Alcohol/week: 2.0 standard drinks of alcohol     Types: 2 Shots of liquor per week     Comment: 1-2 per month    Drug use: No     Comment: CBD oils for headache    Sexual activity: Not Currently     Partners: Male     Birth control/protection: Post-menopausal, See Surgical Hx, None     Comment: , together x 37 years     Social Determinants of Health     Financial Resource Strain: Low Risk  (1/21/2024)    Overall Financial Resource Strain (CARDIA)     Difficulty of Paying Living Expenses: Not hard at all   Food Insecurity: No Food Insecurity (1/21/2024)    Hunger Vital Sign     Worried About Running Out of Food in the Last Year: Never true     Ran Out of Food in the Last Year: Never true   Transportation Needs: No Transportation Needs (1/21/2024)    PRAPARE - Transportation     Lack of Transportation (Medical): No     Lack of Transportation (Non-Medical): No   Physical Activity: Insufficiently Active (1/21/2024)    Exercise Vital Sign     Days of Exercise per Week: 1 day     Minutes of Exercise per Session: 20 min   Stress: No Stress Concern Present (1/21/2024)    [employer] [place] [employer] - Occupational Stress Questionnaire     Feeling of Stress : Not at all   Social Connections: Unknown (1/21/2024)    Social Connection and Isolation Panel [NHANES]     Frequency of Communication with Friends and Family: More than three times a week     Frequency of Social Gatherings with Friends and Family: More than three times a week     Active Member of Clubs or Organizations: No     Attends Club or Organization Meetings: Never     Marital Status:    Housing Stability: Low Risk  (1/21/2024)    Housing Stability Vital Sign     Unable to Pay for Housing in the Last Year: No     Number of Places Lived in the Last Year: 1     Unstable Housing in the Last Year: No     Family History   Problem  "Relation Age of Onset    Heart disease Mother         Atrial fib    Diabetes Mother         Type II    Hearing loss Father     Heart disease Father         Angina, AAA    Stroke Father 93    Diabetes Sister         Type I    Heart disease Sister         Atrrial fib, mitral valve repair    Heart disease Brother         Atrial fib    Asthma Brother     Diverticulosis Brother     Heart disease Brother         Atrial fib    Diverticulosis Brother     No Known Problems Daughter     Breast cancer Neg Hx     Ovarian cancer Neg Hx     Colon cancer Neg Hx     Cancer Neg Hx     Hypertension Neg Hx     Eczema Neg Hx      OB History          1    Para   1    Term   1            AB        Living   1         SAB        IAB        Ectopic        Multiple        Live Births   1                 /66   Ht 5' 8" (1.727 m)   Wt 73.4 kg (161 lb 13.1 oz)   BMI 24.60 kg/m²         GYN & OB History    Date of Last Pap: No result found    OB History    Para Term  AB Living   1 1 1     1   SAB IAB Ectopic Multiple Live Births           1      # Outcome Date GA Lbr Michael/2nd Weight Sex Delivery Anes PTL Lv   1 Term      CS-LTranv   EDWINA       Review of Systems  Review of Systems   Constitutional:  Negative for activity change, appetite change, fatigue and unexpected weight change.   HENT: Negative.     Eyes:  Negative for visual disturbance.   Respiratory:  Negative for shortness of breath and wheezing.    Cardiovascular:  Negative for chest pain, palpitations and leg swelling.   Gastrointestinal:  Negative for abdominal pain, bloating and blood in stool.   Endocrine: Positive for hypothyroidism. Negative for diabetes and hair loss.   Genitourinary:  Positive for vaginal dryness. Negative for decreased libido and dyspareunia.   Musculoskeletal:  Negative for back pain and joint swelling.   Integumentary:  Negative for acne, hair changes and nipple discharge.   Neurological:  Negative for headaches. "   Hematological:  Does not bruise/bleed easily.   Psychiatric/Behavioral:  Negative for depression and sleep disturbance. The patient is not nervous/anxious.    Breast: Negative for mastodynia and nipple discharge          Objective:      Physical Exam:   Constitutional: She is oriented to person, place, and time. She appears well-developed and well-nourished.    HENT:   Head: Normocephalic and atraumatic.    Eyes: Pupils are equal, round, and reactive to light. EOM are normal.     Cardiovascular:  Normal rate and regular rhythm.             Pulmonary/Chest: Effort normal and breath sounds normal.   BREASTS:  no mass, no tenderness, no deformity and no retraction. Right breast exhibits no inverted nipple, no mass, no nipple discharge, no skin change, no tenderness, no bleeding and no swelling. Left breast exhibits no inverted nipple, no mass, no nipple discharge, no skin change, no tenderness, no bleeding and no swelling. Breasts are symmetrical.      Implants encapsulated bilaterally        Abdominal: Soft. Bowel sounds are normal.     Genitourinary:    Pelvic exam was performed with patient supine.      Genitourinary Comments: PELVIC: Normal external female genitalia without lesions. Normal hair distribution. Adequate perineal body, normal urethral meatus. Vagina atrophic without lesions or discharge. No significant cystocele or rectocele. Bimanual exam shows uterus and cervix to be surgically absent. Adnexa without masses or tenderness.  RECTAL: Deferred                 Musculoskeletal: Normal range of motion and moves all extremeties.       Neurological: She is alert and oriented to person, place, and time.    Skin: Skin is warm and dry.    Psychiatric: She has a normal mood and affect.              Assessment:        1. Encounter for gynecological examination without abnormal finding    2. Postmenopausal atrophic vaginitis                Plan:      Problem List Items Addressed This Visit    None  Visit  Diagnoses       Encounter for gynecological examination without abnormal finding    -  Primary  COUNSELING:  The patient was counseled today on regular weight bearing exercise. Patient was counseled today on the new ACS guidelines for cervical cytology screening as well as the current recommendations for breast cancer screening. Counseling session lasted approximately 10 minutes, and all her questions were answered. She was advised to see her primary care physician for all other health maintenance.   FOLLOW-UP with me for next routine visit.       Postmenopausal atrophic vaginitis        Relevant Medications    estradioL (ESTRACE) 0.01 % (0.1 mg/gram) vaginal cream             Follow up in about 2 years (around 1/22/2026).

## 2024-02-19 ENCOUNTER — TELEPHONE (OUTPATIENT)
Dept: ORTHOPEDICS | Facility: CLINIC | Age: 74
End: 2024-02-19
Payer: MEDICARE

## 2024-02-19 DIAGNOSIS — Z96.651 S/P TOTAL KNEE ARTHROPLASTY, RIGHT: Primary | ICD-10-CM

## 2024-02-19 NOTE — TELEPHONE ENCOUNTER
Spoke to patient. Schedule for 2/26/24 with Dr. Tamayo to establish care for R knee.     Kat Feng MS, ATC, OTC  Clinical/Surgical Assistant - Dr. Jim Tamayo   Orthopedic Oncology   [facility]  Phone: [phone]        ----- Message from Gustavo Up sent at 2/19/2024  8:54 AM CST -----  Pt is requesting a call back asap to schedule a NP/Consult.    Pt can be reached at [phone].    Thanks

## 2024-02-26 ENCOUNTER — PATIENT MESSAGE (OUTPATIENT)
Dept: ORTHOPEDICS | Facility: CLINIC | Age: 74
End: 2024-02-26

## 2024-02-26 ENCOUNTER — OFFICE VISIT (OUTPATIENT)
Dept: ORTHOPEDICS | Facility: CLINIC | Age: 74
End: 2024-02-26
Payer: MEDICARE

## 2024-02-26 ENCOUNTER — HOSPITAL ENCOUNTER (OUTPATIENT)
Dept: RADIOLOGY | Facility: HOSPITAL | Age: 74
Discharge: HOME | End: 2024-02-26
Attending: ORTHOPAEDIC SURGERY
Payer: MEDICARE

## 2024-02-26 DIAGNOSIS — Z96.651 S/P TOTAL KNEE ARTHROPLASTY, RIGHT: ICD-10-CM

## 2024-02-26 DIAGNOSIS — Z87.39 HISTORY OF INFECTION OF TOTAL JOINT PROSTHESIS OF KNEE: Primary | ICD-10-CM

## 2024-02-26 PROCEDURE — 73552 X-RAY EXAM OF FEMUR 2/>: CPT | Mod: 26,RT,, | Performed by: RADIOLOGY

## 2024-02-26 PROCEDURE — 73560 X-RAY EXAM OF KNEE 1 OR 2: CPT | Mod: TC,RT

## 2024-02-26 PROCEDURE — 73552 X-RAY EXAM OF FEMUR 2/>: CPT | Mod: TC,RT

## 2024-02-26 PROCEDURE — 99213 OFFICE O/P EST LOW 20 MIN: CPT | Mod: S$GLB,,, | Performed by: ORTHOPAEDIC SURGERY

## 2024-02-26 PROCEDURE — 73560 X-RAY EXAM OF KNEE 1 OR 2: CPT | Mod: 26,RT,, | Performed by: RADIOLOGY

## 2024-02-26 NOTE — PROGRESS NOTES
Orthopaedic Oncology New Patient Visit:      Dear Self, Aaareferral  No address on file and Praveen Jeffery MD,    We had the pleasure of evaluating Zoë Enrique in the Orthopaedic Clinic today at the [facility] today.      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 Vides; establish care here    As you may recall, Zoë Enrique is a 73 y.o. female who is here to establish care with me since Dr. Urban is not at [facility] currently. 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 She has since not had obvious infection. In 2022 she presented to ED here and had knee pain so underwent aspiration which was negative for acute PJI. She is not on any sort of chronic oral suppression. She does not have any mechanical complaints. No new or worsening swelling. No erythema. She knows what activities cause her discomfort after and so she just avoids those. She keeps active with low impact activities. She did not have chemotherapy since low grade parosteal osteosarcoma. She denies any thigh pain with ambulation. No real complaints. No issues with incisions ever. She does not personally have any copies of her prior operative reports.    PMH:   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    Diverticulosis 1998    Hx of atrial flutter     Hx of mitral valve prolapse 1990    Hypothyroidism     Migraines     Recurrent upper respiratory infection (URI)     Supraventricular tachycardia        PSH:  has a past surgical history that  includes Knee surgery (Right, ); Knee Arthroplasty (Right, ); Treatment of cardiac arrhythmia (N/A, 2019); Augmentation of breast (Bilateral, );  section (); Cosmetic surgery (); Colposcopy; Oophorectomy; Total abdominal hysterectomy (); Ablation of arrhythmogenic focus for atrial fibrillation (N/A, 2020); Cardioversion (2020); Transesophageal echocardiography (N/A, 2020); Colon surgery (); Breast surgery (); Eye surgery (Bilateral, ); Joint replacement (Right, , , ); Esophagogastroduodenoscopy (N/A, 2021); Colonoscopy (N/A, 2021); De Quervain's release (Left, 2022); and Trigger finger release (Left, 2022).    Allergies:   Allergies as of 2024 - Reviewed 2024   Allergen Reaction Noted    Morphine Nausea And Vomiting 2018    Succinylcholine  2019       Medications:    Current Outpatient Medications:     acetaminophen (TYLENOL) 650 MG TbSR, Take 650 mg by mouth every 8 (eight) hours., Disp: , Rfl:     acyclovir (ZOVIRAX) 400 MG tablet, Take 1 tablet (400 mg total) by mouth 3 (three) times daily as needed., Disp: 90 tablet, Rfl: 3    docusate sodium (COLACE) 250 MG capsule, Take 250 mg by mouth 2 (two) times daily. , Disp: , Rfl:     estradioL (ESTRACE) 0.01 % (0.1 mg/gram) vaginal cream, Place 1 g vaginally 3 (three) times a week., Disp: 42 g, Rfl: 3    famotidine (PEPCID) 20 MG tablet, Take 1 tablet (20 mg total) by mouth 2 (two) times daily., Disp: 60 tablet, Rfl: 4    HYDROcodone-acetaminophen (NORCO) 5-325 mg per tablet, Take 1 tablet by mouth every 6 (six) hours as needed for Pain. Pt states takes PRN for headaches, Disp: 28 tablet, Rfl: 0    ipratropium (ATROVENT) 42 mcg (0.06 %) nasal spray, 2 sprays by Each Nostril route 3 (three) times daily., Disp: 15 mL, Rfl: 6    levothyroxine (SYNTHROID) 25 MCG tablet, Take 1 tablet (25 mcg total) by mouth once daily., Disp: 90 tablet, Rfl: 3     memantine (NAMENDA) 10 MG Tab, Take 1 tablet (10 mg total) by mouth 2 (two) times daily., Disp: 60 tablet, Rfl: 11    rivaroxaban (XARELTO) 20 mg Tab, Take 1 tablet (20 mg total) by mouth once daily., Disp: 90 tablet, Rfl: 1    sodium chloride 3% 3 % nebulizer solution, Take 4 mLs by nebulization 2 (two) times a day., Disp: 300 mL, Rfl: PRN    verapamiL (CALAN) 40 MG Tab, 1 tab PO prn for palpitations. Maximum 2 doses per day, at least 6 hours apart., Disp: 30 tablet, Rfl: 11    vit C/E/Zn/coppr/lutein/zeaxan (PRESERVISION AREDS-2 ORAL), , Disp: , Rfl:   No current facility-administered medications for this visit.    Facility-Administered Medications Ordered in Other Visits:     sodium chloride 0.9% flush 5 mL, 5 mL, Intravenous, PRN, Aleksandra Taylor NP    Family History: family history includes Asthma in her brother; Diabetes in her mother and sister; Diverticulosis in her brother and brother; Hearing loss in her father; Heart disease in her brother, brother, father, mother, and sister; No Known Problems in her daughter; Stroke (age of onset: 93) in her father.    Social History:  reports that she has never smoked. She has never been exposed to tobacco smoke. She has never used smokeless tobacco. She reports current alcohol use of about 2.0 standard drinks of alcohol per week. She reports that she does not use drugs.    ROS:  A Complete review of systems was performed.  Pertinent positives are listed in the history of present illness above.  Remainder of review of systems were negative.      Vital Signs:  no vital signs obtained at this visit    Physical exam:  General:  AAOX3, No acute distress, normal affect and disposition  Respiratory: Respirations unlabored    Focused examination of the right lower extremity was performed.   There is a long mostly anterior incision about the knee and distal thigh which is well healed  She is able to range her knee 0-90 without discomfort, some mild discomfort with  further flexion  No erythema  No obvious patellar maltracking with palpation  Stable in AP direction  No significant effusion  No pain with calf squeeze  Neurovascularly intact distally      Imaging:    Radiographic Data:  radiographs of the right knee and femur were obtained in clinic today and demonstrate right distal femur prosthesis with long cemented femoral steam and cemented tibial component, surgical clips present in the soft tissue as have been previously seen, no significant evidence of component loosening, no major complications with the implants noted when compared to 2022 radiographs    Assessment/Plan: Zoë Enrique is a 73 y.o. old female with history of paroasteal osteosarcoma right distal femur s/p multiple surgeries here to establish care    We discussed the findings to date. Since her radiographs are stable and she is doing well currently I do not think there is indication for revision based on her symptoms or imaging. We did discuss that sometimes components of these megaprostheses do need to be changed at some point as they wear out faster than normal implants. Additionally reviewed concerning signs of infection which she is well aware of and currently does not demonstrate. I encourage her to do low impact activities and avoid high impact activities because of the wear on these implants. Additionally would like her to obtain her prior outside surgery records so we have her implants on file should we need to know sizes for any future revisions. She will work on this as well since when I tried to find operative report/implant stickers the implant information was not available in care everywhere from my search into her prior [facility] notes around time of prior surgery. I would like to see her back in 1 year with repeat radiographs or sooner should issues arise.Counseled her on use of amoxicillin for invasive dental procedures, endoscopy and colonoscopy. She will contact us about any  questions or concerns. All questions answered.     Thank you for the referral and the opportunity to participate in the care of your patient.  If you have any questions regarding our treatment recommendations don't hesitate to call.    Electronically signed by:    Jim Tamayo MD   Orthopedic Oncology and Complex Arthroplasty Reconstruction  [facility]

## 2024-03-28 ENCOUNTER — OFFICE VISIT (OUTPATIENT)
Dept: RHEUMATOLOGY | Facility: CLINIC | Age: 74
End: 2024-03-28
Payer: MEDICARE

## 2024-03-28 VITALS
SYSTOLIC BLOOD PRESSURE: 103 MMHG | HEIGHT: 68 IN | DIASTOLIC BLOOD PRESSURE: 66 MMHG | WEIGHT: 160.5 LBS | HEART RATE: 79 BPM | BODY MASS INDEX: 24.32 KG/M2

## 2024-03-28 DIAGNOSIS — M12.30 PALINDROMIC RHEUMATISM: Primary | ICD-10-CM

## 2024-03-28 PROCEDURE — 1159F MED LIST DOCD IN RCRD: CPT | Mod: CPTII,S$GLB,, | Performed by: INTERNAL MEDICINE

## 2024-03-28 PROCEDURE — 3074F SYST BP LT 130 MM HG: CPT | Mod: CPTII,S$GLB,, | Performed by: INTERNAL MEDICINE

## 2024-03-28 PROCEDURE — 99214 OFFICE O/P EST MOD 30 MIN: CPT | Mod: S$GLB,,, | Performed by: INTERNAL MEDICINE

## 2024-03-28 PROCEDURE — 1126F AMNT PAIN NOTED NONE PRSNT: CPT | Mod: CPTII,S$GLB,, | Performed by: INTERNAL MEDICINE

## 2024-03-28 PROCEDURE — 3008F BODY MASS INDEX DOCD: CPT | Mod: CPTII,S$GLB,, | Performed by: INTERNAL MEDICINE

## 2024-03-28 PROCEDURE — 99999 PR PBB SHADOW E&M-EST. PATIENT-LVL III: CPT | Mod: PBBFAC,,, | Performed by: INTERNAL MEDICINE

## 2024-03-28 PROCEDURE — 3078F DIAST BP <80 MM HG: CPT | Mod: CPTII,S$GLB,, | Performed by: INTERNAL MEDICINE

## 2024-03-28 NOTE — PROGRESS NOTES
3/27/2024     9:29 AM   Rapid3 Question Responses and Scores   MDHAQ Score 0.2   Psychologic Score 1.1   Pain Score 0.5   When you awakened in the morning OVER THE LAST WEEK, did you feel stiff? Yes   If Yes, please indicate the number of hours until you are as limber as you will be for the day 1   Fatigue Score 0   Global Health Score 0   RAPID3 Score 0.39     Answers submitted by the patient for this visit:  Rheumatology Questionnaire (Submitted on 3/27/2024)  fever: No  eye redness: No  mouth sores: No  headaches: Yes  shortness of breath: No  chest pain: No  trouble swallowing: No  diarrhea: No  constipation: No  unexpected weight change: No  genital sore: No  dysuria: No  During the last 3 days, have you had a skin rash?: No  Bruises or bleeds easily: Yes  cough: No

## 2024-03-28 NOTE — PROGRESS NOTES
Subjective:       Patient ID: Zoë Enrique is a 69 y.o. female.    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  After yadira, she developed pain in right CMC region.  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 She woke up on Sunday the  Jan 25th, she had pain in hands, wrists, and ankles.  There was swelling in hands and wrists.  Reports significant pain in let wrist.  Sometimes she has shooting pain in left wrist. She has been using voltaren gel for her pain. Pain level can be 8/10, aching. Pain is sharp and burning.   She was given medrol dose pack with some improvement. She still has pain and stiffness in the hands.  She has pain in left wrist. She has pain in left ankle.  She reports she has stiffness worse in the morning for a few hours.  Denies any changes in diet. Denies history of smoking.  Denies changes in weight, rashes, oral ulcers,photosensitivity.    Family hx:niece: PILY      Interval history:  She had recent right  third finger trigger s/p injection.  Denies any joint pain today.      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 Negative for activity change, appetite change, chills, diaphoresis, fever and unexpected weight change.   HENT: Negative for congestion, ear discharge, ear pain, facial swelling, mouth sores, sinus pressure, sneezing, sore throat, tinnitus and trouble swallowing.    Eyes: Negative for photophobia, pain, discharge, redness, itching and visual disturbance.   Respiratory: Negative for apnea,  "chest tightness, shortness of breath, wheezing and stridor.    Cardiovascular: Negative for leg swelling.   Gastrointestinal: Negative for abdominal distention, abdominal pain, anal bleeding, blood in stool, constipation, diarrhea and nausea.   Endocrine: Negative for cold intolerance and heat intolerance.   Genitourinary: Negative for difficulty urinating and dysuria.   Musculoskeletal: Positive for arthralgias and joint swelling. Negative for back pain, gait problem, myalgias, neck pain and neck stiffness.   Skin: Negative for color change, pallor, rash and wound.   Neurological: Negative for dizziness, seizures, light-headedness and numbness.   Hematological: Negative for adenopathy. Does not bruise/bleed easily.   Psychiatric/Behavioral: Negative for sleep disturbance. The patient is not nervous/anxious.            Objective:   /71   Pulse 85   Ht 5' 8" (1.727 m)   Wt 73.9 kg (163 lb 0.5 oz)   BMI 24.79 kg/m²      Physical Exam   Constitutional: She is oriented to person, place, and time.   HENT:   Head: Normocephalic and atraumatic.   Right Ear: External ear normal.   Left Ear: External ear normal.   Nose: Nose normal.   Mouth/Throat: Oropharynx is clear and moist. No oropharyngeal exudate.   Eyes: Conjunctivae and EOM are normal. Pupils are equal, round, and reactive to light. Right eye exhibits no discharge. Left eye exhibits no discharge. No scleral icterus.   Neck: Neck supple. No JVD present. No thyromegaly present.   Cardiovascular: Normal rate, regular rhythm, normal heart sounds and intact distal pulses.  Exam reveals no gallop and no friction rub.    No murmur heard.  Pulmonary/Chest: Effort normal and breath sounds normal. No respiratory distress. She has no wheezes. She has no rales. She exhibits no tenderness.   Abdominal: Soft. Bowel sounds are normal. She exhibits no distension and no mass. There is no tenderness. There is no rebound and no guarding.   Lymphadenopathy:     She has no " cervical adenopathy.   Neurological: She is alert and oriented to person, place, and time. No cranial nerve deficit. Gait normal. Coordination normal.   Skin: Skin is dry. No rash noted. No erythema. No pallor.     Psychiatric: Affect and judgment normal.   Musculoskeletal: She exhibits edema and tenderness. She exhibits no deformity.   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 .  She is s/p de Quervain release and trigger release left hand.  Consider repeating MRI of hands but she declines today.  Discussed trial of plaquenil but she declines.    Plan:       Problem List Items Addressed This Visit       None       continue tramadol 50mg po BID prn  Continue tylenol arthritis BID PRN  Note is made of leukopenia/thrombocytopenia      30 * minutes of total time spent on the encounter, which includes face to face time and non-face to face time preparing to see the patient (eg, review of tests), Obtaining and/or reviewing separately obtained history, Documenting clinical information in the electronic or other health record, Independently interpreting results (not separately reported) and communicating results to the patient/family/caregiver, or Care coordination (not separately reported).

## 2024-03-30 DIAGNOSIS — E03.9 HYPOTHYROIDISM (ACQUIRED): ICD-10-CM

## 2024-03-30 NOTE — TELEPHONE ENCOUNTER
No care due was identified.  [facility] Embedded Care Due Messages. Reference number: [id number].   3/30/2024 4:50:25 PM CDT

## 2024-04-01 RX ORDER — LEVOTHYROXINE SODIUM 25 UG/1
25 TABLET ORAL
Qty: 90 TABLET | Refills: 3 | Status: SHIPPED | OUTPATIENT
Start: 2024-04-01

## 2024-04-01 NOTE — TELEPHONE ENCOUNTER
Refill Decision Note   Zoë Klaus  is requesting a refill authorization.  Brief Assessment and Rationale for Refill:  Approve     Medication Therapy Plan:         Comments:     Note composed:1:29 PM 04/01/2024

## 2024-04-16 ENCOUNTER — TELEPHONE (OUTPATIENT)
Dept: ELECTROPHYSIOLOGY | Facility: CLINIC | Age: 74
End: 2024-04-16
Payer: MEDICARE

## 2024-04-16 ENCOUNTER — OFFICE VISIT (OUTPATIENT)
Dept: OTOLARYNGOLOGY | Facility: CLINIC | Age: 74
End: 2024-04-16
Payer: MEDICARE

## 2024-04-16 VITALS
HEART RATE: 70 BPM | DIASTOLIC BLOOD PRESSURE: 67 MMHG | BODY MASS INDEX: 23.85 KG/M2 | SYSTOLIC BLOOD PRESSURE: 121 MMHG | WEIGHT: 156.88 LBS

## 2024-04-16 DIAGNOSIS — I70.0 AORTIC ATHEROSCLEROSIS: Primary | ICD-10-CM

## 2024-04-16 DIAGNOSIS — K21.9 GASTROESOPHAGEAL REFLUX DISEASE, UNSPECIFIED WHETHER ESOPHAGITIS PRESENT: Chronic | ICD-10-CM

## 2024-04-16 DIAGNOSIS — R13.10 DYSPHAGIA, UNSPECIFIED TYPE: Chronic | ICD-10-CM

## 2024-04-16 DIAGNOSIS — J30.0 VASOMOTOR RHINITIS: Primary | Chronic | ICD-10-CM

## 2024-04-16 PROCEDURE — 99214 OFFICE O/P EST MOD 30 MIN: CPT | Mod: 25,S$GLB,, | Performed by: OTOLARYNGOLOGY

## 2024-04-16 PROCEDURE — 1160F RVW MEDS BY RX/DR IN RCRD: CPT | Mod: CPTII,S$GLB,, | Performed by: OTOLARYNGOLOGY

## 2024-04-16 PROCEDURE — 1126F AMNT PAIN NOTED NONE PRSNT: CPT | Mod: CPTII,S$GLB,, | Performed by: OTOLARYNGOLOGY

## 2024-04-16 PROCEDURE — 99999 PR PBB SHADOW E&M-EST. PATIENT-LVL IV: CPT | Mod: PBBFAC,,, | Performed by: OTOLARYNGOLOGY

## 2024-04-16 PROCEDURE — 1159F MED LIST DOCD IN RCRD: CPT | Mod: CPTII,S$GLB,, | Performed by: OTOLARYNGOLOGY

## 2024-04-16 PROCEDURE — 1101F PT FALLS ASSESS-DOCD LE1/YR: CPT | Mod: CPTII,S$GLB,, | Performed by: OTOLARYNGOLOGY

## 2024-04-16 PROCEDURE — 3078F DIAST BP <80 MM HG: CPT | Mod: CPTII,S$GLB,, | Performed by: OTOLARYNGOLOGY

## 2024-04-16 PROCEDURE — 3074F SYST BP LT 130 MM HG: CPT | Mod: CPTII,S$GLB,, | Performed by: OTOLARYNGOLOGY

## 2024-04-16 PROCEDURE — 3288F FALL RISK ASSESSMENT DOCD: CPT | Mod: CPTII,S$GLB,, | Performed by: OTOLARYNGOLOGY

## 2024-04-16 RX ORDER — FAMOTIDINE 20 MG/1
20 TABLET, FILM COATED ORAL 2 TIMES DAILY
Qty: 60 TABLET | Refills: 4 | Status: SHIPPED | OUTPATIENT
Start: 2024-04-16 | End: 2025-04-16

## 2024-04-16 NOTE — PROGRESS NOTES
Chief Complaint   Patient presents with    Follow-up     No Improvement Clear Drainage , Pepcid Rx   .    HPI:     Interval HPI 4/16/2024:   She reports that she is having continue rhinorrhea which is daily but some days are worse than others.Symptoms are worse with temperature changes.  Notes it is bilateral and clear mucus discharge. She has been been on saline rinses and atrovent without relief. She has been using it twice daily and tries to get the third dose in the middle of the day as well. She does not feel that this helps at all.   She has been on famotidine for GERD/LPR. Overall has been doing well but has had some reflux episodes last week. She was given omeprazole 40mg PO BID by Dr. Adorno and did not tolerate this. Felt she had significant side effects of constipation,  stomach discomfort, blurred vision, dry mouth, joint discomfort, and headaches. She stopped the omeprazole and went back to famotidine. She feels that the famotidine has not been controlling her symptoms as well.     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    Diverticulosis 1998    Hx of atrial flutter     Hx of mitral valve prolapse 1990    Hypothyroidism     Migraines     Recurrent upper respiratory infection (URI)     Supraventricular tachycardia      Social History     Socioeconomic History    Marital status:    Tobacco Use    Smoking status: Never     Passive exposure: Never    Smokeless tobacco: Never   Substance and Sexual Activity    Alcohol use: Yes     Alcohol/week: 2.0 standard drinks of alcohol     Types: 2 Shots of liquor per week     Comment: 1-2 per month    Drug use: No     Comment: CBD oils for headache    Sexual activity: Not Currently     Partners: Male     Birth control/protection: Post-menopausal, See Surgical Hx,  None     Comment: , together x 37 years     Social Determinants of Health     Financial Resource Strain: Low Risk  (1/21/2024)    Overall Financial Resource Strain (CARDIA)     Difficulty of Paying Living Expenses: Not hard at all   Food Insecurity: No Food Insecurity (1/21/2024)    Hunger Vital Sign     Worried About Running Out of Food in the Last Year: Never true     Ran Out of Food in the Last Year: Never true   Transportation Needs: No Transportation Needs (1/21/2024)    PRAPARE - Transportation     Lack of Transportation (Medical): No     Lack of Transportation (Non-Medical): No   Physical Activity: Insufficiently Active (1/21/2024)    Exercise Vital Sign     Days of Exercise per Week: 1 day     Minutes of Exercise per Session: 20 min   Stress: No Stress Concern Present (1/21/2024)    [employer] - Occupational Stress Questionnaire     Feeling of Stress : Not at all   Social Connections: Unknown (1/21/2024)    Social Connection and Isolation Panel [NHANES]     Frequency of Communication with Friends and Family: More than three times a week     Frequency of Social Gatherings with Friends and Family: More than three times a week     Active Member of Clubs or Organizations: No     Attends Club or Organization Meetings: Never     Marital Status:    Housing Stability: Low Risk  (1/21/2024)    Housing Stability Vital Sign     Unable to Pay for Housing in the Last Year: No     Number of Places Lived in the Last Year: 1     Unstable Housing in the Last Year: No     Past Surgical History:   Procedure Laterality Date    ABLATION OF ARRHYTHMOGENIC FOCUS FOR ATRIAL FIBRILLATION N/A 11/24/2020    Procedure: ABLATION, ARRHYTHMOGENIC FOCUS, FOR ATRIAL FIBRILLATION;  Surgeon: Moe Seymour MD;  Location: [facility] LAB;  Service: Cardiology;  Laterality: N/A;  AFL/AF, KHANH, CTI, PVI, RFA, CARTO, GEN, DM, 3 PREP    AUGMENTATION OF BREAST Bilateral 1984    35 yrs     BREAST SURGERY   1984    augmentation    CARDIOVERSION  2020    Procedure: Cardioversion;  Surgeon: Moe Seymour MD;  Location: [facility] EP LAB;  Service: Cardiology;;     SECTION      COLON SURGERY      partial colon resection    COLONOSCOPY N/A 2021    Procedure: COLONOSCOPY;  Surgeon: Sahara Salgado MD;  Location: [facility] ENDO (4TH FLR);  Service: Endoscopy;  Laterality: N/A;  constipation prep- 7 days of miralax, 2 fulls of fulls, then day bf clears and split prep   covid test 3/5 pcw, prep instr emailed, antibiotics prophylactically, Xarelto hold x 2 days per Dr. Seymour-see telephone encounter 2/10 -ml    COLPOSCOPY      COSMETIC SURGERY      DE QUERVAIN'S RELEASE Left 2022    Procedure: RELEASE, HAND, FOR DEQUERVAIN'S TENOSYNOVITIS;  Surgeon: Toñito Adame Jr., MD;  Location: [facility] OR;  Service: Orthopedics;  Laterality: Left;    ESOPHAGOGASTRODUODENOSCOPY N/A 2021    Procedure: EGD (ESOPHAGOGASTRODUODENOSCOPY);  Surgeon: Sahara Salgado MD;  Location: [facility] (4TH FLR);  Service: Endoscopy;  Laterality: N/A;    EYE SURGERY Bilateral 2016    Lasik, then cataracts extraction    JOINT REPLACEMENT Right , ,     KNEE ARTHROPLASTY Right     revison in     KNEE SURGERY Right     distal femoral replacing TK    OOPHORECTOMY      TOTAL ABDOMINAL HYSTERECTOMY      TAHBSO    TRANSESOPHAGEAL ECHOCARDIOGRAPHY N/A 2020    Procedure: ECHOCARDIOGRAM, TRANSESOPHAGEAL;  Surgeon: Ramírez Ortez III, MD;  Location: [facility] EP LAB;  Service: Cardiology;  Laterality: N/A;    TREATMENT OF CARDIAC ARRHYTHMIA N/A 2019    Procedure: CARDIOVERSION;  Surgeon: Moe Seymour MD;  Location: [facility] EP LAB;  Service: Cardiology;  Laterality: N/A;  KHANH/DCCV/MAC/DM/3PREP/*ADMIT FOR TIKOSYN*    TRIGGER FINGER RELEASE Left 2022    Procedure: RELEASE, TRIGGER FINGER;  Surgeon: Toñito Adame Jr., MD;  Location: [facility] OR;  Service: Orthopedics;  Laterality: Left;  left  middle finger     Family History   Problem Relation Name Age of Onset    Heart disease Mother Tianna         Atrial fib    Diabetes Mother Tianna         Type II    Hearing loss Father Adan     Heart disease Father Adan         Angina, AAA    Stroke Father Adan 93    Diabetes Sister Mahogany         Type I    Heart disease Sister Mahogany         Atrrial fib, mitral valve repair    Heart disease Brother Severo         Atrial fib    Asthma Brother Severo     Diverticulosis Brother Severo     Heart disease Brother Leonard         Atrial fib    Diverticulosis Brother Leonard     No Known Problems Daughter Michael     Breast cancer Neg Hx      Ovarian cancer Neg Hx      Colon cancer Neg Hx      Cancer Neg Hx      Hypertension Neg Hx      Eczema Neg Hx             Review of Systems  General: negative for chills, fever or weight loss  Psychological: negative for mood changes or depression  Ophthalmic: negative for blurry vision, photophobia or eye pain  ENT: see HPI  Respiratory: no cough, shortness of breath, or wheezing  Cardiovascular: no chest pain or dyspnea on exertion  Gastrointestinal: no abdominal pain, change in bowel habits, or black/ bloody stools  Musculoskeletal: negative for gait disturbance or muscular weakness  Neurological: no syncope or seizures; no ataxia  Dermatological: negative for puritis,  rash and jaundice  Hematologic/lymphatic: no easy bruising, no new lumps or bumps      Physical Exam:    Vitals:    04/16/24 1052   BP: 121/67   Pulse: 70     Constitutional: Well appearing / communicating without difficutly.  NAD.  Eyes: EOM I Bilaterally  Head/Face: Normocephalic.  Negative paranasal sinus pressure/tenderness.  Salivary glands WNL.  House Brackmann I Bilaterally.    Right Ear: Auricle normal appearance. External Auditory Canal within normal limits,TM w/o masses/lesions/perforations. TM mobility noted.   Left Ear: Auricle normal appearance. External Auditory Canal WNL,TM w/o  masses/lesions/perforations. TM mobility noted.  Nose: + mucosal edema. No gross nasal septal deviation. Inferior Turbinates 3+ bilaterally. No septal perforation. No masses/lesions. External nasal skin appears normal without masses/lesions.  Oral Cavity: Gingiva/lips within normal limits.  Dentition/gingiva healthy appearing. Mucus membranes moist. Floor of mouth soft, no masses palpated. Oral Tongue mobile. Hard Palate appears normal.    Oropharynx: Base of tongue appears normal. No masses/lesions noted. Tonsillar fossa/pharyngeal wall without lesions. Posterior oropharynx WNL.  Soft palate without masses. Midline uvula.   Neck/Lymphatic: No LAD I-VI bilaterally.  No thyromegaly.  No masses noted on exam.          Diagnostic studies:  PFTs: 2020: normal spirometry  Laboratory 2022:  IgE level: Less than 35.  ImmunoCAP:  Negative.  IgA: 114.  Ig.  IgM: 66.  Pneumococcal titers: Not protective.    CT sinus 2023: Images interpreted by me and discussed with patient in detail today.   Right pedro bullosa.  Paradoxical curvature of the middle turbinates anteriorly.  Leftward nasal septal deviation with a 3 mm bony spur abutting the left inferior nasal turbinate.  No nasal polyp formation is identified.     Ostiomeatal units are patent with some anatomic narrowing of the distal infundibulum on the left.  The frontal recesses and sphenoid sinus ostia are patent.     The paranasal sinuses are currently clear other than for minimal mucosal thickening at the floor the left maxillary sinus..     Sellar pneumatization of the sphenoid.  There is some pneumatization overlying the grooves of the anterior ethmoid arteries bilaterally.  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     The middle ears and mastoid air cells are clear.     No acute intracranial process.                  Assessment:    ICD-10-CM ICD-9-CM    1. Vasomotor rhinitis  J30.0 477.9 Ambulatory referral/consult to ENT      2. Gastroesophageal reflux  disease, unspecified whether esophagitis present  K21.9 530.81 Ambulatory referral/consult to Gastroenterology      3. Dysphagia, unspecified type  R13.10 787.20 FL Esophagram pharynx and/or cervical                  The primary encounter diagnosis was Vasomotor rhinitis. Diagnoses of Gastroesophageal reflux disease, unspecified whether esophagitis present and Dysphagia, unspecified type were also pertinent to this visit.      Plan:  Orders Placed This Encounter   Procedures    FL Esophagram pharynx and/or cervical    Ambulatory referral/consult to ENT    Ambulatory referral/consult to Gastroenterology     Continue Nasal saline rinses twice daily.   Continue  Atrovent to 0.6%-  2 sprays per nostril 3 times per day. Will refer to rhinology for chronic rhinitis(vasomotor rhinitis)-?consideration of clarifix or rRinAer procedure?   Keep f/u with  neurology for Migraine headaches.   Continue Famotidine 20 mg PO BID.  Obtain barium esophagram-? Element of esophageal dysmotility.  Refer to GI. Last visit with GI 2021.    Linda Yu MD

## 2024-04-18 ENCOUNTER — PATIENT MESSAGE (OUTPATIENT)
Dept: OTOLARYNGOLOGY | Facility: CLINIC | Age: 74
End: 2024-04-18
Payer: MEDICARE

## 2024-04-18 NOTE — PROCEDURES
Laryngoscopy    Date/Time: 4/16/2024 11:00 AM    Performed by: Linda Doll MD  Authorized by: Linda Doll MD    Consent Done?:  Yes (Verbal)  Anesthesia:     Local anesthetic:  Lidocaine 2% and [place]-Synephrine 1/2%  Laryngoscopy:     Areas examined:  Nasal cavities, nasopharynx, oropharynx, hypopharynx, larynx and vocal cords  Nose External:      No external nasal deformity  Nose Intranasal:      Mucosa no polyps     Mucosa ulcers not present     No mucosa lesions present     No septum gross deformity     Turbinates not enlarged  Nasopharynx:      No mucosa lesions     Adenoids not present     Posterior choanae patent     Eustachian tube patent  Larynx/hypopharynx:      No epiglottis lesions     No epiglottis edema     No AE folds lesions     No vocal cord polyps     Equal and normal bilateral     No hypopharynx lesions     No piriform sinus pooling     No piriform sinus lesions     Post cricoid edema (mild)     Post cricoid erythema (mild)

## 2024-04-30 ENCOUNTER — HOSPITAL ENCOUNTER (OUTPATIENT)
Dept: RADIOLOGY | Facility: HOSPITAL | Age: 74
Discharge: HOME | End: 2024-04-30
Attending: OTOLARYNGOLOGY
Payer: MEDICARE

## 2024-04-30 DIAGNOSIS — R13.10 DYSPHAGIA, UNSPECIFIED TYPE: Chronic | ICD-10-CM

## 2024-04-30 PROCEDURE — 74210 X-RAY XM PHRNX&/CRV ESOPH C+: CPT | Mod: TC

## 2024-04-30 PROCEDURE — 74210 X-RAY XM PHRNX&/CRV ESOPH C+: CPT | Mod: 26,,, | Performed by: STUDENT IN AN ORGANIZED HEALTH CARE EDUCATION/TRAINING PROGRAM

## 2024-04-30 PROCEDURE — 25500020 PHARM REV CODE 255: Performed by: OTOLARYNGOLOGY

## 2024-04-30 PROCEDURE — A9698 NON-RAD CONTRAST MATERIALNOC: HCPCS | Performed by: OTOLARYNGOLOGY

## 2024-04-30 RX ADMIN — BARIUM SULFATE 200 ML: 0.6 SUSPENSION ORAL at 10:04

## 2024-05-21 ENCOUNTER — TELEPHONE (OUTPATIENT)
Dept: PRIMARY CARE CLINIC | Facility: CLINIC | Age: 74
End: 2024-05-21
Payer: MEDICARE

## 2024-05-21 DIAGNOSIS — J47.0 ACUTE BRONCHITIS WITH BRONCHIECTASIS: Primary | ICD-10-CM

## 2024-05-21 RX ORDER — METHYLPREDNISOLONE 4 MG/1
TABLET ORAL
Qty: 21 EACH | Refills: 0 | Status: SHIPPED | OUTPATIENT
Start: 2024-05-21 | End: 2024-06-04 | Stop reason: ALTCHOICE

## 2024-05-28 NOTE — TELEPHONE ENCOUNTER
I called Ms. Ruffinon to let her know we are working to remove the charge for the Laryngoscopy that was billed to her as the proceddure was not perfomed. I also let her know that Dr. Freeman removed the Laryngoscopy from her visit summary. She was appreciative of the phone call.

## 2024-05-30 ENCOUNTER — TELEPHONE (OUTPATIENT)
Dept: OTOLARYNGOLOGY | Facility: CLINIC | Age: 74
End: 2024-05-30
Payer: MEDICARE

## 2024-05-30 NOTE — TELEPHONE ENCOUNTER
I called Ms. Enrique to follow up on her questions regarding a charge that had been sent to her insurance company for a Laryngoscopy that was not actually performed.  After speaking with the billing department, they confirmed there was no balance due for her visit on April 16, 2024 with Dr. Freeman in ENT. I provided the phone number of the billing department to Ms. Ernique in the event she would need to contact them in the future.  She expressed understanding and was appreciative of the phone call.

## 2024-06-04 ENCOUNTER — OFFICE VISIT (OUTPATIENT)
Dept: GASTROENTEROLOGY | Facility: CLINIC | Age: 74
End: 2024-06-04
Payer: MEDICARE

## 2024-06-04 ENCOUNTER — LAB VISIT (OUTPATIENT)
Dept: LAB | Facility: HOSPITAL | Age: 74
End: 2024-06-04
Payer: MEDICARE

## 2024-06-04 VITALS
HEIGHT: 68 IN | SYSTOLIC BLOOD PRESSURE: 96 MMHG | WEIGHT: 155.19 LBS | HEART RATE: 80 BPM | BODY MASS INDEX: 23.52 KG/M2 | DIASTOLIC BLOOD PRESSURE: 57 MMHG

## 2024-06-04 DIAGNOSIS — R10.31 RIGHT LOWER QUADRANT PAIN: ICD-10-CM

## 2024-06-04 DIAGNOSIS — K22.4 ESOPHAGEAL DYSMOTILITY: Primary | ICD-10-CM

## 2024-06-04 LAB
ANION GAP SERPL CALC-SCNC: 10 MMOL/L (ref 8–16)
BUN SERPL-MCNC: 18 MG/DL (ref 8–23)
CALCIUM SERPL-MCNC: 9.3 MG/DL (ref 8.7–10.5)
CHLORIDE SERPL-SCNC: 105 MMOL/L (ref 95–110)
CO2 SERPL-SCNC: 24 MMOL/L (ref 23–29)
CREAT SERPL-MCNC: 0.8 MG/DL (ref 0.5–1.4)
EST. GFR  (NO RACE VARIABLE): >60 ML/MIN/1.73 M^2
GLUCOSE SERPL-MCNC: 96 MG/DL (ref 70–110)
POTASSIUM SERPL-SCNC: 4.2 MMOL/L (ref 3.5–5.1)
SODIUM SERPL-SCNC: 139 MMOL/L (ref 136–145)

## 2024-06-04 PROCEDURE — 1126F AMNT PAIN NOTED NONE PRSNT: CPT | Mod: CPTII,S$GLB,,

## 2024-06-04 PROCEDURE — 1101F PT FALLS ASSESS-DOCD LE1/YR: CPT | Mod: CPTII,S$GLB,,

## 2024-06-04 PROCEDURE — 80048 BASIC METABOLIC PNL TOTAL CA: CPT

## 2024-06-04 PROCEDURE — 3074F SYST BP LT 130 MM HG: CPT | Mod: CPTII,S$GLB,,

## 2024-06-04 PROCEDURE — 99214 OFFICE O/P EST MOD 30 MIN: CPT | Mod: S$GLB,,,

## 2024-06-04 PROCEDURE — 1159F MED LIST DOCD IN RCRD: CPT | Mod: CPTII,S$GLB,,

## 2024-06-04 PROCEDURE — 36415 COLL VENOUS BLD VENIPUNCTURE: CPT

## 2024-06-04 PROCEDURE — 3288F FALL RISK ASSESSMENT DOCD: CPT | Mod: CPTII,S$GLB,,

## 2024-06-04 PROCEDURE — 99999 PR PBB SHADOW E&M-EST. PATIENT-LVL IV: CPT | Mod: PBBFAC,,,

## 2024-06-04 PROCEDURE — 3078F DIAST BP <80 MM HG: CPT | Mod: CPTII,S$GLB,,

## 2024-06-04 RX ORDER — CYCLOSPORINE 0.5 MG/ML
1 EMULSION OPHTHALMIC 2 TIMES DAILY
COMMUNITY
Start: 2024-01-31

## 2024-06-04 RX ORDER — AMOXICILLIN 500 MG/1
500 CAPSULE ORAL
COMMUNITY
Start: 2024-04-11

## 2024-06-04 NOTE — PROGRESS NOTES
Gastroenterology Clinic Consultation Note    Reason for Visit:  The primary encounter diagnosis was Esophageal dysmotility. A diagnosis of Right lower quadrant pain was also pertinent to this visit.    PCP:   Praveen Jeffery   [address] / [place] [id number]      Initial HPI   This is a 74 y.o. female referred from her ENT for further evaluation of her esophageal dysmotility. Patient was trialed on Omeprazole, but had constipation, stomach discomfort, blurred vision, dry mouth. Taking pepcid twice daily that is not helping her. Patient reports feeling like food has been getting stuck more frequently. Eating slow. Drinks water without difficulty. Denies pill dysphagia. Happening a couple times a week. Has been dealing with this for several years, but feels like this is occurring more frequently and now with retrosternal chest pain. Denies choking, aspiration.      She also reports intermittent RLQ abdominal pain. History of a sigmoidectomy when she lived in [place] for diverticulitis. No recent flares since then.       ROS:  Review of Systems   Constitutional:  Negative for chills, fever and weight loss.   HENT:  Negative for sore throat.    Eyes:  Negative for redness.   Gastrointestinal:  Positive for abdominal pain and heartburn. Negative for blood in stool, constipation, diarrhea, melena, nausea and vomiting.   Skin:  Negative for itching and rash.   Neurological:  Negative for dizziness, loss of consciousness and weakness.        Medical History:  has a past medical history of Abnormal Pap smear of cervix, Atrial fibrillation (09/2017), Atrial flutter, Basal cell carcinoma (01/2021), Cancer (1989), Diverticulosis (1998), atrial flutter, mitral valve prolapse (1990), Hypothyroidism, Migraines, Recurrent upper respiratory infection (URI), and Supraventricular tachycardia.    Surgical History:  has a past surgical  history that includes Knee surgery (Right, ); Knee Arthroplasty (Right, ); Treatment of cardiac arrhythmia (N/A, 2019); Augmentation of breast (Bilateral, );  section (); Cosmetic surgery (); Colposcopy; Oophorectomy; Total abdominal hysterectomy (); Ablation of arrhythmogenic focus for atrial fibrillation (N/A, 2020); Cardioversion (2020); Transesophageal echocardiography (N/A, 2020); Colon surgery (); Breast surgery (); Eye surgery (Bilateral, ); Joint replacement (Right, , , ); Esophagogastroduodenoscopy (N/A, 2021); Colonoscopy (N/A, 2021); De Quervain's release (Left, 2022); and Trigger finger release (Left, 2022).    Family History: family history includes Asthma in her brother; Diabetes in her mother and sister; Diverticulosis in her brother and brother; Esophageal cancer in her maternal uncle; Hearing loss in her father; Heart disease in her brother, brother, father, mother, and sister; No Known Problems in her daughter; Stroke (age of onset: 93) in her father..       Review of patient's allergies indicates:   Allergen Reactions    Morphine Nausea And Vomiting    Succinylcholine      Other reaction(s): v tach       Current Outpatient Medications on File Prior to Visit   Medication Sig Dispense Refill    acetaminophen (TYLENOL) 650 MG TbSR Take 650 mg by mouth every 8 (eight) hours.      acyclovir (ZOVIRAX) 400 MG tablet Take 1 tablet (400 mg total) by mouth 3 (three) times daily as needed. 90 tablet 3    docusate sodium (COLACE) 250 MG capsule Take 250 mg by mouth 2 (two) times daily.       estradioL (ESTRACE) 0.01 % (0.1 mg/gram) vaginal cream Place 1 g vaginally 3 (three) times a week. 42 g 3    famotidine (PEPCID) 20 MG tablet Take 1 tablet (20 mg total) by mouth 2 (two) times daily. 60 tablet 4    HYDROcodone-acetaminophen (NORCO) 5-325 mg per tablet Take 1 tablet by mouth every 6 (six) hours as  "needed for Pain. Pt states takes PRN for headaches 28 tablet 0    ipratropium (ATROVENT) 42 mcg (0.06 %) nasal spray 2 sprays by Each Nostril route 3 (three) times daily. 15 mL 6    levothyroxine (SYNTHROID) 25 MCG tablet Take 1 tablet by mouth once daily 90 tablet 3    memantine (NAMENDA) 10 MG Tab Take 1 tablet (10 mg total) by mouth 2 (two) times daily. 60 tablet 11    RESTASIS 0.05 % ophthalmic emulsion Place 1 drop into both eyes 2 (two) times daily.      rivaroxaban (XARELTO) 20 mg Tab Take 1 tablet (20 mg total) by mouth once daily. 90 tablet 1    sodium chloride 3% 3 % nebulizer solution Take 4 mLs by nebulization 2 (two) times a day. 300 mL PRN    verapamiL (CALAN) 40 MG Tab 1 tab PO prn for palpitations. Maximum 2 doses per day, at least 6 hours apart. 30 tablet 11    vit C/E/Zn/coppr/lutein/zeaxan (PRESERVISION AREDS-2 ORAL)       amoxicillin (AMOXIL) 500 MG capsule Take 500 mg by mouth as needed. (Patient not taking: Reported on 6/4/2024)      [DISCONTINUED] methylPREDNISolone (MEDROL DOSEPACK) 4 mg tablet use as directed 21 each 0     Current Facility-Administered Medications on File Prior to Visit   Medication Dose Route Frequency Provider Last Rate Last Admin    sodium chloride 0.9% flush 5 mL  5 mL Intravenous PRN Aleksandra Taylor, JO-ANN             Objective Findings:    Vital Signs:  BP (!) 96/57   Pulse 80   Ht 5' 8" (1.727 m)   Wt 70.4 kg (155 lb 3.3 oz)   BMI 23.60 kg/m²   Body mass index is 23.6 kg/m².    Physical Exam:  Physical Exam  Constitutional:       Appearance: She is normal weight. She is not ill-appearing.   HENT:      Mouth/Throat:      Mouth: Mucous membranes are moist.      Pharynx: Oropharynx is clear.   Eyes:      General: No scleral icterus.  Abdominal:      General: Bowel sounds are normal. There is no distension.      Palpations: Abdomen is soft. There is no mass.      Tenderness: There is abdominal tenderness in the right lower quadrant.   Skin:     General: Skin is warm " and dry.      Capillary Refill: Capillary refill takes less than 2 seconds.      Coloration: Skin is not jaundiced or pale.   Neurological:      Mental Status: She is alert and oriented to person, place, and time. Mental status is at baseline.             Labs:  Lab Results   Component Value Date    WBC 4.31 01/02/2024    HGB 13.4 01/02/2024    HCT 42.0 01/02/2024     (L) 01/02/2024    CRP 3.1 05/13/2022    CHOL 200 (H) 01/02/2024    TRIG 101 01/02/2024    HDL 63 01/02/2024    ALKPHOS 57 01/02/2024    ALT 15 01/02/2024    AST 20 01/02/2024     01/02/2024    K 4.7 01/02/2024     01/02/2024    CREATININE 0.7 01/02/2024    BUN 13 01/02/2024    CO2 28 01/02/2024    TSH 1.853 01/02/2024    INR 1.0 11/19/2020       Imaging reviewed: FL esophagram 4/16/2024  FINDINGS:  Swallowing: Normal.    Esophagus: Mild tertiary contractions and proximal escape of contrast suggestive of mild esophageal dysmotility.    Gastroesophageal reflux: None observed.    Other findings: None.    Impression:    Mild esophageal dysmotility as above.    Electronically signed by resident: Aurea Sheffield  Date: 04/30/2024  Time: 11:43       Endoscopy reviewed: 3/19/2021  Impression:           - Normal esophagus. Biopsied.                         - Erythematous mucosa in the antrum. Biopsied.                         - Duodenal diverticulum.                         - The patient is a 72 yo female with solid food                         dysphagia. EGD today was unrevealing for a cause.                         Biopsies were performed to evaluate for EoE and H.                         pylori.   Recommendation:       - Proceed to colonoscopy.                         - Resume previous diet.                         - Continue present medications.                         - Await pathology results.                         - If Dysphagia persists and EoE biopsies are                         negative then consider timed barium esophagram with                          barium tablet and esophageal manometry as the next                         steps in her dysphagia work up.   Attending Participation:        I personally performed the entire procedure.   Sahara Salgado MD   3/19/2021 1:37:37 PM     Colonoscopy 3/19/2021  Impression:           - One 5 mm polyp in the cecum, removed with a cold                         snare. Resected and retrieved.                         - Diverticulosis in the sigmoid colon, in the                         descending colon, in the transverse colon and in                         the ascending colon.                         - Patent surgical anastomosis, characterized by                         healthy appearing mucosa.                         - Internal hemorrhoids.                         - The examination was otherwise normal.                         - The patient is a 72 yo female with a positive FIT                         test. Her exam today showed one benign appearing                         polyp which was removed, diverticulosis, evidence                         of previous surgery and internal hemorrhoids.   Recommendation:       - Discharge patient to home.                         - Resume previous diet.                         - Continue present medications.                         - Await pathology results.                         - Repeat colonoscopy in 7 years for surveillance.   Attending Participation:        I was present and participated during the entire procedure,        including non-becerra portions.   Sahara Salgado MD   3/19/2021 1:43:00 PM     Assessment:  1. Esophageal dysmotility    2. Right lower quadrant pain      Orders Placed This Encounter    CT Abdomen Pelvis W Wo Contrast    BASIC METABOLIC PANEL         Plan:  Referral placed for EGD to rule out EoE, esophagitis causing her dysphagia. HREM order placed as well, catheter to be placed endoscopically. Will consider referral to motility  specialist if indicated based on findings.  CT Abdomen ordered for further evaluate.      Thank you for allowing me to participate in this patient's care.    Sincerely,     Oliva Neumann NP  Gastroenterology Department  [facility]

## 2024-06-04 NOTE — PROGRESS NOTES
"GENERAL GI PATIENT INTAKE:    COVID symptoms in the last 7 days (runny nose, sore throat, congestion, cough, fever): No  PCP: Praveen Jeffery  If not PCP-  number given to establish [phone]: No    ALLERGIES REVIEWED:  Yes    CHIEF COMPLAINT:    Chief Complaint   Patient presents with    Follow-up    Dysphagia    Gastroesophageal Reflux       VITAL SIGNS:  Ht 5' 8" (1.727 m)   Wt 70.4 kg (155 lb 3.3 oz)   BMI 23.60 kg/m²      Change in medical, surgical, family or social history: No      REVIEWED MEDICATION LIST RECONCILED INCLUDING ABOVE MEDS:  Yes     "

## 2024-06-11 PROBLEM — Z79.01 ON ANTICOAGULANT THERAPY: Status: ACTIVE | Noted: 2024-06-11
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
## 2024-06-12 ENCOUNTER — PATIENT MESSAGE (OUTPATIENT)
Dept: GASTROENTEROLOGY | Facility: CLINIC | Age: 74
End: 2024-06-12
Payer: MEDICARE

## 2024-06-12 ENCOUNTER — TELEPHONE (OUTPATIENT)
Dept: ELECTROPHYSIOLOGY | Facility: CLINIC | Age: 74
End: 2024-06-12
Payer: MEDICARE

## 2024-06-12 ENCOUNTER — HOSPITAL ENCOUNTER (OUTPATIENT)
Dept: RADIOLOGY | Facility: HOSPITAL | Age: 74
Discharge: HOME | End: 2024-06-12
Payer: MEDICARE

## 2024-06-12 DIAGNOSIS — R10.31 RIGHT LOWER QUADRANT PAIN: ICD-10-CM

## 2024-06-12 PROCEDURE — A9698 NON-RAD CONTRAST MATERIALNOC: HCPCS

## 2024-06-12 PROCEDURE — 25500020 PHARM REV CODE 255

## 2024-06-12 PROCEDURE — 74177 CT ABD & PELVIS W/CONTRAST: CPT | Mod: 26,,, | Performed by: RADIOLOGY

## 2024-06-12 PROCEDURE — 74177 CT ABD & PELVIS W/CONTRAST: CPT | Mod: TC

## 2024-06-12 RX ADMIN — BARIUM SULFATE 450 ML: 20 SUSPENSION ORAL at 01:06

## 2024-06-12 RX ADMIN — IOHEXOL 75 ML: 350 INJECTION, SOLUTION INTRAVENOUS at 01:06

## 2024-06-12 NOTE — TELEPHONE ENCOUNTER
CT scan with duodenal thickening. Expedited referral placed as patient has been unable to schedule.

## 2024-06-13 ENCOUNTER — HOSPITAL ENCOUNTER (OUTPATIENT)
Dept: CARDIOLOGY | Facility: CLINIC | Age: 74
Discharge: HOME | End: 2024-06-13
Payer: MEDICARE

## 2024-06-13 ENCOUNTER — PATIENT MESSAGE (OUTPATIENT)
Dept: ELECTROPHYSIOLOGY | Facility: CLINIC | Age: 74
End: 2024-06-13

## 2024-06-13 ENCOUNTER — OFFICE VISIT (OUTPATIENT)
Dept: ELECTROPHYSIOLOGY | Facility: CLINIC | Age: 74
End: 2024-06-13
Payer: MEDICARE

## 2024-06-13 VITALS
SYSTOLIC BLOOD PRESSURE: 98 MMHG | HEIGHT: 68 IN | HEART RATE: 75 BPM | DIASTOLIC BLOOD PRESSURE: 58 MMHG | BODY MASS INDEX: 23.22 KG/M2 | WEIGHT: 153.25 LBS

## 2024-06-13 DIAGNOSIS — I48.19 PERSISTENT ATRIAL FIBRILLATION: Primary | ICD-10-CM

## 2024-06-13 DIAGNOSIS — Z79.01 ON ANTICOAGULANT THERAPY: ICD-10-CM

## 2024-06-13 DIAGNOSIS — I70.0 AORTIC ATHEROSCLEROSIS: ICD-10-CM

## 2024-06-13 DIAGNOSIS — I48.92 ATRIAL FLUTTER, UNSPECIFIED TYPE: ICD-10-CM

## 2024-06-13 DIAGNOSIS — Z98.890 HISTORY OF RADIOFREQUENCY ABLATION (RFA) PROCEDURE FOR CARDIAC ARRHYTHMIA: ICD-10-CM

## 2024-06-13 LAB
OHS QRS DURATION: 84 MS
OHS QTC CALCULATION: 439 MS

## 2024-06-13 PROCEDURE — 99999 PR PBB SHADOW E&M-EST. PATIENT-LVL IV: CPT | Mod: PBBFAC,,, | Performed by: NURSE PRACTITIONER

## 2024-06-13 PROCEDURE — 1101F PT FALLS ASSESS-DOCD LE1/YR: CPT | Mod: CPTII,S$GLB,, | Performed by: NURSE PRACTITIONER

## 2024-06-13 PROCEDURE — 93010 ELECTROCARDIOGRAM REPORT: CPT | Mod: S$GLB,,, | Performed by: INTERNAL MEDICINE

## 2024-06-13 PROCEDURE — 1159F MED LIST DOCD IN RCRD: CPT | Mod: CPTII,S$GLB,, | Performed by: NURSE PRACTITIONER

## 2024-06-13 PROCEDURE — 3074F SYST BP LT 130 MM HG: CPT | Mod: CPTII,S$GLB,, | Performed by: NURSE PRACTITIONER

## 2024-06-13 PROCEDURE — 1126F AMNT PAIN NOTED NONE PRSNT: CPT | Mod: CPTII,S$GLB,, | Performed by: NURSE PRACTITIONER

## 2024-06-13 PROCEDURE — 99214 OFFICE O/P EST MOD 30 MIN: CPT | Mod: S$GLB,,, | Performed by: NURSE PRACTITIONER

## 2024-06-13 PROCEDURE — 93005 ELECTROCARDIOGRAM TRACING: CPT | Mod: S$GLB,,, | Performed by: INTERNAL MEDICINE

## 2024-06-13 PROCEDURE — 1160F RVW MEDS BY RX/DR IN RCRD: CPT | Mod: CPTII,S$GLB,, | Performed by: NURSE PRACTITIONER

## 2024-06-13 PROCEDURE — 3288F FALL RISK ASSESSMENT DOCD: CPT | Mod: CPTII,S$GLB,, | Performed by: NURSE PRACTITIONER

## 2024-06-13 PROCEDURE — 3008F BODY MASS INDEX DOCD: CPT | Mod: CPTII,S$GLB,, | Performed by: NURSE PRACTITIONER

## 2024-06-13 PROCEDURE — 3078F DIAST BP <80 MM HG: CPT | Mod: CPTII,S$GLB,, | Performed by: NURSE PRACTITIONER

## 2024-06-25 ENCOUNTER — OFFICE VISIT (OUTPATIENT)
Dept: OTOLARYNGOLOGY | Facility: CLINIC | Age: 74
End: 2024-06-25
Payer: MEDICARE

## 2024-06-25 VITALS
BODY MASS INDEX: 23.73 KG/M2 | HEART RATE: 77 BPM | WEIGHT: 156.06 LBS | SYSTOLIC BLOOD PRESSURE: 119 MMHG | DIASTOLIC BLOOD PRESSURE: 71 MMHG

## 2024-06-25 DIAGNOSIS — J34.3 NASAL TURBINATE HYPERTROPHY: ICD-10-CM

## 2024-06-25 DIAGNOSIS — J30.0 VASOMOTOR RHINITIS: Primary | Chronic | ICD-10-CM

## 2024-06-25 DIAGNOSIS — J34.2 DEVIATED NASAL SEPTUM: ICD-10-CM

## 2024-06-25 PROCEDURE — 3078F DIAST BP <80 MM HG: CPT | Mod: CPTII,S$GLB,, | Performed by: OTOLARYNGOLOGY

## 2024-06-25 PROCEDURE — 3288F FALL RISK ASSESSMENT DOCD: CPT | Mod: CPTII,S$GLB,, | Performed by: OTOLARYNGOLOGY

## 2024-06-25 PROCEDURE — 99214 OFFICE O/P EST MOD 30 MIN: CPT | Mod: 25,S$GLB,, | Performed by: OTOLARYNGOLOGY

## 2024-06-25 PROCEDURE — 3008F BODY MASS INDEX DOCD: CPT | Mod: CPTII,S$GLB,, | Performed by: OTOLARYNGOLOGY

## 2024-06-25 PROCEDURE — 1126F AMNT PAIN NOTED NONE PRSNT: CPT | Mod: CPTII,S$GLB,, | Performed by: OTOLARYNGOLOGY

## 2024-06-25 PROCEDURE — 1159F MED LIST DOCD IN RCRD: CPT | Mod: CPTII,S$GLB,, | Performed by: OTOLARYNGOLOGY

## 2024-06-25 PROCEDURE — 1101F PT FALLS ASSESS-DOCD LE1/YR: CPT | Mod: CPTII,S$GLB,, | Performed by: OTOLARYNGOLOGY

## 2024-06-25 PROCEDURE — 3074F SYST BP LT 130 MM HG: CPT | Mod: CPTII,S$GLB,, | Performed by: OTOLARYNGOLOGY

## 2024-06-25 PROCEDURE — 31231 NASAL ENDOSCOPY DX: CPT | Mod: S$GLB,,, | Performed by: OTOLARYNGOLOGY

## 2024-06-25 PROCEDURE — 1160F RVW MEDS BY RX/DR IN RCRD: CPT | Mod: CPTII,S$GLB,, | Performed by: OTOLARYNGOLOGY

## 2024-06-25 PROCEDURE — 99999 PR PBB SHADOW E&M-EST. PATIENT-LVL IV: CPT | Mod: PBBFAC,,, | Performed by: OTOLARYNGOLOGY

## 2024-06-25 NOTE — PROGRESS NOTES
Subjective:      Zoë Enrique is a 74 y.o. female who was referred to me by Dr. Linda Collins* in consultation for rhinorrhea.    She relates a long history for many years of perennial, daily, bilateral, watery rhinorrhea.  This is sporadic and sometimes affects mealtimes and sometimes affects entry into differing indoor environments.  She has used ipratropium 0.06% without improvement.  She also describes perennial nasal congestion which is bilateral and worse at night.  She denies hyposmia, facial pressure, or notable sinus infections.    Current sinonasal medications as above.  The last course of antibiotics was a long time ago.  She does not regularly use nasal decongestant sprays.    She recalls previously having allergy testing that was reportedly all negative.    She denies a history of asthma.    She denies a history of reflux symptoms.    She denies a diagnosis of obstructive sleep apnea.     She has not had sinonasal surgery.    She does not recall a prior history of nasal trauma.    SNOT-22 score: : (P) 42  NOSE score:: (P) 15%  ETDQ-7 score:: (P) 1.1      Past Medical History  She has a past medical history of Abnormal Pap smear of cervix, Atrial fibrillation, Atrial flutter, Basal cell carcinoma, Cancer, Diverticulosis, atrial flutter, mitral valve prolapse, Hypothyroidism, Migraines, Recurrent upper respiratory infection (URI), and Supraventricular tachycardia.    Past Surgical History  She has a past surgical history that includes Knee surgery (Right, ); Knee Arthroplasty (Right, ); Treatment of cardiac arrhythmia (N/A, 2019); Augmentation of breast (Bilateral, );  section (); Cosmetic surgery (); Colposcopy; Oophorectomy; Total abdominal hysterectomy (); Ablation of arrhythmogenic focus for atrial fibrillation (N/A, 2020); Cardioversion (2020); Transesophageal echocardiography (N/A, 2020); Colon surgery (); Breast surgery ();  Eye surgery (Bilateral, 2016); Joint replacement (Right, 2/07, 11/07, 2/09); Esophagogastroduodenoscopy (N/A, 03/19/2021); Colonoscopy (N/A, 03/19/2021); De Quervain's release (Left, 08/02/2022); and Trigger finger release (Left, 08/02/2022).    Family History  Her family history includes Asthma in her brother; Diabetes in her mother and sister; Diverticulosis in her brother and brother; Esophageal cancer in her maternal uncle; Hearing loss in her father; Heart disease in her brother, brother, father, mother, and sister; No Known Problems in her daughter; Stroke (age of onset: 93) in her father.    Social History  She reports that she has never smoked. She has never been exposed to tobacco smoke. She has never used smokeless tobacco. She reports current alcohol use of about 2.0 standard drinks of alcohol per week. She reports that she does not use drugs.    Allergies  She is allergic to morphine and succinylcholine.    Medications   She has a current medication list which includes the following prescription(s): acetaminophen, acyclovir, docusate sodium, estradiol, famotidine, hydrocodone-acetaminophen, ipratropium, levothyroxine, memantine, restasis, rivaroxaban, sodium chloride 3%, verapamil, vit c/e/zn/coppr/lutein/zeaxan, and amoxicillin, and the following Facility-Administered Medications: sodium chloride 0.9%.    Review of Systems     Constitutional: Positive for fatigue.  Negative for appetite change, chills, fever and unexpected weight loss.      HENT: Positive for hearing loss, postnasal drip and trouble swallowing.  Negative for ear discharge, ear infection, ear pain, facial swelling, mouth sores, nosebleeds, ringing in the ears, runny nose, sinus infection, sinus pressure, sore throat, stuffy nose, tonsil infection, dental problems and voice change.      Eyes:  Positive for eye itching. Negative for change in eyesight, eye drainage and photophobia.     Respiratory:  Positive for cough, shortness of breath  and snoring. Negative for sleep apnea and wheezing.      Cardiovascular:  Positive for irregular heartbeat. Negative for chest pain, foot swelling and swollen veins.     Gastrointestinal:  Positive for acid reflux, constipation and heartburn. Negative for abdominal pain, diarrhea and vomiting.     Genitourinary: Positive for frequent urination. Negative for difficulty urinating and sexual problems.     Musc: Negative for aching joints, aching muscles, back pain and neck pain.     Skin: Negative for rash.     Allergy: Negative for food allergies and seasonal allergies.     Endocrine: Positive for cold intolerance. Negative for heat intolerance.      Neurological: Positive for dizziness, headaches and light-headedness. Negative for seizures and tremors.      Hematologic: Positive for bruises/bleeds easily. Negative for swollen glands.      Psychiatric: Negative for decreased concentration, depression, nervous/anxious and sleep disturbance.               Objective:     /71 (BP Location: Left arm, Patient Position: Sitting, BP Method: Medium (Automatic))   Pulse 77   Wt 70.8 kg (156 lb 1.4 oz)   BMI 23.73 kg/m²        Constitutional:   She appears well-developed. She is cooperative. Normal speech.  No hoarse voice.      Head:  Normocephalic. Salivary glands normal.  Facial strength is normal.      Ears:    Right Ear: No drainage or tenderness. Tympanic membrane is not perforated. Tympanic membrane mobility is normal. No middle ear effusion. No decreased hearing is noted.   Left Ear: No drainage or tenderness. Tympanic membrane is not perforated. Tympanic membrane mobility is normal.  No middle ear effusion. No decreased hearing is noted.     Nose:  Septal deviation present. No mucosal edema, rhinorrhea or polyps. No epistaxis. Turbinate hypertrophy.  Turbinates normal and no turbinate masses.  Right sinus exhibits no maxillary sinus tenderness and no frontal sinus tenderness. Left sinus exhibits no maxillary  sinus tenderness and no frontal sinus tenderness.     Mouth/Throat  Oropharynx clear and moist without lesions or asymmetry and normal uvula midline. She does not have dentures. Normal dentition. No oral lesions or mucous membrane lesions. No oropharyngeal exudate or posterior oropharyngeal erythema. Mirror exam not performed due to patient tolerance.  Mirror exam not performed due to patient tolerance.      Neck:  Neck normal without thyromegaly masses, asymmetry, normal tracheal structure, crepitus, and tenderness, thyroid normal, trachea normal and no adenopathy. Normal range of motion present.     She has no cervical adenopathy.     Cardiovascular:    Regular rhythm.              Pulmonary/Chest:   Effort normal.     Psychiatric:   She has a normal mood and affect. Her speech is normal and behavior is normal.     Neurological:   No cranial nerve deficit.     Skin:   No rash noted.       Procedure    Nasal endoscopy performed.  See procedure note.        Data Reviewed    WBC (K/uL)   Date Value   01/02/2024 4.31     Eosinophil % (%)   Date Value   01/02/2024 1.4     Eos # (K/uL)   Date Value   01/02/2024 0.1     Platelets (K/uL)   Date Value   01/02/2024 131 (L)     Glucose (mg/dL)   Date Value   06/04/2024 96     Total IgE (IU/mL)   Date Value   06/16/2022 <35       No sinus imaging available.       Assessment:     1. Vasomotor rhinitis    2. Deviated nasal septum    3. Nasal turbinate hypertrophy         Plan:     I had a long discussion with the patient regarding her condition and the further workup and management options.  We discussed the treatment options, notably the Rhinaer procedure for posterior nasal nerve ablation. Because of her leftward septal deviation it is unlikely that this can be accomplished without septoplasty, which would require general anesthesia, which she wishes to avoid.  As an alternative, I proposed in-office PNN ablation on the right with bilateral RFA turbinates.  Should this give  incomplete relief  then eventual septoplasty and left-sided PNN ablation would be considered.  I discussed the risks, benefits and alternatives to surgery with the patient, as well as the expected postoperative course.  I gave her the opportunity to ask questions and I answered all of them.  I provided relevant printed information on her condition for her to review at home.    The procedure will be scheduled in the near future.    Follow up for the procedure.

## 2024-06-25 NOTE — LETTER
2024    Linda Doll MD  [address]  [address]  [place] [address]         OTORHINOLARYNGOLOGY AND COMMUNICATION SCIENCES    System Chair  Bello Gandhi MD      Eusebio Bearden MD, MPH    Chair, [facility]  Kylie Stokes MD    Head & Neck Surgical Oncology  Nawaf Garza MD, Section Head  MD Tiny Crump MD Issam Eid, MD Emily Kamen, MD Brian Moore, MD Maggie Brignac, APRN, FNP-C    Facial Plastic and Reconstructive Surgery  MD HIGINIO Hall III, MD    Otology and Neurotology  Toñito Hernandez, MD Calvin Weiss, MD Otis Tidwell, MD Loren Chau, [facility], FNP-C    Rhinology and Sinus Surgery  MD Eusebio Crump MD, MPH  Miguel Strauss, PA-C    Otolaryngic Allergy  SUZY Spencer, MD Kylie Stokes, MD    Laryngology  Neo Cortes MD    Sleep Surgery  SUZY Spencer, MD Miguel Vega, MD Annette Carolina, MD    Pediatric Otolaryngology  Linh Locke, MD Bello Gandhi, MD MONIKA Prado, MD ADVID Sheffield, MD Keith Covarrubias, MD Maricruz Sheffield, PA-C  Carlita Trevizo, APRN, PNP-C    Comprehensive Otolaryngology  Zehra Rodney, MD Yoshi Treadwell, MD Dorina Kinney, MD Vishnu Mcfarland, MD SUZY Spencer, MD Ursula Heck, MD Suzie Alatorre, MD Miguel Vega, MD Annette Carolina, MD Kylie Stokes, MD Rosaura Taylor, MD Otis Schwartz, MD cO Jorge, MD Linda Yu, MD Kelsea Sahu, PA-C  Emily Briceno, APRN-CNP  Vera Kim APRN, FNP-C  Suzanne Bowen, FNP-C  Darell Frank, PA-C  Leslie Alvarado, APRN, FNP-C  Celia Logan, PA-C  Simin Heard, PA-C    Director, Audiology  ARLEEN Benitez, CCC-A    Director, Speech-Language Pathology  Gunjan Field MA, CCC-SLP       Re:  Zoë Enrique  :  1950    Dear Dr. Lydia Yu,    Thank you for referring your patient, Zoë Enrique, to us for evaluation and treatment.  I have enclosed a copy of my clinic note for  your review and records.  If you have any questions please do not hesitate to contact our office.     Thank you for allowing me to participate in the care of your patient.    Sincerely,        Eusebio Bearden MD, MPH, FACS    Director, Rhinology and Sinus Surgery  Department of Otorhinolaryngology  [facility]    Encl:  Progress note     [facility] [address] [place] [address]  phone [phone]  fax [phone]  [email or web address]

## 2024-06-25 NOTE — PROCEDURES
Nasal/sinus endoscopy    Date/Time: 6/25/2024 9:45 AM    Performed by: Eusebio Bearden MD  Authorized by: Eusebio Bearden MD    Anesthesia:     Local anesthetic:  Lidocaine 4% and [place]-Synephrine 1/2%    Patient tolerance:  Patient tolerated the procedure well with no immediate complications  Nose:     Procedure Performed:  Nasal Endoscopy  External:      No external nasal deformity  Intranasal:      Mucosa no polyps     Mucosa ulcers not present     No mucosa lesions present     Enlarged turbinates     Septum gross deformity  Nasopharynx:      No mucosa lesions     Adenoids not present     Posterior choanae patent     Eustachian tube not patent     Leftward septal deviation with vomerian spur

## 2024-07-03 ENCOUNTER — PATIENT MESSAGE (OUTPATIENT)
Dept: ELECTROPHYSIOLOGY | Facility: CLINIC | Age: 74
End: 2024-07-03

## 2024-07-03 ENCOUNTER — HOSPITAL ENCOUNTER (OUTPATIENT)
Dept: CARDIOLOGY | Facility: CLINIC | Age: 74
Discharge: HOME | End: 2024-07-03
Payer: MEDICARE

## 2024-07-03 ENCOUNTER — LAB VISIT (OUTPATIENT)
Dept: LAB | Facility: HOSPITAL | Age: 74
End: 2024-07-03
Payer: MEDICARE

## 2024-07-03 ENCOUNTER — OFFICE VISIT (OUTPATIENT)
Dept: ELECTROPHYSIOLOGY | Facility: CLINIC | Age: 74
End: 2024-07-03
Payer: MEDICARE

## 2024-07-03 VITALS
HEIGHT: 68 IN | HEART RATE: 94 BPM | BODY MASS INDEX: 23.56 KG/M2 | DIASTOLIC BLOOD PRESSURE: 60 MMHG | SYSTOLIC BLOOD PRESSURE: 130 MMHG | WEIGHT: 155.44 LBS

## 2024-07-03 DIAGNOSIS — Z79.01 ON ANTICOAGULANT THERAPY: ICD-10-CM

## 2024-07-03 DIAGNOSIS — I48.92 ATRIAL FLUTTER, UNSPECIFIED TYPE: ICD-10-CM

## 2024-07-03 DIAGNOSIS — I48.0 PAROXYSMAL ATRIAL FIBRILLATION: ICD-10-CM

## 2024-07-03 DIAGNOSIS — I48.19 PERSISTENT ATRIAL FIBRILLATION: ICD-10-CM

## 2024-07-03 DIAGNOSIS — I48.0 PAROXYSMAL ATRIAL FIBRILLATION: Primary | ICD-10-CM

## 2024-07-03 DIAGNOSIS — I47.10 PSVT (PAROXYSMAL SUPRAVENTRICULAR TACHYCARDIA): ICD-10-CM

## 2024-07-03 DIAGNOSIS — I48.19 PERSISTENT ATRIAL FIBRILLATION: Primary | ICD-10-CM

## 2024-07-03 LAB
OHS QRS DURATION: 82 MS
OHS QTC CALCULATION: 457 MS
TSH SERPL DL<=0.005 MIU/L-ACNC: 1 UIU/ML (ref 0.4–4)

## 2024-07-03 PROCEDURE — 3078F DIAST BP <80 MM HG: CPT | Mod: CPTII,S$GLB,, | Performed by: NURSE PRACTITIONER

## 2024-07-03 PROCEDURE — 3008F BODY MASS INDEX DOCD: CPT | Mod: CPTII,S$GLB,, | Performed by: NURSE PRACTITIONER

## 2024-07-03 PROCEDURE — 99999 PR PBB SHADOW E&M-EST. PATIENT-LVL III: CPT | Mod: PBBFAC,,, | Performed by: NURSE PRACTITIONER

## 2024-07-03 PROCEDURE — 36415 COLL VENOUS BLD VENIPUNCTURE: CPT | Performed by: NURSE PRACTITIONER

## 2024-07-03 PROCEDURE — 3075F SYST BP GE 130 - 139MM HG: CPT | Mod: CPTII,S$GLB,, | Performed by: NURSE PRACTITIONER

## 2024-07-03 PROCEDURE — 93005 ELECTROCARDIOGRAM TRACING: CPT | Mod: S$GLB,,, | Performed by: INTERNAL MEDICINE

## 2024-07-03 PROCEDURE — 1101F PT FALLS ASSESS-DOCD LE1/YR: CPT | Mod: CPTII,S$GLB,, | Performed by: NURSE PRACTITIONER

## 2024-07-03 PROCEDURE — 99214 OFFICE O/P EST MOD 30 MIN: CPT | Mod: S$GLB,,, | Performed by: NURSE PRACTITIONER

## 2024-07-03 PROCEDURE — 1160F RVW MEDS BY RX/DR IN RCRD: CPT | Mod: CPTII,S$GLB,, | Performed by: NURSE PRACTITIONER

## 2024-07-03 PROCEDURE — 1159F MED LIST DOCD IN RCRD: CPT | Mod: CPTII,S$GLB,, | Performed by: NURSE PRACTITIONER

## 2024-07-03 PROCEDURE — 93010 ELECTROCARDIOGRAM REPORT: CPT | Mod: S$GLB,,, | Performed by: INTERNAL MEDICINE

## 2024-07-03 PROCEDURE — 84443 ASSAY THYROID STIM HORMONE: CPT | Performed by: NURSE PRACTITIONER

## 2024-07-03 PROCEDURE — 3288F FALL RISK ASSESSMENT DOCD: CPT | Mod: CPTII,S$GLB,, | Performed by: NURSE PRACTITIONER
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
## 2024-07-03 NOTE — Clinical Note
Can we schedule DCCV for patient? No KHANH if compliant with xarelto. Plan is to start flecainide afterward.Thanks!

## 2024-07-03 NOTE — Clinical Note
Pt with PVIx4 back in likely AFL x 3 days. She is very symptomatic and requesting DCCV. Possibly provoked by recent bronchitis. She is open to trying low dose flecainide afterward (she tolerated tikosyn but says it was too expensive and is not interested in another procedure). OK to schedule DCCV? thx

## 2024-07-04 ENCOUNTER — PATIENT MESSAGE (OUTPATIENT)
Dept: ELECTROPHYSIOLOGY | Facility: CLINIC | Age: 74
End: 2024-07-04
Payer: MEDICARE

## 2024-07-05 ENCOUNTER — TELEPHONE (OUTPATIENT)
Dept: ELECTROPHYSIOLOGY | Facility: CLINIC | Age: 74
End: 2024-07-05
Payer: MEDICARE

## 2024-07-05 ENCOUNTER — PATIENT MESSAGE (OUTPATIENT)
Dept: ELECTROPHYSIOLOGY | Facility: CLINIC | Age: 74
End: 2024-07-05
Payer: MEDICARE

## 2024-07-05 ENCOUNTER — LAB VISIT (OUTPATIENT)
Dept: LAB | Facility: HOSPITAL | Age: 74
End: 2024-07-05
Attending: INTERNAL MEDICINE
Payer: MEDICARE

## 2024-07-05 DIAGNOSIS — I48.0 PAROXYSMAL ATRIAL FIBRILLATION: ICD-10-CM

## 2024-07-05 LAB
ANION GAP SERPL CALC-SCNC: 6 MMOL/L (ref 8–16)
APTT PPP: 31.2 SEC (ref 21–32)
BUN SERPL-MCNC: 14 MG/DL (ref 8–23)
CALCIUM SERPL-MCNC: 9.1 MG/DL (ref 8.7–10.5)
CHLORIDE SERPL-SCNC: 107 MMOL/L (ref 95–110)
CO2 SERPL-SCNC: 26 MMOL/L (ref 23–29)
CREAT SERPL-MCNC: 0.7 MG/DL (ref 0.5–1.4)
ERYTHROCYTE [DISTWIDTH] IN BLOOD BY AUTOMATED COUNT: 14.4 % (ref 11.5–14.5)
EST. GFR  (NO RACE VARIABLE): >60 ML/MIN/1.73 M^2
GLUCOSE SERPL-MCNC: 79 MG/DL (ref 70–110)
HCT VFR BLD AUTO: 38.9 % (ref 37–48.5)
HGB BLD-MCNC: 12.3 G/DL (ref 12–16)
INR PPP: 1 (ref 0.8–1.2)
MCH RBC QN AUTO: 30.9 PG (ref 27–31)
MCHC RBC AUTO-ENTMCNC: 31.6 G/DL (ref 32–36)
MCV RBC AUTO: 98 FL (ref 82–98)
PLATELET # BLD AUTO: 147 K/UL (ref 150–450)
PMV BLD AUTO: 11.1 FL (ref 9.2–12.9)
POTASSIUM SERPL-SCNC: 4.7 MMOL/L (ref 3.5–5.1)
PROTHROMBIN TIME: 11.3 SEC (ref 9–12.5)
RBC # BLD AUTO: 3.98 M/UL (ref 4–5.4)
SODIUM SERPL-SCNC: 139 MMOL/L (ref 136–145)
WBC # BLD AUTO: 4.55 K/UL (ref 3.9–12.7)

## 2024-07-05 PROCEDURE — 85027 COMPLETE CBC AUTOMATED: CPT | Performed by: INTERNAL MEDICINE

## 2024-07-05 PROCEDURE — 80048 BASIC METABOLIC PNL TOTAL CA: CPT | Performed by: INTERNAL MEDICINE

## 2024-07-05 PROCEDURE — 85610 PROTHROMBIN TIME: CPT | Performed by: INTERNAL MEDICINE

## 2024-07-05 PROCEDURE — 85730 THROMBOPLASTIN TIME PARTIAL: CPT | Performed by: INTERNAL MEDICINE

## 2024-07-05 PROCEDURE — 36415 COLL VENOUS BLD VENIPUNCTURE: CPT | Performed by: INTERNAL MEDICINE

## 2024-07-05 NOTE — TELEPHONE ENCOUNTER
Left message for pt. To call [phone] to review instructions for Monday, 7/8/24. Attempted 2nd call, no answer. Reminder for procedure sent to pt via portal.

## 2024-07-08 ENCOUNTER — ANESTHESIA (OUTPATIENT)
Dept: MEDSURG UNIT | Facility: HOSPITAL | Age: 74
End: 2024-07-08
Payer: MEDICARE

## 2024-07-08 ENCOUNTER — HOSPITAL ENCOUNTER (OUTPATIENT)
Facility: HOSPITAL | Age: 74
Discharge: HOME | End: 2024-07-08
Attending: INTERNAL MEDICINE | Admitting: INTERNAL MEDICINE
Payer: MEDICARE

## 2024-07-08 ENCOUNTER — ANESTHESIA EVENT (OUTPATIENT)
Dept: MEDSURG UNIT | Facility: HOSPITAL | Age: 74
End: 2024-07-08
Payer: MEDICARE

## 2024-07-08 ENCOUNTER — HOSPITAL ENCOUNTER (OUTPATIENT)
Dept: CARDIOLOGY | Facility: HOSPITAL | Age: 74
Discharge: HOME | End: 2024-07-08
Attending: INTERNAL MEDICINE
Payer: MEDICARE

## 2024-07-08 VITALS
HEIGHT: 68 IN | BODY MASS INDEX: 23.19 KG/M2 | RESPIRATION RATE: 18 BRPM | SYSTOLIC BLOOD PRESSURE: 125 MMHG | HEART RATE: 79 BPM | WEIGHT: 153 LBS | TEMPERATURE: 98 F | OXYGEN SATURATION: 98 % | DIASTOLIC BLOOD PRESSURE: 64 MMHG

## 2024-07-08 VITALS
WEIGHT: 153 LBS | BODY MASS INDEX: 23.19 KG/M2 | DIASTOLIC BLOOD PRESSURE: 65 MMHG | HEIGHT: 68 IN | SYSTOLIC BLOOD PRESSURE: 111 MMHG

## 2024-07-08 DIAGNOSIS — I48.0 PAROXYSMAL ATRIAL FIBRILLATION: Primary | ICD-10-CM

## 2024-07-08 DIAGNOSIS — I48.19 PERSISTENT ATRIAL FIBRILLATION: ICD-10-CM

## 2024-07-08 DIAGNOSIS — I48.0 PAROXYSMAL ATRIAL FIBRILLATION: ICD-10-CM

## 2024-07-08 DIAGNOSIS — I48.19 ATRIAL FIBRILLATION, PERSISTENT: ICD-10-CM

## 2024-07-08 LAB
ASCENDING AORTA: 2.8 CM
BSA FOR ECHO PROCEDURE: 1.82 M2
LAA PV: 35 CM/S
OHS QRS DURATION: 78 MS
OHS QRS DURATION: 78 MS
OHS QTC CALCULATION: 422 MS
OHS QTC CALCULATION: 451 MS
SINUS: 3 CM
STJ: 2.7 CM

## 2024-07-08 PROCEDURE — 63600175 PHARM REV CODE 636 W HCPCS: Performed by: REGISTERED NURSE

## 2024-07-08 PROCEDURE — 25000003 PHARM REV CODE 250: Performed by: REGISTERED NURSE

## 2024-07-08 PROCEDURE — 93010 ELECTROCARDIOGRAM REPORT: CPT | Mod: ,,, | Performed by: INTERNAL MEDICINE

## 2024-07-08 PROCEDURE — 93005 ELECTROCARDIOGRAM TRACING: CPT

## 2024-07-08 PROCEDURE — 37000008 HC ANESTHESIA 1ST 15 MINUTES: Performed by: STUDENT IN AN ORGANIZED HEALTH CARE EDUCATION/TRAINING PROGRAM

## 2024-07-08 PROCEDURE — 93312 ECHO TRANSESOPHAGEAL: CPT

## 2024-07-08 PROCEDURE — 93312 ECHO TRANSESOPHAGEAL: CPT | Mod: 26,,, | Performed by: INTERNAL MEDICINE

## 2024-07-08 PROCEDURE — 93320 DOPPLER ECHO COMPLETE: CPT | Mod: 26,,, | Performed by: INTERNAL MEDICINE

## 2024-07-08 PROCEDURE — 92960 CARDIOVERSION ELECTRIC EXT: CPT | Performed by: STUDENT IN AN ORGANIZED HEALTH CARE EDUCATION/TRAINING PROGRAM

## 2024-07-08 PROCEDURE — 93320 DOPPLER ECHO COMPLETE: CPT

## 2024-07-08 PROCEDURE — 93005 ELECTROCARDIOGRAM TRACING: CPT | Mod: 59

## 2024-07-08 PROCEDURE — 37000009 HC ANESTHESIA EA ADD 15 MINS: Performed by: STUDENT IN AN ORGANIZED HEALTH CARE EDUCATION/TRAINING PROGRAM

## 2024-07-08 PROCEDURE — 92960 CARDIOVERSION ELECTRIC EXT: CPT | Mod: ,,, | Performed by: STUDENT IN AN ORGANIZED HEALTH CARE EDUCATION/TRAINING PROGRAM

## 2024-07-08 PROCEDURE — 93325 DOPPLER ECHO COLOR FLOW MAPG: CPT | Mod: 26,,, | Performed by: INTERNAL MEDICINE

## 2024-07-08 RX ORDER — PROPOFOL 10 MG/ML
VIAL (ML) INTRAVENOUS
Status: DISCONTINUED | OUTPATIENT
Start: 2024-07-08 | End: 2024-07-08

## 2024-07-08 RX ORDER — LIDOCAINE HYDROCHLORIDE 20 MG/ML
INJECTION INTRAVENOUS
Status: DISCONTINUED | OUTPATIENT
Start: 2024-07-08 | End: 2024-07-08

## 2024-07-08 RX ORDER — LIDOCAINE HYDROCHLORIDE 20 MG/ML
SOLUTION OROPHARYNGEAL
Status: DISCONTINUED | OUTPATIENT
Start: 2024-07-08 | End: 2024-07-08

## 2024-07-08 RX ORDER — FLECAINIDE ACETATE 100 MG/1
100 TABLET ORAL EVERY 12 HOURS
Qty: 60 TABLET | Refills: 11 | Status: SHIPPED | OUTPATIENT
Start: 2024-07-08 | End: 2025-07-08

## 2024-07-08 RX ORDER — GLUCAGON 1 MG
1 KIT INJECTION
Status: DISCONTINUED | OUTPATIENT
Start: 2024-07-08 | End: 2024-07-08 | Stop reason: HOSPADM

## 2024-07-08 RX ORDER — PROPOFOL 10 MG/ML
INJECTION, EMULSION INTRAVENOUS CONTINUOUS PRN
Status: DISCONTINUED | OUTPATIENT
Start: 2024-07-08 | End: 2024-07-08

## 2024-07-08 RX ADMIN — PROPOFOL 70 MG: 10 INJECTION, EMULSION INTRAVENOUS at 10:07

## 2024-07-08 RX ADMIN — SODIUM CHLORIDE: 0.9 INJECTION, SOLUTION INTRAVENOUS at 10:07

## 2024-07-08 RX ADMIN — LIDOCAINE HYDROCHLORIDE 40 MG: 20 INJECTION INTRAVENOUS at 10:07

## 2024-07-08 RX ADMIN — LIDOCAINE HYDROCHLORIDE 15 ML: 20 SOLUTION ORAL at 10:07

## 2024-07-08 RX ADMIN — PROPOFOL 20 MG: 10 INJECTION, EMULSION INTRAVENOUS at 10:07

## 2024-07-08 RX ADMIN — PROPOFOL 150 MCG/KG/MIN: 10 INJECTION, EMULSION INTRAVENOUS at 10:07

## 2024-07-08 RX ADMIN — PROPOFOL 10 MG: 10 INJECTION, EMULSION INTRAVENOUS at 10:07

## 2024-07-08 NOTE — NURSING
Received report from Yonny APONTE. Patient s/p KHANH/DCCV, AAOx4.   VSS, no c/o pain or discomfort at this time, resp even and unlabored.   Post procedure protocol reviewed with patient and patient's family. Understanding verbalized. Family members at bedside. Nurse call bell within reach.     Tele confirmed.

## 2024-07-08 NOTE — DISCHARGE INSTRUCTIONS
Medications:  -Continue to take your home medications as listed on your medication list after you are discharged.    New Medications:  -Flecainide 100 mg by mouth twice daily     Diet  -You may resume oral intake after you are discharged, as long you have no swallowing difficulties.    Because you have received sedation for this procedure:  -Limit activity for the remainder of the day.  -Do not smoke for at least 6 hours and until you are fully awake and alert.  -Do not drink alcoholic beverage for 24 hours.  -Do not drive for 24 hours.  -Defer important decision making until the following day.     Go to the Emergency Department if you develop:   -Bleeding  -Weakness or numbness  -Visual, gait or speech disturbance  -New chest pain, palpitations, shortness of breath, rapid heart beat, or fainting  -Fever    Follow up:  -EKG in 1 week.  -Dr. Seymour or Gwen Gilbert NP in 1 month. Call or message the office to schedule.    Any need to reschedule or cancel procedures, or any questions regarding your procedures should be addressed directly with the Arrhythmia Department Nurses at the following phone number: [phone].

## 2024-07-08 NOTE — TRANSFER OF CARE
"Anesthesia Transfer of Care Note    Patient: Zoë Enrique    Procedure(s) Performed: Procedure(s) (LRB):  Cardioversion or Defibrillation (N/A)  Transesophageal echo (KHANH) intra-procedure log documentation (N/A)    Patient location: PACU    Anesthesia Type: general    Transport from OR: Transported from OR on room air with adequate spontaneous ventilation    Post pain: adequate analgesia    Post assessment: no apparent anesthetic complications and tolerated procedure well    Post vital signs: stable    Level of consciousness: awake, alert and oriented    Nausea/Vomiting: no nausea/vomiting    Complications: none    Transfer of care protocol was followed      Last vitals: Visit Vitals  /65   Pulse 84   Temp 36.8 °C (98.2 °F) (Temporal)   Resp 16   Ht 5' 8" (1.727 m)   Wt 69.4 kg (153 lb)   SpO2 96%   Breastfeeding No   BMI 23.26 kg/m²     "

## 2024-07-08 NOTE — ANESTHESIA POSTPROCEDURE EVALUATION
Anesthesia Post Evaluation    Patient: Zoë Enrique    Procedure(s) Performed: Procedure(s) (LRB):  Cardioversion or Defibrillation (N/A)  Transesophageal echo (KHANH) intra-procedure log documentation (N/A)    Final Anesthesia Type: general      Patient location during evaluation: PACU  Patient participation: Yes- Able to Participate  Level of consciousness: awake and alert  Post-procedure vital signs: reviewed and stable  Pain management: adequate  Airway patency: patent    PONV status at discharge: No PONV  Anesthetic complications: no      Cardiovascular status: blood pressure returned to baseline  Respiratory status: unassisted, spontaneous ventilation and room air  Hydration status: euvolemic                Vitals Value Taken Time   /64 07/08/24 1231   Temp 36.7 °C (98 °F) 07/08/24 1230   Pulse 76 07/08/24 1238   Resp 32 07/08/24 1236   SpO2 93 % 07/08/24 1238   Vitals shown include unfiled device data.      No case tracking events are documented in the log.      Pain/Kim Score: Kim Score: 10 (7/8/2024 12:30 PM)
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
## 2024-07-08 NOTE — NURSING
Pt DC'd per orders.  DC paperwork reviewed w pt and spouse.  Pt verbalized DC instructions.    IV removed. Pt was able to drink, eat, walk, and urinate with no difficulties.    Pt being wheeled off unit per transport in wheelchair.

## 2024-07-08 NOTE — ANESTHESIA PREPROCEDURE EVALUATION
07/08/2024  Zoë Enrique is a 74 y.o., female.  [facility]  Anesthesia Pre-Operative Evaluation       Patient Name: Zoë Enrique  YOB: 1950  MRN: [id number]  CSN: [id number]      Code Status: Prior   Date of Procedure: 7/8/2024  Anesthesia: Monitor Anesthesia Care Procedure: Procedure(s) (LRB):  Cardioversion or Defibrillation (N/A)  Transesophageal echo (KHANH) intra-procedure log documentation (N/A)  Pre-Operative Diagnosis: Paroxysmal atrial fibrillation [I48.0]  Proceduralist: Surgeons and Role:  Panel 1:     * Moe Seymour MD - Primary  Panel 2:     * Provider, [facility] Diagnostic - Primary        SUBJECTIVE:   Zoë Enrique is a 74 y.o. female who is here today for Procedure(s) (LRB):  Cardioversion or Defibrillation (N/A)  Transesophageal echo (KHANH) intra-procedure log documentation (N/A).   No notes on file    No current facility-administered medications for this encounter.       she has a current medication list which includes the following long-term medication(s): acyclovir, estradiol, famotidine, levothyroxine, verapamil, and memantine.   ALLERGIES:     Review of patient's allergies indicates:   Allergen Reactions    Morphine Nausea And Vomiting    Succinylcholine      Other reaction(s): v tach     LDA:          Lines/Drains/Airways       Peripheral Intravenous Line  Duration                  Peripheral IV - Single Lumen 07/08/24 0815 20 G 1 in No Right Antecubital <1 day                    History:   There are no hospital problems to display for this patient.    Patient Active Problem List   Diagnosis    Chronic tension-type headache, not intractable    Paroxysmal atrial fibrillation    Hypothyroidism (acquired)    Osteosarcoma of right femur    Acute pain of left wrist    Diffuse arthralgia    History of arthroplasty of right knee    Greater trochanteric bursitis  of right hip    Weakness of hip    Hip pain    Longstanding persistent atrial fibrillation    Bronchiectasis without complication    Cough    Thrombocytopenia    Fecal occult blood test positive    Dysphagia    PSVT (paroxysmal supraventricular tachycardia)    Urge incontinence    Nocturia    Pelvic floor weakness    De Quervain's tenosynovitis, left    Trigger middle finger of left hand    Right knee pain    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    Decreased range of motion of left wrist    Decreased  strength of left hand    Impaired sensation    Aortic atherosclerosis    On anticoagulant therapy     Medical History   Past Medical History:   Diagnosis Date    Abnormal Pap smear of cervix     Atrial fibrillation 2017    Atrial flutter     Basal cell carcinoma 2021    superficial BCC R cheek s/p Efudex    Cancer     osteosarcoma of right femur    Diverticulosis     Hx of atrial flutter     Hx of mitral valve prolapse     Hypothyroidism     Migraines     Recurrent upper respiratory infection (URI)     Supraventricular tachycardia      Surgical History:    has a past surgical history that includes Knee surgery (Right, ); Knee Arthroplasty (Right, ); Treatment of cardiac arrhythmia (N/A, 2019); Augmentation of breast (Bilateral, );  section (); Cosmetic surgery (); Colposcopy; Oophorectomy; Total abdominal hysterectomy (); Ablation of arrhythmogenic focus for atrial fibrillation (N/A, 2020); Cardioversion (2020); Transesophageal echocardiography (N/A, 2020); Colon surgery (); Breast surgery (); Eye surgery (Bilateral, ); Joint replacement (Right, , , ); Esophagogastroduodenoscopy (N/A, 2021); Colonoscopy (N/A, 2021); De Quervain's release (Left, 2022); and Trigger finger release (Left, 2022).   Social History:    reports that she has never smoked. She has never been exposed to  "tobacco smoke. She has never used smokeless tobacco. She reports current alcohol use of about 2.0 standard drinks of alcohol per week. She reports that she does not use drugs.     OBJECTIVE:     Vital Signs (Most Recent):  Temp: 36.8 °C (98.2 °F) (07/08/24 0805)  Pulse: 84 (07/08/24 0805)  Resp: 16 (07/08/24 0805)  BP: 111/65 (07/08/24 0817)  SpO2: 96 % (07/08/24 0805) Vital Signs Range (Last 24H):  Temp:  [36.8 °C (98.2 °F)]   Pulse:  [84]   Resp:  [16]   BP: (105-111)/(64-65)   SpO2:  [96 %]      Last 3 Vitals:       6/25/2024    10:07 AM 7/3/2024    11:53 AM 7/8/2024     8:05 AM   Vitals - 1 value per visit   SYSTOLIC 119 130 105   DIASTOLIC 71 60 64   Pulse 77 94 84   Temp   36.8 °C (98.2 °F)   Resp   16   SPO2   96 %   Weight (lb) 156.09 155.42 153   Weight (kg) 70.8 70.5 69.4   Height  5' 8" (1.727 m) 5' 8" (1.727 m)   BMI (Calculated)  23.6 23.3   Pain Score Zero  One     Body mass index is 23.26 kg/m².   Wt Readings from Last 4 Encounters:   07/08/24 69.4 kg (153 lb)   07/03/24 70.5 kg (155 lb 6.8 oz)   06/25/24 70.8 kg (156 lb 1.4 oz)   06/13/24 69.5 kg (153 lb 3.5 oz)     Significant Labs:  Lab Results   Component Value Date    WBC 4.55 07/05/2024    HGB 12.3 07/05/2024    HCT 38.9 07/05/2024     (L) 07/05/2024     07/05/2024    K 4.7 07/05/2024     07/05/2024    CREATININE 0.7 07/05/2024    BUN 14 07/05/2024    CO2 26 07/05/2024    GLU 79 07/05/2024    CALCIUM 9.1 07/05/2024    MG 2.1 08/15/2019    PHOS 3.5 08/13/2019    ALKPHOS 57 01/02/2024    ALT 15 01/02/2024    AST 20 01/02/2024    ALBUMIN 3.9 01/02/2024    INR 1.0 07/05/2024    APTT 31.2 07/05/2024    CPK 46 01/23/2020     No LMP recorded. Patient has had a hysterectomy.      EKG:   Results for orders placed or performed during the hospital encounter of 07/08/24   EKG 12-LEAD    Collection Time: 07/08/24  7:59 AM   Result Value Ref Range    QRS Duration 78 ms    [facility] QTC Calculation 422 ms    Narrative    Test Reason : " I48.0,I48.19,    Vent. Rate : 095 BPM     Atrial Rate : 000 BPM     P-R Int : 000 ms          QRS Dur : 078 ms      QT Int : 336 ms       P-R-T Axes : 000 069 072 degrees     QTc Int : 422 ms    Atrial fibrillation with premature ventricular or aberrantly conducted  complexes  Septal infarct ,age undetermined  Abnormal ECG  When compared with ECG of 03-JUL-2024 11:33,  ST now depressed in Inferior leads  Non-specific change in ST segment in Lateral leads  Nonspecific T wave abnormality now evident in Lateral leads  Confirmed by Andres PETERS MD ([id number]) on 7/8/2024 8:35:17 AM    Referred By: PETER SCHULTZ           Confirmed By:Andres PETERS MD       TTE:  Results for orders placed or performed during the hospital encounter of 06/05/23   Echo   Result Value Ref Range    EF 55 %    TV resting pulmonary artery pressure 39 mmHg    Narrative    · The left ventricle is normal in size with normal systolic function. The   estimated ejection fraction is 55%.  · Normal right ventricular size with normal right ventricular systolic   function.  · Indeterminate left ventricular diastolic function.  · Severe left atrial enlargement.  · Mild to moderate tricuspid regurgitation.  · The mitral valve is thickened with mild bileaflet prolapse.  · Small pericardial effusion. No hemodynamic compromise.  · The estimated PA systolic pressure is 39 mmHg.  · Intermediate central venous pressure (8 mmHg).        EF   Date Value Ref Range Status   06/05/2023 55 % Final     Nuc Stress EF   Date Value Ref Range Status   04/08/2021 75 % Final     Nuc Rest EF   Date Value Ref Range Status   04/08/2021 79  Final      No results found for this or any previous visit.  KHANH:  Results for orders placed or performed during the hospital encounter of 11/24/20   Transesophageal echo (KHANH)   Result Value Ref Range    BSA 1.89 m2    Sinus 3.10 cm    STJ 2.70 cm    Narrative    · No thrombus is present in the appendage. No thrombus is present in the   left atrium.  ·  The left ventricle is normal in size with normal systolic function. The   estimated ejection fraction is 65%.  · Normal right ventricular size with normal right ventricular systolic   function.  · Grade 1 plaque present in the descending aorta.          ASSESSMENT/PLAN:         Pre-op Assessment    I have reviewed the Patient Summary Reports.     I have reviewed the Nursing Notes. I have reviewed the NPO Status.   I have reviewed the Medications.     Review of Systems  Cardiovascular:                       Atrial Fibrillation, Atrial Flutter     Pulmonary:       Recent URI   Pulmonary Symptoms:                  Neurological:    Neuromuscular Disease,  Headaches      Dx of Headaches                         Neuromuscular Disease   Endocrine:   Hypothyroidism       Hypothyroidism              Physical Exam  General: Well nourished, Cooperative and Alert    Airway:  Mallampati: III / II  Mouth Opening: Normal  TM Distance: Normal  Tongue: Normal  Neck ROM: Normal ROM    Dental:  Intact    Chest/Lungs:  Normal Respiratory Rate    Heart:  Rate: Normal        Anesthesia Plan  Type of Anesthesia, risks & benefits discussed:    Anesthesia Type: Gen Natural Airway, MAC  Intra-op Monitoring Plan: Standard ASA Monitors  Post Op Pain Control Plan: IV/PO Opioids PRN  Induction:  IV  Informed Consent: Informed consent signed with the Patient and all parties understand the risks and agree with anesthesia plan.  All questions answered.   ASA Score: 3  Day of Surgery Review of History & Physical: H&P Update referred to the surgeon/provider.    Ready For Surgery From Anesthesia Perspective.     .

## 2024-07-08 NOTE — HOSPITAL COURSE
Patient underwent KHANH without evidence of BRITTNI thrombus. Proceeded with DCCV, converting from atrial fibrillation to sinus rhythm. Patient tolerated the procedure without any acute complications. Post-DCCV ECG revealed sinus rhythm at 65 bpm. Plan to continue all home medications including Xarelto 20 mg daily, Verapamil 40 mg daily; start Flecainide 100 mg PO BID. Instructed to return in 1 week for follow up ECG and in 4 weeks for clinic follow up with Dr. Seymour or Gwen Gilbert NP.   Patient was assessed at bedside prior to discharge, they reported feeling well and denied chest discomfort, shortness of breath, palpitations, lightheadedness, or any other acute symptoms. Discharge instructions were discussed with patient and all questions were answered. Patient was discharged home in stable condition.

## 2024-07-09 DIAGNOSIS — N95.2 POSTMENOPAUSAL ATROPHIC VAGINITIS: ICD-10-CM

## 2024-07-09 RX ORDER — VERAPAMIL HYDROCHLORIDE 40 MG/1
TABLET ORAL
Qty: 30 TABLET | Refills: 11 | Status: SHIPPED | OUTPATIENT
Start: 2024-07-09

## 2024-07-09 RX ORDER — ESTRADIOL 0.1 MG/G
CREAM VAGINAL
Qty: 43 G | Refills: 0 | Status: SHIPPED | OUTPATIENT
Start: 2024-07-09

## 2024-07-12 ENCOUNTER — PATIENT MESSAGE (OUTPATIENT)
Dept: ELECTROPHYSIOLOGY | Facility: CLINIC | Age: 74
End: 2024-07-12
Payer: MEDICARE

## 2024-07-15 ENCOUNTER — HOSPITAL ENCOUNTER (OUTPATIENT)
Dept: CARDIOLOGY | Facility: CLINIC | Age: 74
Discharge: HOME | End: 2024-07-15
Payer: MEDICARE

## 2024-07-15 DIAGNOSIS — I48.19 PERSISTENT ATRIAL FIBRILLATION: ICD-10-CM

## 2024-07-15 LAB
OHS QRS DURATION: 90 MS
OHS QTC CALCULATION: 449 MS

## 2024-07-15 PROCEDURE — 93005 ELECTROCARDIOGRAM TRACING: CPT | Mod: S$GLB,,, | Performed by: INTERNAL MEDICINE

## 2024-07-15 PROCEDURE — 93010 ELECTROCARDIOGRAM REPORT: CPT | Mod: S$GLB,,, | Performed by: INTERNAL MEDICINE

## 2024-07-15 RX ORDER — IPRATROPIUM BROMIDE 42 UG/1
2 SPRAY, METERED NASAL 3 TIMES DAILY
Qty: 15 ML | Refills: 6 | Status: SHIPPED | OUTPATIENT
Start: 2024-07-15

## 2024-07-16 ENCOUNTER — TELEPHONE (OUTPATIENT)
Dept: ELECTROPHYSIOLOGY | Facility: CLINIC | Age: 74
End: 2024-07-16
Payer: MEDICARE

## 2024-07-17 ENCOUNTER — OFFICE VISIT (OUTPATIENT)
Dept: ELECTROPHYSIOLOGY | Facility: CLINIC | Age: 74
End: 2024-07-17
Payer: MEDICARE

## 2024-07-17 VITALS
WEIGHT: 156.06 LBS | SYSTOLIC BLOOD PRESSURE: 107 MMHG | HEIGHT: 68 IN | DIASTOLIC BLOOD PRESSURE: 56 MMHG | HEART RATE: 68 BPM | BODY MASS INDEX: 23.65 KG/M2

## 2024-07-17 DIAGNOSIS — I48.0 PAROXYSMAL ATRIAL FIBRILLATION: ICD-10-CM

## 2024-07-17 DIAGNOSIS — I48.0 PAROXYSMAL ATRIAL FIBRILLATION: Primary | ICD-10-CM

## 2024-07-17 DIAGNOSIS — I47.10 PSVT (PAROXYSMAL SUPRAVENTRICULAR TACHYCARDIA): Primary | ICD-10-CM

## 2024-07-17 PROCEDURE — 99215 OFFICE O/P EST HI 40 MIN: CPT | Mod: S$GLB,,, | Performed by: INTERNAL MEDICINE

## 2024-07-17 PROCEDURE — 3078F DIAST BP <80 MM HG: CPT | Mod: CPTII,S$GLB,, | Performed by: INTERNAL MEDICINE

## 2024-07-17 PROCEDURE — 99999 PR PBB SHADOW E&M-EST. PATIENT-LVL III: CPT | Mod: PBBFAC,,, | Performed by: INTERNAL MEDICINE

## 2024-07-17 PROCEDURE — 1160F RVW MEDS BY RX/DR IN RCRD: CPT | Mod: CPTII,S$GLB,, | Performed by: INTERNAL MEDICINE

## 2024-07-17 PROCEDURE — 1159F MED LIST DOCD IN RCRD: CPT | Mod: CPTII,S$GLB,, | Performed by: INTERNAL MEDICINE

## 2024-07-17 PROCEDURE — 3074F SYST BP LT 130 MM HG: CPT | Mod: CPTII,S$GLB,, | Performed by: INTERNAL MEDICINE

## 2024-07-17 PROCEDURE — 3008F BODY MASS INDEX DOCD: CPT | Mod: CPTII,S$GLB,, | Performed by: INTERNAL MEDICINE

## 2024-07-17 PROCEDURE — 1126F AMNT PAIN NOTED NONE PRSNT: CPT | Mod: CPTII,S$GLB,, | Performed by: INTERNAL MEDICINE

## 2024-07-17 RX ORDER — VERAPAMIL HYDROCHLORIDE 40 MG/1
40 TABLET ORAL 2 TIMES DAILY
Qty: 180 TABLET | Refills: 3 | Status: SHIPPED | OUTPATIENT
Start: 2024-07-17
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
## 2024-07-19 ENCOUNTER — TELEPHONE (OUTPATIENT)
Dept: ORTHOPEDICS | Facility: CLINIC | Age: 74
End: 2024-07-19
Payer: MEDICARE

## 2024-07-19 NOTE — TELEPHONE ENCOUNTER
Called patient to let them know that I will be leaving [facility] at the end of September. Patient appreciative of call. She is doing well. She has seen Dr. Urban previously and explained he is still in [place]. She will let us know if she has issues getting in for follow up as needed. All questions answered,

## 2024-07-25 ENCOUNTER — OFFICE VISIT (OUTPATIENT)
Dept: ORTHOPEDICS | Facility: CLINIC | Age: 74
End: 2024-07-25
Payer: MEDICARE

## 2024-07-25 ENCOUNTER — PATIENT MESSAGE (OUTPATIENT)
Dept: ORTHOPEDICS | Facility: CLINIC | Age: 74
End: 2024-07-25
Payer: MEDICARE

## 2024-07-25 VITALS — BODY MASS INDEX: 23.73 KG/M2 | HEIGHT: 68 IN

## 2024-07-25 DIAGNOSIS — M65.331 TRIGGER MIDDLE FINGER OF RIGHT HAND: Primary | ICD-10-CM

## 2024-07-25 PROCEDURE — 99999 PR PBB SHADOW E&M-EST. PATIENT-LVL III: CPT | Mod: PBBFAC,,, | Performed by: ORTHOPAEDIC SURGERY

## 2024-07-25 RX ORDER — TRIAMCINOLONE ACETONIDE 40 MG/ML
20 INJECTION, SUSPENSION INTRA-ARTICULAR; INTRAMUSCULAR
Status: COMPLETED | OUTPATIENT
Start: 2024-07-25 | End: 2024-07-25

## 2024-07-25 RX ADMIN — TRIAMCINOLONE ACETONIDE 20 MG: 40 INJECTION, SUSPENSION INTRA-ARTICULAR; INTRAMUSCULAR at 11:07

## 2024-07-25 NOTE — PROGRESS NOTES
Subjective:      Patient ID: Zoë Enrique is a 74 y.o. female.  Chief Complaint: Finger Pain (Right middle trigger)      HPI  Zoë Enrique is a  74 y.o. female presenting today for follow up of right hand symptoms related to triggering of the middle finger.  She reports that she is having a flare-up again in the right hand   She would like injection   .    Review of patient's allergies indicates:   Allergen Reactions    Morphine Nausea And Vomiting    Succinylcholine      Other reaction(s): v tach         Current Outpatient Medications   Medication Sig Dispense Refill    acetaminophen (TYLENOL) 650 MG TbSR Take 650 mg by mouth every 8 (eight) hours.      acyclovir (ZOVIRAX) 400 MG tablet Take 1 tablet (400 mg total) by mouth 3 (three) times daily as needed. 90 tablet 3    amoxicillin (AMOXIL) 500 MG capsule Take 500 mg by mouth as needed.      docusate sodium (COLACE) 250 MG capsule Take 250 mg by mouth 2 (two) times daily.       estradioL (ESTRACE) 0.01 % (0.1 mg/gram) vaginal cream PLACE 1 GRAM VAGINALLY EVERY OTHER DAY AS DIRECTED 43 g 0    famotidine (PEPCID) 20 MG tablet Take 1 tablet (20 mg total) by mouth 2 (two) times daily. 60 tablet 4    flecainide (TAMBOCOR) 100 MG Tab Take 1 tablet (100 mg total) by mouth every 12 (twelve) hours. 60 tablet 11    HYDROcodone-acetaminophen (NORCO) 5-325 mg per tablet Take 1 tablet by mouth every 6 (six) hours as needed for Pain. Pt states takes PRN for headaches 28 tablet 0    ipratropium (ATROVENT) 42 mcg (0.06 %) nasal spray 2 sprays by Each Nostril route 3 (three) times daily. 15 mL 6    levothyroxine (SYNTHROID) 25 MCG tablet Take 1 tablet by mouth once daily 90 tablet 3    memantine (NAMENDA) 10 MG Tab Take 1 tablet (10 mg total) by mouth 2 (two) times daily. 60 tablet 11    RESTASIS 0.05 % ophthalmic emulsion Place 1 drop into both eyes 2 (two) times daily.      rivaroxaban (XARELTO) 20 mg Tab Take 1 tablet (20 mg total) by mouth once daily. 90 tablet 3    sodium  chloride 3% 3 % nebulizer solution Take 4 mLs by nebulization 2 (two) times a day. 300 mL PRN    verapamiL (CALAN) 40 MG Tab Take 1 tablet (40 mg total) by mouth 2 (two) times daily. 180 tablet 3    vit C/E/Zn/coppr/lutein/zeaxan (PRESERVISION AREDS-2 ORAL)        No current facility-administered medications for this visit.     Facility-Administered Medications Ordered in Other Visits   Medication Dose Route Frequency Provider Last Rate Last Admin    sodium chloride 0.9% flush 5 mL  5 mL Intravenous PRN Aleksandra Taylor NP           Past Medical History:   Diagnosis Date    Abnormal Pap smear of cervix     Atrial fibrillation 2017    Atrial flutter     Basal cell carcinoma 2021    superficial BCC R cheek s/p Efudex    Cancer     osteosarcoma of right femur    Diverticulosis     Hx of atrial flutter     Hx of mitral valve prolapse     Hypothyroidism     Migraines     Recurrent upper respiratory infection (URI)     Supraventricular tachycardia        Past Surgical History:   Procedure Laterality Date    ABLATION OF ARRHYTHMOGENIC FOCUS FOR ATRIAL FIBRILLATION N/A 2020    Procedure: ABLATION, ARRHYTHMOGENIC FOCUS, FOR ATRIAL FIBRILLATION;  Surgeon: Moe Seymour MD;  Location: [facility] EP LAB;  Service: Cardiology;  Laterality: N/A;  AFL/AF, KHANH, CTI, PVI, RFA, CARTO, GEN, DM, 3 PREP    AUGMENTATION OF BREAST Bilateral 1984    35 yrs     BREAST SURGERY      augmentation    CARDIOVERSION  2020    Procedure: Cardioversion;  Surgeon: Moe Seymour MD;  Location: [facility] EP LAB;  Service: Cardiology;;     SECTION      COLON SURGERY      partial colon resection    COLONOSCOPY N/A 2021    Procedure: COLONOSCOPY;  Surgeon: Sahara Salgado MD;  Location: [facility] ENDO ([address] [place] [address]);  Service: Endoscopy;  Laterality: N/A;  constipation prep- 7 days of miralax, 2 fulls of fulls, then day bf clears and split prep   covid test 3/5 pcw, prep instr emailed, antibiotics  "prophylactically, Xarelto hold x 2 days per Dr. Seymour-see telephone encounter 2/10 -ml    COLPOSCOPY      COSMETIC SURGERY  1987    DE QUERVAIN'S RELEASE Left 08/02/2022    Procedure: RELEASE, HAND, FOR DEQUERVAIN'S TENOSYNOVITIS;  Surgeon: Toñito Adame Jr., MD;  Location: [facility] OR;  Service: Orthopedics;  Laterality: Left;    ECHOCARDIOGRAM,TRANSESOPHAGEAL N/A 7/8/2024    Procedure: Transesophageal echo (KHANH) intra-procedure log documentation;  Surgeon: Ramírez Ortez III, MD;  Location: [facility] EP LAB;  Service: Cardiology;  Laterality: N/A;    ESOPHAGOGASTRODUODENOSCOPY N/A 03/19/2021    Procedure: EGD (ESOPHAGOGASTRODUODENOSCOPY);  Surgeon: Sahara Salgado MD;  Location: [facility] ENDO ([address] [place] [address]);  Service: Endoscopy;  Laterality: N/A;    EYE SURGERY Bilateral 2016    Lasik, then cataracts extraction    JOINT REPLACEMENT Right 2/07, 11/07, 2/09    KNEE ARTHROPLASTY Right 2007    revison in 2009    KNEE SURGERY Right 1989    distal femoral replacing TK    OOPHORECTOMY      TOTAL ABDOMINAL HYSTERECTOMY  1994    TAHBSO    TRANSESOPHAGEAL ECHOCARDIOGRAPHY N/A 11/24/2020    Procedure: ECHOCARDIOGRAM, TRANSESOPHAGEAL;  Surgeon: Ramírez Ortez III, MD;  Location: [facility] EP LAB;  Service: Cardiology;  Laterality: N/A;    TREATMENT OF CARDIAC ARRHYTHMIA N/A 08/12/2019    Procedure: CARDIOVERSION;  Surgeon: Moe Seymour MD;  Location: [facility] EP LAB;  Service: Cardiology;  Laterality: N/A;  KHANH/DCCV/MAC/DM/3PREP/*ADMIT FOR TIKOSYN*    TREATMENT OF CARDIAC ARRHYTHMIA N/A 7/8/2024    Procedure: Cardioversion or Defibrillation;  Surgeon: GABRIELE Bull MD;  Location: [facility] EP LAB;  Service: Cardiology;  Laterality: N/A;  AF, KHANH, DCCV, MAC, DM, 3 Prep    TRIGGER FINGER RELEASE Left 08/02/2022    Procedure: RELEASE, TRIGGER FINGER;  Surgeon: Toñito Adame Jr., MD;  Location: [facility] OR;  Service: Orthopedics;  Laterality: Left;  left middle finger       OBJECTIVE:   PHYSICAL EXAM:  Height: 5' 8" (172.7 cm)    Vitals:    " "07/25/24 1042   Height: 5' 8" (1.727 m)   PainSc:   1   PainLoc: Finger     Ortho/SPM Exam  Examination right hand there is tenderness over the flexor tendon sheath right middle finger   Mild triggering    strength decreased   Sensation intact   Tinel sign negative       RADIOGRAPHS:  None  Comments: I have personally reviewed the imaging and I agree with the above radiologist's report.    ASSESSMENT/PLAN:     IMPRESSION:  Triggering of the right middle finger    PLAN:  She would like injection today   After pause for time-out identified the right middle finger injected flexor tendon sheath with combination Kenalog 20 mg 0.5 cc xylocaine sterile technique   Tolerated the procedure well without complication    FOLLOW UP:  2-3 months or as needed consider surgery if symptoms persist    Disclaimer: This note has been generated using voice-recognition software. There may be typographical errors that have been missed during proof-reading.     "

## 2024-08-16 ENCOUNTER — TELEPHONE (OUTPATIENT)
Dept: NEUROLOGY | Facility: CLINIC | Age: 74
End: 2024-08-16
Payer: MEDICARE

## 2024-08-16 ENCOUNTER — OFFICE VISIT (OUTPATIENT)
Dept: OTOLARYNGOLOGY | Facility: CLINIC | Age: 74
End: 2024-08-16
Payer: MEDICARE

## 2024-08-16 VITALS
HEART RATE: 66 BPM | BODY MASS INDEX: 23.33 KG/M2 | SYSTOLIC BLOOD PRESSURE: 117 MMHG | WEIGHT: 153.44 LBS | DIASTOLIC BLOOD PRESSURE: 68 MMHG

## 2024-08-16 DIAGNOSIS — J40 BRONCHITIS: ICD-10-CM

## 2024-08-16 DIAGNOSIS — J34.3 NASAL TURBINATE HYPERTROPHY: Chronic | ICD-10-CM

## 2024-08-16 DIAGNOSIS — J30.0 VASOMOTOR RHINITIS: Primary | Chronic | ICD-10-CM

## 2024-08-16 DIAGNOSIS — J34.2 DEVIATED NASAL SEPTUM: Chronic | ICD-10-CM

## 2024-08-16 PROCEDURE — 99999 PR PBB SHADOW E&M-EST. PATIENT-LVL III: CPT | Mod: PBBFAC,,, | Performed by: OTOLARYNGOLOGY

## 2024-08-16 RX ORDER — METHYLPREDNISOLONE 4 MG/1
TABLET ORAL
Qty: 1 EACH | Refills: 0 | Status: SHIPPED | OUTPATIENT
Start: 2024-08-16

## 2024-08-16 RX ORDER — PROMETHAZINE HYDROCHLORIDE AND DEXTROMETHORPHAN HYDROBROMIDE 6.25; 15 MG/5ML; MG/5ML
5 SYRUP ORAL EVERY 4 HOURS PRN
Qty: 118 ML | Refills: 0 | Status: SHIPPED | OUTPATIENT
Start: 2024-08-16 | End: 2024-08-26

## 2024-08-16 RX ORDER — DOXYCYCLINE HYCLATE 100 MG
100 TABLET ORAL 2 TIMES DAILY
Qty: 20 TABLET | Refills: 0 | Status: SHIPPED | OUTPATIENT
Start: 2024-08-16 | End: 2024-08-26

## 2024-08-16 NOTE — TELEPHONE ENCOUNTER
Pt is scheduled for August 21.     ----- Message from Liliam Gomes sent at 8/13/2024 12:30 PM CDT -----  Regarding: Pt called states she needs to resche appt unable to do so  Contact: [phone]  Name of Who is Calling:MILKA BARRIGA [[id number]]        What is the request in detail:Pt called states she needs to resche appt unable to do so. Please advise         Can the clinic reply by [facility]:No        What Number to Call Back [facility]: Telephone Information:  Mobile          [phone]

## 2024-08-16 NOTE — PROGRESS NOTES
Chief Complaint   Patient presents with    Follow-up       Interval HPI 8/16/2024:  Follow up visit. Reports that she did see Dr. Bearden for evaluation for potential PNN ablation on the right with bilateral RFA turbinates. She did not schedule this procedure as of yet due to concerns with her A-fib. She reports that she had episode of bronchitis since May. Notes persistent cough which has been productive in nature. She feels that she has mucus in her throat. Denies facial pain/pressure, hyposmia, headaches.     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    Diverticulosis 1998    Hx of atrial flutter     Hx of mitral valve prolapse 1990    Hypothyroidism     Migraines     Recurrent upper respiratory infection (URI)     Supraventricular tachycardia      Social History     Socioeconomic History    Marital status:    Tobacco Use    Smoking status: Never     Passive exposure: Never    Smokeless tobacco: Never   Substance and Sexual Activity    Alcohol use: Yes     Alcohol/week: 2.0 standard drinks of alcohol     Types: 2 Shots of liquor per week     Comment: 1-2 per month    Drug use: No     Comment: CBD oils for headache    Sexual activity: Not Currently     Partners: Male     Birth control/protection: Post-menopausal, See Surgical Hx, None     Comment: , together x 37 years   Social History Narrative    N/A PER THE PATIENT.     Social Determinants of Health     Financial Resource Strain: Low Risk  (1/21/2024)    Overall Financial Resource Strain (CARDIA)     Difficulty of Paying Living Expenses: Not hard at all   Food Insecurity: No Food Insecurity (1/21/2024)    Hunger Vital Sign     Worried About Running Out of Food in the Last Year: Never true     Ran Out of Food in the Last Year: Never true   Transportation  Needs: No Transportation Needs (2024)    PRAPARE - Transportation     Lack of Transportation (Medical): No     Lack of Transportation (Non-Medical): No   Physical Activity: Insufficiently Active (2024)    Exercise Vital Sign     Days of Exercise per Week: 1 day     Minutes of Exercise per Session: 20 min   Stress: No Stress Concern Present (2024)    [employer] [place] [employer] - Occupational Stress Questionnaire     Feeling of Stress : Not at all   Housing Stability: Low Risk  (2024)    Housing Stability Vital Sign     Unable to Pay for Housing in the Last Year: No     Number of Places Lived in the Last Year: 1     Unstable Housing in the Last Year: No     Past Surgical History:   Procedure Laterality Date    ABLATION OF ARRHYTHMOGENIC FOCUS FOR ATRIAL FIBRILLATION N/A 2020    Procedure: ABLATION, ARRHYTHMOGENIC FOCUS, FOR ATRIAL FIBRILLATION;  Surgeon: Moe Seymour MD;  Location: [facility] EP LAB;  Service: Cardiology;  Laterality: N/A;  AFL/AF, KHANH, CTI, PVI, RFA, CARTO, GEN, DM, 3 PREP    AUGMENTATION OF BREAST Bilateral 1984    35 yrs     BREAST SURGERY      augmentation    CARDIOVERSION  2020    Procedure: Cardioversion;  Surgeon: Moe Seymour MD;  Location: [facility] EP LAB;  Service: Cardiology;;     SECTION      COLON SURGERY      partial colon resection    COLONOSCOPY N/A 2021    Procedure: COLONOSCOPY;  Surgeon: Sahara Salgado MD;  Location: [facility] ENDO ([address] [place] [address]);  Service: Endoscopy;  Laterality: N/A;  constipation prep- 7 days of miralax, 2 fulls of fulls, then day bf clears and split prep   covid test 3/5 pcw, prep instr emailed, antibiotics prophylactically, Xarelto hold x 2 days per Dr. Seymour-see telephone encounter 2/10 -ml    COLPOSCOPY      COSMETIC SURGERY      DE QUERVAIN'S RELEASE Left 2022    Procedure: RELEASE, HAND, FOR DEQUERVAIN'S TENOSYNOVITIS;  Surgeon: Toñito Adame Jr., MD;  Location: [facility] OR;  Service:  Orthopedics;  Laterality: Left;    ECHOCARDIOGRAM,TRANSESOPHAGEAL N/A 7/8/2024    Procedure: Transesophageal echo (KHANH) intra-procedure log documentation;  Surgeon: Ramírez Ortez III, MD;  Location: [facility] EP LAB;  Service: Cardiology;  Laterality: N/A;    ESOPHAGOGASTRODUODENOSCOPY N/A 03/19/2021    Procedure: EGD (ESOPHAGOGASTRODUODENOSCOPY);  Surgeon: Sahara Salgado MD;  Location: [facility] ENDO ([address] [place] [address]);  Service: Endoscopy;  Laterality: N/A;    EYE SURGERY Bilateral 2016    Lasik, then cataracts extraction    JOINT REPLACEMENT Right 2/07, 11/07, 2/09    KNEE ARTHROPLASTY Right 2007    revison in 2009    KNEE SURGERY Right 1989    distal femoral replacing TK    OOPHORECTOMY      TOTAL ABDOMINAL HYSTERECTOMY  1994    TAHBSO    TRANSESOPHAGEAL ECHOCARDIOGRAPHY N/A 11/24/2020    Procedure: ECHOCARDIOGRAM, TRANSESOPHAGEAL;  Surgeon: Ramírez Ortez III, MD;  Location: [facility] EP LAB;  Service: Cardiology;  Laterality: N/A;    TREATMENT OF CARDIAC ARRHYTHMIA N/A 08/12/2019    Procedure: CARDIOVERSION;  Surgeon: Moe Seymour MD;  Location: [facility] EP LAB;  Service: Cardiology;  Laterality: N/A;  KHANH/DCCV/MAC/DM/3PREP/*ADMIT FOR TIKOSYN*    TREATMENT OF CARDIAC ARRHYTHMIA N/A 7/8/2024    Procedure: Cardioversion or Defibrillation;  Surgeon: GABRIELE Bull MD;  Location: [facility] EP LAB;  Service: Cardiology;  Laterality: N/A;  AF, KHANH, DCCV, MAC, DM, 3 Prep    TRIGGER FINGER RELEASE Left 08/02/2022    Procedure: RELEASE, TRIGGER FINGER;  Surgeon: Toñito Adame Jr., MD;  Location: [facility] OR;  Service: Orthopedics;  Laterality: Left;  left middle finger     Family History   Problem Relation Name Age of Onset    Heart disease Mother Tianna         Atrial fib    Diabetes Mother Tianna         Type II    Hearing loss Father Adan     Heart disease Father Adan         Angina, AAA    Stroke Father Adan 93    Diabetes Sister Mahogany         Type I    Heart disease Sister Mahogany         Atrrial fib, mitral valve  repair    Heart disease Brother Severo         Atrial fib    Asthma Brother Severo     Diverticulosis Brother Severo     Heart disease Brother Leonard         Atrial fib    Diverticulosis Brother Leonard     Esophageal cancer Maternal Uncle      No Known Problems Daughter Michael     Breast cancer Neg Hx      Ovarian cancer Neg Hx      Colon cancer Neg Hx      Cancer Neg Hx      Hypertension Neg Hx      Eczema Neg Hx             Review of Systems  General: negative for chills, fever or weight loss  Psychological: negative for mood changes or depression  Ophthalmic: negative for blurry vision, photophobia or eye pain  ENT: see HPI  Respiratory: no cough, shortness of breath, or wheezing  Cardiovascular: no chest pain or dyspnea on exertion  Gastrointestinal: no abdominal pain, change in bowel habits, or black/ bloody stools  Musculoskeletal: negative for gait disturbance or muscular weakness  Neurological: no syncope or seizures; no ataxia  Dermatological: negative for puritis,  rash and jaundice  Hematologic/lymphatic: no easy bruising, no new lumps or bumps      Physical Exam:    Vitals:    08/16/24 0951   BP: 117/68   Pulse: 66     Constitutional: Well appearing / communicating without difficutly.  NAD.  Eyes: EOM I Bilaterally  Head/Face: Normocephalic.  Negative paranasal sinus pressure/tenderness.  Salivary glands WNL.  House Brackmann I Bilaterally.    Right Ear: Auricle normal appearance. External Auditory Canal within normal limits,TM w/o masses/lesions/perforations. TM mobility noted.   Left Ear: Auricle normal appearance. External Auditory Canal WNL,TM w/o masses/lesions/perforations. TM mobility noted.  Nose: + mucosal edema. No gross nasal septal deviation. Inferior Turbinates 3+ bilaterally. No septal perforation. No masses/lesions. External nasal skin appears normal without masses/lesions.  Oral Cavity: Gingiva/lips within normal limits.  Dentition/gingiva healthy appearing. Mucus membranes moist. Floor of  mouth soft, no masses palpated. Oral Tongue mobile. Hard Palate appears normal.    Oropharynx: Base of tongue appears normal. No masses/lesions noted. Tonsillar fossa/pharyngeal wall without lesions. Posterior oropharynx WNL.  Soft palate without masses. Midline uvula.   Neck/Lymphatic: No LAD I-VI bilaterally.  No thyromegaly.  No masses noted on exam.          Diagnostic studies:  PFTs: 2020: normal spirometry  Laboratory 2022:  IgE level: Less than 35.  ImmunoCAP:  Negative.  IgA: 114.  Ig.  IgM: 66.  Pneumococcal titers: Not protective.    CT sinus 2023: Images interpreted by me and discussed with patient in detail today.   Right pedro bullosa.  Paradoxical curvature of the middle turbinates anteriorly.  Leftward nasal septal deviation with a 3 mm bony spur abutting the left inferior nasal turbinate.  No nasal polyp formation is identified.     Ostiomeatal units are patent with some anatomic narrowing of the distal infundibulum on the left.  The frontal recesses and sphenoid sinus ostia are patent.     The paranasal sinuses are currently clear other than for minimal mucosal thickening at the floor the left maxillary sinus..     Sellar pneumatization of the sphenoid.  There is some pneumatization overlying the grooves of the anterior ethmoid arteries bilaterally.  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     The middle ears and mastoid air cells are clear.     No acute intracranial process.            Assessment:    ICD-10-CM ICD-9-CM    1. Vasomotor rhinitis  J30.0 477.9       2. Deviated nasal septum  J34.2 470       3. Nasal turbinate hypertrophy  J34.3 478.0       4. Bronchitis  J40 490               The primary encounter diagnosis was Vasomotor rhinitis. Diagnoses of Deviated nasal septum, Nasal turbinate hypertrophy, and Bronchitis were also pertinent to this visit.      Plan:  No orders of the defined types were placed in this encounter.    Continue Nasal saline rinses twice daily.   Start  doxycline 100 mg PO BID for 10 days  Start medrol dose hussein  Start promethazine DM for cough  Continue  Atrovent to 0.6%-  2 sprays per nostril 3 times per day.  Advised to consider PNN ablation on the right with bilateral RFA turbinates with rhinology when able.       Linda Yu MD

## 2024-08-19 ENCOUNTER — TELEPHONE (OUTPATIENT)
Dept: NEUROLOGY | Facility: CLINIC | Age: 74
End: 2024-08-19
Payer: MEDICARE

## 2024-08-19 NOTE — TELEPHONE ENCOUNTER
Spoke to Pt about rescheduling her appt. Pt will be rescheduled for October 28. Appt will be added to the wait list as well.       ----- Message from Barb Bennett sent at 8/19/2024  9:37 AM CDT -----  Regarding: appt access  Contact: pt @ [phone]  Pt is calling to be rescheduled for appt on 08/21 as she is not avail. Pt is advising that this is her second attempt at rescheduling. Please call to advise further. Thank you for all you are doing.

## 2024-08-20 ENCOUNTER — PATIENT MESSAGE (OUTPATIENT)
Dept: ORTHOPEDICS | Facility: CLINIC | Age: 74
End: 2024-08-20
Payer: MEDICARE

## 2024-08-28 ENCOUNTER — HOSPITAL ENCOUNTER (OUTPATIENT)
Dept: CARDIOLOGY | Facility: CLINIC | Age: 74
Discharge: HOME | End: 2024-08-28
Payer: MEDICARE

## 2024-08-28 ENCOUNTER — OFFICE VISIT (OUTPATIENT)
Dept: ELECTROPHYSIOLOGY | Facility: CLINIC | Age: 74
End: 2024-08-28
Payer: MEDICARE

## 2024-08-28 VITALS
WEIGHT: 155.19 LBS | HEIGHT: 68 IN | HEART RATE: 63 BPM | BODY MASS INDEX: 23.52 KG/M2 | SYSTOLIC BLOOD PRESSURE: 102 MMHG | DIASTOLIC BLOOD PRESSURE: 52 MMHG

## 2024-08-28 DIAGNOSIS — I49.1 APC (ATRIAL PREMATURE CONTRACTIONS): ICD-10-CM

## 2024-08-28 DIAGNOSIS — I48.0 PAROXYSMAL ATRIAL FIBRILLATION: Primary | ICD-10-CM

## 2024-08-28 DIAGNOSIS — E03.9 HYPOTHYROIDISM (ACQUIRED): ICD-10-CM

## 2024-08-28 DIAGNOSIS — I48.19 PERSISTENT ATRIAL FIBRILLATION: ICD-10-CM

## 2024-08-28 DIAGNOSIS — Z79.01 ON ANTICOAGULANT THERAPY: ICD-10-CM

## 2024-08-28 LAB
OHS QRS DURATION: 106 MS
OHS QTC CALCULATION: 472 MS

## 2024-08-28 PROCEDURE — 3074F SYST BP LT 130 MM HG: CPT | Mod: CPTII,S$GLB,, | Performed by: INTERNAL MEDICINE

## 2024-08-28 PROCEDURE — 3078F DIAST BP <80 MM HG: CPT | Mod: CPTII,S$GLB,, | Performed by: INTERNAL MEDICINE

## 2024-08-28 PROCEDURE — 3288F FALL RISK ASSESSMENT DOCD: CPT | Mod: CPTII,S$GLB,, | Performed by: INTERNAL MEDICINE

## 2024-08-28 PROCEDURE — 93005 ELECTROCARDIOGRAM TRACING: CPT | Mod: S$GLB,,, | Performed by: INTERNAL MEDICINE

## 2024-08-28 PROCEDURE — 1101F PT FALLS ASSESS-DOCD LE1/YR: CPT | Mod: CPTII,S$GLB,, | Performed by: INTERNAL MEDICINE

## 2024-08-28 PROCEDURE — 99999 PR PBB SHADOW E&M-EST. PATIENT-LVL III: CPT | Mod: PBBFAC,,, | Performed by: INTERNAL MEDICINE

## 2024-08-28 PROCEDURE — 3008F BODY MASS INDEX DOCD: CPT | Mod: CPTII,S$GLB,, | Performed by: INTERNAL MEDICINE

## 2024-08-28 PROCEDURE — 1159F MED LIST DOCD IN RCRD: CPT | Mod: CPTII,S$GLB,, | Performed by: INTERNAL MEDICINE

## 2024-08-28 PROCEDURE — 1126F AMNT PAIN NOTED NONE PRSNT: CPT | Mod: CPTII,S$GLB,, | Performed by: INTERNAL MEDICINE

## 2024-08-28 PROCEDURE — 99215 OFFICE O/P EST HI 40 MIN: CPT | Mod: S$GLB,,, | Performed by: INTERNAL MEDICINE

## 2024-08-28 PROCEDURE — 93010 ELECTROCARDIOGRAM REPORT: CPT | Mod: S$GLB,,, | Performed by: INTERNAL MEDICINE

## 2024-08-28 RX ORDER — METOPROLOL SUCCINATE 25 MG/1
25 TABLET, EXTENDED RELEASE ORAL DAILY
Qty: 90 TABLET | Refills: 3 | Status: SHIPPED | OUTPATIENT
Start: 2024-08-28 | End: 2025-08-28
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
## 2024-09-23 NOTE — TELEPHONE ENCOUNTER
No care due was identified.  [facility] Embedded Care Due Messages. Reference number: [id number].   9/23/2024 6:06:56 PM CDT

## 2024-09-24 RX ORDER — ACYCLOVIR 400 MG/1
400 TABLET ORAL 3 TIMES DAILY PRN
Qty: 90 TABLET | Refills: 0 | Status: SHIPPED | OUTPATIENT
Start: 2024-09-24

## 2024-09-27 RX ORDER — FAMOTIDINE 20 MG/1
20 TABLET, FILM COATED ORAL 2 TIMES DAILY
Qty: 60 TABLET | Refills: 4 | Status: SHIPPED | OUTPATIENT
Start: 2024-09-27 | End: 2025-09-27

## 2024-10-05 NOTE — TELEPHONE ENCOUNTER
Spoke w/ pt regarding wanting to schedule appt. Informed pt that DM wasn't available for dates preferred, but I scheduled pt for 7/12  ----- Message from Chrissy Snowden sent at 6/25/2019  5:02 PM CDT -----  Contact: yen  Appointment Request From: Zoë Enrique    With Provider: Moe Seymour    Preferred Date Range: 7/10/2019 - 7/11/2019    Preferred Times: Any time    Reason for visit: New patient    Comments:  Arrhythmia - a fib, a flutter        
PAST SURGICAL HISTORY:  No significant past surgical history

## 2024-10-08 ENCOUNTER — TELEPHONE (OUTPATIENT)
Dept: ENDOSCOPY | Facility: HOSPITAL | Age: 74
End: 2024-10-08
Payer: MEDICARE

## 2024-10-08 NOTE — TELEPHONE ENCOUNTER
"----- Message from Padmini sent at 2024  8:40 AM CDT -----  Regarding: FW: EGD with HREM    ----- Message -----  From: Oliva Neumann NP  Sent: 2024   3:16 PM CDT  To: [facility]  Subject: EGD with HREM                                    Procedure: EGD w/high resolution esophageal manometry (please place catheter while under anesthesia)    Diagnosis: GERD and Dysphagia    Procedure Timin-12 weeks    #If within 4 weeks selected, please julissa as high priority#    #If greater than 12 weeks, please select "5-12 weeks" and delay sending until 3 months prior to requested date#     [place] [facility] [place] [facility]    Additional Scheduling Information: Blood thinners (Xarelto with Dr. Kat BIANCHI)    Prep Specifications:N/A    Is the patient taking a GLP-1 Agonist:no    Have you attached a patient to this message: yes  "

## 2024-11-04 ENCOUNTER — PATIENT MESSAGE (OUTPATIENT)
Dept: ELECTROPHYSIOLOGY | Facility: CLINIC | Age: 74
End: 2024-11-04
Payer: MEDICARE

## 2024-11-07 ENCOUNTER — OFFICE VISIT (OUTPATIENT)
Dept: ALLERGY | Facility: CLINIC | Age: 74
End: 2024-11-07
Payer: MEDICARE

## 2024-11-07 VITALS — HEIGHT: 68 IN | WEIGHT: 155.88 LBS | BODY MASS INDEX: 23.63 KG/M2

## 2024-11-07 DIAGNOSIS — J06.9 UPPER RESPIRATORY TRACT INFECTION, UNSPECIFIED TYPE: ICD-10-CM

## 2024-11-07 DIAGNOSIS — H10.403 CHRONIC CONJUNCTIVITIS OF BOTH EYES, UNSPECIFIED CHRONIC CONJUNCTIVITIS TYPE: ICD-10-CM

## 2024-11-07 DIAGNOSIS — J31.0 CHRONIC RHINITIS: ICD-10-CM

## 2024-11-07 DIAGNOSIS — Z86.19 FREQUENT INFECTIONS: Primary | ICD-10-CM

## 2024-11-07 PROCEDURE — 99999 PR PBB SHADOW E&M-EST. PATIENT-LVL III: CPT | Mod: PBBFAC,,, | Performed by: ALLERGY & IMMUNOLOGY

## 2024-11-07 RX ORDER — AMOXICILLIN AND CLAVULANATE POTASSIUM 875; 125 MG/1; MG/1
1 TABLET, FILM COATED ORAL 2 TIMES DAILY
Qty: 20 TABLET | Refills: 1 | Status: SHIPPED | OUTPATIENT
Start: 2024-11-07

## 2024-11-07 NOTE — PROGRESS NOTES
"Zoë Enrique returns to clinic today for continued evaluation of recurrent upper respiratory infections.  She was last seen January 10, 2023.  She is here alone.  She is a retired RN.    Last Saturday, she started having increased fatigue and lightheadedness.  Her blood pressure readings at home were lower than normal.  Her pulse rate has been in the 60s.  She was in contact with Dr. Seymour's office  in electrophysiology.  She was told to hold her flecainide for four days and cut her metoprolol in half.    She has continued to feel dizzy.  When she was walking into clinic today she developed a  floater in her right eye that looked like a "C".  She then had one on the left.  They resolved while she was in the office.    She has been sitting with her 4-month-old and 4-year-old grandchildren.  Over the last year she has had at least six upper respiratory infections. She thinks she has taken antibiotics and possibly steroids about 4 times.    She has had increased rhinitis for the past month.  She now has green rhinorrhea and a cough that is productive green mucus.    Her  has had almost as many upper respiratory infections as she has has.  They attribute them to being around the small children.    She has had low pneumococcal titers in the past that have not responded well to Pneumovax 23 or Prevnar.    Her reflux is now under better control.   She was not able to tolerate omeprazole. She is now taking Pepcid.  She last saw Dr. Linda Oneill in ENT on August 6, 2024.  She did not have any evidence of LPR.         11/6/2024     7:33 PM   [facility] PEQ ALLERGY QUESTIONNAIRE SHORT   facial swelling No   Sinus pain? Yes   sinus pressure  Yes   ear discharge No   ear pain Yes   hearing loss Yes   nosebleeds No   postnasal drip Yes   sneezing No   runny nose Yes   congestion Yes   sore throat Yes   trouble swallowing Yes   voice change No   eye itching Yes   eye redness Yes   eye discharge No   eye pain No   Light " sensitivity / light hurts the eyes? No   cough Yes   wheezing No   shortness of breath Yes   apnea No   choking No   chest tightness Yes   Skin: No symptoms      Physical Examination:  General: Well-developed, well-nourished, no acute distress.  No throat clearing.  Head: No sinus tenderness.  Eyes: Conjunctivae:  No bulbar or palpebral conjunctival injection.  Ears: EAC's clear.  TM's clear.  No pre-auricular nodes.  Nose: Nasal Mucosa:  Pink.  Septum: No apparent deviation.  Turbinates:  No significant edema.  Polyps/Mass:  None visible.  Teeth/Gums:  No bleeding noted.  Oropharynx: No exudates.  Neck: Supple without thyromegaly. No cervical lymphadenopathy.    Respiratory/Chest: Effort: Good.  Auscultation:  Clear bilaterally.  Skin: Good turgor.  No urticaria or angioedema.  Neuro/Psych: Oriented x 3.    Spirometry 12/16/2020:  Spirometry is normal. Lung volume determination is normal. DLCO is normal.    Chest CT 01/14/2022:  1.  Tubular bronchiectasis with retained secretions and innumerable peribronchovascular distributed punctate micro nodules, predominantly within the right middle and lower lobes, concerning for an atypical pneumonia, such as non tuberculous mycobacterium or chronic bronchiolitis.  Findings are increased compared to prior studies.  2.  Scattered bandlike opacities compatible with subsegmental atelectasis.  3.  Additional scattered calcified and noncalcified pulmonary nodules, largest measuring 0.5 cm.  For multiple solid nodules all <6 mm, Fleischner Society 2017 guidelines recommend no routine follow up for a low risk patient, or follow up with non-contrast chest CT at 12 months after discovery in a high risk patient.  4.  Dilated left atrial appendage, small foci of atrial wall calcifications.  Correlate with chronic left atrial dysfunction.  5.  Left, and possibly small right, implant rupture without infiltration into adjacent breast tissue.    Laboratory 06/16/2022:  IgE level: Less than  35.  ImmunoCAP:  Negative.  IgA: 114.  Ig.  IgM: 66.  Pneumococcal titers: Not protective.    Inhalant skin tests 2022: 3+ histamine control. All tests are negative.    Laboratory 2022:  Pneumococcal titers:  Borderline protective.    Sinus CT 2023:  Nasal septal deviation to the left.  The paranasal sinuses essentially are currently clear.  The ostiomeatal complexes are patent with some anatomic narrowing of the distal infundibulum on the left.    Chest x-ray 2023:  No active cardiac or pulmonary findings.    Complete PFT 2023:  Spirometry is normal.  Remains in improve any bronchodilator. Lung volume determination normal DLCO is mildly decreased.    Laboratory 2024:   Pneumococcal titers:  Not protective.  CBC:  Normal.   CMP:  Normal.  TSH:  1.853.   Free T4:  0.91.   Lipid panel:  Cholesterol 200.    Laboratory 2024:   CBC: Normal.   Chem 6: Normal.     Assessment:  1. Chronic rhinitis, not allergic.  2. Chronic conjunctivitis, not allergic.  3. Chronic cough, without allergic component, resolved.  4. Recurrent upper respiratory infections, with previously low pneumococcal titer.  5. LPR, improved.  6. Right middle lobe bronchiectasis.  7. S/P five ablations for atrial fibrillation currently on Xarelto.  8. Hypothyroidism on levothyroxine.  9. Dry eye syndrome.  10. 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  11. S/P hysterectomy .  12. S/P diverticulosis with resection .    13. Migraine headaches.    Recommendations:  1. Repeat Pneumovax 23.  2. Recheck titers in six weeks.  3. Continue Pepcid.  4. Return to clinic in eight weeks.  5. She would like for her  to be seen at the same time.    Patient education 2023:   LPR and web site were reviewed.    Patient education 2022:   LPR and web site were reviewed.   Relationship between sinus headaches and migraines was reviewed.

## 2024-11-12 ENCOUNTER — PATIENT MESSAGE (OUTPATIENT)
Dept: ADMINISTRATIVE | Facility: OTHER | Age: 74
End: 2024-11-12
Payer: MEDICARE

## 2024-11-13 ENCOUNTER — PATIENT MESSAGE (OUTPATIENT)
Dept: PRIMARY CARE CLINIC | Facility: CLINIC | Age: 74
End: 2024-11-13
Payer: MEDICARE

## 2024-11-13 ENCOUNTER — HOSPITAL ENCOUNTER (OUTPATIENT)
Dept: CARDIOLOGY | Facility: CLINIC | Age: 74
Discharge: HOME | End: 2024-11-13
Payer: MEDICARE

## 2024-11-13 DIAGNOSIS — I48.0 PAROXYSMAL ATRIAL FIBRILLATION: Primary | ICD-10-CM

## 2024-11-13 DIAGNOSIS — I48.19 PERSISTENT ATRIAL FIBRILLATION: ICD-10-CM

## 2024-11-13 LAB
OHS QRS DURATION: 86 MS
OHS QTC CALCULATION: 444 MS

## 2024-11-13 PROCEDURE — 93005 ELECTROCARDIOGRAM TRACING: CPT | Mod: S$GLB,,, | Performed by: INTERNAL MEDICINE

## 2024-11-13 PROCEDURE — 93010 ELECTROCARDIOGRAM REPORT: CPT | Mod: S$GLB,,, | Performed by: INTERNAL MEDICINE

## 2024-11-15 ENCOUNTER — PATIENT MESSAGE (OUTPATIENT)
Dept: OPTOMETRY | Facility: CLINIC | Age: 74
End: 2024-11-15
Payer: MEDICARE

## 2024-11-15 RX ORDER — CYCLOSPORINE 0.5 MG/ML
1 EMULSION OPHTHALMIC 2 TIMES DAILY
Qty: 180 EACH | Refills: 3 | Status: SHIPPED | OUTPATIENT
Start: 2024-11-15 | End: 2025-11-15

## 2024-11-19 ENCOUNTER — PATIENT MESSAGE (OUTPATIENT)
Dept: ALLERGY | Facility: CLINIC | Age: 74
End: 2024-11-19
Payer: MEDICARE

## 2024-11-21 ENCOUNTER — CLINICAL SUPPORT (OUTPATIENT)
Dept: ALLERGY | Facility: CLINIC | Age: 74
End: 2024-11-21
Payer: MEDICARE

## 2024-11-21 ENCOUNTER — PATIENT MESSAGE (OUTPATIENT)
Dept: ALLERGY | Facility: CLINIC | Age: 74
End: 2024-11-21
Payer: MEDICARE

## 2024-11-21 DIAGNOSIS — R76.0 ABNORMAL ANTIBODY TITER: ICD-10-CM

## 2024-11-21 DIAGNOSIS — Z86.19 FREQUENT INFECTIONS: Primary | ICD-10-CM

## 2024-11-21 DIAGNOSIS — I48.0 PAROXYSMAL ATRIAL FIBRILLATION: Primary | ICD-10-CM

## 2024-11-21 PROCEDURE — 90732 PPSV23 VACC 2 YRS+ SUBQ/IM: CPT | Mod: S$GLB,,, | Performed by: ALLERGY & IMMUNOLOGY

## 2024-11-21 PROCEDURE — G0009 ADMIN PNEUMOCOCCAL VACCINE: HCPCS | Mod: S$GLB,,, | Performed by: ALLERGY & IMMUNOLOGY

## 2024-11-21 NOTE — PROGRESS NOTES
PNEUMOVAX 23 LEFT DELTOID IM given, tolerated well. Pt. Instructed to wait 15 minutes. VIS FORM given.  LABS SCHEDULE

## 2024-11-27 DIAGNOSIS — M54.16 LUMBAR BACK PAIN WITH RADICULOPATHY AFFECTING LOWER EXTREMITY: Primary | ICD-10-CM

## 2024-11-27 DIAGNOSIS — G57.11 MERALGIA PARESTHETICA, RIGHT: ICD-10-CM

## 2024-11-27 DIAGNOSIS — M16.11 PRIMARY OSTEOARTHRITIS OF RIGHT HIP: ICD-10-CM

## 2024-12-01 NOTE — PROGRESS NOTES
Subjective   Patient ID:  Zoë Enrique is a 74 y.o. female who presents for follow-up of Atrial Fibrillation      74 yoF here for AF management. She has had AF for several years and underwent ablations by Dr Rowland at [facility], at [place] and more recently a complex ablation 11/24/2020 by Dr Seymour. She developed atypical AFL and underwent KHANH/CV 7/24 by Dr Seymour. She was placed on flecainide for rhythm control after. At some point between her CV and November she was taken off flecainide. She felt recurrence of symptoms and was in AFL 11/13/24. She feels poorly in AFL and has felt much better since she converted  back to SR a few weeks ago. Xarelto for CVA prophylaxis.     Ablation 11/20:  · Successful ablation of atrial tachycardia.  · Successful ablation of atrial flutter (typical).  · Persistently successful prior pulmonary vein RF ablation.  · Catheter ablation of two mechanistically distinct tachycardias (focal AT and CTI-dependent AFL).  · Intracardiac echo.  · Transeptal puncture.  · Cardioversion was successfully performed with restoration of normal sinus rhythm.  · 3D mapping performed with Carto 3.  · Cardioversion.  · CS catheter placement and pacing.  · Device reprogramming.  · Electrophysiology study with coronary sinus pacing.  · His bundle recording.  · Radiofrequency ablation.    KHANH/CV 7/24:  ·  KHANH prior to ablation.  ·  Left Ventricle: The left ventricle is normal in size. There is normal systolic function with a visually estimated ejection fraction of 55 - 60%.  ·  Right Ventricle: Normal right ventricular cavity size. Systolic function is normal.  ·  Left Atrium: Left atrium is dilated. The left atrial appendage appears normal. The left atrial appendage has a chicken wing morphology. Appendage velocity is reduced at less than 40 cm/sec. There is no thrombus in the left atrial appendage. The pulmonary veins have systolic blunting.  ·  Mitral Valve: There is moderate posterior mitral  annular calcification present. There is likely a small perforation of the posterior mitral leaflet (P2) with associated mild MR. There is prolapse of the posterior mitral leaflet. There is mild regurgitation.  ·  Tricuspid Valve: There is mild regurgitation.  ·  Aorta: Grade 2 atherosclerosis of the descending aorta and in the aortic arch.  ·  Pericardium: There is a small effusion adjacent to the right ventricle.      My interpretation of the ECG is:    NSR   ms, QRS 82 ms    Past Medical History:  No date: Abnormal Pap smear of cervix  2017: Atrial fibrillation  No date: Atrial flutter  2021: Basal cell carcinoma      Comment:  superficial BCC R cheek s/p Efudex  : Cancer      Comment:  osteosarcoma of right femur  : Diverticulosis  No date: Hx of atrial flutter  : Hx of mitral valve prolapse  No date: Hypothyroidism  No date: Migraines  No date: Recurrent upper respiratory infection (URI)  No date: Supraventricular tachycardia    Past Surgical History:  2020: ABLATION OF ARRHYTHMOGENIC FOCUS FOR ATRIAL FIBRILLATION;   N/A      Comment:  Procedure: ABLATION, ARRHYTHMOGENIC FOCUS, FOR ATRIAL                FIBRILLATION;  Surgeon: Moe Seymour MD;  Location:                [facility] EP LAB;  Service: Cardiology;  Laterality: N/A;                 AFL/AF, KHANH, CTI, PVI, RFA, CARTO, GEN, DM, 3 PREP  : AUGMENTATION OF BREAST; Bilateral      Comment:  35 yrs   : BREAST SURGERY      Comment:  augmentation  2020: CARDIOVERSION      Comment:  Procedure: Cardioversion;  Surgeon: Moe Seymour MD;               Location: [facility] EP LAB;  Service: Cardiology;;  1988:  SECTION  : COLON SURGERY      Comment:  partial colon resection  2021: COLONOSCOPY; N/A      Comment:  Procedure: COLONOSCOPY;  Surgeon: Sahara Salgado MD;                [facility]  Service: Endoscopy;                 Laterality: N/A;  constipation prep- 7 days of miralax, 2                fulls of fulls, then day bf clears and split prep covid               test 3/5 pcw, prep instr emailed, antibiotics                prophylactically, Xarelto hold x 2 days per Dr. Seymour-see               telephone encounter 2/10 -ml  No date: COLPOSCOPY  1987: COSMETIC SURGERY  08/02/2022: DE QUERVAIN'S RELEASE; Left      Comment:  Procedure: RELEASE, HAND, FOR DEQUERVAIN'S                TENOSYNOVITIS;  Surgeon: Toñito Adame Jr., MD;                 Location: [facility] OR;  Service: Orthopedics;  Laterality:                Left;  7/8/2024: ECHOCARDIOGRAM,TRANSESOPHAGEAL; N/A      Comment:  Procedure: Transesophageal echo (KHANH) intra-procedure                log documentation;  Surgeon: Ramírez Ortez III, MD;                Location: [facility] EP LAB;  Service: Cardiology;  Laterality:               N/A;  03/19/2021: ESOPHAGOGASTRODUODENOSCOPY; N/A      Comment:  Procedure: EGD (ESOPHAGOGASTRODUODENOSCOPY);  Surgeon:                Sahara Salgado MD;  Location: [facility] ENDO ([address] [place] [address]);                 Service: Endoscopy;  Laterality: N/A;  2016: EYE SURGERY; Bilateral      Comment:  Lasik, then cataracts extraction  2/07, 11/07, 2/09: JOINT REPLACEMENT; Right  2007: KNEE ARTHROPLASTY; Right      Comment:  revison in 2009 1989: KNEE SURGERY; Right      Comment:  distal femoral replacing TK  No date: OOPHORECTOMY  1994: TOTAL ABDOMINAL HYSTERECTOMY      Comment:  TAHBSO  11/24/2020: TRANSESOPHAGEAL ECHOCARDIOGRAPHY; N/A      Comment:  Procedure: ECHOCARDIOGRAM, TRANSESOPHAGEAL;  Surgeon:                Ramírez Ortez III, MD;  Location: [facility] EP LAB;                 Service: Cardiology;  Laterality: N/A;  08/12/2019: TREATMENT OF CARDIAC ARRHYTHMIA; N/A      Comment:  Procedure: CARDIOVERSION;  Surgeon: Moe Seymour MD;               Location: [facility] EP LAB;  Service: Cardiology;  Laterality:               N/A;  KHANH/DCCV/MAC/DM/3PREP/*ADMIT FOR TIKOSYN*  7/8/2024: TREATMENT OF CARDIAC ARRHYTHMIA; N/A       Comment:  Procedure: Cardioversion or Defibrillation;  Surgeon:                GABRIELE Bull MD;  Location: [facility] EP LAB;  Service:               Cardiology;  Laterality: N/A;  AF, KHANH, DCCV, MAC, DM, 3                Prep  08/02/2022: TRIGGER FINGER RELEASE; Left      Comment:  Procedure: RELEASE, TRIGGER FINGER;  Surgeon: Toñito Adame Jr., MD;  Location: [facility] OR;  Service:                Orthopedics;  Laterality: Left;  left middle finger    Social History    Socioeconomic History      Marital status:     Tobacco Use      Smoking status: Never        Passive exposure: Never      Smokeless tobacco: Never    Substance and Sexual Activity      Alcohol use: Yes        Alcohol/week: 2.0 standard drinks of alcohol        Types: 2 Shots of liquor per week        Comment: 1-2 per month      Drug use: No        Comment: CBD oils for headache      Sexual activity: Not Currently        Partners: Male        Birth control/protection: Post-menopausal, See Surgical Hx, None        Comment: , together x 37 years    Social History Narrative      N/A PER THE PATIENT.    Social Drivers of Health  Financial Resource Strain: Low Risk  (11/25/2024)      [place] [facility]      Overall Financial Resource Strain (CARDIA)          Difficulty of Paying Living Expenses: Not hard at all  Food Insecurity: No Food Insecurity (11/25/2024)      [place] [facility]      Hunger Vital Sign          Worried About Running Out of Food in the Last Year: Never true          Ran Out of Food in the Last Year: Never true  Transportation Needs: No Transportation Needs (11/25/2024)      [place] [facility]      PRAPARE - Transportation          Lack of Transportation (Medical): No          Lack of Transportation (Non-Medical): No  Physical Activity: Insufficiently Active (11/25/2024)      [place] [facility]      Exercise Vital Sign          Days of Exercise per Week: 3 days          Minutes of  Exercise per Session: 20 min  Stress: No Stress Concern Present (11/25/2024)      Received from [facility] - Occupational Stress Questionnaire          Feeling of Stress : Not at all  Housing Stability: Unknown (11/25/2024)      Received from [facility]      Housing Stability Vital Sign          Unable to Pay for Housing in the Last Year: No    Review of patient's family history indicates:  Problem: Heart disease      Relation: Mother          Name: Tianna              Age of Onset: (Not Specified)              Comment: Atrial fib  Problem: Diabetes      Relation: Mother          Name: Tianna              Age of Onset: (Not Specified)              Comment: Type II  Problem: Hearing loss      Relation: Father          Name: Adan              Age of Onset: (Not Specified)  Problem: Heart disease      Relation: Father          Name: Adan              Age of Onset: (Not Specified)              Comment: Angina, AAA  Problem: Stroke      Relation: Father          Name: Adan              Age of Onset: 93  Problem: Diabetes      Relation: Sister          Name: Mahogany              Age of Onset: (Not Specified)              Comment: Type I  Problem: Heart disease      Relation: Sister          Name: Mahogany              Age of Onset: (Not Specified)              Comment: Atrrial fib, mitral valve repair  Problem: Heart disease      Relation: Brother          Name: Severo              Age of Onset: (Not Specified)              Comment: Atrial fib  Problem: Asthma      Relation: Brother          Name: Severo              Age of Onset: (Not Specified)  Problem: Diverticulosis      Relation: Brother          Name: Severo              Age of Onset: (Not Specified)  Problem: Heart disease      Relation: Brother          Name: Leonard              Age of Onset: (Not Specified)              Comment: Atrial fib  Problem: Diverticulosis      Relation: Brother          Name: Leonard               Age of Onset: (Not Specified)  Problem: Esophageal cancer      Relation: Maternal Uncle          Name:               Age of Onset: (Not Specified)  Problem: No Known Problems      Relation: Daughter          Name: Michael              Age of Onset: (Not Specified)  Problem: Breast cancer      Relation: Neg Hx          Name:               Age of Onset: (Not Specified)  Problem: Ovarian cancer      Relation: Neg Hx          Name:               Age of Onset: (Not Specified)  Problem: Colon cancer      Relation: Neg Hx          Name:               Age of Onset: (Not Specified)  Problem: Cancer      Relation: Neg Hx          Name:               Age of Onset: (Not Specified)  Problem: Hypertension      Relation: Neg Hx          Name:               Age of Onset: (Not Specified)  Problem: Eczema      Relation: Neg Hx          Name:               Age of Onset: (Not Specified)      Current Outpatient Medications:  acyclovir (ZOVIRAX) 400 MG tablet, Take 1 tablet (400 mg total) by mouth 3 (three) times daily as needed., Disp: 90 tablet, Rfl: 0  amoxicillin (AMOXIL) 500 MG capsule, Take 500 mg by mouth as needed., Disp: , Rfl:   amoxicillin-clavulanate 875-125mg (AUGMENTIN) 875-125 mg per tablet, Take 1 tablet by mouth 2 (two) times daily., Disp: 20 tablet, Rfl: 1  docusate sodium (COLACE) 250 MG capsule, Take 250 mg by mouth 2 (two) times daily. , Disp: , Rfl:   estradioL (ESTRACE) 0.01 % (0.1 mg/gram) vaginal cream, PLACE 1 GRAM VAGINALLY EVERY OTHER DAY AS DIRECTED, Disp: 43 g, Rfl: 0  famotidine (PEPCID) 20 MG tablet, Take 1 tablet (20 mg total) by mouth 2 (two) times daily., Disp: 60 tablet, Rfl: 4  flecainide (TAMBOCOR) 100 MG Tab, Take 1 tablet (100 mg total) by mouth every 12 (twelve) hours. (Patient not taking: Reported on 11/7/2024), Disp: 60 tablet, Rfl: 11  HYDROcodone-acetaminophen (NORCO) 5-325 mg per tablet, Take 1 tablet by mouth every 6 (six) hours as needed for Pain. Pt states takes PRN for headaches,  Disp: 28 tablet, Rfl: 0  ipratropium (ATROVENT) 42 mcg (0.06 %) nasal spray, 2 sprays by Each Nostril route 3 (three) times daily., Disp: 15 mL, Rfl: 6  levothyroxine (SYNTHROID) 25 MCG tablet, Take 1 tablet by mouth once daily, Disp: 90 tablet, Rfl: 3  metoprolol succinate (TOPROL-XL) 25 MG 24 hr tablet, Take 1 tablet (25 mg total) by mouth once daily. (Patient taking differently: Take by mouth once daily.), Disp: 90 tablet, Rfl: 3  RESTASIS 0.05 % ophthalmic emulsion, Place 1 drop into both eyes 2 (two) times daily., Disp: 180 each, Rfl: 3  rivaroxaban (XARELTO) 20 mg Tab, Take 1 tablet (20 mg total) by mouth once daily., Disp: 90 tablet, Rfl: 3  sodium chloride 3% 3 % nebulizer solution, Take 4 mLs by nebulization 2 (two) times a day., Disp: 300 mL, Rfl: PRN  vit C/E/Zn/coppr/lutein/zeaxan (PRESERVISION AREDS-2 ORAL), , Disp: , Rfl:     No current facility-administered medications for this visit.  Facility-Administered Medications Ordered in Other Visits:  sodium chloride 0.9% flush 5 mL, 5 mL, Intravenous, PRN, Aleksandra Taylor, JO-ANN              Review of Systems   Constitutional: Negative.   HENT: Negative.     Eyes: Negative.    Cardiovascular:  Negative for chest pain, dyspnea on exertion, leg swelling, near-syncope, palpitations and syncope.   Respiratory: Negative.  Negative for shortness of breath.    Endocrine: Negative.    Hematologic/Lymphatic: Negative.    Skin: Negative.    Musculoskeletal: Negative.    Gastrointestinal: Negative.    Genitourinary: Negative.    Neurological: Negative.  Negative for dizziness and light-headedness.   Psychiatric/Behavioral: Negative.     Allergic/Immunologic: Negative.           Objective     Physical Exam  Vitals reviewed.   Constitutional:       General: She is not in acute distress.     Appearance: She is well-developed.   HENT:      Head: Normocephalic and atraumatic.   Eyes:      Pupils: Pupils are equal, round, and reactive to light.   Neck:      Thyroid: No  thyromegaly.      Vascular: No JVD.   Cardiovascular:      Rate and Rhythm: Normal rate and regular rhythm.      Chest Wall: PMI is not displaced.      Heart sounds: Normal heart sounds, S1 normal and S2 normal. No murmur heard.     No friction rub. No gallop.   Pulmonary:      Effort: Pulmonary effort is normal. No respiratory distress.      Breath sounds: Normal breath sounds. No wheezing or rales.   Abdominal:      General: Bowel sounds are normal. There is no distension.      Palpations: Abdomen is soft.      Tenderness: There is no abdominal tenderness. There is no guarding or rebound.   Musculoskeletal:         General: No tenderness. Normal range of motion.      Cervical back: Normal range of motion.   Skin:     General: Skin is warm and dry.      Findings: No erythema or rash.   Neurological:      Mental Status: She is alert and oriented to person, place, and time.      Cranial Nerves: No cranial nerve deficit.   Psychiatric:         Behavior: Behavior normal.         Thought Content: Thought content normal.         Judgment: Judgment normal.            Assessment and Plan     1. Paroxysmal atrial fibrillation        Plan:  74 yoF here for arrhythmia management. She has recurrence of an atypical AFL/AT with intolerance to flecainide. I recommended PRN flecainide which she agreed to given the infrequency of her events. If her arrhythmia worsens, I would offer atypical AFL ablation. RTC 3m

## 2024-12-03 ENCOUNTER — HOSPITAL ENCOUNTER (OUTPATIENT)
Dept: CARDIOLOGY | Facility: CLINIC | Age: 74
Discharge: HOME | End: 2024-12-03
Payer: MEDICARE

## 2024-12-03 ENCOUNTER — OFFICE VISIT (OUTPATIENT)
Dept: ELECTROPHYSIOLOGY | Facility: CLINIC | Age: 74
End: 2024-12-03
Payer: MEDICARE

## 2024-12-03 VITALS
HEIGHT: 68 IN | BODY MASS INDEX: 23.76 KG/M2 | DIASTOLIC BLOOD PRESSURE: 80 MMHG | WEIGHT: 156.75 LBS | HEART RATE: 64 BPM | SYSTOLIC BLOOD PRESSURE: 124 MMHG

## 2024-12-03 DIAGNOSIS — I48.0 PAROXYSMAL ATRIAL FIBRILLATION: Primary | ICD-10-CM

## 2024-12-03 DIAGNOSIS — I48.0 PAROXYSMAL ATRIAL FIBRILLATION: ICD-10-CM

## 2024-12-03 LAB
OHS QRS DURATION: 82 MS
OHS QTC CALCULATION: 435 MS

## 2024-12-03 PROCEDURE — 93005 ELECTROCARDIOGRAM TRACING: CPT | Mod: S$GLB,,, | Performed by: INTERNAL MEDICINE

## 2024-12-03 PROCEDURE — 99999 PR PBB SHADOW E&M-EST. PATIENT-LVL III: CPT | Mod: PBBFAC,,, | Performed by: INTERNAL MEDICINE

## 2024-12-03 PROCEDURE — 93010 ELECTROCARDIOGRAM REPORT: CPT | Mod: S$GLB,,, | Performed by: INTERNAL MEDICINE

## 2024-12-04 ENCOUNTER — CLINICAL SUPPORT (OUTPATIENT)
Dept: REHABILITATION | Facility: HOSPITAL | Age: 74
End: 2024-12-04
Payer: MEDICARE

## 2024-12-04 DIAGNOSIS — R53.1 WEAKNESS: ICD-10-CM

## 2024-12-04 DIAGNOSIS — M25.551 RIGHT HIP PAIN: Primary | ICD-10-CM

## 2024-12-04 PROCEDURE — 97112 NEUROMUSCULAR REEDUCATION: CPT

## 2024-12-04 PROCEDURE — 97161 PT EVAL LOW COMPLEX 20 MIN: CPT

## 2024-12-04 NOTE — PLAN OF CARE
[facility]   Physical Therapy Initial Evaluation      Name: Zoë Longmion  [facility] Number: [id number]    Therapy Diagnosis:   Encounter Diagnoses   Name Primary?    Right hip pain Yes    Weakness         Physician: Alba Torres PA-C    Physician Orders: PT Eval and Treat  Medical Diagnosis from Referral:   M54.16 (ICD-10-CM) - Lumbar radiculopathy   M16.11 (ICD-10-CM) - Primary osteoarthritis of right hip     Evaluation Date: 12/4/2024  Authorization Period Expiration: TBD  Plan of Care Expiration: 3/4/2025  Progress Note Due: 1/4/2024  Visit # / Visits authorized: 1/ 1   FOTO: 1/ 3    Precautions: Standard and hx of [place]     Time In: 10:00 am  Time Out: 10:45 am  Total Billable Time: 45 minutes    Subjective     Date of onset: August    History of current condition - Zoë reports: She had 4 knee surgery because she had a low grade bone cancer. Now she has hardware in there. She started having anterior hip pain intermittently in the front of the hip that was described as sharp. It has progressively gotten worse. She has a 5 month old granddaughter that she picks up and and that she is having pain going down the front of the leg. It is waking her up at night and has to take medications. She has trouble standing and walking because of it sometimes. She saw a chiropractor yesterday afternoon and had some adjustments done and already feels better. Resting, tylenol, and heat helps. She denies numbness and tingling. She describes the pain as ache and sometimes a burning sensation funning from ASIS to knee. She denies any new mechanical symptoms or buckling. She had some muscle taken out of quad at initial surgery and has a pocket of fluid at anterior inferior knee that has been there for years. She does find that she needs UE support to standing up from sitting. At the chiropractor they adjusted her because her legs were two different lengs    Falls: none recently    Imaging: Lumbar X-ray:  "Per EMR "FINDINGS: Alignment is within normal limits. Mild anterolisthesis of L4 in relation to L5, with anterior offset measuring approximately 4 mm. No fracture is seen. The vertebral body heights are maintained. Disc space narrowing at the L3-4, L4-5 and L5-S1 levels with hypertrophic osteoarthropathy in the facets at those levels. Pedicular outlines are maintained on the frontal view. The prevertebral soft tissues are within normal limits. The sacroiliac joints are normal."  Femur X-ray: Per EMR "FINDINGS: Redemonstration of the long neck total right knee arthroplasty without evidence of loosening or failure. No evidence of an acute displaced fracture or dislocation. Postprocedural soft tissue changes are noted. Grossly unchanged appearance of the enthesophyte-like calcification of the right femoral mid shaft. Right hip joint space narrowing and sclerosis." Knee X-Ray: Per EMR "FINDINGS:  Status post total right knee longstem arthroplasty without evidence of loosening or failure. Postprocedural soft tissue changes are noted. No concerning exophytic calcifications. No aggressive appearing osteolytic or blastic lesion evident. "    Prior Therapy: yes for knee surgeries  Social History: lives in [facility] with [facility]   Occupation: retired   Prior Level of Function: independent   Current Level of Function: difficulty with walking, household work;     Pain:  Current 2/10, worst 6/10, best 0/10   Location: right back and thigh and anterior hip  Description: Aching, Burning, and Sharp  Aggravating Factors: see above  Easing Factors: sitting back with foot up (long sitting); see above    Patients goals: to be pain-free     Medical History:   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    Diverticulosis 1998    Hx of atrial flutter     Hx " of mitral valve prolapse     Hypothyroidism     Migraines     Recurrent upper respiratory infection (URI)     Supraventricular tachycardia        Surgical History:   Zoë Enrique  has a past surgical history that includes Knee surgery (Right, ); Knee Arthroplasty (Right, ); Treatment of cardiac arrhythmia (N/A, 2019); Augmentation of breast (Bilateral, );  section (); Cosmetic surgery (); Colposcopy; Oophorectomy; Total abdominal hysterectomy (); Ablation of arrhythmogenic focus for atrial fibrillation (N/A, 2020); Cardioversion (2020); Transesophageal echocardiography (N/A, 2020); Colon surgery (); Breast surgery (); Eye surgery (Bilateral, ); Joint replacement (Right, , , ); Esophagogastroduodenoscopy (N/A, 2021); Colonoscopy (N/A, 2021); De Quervain's release (Left, 2022); Trigger finger release (Left, 2022); Treatment of cardiac arrhythmia (N/A, 2024); and echocardiogram,transesophageal (N/A, 2024).    Medications:   Zoë has a current medication list which includes the following prescription(s): acyclovir, amoxicillin, amoxicillin-clavulanate 875-125mg, docusate sodium, estradiol, famotidine, flecainide, hydrocodone-acetaminophen, ipratropium, levothyroxine, metoprolol succinate, restasis, rivaroxaban, sodium chloride 3%, and vit c/e/zn/coppr/lutein/zeaxan, and the following Facility-Administered Medications: sodium chloride 0.9%.    Allergies:   Review of patient's allergies indicates:   Allergen Reactions    Morphine Nausea And Vomiting    Succinylcholine      Other reaction(s): v tach        Objective        Lumbar AROM: Pain/Dysfunction with Movement: !=pain   Flexion: 85 degrees Hamstring stretch   Extension: 20 degrees    Right side bendin degrees    Left side bendin degrees    Right rotation: 10% limited Reproduction of pain   Left rotation: 0% limited    Hip flesion: R = 120,  125*;  L = 115P, 120A           Right Left Comment: !=pain   Hip Flexion: 4-/5 4+/5    Hip ABD: 4/5 4+/5    Hip ADD: 4/5 4+/5    Hip Extension: 4-/5 4/5    Hip IR: 4/5 4/5 R tightness   Hip ER: 4/5 4+/5         Right Left Comment ! = pain   Knee extension: 4/5 5/5    Knee flexion: 4/5 5/5    Ankle DF: 4+/5 5/5         Neural Tension Positive/Negative   Slump w/ DF  negative     Prone hip ext motor control: dec'd R glute activation; otherwise WNL      Sacroiliac Screen:    - Distraction   - Thigh Thrust: negative   - Gaenslen   - Sacral Thrust: negative   - Compression    Palpation: hypertonicity to B lumbar paraspinals    Joint mobility: mildly dec'd t/o lumbar and lower thoracic spine with PA glides    SID: impaired B, not improved with core contraction    FADIR: negative    90/90 Hamstring test:   impaired B    Intake Outcome Measure for FOTO Survey    Therapist reviewed FOTO scores for Zoë Enrique on 12/4/2024.   FOTO report - see Media section or FOTO account episode details.    Intake Score: tbd%         Treatment     Total Treatment time (time-based codes) separate from Evaluation: 8 minutes     Zoë received the treatments listed below:        neuromuscular re-education activities to improve: Coordination, Proprioception, Posture, and Motor Control for 8 minutes. The following activities were included:  Hip abd iso 1x1' B  SL clam 1x1' B  TrA activations 3x10, 5s - review and demonstration for HEP only      Patient Education and Home Exercises     Education provided:   - Role of PT  - PT POC  - HEP    Written Home Exercises Provided: Yes. Exercises were reviewed and Zoë was able to demonstrate them prior to the end of the session.  Zoë demonstrated good  understanding of the education provided. See EMR under Patient Instructions for exercises provided during therapy sessions.    Assessment     Zoë is a 74 y.o. female referred to outpatient Physical Therapy with a medical diagnosis of lumbar  radiculopathy and OA of R hip. Patient presents with Symptoms consistent with medical diagnoses and impairments that are effecting their daily life. She is limited in lumbar ROM, LE strength, activity tolerance, pain, functional mobility, motor control, and joint mobility.     Patient prognosis is Good.   Patient will benefit from skilled outpatient Physical Therapy to address the deficits stated above and in the chart below, provide patient /family education, and to maximize patientt's level of independence.     Plan of care discussed with patient: Yes  Patient's spiritual, cultural and educational needs considered and patient is agreeable to the plan of care and goals as stated below:     Anticipated Barriers for therapy: pain, co-morbidities    Medical Necessity is demonstrated by the following  History  Co-morbidities and personal factors that may impact the plan of care [] LOW: no personal factors / co-morbidities  [] MODERATE: 1-2 personal factors / co-morbidities  [x] HIGH: 3+ personal factors / co-morbidities    Moderate / High Support Documentation:   Co-morbidities affecting plan of care: see history above    Personal Factors:   age     Examination  Body Structures and Functions, activity limitations and participation restrictions that may impact the plan of care [] LOW: addressing 1-2 elements  [x] MODERATE: 3+ elements  [] HIGH: 4+ elements (please support below)    Moderate / High Support Documentation: pain, ROM, strength, motor control     Clinical Presentation [x] LOW: stable  [] MODERATE: Evolving  [] HIGH: Unstable     Decision Making/ Complexity Score: low       Goals:  Short Term Goals (4 Weeks):  1. Pt will be compliant with HEP to supplement PT in decreasing pain with functional mobility.  2. Pt will perform pallof press with good control to demonstrate improved core strength.  3. Pt will improve lumbar ext ROM by 5 degrees to improve functional mobility.  4. Pt will improve impaired LE MMTs by  1/2 score to improve strength for functional tasks.  Long Term Goals (8 Weeks):   1. Pt will improve FOTO score to </= tbd% limited to decrease perceived limitation with maintaining/changing body position.   2. Pt will perform floor to waist lift with good control to demonstrate improved core strength.  3. Pt will improve impaired LE MMTs by 1 score to improve strength for functional tasks.  4. Pt will report no pain during lumbar ROM to promote functional mobility.     Plan     Plan of care Certification: 12/4/2024 to 3/4/2025.    Outpatient Physical Therapy 2 times weekly for 12 weeks to include the following interventions: Cervical/Lumbar Traction, Electrical Stimulation, Gait Training, Manual Therapy, Moist Heat/ Ice, Neuromuscular Re-ed, Therapeutic Activities, Therapeutic Exercise, Ultrasound, and dry needling, IASTM, and kinesiotaping PRN.       Singh Arcos, PT, DPT        Physician's Signature: _________________________________________ Date: ________________

## 2024-12-09 ENCOUNTER — CLINICAL SUPPORT (OUTPATIENT)
Dept: REHABILITATION | Facility: HOSPITAL | Age: 74
End: 2024-12-09
Payer: MEDICARE

## 2024-12-09 DIAGNOSIS — M25.551 RIGHT HIP PAIN: Primary | ICD-10-CM

## 2024-12-09 DIAGNOSIS — R53.1 WEAKNESS: ICD-10-CM

## 2024-12-09 PROCEDURE — 97110 THERAPEUTIC EXERCISES: CPT

## 2024-12-09 PROCEDURE — 97112 NEUROMUSCULAR REEDUCATION: CPT

## 2024-12-09 PROCEDURE — 97530 THERAPEUTIC ACTIVITIES: CPT

## 2024-12-12 ENCOUNTER — CLINICAL SUPPORT (OUTPATIENT)
Dept: REHABILITATION | Facility: HOSPITAL | Age: 74
End: 2024-12-12
Payer: MEDICARE

## 2024-12-12 DIAGNOSIS — R53.1 WEAKNESS: ICD-10-CM

## 2024-12-12 DIAGNOSIS — M25.551 RIGHT HIP PAIN: Primary | ICD-10-CM

## 2024-12-12 PROCEDURE — 97112 NEUROMUSCULAR REEDUCATION: CPT | Mod: CQ

## 2024-12-12 PROCEDURE — 97530 THERAPEUTIC ACTIVITIES: CPT | Mod: CQ

## 2024-12-12 NOTE — PROGRESS NOTES
[facility]   Physical Therapy Treatment Note      Name: Zoë [facility] Number: [id number]    Therapy Diagnosis:   Encounter Diagnoses   Name Primary?    Right hip pain Yes    Weakness      Physician: Alba Torres PA-C    Visit Date: 12/12/2024    Physician Orders: PT Eval and Treat  Medical Diagnosis from Referral:   M54.16 (ICD-10-CM) - Lumbar radiculopathy   M16.11 (ICD-10-CM) - Primary osteoarthritis of right hip      Evaluation Date: 12/4/2024  Authorization Period Expiration: 12/31/2024  Plan of Care Expiration: 3/4/2025  Progress Note Due: 1/4/2024  Visit # / Visits authorized: 1/1, 1/20   FOTO: 1/ 3     Precautions: Standard and hx of [place]      Time In: 11:30 am  Time Out: 12:13 pm  Total Billable Time: 43 minutes    PTA Visit #: 1/5       Subjective     Patient reports: that the R hip is hurting more today than usual. She did take Tylenol this morning which did help a little.  She was compliant with home exercise program.  Response to previous treatment: no adverse effects  Functional change: none reported    Pain: 4/10  Location: right hip      Objective      Objective Measures updated at progress report unless specified.     Treatment     Zoë received the treatments listed below:      therapeutic exercises to develop strength, ROM, flexibility, and core stabilization for 12 minutes including:  Triple flexion red theraband x10  Seated ball roll outs 3 directions x10 each way      neuromuscular re-education activities to improve: Balance, Coordination, Proprioception, and Posture for 23 minutes. The following activities were included:  Hip abd iso 1x1' B  SL clam 1x1' B  TrA activations 3 direction with SB x10, 5s  Supine pilates ring abd 3x10, 3s  Standing march with 2# x15 B NT      therapeutic activities to improve functional performance for 8  minutes, including:  Pallof 2x10 B GTB (unable to complete !)  SAPD with TrA 3x10  Standing hip abd/ext 2x10 B       Patient  Education and Home Exercises       Education provided:   - HEP    Written Home Exercises Provided: Yes. Exercises were reviewed and Zoë was able to demonstrate them prior to the end of the session.  Zoë demonstrated good  understanding of the education provided. See Electronic Medical Record under Patient Instructions for exercises provided during therapy sessions    Assessment     Pt presents to therapy with inc'd sx in ant R hip. Pt unable to complete both sets of Paloff press therefore deferred second set. Standing hip abd/ext proved challenging as patient reported R hip discomfort when loading R LE having to rely on max UE support from high/low mat. Trial of CP to R ant hip to reduce inflammation and pain level and encouraged to apply at home if beneficial.      Zoë Is progressing well towards her goals.   Patient prognosis is Good.   Patient will benefit from skilled outpatient Physical Therapy to address the deficits stated above and in the chart below, provide patient /family education, and to maximize patientt's level of independence.      Plan of care discussed with patient: Yes  Patient's spiritual, cultural and educational needs considered and patient is agreeable to the plan of care and goals as stated below:      Anticipated Barriers for therapy: pain, co-morbidities    Goals:   Short Term Goals (4 Weeks):  1. Pt will be compliant with HEP to supplement PT in decreasing pain with functional mobility.  2. Pt will perform pallof press with good control to demonstrate improved core strength.  3. Pt will improve lumbar ext ROM by 5 degrees to improve functional mobility.  4. Pt will improve impaired LE MMTs by 1/2 score to improve strength for functional tasks.  Long Term Goals (8 Weeks):   1. Pt will improve FOTO score to </= tbd% limited to decrease perceived limitation with maintaining/changing body position.   2. Pt will perform floor to waist lift with good control to demonstrate improved core  strength.  3. Pt will improve impaired LE MMTs by 1 score to improve strength for functional tasks.  4. Pt will report no pain during lumbar ROM to promote functional mobility.        Plan     Plan of care Certification: 12/4/2024 to 3/4/2025.     Outpatient Physical Therapy 2 times weekly for 12 weeks to include the following interventions: Cervical/Lumbar Traction, Electrical Stimulation, Gait Training, Manual Therapy, Moist Heat/ Ice, Neuromuscular Re-ed, Therapeutic Activities, Therapeutic Exercise, Ultrasound, and dry needling, IASTM, and kinesiotaping PRN.     Vinny Joyner, PTA

## 2024-12-16 ENCOUNTER — HOSPITAL ENCOUNTER (OUTPATIENT)
Dept: RADIOLOGY | Facility: HOSPITAL | Age: 74
Discharge: HOME | End: 2024-12-16
Attending: INTERNAL MEDICINE
Payer: MEDICARE

## 2024-12-16 DIAGNOSIS — Z12.31 ENCOUNTER FOR SCREENING MAMMOGRAM FOR BREAST CANCER: ICD-10-CM

## 2024-12-16 PROCEDURE — 77067 SCR MAMMO BI INCL CAD: CPT | Mod: TC

## 2024-12-16 PROCEDURE — 77063 BREAST TOMOSYNTHESIS BI: CPT | Mod: 26,,, | Performed by: RADIOLOGY

## 2024-12-16 PROCEDURE — 77067 SCR MAMMO BI INCL CAD: CPT | Mod: 26,,, | Performed by: RADIOLOGY

## 2024-12-18 ENCOUNTER — PATIENT MESSAGE (OUTPATIENT)
Dept: ELECTROPHYSIOLOGY | Facility: CLINIC | Age: 74
End: 2024-12-18
Payer: MEDICARE

## 2024-12-19 ENCOUNTER — CLINICAL SUPPORT (OUTPATIENT)
Dept: REHABILITATION | Facility: HOSPITAL | Age: 74
End: 2024-12-19
Payer: MEDICARE

## 2024-12-19 DIAGNOSIS — M25.551 RIGHT HIP PAIN: Primary | ICD-10-CM

## 2024-12-19 DIAGNOSIS — R53.1 WEAKNESS: ICD-10-CM

## 2024-12-19 PROCEDURE — 97112 NEUROMUSCULAR REEDUCATION: CPT

## 2024-12-19 PROCEDURE — 97530 THERAPEUTIC ACTIVITIES: CPT

## 2024-12-19 NOTE — PROGRESS NOTES
[facility]   Physical Therapy Treatment Note      Name: Zoë [facility] Number: [id number]    Therapy Diagnosis:   Encounter Diagnoses   Name Primary?    Right hip pain Yes    Weakness        Physician: Alba Torres PA-C    Visit Date: 12/19/2024    Physician Orders: PT Eval and Treat  Medical Diagnosis from Referral:   M54.16 (ICD-10-CM) - Lumbar radiculopathy   M16.11 (ICD-10-CM) - Primary osteoarthritis of right hip      Evaluation Date: 12/4/2024  Authorization Period Expiration: 12/31/2024  Plan of Care Expiration: 3/4/2025  Progress Note Due: 1/4/2024  Visit # / Visits authorized: 1/1, 2/20   FOTO: 1/ 3     Precautions: Standard and hx of [place]      Time In: 9:59 am  Time Out: 10:36 pm  Total Billable Time: 37 minutes    PTA Visit #: 0/5       Subjective     Patient reports: she is   She was compliant with home exercise program.  Response to previous treatment: no adverse effects  Functional change: none reported    Pain: 4/10  Location: right hip      Objective      Objective Measures updated at progress report unless specified.     Treatment     Zoë received the treatments listed below:      therapeutic exercises to develop strength, ROM, flexibility, and core stabilization for 8 minutes including:  Triple flexion red theraband x10 fwd, x10 medial force  Seated ball roll outs 3 directions x10 each way      neuromuscular re-education activities to improve: Balance, Coordination, Proprioception, and Posture for 21 minutes. The following activities were included:  Hip abd iso 1x1' B  SL clam 1x1' B  TrA activations 3 direction with SB x10, 5s  Supine pilates ring abd 3x10, 3s  Standing march with 2# x15 B  Supine femoral nerve gliding x30      therapeutic activities to improve functional performance for 8 minutes, including:  Pallof 2x10 B GTB   SAPD with TrA 2x10  Standing hip abd/ext 2x10 B       Patient Education and Home Exercises       Education provided:   -  HEP    Written Home Exercises Provided: Yes. Exercises were reviewed and Zoë was able to demonstrate them prior to the end of the session.  Zëo demonstrated good  understanding of the education provided. See Electronic Medical Record under Patient Instructions for exercises provided during therapy sessions    Assessment     Able to re-introduce full program with minimal modifications from pain. Will monitor response to todays session and continue to progress hip girdle strengthening as tolerated.       Zoë Is progressing well towards her goals.   Patient prognosis is Good.   Patient will benefit from skilled outpatient Physical Therapy to address the deficits stated above and in the chart below, provide patient /family education, and to maximize patientt's level of independence.      Plan of care discussed with patient: Yes  Patient's spiritual, cultural and educational needs considered and patient is agreeable to the plan of care and goals as stated below:      Anticipated Barriers for therapy: pain, co-morbidities    Goals:   Short Term Goals (4 Weeks):  1. Pt will be compliant with HEP to supplement PT in decreasing pain with functional mobility.  2. Pt will perform pallof press with good control to demonstrate improved core strength.  3. Pt will improve lumbar ext ROM by 5 degrees to improve functional mobility.  4. Pt will improve impaired LE MMTs by 1/2 score to improve strength for functional tasks.  Long Term Goals (8 Weeks):   1. Pt will improve FOTO score to </= tbd% limited to decrease perceived limitation with maintaining/changing body position.   2. Pt will perform floor to waist lift with good control to demonstrate improved core strength.  3. Pt will improve impaired LE MMTs by 1 score to improve strength for functional tasks.  4. Pt will report no pain during lumbar ROM to promote functional mobility.        Plan     Plan of care Certification: 12/4/2024 to 3/4/2025.     Outpatient Physical  Therapy 2 times weekly for 12 weeks to include the following interventions: Cervical/Lumbar Traction, Electrical Stimulation, Gait Training, Manual Therapy, Moist Heat/ Ice, Neuromuscular Re-ed, Therapeutic Activities, Therapeutic Exercise, Ultrasound, and dry needling, IASTM, and kinesiotaping PRN.     Singh Arcos, PT, DPT

## 2024-12-23 ENCOUNTER — LAB VISIT (OUTPATIENT)
Dept: LAB | Facility: HOSPITAL | Age: 74
End: 2024-12-23
Attending: ALLERGY & IMMUNOLOGY
Payer: MEDICARE

## 2024-12-23 DIAGNOSIS — Z86.19 FREQUENT INFECTIONS: ICD-10-CM

## 2024-12-23 LAB
IGA SERPL-MCNC: 96 MG/DL (ref 40–350)
IGG SERPL-MCNC: 721 MG/DL (ref 650–1600)
IGM SERPL-MCNC: 56 MG/DL (ref 50–300)

## 2024-12-23 PROCEDURE — 36415 COLL VENOUS BLD VENIPUNCTURE: CPT | Performed by: ALLERGY & IMMUNOLOGY

## 2024-12-23 PROCEDURE — 82784 ASSAY IGA/IGD/IGG/IGM EACH: CPT | Mod: 59 | Performed by: ALLERGY & IMMUNOLOGY

## 2024-12-23 PROCEDURE — 86317 IMMUNOASSAY INFECTIOUS AGENT: CPT | Performed by: ALLERGY & IMMUNOLOGY

## 2024-12-23 PROCEDURE — 82784 ASSAY IGA/IGD/IGG/IGM EACH: CPT | Performed by: ALLERGY & IMMUNOLOGY

## 2024-12-23 NOTE — PROGRESS NOTES
[facility]   Physical Therapy Treatment Note      Name: Zoë [facility] Number: [id number]    Therapy Diagnosis:   Encounter Diagnoses   Name Primary?    Right hip pain Yes    Weakness          Physician: Alba Torres PA-C    Visit Date: 12/26/2024    Physician Orders: PT Eval and Treat  Medical Diagnosis from Referral:   M54.16 (ICD-10-CM) - Lumbar radiculopathy   M16.11 (ICD-10-CM) - Primary osteoarthritis of right hip      Evaluation Date: 12/4/2024  Authorization Period Expiration: 12/31/2024  Plan of Care Expiration: 3/4/2025  Progress Note Due: 1/4/2024  Visit # / Visits authorized: 1/1, 4/20   FOTO: 1/ 3     Precautions: Standard and hx of CA      Time In: 9:15 am  Time Out: 9:50 pm  Total Billable Time: 35 minutes    PTA Visit #: 1/5       Subjective     Patient reports: that after adding the (hip flexor) stretch last session the pain that she's been having in the front of her hip was gone for about 4-5 days.   She was compliant with home exercise program.  Response to previous treatment: no adverse effects  Functional change: none reported    Pain: 4/10  Location: right hip      Objective      Objective Measures updated at progress report unless specified.     Treatment     Zoë received the treatments listed below:      therapeutic exercises to develop strength, ROM, flexibility, and core stabilization for 8 minutes including:  Triple flexion red theraband x10 fwd, x10 medial force  Seated ball roll outs 3 directions x10 each way      neuromuscular re-education activities to improve: Balance, Coordination, Proprioception, and Posture for 18 minutes. The following activities were included:  Hip abd iso 1x1' B  SL clam 1x1' B  TrA activations 3 direction with SB x10, 5s  Supine pilates ring abd 3x10, 3s  Standing march with 2# 2x15 B  Supine femoral nerve gliding x30      therapeutic activities to improve functional performance for 9 minutes, including:  Pallof 2x10 B  Event Note GTB   SAPD with TrA 2x10 GTB   Standing hip abd/ext 2x10 B 2lb   STS from 22 in mat c/ 5lb 2x5     Patient Education and Home Exercises       Education provided:   - HEP    Written Home Exercises Provided: Yes. Exercises were reviewed and Zoë was able to demonstrate them prior to the end of the session.  Zoë demonstrated good  understanding of the education provided. See Electronic Medical Record under Patient Instructions for exercises provided during therapy sessions    Assessment     Continued with established activities with no adverse effects. Incorporated weights during standing hip abduction and extension, as well as added sit to stand from elevated mat. Planned for 15 reps of STS, however due to patient's knee pain, stopped at 10 reps. Will continue to monitor pt's response to therapy sessions and progress as tolerated.       Zoë Is progressing well towards her goals.   Patient prognosis is Good.   Patient will benefit from skilled outpatient Physical Therapy to address the deficits stated above and in the chart below, provide patient /family education, and to maximize patientt's level of independence.      Plan of care discussed with patient: Yes  Patient's spiritual, cultural and educational needs considered and patient is agreeable to the plan of care and goals as stated below:      Anticipated Barriers for therapy: pain, co-morbidities    Goals:   Short Term Goals (4 Weeks):  1. Pt will be compliant with HEP to supplement PT in decreasing pain with functional mobility.  2. Pt will perform pallof press with good control to demonstrate improved core strength.  3. Pt will improve lumbar ext ROM by 5 degrees to improve functional mobility.  4. Pt will improve impaired LE MMTs by 1/2 score to improve strength for functional tasks.  Long Term Goals (8 Weeks):   1. Pt will improve FOTO score to </= tbd% limited to decrease perceived limitation with maintaining/changing body position.   2. Pt will perform  floor to waist lift with good control to demonstrate improved core strength.  3. Pt will improve impaired LE MMTs by 1 score to improve strength for functional tasks.  4. Pt will report no pain during lumbar ROM to promote functional mobility.        Plan     Plan of care Certification: 12/4/2024 to 3/4/2025.     Outpatient Physical Therapy 2 times weekly for 12 weeks to include the following interventions: Cervical/Lumbar Traction, Electrical Stimulation, Gait Training, Manual Therapy, Moist Heat/ Ice, Neuromuscular Re-ed, Therapeutic Activities, Therapeutic Exercise, Ultrasound, and dry needling, IASTM, and kinesiotaping PRN.     Kelly Currie, PTA

## 2024-12-26 ENCOUNTER — CLINICAL SUPPORT (OUTPATIENT)
Dept: REHABILITATION | Facility: HOSPITAL | Age: 74
End: 2024-12-26
Payer: MEDICARE

## 2024-12-26 DIAGNOSIS — R53.1 WEAKNESS: ICD-10-CM

## 2024-12-26 DIAGNOSIS — M25.551 RIGHT HIP PAIN: Primary | ICD-10-CM

## 2024-12-26 PROCEDURE — 97112 NEUROMUSCULAR REEDUCATION: CPT | Mod: KX,CQ

## 2024-12-26 PROCEDURE — 97530 THERAPEUTIC ACTIVITIES: CPT | Mod: KX,CQ

## 2024-12-27 DIAGNOSIS — I49.9 CARDIAC ARRHYTHMIA, UNSPECIFIED CARDIAC ARRHYTHMIA TYPE: ICD-10-CM

## 2024-12-27 DIAGNOSIS — I48.4 ATYPICAL ATRIAL FLUTTER: ICD-10-CM

## 2024-12-27 DIAGNOSIS — I48.0 PAROXYSMAL ATRIAL FIBRILLATION: Primary | ICD-10-CM

## 2024-12-29 LAB
DEPRECATED S PNEUM12 IGG SER-MCNC: <0.3 UG/ML
DEPRECATED S PNEUM23 IGG SER-MCNC: <0.3 UG/ML
DEPRECATED S PNEUM4 IGG SER-MCNC: 0.6 UG/ML
DEPRECATED S PNEUM8 IGG SER-MCNC: 0.7 UG/ML
DEPRECATED S PNEUM9 IGG SER-MCNC: 0.8 UG/ML
S PN DA SERO 19F IGG SER-MCNC: 0.9 UG/ML
S PNEUM DA 1 IGG SER-MCNC: <0.3 UG/ML
S PNEUM DA 14 IGG SER-MCNC: 0.5
S PNEUM DA 18C IGG SER-MCNC: 0.9
S PNEUM DA 3 IGG SER-MCNC: 3.6 UG/ML
S PNEUM DA 5 IGG SER-MCNC: <0.3 UG/ML
S PNEUM DA 6B IGG SER-MCNC: 0.8 UG/ML
S PNEUM DA 7F IGG SER-MCNC: 1.5 UG/ML
S PNEUM DA 9V IGG SER-MCNC: 0.7 UG/ML

## 2024-12-31 ENCOUNTER — CLINICAL SUPPORT (OUTPATIENT)
Dept: REHABILITATION | Facility: HOSPITAL | Age: 74
End: 2024-12-31
Payer: MEDICARE

## 2024-12-31 DIAGNOSIS — M25.551 RIGHT HIP PAIN: Primary | ICD-10-CM

## 2024-12-31 DIAGNOSIS — R53.1 WEAKNESS: ICD-10-CM

## 2024-12-31 PROCEDURE — 97530 THERAPEUTIC ACTIVITIES: CPT

## 2024-12-31 PROCEDURE — 97112 NEUROMUSCULAR REEDUCATION: CPT

## 2024-12-31 PROCEDURE — 97110 THERAPEUTIC EXERCISES: CPT

## 2024-12-31 NOTE — PROGRESS NOTES
[facility]   Physical Therapy Treatment Note      Name: Zoë [facility] Number: [id number]    Therapy Diagnosis:   Encounter Diagnoses   Name Primary?    Right hip pain Yes    Weakness          Physician: Alba Torres PA-C    Visit Date: 12/31/2024    Physician Orders: PT Eval and Treat  Medical Diagnosis from Referral:   M54.16 (ICD-10-CM) - Lumbar radiculopathy   M16.11 (ICD-10-CM) - Primary osteoarthritis of right hip      Evaluation Date: 12/4/2024  Authorization Period Expiration: 12/31/2024  Plan of Care Expiration: 3/4/2025  Progress Note Due: 1/4/2024  Visit # / Visits authorized: 1/1, 5/20   FOTO: 1/ 3     Precautions: Standard and hx of CA      Time In: 9:01 am  Time Out: 9:44 pm  Total Billable Time: 43 minutes    PTA Visit #: 0/5       Subjective     Patient reports: her knee was painful and inflamed after last session and is still a little sore.   She was compliant with home exercise program.  Response to previous treatment: no adverse effects  Functional change: none reported    Pain: 4/10  Location: right hip      Objective      Objective Measures updated at progress report unless specified.     Treatment     Zoë received the treatments listed below:      therapeutic exercises to develop strength, ROM, flexibility, and core stabilization for 8 minutes including:  Triple flexion red theraband x10 fwd  Seated ball roll outs 3 directions x10 each way      neuromuscular re-education activities to improve: Balance, Coordination, Proprioception, and Posture for 20 minutes. The following activities were included:  Hip abd iso 1x1' B  SL clam 1x1' B  TrA activations 3 direction with SB x10, 5s  Supine pilates ring abd 3x10, 3s  Standing march with 2# 2x15 B  Supine femoral nerve gliding x30  QS 2x10 B      therapeutic activities to improve functional performance for 15 minutes, including:  Pallof 2x10 B GTB   SAPD with TrA 2x10 GTB   Standing hip abd/ext 2x10 B 2lb    STS from 22 in mat c/ 5lb 2x5 - d/c d/t knee pain  Half lunge to high knee (R LE moving only) 2x10    Patient Education and Home Exercises       Education provided:   - HEP    Written Home Exercises Provided: Yes. Exercises were reviewed and Zoë was able to demonstrate them prior to the end of the session.  Zoë demonstrated good  understanding of the education provided. See Electronic Medical Record under Patient Instructions for exercises provided during therapy sessions    Assessment     Attempted to add bicycle for hip flexor and core strengthening but inc'd knee pain so did not proceed with the activity. Some activities modified d/t pain in the knee.       Zoë Is progressing well towards her goals.   Patient prognosis is Good.   Patient will benefit from skilled outpatient Physical Therapy to address the deficits stated above and in the chart below, provide patient /family education, and to maximize patientt's level of independence.      Plan of care discussed with patient: Yes  Patient's spiritual, cultural and educational needs considered and patient is agreeable to the plan of care and goals as stated below:      Anticipated Barriers for therapy: pain, co-morbidities    Goals:   Short Term Goals (4 Weeks):  1. Pt will be compliant with HEP to supplement PT in decreasing pain with functional mobility.  2. Pt will perform pallof press with good control to demonstrate improved core strength.  3. Pt will improve lumbar ext ROM by 5 degrees to improve functional mobility.  4. Pt will improve impaired LE MMTs by 1/2 score to improve strength for functional tasks.  Long Term Goals (8 Weeks):   1. Pt will improve FOTO score to </= tbd% limited to decrease perceived limitation with maintaining/changing body position.   2. Pt will perform floor to waist lift with good control to demonstrate improved core strength.  3. Pt will improve impaired LE MMTs by 1 score to improve strength for functional tasks.  4. Pt  will report no pain during lumbar ROM to promote functional mobility.        Plan     Plan of care Certification: 12/4/2024 to 3/4/2025.     Outpatient Physical Therapy 2 times weekly for 12 weeks to include the following interventions: Cervical/Lumbar Traction, Electrical Stimulation, Gait Training, Manual Therapy, Moist Heat/ Ice, Neuromuscular Re-ed, Therapeutic Activities, Therapeutic Exercise, Ultrasound, and dry needling, IASTM, and kinesiotaping PRN.     Singh Arcos, PT, DPT

## 2025-01-07 ENCOUNTER — PATIENT MESSAGE (OUTPATIENT)
Dept: ALLERGY | Facility: CLINIC | Age: 75
End: 2025-01-07
Payer: MEDICARE

## 2025-01-13 DIAGNOSIS — Z00.00 ENCOUNTER FOR MEDICARE ANNUAL WELLNESS EXAM: ICD-10-CM

## 2025-01-19 ENCOUNTER — PATIENT MESSAGE (OUTPATIENT)
Dept: OTOLARYNGOLOGY | Facility: CLINIC | Age: 75
End: 2025-01-19
Payer: MEDICARE

## 2025-01-21 RX ORDER — ACYCLOVIR 400 MG/1
400 TABLET ORAL
Qty: 90 TABLET | Refills: 0 | Status: SHIPPED | OUTPATIENT
Start: 2025-01-21

## 2025-01-24 ENCOUNTER — PATIENT MESSAGE (OUTPATIENT)
Dept: RHEUMATOLOGY | Facility: CLINIC | Age: 75
End: 2025-01-24
Payer: MEDICARE

## 2025-01-24 RX ORDER — PREDNISONE 20 MG/1
20 TABLET ORAL DAILY
Qty: 10 TABLET | Refills: 3 | Status: SHIPPED | OUTPATIENT
Start: 2025-01-24 | End: 2025-02-21

## 2025-01-29 ENCOUNTER — PATIENT MESSAGE (OUTPATIENT)
Dept: ELECTROPHYSIOLOGY | Facility: CLINIC | Age: 75
End: 2025-01-29
Payer: MEDICARE

## 2025-01-29 ENCOUNTER — OFFICE VISIT (OUTPATIENT)
Dept: OTOLARYNGOLOGY | Facility: CLINIC | Age: 75
End: 2025-01-29
Payer: MEDICARE

## 2025-01-29 ENCOUNTER — TELEPHONE (OUTPATIENT)
Dept: ELECTROPHYSIOLOGY | Facility: CLINIC | Age: 75
End: 2025-01-29
Payer: MEDICARE

## 2025-01-29 VITALS
DIASTOLIC BLOOD PRESSURE: 55 MMHG | HEART RATE: 66 BPM | WEIGHT: 156.5 LBS | BODY MASS INDEX: 23.72 KG/M2 | HEIGHT: 68 IN | SYSTOLIC BLOOD PRESSURE: 92 MMHG

## 2025-01-29 DIAGNOSIS — J34.2 DEVIATED NASAL SEPTUM: Chronic | ICD-10-CM

## 2025-01-29 DIAGNOSIS — J30.0 VASOMOTOR RHINITIS: Chronic | ICD-10-CM

## 2025-01-29 DIAGNOSIS — K21.9 LARYNGOPHARYNGEAL REFLUX (LPR): Primary | Chronic | ICD-10-CM

## 2025-01-29 PROCEDURE — 31575 DIAGNOSTIC LARYNGOSCOPY: CPT | Mod: HCNC,S$GLB,, | Performed by: OTOLARYNGOLOGY

## 2025-01-29 PROCEDURE — 1159F MED LIST DOCD IN RCRD: CPT | Mod: HCNC,CPTII,S$GLB, | Performed by: OTOLARYNGOLOGY

## 2025-01-29 PROCEDURE — 3288F FALL RISK ASSESSMENT DOCD: CPT | Mod: HCNC,CPTII,S$GLB, | Performed by: OTOLARYNGOLOGY

## 2025-01-29 PROCEDURE — 99999 PR PBB SHADOW E&M-EST. PATIENT-LVL III: CPT | Mod: PBBFAC,,, | Performed by: OTOLARYNGOLOGY

## 2025-01-29 PROCEDURE — 99214 OFFICE O/P EST MOD 30 MIN: CPT | Mod: 25,HCNC,S$GLB, | Performed by: OTOLARYNGOLOGY

## 2025-01-29 PROCEDURE — 3008F BODY MASS INDEX DOCD: CPT | Mod: HCNC,CPTII,S$GLB, | Performed by: OTOLARYNGOLOGY

## 2025-01-29 PROCEDURE — 3078F DIAST BP <80 MM HG: CPT | Mod: HCNC,CPTII,S$GLB, | Performed by: OTOLARYNGOLOGY

## 2025-01-29 PROCEDURE — 1125F AMNT PAIN NOTED PAIN PRSNT: CPT | Mod: HCNC,CPTII,S$GLB, | Performed by: OTOLARYNGOLOGY

## 2025-01-29 PROCEDURE — 3074F SYST BP LT 130 MM HG: CPT | Mod: HCNC,CPTII,S$GLB, | Performed by: OTOLARYNGOLOGY

## 2025-01-29 PROCEDURE — 1101F PT FALLS ASSESS-DOCD LE1/YR: CPT | Mod: HCNC,CPTII,S$GLB, | Performed by: OTOLARYNGOLOGY

## 2025-01-29 RX ORDER — FAMOTIDINE 40 MG/1
40 TABLET, FILM COATED ORAL NIGHTLY
Qty: 30 TABLET | Refills: 3 | Status: SHIPPED | OUTPATIENT
Start: 2025-01-29 | End: 2026-01-29

## 2025-01-29 RX ORDER — OMEPRAZOLE 40 MG/1
40 CAPSULE, DELAYED RELEASE ORAL DAILY
Qty: 30 CAPSULE | Refills: 3 | Status: SHIPPED | OUTPATIENT
Start: 2025-01-29 | End: 2026-01-29

## 2025-01-29 NOTE — PROCEDURES
Laryngoscopy    Date/Time: 1/29/2025 1:40 PM    Performed by: Linda Doll MD  Authorized by: Linda Doll MD    Consent Done?:  Yes (Verbal)  Anesthesia:     Local anesthetic:  Lidocaine 2% and [place]-Synephrine 1/2%  Laryngoscopy:     Areas examined:  Nasal cavities, nasopharynx, oropharynx, hypopharynx, larynx and vocal cords  Nose External:      No external nasal deformity  Nose Intranasal:      Mucosa no polyps     Mucosa ulcers not present     No mucosa lesions present     No septum gross deformity     Turbinates not enlarged  Nasopharynx:      No mucosa lesions     Adenoids not present     Posterior choanae patent     Eustachian tube patent  Larynx/hypopharynx:      No epiglottis lesions     No epiglottis edema     No AE folds lesions     No vocal cord polyps     Equal and normal bilateral     No hypopharynx lesions     No piriform sinus pooling     No piriform sinus lesions     Post cricoid edema (moderate)     Post cricoid erythema (moderate)

## 2025-01-29 NOTE — PROGRESS NOTES
Chief Complaint   Patient presents with    Follow-up     Pt stated she coughs a lot and has a lot of mucus,congested       Interval HPI 8/16/2024:  Follow up visit. Reports that she did see Dr. Bearden for evaluation for potential PNN ablation on the right with bilateral RFA turbinates. She did not schedule this procedure as of yet due to concerns with her A-fib. She reports that she had episode of bronchitis since May. Notes persistent cough which has been productive in nature. She feels that she has mucus in her throat. Denies facial pain/pressure, hyposmia, headaches.     Interval HPI 1/29/2025:  Follow up visit. Reports over the last week to 10 days she has had URI infection and notes that she has had increased congestion. She has had some pressure in her ears  as well.  She notes that she has had some heartburn episodes more frequently and feels that the cough seems to be correlated to this.  She has frequent throat clearing.     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    Diverticulosis 1998    Hx of atrial flutter     Hx of mitral valve prolapse 1990    Hypothyroidism     Migraines     Recurrent upper respiratory infection (URI)     Supraventricular tachycardia      Social History     Socioeconomic History    Marital status:    Tobacco Use    Smoking status: Never     Passive exposure: Never    Smokeless tobacco: Never   Substance and Sexual Activity    Alcohol use: Yes     Alcohol/week: 2.0 standard drinks of alcohol     Types: 2 Shots of liquor per week     Comment: 1-2 per month    Drug use: No     Comment: CBD oils for headache    Sexual activity: Not Currently     Partners: Male     Birth control/protection: Post-menopausal, See Surgical Hx, None     Comment: , together x 37 years   Social  History Narrative    N/A PER THE PATIENT.     Social Drivers of Health     Financial Resource Strain: Low Risk  (2024)    Received from [facility]    Overall Financial Resource Strain (CARDIA)     Difficulty of Paying Living Expenses: Not hard at all   Food Insecurity: No Food Insecurity (2024)    Received from [facility]    Hunger Vital Sign     Worried About Running Out of Food in the Last Year: Never true     Ran Out of Food in the Last Year: Never true   Transportation Needs: No Transportation Needs (2024)    Received from [facility]    PRAPARE - Transportation     Lack of Transportation (Medical): No     Lack of Transportation (Non-Medical): No   Physical Activity: Insufficiently Active (2024)    Received from [facility]    Exercise Vital Sign     Days of Exercise per Week: 3 days     Minutes of Exercise per Session: 20 min   Stress: No Stress Concern Present (2024)    Received from [facility]    [facility] - Occupational Stress Questionnaire     Feeling of Stress : Not at all   Housing Stability: Unknown (2024)    Received from [facility]    Housing Stability Vital Sign     Unable to Pay for Housing in the Last Year: No     Past Surgical History:   Procedure Laterality Date    ABLATION OF ARRHYTHMOGENIC FOCUS FOR ATRIAL FIBRILLATION N/A 2020    Procedure: ABLATION, ARRHYTHMOGENIC FOCUS, FOR ATRIAL FIBRILLATION;  Surgeon: Moe Seymour MD;  Location: [facility] EP LAB;  Service: Cardiology;  Laterality: N/A;  AFL/AF, KHANH, CTI, PVI, RFA, CARTO, GEN, DM, 3 PREP    AUGMENTATION OF BREAST Bilateral 1984    35 yrs     BREAST SURGERY  1984    augmentation    CARDIOVERSION  2020    Procedure: Cardioversion;  Surgeon: Moe Seymour MD;  Location: [facility] EP LAB;  Service: Cardiology;;     SECTION      COLON SURGERY      partial colon resection    COLONOSCOPY N/A 2021    Procedure: COLONOSCOPY;  Surgeon: Sahara SUAZO  MD Naomi;  Location: [facility] ENDO (4TH FLR);  Service: Endoscopy;  Laterality: N/A;  constipation prep- 7 days of miralax, 2 fulls of fulls, then day bf clears and split prep   covid test 3/5 pcw, prep instr emailed, antibiotics prophylactically, Xarelto hold x 2 days per Dr. Seymour-see telephone encounter 2/10 -ml    COLPOSCOPY      COSMETIC SURGERY  1987    DE QUERVAIN'S RELEASE Left 08/02/2022    Procedure: RELEASE, HAND, FOR DEQUERVAIN'S TENOSYNOVITIS;  Surgeon: Toñito Adame Jr., MD;  Location: [facility] OR;  Service: Orthopedics;  Laterality: Left;    ECHOCARDIOGRAM,TRANSESOPHAGEAL N/A 7/8/2024    Procedure: Transesophageal echo (KHANH) intra-procedure log documentation;  Surgeon: Ramírez Ortez III, MD;  Location: [facility] EP LAB;  Service: Cardiology;  Laterality: N/A;    ESOPHAGOGASTRODUODENOSCOPY N/A 03/19/2021    Procedure: EGD (ESOPHAGOGASTRODUODENOSCOPY);  Surgeon: Sahara Salgado MD;  [facility]  Service: Endoscopy;  Laterality: N/A;    EYE SURGERY Bilateral 2016    Lasik, then cataracts extraction    JOINT REPLACEMENT Right 2/07, 11/07, 2/09    KNEE ARTHROPLASTY Right 2007    revison in 2009    KNEE SURGERY Right 1989    distal femoral replacing TK    OOPHORECTOMY      TOTAL ABDOMINAL HYSTERECTOMY  1994    TAHBSO    TRANSESOPHAGEAL ECHOCARDIOGRAPHY N/A 11/24/2020    Procedure: ECHOCARDIOGRAM, TRANSESOPHAGEAL;  Surgeon: Ramírez Ortez III, MD;  Location: [facility] EP LAB;  Service: Cardiology;  Laterality: N/A;    TREATMENT OF CARDIAC ARRHYTHMIA N/A 08/12/2019    Procedure: CARDIOVERSION;  Surgeon: Moe Seymour MD;  Location: [facility] EP LAB;  Service: Cardiology;  Laterality: N/A;  KHANH/DCCV/MAC/DM/3PREP/*ADMIT FOR TIKOSYN*    TREATMENT OF CARDIAC ARRHYTHMIA N/A 7/8/2024    Procedure: Cardioversion or Defibrillation;  Surgeon: GABRIELE Bull MD;  Location: [facility] EP LAB;  Service: Cardiology;  Laterality: N/A;  AF, KHANH, DCCV, MAC, DM, 3 Prep    TRIGGER FINGER RELEASE Left 08/02/2022     Procedure: RELEASE, TRIGGER FINGER;  Surgeon: Toñito Adame Jr., MD;  Location: [facility];  Service: Orthopedics;  Laterality: Left;  left middle finger     Family History   Problem Relation Name Age of Onset    Heart disease Mother Tianna         Atrial fib    Diabetes Mother Tianna         Type II    Hearing loss Father Adan     Heart disease Father Adan         Angina, AAA    Stroke Father Adan 93    Diabetes Sister Mahogany         Type I    Heart disease Sister Mahogany         Atrrial fib, mitral valve repair    Heart disease Brother Severo         Atrial fib    Asthma Brother Severo     Diverticulosis Brother Severo     Heart disease Brother Leonard         Atrial fib    Diverticulosis Brother Leonard     Esophageal cancer Maternal Uncle      No Known Problems Daughter Michael     Breast cancer Neg Hx      Ovarian cancer Neg Hx      Colon cancer Neg Hx      Cancer Neg Hx      Hypertension Neg Hx      Eczema Neg Hx             Review of Systems  General: negative for chills, fever or weight loss  Psychological: negative for mood changes or depression  Ophthalmic: negative for blurry vision, photophobia or eye pain  ENT: see HPI  Respiratory: no cough, shortness of breath, or wheezing  Cardiovascular: no chest pain or dyspnea on exertion  Gastrointestinal: no abdominal pain, change in bowel habits, or black/ bloody stools  Musculoskeletal: negative for gait disturbance or muscular weakness  Neurological: no syncope or seizures; no ataxia  Dermatological: negative for puritis,  rash and jaundice  Hematologic/lymphatic: no easy bruising, no new lumps or bumps      Physical Exam:    Vitals:    01/29/25 1312   BP: (!) 92/55   Pulse: 66       Constitutional: Well appearing / communicating without difficutly.  NAD.  Eyes: EOM I Bilaterally  Head/Face: Normocephalic.  Negative paranasal sinus pressure/tenderness.  Salivary glands WNL.  House Brackmann I Bilaterally.    Right Ear: Auricle normal appearance. External  Auditory Canal within normal limits,TM w/o masses/lesions/perforations. TM mobility noted.   Left Ear: Auricle normal appearance. External Auditory Canal WNL,TM w/o masses/lesions/perforations. TM mobility noted.  Nose: + mucosal edema. No gross nasal septal deviation. Inferior Turbinates 3+ bilaterally. No septal perforation. No masses/lesions. External nasal skin appears normal without masses/lesions.  Oral Cavity: Gingiva/lips within normal limits.  Dentition/gingiva healthy appearing. Mucus membranes moist. Floor of mouth soft, no masses palpated. Oral Tongue mobile. Hard Palate appears normal.    Oropharynx: Base of tongue appears normal. No masses/lesions noted. Tonsillar fossa/pharyngeal wall without lesions. Posterior oropharynx WNL.  Soft palate without masses. Midline uvula.   Neck/Lymphatic: No LAD I-VI bilaterally.  No thyromegaly.  No masses noted on exam.          Diagnostic studies:  PFTs: 2020: normal spirometry  Laboratory 2022:  IgE level: Less than 35.  ImmunoCAP:  Negative.  IgA: 114.  Ig.  IgM: 66.  Pneumococcal titers: Not protective.    CT sinus 2023: Images interpreted by me and discussed with patient in detail today.   Right pedro bullosa.  Paradoxical curvature of the middle turbinates anteriorly.  Leftward nasal septal deviation with a 3 mm bony spur abutting the left inferior nasal turbinate.  No nasal polyp formation is identified.     Ostiomeatal units are patent with some anatomic narrowing of the distal infundibulum on the left.  The frontal recesses and sphenoid sinus ostia are patent.     The paranasal sinuses are currently clear other than for minimal mucosal thickening at the floor the left maxillary sinus..     Sellar pneumatization of the sphenoid.  There is some pneumatization overlying the grooves of the anterior ethmoid arteries bilaterally.  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     The middle ears and mastoid air cells are clear.     No acute intracranial  process.            Assessment:    ICD-10-CM ICD-9-CM    1. Laryngopharyngeal reflux (LPR)  K21.9 478.79       2. Deviated nasal septum  J34.2 470       3. Vasomotor rhinitis  J30.0 477.9                 The primary encounter diagnosis was Laryngopharyngeal reflux (LPR). Diagnoses of Deviated nasal septum and Vasomotor rhinitis were also pertinent to this visit.      Plan:  No orders of the defined types were placed in this encounter.    Continue Nasal saline rinses twice daily.     Continue  Atrovent to 0.6%-  2 sprays per nostril 3 times per day.  Advised to consider PNN ablation on the right with bilateral RFA turbinates with rhinology when able.     Start Prilosec 40 mg PO daily  Increase famotidine to 40 mg PO daily    Follow up in 3 months.      Linda Yu MD

## 2025-01-30 ENCOUNTER — PATIENT MESSAGE (OUTPATIENT)
Dept: ELECTROPHYSIOLOGY | Facility: CLINIC | Age: 75
End: 2025-01-30
Payer: MEDICARE

## 2025-01-31 ENCOUNTER — PATIENT MESSAGE (OUTPATIENT)
Dept: ALLERGY | Facility: CLINIC | Age: 75
End: 2025-01-31
Payer: MEDICARE

## 2025-02-04 NOTE — PROGRESS NOTES
Nephrology Progress Note    Patient seen earlier today.  Remains intubated/mechanically ventilated.     ROS:  Patient is unable to relate due to clinical condition.    MEDICATIONS:  Current Facility-Administered Medications   Medication Dose Route Frequency Provider Last Rate Last Admin    midodrine (PROAMATINE) tablet 5 mg  5 mg Oral 3 times per day Sanchez Xie MD        ceFEPIme (MAXIPIME) 2,000 mg in sodium chloride 0.9 % 100 mL IVPB  2,000 mg Intravenous Daily Roro Grier DO 25 mL/hr at 02/04/25 1259 Rate Verify at 02/04/25 1259    oseltamivir (TAMIFLU) 6 MG/ML suspension 75 mg  75 mg Per NG Tube Every Other Day Roro Grier DO        aspirin chewable 81 mg  81 mg Per NG Tube Daily Alisha Kuo CNP   81 mg at 02/04/25 0926    sodium chloride 0.9 % injection 2 mL  2 mL Intracatheter 2 times per day Roro Grier DO   2 mL at 02/04/25 0926    ipratropium-albuterol (DUONEB) 0.5-2.5 (3) MG/3ML nebulizer solution 3 mL  3 mL Nebulization Q6H Resp Roro Grier DO   3 mL at 02/04/25 1323    famotidine (PEPCID) tablet 20 mg  20 mg Oral Daily Roro Grier DO   20 mg at 02/04/25 0926    docusate sodium-sennosides (SENOKOT S) 50-8.6 MG 2 tablet  2 tablet Per NG Tube QHS Roro Grier DO   2 tablet at 02/03/25 2013    CARBOXYMethylcellulose (REFRESH TEARS) 0.5 % ophthalmic solution 1 drop  1 drop Both Eyes 6 times per day Roro Grier DO   1 drop at 02/04/25 1201    chlorhexidine gluconate (PERIDEX) 0.12 % solution 15 mL  15 mL Swish & Spit 2 times per day Roro Grier DO   15 mL at 02/04/25 0900    sodium chloride 0.9 % injection 10 mL  10 mL Injection 2 times per day Roro Grier DO   10 mL at 02/04/25 0926    sodium chloride 0.9 % injection 10 mL  10 mL Injection 2 times per day Roro Grier DO   10 mL at 02/04/25 0925    sodium chloride 0.9 % injection 10 mL  10 mL Injection 2 times per day Roro Grier DO   10 mL at 02/04/25 0925          Physical  Chart reviewed. Care Everywhere updates requested. Immunizations reconciled. HM updated.     Examination:  Vital Signs:  Visit Vitals  /61 (BP Location: RUE - Right upper extremity)   Pulse 82   Temp 97.8 °F (36.6 °C) (Axillary)   Resp (!) 24   Ht 6' (1.829 m)   Wt 69.4 kg (153 lb)   SpO2 97%   BMI 20.75 kg/m²     General:  Intubated/mechanically ventilated  Head:  Normocephalic-atraumatic.   Neck:  Trachea is midline.   Eyes:  Normal conjunctivae.    ENT:   ET tube in place  Cardiovascular:  S1, S2 auscultated.  Regular rate and rhythm.  No rub audible.  No edema.  Respiratory:   Decreased breath sounds at bases anteriorly  Gastrointestinal:  Soft; bowel sounds audible. Genitourinary: Cuello catheter in place  Musculoskeletal:  No joint swelling.  No deformity.      Skin: Warm and dry.  No rash noted.  Neurologic:  Sedated  Psychiatric:   Unable to assess    I/O's    Intake/Output Summary (Last 24 hours) at 2/4/2025 1329  Last data filed at 2/4/2025 1259  Gross per 24 hour   Intake 3003.69 ml   Output 1300 ml   Net 1703.69 ml       Labs:    Recent Labs   Lab 02/04/25  0519 02/03/25  1745 02/03/25  1046   SODIUM 132* 128* 130*   POTASSIUM 4.2 4.8 5.4*   CHLORIDE 100 99 99   CO2 25 26 26   BUN 73* 64* 60*   CREATININE 4.44* 3.91* 3.59*   GLUCOSE 109* 130* 122*   CALCIUM 7.9* 7.9* 8.3*      Recent Labs   Lab 02/04/25  0519 02/03/25  1745 02/03/25  1046 02/03/25  0620 02/01/25  2258 02/01/25  2049   SODIUM 132* 128* 130* 128*   < > 133*   CHLORIDE 100 99 99 98   < > 102   CO2 25 26 26 25   < > 31   BUN 73* 64* 60* 58*   < > 33*   CREATININE 4.44* 3.91* 3.59* 3.44*   < > 1.79*   CALCIUM 7.9* 7.9* 8.3* 8.2*   < > 9.0   ALBUMIN  --   --   --  1.9*  --  2.6*   BILIRUBIN  --   --   --  0.4  --  0.4   ALKPT  --   --   --  64  --  78   GPT  --   --   --  240*  --  41   AST  --   --   --  367*  --  70*   GLUCOSE 109* 130* 122* 107*   < > 83    < > = values in this interval not displayed.      Recent Labs   Lab 02/04/25  0519   WBC 8.6   RBC 2.74*   HGB 8.5*   HCT 25.5*                Assessment and Plan:      JUNE/CKD 3 -  oliguric  - Cr 1.71 earlier this month, from Cr 1.26 in 2021.   - Suspect patient's JUNE reflects ATN.  Differential also includes reduced intravascular volume, structural abnormality, although I suspect those are less likely at this time.  Renal US did not reveal any hydronephrosis.  - Creatinine is increased today.  Urine output appears improved from previous.  Patient received dose Lasix yesterday.   - Continue to monitor chemistries  - Will redose Lasix today.      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 Pleural biopsy with fibrofatty tissue with dense fibrosis and skeletal muscle    Flu A/Acute hypoxemic respiratory failure/COPD  - sedated, intubated , management per ICU  - continue antimicrobials    Hypotension  - this is improved.  Patient is off pressors

## 2025-02-10 ENCOUNTER — OFFICE VISIT (OUTPATIENT)
Dept: ORTHOPEDICS | Facility: CLINIC | Age: 75
End: 2025-02-10
Payer: MEDICARE

## 2025-02-10 ENCOUNTER — LAB VISIT (OUTPATIENT)
Dept: LAB | Facility: HOSPITAL | Age: 75
End: 2025-02-10
Attending: INTERNAL MEDICINE
Payer: MEDICARE

## 2025-02-10 VITALS — BODY MASS INDEX: 23.8 KG/M2 | HEIGHT: 68 IN

## 2025-02-10 DIAGNOSIS — M65.331 TRIGGER MIDDLE FINGER OF RIGHT HAND: Primary | ICD-10-CM

## 2025-02-10 DIAGNOSIS — I48.4 ATYPICAL ATRIAL FLUTTER: ICD-10-CM

## 2025-02-10 DIAGNOSIS — I49.9 CARDIAC ARRHYTHMIA, UNSPECIFIED CARDIAC ARRHYTHMIA TYPE: ICD-10-CM

## 2025-02-10 LAB
ANION GAP SERPL CALC-SCNC: 9 MMOL/L (ref 8–16)
APTT PPP: 32.2 SEC (ref 21–32)
BUN SERPL-MCNC: 12 MG/DL (ref 8–23)
CALCIUM SERPL-MCNC: 8.9 MG/DL (ref 8.7–10.5)
CHLORIDE SERPL-SCNC: 108 MMOL/L (ref 95–110)
CO2 SERPL-SCNC: 24 MMOL/L (ref 23–29)
CREAT SERPL-MCNC: 0.7 MG/DL (ref 0.5–1.4)
ERYTHROCYTE [DISTWIDTH] IN BLOOD BY AUTOMATED COUNT: 13.3 % (ref 11.5–14.5)
EST. GFR  (NO RACE VARIABLE): >60 ML/MIN/1.73 M^2
GLUCOSE SERPL-MCNC: 92 MG/DL (ref 70–110)
HCT VFR BLD AUTO: 38.9 % (ref 37–48.5)
HGB BLD-MCNC: 12.4 G/DL (ref 12–16)
INR PPP: 1.1 (ref 0.8–1.2)
MCH RBC QN AUTO: 31.1 PG (ref 27–31)
MCHC RBC AUTO-ENTMCNC: 31.9 G/DL (ref 32–36)
MCV RBC AUTO: 98 FL (ref 82–98)
PLATELET # BLD AUTO: 167 K/UL (ref 150–450)
PMV BLD AUTO: 10.1 FL (ref 9.2–12.9)
POTASSIUM SERPL-SCNC: 4.7 MMOL/L (ref 3.5–5.1)
PROTHROMBIN TIME: 12.1 SEC (ref 9–12.5)
RBC # BLD AUTO: 3.99 M/UL (ref 4–5.4)
SODIUM SERPL-SCNC: 141 MMOL/L (ref 136–145)
WBC # BLD AUTO: 4.74 K/UL (ref 3.9–12.7)

## 2025-02-10 PROCEDURE — 3008F BODY MASS INDEX DOCD: CPT | Mod: HCNC,CPTII,S$GLB, | Performed by: ORTHOPAEDIC SURGERY

## 2025-02-10 PROCEDURE — 85027 COMPLETE CBC AUTOMATED: CPT | Mod: HCNC | Performed by: INTERNAL MEDICINE

## 2025-02-10 PROCEDURE — 85610 PROTHROMBIN TIME: CPT | Mod: HCNC | Performed by: INTERNAL MEDICINE

## 2025-02-10 PROCEDURE — 99213 OFFICE O/P EST LOW 20 MIN: CPT | Mod: HCNC,25,S$GLB, | Performed by: ORTHOPAEDIC SURGERY

## 2025-02-10 PROCEDURE — 1125F AMNT PAIN NOTED PAIN PRSNT: CPT | Mod: HCNC,CPTII,S$GLB, | Performed by: ORTHOPAEDIC SURGERY

## 2025-02-10 PROCEDURE — 80048 BASIC METABOLIC PNL TOTAL CA: CPT | Mod: HCNC | Performed by: INTERNAL MEDICINE

## 2025-02-10 PROCEDURE — 36415 COLL VENOUS BLD VENIPUNCTURE: CPT | Mod: HCNC | Performed by: INTERNAL MEDICINE

## 2025-02-10 PROCEDURE — 20550 NJX 1 TENDON SHEATH/LIGAMENT: CPT | Mod: HCNC,RT,S$GLB, | Performed by: ORTHOPAEDIC SURGERY

## 2025-02-10 PROCEDURE — 1159F MED LIST DOCD IN RCRD: CPT | Mod: HCNC,CPTII,S$GLB, | Performed by: ORTHOPAEDIC SURGERY

## 2025-02-10 PROCEDURE — 3288F FALL RISK ASSESSMENT DOCD: CPT | Mod: HCNC,CPTII,S$GLB, | Performed by: ORTHOPAEDIC SURGERY

## 2025-02-10 PROCEDURE — 85730 THROMBOPLASTIN TIME PARTIAL: CPT | Mod: HCNC | Performed by: INTERNAL MEDICINE

## 2025-02-10 PROCEDURE — 1101F PT FALLS ASSESS-DOCD LE1/YR: CPT | Mod: HCNC,CPTII,S$GLB, | Performed by: ORTHOPAEDIC SURGERY

## 2025-02-10 PROCEDURE — 99999 PR PBB SHADOW E&M-EST. PATIENT-LVL III: CPT | Mod: PBBFAC,HCNC,, | Performed by: ORTHOPAEDIC SURGERY

## 2025-02-10 RX ORDER — TRIAMCINOLONE ACETONIDE 40 MG/ML
20 INJECTION, SUSPENSION INTRA-ARTICULAR; INTRAMUSCULAR
Status: COMPLETED | OUTPATIENT
Start: 2025-02-10 | End: 2025-02-10

## 2025-02-10 RX ADMIN — TRIAMCINOLONE ACETONIDE 20 MG: 40 INJECTION, SUSPENSION INTRA-ARTICULAR; INTRAMUSCULAR at 11:02

## 2025-02-10 NOTE — PROGRESS NOTES
Subjective:      Patient ID: Zoë Enrique is a 74 y.o. female.  Chief Complaint: Finger Pain (Right middle trigger finger)      HPI  Zoë nErique is a  74 y.o. female presenting today for follow up of right hand symptoms.  She reports that she is having a recurrent problem with the right middle finger reporting triggering   The last injection last almost a year but symptoms have recurred no numbness or tingling report.    Review of patient's allergies indicates:   Allergen Reactions    Morphine Nausea And Vomiting    Succinylcholine      Other reaction(s): v tach         Current Outpatient Medications   Medication Sig Dispense Refill    acyclovir (ZOVIRAX) 400 MG tablet Take 1 tablet by mouth three times daily as needed 90 tablet 0    amoxicillin (AMOXIL) 500 MG capsule Take 500 mg by mouth as needed (takes prior to all dental procedures).      docusate sodium (COLACE) 250 MG capsule Take 250 mg by mouth 2 (two) times daily.       estradioL (ESTRACE) 0.01 % (0.1 mg/gram) vaginal cream PLACE 1 GRAM VAGINALLY EVERY OTHER DAY AS DIRECTED 43 g 0    famotidine (PEPCID) 40 MG tablet Take 1 tablet (40 mg total) by mouth every evening. 30 tablet 3    flecainide (TAMBOCOR) 100 MG Tab Take 1 tablet (100 mg total) by mouth every 12 (twelve) hours. 60 tablet 11    HYDROcodone-acetaminophen (NORCO) 5-325 mg per tablet Take 1 tablet by mouth every 6 (six) hours as needed for Pain. Pt states takes PRN for headaches 28 tablet 0    ipratropium (ATROVENT) 42 mcg (0.06 %) nasal spray 2 sprays by Each Nostril route 3 (three) times daily. 15 mL 6    levothyroxine (SYNTHROID) 25 MCG tablet Take 1 tablet by mouth once daily 90 tablet 3    metoprolol succinate (TOPROL-XL) 25 MG 24 hr tablet Take 1 tablet (25 mg total) by mouth once daily. (Patient taking differently: Take by mouth once daily. Pt taking 12.5) 90 tablet 3    omeprazole (PRILOSEC) 40 MG capsule Take 1 capsule (40 mg total) by mouth once daily. 30 capsule 3    predniSONE  (DELTASONE) 20 MG tablet Take 1 tablet (20 mg total) by mouth once daily. 10 tablet 3    RESTASIS 0.05 % ophthalmic emulsion Place 1 drop into both eyes 2 (two) times daily. 180 each 3    rivaroxaban (XARELTO) 20 mg Tab Take 1 tablet (20 mg total) by mouth once daily. 90 tablet 3    sodium chloride 3% 3 % nebulizer solution Take 4 mLs by nebulization 2 (two) times a day. 300 mL PRN    vit C/E/Zn/coppr/lutein/zeaxan (PRESERVISION AREDS-2 ORAL)       amoxicillin-clavulanate 875-125mg (AUGMENTIN) 875-125 mg per tablet Take 1 tablet by mouth 2 (two) times daily. 20 tablet 1     No current facility-administered medications for this visit.     Facility-Administered Medications Ordered in Other Visits   Medication Dose Route Frequency Provider Last Rate Last Admin    sodium chloride 0.9% flush 5 mL  5 mL Intravenous PRN Aleksandra Taylor NP           Past Medical History:   Diagnosis Date    Abnormal Pap smear of cervix     Atrial fibrillation 2017    Atrial flutter     Basal cell carcinoma 2021    superficial BCC R cheek s/p Efudex    Cancer     osteosarcoma of right femur    Diverticulosis     Hx of atrial flutter     Hx of mitral valve prolapse     Hypothyroidism     Migraines     Recurrent upper respiratory infection (URI)     Supraventricular tachycardia        Past Surgical History:   Procedure Laterality Date    ABLATION OF ARRHYTHMOGENIC FOCUS FOR ATRIAL FIBRILLATION N/A 2020    Procedure: ABLATION, ARRHYTHMOGENIC FOCUS, FOR ATRIAL FIBRILLATION;  Surgeon: Moe Seymour MD;  Location: [facility] EP LAB;  Service: Cardiology;  Laterality: N/A;  AFL/AF, KHANH, CTI, PVI, RFA, CARTO, GEN, DM, 3 PREP    AUGMENTATION OF BREAST Bilateral     35 yrs     BREAST SURGERY      augmentation    CARDIOVERSION  2020    Procedure: Cardioversion;  Surgeon: Moe Seymour MD;  Location: [facility] EP LAB;  Service: Cardiology;;     SECTION      COLON SURGERY      partial colon resection     COLONOSCOPY N/A 03/19/2021    Procedure: COLONOSCOPY;  Surgeon: Sahara Salgado MD;  Location: [facility] ENDO (4TH FLR);  Service: Endoscopy;  Laterality: N/A;  constipation prep- 7 days of miralax, 2 fulls of fulls, then day bf clears and split prep   covid test 3/5 pcw, prep instr emailed, antibiotics prophylactically, Xarelto hold x 2 days per Dr. Seymour-see telephone encounter 2/10 -ml    COLPOSCOPY      COSMETIC SURGERY  1987    DE QUERVAIN'S RELEASE Left 08/02/2022    Procedure: RELEASE, HAND, FOR DEQUERVAIN'S TENOSYNOVITIS;  Surgeon: Toñito Adame Jr., MD;  Location: [facility] OR;  Service: Orthopedics;  Laterality: Left;    ECHOCARDIOGRAM,TRANSESOPHAGEAL N/A 7/8/2024    Procedure: Transesophageal echo (KHANH) intra-procedure log documentation;  Surgeon: Ramírez Ortez III, MD;  Location: [facility] EP LAB;  Service: Cardiology;  Laterality: N/A;    ESOPHAGOGASTRODUODENOSCOPY N/A 03/19/2021    Procedure: EGD (ESOPHAGOGASTRODUODENOSCOPY);  Surgeon: Sahara Salgado MD;  [facility]  Service: Endoscopy;  Laterality: N/A;    EYE SURGERY Bilateral 2016    Lasik, then cataracts extraction    JOINT REPLACEMENT Right 2/07, 11/07, 2/09    KNEE ARTHROPLASTY Right 2007    revison in 2009    KNEE SURGERY Right 1989    distal femoral replacing TK    OOPHORECTOMY      TOTAL ABDOMINAL HYSTERECTOMY  1994    TAHBSO    TRANSESOPHAGEAL ECHOCARDIOGRAPHY N/A 11/24/2020    Procedure: ECHOCARDIOGRAM, TRANSESOPHAGEAL;  Surgeon: Ramírez Ortez III, MD;  Location: [facility] EP LAB;  Service: Cardiology;  Laterality: N/A;    TREATMENT OF CARDIAC ARRHYTHMIA N/A 08/12/2019    Procedure: CARDIOVERSION;  Surgeon: Moe Seymour MD;  Location: [facility] EP LAB;  Service: Cardiology;  Laterality: N/A;  KHANH/DCCV/MAC/DM/3PREP/*ADMIT FOR TIKOSYN*    TREATMENT OF CARDIAC ARRHYTHMIA N/A 7/8/2024    Procedure: Cardioversion or Defibrillation;  Surgeon: GABRIELE Bull MD;  Location: [facility] EP LAB;  Service: Cardiology;  Laterality: N/A;  " AF, KHANH, DCCV, MAC, DM, 3 Prep    TRIGGER FINGER RELEASE Left 08/02/2022    Procedure: RELEASE, TRIGGER FINGER;  Surgeon: Toñito Adame Jr., MD;  Location: [facility];  Service: Orthopedics;  Laterality: Left;  left middle finger       OBJECTIVE:   PHYSICAL EXAM:  Height: 5' 8" (172.7 cm)    Vitals:    02/10/25 1124   Height: 5' 8" (1.727 m)   PainSc:   2   PainLoc: Finger     Ortho/SPM Exam  Examination right hand there is tenderness along the flexor tendon sheath mild triggering   Range of motion fingers full   Sensation intact    RADIOGRAPHS:  None  Comments: I have personally reviewed the imaging and I agree with the above radiologist's report.    ASSESSMENT/PLAN:     IMPRESSION:  Right trigger finger middle    PLAN:  We discussed options including injection versus surgery   She would like injection   After pause for time-out identified the right middle finger injected with Kenalog 20 mg 0.5 cc xylocaine sterile technique   Tolerated the procedure well without complication   If symptoms persist I would like her to consider surgical treatment    FOLLOW UP:  1-2 months or as needed    Disclaimer: This note has been generated using voice-recognition software. There may be typographical errors that have been missed during proof-reading.     "

## 2025-02-13 ENCOUNTER — PATIENT MESSAGE (OUTPATIENT)
Dept: ELECTROPHYSIOLOGY | Facility: CLINIC | Age: 75
End: 2025-02-13
Payer: MEDICARE

## 2025-02-14 ENCOUNTER — TELEPHONE (OUTPATIENT)
Dept: ELECTROPHYSIOLOGY | Facility: CLINIC | Age: 75
End: 2025-02-14
Payer: MEDICARE

## 2025-02-14 NOTE — TELEPHONE ENCOUNTER
Spoke to patient     CONFIRMED procedure arrival time of 5:15 AM on 2/17/2025    Reiterated instructions including:  -Directions to check in desk  -NPO after midnight night prior to procedure  -High importance of HOLDING Flecainide 4 days prior to procedure. Patient report did take the Flecainide 2/13/25 morning dose. Instructed patient to continue to hold and do not take any additional. Patient verbalized understanding.   -Confirmed compliance of Xarelto. Patient instructed to continue to take Xarelto as prescribed, take the evening prior to the procedure. Patient verbalized understanding.   -Pre-procedure LABS Reviewed. No alerts noted.   -Confirmed absence of implanted device/stimulator   -Confirmed no fever, cough, or shortness of breath in the past 30 days  -Confirmed no redness, rash, irritation, or yeast infection to groin area.   -Do not wear mascara day of procedure  -Reviewed current visitor policy    Patient verbalized understanding of above and appreciated the call.         ----- Message from Med Assistant Amaro sent at 2/14/2025  8:57 AM CST -----  Regarding: FW: Returning call    ----- Message -----  From: Cheri Sweet  Sent: 2/14/2025   8:51 AM CST  To: [facility] Staff  Subject: Returning call                                   Patient returning call. Please call back @ [phone]. Thank you Cheri

## 2025-02-16 ENCOUNTER — ANESTHESIA EVENT (OUTPATIENT)
Dept: MEDSURG UNIT | Facility: HOSPITAL | Age: 75
End: 2025-02-16
Payer: MEDICARE

## 2025-02-16 NOTE — ASSESSMENT & PLAN NOTE
74 yoF with a history of AF for several years who underwent ablations by Dr Rowland at [facility], at [place] and more recently a complex ablation 11/24/2020 by Dr Seymour where RFA of CTI for typical AFL and RFA of high posterior RA septum were performed for a focal AT. She developed atypical AFL and underwent KHANH/CV 7/24 by Dr Seymour. EF 55-60%. She was placed on flecainide for rhythm control after. At some point between her CV and November she was taken off flecainide. She felt recurrence of symptoms and was in AFL 11/13/24. She feels poorly in AFL and has felt much better since she converted  back to SR a few weeks ago. Xarelto for CVA prophylaxis.     No KHANH- NSR, compliant with Xarelto  MIYA    The risks, benefits and alternatives of the procedure were explained to the patient, patient's family and/or surrogate decision maker. Risks include (but not limited to) bleeding, hematoma, infection, pain, vascular damage, damage to structures surrounding the vasculature, myocardial damage [perforation, valvular damage], cardiac tamponade, phrenic nerve damage, pulmonary vein stenosis, atrioesophageal (AE) fistula, coronary vasospasm, CVA, MI, and death. Patient is understanding that repeat ablations may be required. All questions were answered. Patient is understanding of these risks, and would like to proceed.

## 2025-02-16 NOTE — HPI
74 yoF with a history of AF for several years who underwent ablations by Dr Rowland at [facility], at [place] and more recently a complex ablation 11/24/2020 by Dr Seymour. She developed atypical AFL and underwent KHANH/CV 7/24 by Dr Seymour. EF 55-60%. She was placed on flecainide for rhythm control after. At some point between her CV and November she was taken off flecainide. She felt recurrence of symptoms and was in AFL 11/13/24. She feels poorly in AFL and has felt much better since she converted  back to SR a few weeks ago. Xarelto for CVA prophylaxis.   ECG of AFL shows + p waves in V1 and inferior leads, iso in I and - in AVL  She presents today for redo ablation.      Ablation 11/20:  ·Successful ablation of atrial tachycardia -- high posterior RA septum  ·Successful ablation of atrial flutter (typical).  ·Persistently successful prior pulmonary vein RF ablation.  ·Catheter ablation of two mechanistically distinct tachycardias (focal AT and CTI-dependent AFL).    Meds: Xarelto 20daily, Flecainide 100 bid, Toprol  25 daily     ECG: NSR

## 2025-02-16 NOTE — ANESTHESIA PREPROCEDURE EVALUATION
02/16/2025  Zoë Enrique is a 74 y.o., female.Problem List[1]        Pre-op Assessment    I have reviewed the Patient Summary Reports.       I have reviewed the Medications.     Review of Systems  Anesthesia Hx:  No problems with previous Anesthesia               Denies Personal Hx of Anesthesia complications.                    Cardiovascular:                       Atrial Fibrillation, Atrial Flutter     Pulmonary:       Recent URI   Pulmonary Symptoms:                  Neurological:    Neuromuscular Disease,  Headaches      Dx of Headaches                         Neuromuscular Disease   Endocrine:   Hypothyroidism       Hypothyroidism              Physical Exam    Airway:  No airway management difficulties anticipated  Dental:  No active dental issues noted  Chest/Lungs:  Clear to auscultation    Heart:  Rate: Normal  Rhythm: Regular Rhythm  Sounds: Normal        Anesthesia Plan  Type of Anesthesia, risks & benefits discussed:    Anesthesia Type: Gen Supraglottic Airway, Gen ETT  Informed Consent: Informed consent signed with the Patient and all parties understand the risks and agree with anesthesia plan.  All questions answered.   ASA Score: 3  Anesthesia Plan Notes: Chart reviewed. Patient seen and examined. Anesthesia plan discussed and questions answered. E-consent signed. Terry House MD    Ready For Surgery From Anesthesia Perspective.     .           [1]   Patient Active Problem List  Diagnosis    Chronic tension-type headache, not intractable    Paroxysmal atrial fibrillation    Hypothyroidism (acquired)    Osteosarcoma of right femur    Acute pain of left wrist    Diffuse arthralgia    History of arthroplasty of right knee    Greater trochanteric bursitis of right hip    Weakness of hip    Hip pain    Longstanding persistent atrial fibrillation    Bronchiectasis without complication    Cough     Thrombocytopenia    Fecal occult blood test positive    Dysphagia    PSVT (paroxysmal supraventricular tachycardia)    Urge incontinence    Nocturia    Pelvic floor weakness    De Quervain's tenosynovitis, left    Trigger middle finger of right hand    Right knee pain    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

## 2025-02-16 NOTE — SUBJECTIVE & OBJECTIVE
Past Medical History:   Diagnosis Date    Abnormal Pap smear of cervix     Atrial fibrillation 2017    Atrial flutter     Basal cell carcinoma 2021    superficial BCC R cheek s/p Efudex    Cancer     osteosarcoma of right femur    Diverticulosis     Hx of atrial flutter     Hx of mitral valve prolapse     Hypothyroidism     Migraines     Recurrent upper respiratory infection (URI)     Supraventricular tachycardia        Past Surgical History:   Procedure Laterality Date    ABLATION OF ARRHYTHMOGENIC FOCUS FOR ATRIAL FIBRILLATION N/A 2020    Procedure: ABLATION, ARRHYTHMOGENIC FOCUS, FOR ATRIAL FIBRILLATION;  Surgeon: Moe Seymour MD;  Location: [facility] EP LAB;  Service: Cardiology;  Laterality: N/A;  AFL/AF, KHANH, CTI, PVI, RFA, CARTO, GEN, DM, 3 PREP    AUGMENTATION OF BREAST Bilateral 1984    35 yrs     BREAST SURGERY      augmentation    CARDIOVERSION  2020    Procedure: Cardioversion;  Surgeon: Moe Seymour MD;  Location: [facility] EP LAB;  Service: Cardiology;;     SECTION      COLON SURGERY      partial colon resection    COLONOSCOPY N/A 2021    Procedure: COLONOSCOPY;  Surgeon: Sahara Salgado MD;  Location: [facility] ENDO ([address] [place] [address]);  Service: Endoscopy;  Laterality: N/A;  constipation prep- 7 days of miralax, 2 fulls of fulls, then day bf clears and split prep   covid test 3/5 pcw, prep instr emailed, antibiotics prophylactically, Xarelto hold x 2 days per Dr. Seymour-see telephone encounter /10 -ml    COLPOSCOPY      COSMETIC SURGERY      DE QUERVAIN'S RELEASE Left 2022    Procedure: RELEASE, HAND, FOR DEQUERVAIN'S TENOSYNOVITIS;  Surgeon: Toñito Adame Jr., MD;  Location: [facility] OR;  Service: Orthopedics;  Laterality: Left;    ECHOCARDIOGRAM,TRANSESOPHAGEAL N/A 2024    Procedure: Transesophageal echo (KHANH) intra-procedure log documentation;  Surgeon: Ramírez Ortez III, MD;  Location: [facility] EP LAB;  Service: Cardiology;  Laterality: N/A;     ESOPHAGOGASTRODUODENOSCOPY N/A 03/19/2021    Procedure: EGD (ESOPHAGOGASTRODUODENOSCOPY);  Surgeon: Sahara Salgado MD;  Location: [facility] ([address] [place] [address]);  Service: Endoscopy;  Laterality: N/A;    EYE SURGERY Bilateral 2016    Lasik, then cataracts extraction    JOINT REPLACEMENT Right 2/07, 11/07, 2/09    KNEE ARTHROPLASTY Right 2007    revison in 2009    KNEE SURGERY Right 1989    distal femoral replacing TK    OOPHORECTOMY      TOTAL ABDOMINAL HYSTERECTOMY  1994    TAHBSO    TRANSESOPHAGEAL ECHOCARDIOGRAPHY N/A 11/24/2020    Procedure: ECHOCARDIOGRAM, TRANSESOPHAGEAL;  Surgeon: Ramírez Ortez III, MD;  Location: [facility] EP LAB;  Service: Cardiology;  Laterality: N/A;    TREATMENT OF CARDIAC ARRHYTHMIA N/A 08/12/2019    Procedure: CARDIOVERSION;  Surgeon: Moe Seymour MD;  Location: [facility] EP LAB;  Service: Cardiology;  Laterality: N/A;  KHANH/DCCV/MAC/DM/3PREP/*ADMIT FOR TIKOSYN*    TREATMENT OF CARDIAC ARRHYTHMIA N/A 7/8/2024    Procedure: Cardioversion or Defibrillation;  Surgeon: GABRIELE Bull MD;  Location: [facility] EP LAB;  Service: Cardiology;  Laterality: N/A;  AF, KHANH, DCCV, MAC, DM, 3 Prep    TRIGGER FINGER RELEASE Left 08/02/2022    Procedure: RELEASE, TRIGGER FINGER;  Surgeon: Toñito Adame Jr., MD;  Location: [facility] OR;  Service: Orthopedics;  Laterality: Left;  left middle finger       Review of patient's allergies indicates:   Allergen Reactions    Morphine Nausea And Vomiting    Succinylcholine      Other reaction(s): v tach       Current Facility-Administered Medications on File Prior to Encounter   Medication    sodium chloride 0.9% flush 5 mL     Current Outpatient Medications on File Prior to Encounter   Medication Sig    amoxicillin (AMOXIL) 500 MG capsule Take 500 mg by mouth as needed (takes prior to all dental procedures).    amoxicillin-clavulanate 875-125mg (AUGMENTIN) 875-125 mg per tablet Take 1 tablet by mouth 2 (two) times daily.    docusate sodium (COLACE) 250 MG capsule Take  250 mg by mouth 2 (two) times daily.     estradioL (ESTRACE) 0.01 % (0.1 mg/gram) vaginal cream PLACE 1 GRAM VAGINALLY EVERY OTHER DAY AS DIRECTED    flecainide (TAMBOCOR) 100 MG Tab Take 1 tablet (100 mg total) by mouth every 12 (twelve) hours.    HYDROcodone-acetaminophen (NORCO) 5-325 mg per tablet Take 1 tablet by mouth every 6 (six) hours as needed for Pain. Pt states takes PRN for headaches    ipratropium (ATROVENT) 42 mcg (0.06 %) nasal spray 2 sprays by Each Nostril route 3 (three) times daily.    levothyroxine (SYNTHROID) 25 MCG tablet Take 1 tablet by mouth once daily    metoprolol succinate (TOPROL-XL) 25 MG 24 hr tablet Take 1 tablet (25 mg total) by mouth once daily. (Patient taking differently: Take by mouth once daily. Pt taking 12.5)    RESTASIS 0.05 % ophthalmic emulsion Place 1 drop into both eyes 2 (two) times daily.    rivaroxaban (XARELTO) 20 mg Tab Take 1 tablet (20 mg total) by mouth once daily.    sodium chloride 3% 3 % nebulizer solution Take 4 mLs by nebulization 2 (two) times a day.    vit C/E/Zn/coppr/lutein/zeaxan (PRESERVISION AREDS-2 ORAL)      Family History       Problem Relation (Age of Onset)    Asthma Brother    Diabetes Mother, Sister    Diverticulosis Brother, Brother    Esophageal cancer Maternal Uncle    Hearing loss Father    Heart disease Mother, Father, Sister, Brother, Brother    No Known Problems Daughter    Stroke Father (93)          Tobacco Use    Smoking status: Never     Passive exposure: Never    Smokeless tobacco: Never   Substance and Sexual Activity    Alcohol use: Yes     Alcohol/week: 2.0 standard drinks of alcohol     Types: 2 Shots of liquor per week     Comment: 1-2 per month    Drug use: No     Comment: CBD oils for headache    Sexual activity: Not Currently     Partners: Male     Birth control/protection: Post-menopausal, See Surgical Hx, None     Comment: , together x 37 years     Review of Systems   All other systems reviewed and are  negative.    Objective:     Vital Signs (Most Recent):    Vital Signs (24h Range):             There is no height or weight on file to calculate BMI.             Physical Exam  Vitals and nursing note reviewed.   Constitutional:       Appearance: Normal appearance.   HENT:      Head: Normocephalic.   Cardiovascular:      Rate and Rhythm: Normal rate and regular rhythm.      Pulses: Normal pulses.      Heart sounds: Normal heart sounds.   Pulmonary:      Effort: Pulmonary effort is normal.      Breath sounds: Normal breath sounds.   Abdominal:      General: Abdomen is flat.   Musculoskeletal:      Right lower leg: No edema.      Left lower leg: No edema.   Skin:     General: Skin is warm.   Neurological:      General: No focal deficit present.      Mental Status: She is alert.            Significant Labs: All pertinent lab results from the last 24 hours have been reviewed.

## 2025-02-17 ENCOUNTER — HOSPITAL ENCOUNTER (OUTPATIENT)
Facility: HOSPITAL | Age: 75
Discharge: HOME | End: 2025-02-17
Attending: INTERNAL MEDICINE | Admitting: INTERNAL MEDICINE
Payer: MEDICARE

## 2025-02-17 ENCOUNTER — TELEPHONE (OUTPATIENT)
Dept: ELECTROPHYSIOLOGY | Facility: CLINIC | Age: 75
End: 2025-02-17
Payer: MEDICARE

## 2025-02-17 ENCOUNTER — PATIENT MESSAGE (OUTPATIENT)
Dept: ADMINISTRATIVE | Facility: CLINIC | Age: 75
End: 2025-02-17
Payer: MEDICARE

## 2025-02-17 ENCOUNTER — ANESTHESIA (OUTPATIENT)
Dept: MEDSURG UNIT | Facility: HOSPITAL | Age: 75
End: 2025-02-17
Payer: MEDICARE

## 2025-02-17 VITALS
SYSTOLIC BLOOD PRESSURE: 117 MMHG | WEIGHT: 155 LBS | OXYGEN SATURATION: 96 % | DIASTOLIC BLOOD PRESSURE: 59 MMHG | RESPIRATION RATE: 18 BRPM | BODY MASS INDEX: 23.49 KG/M2 | TEMPERATURE: 98 F | HEIGHT: 68 IN | HEART RATE: 58 BPM

## 2025-02-17 DIAGNOSIS — I48.4 ATYPICAL ATRIAL FLUTTER: ICD-10-CM

## 2025-02-17 DIAGNOSIS — I48.92 ATRIAL FLUTTER: ICD-10-CM

## 2025-02-17 DIAGNOSIS — I48.91 A-FIB: ICD-10-CM

## 2025-02-17 DIAGNOSIS — I49.9 ARRHYTHMIA: ICD-10-CM

## 2025-02-17 LAB
OHS QRS DURATION: 80 MS
OHS QRS DURATION: 88 MS
OHS QTC CALCULATION: 438 MS
OHS QTC CALCULATION: 466 MS

## 2025-02-17 PROCEDURE — 37000008 HC ANESTHESIA 1ST 15 MINUTES: Mod: HCNC | Performed by: INTERNAL MEDICINE

## 2025-02-17 PROCEDURE — C1887 CATHETER, GUIDING: HCPCS | Mod: HCNC | Performed by: INTERNAL MEDICINE

## 2025-02-17 PROCEDURE — C1753 CATH, INTRAVAS ULTRASOUND: HCPCS | Mod: HCNC | Performed by: INTERNAL MEDICINE

## 2025-02-17 PROCEDURE — 25000003 PHARM REV CODE 250: Mod: HCNC | Performed by: NURSE ANESTHETIST, CERTIFIED REGISTERED

## 2025-02-17 PROCEDURE — 93621 COMP EP EVL L PAC&REC C SINS: CPT | Mod: HCNC | Performed by: INTERNAL MEDICINE

## 2025-02-17 PROCEDURE — 93662 INTRACARDIAC ECG (ICE): CPT | Mod: HCNC | Performed by: INTERNAL MEDICINE

## 2025-02-17 PROCEDURE — C1730 CATH, EP, 19 OR FEW ELECT: HCPCS | Mod: HCNC | Performed by: INTERNAL MEDICINE

## 2025-02-17 PROCEDURE — 63600175 PHARM REV CODE 636 W HCPCS: Mod: HCNC | Performed by: INTERNAL MEDICINE

## 2025-02-17 PROCEDURE — 93620 COMP EP EVL R AT VEN PAC&REC: CPT | Mod: HCNC | Performed by: INTERNAL MEDICINE

## 2025-02-17 PROCEDURE — 25000003 PHARM REV CODE 250: Mod: HCNC | Performed by: STUDENT IN AN ORGANIZED HEALTH CARE EDUCATION/TRAINING PROGRAM

## 2025-02-17 PROCEDURE — 27201423 OPTIME MED/SURG SUP & DEVICES STERILE SUPPLY: Mod: HCNC | Performed by: INTERNAL MEDICINE

## 2025-02-17 PROCEDURE — C1766 INTRO/SHEATH,STRBLE,NON-PEEL: HCPCS | Mod: HCNC | Performed by: INTERNAL MEDICINE

## 2025-02-17 PROCEDURE — 37000009 HC ANESTHESIA EA ADD 15 MINS: Mod: HCNC | Performed by: INTERNAL MEDICINE

## 2025-02-17 PROCEDURE — 63600175 PHARM REV CODE 636 W HCPCS: Mod: HCNC | Performed by: ANESTHESIOLOGY

## 2025-02-17 PROCEDURE — 25000003 PHARM REV CODE 250: Mod: HCNC | Performed by: INTERNAL MEDICINE

## 2025-02-17 PROCEDURE — 93005 ELECTROCARDIOGRAM TRACING: CPT | Mod: HCNC

## 2025-02-17 PROCEDURE — 63600175 PHARM REV CODE 636 W HCPCS: Mod: HCNC | Performed by: NURSE ANESTHETIST, CERTIFIED REGISTERED

## 2025-02-17 PROCEDURE — C1894 INTRO/SHEATH, NON-LASER: HCPCS | Mod: HCNC | Performed by: INTERNAL MEDICINE

## 2025-02-17 RX ORDER — DEXAMETHASONE SODIUM PHOSPHATE 4 MG/ML
INJECTION, SOLUTION INTRA-ARTICULAR; INTRALESIONAL; INTRAMUSCULAR; INTRAVENOUS; SOFT TISSUE
Status: DISCONTINUED | OUTPATIENT
Start: 2025-02-17 | End: 2025-02-17

## 2025-02-17 RX ORDER — PROCHLORPERAZINE EDISYLATE 5 MG/ML
5 INJECTION INTRAMUSCULAR; INTRAVENOUS EVERY 30 MIN PRN
Status: DISCONTINUED | OUTPATIENT
Start: 2025-02-17 | End: 2025-02-17 | Stop reason: HOSPADM

## 2025-02-17 RX ORDER — PROTAMINE SULFATE 10 MG/ML
INJECTION, SOLUTION INTRAVENOUS
Status: DISCONTINUED | OUTPATIENT
Start: 2025-02-17 | End: 2025-02-17

## 2025-02-17 RX ORDER — HYDROMORPHONE HYDROCHLORIDE 1 MG/ML
0.2 INJECTION, SOLUTION INTRAMUSCULAR; INTRAVENOUS; SUBCUTANEOUS EVERY 5 MIN PRN
Status: DISCONTINUED | OUTPATIENT
Start: 2025-02-17 | End: 2025-02-17 | Stop reason: HOSPADM

## 2025-02-17 RX ORDER — ROCURONIUM BROMIDE 10 MG/ML
INJECTION, SOLUTION INTRAVENOUS
Status: DISCONTINUED | OUTPATIENT
Start: 2025-02-17 | End: 2025-02-17

## 2025-02-17 RX ORDER — DEXMEDETOMIDINE HYDROCHLORIDE 100 UG/ML
INJECTION, SOLUTION INTRAVENOUS
Status: DISCONTINUED | OUTPATIENT
Start: 2025-02-17 | End: 2025-02-17

## 2025-02-17 RX ORDER — DIPHENHYDRAMINE HYDROCHLORIDE 50 MG/ML
25 INJECTION INTRAMUSCULAR; INTRAVENOUS EVERY 6 HOURS PRN
Status: DISCONTINUED | OUTPATIENT
Start: 2025-02-17 | End: 2025-02-17 | Stop reason: HOSPADM

## 2025-02-17 RX ORDER — FENTANYL CITRATE 50 UG/ML
INJECTION, SOLUTION INTRAMUSCULAR; INTRAVENOUS
Status: DISCONTINUED | OUTPATIENT
Start: 2025-02-17 | End: 2025-02-17

## 2025-02-17 RX ORDER — PROPOFOL 10 MG/ML
VIAL (ML) INTRAVENOUS
Status: DISCONTINUED | OUTPATIENT
Start: 2025-02-17 | End: 2025-02-17

## 2025-02-17 RX ORDER — ONDANSETRON HYDROCHLORIDE 2 MG/ML
4 INJECTION, SOLUTION INTRAVENOUS ONCE AS NEEDED
Status: DISCONTINUED | OUTPATIENT
Start: 2025-02-17 | End: 2025-02-17 | Stop reason: HOSPADM

## 2025-02-17 RX ORDER — HEPARIN SOD,PORCINE/0.9 % NACL 1000/500ML
INTRAVENOUS SOLUTION INTRAVENOUS
Status: DISCONTINUED | OUTPATIENT
Start: 2025-02-17 | End: 2025-02-17 | Stop reason: HOSPADM

## 2025-02-17 RX ORDER — MIDAZOLAM HYDROCHLORIDE 1 MG/ML
INJECTION INTRAMUSCULAR; INTRAVENOUS
Status: DISCONTINUED | OUTPATIENT
Start: 2025-02-17 | End: 2025-02-17

## 2025-02-17 RX ORDER — FENTANYL CITRATE 50 UG/ML
25 INJECTION, SOLUTION INTRAMUSCULAR; INTRAVENOUS EVERY 5 MIN PRN
Status: DISCONTINUED | OUTPATIENT
Start: 2025-02-17 | End: 2025-02-17 | Stop reason: HOSPADM

## 2025-02-17 RX ORDER — PHENYLEPHRINE HYDROCHLORIDE 10 MG/ML
INJECTION INTRAVENOUS
Status: DISCONTINUED | OUTPATIENT
Start: 2025-02-17 | End: 2025-02-17

## 2025-02-17 RX ORDER — LIDOCAINE HYDROCHLORIDE 20 MG/ML
INJECTION INTRAVENOUS
Status: DISCONTINUED | OUTPATIENT
Start: 2025-02-17 | End: 2025-02-17

## 2025-02-17 RX ORDER — HEPARIN SODIUM 1000 [USP'U]/ML
INJECTION, SOLUTION INTRAVENOUS; SUBCUTANEOUS
Status: DISCONTINUED | OUTPATIENT
Start: 2025-02-17 | End: 2025-02-17

## 2025-02-17 RX ORDER — ACETAMINOPHEN 325 MG/1
650 TABLET ORAL EVERY 4 HOURS PRN
Status: DISCONTINUED | OUTPATIENT
Start: 2025-02-17 | End: 2025-02-17 | Stop reason: HOSPADM

## 2025-02-17 RX ORDER — ONDANSETRON HYDROCHLORIDE 2 MG/ML
INJECTION, SOLUTION INTRAVENOUS
Status: DISCONTINUED | OUTPATIENT
Start: 2025-02-17 | End: 2025-02-17

## 2025-02-17 RX ORDER — MULTIVITAMIN WITH IRON
TABLET ORAL DAILY
COMMUNITY

## 2025-02-17 RX ORDER — LIDOCAINE HYDROCHLORIDE 20 MG/ML
INJECTION, SOLUTION INFILTRATION; PERINEURAL
Status: DISCONTINUED | OUTPATIENT
Start: 2025-02-17 | End: 2025-02-17 | Stop reason: HOSPADM

## 2025-02-17 RX ORDER — HEPARIN SODIUM 10000 [USP'U]/100ML
INJECTION, SOLUTION INTRAVENOUS CONTINUOUS PRN
Status: DISCONTINUED | OUTPATIENT
Start: 2025-02-17 | End: 2025-02-17

## 2025-02-17 RX ADMIN — FENTANYL CITRATE 25 MCG: 50 INJECTION INTRAMUSCULAR; INTRAVENOUS at 11:02

## 2025-02-17 RX ADMIN — FENTANYL CITRATE 50 MCG: 0.05 INJECTION, SOLUTION INTRAMUSCULAR; INTRAVENOUS at 10:02

## 2025-02-17 RX ADMIN — HEPARIN SODIUM 10500 UNITS: 1000 INJECTION, SOLUTION INTRAVENOUS; SUBCUTANEOUS at 08:02

## 2025-02-17 RX ADMIN — PROTAMINE SULFATE 10 MG: 10 INJECTION, SOLUTION INTRAVENOUS at 10:02

## 2025-02-17 RX ADMIN — ONDANSETRON 4 MG: 2 INJECTION INTRAMUSCULAR; INTRAVENOUS at 10:02

## 2025-02-17 RX ADMIN — PHENYLEPHRINE HYDROCHLORIDE 100 MCG: 10 INJECTION INTRAVENOUS at 08:02

## 2025-02-17 RX ADMIN — ROCURONIUM BROMIDE 50 MG: 10 INJECTION INTRAVENOUS at 07:02

## 2025-02-17 RX ADMIN — SODIUM CHLORIDE: 0.9 INJECTION, SOLUTION INTRAVENOUS at 07:02

## 2025-02-17 RX ADMIN — PHENYLEPHRINE HYDROCHLORIDE 100 MCG: 10 INJECTION INTRAVENOUS at 07:02

## 2025-02-17 RX ADMIN — LIDOCAINE HYDROCHLORIDE 100 MG: 20 INJECTION INTRAVENOUS at 07:02

## 2025-02-17 RX ADMIN — PROPOFOL 50 MG: 10 INJECTION, EMULSION INTRAVENOUS at 07:02

## 2025-02-17 RX ADMIN — DEXMEDETOMIDINE 12 MCG: 200 INJECTION, SOLUTION INTRAVENOUS at 07:02

## 2025-02-17 RX ADMIN — PHENYLEPHRINE HYDROCHLORIDE 0.25 MCG/KG/MIN: 10 INJECTION INTRAVENOUS at 08:02

## 2025-02-17 RX ADMIN — FENTANYL CITRATE 100 MCG: 0.05 INJECTION, SOLUTION INTRAMUSCULAR; INTRAVENOUS at 07:02

## 2025-02-17 RX ADMIN — PROPOFOL 150 MG: 10 INJECTION, EMULSION INTRAVENOUS at 07:02

## 2025-02-17 RX ADMIN — DEXAMETHASONE SODIUM PHOSPHATE 8 MG: 4 INJECTION, SOLUTION INTRAMUSCULAR; INTRAVENOUS at 10:02

## 2025-02-17 RX ADMIN — HEPARIN SODIUM 5000 UNITS: 1000 INJECTION, SOLUTION INTRAVENOUS; SUBCUTANEOUS at 08:02

## 2025-02-17 RX ADMIN — HEPARIN SODIUM AND DEXTROSE 12 UNITS/KG/HR: 10000; 5 INJECTION INTRAVENOUS at 08:02

## 2025-02-17 RX ADMIN — DEXMEDETOMIDINE 8 MCG: 200 INJECTION, SOLUTION INTRAVENOUS at 10:02

## 2025-02-17 RX ADMIN — PROTAMINE SULFATE 45 MG: 10 INJECTION, SOLUTION INTRAVENOUS at 10:02

## 2025-02-17 RX ADMIN — MIDAZOLAM HYDROCHLORIDE 2 MG: 2 INJECTION, SOLUTION INTRAMUSCULAR; INTRAVENOUS at 07:02

## 2025-02-17 RX ADMIN — ACETAMINOPHEN 650 MG: 325 TABLET ORAL at 11:02

## 2025-02-17 RX ADMIN — SUGAMMADEX 200 MG: 100 INJECTION, SOLUTION INTRAVENOUS at 10:02

## 2025-02-17 NOTE — NURSING TRANSFER
Nursing Transfer Note      2/17/2025   1:22 PM    Nurse giving handoff:yamilet jefferson   Nurse receiving handoff:julián samsonu rn     Reason patient is being transferred: d/c critieria met     [facility]    Transfer via stretcher    Transfer with cardiac monitoring(box 2904) registered and verified able to see patient on monitor     Transported by yamilet ejfferson     Transfer Vital Signs:  Blood Pressure:see epic   Heart Rate:see epic   O2:room air   Temperature:see epic   Respirations:see epic     Telemetry: yes   Order for Tele Monitor? Yes     Additional Lines: \monitor groin sites     Medicines sent: none     Any special needs or follow-up needed: groin sites monitor     Patient belongings transferred with patient: n/a     Chart send with patient: yes     Notified: reported to julián jefferson     Patient reassessed at: seeepic  (date, time)  1  Upon arrival to floor: to room no complaints no distress noted.

## 2025-02-17 NOTE — TELEPHONE ENCOUNTER
----- Message from Carmen Riggins sent at 2/17/2025  3:05 PM CST -----  Regarding: Clinic Follow up  Hello, Patient came today for ablation however did not perform ablation given high risk of pacemaker noted during case. Dr. Azevedo would like for her to have a follow up in 2-4 weeks in the clinic to discuss watchman placement given isolated left atrial appendage. They will also discuss management options for her atrial flutter.Thank you!Delon

## 2025-02-17 NOTE — NURSING
Pt DC'd per orders.  DC paperwork reviewed w pt and spouse.  Pt verbalized DC instructions.    IV's removed. Tele DC'd.  Pt was able to drink, eat, walk, and urinate with no difficulties.    Pt waiting on transport at this time.

## 2025-02-17 NOTE — TELEPHONE ENCOUNTER
Spoke with patient's , patient asked me to call her back tomorrow once she is home to schedule, gave patient desk number asked her to give me a call at her earliest convenience tomorrow between 8am and 3pm, patient's  agreed.

## 2025-02-17 NOTE — ANESTHESIA PROCEDURE NOTES
Intubation    Date/Time: 2/17/2025 7:30 AM    Performed by: Zenaida Garcia CRNA  Authorized by: Terry House MD    Intubation:     Induction:  Intravenous    Intubated:  Postinduction    Mask Ventilation:  Easy mask    Attempts:  1    Attempted By:  Staff anesthesiologist    Method of Intubation:  Video laryngoscopy    Blade:  Jean Baptiste 3    Laryngeal View Grade: Grade IIA - cords partially seen      Difficult Airway Encountered?: No      Complications:  None    Airway Device:  Oral endotracheal tube    Airway Device Size:  7.0    Style/Cuff Inflation:  Cuffed    Placement Verified By:  Capnometry    Complicating Factors:  Anterior larynx    Findings Post-Intubation:  Atraumatic/condition of teeth unchanged and BS equal bilateral

## 2025-02-17 NOTE — HOSPITAL COURSE
Patient presented for RFA of atypical AFL. Difficult transseptal with baseline pericardial effusion that was stable throughout the entire case. Mapping of right and left atrium confirmed CTI block, RA high septal scaring where previously ablated. Pvs and PW remained isolated. BRITTNI electrically isolated, likely from prior ablation. Tachycardia was induced and localized to right atrium above the AV node. Discussed findings with  during procedure including high risk of requiring a pacemaker. Decision was made NOT to ablate. We discussed need for LAAO device given isolated appendage and high risk for stroke. Mapping suggested large BRITTNI. We will obtain a CT for BRITTNI sizing to determine whether watchman or Amulet more appropriate.      No evidence of intra- or post-procedure complications.     EP medications at discharge:  Antiarrhythmics and/or AVN agents: unchanged. Continue Flecainide 100mg bid  Patient was instructed to continue their oral anticoagulation as previously prescribed. Advised her to take with dinner.     Groin access sites without hematoma or bleeding. Activity restrictions given to patient. Instructed to seek medical attention for shortness of breath, chest discomfort not alleviated with NSAIDs, bleeding/hematoma formation at the access sites, acute onset of neurologic symptoms, N/V, or hematemesis. At discharge the patient denied CP, SOB, access site bleeding/hematoma, or any other complaints or evidence of complications.    ----------------------------------------------------------------------------------------

## 2025-02-17 NOTE — TRANSFER OF CARE
"Anesthesia Transfer of Care Note    Patient: Zoë Enrique    Procedure(s) Performed: Procedure(s) (LRB):  Ablation, Atrial Flutter, Atypical (N/A)    Patient location: Cath Lab    Anesthesia Type: general    Transport from OR: Transported from OR on 6-10 L/min O2 by face mask with adequate spontaneous ventilation    Post pain: adequate analgesia    Post assessment: no apparent anesthetic complications and tolerated procedure well    Post vital signs: stable    Level of consciousness: awake and responds to stimulation    Nausea/Vomiting: no nausea/vomiting    Complications: none    Transfer of care protocol was followed      Last vitals: Visit Vitals  /60   Pulse (!) 54   Temp 36.5 °C (97.7 °F) (Temporal)   Resp 16   Ht 5' 8" (1.727 m)   Wt 70.3 kg (155 lb)   SpO2 98%   Breastfeeding No   BMI 23.57 kg/m²     "

## 2025-02-17 NOTE — PROGRESS NOTES
Patient admitted to recovery see [facility] for complete assessment pacu bcg' smaintained safety measures verified patient instructed on pain scale and patient verbalized understanding. Called for EKG and also called patient's  and updated on patient location with the permission of patient.

## 2025-02-17 NOTE — ANESTHESIA POSTPROCEDURE EVALUATION
Anesthesia Post Evaluation    Patient: Zoë Enrique    Procedure(s) Performed: Procedure(s) (LRB):  Ablation, Atrial Flutter, Atypical (N/A)  EP - diagnostic    Final Anesthesia Type: general      Patient participation: Yes- Able to Participate  Level of consciousness: awake and alert  Post-procedure vital signs: reviewed and stable  Pain control: Pain has been treated.  Airway patency: patent    PONV status: Absent or treated.  Anesthetic complications: no      Cardiovascular status: hemodynamically stable  Respiratory status: unassisted  Hydration status: euvolemic  Follow-up not needed.              Vitals Value Taken Time   /63 02/17/25 12:32   Temp 36.7 °C (98 °F) 02/17/25 12:00   Pulse 53 02/17/25 12:46   Resp 11 02/17/25 12:46   SpO2 93 % 02/17/25 12:46   Vitals shown include unfiled device data.      No case tracking events are documented in the log.      Pain/Kim Score: Pain Rating Prior to Med Admin: 7 (2/17/2025 11:39 AM)  Pain Rating Post Med Admin: 5 (2/17/2025 12:30 PM)  Kim Score: 9 (2/17/2025 11:30 AM)

## 2025-02-17 NOTE — ANESTHESIA PROCEDURE NOTES
Arterial    Diagnosis: a fib    Patient location during procedure: done in OR    Staffing  Authorizing Provider: Terry House MD  Performing Provider: Terry House MD    Staffing  Performed by: Terry House MD  Authorized by: Terry House MD    Anesthesiologist was present at the time of the procedure.    Preanesthetic Checklist  Completed: patient identified, IV checked, site marked, risks and benefits discussed, surgical consent, monitors and equipment checked, pre-op evaluation, timeout performed and anesthesia consent givenArterial  Skin Prep: chlorhexidine gluconate  Local Infiltration: none  Orientation: right  Location: radial    Catheter Size: 20 G  Catheter placement by Ultrasound guidance. Heme positive aspiration all ports.   Vessel Caliber: patent  Needle advanced into vessel with real time Ultrasound guidance.Insertion Attempts: 1  Assessment  Dressing: secured with tape and tegaderm

## 2025-02-17 NOTE — NURSING
Sutures removed per orders.    L groin w large hematoma post suture removal. Pressure held for roughly 10 min. Site is now soft to touch. Messaged MD regarding hematoma that is no longer present at this time.  Dressings placed to bilateral groins.    Dressing is CDI at this time.  No s/s of bleeding noted.

## 2025-02-17 NOTE — H&P
[facility] Short Stay Cardiac [facility]  Cardiac Electrophysiology  History and Physical     Admission Date: 2/17/2025  Code Status: Prior   Attending Provider: Vinny Azevedo MD   Principal Problem:Paroxysmal atrial fibrillation    Subjective:     Chief Complaint:  AFL     HPI:  74 yoF with a history of AF for several years who underwent ablations by Dr Rowland at [facility], at [place] and more recently a complex ablation 11/24/2020 by Dr Seymour. She developed atypical AFL and underwent KHANH/CV 7/24 by Dr Seymour. EF 55-60%. She was placed on flecainide for rhythm control after. At some point between her CV and November she was taken off flecainide. She felt recurrence of symptoms and was in AFL 11/13/24. She feels poorly in AFL and has felt much better since she converted  back to SR a few weeks ago. Xarelto for CVA prophylaxis.   ECG of AFL shows + p waves in V1 and inferior leads, iso in I and - in AVL  She presents today for redo ablation.      Ablation 11/20:  ·Successful ablation of atrial tachycardia -- high posterior RA septum  ·Successful ablation of atrial flutter (typical).  ·Persistently successful prior pulmonary vein RF ablation.  ·Catheter ablation of two mechanistically distinct tachycardias (focal AT and CTI-dependent AFL).    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    Diverticulosis 1998    Hx of atrial flutter     Hx of mitral valve prolapse 1990    Hypothyroidism     Migraines     Recurrent upper respiratory infection (URI)     Supraventricular tachycardia        Past Surgical History:   Procedure Laterality Date    ABLATION OF ARRHYTHMOGENIC FOCUS FOR ATRIAL FIBRILLATION N/A 11/24/2020    Procedure: ABLATION,  ARRHYTHMOGENIC FOCUS, FOR ATRIAL FIBRILLATION;  Surgeon: Moe Seymour MD;  Location: [facility] EP LAB;  Service: Cardiology;  Laterality: N/A;  AFL/AF, KHANH, CTI, PVI, RFA, CARTO, GEN, DM, 3 PREP    AUGMENTATION OF BREAST Bilateral 1984    35 yrs     BREAST SURGERY  1984    augmentation    CARDIOVERSION  2020    Procedure: Cardioversion;  Surgeon: Moe Seymour MD;  Location: [facility] EP LAB;  Service: Cardiology;;     SECTION  1988    COLON SURGERY      partial colon resection    COLONOSCOPY N/A 2021    Procedure: COLONOSCOPY;  Surgeon: Sahara Salgado MD;  Location: [facility] ENDO (4TH FLR);  Service: Endoscopy;  Laterality: N/A;  constipation prep- 7 days of miralax, 2 fulls of fulls, then day bf clears and split prep   covid test 3/5 pcw, prep instr emailed, antibiotics prophylactically, Xarelto hold x 2 days per Dr. Seymour-see telephone encounter 2/10 -ml    COLPOSCOPY      COSMETIC SURGERY      DE QUERVAIN'S RELEASE Left 2022    Procedure: RELEASE, HAND, FOR DEQUERVAIN'S TENOSYNOVITIS;  Surgeon: Toñito Adame Jr., MD;  Location: [facility] OR;  Service: Orthopedics;  Laterality: Left;    ECHOCARDIOGRAM,TRANSESOPHAGEAL N/A 2024    Procedure: Transesophageal echo (KHANH) intra-procedure log documentation;  Surgeon: Ramírez Ortez III, MD;  Location: [facility] EP LAB;  Service: Cardiology;  Laterality: N/A;    ESOPHAGOGASTRODUODENOSCOPY N/A 2021    Procedure: EGD (ESOPHAGOGASTRODUODENOSCOPY);  Surgeon: Sahara Salgado MD;  Location: [facility] ENDO (4TH FLR);  Service: Endoscopy;  Laterality: N/A;    EYE SURGERY Bilateral 2016    Lasik, then cataracts extraction    JOINT REPLACEMENT Right , ,     KNEE ARTHROPLASTY Right     revison in     KNEE SURGERY Right     distal femoral replacing TK    OOPHORECTOMY      TOTAL ABDOMINAL HYSTERECTOMY      TAHBSO    TRANSESOPHAGEAL ECHOCARDIOGRAPHY N/A 2020    Procedure: ECHOCARDIOGRAM, TRANSESOPHAGEAL;  Surgeon:  Ramírez Ortez III, MD;  Location: [facility] EP LAB;  Service: Cardiology;  Laterality: N/A;    TREATMENT OF CARDIAC ARRHYTHMIA N/A 08/12/2019    Procedure: CARDIOVERSION;  Surgeon: Moe Seymour MD;  Location: [facility] EP LAB;  Service: Cardiology;  Laterality: N/A;  KHANH/DCCV/MAC/DM/3PREP/*ADMIT FOR TIKOSYN*    TREATMENT OF CARDIAC ARRHYTHMIA N/A 7/8/2024    Procedure: Cardioversion or Defibrillation;  Surgeon: GABRIELE Bull MD;  Location: [facility] EP LAB;  Service: Cardiology;  Laterality: N/A;  AF, KHANH, DCCV, MAC, DM, 3 Prep    TRIGGER FINGER RELEASE Left 08/02/2022    Procedure: RELEASE, TRIGGER FINGER;  Surgeon: Toñito Adame Jr., MD;  Location: [facility] OR;  Service: Orthopedics;  Laterality: Left;  left middle finger       Review of patient's allergies indicates:   Allergen Reactions    Morphine Nausea And Vomiting    Succinylcholine      Other reaction(s): v tach       Current Facility-Administered Medications on File Prior to Encounter   Medication    sodium chloride 0.9% flush 5 mL     Current Outpatient Medications on File Prior to Encounter   Medication Sig    amoxicillin (AMOXIL) 500 MG capsule Take 500 mg by mouth as needed (takes prior to all dental procedures).    amoxicillin-clavulanate 875-125mg (AUGMENTIN) 875-125 mg per tablet Take 1 tablet by mouth 2 (two) times daily.    docusate sodium (COLACE) 250 MG capsule Take 250 mg by mouth 2 (two) times daily.     estradioL (ESTRACE) 0.01 % (0.1 mg/gram) vaginal cream PLACE 1 GRAM VAGINALLY EVERY OTHER DAY AS DIRECTED    flecainide (TAMBOCOR) 100 MG Tab Take 1 tablet (100 mg total) by mouth every 12 (twelve) hours.    HYDROcodone-acetaminophen (NORCO) 5-325 mg per tablet Take 1 tablet by mouth every 6 (six) hours as needed for Pain. Pt states takes PRN for headaches    ipratropium (ATROVENT) 42 mcg (0.06 %) nasal spray 2 sprays by Each Nostril route 3 (three) times daily.    levothyroxine (SYNTHROID) 25 MCG tablet Take 1 tablet by mouth once daily     metoprolol succinate (TOPROL-XL) 25 MG 24 hr tablet Take 1 tablet (25 mg total) by mouth once daily. (Patient taking differently: Take by mouth once daily. Pt taking 12.5)    RESTASIS 0.05 % ophthalmic emulsion Place 1 drop into both eyes 2 (two) times daily.    rivaroxaban (XARELTO) 20 mg Tab Take 1 tablet (20 mg total) by mouth once daily.    sodium chloride 3% 3 % nebulizer solution Take 4 mLs by nebulization 2 (two) times a day.    vit C/E/Zn/coppr/lutein/zeaxan (PRESERVISION AREDS-2 ORAL)      Family History       Problem Relation (Age of Onset)    Asthma Brother    Diabetes Mother, Sister    Diverticulosis Brother, Brother    Esophageal cancer Maternal Uncle    Hearing loss Father    Heart disease Mother, Father, Sister, Brother, Brother    No Known Problems Daughter    Stroke Father (93)          Tobacco Use    Smoking status: Never     Passive exposure: Never    Smokeless tobacco: Never   Substance and Sexual Activity    Alcohol use: Yes     Alcohol/week: 2.0 standard drinks of alcohol     Types: 2 Shots of liquor per week     Comment: 1-2 per month    Drug use: No     Comment: CBD oils for headache    Sexual activity: Not Currently     Partners: Male     Birth control/protection: Post-menopausal, See Surgical Hx, None     Comment: , together x 37 years     Review of Systems   All other systems reviewed and are negative.    Objective:     Vital Signs (Most Recent):    Vital Signs (24h Range):             There is no height or weight on file to calculate BMI.             Physical Exam  Vitals and nursing note reviewed.   Constitutional:       Appearance: Normal appearance.   HENT:      Head: Normocephalic.   Cardiovascular:      Rate and Rhythm: Normal rate and regular rhythm.      Pulses: Normal pulses.      Heart sounds: Normal heart sounds.   Pulmonary:      Effort: Pulmonary effort is normal.      Breath sounds: Normal breath sounds.   Abdominal:      General: Abdomen is flat.   Musculoskeletal:       Right lower leg: No edema.      Left lower leg: No edema.   Skin:     General: Skin is warm.   Neurological:      General: No focal deficit present.      Mental Status: She is alert.            Significant Labs: All pertinent lab results from the last 24 hours have been reviewed.    Assessment and Plan:     * Paroxysmal atrial fibrillation  74 yoF with a history of AF for several years who underwent ablations by Dr Rowland at [facility], at [place] and more recently a complex ablation 11/24/2020 by Dr Seymour where RFA of CTI for typical AFL and RFA of high posterior RA septum were performed for a focal AT. She developed atypical AFL and underwent KHANH/CV 7/24 by Dr Seymour. EF 55-60%. She was placed on flecainide for rhythm control after. At some point between her CV and November she was taken off flecainide. She felt recurrence of symptoms and was in AFL 11/13/24. She feels poorly in AFL and has felt much better since she converted  back to SR a few weeks ago. Xarelto for CVA prophylaxis.     No KHANH- NSR, compliant with Xarelto  MIYA    The risks, benefits and alternatives of the procedure were explained to the patient, patient's family and/or surrogate decision maker. Risks include (but not limited to) bleeding, hematoma, infection, pain, vascular damage, damage to structures surrounding the vasculature, myocardial damage [perforation, valvular damage], cardiac tamponade, phrenic nerve damage, pulmonary vein stenosis, atrioesophageal (AE) fistula, coronary vasospasm, CVA, MI, and death. Patient is understanding that repeat ablations may be required. All questions were answered. Patient is understanding of these risks, and would like to proceed.           Carmen Riggins MD  Cardiac Electrophysiology  [facility] - Short Stay Cardiac Unit

## 2025-02-17 NOTE — DISCHARGE INSTRUCTIONS
Continue all home medications.   Remember to take Xarelto with dinner    Groin site management, precautions, and restrictions:  Remove the bandages over your groin area the morning after your procedure. You can shower after you remove these bandages. Keep the groin sites clean and dry. You do not need to apply ointments or bandages to the area.   If oozing from groin site occurs, apply pressure without letting up for 15 minutes and lay flat for 1 hour. If bleeding has resolved, you can continue to monitor. If the bleeding continues or there is significant swelling or pain in the groin area, please visit the nearest ER for evaluation and treatment. DO NOT STOP TAKING YOUR BLOOD THINNER UNLESS INSTRUCTED BY A PHYSICIAN.   Do not take baths or submerge your groin area or at least 1 week or when the puncture sites in your groin have completely healed  Do not lift anything over 10 lbs for the first week after your procedure, and avoid strenuous activity during this time to allow for the groin sites to heal. After 1 week, there are no activity restrictions.  Please contact the electrophysiology clinic or go to the ER if you experience: severe chest pain, shortness of breath, bleeding or swelling of the groin sites, or any other concerns.      Discharge Instructions and Care of Your Groin      Catheter Insertion Site  The morning after your procedure, you may take the dressing off. The easiest way to do this is when you are showering, get the tape and dressing wet and remove it.  After the bandage is removed, cover the area with a small adhesive bandage. It is normal for the catheter insertion site to be black and blue for a couple of days. The site may also be slightly swollen and pink, and there may be a small lump (about the size of a quarter) at the site.  Wash the catheter insertion site at least once daily with soap and water. Place soapy water on your hand or washcloth and gently wash the insertion site; do not  rub.  Keep the area clean and dry when you are not showering.  Do not use creams, lotions or ointment on the wound site.  Wear loose clothes and loose underwear.  Do not take a bath, tub soak, go in a Jacuzzi, or swim in a pool or lake for one week after the procedure.    Activity Instructions  Do not strain during bowel movements for the first 3 to 4 days after the procedure to prevent bleeding from the catheter insertion site.  Avoid heavy lifting (more than 10 pounds) and pushing or pulling heavy objects for the first 5 to 7 days after the procedure.  Do not participate in strenuous activities for 5 days after the procedure. This includes most sports - jogging, golfing, play tennis, and bowling.  You may climb stairs if needed, but walk up and down the stairs more slowly than usual.  Gradually increase your activities until you reach your normal activity level within one week after the procedure.      If bleeding should occur following discharge:  Sit down and apply firm pressure to the puncture site with your fingers for 10 minutes   If the bleeding stops, continue to sit quietly, keeping your leg straight for 2 hours. Notify your physician as soon as possible   If bleeding does not stop after 10 minutes or if there is a large amount of bleeding or spurting, call 911 immediately.  Do not drive yourself to the hospital.     Notify your physician if these symptoms persist or if you experience:  Change in color or temperature of the leg  Redness, heat, or pus at the puncture site  Chills or fever greater than 101.0 F

## 2025-02-17 NOTE — DISCHARGE SUMMARY
[facility] - Short Stay Cardiac Unit  Cardiac Electrophysiology  Discharge Summary      Patient Name: Zoë Enrique  MRN: [id number]  Admission Date: 2/17/2025  Hospital Length of Stay: 0 days  Discharge Date and Time:  02/17/2025 3:03 PM  Attending Physician: Vinny Azevedo MD    Discharging Provider: Carmen Riggins MD  Primary Care Physician: Praveen Jeffery MD    Hospital Course:  Patient presented for RFA of atypical AFL. Difficult transseptal with baseline pericardial effusion that was stable throughout the entire case. Mapping of right and left atrium confirmed CTI block, RA high septal scaring where previously ablated. Pvs and PW remained isolated. BRITTNI electrically isolated, likely from prior ablation. Tachycardia was induced and localized to right atrium above the AV node. Discussed findings with  during procedure including high risk of requiring a pacemaker. Decision was made NOT to ablate. We discussed need for LAAO device given isolated appendage and high risk for stroke. Mapping suggested large BRITTNI. We will obtain a CT for BRITTNI sizing to determine whether watchman or Amulet more appropriate.      No evidence of intra- or post-procedure complications.     EP medications at discharge:  Antiarrhythmics and/or AVN agents: unchanged. Continue Flecainide 100mg bid  Patient was instructed to continue their oral anticoagulation as previously prescribed. Advised her to take with dinner.     Groin access sites without hematoma or bleeding. Activity restrictions given to patient. Instructed to seek medical attention for shortness of breath, chest discomfort not alleviated with NSAIDs, bleeding/hematoma formation at the access sites, acute onset of neurologic symptoms, N/V, or hematemesis. At discharge the patient denied CP, SOB, access site bleeding/hematoma, or any other complaints or evidence of complications.    HPI:   74 yoF with a history of AF for several years who underwent ablations by   Kashif at [facility], at [place] and more recently a complex ablation 11/24/2020 by Dr Seymour. She developed atypical AFL and underwent KHANH/CV 7/24 by Dr Seymour. EF 55-60%. She was placed on flecainide for rhythm control after. At some point between her CV and November she was taken off flecainide. She felt recurrence of symptoms and was in AFL 11/13/24. She feels poorly in AFL and has felt much better since she converted  back to SR a few weeks ago. Xarelto for CVA prophylaxis.   ECG of AFL shows + p waves in V1 and inferior leads, iso in I and - in AVL  She presents today for redo ablation.      Ablation 11/20:  ·Successful ablation of atrial tachycardia -- high posterior RA septum  ·Successful ablation of atrial flutter (typical).  ·Persistently successful prior pulmonary vein RF ablation.  ·Catheter ablation of two mechanistically distinct tachycardias (focal AT and CTI-dependent AFL).    Meds: Xarelto 20daily, Flecainide 100 bid, Toprol  25 daily     ECG: NSR    Procedure(s) (LRB):  Ablation, Atrial Flutter, Atypical (N/A)  EP - diagnostic       Goals of Care Treatment Preferences:  Code Status: Full Code      Final Active Diagnoses:    Diagnosis Date Noted POA    PRINCIPAL PROBLEM:  Paroxysmal atrial fibrillation [I48.0] 12/10/2018 Yes      Problems Resolved During this Admission:       Discharged Condition: stable      Follow Up:   Follow-up Information       Vinny Azevedo MD Follow up in 2 week(s).    Specialties: Electrophysiology, Cardiovascular Disease, Cardiology  Contact information:  [address] [place] [address] [id number] [phone]                           Patient Instructions:   No discharge procedures on file.  Medications:  Reconciled Home Medications:      Medication List        CONTINUE taking these medications      acyclovir 400 MG tablet  Commonly known as: ZOVIRAX  Take 1 tablet by mouth three times daily as needed     amoxicillin 500 MG capsule  Commonly known as:  AMOXIL  Take 500 mg by mouth as needed (takes prior to all dental procedures).     docusate sodium 250 MG capsule  Commonly known as: COLACE  Take 250 mg by mouth 2 (two) times daily.     estradioL 0.01 % (0.1 mg/gram) vaginal cream  Commonly known as: ESTRACE  PLACE 1 GRAM VAGINALLY EVERY OTHER DAY AS DIRECTED     famotidine 40 MG tablet  Commonly known as: PEPCID  Take 1 tablet (40 mg total) by mouth every evening.     fish oil-omega-3 fatty acids 300-1,000 mg capsule  Take by mouth once daily.     flecainide 100 MG Tab  Commonly known as: TAMBOCOR  Take 1 tablet (100 mg total) by mouth every 12 (twelve) hours.     HYDROcodone-acetaminophen 5-325 mg per tablet  Commonly known as: NORCO  Take 1 tablet by mouth every 6 (six) hours as needed for Pain. Pt states takes PRN for headaches     ipratropium 42 mcg (0.06 %) nasal spray  Commonly known as: ATROVENT  2 sprays by Each Nostril route 3 (three) times daily.     levothyroxine 25 MCG tablet  Commonly known as: SYNTHROID  Take 1 tablet by mouth once daily     metoprolol succinate 25 MG 24 hr tablet  Commonly known as: TOPROL-XL  Take 1 tablet (25 mg total) by mouth once daily.     omeprazole 40 MG capsule  Commonly known as: PRILOSEC  Take 1 capsule (40 mg total) by mouth once daily.     PRESERVISION AREDS-2 ORAL     RESTASIS 0.05 % ophthalmic emulsion  Generic drug: cycloSPORINE  Place 1 drop into both eyes 2 (two) times daily.     rivaroxaban 20 mg Tab  Commonly known as: XARELTO  Take 1 tablet (20 mg total) by mouth once daily.     sodium chloride 3% 3 % nebulizer solution  Take 4 mLs by nebulization 2 (two) times a day.            ASK your doctor about these medications      amoxicillin-clavulanate 875-125mg 875-125 mg per tablet  Commonly known as: AUGMENTIN  Take 1 tablet by mouth 2 (two) times daily.     predniSONE 20 MG tablet  Commonly known as: DELTASONE  Take 1 tablet (20 mg total) by mouth once daily.              Time spent on the discharge of patient:  45 minutes    Carmen Riggins MD  Cardiac Electrophysiology  [facility] - Short Stay Cardiac Unit

## 2025-02-17 NOTE — NURSING
Received report from Anjelica APONTE. Patient s/p procedure, AAOx4. VSS, no c/o pain or discomfort at this time, resp even and unlabored.   Sutures and stop cocks in place to bilateral groins is CDI. No active bleeding. No hematoma noted.   Post procedure protocol reviewed with patient and patient's family. Understanding verbalized. Family members at bedside. Nurse call bell within reach.     Tele maintained.

## 2025-02-17 NOTE — BRIEF OP NOTE
: Vinny Azevedo MD  Date of Procedure: 02/17/2025     Post-operative Diagnosis: AF/AFL     Procedure Performed: EPS     Description of Procedure: The patient was brought to the EP lab in the fasting state. Prepped and draped in sterile fashion. Safety timeout was performed. Sedation administered by anesthesia staff. Ultrasound guided venous access of the bilateral femoral veins was performed. ICE and CS catheters placed via left femoral vein access. Long sheaths via right femoral venous access. One transseptal punctures performed using combination of fluoroscopic and ICE guidance. Difficult transseptal. Baselines pericardial effusion.Map created.     Mapping of right and left atrium confirmed CTI block, RA high septal scaring where previously ablated. Pvs and PW remained isolated. BRITTNI electrically isolated, likely from prior ablation. Tachycardia was induced and localized to right atrium above the AV node. Discussed findings with  during procedure including high risk of requiring a pacemaker. Decision was made NOT to ablate.        EBL: <10 mL     Specimens: none  Complications: no immediate  Pericardial effusion stable throughout case.     Plan:   Bedrest x 4 hrs   Remove sutures at 3 hours post-procedure   Ambulation at 4 hours post-procedure if there is no evidence of access site complications   Close monitoring of hemodynamics, access site, and neurologic status   ECG upon arrival to PACU   Patient to resume OAC this evening. If bleeding or hematoma formation, please notify EP service before holding OAC   Medication changes: none   Plan for discharge following bedrest if patient tolerating PO intake, voiding, and ambulatory without evidence of complications     We discussed need for LAAO device given isolated appendage and high risk for stroke. Mapping suggested large BRITTNI. We will obtain a CT for BRITTNI sizing to determine whether watchman or Amulet more appropriate.        Carmen  MD Gilson, PGY8  Electrophysiology

## 2025-02-18 NOTE — PROGRESS NOTES
----- Message from Kelly Long PA-C sent at 2/13/2025  9:39 AM EST -----  Patient ready to schedule. TY!  ----- Message -----  From: Lisa Dunn MD  Sent: 2/13/2025   9:08 AM EST  To: [facility]    Schedule EGD with RFA anytime the week of March 9th. Orders placed.   Week9     Collect any relevant AEs & Cms- none   Review Stimulation sessions & schedule-done  Document any comments on Stimulation Device- none

## 2025-02-19 LAB
POC ACTIVATED CLOTTING TIME K: 158 SEC (ref 74–137)
POC ACTIVATED CLOTTING TIME K: 187 SEC (ref 74–137)
POC ACTIVATED CLOTTING TIME K: 291 SEC (ref 74–137)
POC ACTIVATED CLOTTING TIME K: 337 SEC (ref 74–137)
POC ACTIVATED CLOTTING TIME K: 343 SEC (ref 74–137)
POC ACTIVATED CLOTTING TIME K: 366 SEC (ref 74–137)
POC ACTIVATED CLOTTING TIME K: 412 SEC (ref 74–137)
SAMPLE: ABNORMAL

## 2025-02-20 ENCOUNTER — PATIENT MESSAGE (OUTPATIENT)
Dept: ELECTROPHYSIOLOGY | Facility: CLINIC | Age: 75
End: 2025-02-20
Payer: MEDICARE

## 2025-02-21 ENCOUNTER — OFFICE VISIT (OUTPATIENT)
Dept: ALLERGY | Facility: CLINIC | Age: 75
End: 2025-02-21
Payer: MEDICARE

## 2025-02-21 ENCOUNTER — PATIENT MESSAGE (OUTPATIENT)
Dept: ALLERGY | Facility: CLINIC | Age: 75
End: 2025-02-21

## 2025-02-21 ENCOUNTER — LAB VISIT (OUTPATIENT)
Dept: LAB | Facility: HOSPITAL | Age: 75
End: 2025-02-21
Attending: STUDENT IN AN ORGANIZED HEALTH CARE EDUCATION/TRAINING PROGRAM
Payer: MEDICARE

## 2025-02-21 VITALS — BODY MASS INDEX: 24.86 KG/M2 | HEIGHT: 68 IN | WEIGHT: 164 LBS

## 2025-02-21 DIAGNOSIS — D80.6 SPECIFIC ANTIBODY DEFICIENCY WITH NORMAL IG CONCENTRATION AND NORMAL NUMBER OF B CELLS: ICD-10-CM

## 2025-02-21 DIAGNOSIS — D80.6 SPECIFIC ANTIBODY DEFICIENCY WITH NORMAL IG CONCENTRATION AND NORMAL NUMBER OF B CELLS: Primary | ICD-10-CM

## 2025-02-21 PROCEDURE — 86317 IMMUNOASSAY INFECTIOUS AGENT: CPT | Mod: HCNC | Performed by: STUDENT IN AN ORGANIZED HEALTH CARE EDUCATION/TRAINING PROGRAM

## 2025-02-21 PROCEDURE — 36415 COLL VENOUS BLD VENIPUNCTURE: CPT | Mod: HCNC | Performed by: STUDENT IN AN ORGANIZED HEALTH CARE EDUCATION/TRAINING PROGRAM

## 2025-02-21 PROCEDURE — 86360 T CELL ABSOLUTE COUNT/RATIO: CPT | Mod: HCNC | Performed by: STUDENT IN AN ORGANIZED HEALTH CARE EDUCATION/TRAINING PROGRAM

## 2025-02-21 PROCEDURE — 86355 B CELLS TOTAL COUNT: CPT | Mod: HCNC | Performed by: STUDENT IN AN ORGANIZED HEALTH CARE EDUCATION/TRAINING PROGRAM

## 2025-02-21 PROCEDURE — 86359 T CELLS TOTAL COUNT: CPT | Mod: HCNC | Performed by: STUDENT IN AN ORGANIZED HEALTH CARE EDUCATION/TRAINING PROGRAM

## 2025-02-21 PROCEDURE — 86357 NK CELLS TOTAL COUNT: CPT | Mod: HCNC | Performed by: STUDENT IN AN ORGANIZED HEALTH CARE EDUCATION/TRAINING PROGRAM

## 2025-02-21 NOTE — PROGRESS NOTES
ALLERGY & IMMUNOLOGY CLINIC       HISTORY OF PRESENT ILLNESS   Referral from: Dr Adorno  CC: immune evaluation     HPI: Zoë Enrique is a 74 y.o. female  History obtained from patient     Cough: prilosec by ent usually after eating and drinking. Started after last cardioversion with KHANH.     Recurrent frequent URI. As soon as she gets over them she will get another one.Usually present with sinus pressure (suffers from tension HA and pain in her sinus and teeth hand), cough and LAD.  Does not recall exact onset but maybe before 2022. This is the reason she started following with Dr Adorno. She notes a green mucus. She waits more than a week before getting abx. Mostly coughing the lingering cough. Productive and wet   Meds: Usually take otc cold pills like Nyquil (dextromethorphan and Guaifenesin), tessalon pearls   Abx: Levaquin and prednisone together and works within 2 days ago. Unsure if abx by itself work.   Other infections:   -No AOM as an adult   -Not PNA, one episode as a child and was admitted (but the threshold was low to be admitted)   -Daughter is healthy.   -Sometimes her  he will be sick  at the same time but not all the time.     Tried 3% saline neb done only when sick helps bring things up. Nasal lavage as well. Ha snot changed the need for abx.       Chart review: Follows with Dr Adorno for recurrent URI. Last seen on 11/2024. During the last  year she has had at least 6 URI with abx and steroids at least 4 times. She does sit for the apst year her 4 mo and 3 yo grandchildren.  also getting as sick as her. Low strep pneumo titers and poor response to prevnar or ppsv23.     Pulm: follows with Dr Rivas for Bronchiectasis. No positive AFB.     Hx:   Afib s/p ablation x5 and Xarelto:  unable to undergone the 6th ablation recently 2/17/25.     MEDICAL HISTORY   SurgHx:  Past Surgical History:   Procedure Laterality Date    ABLATION OF ARRHYTHMOGENIC FOCUS FOR ATRIAL FIBRILLATION N/A  2020    Procedure: ABLATION, ARRHYTHMOGENIC FOCUS, FOR ATRIAL FIBRILLATION;  Surgeon: Moe Seymour MD;  Location: [facility] EP LAB;  Service: Cardiology;  Laterality: N/A;  AFL/AF, KHANH, CTI, PVI, RFA, CARTO, GEN, DM, 3 PREP    ABLATION, ATRIAL FLUTTER, ATYPICAL N/A 2025    Procedure: Ablation, Atrial Flutter, Atypical;  Surgeon: Vinny Azevedo MD;  Location: [facility] EP LAB;  Service: Cardiology;  Laterality: N/A;  AFL (atypical), KHANH (cx if SR), RFA, MIYA, GEN, MB, 3prep    AUGMENTATION OF BREAST Bilateral     35 yrs     BREAST SURGERY      augmentation    CARDIOVERSION  2020    Procedure: Cardioversion;  Surgeon: Moe Seymour MD;  Location: [facility] EP LAB;  Service: Cardiology;;     SECTION      COLON SURGERY      partial colon resection    COLONOSCOPY N/A 2021    Procedure: COLONOSCOPY;  Surgeon: Sahara Salgado MD;  Location: [facility] ENDO ([address] [place] [address]);  Service: Endoscopy;  Laterality: N/A;  constipation prep- 7 days of miralax, 2 fulls of fulls, then day bf clears and split prep   covid test 3/5 pcw, prep instr emailed, antibiotics prophylactically, Xarelto hold x 2 days per Dr. Seymour-see telephone encounter 2/10 -ml    COLPOSCOPY      COSMETIC SURGERY      DE QUERVAIN'S RELEASE Left 2022    Procedure: RELEASE, HAND, FOR DEQUERVAIN'S TENOSYNOVITIS;  Surgeon: Toñito Adame Jr., MD;  Location: [facility] OR;  Service: Orthopedics;  Laterality: Left;    DIAGNOSTIC CARDIAC ELECTROPHYSIOLOGY STUDY  2025    Procedure: EP - diagnostic;  Surgeon: Vinny Azevedo MD;  Location: [facility] EP LAB;  Service: Cardiology;;    ECHOCARDIOGRAM,TRANSESOPHAGEAL N/A 2024    Procedure: Transesophageal echo (KHANH) intra-procedure log documentation;  Surgeon: Ramírez Ortez III, MD;  Location: [facility] EP LAB;  Service: Cardiology;  Laterality: N/A;    ESOPHAGOGASTRODUODENOSCOPY N/A 2021    Procedure: EGD (ESOPHAGOGASTRODUODENOSCOPY);  Surgeon: Sahara Salgado MD;   "Location: [facility] ENDO (4TH FLR);  Service: Endoscopy;  Laterality: N/A;    EYE SURGERY Bilateral 2016    Lasik, then cataracts extraction    JOINT REPLACEMENT Right 2/07, 11/07, 2/09    KNEE ARTHROPLASTY Right 2007    revison in 2009    KNEE SURGERY Right 1989    distal femoral replacing TK    OOPHORECTOMY      TOTAL ABDOMINAL HYSTERECTOMY  1994    TAHBSO    TRANSESOPHAGEAL ECHOCARDIOGRAPHY N/A 11/24/2020    Procedure: ECHOCARDIOGRAM, TRANSESOPHAGEAL;  Surgeon: Ramírez Ortez III, MD;  Location: [facility] EP LAB;  Service: Cardiology;  Laterality: N/A;    TREATMENT OF CARDIAC ARRHYTHMIA N/A 08/12/2019    Procedure: CARDIOVERSION;  Surgeon: Moe Seymour MD;  Location: [facility] EP LAB;  Service: Cardiology;  Laterality: N/A;  KHANH/DCCV/MAC/DM/3PREP/*ADMIT FOR TIKOSYN*    TREATMENT OF CARDIAC ARRHYTHMIA N/A 7/8/2024    Procedure: Cardioversion or Defibrillation;  Surgeon: GABRIELE Bull MD;  Location: [facility] EP LAB;  Service: Cardiology;  Laterality: N/A;  AF, KHANH, DCCV, MAC, DM, 3 Prep    TRIGGER FINGER RELEASE Left 08/02/2022    Procedure: RELEASE, TRIGGER FINGER;  Surgeon: Toñito Adame Jr., MD;  Location: [facility] OR;  Service: Orthopedics;  Laterality: Left;  left middle finger        PHYSICAL EXAM   VS: Ht 5' 8" (1.727 m)   Wt 74.4 kg (164 lb 0.4 oz)   BMI 24.94 kg/m²   GENERAL: NAD, well nourished, well appearing     LABS AND IMAGING   Inhalant skin tests 07/18/2022: 3+ histamine control. All tests are negative. Immunocaps negative as well     Component      Latest Ref Rng 12/23/2024   S.pneumoniae Type 1 <0.3    S.pneumoniae Type 3 3.6    Strep pneumo Type 4 0.6    S.pneumoniae Type 5 <0.3    S.pneumoniae Type 8 0.7    S.pneumoniae Type 9N 0.8    S.pneumoniae Type 12F <0.3    Strep pneumo Type 14 0.5    S.pneumoniae Type 19F 0.9    S.pneumoniae Type 23F <0.3    S.pneumoniae Type 6B 0.8    S.pneumoniae Type 7F 1.5    S.pneumoniae Type 18C 0.9    S.pneumoniae Type 9V Abs 0.7    IgA Level      40 - 350 mg/dL 96  "   IgG      650 - 1600 mg/dL 721    IgM      50 - 300 mg/dL      Initial 1/14 serotypes--> 6/24/22 PPSV23 and within 1 month 4/14 sertoypes--> 9/2023 PCV20--> 11/21/24 PPSV23--> 6 weeks after titers 4/14    Platelets low  ALC normal  AEC normal        Chest CT 01/14/2022:  1.  Tubular bronchiectasis with retained secretions and innumerable peribronchovascular distributed punctate micro nodules, predominantly within the right middle and lower lobes, concerning for an atypical pneumonia, such as non tuberculous mycobacterium or chronic bronchiolitis.  Findings are increased compared to prior studies.  2.  Scattered bandlike opacities compatible with subsegmental atelectasis.  3.  Additional scattered calcified and noncalcified pulmonary nodules, largest measuring 0.5 cm.  For multiple solid nodules all <6 mm, Fleischner Society 2017 guidelines recommend no routine follow up for a low risk patient, or follow up with non-contrast chest CT at 12 months after discovery in a high risk patient.  4.  Dilated left atrial appendage, small foci of atrial wall calcifications.  Correlate with chronic left atrial dysfunction.  5.  Left, and possibly small right, implant rupture without infiltration into adjacent breast tissue.    Micro: AFB resp cx negative         ASSESSMENT & PLAN     SAD/SPAD: recurrent infections since around GC with several antibiotics treatment. She also has RML bronchiectasis that does not seem to be MAC related. Normal immunoglobulin isotypes and poor response to both PPSV23 twice and prevnar as well. We have discussed the evaluation that has been done so far and the next steps which include, watchful waiting (treat ad we get infected), antibiotic ppx, or IGRT).     -Patient will like to think about possibilities   -Will obtain tetanus titers and lymphocyte profile   -Patient to let me know her decision.  -Regardless, would like to see how she has been doing in 2-3 months..       Follow up: 2-3 months     I  spent a total of 40 minutes on the day of the visit. This includes face to face time and non-face to face time preparing to see the patient (eg, review of tests), obtaining and/or reviewing separately obtained history, documenting clinical information in the electronic or other health record, independently interpreting results and communicating results to the patient/family/caregiver, or care coordinator.    Arden Zelaya MD   [facility]

## 2025-02-21 NOTE — TELEPHONE ENCOUNTER
Called patient, offered her soonest available date, for Tuesday April 8th at 9:30am, patient agreed.

## 2025-02-21 NOTE — PATIENT INSTRUCTIONS
What?is?immunoglobulin?replacement?therapy?     Immunoglobulin?replacement?therapy?is?a?blood?based?treatment.?The?immunoglobulin?contains? antibodies?that?help?to?fight?infection.?You?have?been?recommended?this?treatment?because?your? doctors?have?found?that?your?immune?system?(or?your?childs?immune?system)?is?not?making? antibodies.?Immunoglobulin?can?be?given?intravenously?or?subcutaneously. What?is?the?difference?between?intravenous?and?subcutaneous? immunoglobulin? Intravenous?immunoglobulin?(IVIG)?has been?in?use?since?the?1970s?and?involves?giving? immunoglobulin?straight into?the?circulation?system?by?a?needle?in?a?vein. Quite?large?amounts?of? immunoglobulin?can?be?given?this?way?and?for?this?reason?treatment?is?only?needed?every?three? weeks?or?so,?with?each?treatment?lasting?between?two?and?four?hours.?If?you?have?side?effects?with? IVIG?it?is?usually?because?it?is?being?given?too?quickly.?Initially?you?would?have?treatment?in?hospital,? but?most?people?can?be?trained?to?have?it?at?home. Subcutaneous?immunoglobulin?(SCIG)?has?been?developed?more?recently?than?IVIG,?with?new? immunoglobulin?preparations?being?produced?exclusively?for?subcutaneous?use?by?2005.?In?SCIG,? immunoglobulin?is?delivered?by?a?needle?into?the?fatty?tissues?under?the?skin,?where?it?enters?the? circulation?slowly?over?a?few?days.?There?isnt?much?room?under?the?skin,?so?the?dose?of? immunoglobulin?given?is?smaller?than?with?IVIG.?For?this?reason,?SCIG?is?usually?given?every?week.? Nearly?everybody?on?SCIG?learns?how?to?have?treatment?at?home,?with?each?session?lasting?up?to? about?two?hours.     Will?I?be?given?the?choice?between?intravenous?and?subcutaneous? immunoglobulin?     Your?immunology?team?will?give?you?the?information?to?help?you?decide?which?treatment?you?will? have.?You?might?want?to?consider?the?following?factors:  If?you?have?really?bad?veins,?then?IVIG?may?not?be?your?best?option.   If?you?want?to?take?ownership?of?your?illness,?then?SCIG?may?be?the?best?choice?because?you? will?probably?learn?how?to?have?home?therapy?faster.?  Infusion?related?side?effects?are?more?common?with?IVIG?than?SCIG?and?can?be?related?to?the? volume?and?rate?of?infusion. Your?immunology?centre?will?be?able?to?offer?you?either?treatment?depending?on?these?factors?and? your?personal?choice.?     What?is?in?the?immunoglobulin?     Immunoglobulin?is?made?from?donated?blood?derived?plasma.?During?manufacture?everything? except?a?type?of?immunoglobulin?called?immunoglobulin?G?(IgG)?is?removed?from?the?plasma.?IgG?is? very?good?at?fighting?bacteria?and?viruses.?IgG?has?other?effects?too,?so?it?isnt?just?used?for?people? with?immune?deficiency.?You?might?hear?about?immunoglobulin?being?used?in?some?people?with? other?immune?(autoimmune)?problems. Why?do?I?need?immunoglobulin? Immune?deficient?patients?are?at?a?greater?risk?of?infection?than?others.?Clinical?trials?have?shown? that?for?people?with?immune?deficiency,?immunoglobulin?treatments?result?in?fewer?infections,?and? the?infections?that?do?occur?tend?to?be?less?serious.?There?is?also?evidence?that?people?with?immune? deficiency?are?more?likely?to?enjoy?good?health?over?many?years?if?they?receive?immunoglobulin? correctly.?Finally,?your?wellbeing?and?energy?levels?are?likely?to?be?better?if?you?are?on? immunoglobulin.?Please?note?that?it?may?take?several?months?before?you?feel?these?benefits.     What?tests?do?I?need?to?have?before?starting?immunoglobulin?   Your?immunologist?will?only?recommend?starting?immunoglobulin?if?you?have?had?tests?which? confirm?it?is?the?right?treatment?for?you.?In?cases?of?severe?immune?deficiency,?only?a?couple?of? blood?tests?are?required?before?the?doctor?will?recommend?immunoglobulin. Fortunately,?most?people?have?mild?immune?deficiency?and?in?this?situation?the?doctor?might?try?  other?treatments?before?immunoglobulin.?For?example,?the?immunologist?might?try?giving?you? vaccines?and?checking?how?well?you?respond.?This?vaccine?challenge?can?take?several?weeks?as?you? have?to?have?the?vaccine,?wait?a?few?weeks?before?the?blood?tests?and?then?get?the?results.? Your?immunologist?might?suggest?taking?regular?antibiotics?for?a?few?months?and?seeing?how?well? these?protect?you?from?infection.?This?can?be?done?while?you?wait?for?the?results?of?a?vaccine? challenge. Finally,?your?immunologist?might?suggest?trying?immunoglobulin?for?a?period?of?time,?for?example?a? year.?If?it?is?clear?that?you?have?benefitted,?then?they?will?recommend?you?continue?it,?but?if?you?do? not?benefit?your?immunologist?will?suggest?stopping.     I?heard?that?some?people?have?reactions?to?immunoglobulins Most?people?do?not?have?reactions?to?immunoglobulins.?This?is?why?it?is?safe?to?go?on?to?home? therapy. The?reactions?that?do?sometimes?happen?include?rashes,?a?high?temperature,?shivering?or?itching.? You?can?also?get?a?headache?with?immunoglobulin,?although?this?tends?to?happen?the?day?after?it?is? given. When?reactions?do?happen,?there?is?usually?one?of?two?factors?responsible:  Immunoglobulin?is?given?too?fast?for?the?individual?concerned.?This?is?most?likely?to?happen? with?IVIG?because?a?larger?dose?is?being?given.?If?you?have?a?reaction?during?an?infusion,?the? first?thing?to?do?is?to?slow?the?infusion?right?down?and?consider?stopping?it?if?the?symptoms? do?not?improve?rapidly.?Once?recovered,?you?should?record?the?details?of?the?reaction?in? order?to?inform?your?clinical?immunology?team.  Immunoglobulin?is?given?at?a?time?when?there?is?an?infection.?If?you?have?a?cold?or?a?chest? infection?on?the?day?of?your?infusion,?you?are?more?likely?to?have?a?reaction.?Immunology? teams?will?help?you?recognise?the?symptoms?of?infection,?so?that?you?can?delay?your?infusion?  by?a?couple?of?days?if?necessary.?Because?immunoglobulin?treatment?takes?a?few?months?to? reduce?the?risk?of?infections,?this?is?most?likely?to?happen?when?you?have?just?started? immunoglobulin. If?you?continue?to?have?reactions?with?immunoglobulin,?your?immunologist?may?recommend?taking? paracetamol?or?antihistamines?first.?Sometimes?reactions?occur?with?one?batch?of?immunoglobulin? but these?may?go?away?once?the?batch?is?changed.?Very?occasionally?your?immunologist?will? recommend?you?change?your?immunoglobulin?product?because?reactions?cannot?be?brought?under? control.? If?you?have?reactions?when?you?start?immunoglobulin?treatment,?the?chances?are?that?they?will?be? brought?under?control.     Are?there?any?other?safety?problems?with?immunoglobulin?   Immunoglobulin?is?made?from?blood?donations.?Several?thousand?blood?donations?are?pooled?in?the? process.?For?these?reasons?there?is?always?a?possibility?of?catching?an?infection?from?one?of?the?blood? donors. No?one?has?ever?caught?HIV?or?hepatitis?B?from?immunoglobulin?therapy.?In?the?1990s,?a?small? number?of?people?caught?hepatitis?C?from?immunoglobulin.?These?days,?blood?donors?are?selected? very?carefully?and?the?manufacturing?process?contains?steps?to?remove?viruses?and?bacteria. There?have?been?no?cases?of?infection?being?spread?from?person?to?person?by?immunoglobulin?since? the?1990s. There?are?two?theoretical?risks?from?immunoglobulin.?The?first?is?from?prion?infection.?Prions?cause BSE?(mad?cow?disease)?and?variant?CJD,?mainly?in?the?[place].?Prions?have?been?spread?from?person?to? person?by?blood?transfusions?but?never?by?immunoglobulin.?But?because?of?this?theoretical?risk,? [place]?plasma?is?not?used?for?making?immunoglobulin. The?other?risk?is?of?new?infections?that?start?to?affect?humans,?either?because?of?global?climate? change?or?change?in?behaviour?(e.g.?feeding?sheep?to?cows,?in?the?case?of?BSE).?One?example?of?this?  is?a?virus?that?affected?people?in?[place]?and?entered?the?blood?supply?there.? It?is?very?difficult?to?predict?whether?new?infections,?which?could?be?spread?by?immunoglobulin,?will? appear?in?the?future.?However,?the?immunoglobulin?manufacturers?and?immunologists?around?the? world?are?constantly?on?the?lookout?for?any?problems?such?as?this    . What?do?the?manufacturers?do?to?make?sure?there?are?no? infections?in?the?immunoglobulin? The?first?step?the?manufacturers?take?is?to?get?to?know?the?blood?donors?really?well.?Manufacturers? insist?that?their?donors?donate?regularly.?Each?time?a?donor?attends?the?blood?centre?they?are?asked? a?lot?of?questions,?ranging?from?their?sex?lives?to?any?recent?travel.?They?then?donate?the?blood?as? well?as?having?a?series?of?blood?tests?to?make?sure?they?dont?have?an?infection.?The?blood?is?not? released?for?processing?until?the?blood?tests?have?come?back?negative.? The?second?step?is?that?the?plasma?is?treated?in?a?few?different?ways?to?get?rid?of?infection.? Depending?on?the?,?the?plasma?will?get?a?combination?of?heat?treatment (pasteurisation),?addition?of?solvent?detergent,?and?jennifer?filtration?with?or?without?UV?light? treatment. Donor?centres?and?immunoglobulin?manufacturers?have?very?high?standards?for?minimising?the?risk? of?infection?getting?into?the?immunoglobulin?supply.?Donor?centres?and?manufacturers?are?inspected? regularly?and?will?be?closed?down?if?there?is?any?hint?of?a?problem. A?final?important?safety?step?is?carried?out?by?immunologists,?who?either?do?annual?hepatitis?checks? or?save?a?sample?of?blood?for?infection?testing.?You?will?also?be?kept?on?the?same?immunoglobulin? product?once?you?have?started.?It?is?through?this?kind?of?surveillance?that?we?can?be?confident?that? immunoglobulin?and?its?administration?is?as?safe?as?possible.

## 2025-02-24 ENCOUNTER — RESULTS FOLLOW-UP (OUTPATIENT)
Dept: ALLERGY | Facility: CLINIC | Age: 75
End: 2025-02-24

## 2025-02-24 LAB
CD3+CD4+ CELLS # BLD: 615 CELLS/UL (ref 300–1400)
CD3+CD4+ CELLS NFR BLD: 53 % (ref 28–57)
LYMPHOCYTES NFR CSF MANUAL: 10.4 % (ref 6–19)
LYMPHOCYTES NFR CSF MANUAL: 127 CELLS/UL (ref 100–500)
LYMPHOCYTES NFR CSF MANUAL: 14.8 % (ref 7–31)
LYMPHOCYTES NFR CSF MANUAL: 17.2 % (ref 10–39)
LYMPHOCYTES NFR CSF MANUAL: 180 CELLS/UL (ref 90–600)
LYMPHOCYTES NFR CSF MANUAL: 199 CELLS/UL (ref 200–900)
LYMPHOCYTES NFR CSF MANUAL: 3.09 % (ref 0.9–3.6)
LYMPHOCYTES NFR CSF MANUAL: 72.8 % (ref 55–83)
LYMPHOCYTES NFR CSF MANUAL: 844 CELLS/UL (ref 700–2100)
TETANUS TOXOID IGG AB: POSITIVE
TETANUS TOXOID IGG VALUE: 1.6 IU/ML

## 2025-03-03 ENCOUNTER — PATIENT MESSAGE (OUTPATIENT)
Dept: ALLERGY | Facility: CLINIC | Age: 75
End: 2025-03-03
Payer: MEDICARE

## 2025-03-06 ENCOUNTER — PATIENT MESSAGE (OUTPATIENT)
Dept: ELECTROPHYSIOLOGY | Facility: CLINIC | Age: 75
End: 2025-03-06
Payer: MEDICARE

## 2025-03-07 NOTE — TELEPHONE ENCOUNTER
Called pt, informed her of Dr Tubbs recommendations. Reports she does not want to go to ER, reports this has happened before and was instructed to hold her flecainide, explained to pt that since she is in SR these symptoms may not be rhythm related and should be evaluated in ER

## 2025-03-18 ENCOUNTER — OFFICE VISIT (OUTPATIENT)
Dept: OPTOMETRY | Facility: CLINIC | Age: 75
End: 2025-03-18
Payer: MEDICARE

## 2025-03-18 DIAGNOSIS — H02.889 MGD (MEIBOMIAN GLAND DISEASE), UNSPECIFIED LATERALITY: ICD-10-CM

## 2025-03-18 DIAGNOSIS — H10.13 CONJUNCTIVITIS, ALLERGIC, BILATERAL: ICD-10-CM

## 2025-03-18 DIAGNOSIS — Z98.890 H/O BLEPHAROPLASTY: ICD-10-CM

## 2025-03-18 DIAGNOSIS — Z96.1 PSEUDOPHAKIA OF BOTH EYES: ICD-10-CM

## 2025-03-18 DIAGNOSIS — H04.123 DRY EYE SYNDROME, BILATERAL: ICD-10-CM

## 2025-03-18 DIAGNOSIS — H52.11 MYOPIA OF RIGHT EYE: Primary | ICD-10-CM

## 2025-03-18 DIAGNOSIS — Z98.890 HX OF LASIK: ICD-10-CM

## 2025-03-18 DIAGNOSIS — H35.363 DRUSEN OF MACULA OF BOTH EYES: ICD-10-CM

## 2025-03-18 PROCEDURE — 92134 CPTRZ OPH DX IMG PST SGM RTA: CPT | Mod: HCNC,S$GLB,, | Performed by: OPTOMETRIST

## 2025-03-18 PROCEDURE — 1159F MED LIST DOCD IN RCRD: CPT | Mod: HCNC,CPTII,S$GLB, | Performed by: OPTOMETRIST

## 2025-03-18 PROCEDURE — 92015 DETERMINE REFRACTIVE STATE: CPT | Mod: HCNC,S$GLB,, | Performed by: OPTOMETRIST

## 2025-03-18 PROCEDURE — 99999 PR PBB SHADOW E&M-EST. PATIENT-LVL II: CPT | Mod: PBBFAC,HCNC,, | Performed by: OPTOMETRIST

## 2025-03-18 PROCEDURE — 1126F AMNT PAIN NOTED NONE PRSNT: CPT | Mod: HCNC,CPTII,S$GLB, | Performed by: OPTOMETRIST

## 2025-03-18 PROCEDURE — 3288F FALL RISK ASSESSMENT DOCD: CPT | Mod: HCNC,CPTII,S$GLB, | Performed by: OPTOMETRIST

## 2025-03-18 PROCEDURE — 1101F PT FALLS ASSESS-DOCD LE1/YR: CPT | Mod: HCNC,CPTII,S$GLB, | Performed by: OPTOMETRIST

## 2025-03-18 PROCEDURE — 92014 COMPRE OPH EXAM EST PT 1/>: CPT | Mod: HCNC,S$GLB,, | Performed by: OPTOMETRIST

## 2025-03-18 RX ORDER — CYCLOSPORINE 0.5 MG/ML
1 EMULSION OPHTHALMIC 2 TIMES DAILY
Qty: 180 EACH | Refills: 3 | Status: SHIPPED | OUTPATIENT
Start: 2025-03-18 | End: 2026-03-18

## 2025-03-18 RX ORDER — FLUOROMETHOLONE 1 MG/ML
1 SUSPENSION/ DROPS OPHTHALMIC 4 TIMES DAILY
Qty: 5 ML | Refills: 0 | Status: SHIPPED | OUTPATIENT
Start: 2025-03-18 | End: 2025-04-01

## 2025-03-18 RX ORDER — CYCLOSPORINE 0.5 MG/ML
1 EMULSION OPHTHALMIC 2 TIMES DAILY
Qty: 60 EACH | Refills: 3 | Status: SHIPPED | OUTPATIENT
Start: 2025-03-18 | End: 2026-03-18

## 2025-03-19 NOTE — PROGRESS NOTES
TERENCE    LEELA: 3/21/2023 - Dr. Cruz     CC: Pt is here today for a routine eye exam. Pt states that she recently   had 2-3 episodes of an ocular migraine that lasted about 30 minutes. Pt   states that the first episode occurred in Nov 2024 and the others were in   Feb 2025. Pt states that her eyes are extremely itchy and the drops   doesn't seem to help improve symptoms.     (+)Dryness  (+)Burning  (+)Itchiness  (-)Tearing  (+)Flashes  (-)Floaters   (+)Photophobia  (+)Eye Pain - OS only       Diabetic: no    Contact Lens: no     Eye Meds:   Restasis BID - refill needed  Pataday PRN   Systane PF QD-PRN    PD: 65.5    Last edited by Mabel Monsalve MA on 3/18/2025  3:45 PM.            Assessment /Plan     For exam results, see Encounter Report.    Myopia of right eye   Rx specs    Drusen of macula of both eyes  -  Today   Posterior Segment OCT Retina-Both eyes: WNL OU    Conjunctivitis, allergic, bilateral   Continue with Pataday XS QAM    Hx of LASIK  Pseudophakia of both eyes  H/O blepharoplasty    MGD (meibomian gland disease), unspecified laterality   Ocusoft lid scrubs daily   Continue with fish oil    Dry eye syndrome, bilateral    -   Start  fluorometholone 0.1% (FML) 0.1 % DrpS; Place 1 drop into both eyes 4 (four) times daily. for 14 days  Dispense: 5 mL; Refill: 0, then stop and   Start  perfluorohexyloctane, PF, 100 % Drop; Place 1 drop into both eyes 4 (four) times daily.  Dispense: 3 mL; Refill: 6  -   Continue with cycloSPORINE (RESTASIS) 0.05 % ophthalmic emulsion; Place 1 drop into both eyes 2 (two) times daily.  Dispense: 60 each; Refill: 3    Consider switching to VEVYE if possible    RTC 1 year

## 2025-03-20 ENCOUNTER — PATIENT MESSAGE (OUTPATIENT)
Dept: OPTOMETRY | Facility: CLINIC | Age: 75
End: 2025-03-20
Payer: MEDICARE

## 2025-03-20 ENCOUNTER — PATIENT MESSAGE (OUTPATIENT)
Dept: ADMINISTRATIVE | Facility: CLINIC | Age: 75
End: 2025-03-20
Payer: MEDICARE

## 2025-03-21 ENCOUNTER — PATIENT MESSAGE (OUTPATIENT)
Dept: ELECTROPHYSIOLOGY | Facility: CLINIC | Age: 75
End: 2025-03-21
Payer: MEDICARE

## 2025-03-21 DIAGNOSIS — E03.9 HYPOTHYROIDISM (ACQUIRED): ICD-10-CM

## 2025-03-21 RX ORDER — LEVOTHYROXINE SODIUM 25 UG/1
25 TABLET ORAL
Qty: 90 TABLET | Refills: 0 | Status: SHIPPED | OUTPATIENT
Start: 2025-03-21

## 2025-03-21 NOTE — TELEPHONE ENCOUNTER
No care due was identified.  [facility] Embedded Care Due Messages. Reference number: [id number].   3/21/2025 12:51:37 PM CDT

## 2025-03-21 NOTE — TELEPHONE ENCOUNTER
Refill Decision Note   Zoë Klaus  is requesting a refill authorization.  Brief Assessment and Rationale for Refill:  Approve     Medication Therapy Plan:  TSH-WNL      Comments:     Note composed:1:29 PM 03/21/2025             11/2020

## 2025-03-24 DIAGNOSIS — H04.123 DRY EYE SYNDROME, BILATERAL: Primary | ICD-10-CM

## 2025-03-24 RX ORDER — CYCLOSPORINE OPHTHALMIC SOLUTION 1 MG/ML
1 SOLUTION/ DROPS OPHTHALMIC 2 TIMES DAILY
Qty: 2 ML | Refills: 6 | Status: SHIPPED | OUTPATIENT
Start: 2025-03-24 | End: 2026-03-24

## 2025-03-25 RX ORDER — IPRATROPIUM BROMIDE 42 UG/1
2 SPRAY, METERED NASAL 3 TIMES DAILY
Qty: 15 ML | Refills: 6 | Status: SHIPPED | OUTPATIENT
Start: 2025-03-25

## 2025-03-26 ENCOUNTER — OFFICE VISIT (OUTPATIENT)
Dept: HOME HEALTH SERVICES | Facility: CLINIC | Age: 75
End: 2025-03-26
Payer: MEDICARE

## 2025-03-26 VITALS — WEIGHT: 160 LBS | HEIGHT: 68 IN | BODY MASS INDEX: 24.25 KG/M2

## 2025-03-26 DIAGNOSIS — I48.0 PAROXYSMAL ATRIAL FIBRILLATION: Primary | ICD-10-CM

## 2025-03-26 DIAGNOSIS — J47.9 BRONCHIECTASIS WITHOUT COMPLICATION: ICD-10-CM

## 2025-03-26 DIAGNOSIS — G44.229 CHRONIC TENSION-TYPE HEADACHE, NOT INTRACTABLE: ICD-10-CM

## 2025-03-26 DIAGNOSIS — Z00.00 ENCOUNTER FOR MEDICARE ANNUAL WELLNESS EXAM: Primary | ICD-10-CM

## 2025-03-26 DIAGNOSIS — I27.20 PULMONARY HYPERTENSION: ICD-10-CM

## 2025-03-26 DIAGNOSIS — E03.9 HYPOTHYROIDISM (ACQUIRED): ICD-10-CM

## 2025-03-26 DIAGNOSIS — I48.11 LONGSTANDING PERSISTENT ATRIAL FIBRILLATION: ICD-10-CM

## 2025-03-26 DIAGNOSIS — I70.0 AORTIC ATHEROSCLEROSIS: ICD-10-CM

## 2025-03-26 PROCEDURE — 1160F RVW MEDS BY RX/DR IN RCRD: CPT | Mod: CPTII,95,, | Performed by: NURSE PRACTITIONER

## 2025-03-26 PROCEDURE — 3288F FALL RISK ASSESSMENT DOCD: CPT | Mod: CPTII,95,, | Performed by: NURSE PRACTITIONER

## 2025-03-26 PROCEDURE — 1125F AMNT PAIN NOTED PAIN PRSNT: CPT | Mod: CPTII,95,, | Performed by: NURSE PRACTITIONER

## 2025-03-26 PROCEDURE — 1158F ADVNC CARE PLAN TLK DOCD: CPT | Mod: CPTII,95,, | Performed by: NURSE PRACTITIONER

## 2025-03-26 PROCEDURE — 1159F MED LIST DOCD IN RCRD: CPT | Mod: CPTII,95,, | Performed by: NURSE PRACTITIONER

## 2025-03-26 PROCEDURE — G0439 PPPS, SUBSEQ VISIT: HCPCS | Mod: 95,,, | Performed by: NURSE PRACTITIONER

## 2025-03-26 PROCEDURE — 1101F PT FALLS ASSESS-DOCD LE1/YR: CPT | Mod: CPTII,95,, | Performed by: NURSE PRACTITIONER

## 2025-03-26 NOTE — PROGRESS NOTES
Ms. Enrique is a patient of Dr. Azevedo and was last seen in clinic 12/3/2024.      Subjective:   Patient ID:  Zoë Enrique is a 75 y.o. female who presents for follow up of Atrial Fibrillation  .     HPI:    Ms. Enrique is a 75 y.o. 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     Background:    74 yoF here for AF management. She has had AF for several years and underwent ablations by Dr Rowland at [facility], at [place] and more recently a complex ablation 11/24/2020 by Dr Seymour. She developed atypical AFL and underwent KHANH/CV 7/24 by Dr Seymour. She was placed on flecainide for rhythm control after. At some point between her CV and November she was taken off flecainide. She felt recurrence of symptoms and was in AFL 11/13/24. She feels poorly in AFL and has felt much better since she converted  back to SR a few weeks ago. Xarelto for CVA prophylaxis.     74 yoF here for arrhythmia management. She has recurrence of an atypical AFL/AT with intolerance to flecainide. I recommended PRN flecainide which she agreed to given the infrequency of her events. If her arrhythmia worsens, I would offer atypical AFL ablation. [facility]    74 yoF here for arrhythmia management. She has recurrence of an atypical AFL/AT with intolerance to flecainide. I recommended PRN flecainide which she agreed to given the infrequency of her events. If her arrhythmia worsens, I would offer atypical AFL ablation. [facility]    Update (03/27/2025):    2/17/2025:    CTI block at baseline    All 4 PVs, PW, roof, superior mitral annulus, lateral mitral annulus, BRITTNI, inferior mitral annulus were electrically silent    Tachycardia induced matching clinical tahcycardia from 11/24 and 7/24 ECGs with earliest activation in the rea-darlene area just suprior and anteiror to the superior boundary of the CS    No ablation performed    3D mapping performed with Ensite.    Electrophysiology study with coronary sinus pacing.    Intracardiac  echo.    Invasive electrophysiology study.    Transeptal puncture.    Difficult transseptal with baseline pericardial effusion that was stable throughout the entire case. Mapping of right and left atrium confirmed CTI block, RA high septal scaring where previously ablated. Pvs and PW remained isolated. BRITTNI electrically isolated, likely from prior ablation. Tachycardia was induced and localized to right atrium above the AV node. Discussed findings with  during procedure including high risk of requiring a pacemaker. Decision was made NOT to ablate. We discussed need for LAAO device given isolated appendage and high risk for stroke. Mapping suggested large BRITTNI. We will obtain a CT for BRITTNI sizing to determine whether watchman or Amulet more appropriate.      3/6/2025: Pt reported low BPs. Declined going to ED.    Today she reports that she felt very unwell on the flecainide and did stop it yesterday. BPs have come up but still somewhat low after metoprolol. She notes poor endurance, chest pressure, and LH. No syncope.      Prinodal tach   Wartchman  No dccv   High risk for clot  Baseline effusion    She is currently taking toprol 12.5mg daily. Stopped flecainide yesterday.     I have personally reviewed the patient's EKG today, which shows atypical atrial flutter at 64bpm. RS is 82. QTc is 406.    Relevant Cardiac Test Results:    KHANH (7/8/2024):    KHANH prior to ablation.    Left Ventricle: The left ventricle is normal in size. There is normal systolic function with a visually estimated ejection fraction of 55 - 60%.    Right Ventricle: Normal right ventricular cavity size. Systolic function is normal.    Left Atrium: Left atrium is dilated. The left atrial appendage appears normal. The left atrial appendage has a chicken wing morphology. Appendage velocity is reduced at less than 40 cm/sec. There is no thrombus in the left atrial appendage. The pulmonary veins have systolic blunting.    Mitral Valve: There is  moderate posterior mitral annular calcification present. There is likely a small perforation of the posterior mitral leaflet (P2) with associated mild MR. There is prolapse of the posterior mitral leaflet. There is mild regurgitation.    Tricuspid Valve: There is mild regurgitation.    Aorta: Grade 2 atherosclerosis of the descending aorta and in the aortic arch.    Pericardium: There is a small effusion adjacent to the right ventricle.    Current Outpatient Medications   Medication Sig    acyclovir (ZOVIRAX) 400 MG tablet Take 1 tablet by mouth three times daily as needed    amoxicillin (AMOXIL) 500 MG capsule Take 500 mg by mouth as needed (takes prior to all dental procedures).    cycloSPORINE (VEVYE) 0.1 % Drop Place 1 drop into both eyes 2 (two) times a day.    docusate sodium (COLACE) 250 MG capsule Take 250 mg by mouth 2 (two) times daily.     estradioL (ESTRACE) 0.01 % (0.1 mg/gram) vaginal cream PLACE 1 GRAM VAGINALLY EVERY OTHER DAY AS DIRECTED    famotidine (PEPCID) 40 MG tablet Take 1 tablet (40 mg total) by mouth every evening.    flecainide (TAMBOCOR) 100 MG Tab Take 1 tablet (100 mg total) by mouth every 12 (twelve) hours.    fluorometholone 0.1% (FML) 0.1 % DrpS Place 1 drop into both eyes 4 (four) times daily. for 14 days    HYDROcodone-acetaminophen (NORCO) 5-325 mg per tablet Take 1 tablet by mouth every 6 (six) hours as needed for Pain. Pt states takes PRN for headaches    ipratropium (ATROVENT) 42 mcg (0.06 %) nasal spray 2 sprays by Each Nostril route 3 (three) times daily.    levothyroxine (SYNTHROID) 25 MCG tablet Take 1 tablet by mouth once daily    metoprolol succinate (TOPROL-XL) 25 MG 24 hr tablet Take 1 tablet (25 mg total) by mouth once daily.    omega-3 fatty acids/fish oil (FISH OIL-OMEGA-3 FATTY ACIDS) 300-1,000 mg capsule Take by mouth once daily.    omeprazole (PRILOSEC) 40 MG capsule Take 1 capsule (40 mg total) by mouth once daily.    perfluorohexyloctane, PF, 100 % Drop Place 1  drop into both eyes 4 (four) times daily.    rivaroxaban (XARELTO) 20 mg Tab Take 1 tablet (20 mg total) by mouth once daily.    sodium chloride 3% 3 % nebulizer solution Take 4 mLs by nebulization 2 (two) times a day.    vit C/E/Zn/coppr/lutein/zeaxan (PRESERVISION AREDS-2 ORAL)      No current facility-administered medications for this visit.     Facility-Administered Medications Ordered in Other Visits   Medication    sodium chloride 0.9% flush 5 mL       Review of Systems   Constitutional: Positive for malaise/fatigue.   Cardiovascular:  Positive for chest pain and dyspnea on exertion. Negative for irregular heartbeat, leg swelling and palpitations.   Respiratory:  Negative for shortness of breath.    Hematologic/Lymphatic: Negative for bleeding problem.   Skin:  Negative for rash.   Musculoskeletal:  Negative for myalgias.   Gastrointestinal:  Negative for hematemesis, hematochezia and nausea.   Genitourinary:  Negative for hematuria.   Neurological:  Positive for light-headedness.   Psychiatric/Behavioral:  Negative for altered mental status.    Allergic/Immunologic: Negative for persistent infections.       Objective:          BP (!) 119/59 (Patient Position: Sitting)   Pulse 64   Wt 73.4 kg (161 lb 13.1 oz)   BMI 24.60 kg/m²     Physical Exam  Vitals and nursing note reviewed.   Constitutional:       Appearance: Normal appearance. She is well-developed.   HENT:      Head: Normocephalic.      Nose: Nose normal.   Eyes:      Pupils: Pupils are equal, round, and reactive to light.   Cardiovascular:      Rate and Rhythm: Normal rate. Rhythm irregularly irregular.   Pulmonary:      Effort: No respiratory distress.   Musculoskeletal:         General: Normal range of motion.   Skin:     General: Skin is warm and dry.      Findings: No erythema.   Neurological:      Mental Status: She is alert and oriented to person, place, and time.   Psychiatric:         Speech: Speech normal.         Behavior: Behavior normal.            Lab Results   Component Value Date     02/10/2025    K 4.7 02/10/2025    MG 2.1 08/15/2019    BUN 12 02/10/2025    CREATININE 0.7 02/10/2025    ALT 15 01/02/2024    AST 20 01/02/2024    HGB 12.4 02/10/2025    HCT 38.9 02/10/2025    TSH 1.001 07/03/2024    LDLCALC 116.8 01/02/2024       Recent Labs   Lab 07/05/24  0958 02/10/25  1013   INR 1.0 1.1       Assessment:     1. Longstanding persistent atrial fibrillation    2. Pulmonary hypertension    3. On anticoagulant therapy        Plan:     In summary, Ms. Enrique is a 75 y.o. 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   Pt is 5 weeks s/p EP procedure. Tachycardia induced matching clinical tahcycardia from 11/24 and 7/24 ECGs with earliest activation in the rea-darlene area just suprior and anteiror to the superior boundary of the CS. No ablation performed due to high risk for PPM. In addition, isolated BRITTNI associated with high risk of stroke. Recommendation is to proceed with LAAO. (Mapping suggested large BRITTNI and plan is for CT to determine whether watchman or amulet is more appropriate.)  Patient did not tolerate flecainide and stopped it yesterday. 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 She is symptomatic. BPs remain low. Ok to hold metoprolol as well for now. She is on xarelto. Confirmed she is taking with food.  She is quite symptomatic out of rhythm. We discussed that because of her high risk of stroke, any attempt at rhythm control should be deferred until LAAO is implanted. She understood. All questions answered.    OK to stop flecainide and hold metoprolol given low BP  Plan is for LAAO - likely CT 1st  Continue xarelto with food  RTC as scheduled after procedure      *A copy of this note has been sent to Dr. Azevedo*    No follow-ups on file.    55 minutes of total time spent on the encounter, which includes face to face time and non-face to face time preparing to see the patient (eg, review of tests), Obtaining and/or  reviewing separately obtained history, Documenting clinical information in the electronic or other health record, Independently interpreting results (not separately reported) and communicating results to the patient/family/caregiver, or Care coordination (not separately reported).     Visit today included increased complexity associated with the care of the episodic problem isolated BRITTNI addressed and managing the longitudinal care of the patient due to the serious and/or complex managed problem(s) .    ------------------------------------------------------------------    KULWANT Serna, NP-C  Cardiac Electrophysiology

## 2025-03-26 NOTE — TELEPHONE ENCOUNTER
Called and spoke with pt, advised to go to ER as we can not treat her arrhythmia with low b/p over the phone, pt reports her last b/p reading is 97/59, still having dizziness, fatigue and chest heaviness with deep breaths, some SOB with activity, pt declines ER, states she feels if she stops the flecainide she will not feel as bad, advised pt to stop the flecainide and to not take any toprol if bp is <90 prior to taking it, she does not want to cancel her appt with Dr Azevedo on 4/8, pt agreed to see NP if we can get her an appt this week again advised pt to go to ER if her b/p goes lower again or she feels she needs immediate attention, after call was able to scheduled appt for tomorrow with NP, sent pt message with appt information

## 2025-03-27 ENCOUNTER — HOSPITAL ENCOUNTER (OUTPATIENT)
Dept: CARDIOLOGY | Facility: CLINIC | Age: 75
Discharge: HOME | End: 2025-03-27
Payer: MEDICARE

## 2025-03-27 ENCOUNTER — OFFICE VISIT (OUTPATIENT)
Dept: ELECTROPHYSIOLOGY | Facility: CLINIC | Age: 75
End: 2025-03-27
Payer: MEDICARE

## 2025-03-27 VITALS
SYSTOLIC BLOOD PRESSURE: 119 MMHG | BODY MASS INDEX: 24.6 KG/M2 | DIASTOLIC BLOOD PRESSURE: 59 MMHG | WEIGHT: 161.81 LBS | HEART RATE: 64 BPM

## 2025-03-27 DIAGNOSIS — I48.0 PAROXYSMAL ATRIAL FIBRILLATION: ICD-10-CM

## 2025-03-27 DIAGNOSIS — Z79.01 ON ANTICOAGULANT THERAPY: ICD-10-CM

## 2025-03-27 DIAGNOSIS — I48.11 LONGSTANDING PERSISTENT ATRIAL FIBRILLATION: Primary | ICD-10-CM

## 2025-03-27 DIAGNOSIS — I27.20 PULMONARY HYPERTENSION: ICD-10-CM

## 2025-03-27 DIAGNOSIS — I48.0 PAROXYSMAL ATRIAL FIBRILLATION: Primary | ICD-10-CM

## 2025-03-27 LAB
OHS QRS DURATION: 82 MS
OHS QTC CALCULATION: 406 MS

## 2025-03-27 PROCEDURE — 93010 ELECTROCARDIOGRAM REPORT: CPT | Mod: HCNC,S$GLB,, | Performed by: INTERNAL MEDICINE

## 2025-03-27 PROCEDURE — 93005 ELECTROCARDIOGRAM TRACING: CPT | Mod: HCNC,S$GLB,, | Performed by: INTERNAL MEDICINE

## 2025-03-27 PROCEDURE — 99999 PR PBB SHADOW E&M-EST. PATIENT-LVL III: CPT | Mod: PBBFAC,HCNC,, | Performed by: NURSE PRACTITIONER

## 2025-03-27 NOTE — Clinical Note
Hector Gould, Dr. Azevedo wants to schedule her for watchman or amulet but his note says he wants a CT 1st to see the BRITTNI anatomy. I wasn't sure what kind of CT he usually gets but if you know I know the pt wants to move forward on everything as quickly as possible. Thanks!

## 2025-03-27 NOTE — Clinical Note
I explained the situation and she understands and is ready to proceed with LAAO (watchman or amulet). Thx.

## 2025-03-28 ENCOUNTER — TELEPHONE (OUTPATIENT)
Dept: ELECTROPHYSIOLOGY | Facility: CLINIC | Age: 75
End: 2025-03-28
Payer: MEDICARE

## 2025-03-28 DIAGNOSIS — I48.0 PAROXYSMAL ATRIAL FIBRILLATION: Primary | ICD-10-CM

## 2025-03-28 DIAGNOSIS — I48.11 LONGSTANDING PERSISTENT ATRIAL FIBRILLATION: Primary | ICD-10-CM

## 2025-03-28 NOTE — TELEPHONE ENCOUNTER
Spoke with pt, informed her that CT is not needed and we are cancelling that appt, explained that she will need to see a gen cardiologist for 2nd documentation that is needed for watchman approval, placed referral and will contact her to schedule watchman once gen card appt scheduled

## 2025-03-28 NOTE — TELEPHONE ENCOUNTER
----- Message from Vinny Azevedo MD sent at 3/28/2025  2:41 PM CDT -----  I do not get CTs for Watchman cases routinely  ----- Message -----  From: Bryanna Bucio RN  Sent: 3/28/2025  10:10 AM CDT  To: Vinny Azevedo MD    Do you want the CT first? Or just move forward with the watchman?  ----- Message -----  From: Vinny Azevedo MD  Sent: 3/27/2025   4:54 PM CDT  To: Gwen Gilbert NP; Bryanna Bucio RN    Great thanksBecky, can we set up Watchman for this patient? thanks  ----- Message -----  From: Gwen Gilbert NP  Sent: 3/27/2025   3:30 PM CDT  To: Vinny Azevedo MD    I explained the situation and she understands and is ready to proceed with LAAO (watchman or amulet). Thx.

## 2025-03-29 PROBLEM — C40.21: Status: RESOLVED | Noted: 2019-09-04 | Resolved: 2025-03-29

## 2025-03-29 PROBLEM — D69.6 THROMBOCYTOPENIA: Status: RESOLVED | Noted: 2021-02-19 | Resolved: 2025-03-29

## 2025-03-30 NOTE — PATIENT INSTRUCTIONS
Counseling and Referral of Other Preventative  (Italic type indicates deductible and co-insurance are waived)    Patient Name: Zoë Enrique  Today's Date: 3/29/2025    Health Maintenance       Date Due Completion Date    High Dose Statin Never done ---    COVID-19 Vaccine (7 - 2024-25 season) 09/01/2024 6/29/2023    DEXA Scan 10/20/2026 10/20/2022    Colorectal Cancer Screening 03/19/2028 3/19/2021    Lipid Panel 01/02/2029 1/2/2024    TETANUS VACCINE 02/13/2030 2/13/2020        No orders of the defined types were placed in this encounter.    The following information is provided to all patients.  This information is to help you find resources for any of the problems found today that may be affecting your health:                  Living healthy guide: www.[facility].[place].gov      Understanding Diabetes: www.diabetes.org      Eating healthy: www.cdc.gov/healthyweight      [facility] home safety checklist: www.cdc.gov/steadi/patient.html      Agency on Aging: www.goea.[place].[facility]      Alcoholics anonymous (AA): www.aa.org      Physical Activity: www.alan.nih.gov/pr0kkbb      Tobacco use: www.quitwithusla.org

## 2025-03-30 NOTE — PROGRESS NOTES
The patient location is: [place]  The chief complaint leading to consultation is: Health Risk Assessment    Visit type: audiovisual    Face to Face time with patient: 20  35 minutes of total time spent on the encounter, which includes face to face time and non-face to face time preparing to see the patient (eg, review of tests), Obtaining and/or reviewing separately obtained history, Documenting clinical information in the electronic or other health record, Independently interpreting results (not separately reported) and communicating results to the patient/family/caregiver, or Care coordination (not separately reported).         Each patient to whom he or she provides medical services by telemedicine is:  (1) informed of the relationship between the physician and patient and the respective role of any other health care provider with respect to management of the patient; and (2) notified that he or she may decline to receive medical services by telemedicine and may withdraw from such care at any time.    Notes:                         Zoë Enrique presented for a follow-up Medicare AWV today. The following components were reviewed and updated:    Medical history  Family History  Social history  Allergies and Current Medications  Health Risk Assessment  Health Maintenance  Care Team    **See Completed Assessments for Annual Wellness visit with in the encounter summary    The following assessments were completed:  Depression Screening  Cognitive function Screening  Timed Get Up Test  Whisper Test      Opioid documentation:      Patient does have a current opioid prescription.      Patient accepted further discussion regarding opioid medication use.      Patient is currently taking hydrocodone narcotic for chronic headaches pain.        Pain level today is 2/10.       In addition to narcotic pain medications, patient is also using (no other oral, topical, or alternative treatments) for pain control.       Patient is  "not followed by a specialist currently for their pain and will not be referred today.       Patient's opioid risk potential based on ORT-OUD tool:       Gordo each box that applies   No   Yes     Family history of substance abuse   Alcohol [x] []   Illegal drugs [x] []   Rx drugs [x] []     Personal history of substance abuse   Alcohol [x] []   Illegal drugs [x] []   Rx drugs [x] []     Age between 16-45 years   [x]   []     Patient with ADD, OCD, Bipolar disorder, schizoprenia   [x]   []     Patient with depression   [x]   []                         Scoring total                                               0                  Non-opioid treatment options have been discussed today and added to the patient's after visit summary.          Vitals:    03/26/25 1009   Weight: 72.6 kg (160 lb)   Height: 5' 8" (1.727 m)     Body mass index is 24.33 kg/m².       Physical Exam  Constitutional:       Appearance: Normal appearance.   Neurological:      General: No focal deficit present.      Mental Status: She is alert and oriented to person, place, and time.           Diagnoses and health risks identified today and associated recommendations/orders:  1. Encounter for Medicare annual wellness exam  Assessments completed. Preventive measures, health maintenance, and immunizations reviewed with patient.  - Ambulatory Referral/Consult to Enhanced Annual Wellness Visit (eAWV)    2. Longstanding persistent atrial fibrillation  Stable, patient on Xarelto and Toprol-XL. Followed by Cardiology.    3. Pulmonary hypertension  Stable, followed by Cardiology.    4. Aortic atherosclerosis  Stable, followed by Cardiology.    5. Bronchiectasis without complication  Stable, followed by PCP.    6. Chronic tension-type headache, not intractable  Stable, patient on Norco 5/325 mg prn severe pain. Followed by PCP.    7. Hypothyroidism (acquired)  Stable, patient on Synthroid. Followed by PCP.      Provided Zoë with a 5-10 year written screening " schedule and personal prevention plan. Recommendations were developed using the USPSTF age appropriate recommendations. Education, counseling, and referrals were provided as needed.  After Visit Summary printed and given to patient which includes a list of additional screenings\tests needed.    Follow up in about 1 year (around 3/26/2026) for your next annual wellness visit.      Chacha Eisenberg, NP      I offered to discuss advanced care planning, including how to pick a person who would make decisions for you if you were unable to make them for yourself, called a health care power of , and what kind of decisions you might make such as use of life sustaining treatments such as ventilators and tube feeding when faced with a life limiting illness recorded on a living will that they will need to know. (How you want to be cared for as you near the end of your natural life)     X Patient is interested in learning more about how to make advanced directives.  I provided them paperwork and offered to discuss this with them.

## 2025-04-02 ENCOUNTER — PROCEDURE VISIT (OUTPATIENT)
Dept: DERMATOLOGY | Facility: CLINIC | Age: 75
End: 2025-04-02

## 2025-04-02 ENCOUNTER — OFFICE VISIT (OUTPATIENT)
Dept: DERMATOLOGY | Facility: CLINIC | Age: 75
End: 2025-04-02
Payer: MEDICARE

## 2025-04-02 DIAGNOSIS — D18.01 CHERRY ANGIOMA: ICD-10-CM

## 2025-04-02 DIAGNOSIS — L82.1 SEBORRHEIC KERATOSES: Primary | ICD-10-CM

## 2025-04-02 DIAGNOSIS — D22.9 MULTIPLE BENIGN NEVI: ICD-10-CM

## 2025-04-02 DIAGNOSIS — Z12.83 SKIN CANCER SCREENING: Primary | ICD-10-CM

## 2025-04-02 DIAGNOSIS — L82.1 SEBORRHEIC KERATOSES: ICD-10-CM

## 2025-04-02 PROCEDURE — 99213 OFFICE O/P EST LOW 20 MIN: CPT | Mod: HCNC,S$GLB,, | Performed by: DERMATOLOGY

## 2025-04-02 PROCEDURE — 99999 PR PBB SHADOW E&M-EST. PATIENT-LVL III: CPT | Mod: PBBFAC,HCNC,, | Performed by: DERMATOLOGY

## 2025-04-02 PROCEDURE — 99499 UNLISTED E&M SERVICE: CPT | Mod: ,,, | Performed by: DERMATOLOGY

## 2025-04-02 PROCEDURE — 17110 DESTRUCTION B9 LES UP TO 14: CPT | Mod: CSM,,, | Performed by: DERMATOLOGY

## 2025-04-02 NOTE — PROGRESS NOTES
Subjective:      Patient ID:  Zoë Enrique is a 75 y.o. female who presents for   Chief Complaint   Patient presents with    Skin Check     HPI  Zoë Enrique is a 75 y.o. female who presents for: FBSE screening exam for skin cancer.    Last office visit 9-18-23    The patient has no specific concerns today.      Hx of BCC to right cheek-2020  This is a high risk patient here to check for the development of new lesions.   Review of Systems   Skin:  Positive for daily sunscreen use. Negative for activity-related sunscreen use, recent sunburn and wears hat.   Hematologic/Lymphatic: Bruises/bleeds easily.       Objective:   Physical Exam   Constitutional: She appears well-developed and well-nourished. No distress.   Neurological: She is alert and oriented to person, place, and time. She is not disoriented.   Psychiatric: She has a normal mood and affect.   Skin:   Areas Examined (abnormalities noted in diagram):   Scalp / Hair Palpated and Inspected  Head / Face Inspection Performed  Neck Inspection Performed  Chest / Axilla Inspection Performed  Abdomen Inspection Performed  Genitals / Buttocks / Groin Inspection Performed  Back Inspection Performed  RUE Inspected  LUE Inspection Performed  RLE Inspected  LLE Inspection Performed  Nails and Digits Inspection Performed                 Diagram Legend     Erythematous scaling macule/papule c/w actinic keratosis       Vascular papule c/w angioma      Pigmented verrucoid papule/plaque c/w seborrheic keratosis      Yellow umbilicated papule c/w sebaceous hyperplasia      Irregularly shaped tan macule c/w lentigo     1-2 mm smooth white papules consistent with Milia      Movable subcutaneous cyst with punctum c/w epidermal inclusion cyst      Subcutaneous movable cyst c/w pilar cyst      Firm pink to brown papule c/w dermatofibroma      Pedunculated fleshy papule(s) c/w skin tag(s)      Evenly pigmented macule c/w junctional nevus     Mildly variegated pigmented, slightly  irregular-bordered macule c/w mildly atypical nevus      Flesh colored to evenly pigmented papule c/w intradermal nevus       Pink pearly papule/plaque c/w basal cell carcinoma      Erythematous hyperkeratotic cursted plaque c/w SCC      Surgical scar with no sign of skin cancer recurrence      Open and closed comedones      Inflammatory papules and pustules      Verrucoid papule consistent consistent with wart     Erythematous eczematous patches and plaques     Dystrophic onycholytic nail with subungual debris c/w onychomycosis     Umbilicated papule    Erythematous-base heme-crusted tan verrucoid plaque consistent with inflamed seborrheic keratosis     Erythematous Silvery Scaling Plaque c/w Psoriasis     See annotation      Assessment / Plan:        Skin cancer screening    Seborrheic keratoses    Cherry angioma    Multiple benign nevi         Area of previous BCC examined. Site well healed with no signs of recurrence.    Total body skin examination performed today including at least 12 points as noted in physical examination. No lesions suspicious for malignancy noted.    Recommend daily sun protection/avoidance, use of at least SPF 30, broad spectrum sunscreen (OTC drug), skin self examinations, and routine physician surveillance to optimize early detection    Benign lesions per above    See cosmetic note      No follow-ups on file.

## 2025-04-04 ENCOUNTER — OFFICE VISIT (OUTPATIENT)
Dept: CARDIOLOGY | Facility: CLINIC | Age: 75
End: 2025-04-04
Payer: MEDICARE

## 2025-04-04 VITALS
BODY MASS INDEX: 24.7 KG/M2 | DIASTOLIC BLOOD PRESSURE: 82 MMHG | HEIGHT: 68 IN | WEIGHT: 162.94 LBS | SYSTOLIC BLOOD PRESSURE: 132 MMHG | HEART RATE: 71 BPM

## 2025-04-04 DIAGNOSIS — I48.0 PAROXYSMAL ATRIAL FIBRILLATION: Primary | ICD-10-CM

## 2025-04-04 DIAGNOSIS — I47.10 PSVT (PAROXYSMAL SUPRAVENTRICULAR TACHYCARDIA): ICD-10-CM

## 2025-04-04 DIAGNOSIS — I95.2 HYPOTENSION DUE TO DRUGS: ICD-10-CM

## 2025-04-04 DIAGNOSIS — I49.1 APC (ATRIAL PREMATURE CONTRACTIONS): ICD-10-CM

## 2025-04-04 PROCEDURE — 99999 PR PBB SHADOW E&M-EST. PATIENT-LVL IV: CPT | Mod: PBBFAC,,, | Performed by: INTERNAL MEDICINE

## 2025-04-04 NOTE — PROGRESS NOTES
Patient ID: Zoë Enrique is a 75 y.o. female.    History of Present Illness      Patient has a long-standing history of atrial fibrillation, first diagnosed in the 1990s. She has undergone multiple ablation procedures: 2 at [facility], 1 at the [facility], and the most recent in 2020 with Dr. Seymour. She had an attempted ablation for atrial flutter in February 2025 with Dr. Azevedo.  At that time he reported an abnormality with the left atrial appendage that he felt was high-risk for CVA and he is exploring a Watchman placement at this time.      Currently, she reports being symptomatically well, with no issues of palpitations or tachycardia. She has had hypotensive episodes related to her atrial fibrillation medications, particularly Flecainide. About 2 weeks ago, her blood pressure was recorded at 70/40. Due to these issues, both metoprolol succinate and Flecainide have been discontinued. The low blood pressure has been an ongoing issue since starting Flecainide, causing significant limitations in her daily activities, including near-syncope, inability to leave the house, and requiring assistance from her  for activities of daily living (ADLs).     She is currently taking Xarelto 20 mg daily for anticoagulation. She had a significant bleeding episode following a partial colon resection sometime before 2015, which was attributed to accidentally doubling her Xarelto dose.     She denies any recent bleeding issues or syncope.    MEDICAL HISTORY:  - Atrial fibrillation: Diagnosed in the 1990s  - Atrial flutter  - Hypothyroidism  - Arthritis  - Osteosarcoma: Diagnosed in 1989, treated with surgery  - Paroxysmal atrial fibrillation    SURGICAL HISTORY:  - Ablations for atrial fibrillation: 2 at [facility]  - Ablation: At [facility] by Dr. Cardozo  - Ablation: In 2020 by Dr. Seymour  - Partial colon resection: Between 2590-2325, complicated by bleeding due to accidental doubling of  Xarelto dose  - Osteosarcoma-related surgeries: 4 surgeries  - Ablation attempt: February 2025, for atrial flutter by Dr. Azevedo (incomplete due to concerns about left atrial appendage)    MEDICATIONS:  - Xarelto 20 mg, daily  - Levothyroxine, for hypothyroidism  - Discontinued metoprolol succinate 25 mg daily, due to low blood pressure (70/70) about 2 weeks ago  - Discontinued flecainide, patient could not tolerate it due to hypotensive issues         Physical Exam    Vitals: Heart rate: 71 bpm. Blood pressure: 132/82.  Cardiovascular: Regular rate and rhythm with normal S1 and S2. No murmurs, rubs, or gallops. No JVD or carotid bruit.  Extremities: No lower extremity edema.  Lungs: All normal.         Assessment & Plan    I48.0 Paroxysmal atrial fibrillation  I47.10 PSVT (paroxysmal supraventricular tachycardia)  I49.1 APC (atrial premature contractions)  I95.2 Hypotension due to drugs    PAROXYSMAL ATRIAL FIBRILLATION:  - Long-standing history of paroxysmal a-fib and atrial flutter, diagnosed in 1990s.  - Multiple ablations performed, including 2 at [facility], 1 at [facility], and most recent in 2020 by Dr. Seymour.  - Dr. Azevedo attempted ablation for atrial flutter in February 2025.  - Currently asymptomatic for palpitations and tachycardia.  - Considered Watchman procedure due to concerns about high stroke risk and single left atrial appendage.  The patient also with intermittent hypotension that is concerning and has made errors with rivaroxaban therapy previously resulting in significant bleeds.  - Dr. Azevedo to discuss pros and cons of Watchman implant with the patient next week.    HYPOTENSION DUE TO DRUGS:  - History of hypotension related to Flecainide use.  - Discontinued metoprolol succinate and Flecainide due to intolerance and hypotension.     [id number] score: 5  HAS-BLED score: 4    If you answer NO to any of the four criteria below, the patient does not meet the WATCHMAN  implant eligibility requirements.     Patient has non-valvular atrial fibrillation: Yes  Patient has an increased risk for stroke and is recommended for oral anticoagulation (OAC): Yes  Patient is suitable for short-term anticoagulation therapy but deemed unable to take long-term OAC: Yes  Patient has an appropriate rationale to seek a non-pharmacological alternative to OACs. Specific factors include: History of bleeding or increased bleeding risk, History of risk of falls, Documented poor compliance with OAC therapy, Increased bleeding risk not reflected by HAS-BLED score, Other situations for which OAC is inappropriate              This note was generated with the assistance of ambient listening technology. Verbal consent was obtained by the patient and accompanying visitor(s) for the recording of patient appointment to facilitate this note. I attest to having reviewed and edited the generated note for accuracy, though some syntax or spelling errors may persist. Please contact the author of this note for any clarification.

## 2025-04-07 ENCOUNTER — PATIENT MESSAGE (OUTPATIENT)
Dept: PRIMARY CARE CLINIC | Facility: CLINIC | Age: 75
End: 2025-04-07
Payer: MEDICARE

## 2025-04-07 DIAGNOSIS — E03.9 HYPOTHYROIDISM (ACQUIRED): ICD-10-CM

## 2025-04-07 DIAGNOSIS — I48.0 PAROXYSMAL ATRIAL FIBRILLATION: Primary | ICD-10-CM

## 2025-04-07 NOTE — PROGRESS NOTES
Subjective   Patient ID:  Zoë Enrique is a 75 y.o. female who presents for follow-up of Atrial Fibrillation      75 yoF here for AF management.     12/24: She has had AF for several years and underwent ablations by Dr Rowland at [facility], at [place] and more recently a complex ablation 11/24/2020 by Dr Seymour. She developed atypical AFL and underwent KHANH/CV 7/24 by Dr Seymour. She was placed on flecainide for rhythm control after. At some point between her CV and November she was taken off flecainide. She felt recurrence of symptoms and was in AFL 11/13/24. She feels poorly in AFL and has felt much better since she converted  back to SR a few weeks ago. Xarelto for CVA prophylaxis.     Interval history: Ablation 2/25 with rea-darlene AT. Her BRITTNI was isolated. AAD were restarted. Her main complaint is of chest pressure that occurs on most days.     Ablation 2/25:  ·  CTI block at baseline  ·  All 4 PVs, PW, roof, superior mitral annulus, lateral mitral annulus, BRITTNI, inferior mitral annulus were electrically silent  ·  Tachycardia induced matching clinical tahcycardia from 11/24 and 7/24 ECGs with earliest activation in the rea-darlene area just suprior and anteiror to the superior boundary of the CS  ·  No ablation performed  ·  3D mapping performed with [employer].  ·  Electrophysiology study with coronary sinus pacing.  ·  Intracardiac echo.  ·  Invasive electrophysiology study.  ·  Transeptal puncture.      Ablation 11/20 Dr Seymour:  · Successful ablation of atrial tachycardia.  · Successful ablation of atrial flutter (typical).  · Persistently successful prior pulmonary vein RF ablation.  · Catheter ablation of two mechanistically distinct tachycardias (focal AT and CTI-dependent AFL).  · Intracardiac echo.  · Transeptal puncture.  · Cardioversion was successfully performed with restoration of normal sinus rhythm.  · 3D mapping performed with [employer] 3.  · Cardioversion.  · CS catheter placement and  pacing.  · Device reprogramming.  · Electrophysiology study with coronary sinus pacing.  · His bundle recording.  · Radiofrequency ablation.    KHANH/CV 7/24:  ·  KHANH prior to ablation.  ·  Left Ventricle: The left ventricle is normal in size. There is normal systolic function with a visually estimated ejection fraction of 55 - 60%.  ·  Right Ventricle: Normal right ventricular cavity size. Systolic function is normal.  ·  Left Atrium: Left atrium is dilated. The left atrial appendage appears normal. The left atrial appendage has a chicken wing morphology. Appendage velocity is reduced at less than 40 cm/sec. There is no thrombus in the left atrial appendage. The pulmonary veins have systolic blunting.  ·  Mitral Valve: There is moderate posterior mitral annular calcification present. There is likely a small perforation of the posterior mitral leaflet (P2) with associated mild MR. There is prolapse of the posterior mitral leaflet. There is mild regurgitation.  ·  Tricuspid Valve: There is mild regurgitation.  ·  Aorta: Grade 2 atherosclerosis of the descending aorta and in the aortic arch.  ·  Pericardium: There is a small effusion adjacent to the right ventricle.    My interpretation of the ECG is:    Past Medical History:  No date: Abnormal Pap smear of cervix  09/2017: Atrial fibrillation  No date: Atrial flutter  01/2021: Basal cell carcinoma      Comment:  superficial BCC R cheek s/p Efudex  1989: Cancer      Comment:  osteosarcoma of right femur  1998: Diverticulosis  No date: Hx of atrial flutter  1990: Hx of mitral valve prolapse  No date: Hypothyroidism  No date: Migraines  No date: Recurrent upper respiratory infection (URI)  No date: Supraventricular tachycardia    Past Surgical History:  11/24/2020: ABLATION OF ARRHYTHMOGENIC FOCUS FOR ATRIAL FIBRILLATION;   N/A      Comment:  Procedure: ABLATION, ARRHYTHMOGENIC FOCUS, FOR ATRIAL                FIBRILLATION;  Surgeon: Moe Seymour MD;  Location:                 [facility] EP LAB;  Service: Cardiology;  Laterality: N/A;                 AFL/AF, KHANH, CTI, PVI, RFA, CARTO, GEN, DM, 3 PREP  2025: ABLATION, ATRIAL FLUTTER, ATYPICAL; N/A      Comment:  Procedure: Ablation, Atrial Flutter, Atypical;  Surgeon:               Vinny Azevedo MD;  Location: [facility] EP LAB;                 Service: Cardiology;  Laterality: N/A;  AFL (atypical),                KHANH (cx if SR), RFA, MIYA, GEN, MB, 3prep  : AUGMENTATION OF BREAST; Bilateral      Comment:  35 yrs   : BREAST SURGERY      Comment:  augmentation  2020: CARDIOVERSION      Comment:  Procedure: Cardioversion;  Surgeon: Moe Seymour MD;               Location: [facility] EP LAB;  Service: Cardiology;;  1988:  SECTION  2011: COLON SURGERY      Comment:  partial colon resection  2021: COLONOSCOPY; N/A      Comment:  Procedure: COLONOSCOPY;  Surgeon: Sahara Salgado MD;                Location: [facility] ENDO (4TH FLR);  Service: Endoscopy;                 Laterality: N/A;  constipation prep- 7 days of miralax, 2               fulls of fulls, then day bf clears and split prep covid               test 3/5 pcw, prep instr emailed, antibiotics                prophylactically, Xarelto hold x 2 days per Dr. Seymour-see               telephone encounter 2/10 -ml  No date: COLPOSCOPY  1987: COSMETIC SURGERY  2022: DE QUERVAIN'S RELEASE; Left      Comment:  Procedure: RELEASE, HAND, FOR DEQUERVAIN'S                TENOSYNOVITIS;  Surgeon: Toñito Adame Jr., MD;                 Location: [facility] OR;  Service: Orthopedics;  Laterality:                Left;  2025: DIAGNOSTIC CARDIAC ELECTROPHYSIOLOGY STUDY      Comment:  Procedure: EP - diagnostic;  Surgeon: Vinny Azevedo MD;  Location: [facility] EP LAB;  Service: Cardiology;;  2024: ECHOCARDIOGRAM,TRANSESOPHAGEAL; N/A      Comment:  Procedure: Transesophageal echo (KHANH) intra-procedure                log documentation;  Surgeon:  Ramírez Ortez III, MD;                Location: [facility] EP LAB;  Service: Cardiology;  Laterality:               N/A;  03/19/2021: ESOPHAGOGASTRODUODENOSCOPY; N/A      Comment:  Procedure: EGD (ESOPHAGOGASTRODUODENOSCOPY);  Surgeon:                Sahara Salgado MD;  Location: [facility] ENDO (4TH FLR);                 Service: Endoscopy;  Laterality: N/A;  2016: EYE SURGERY; Bilateral      Comment:  Lasik, then cataracts extraction  2/07, 11/07, 2/09: JOINT REPLACEMENT; Right  2007: KNEE ARTHROPLASTY; Right      Comment:  revison in 2009 1989: KNEE SURGERY; Right      Comment:  distal femoral replacing TK  No date: OOPHORECTOMY  1994: TOTAL ABDOMINAL HYSTERECTOMY      Comment:  TAHBSO  11/24/2020: TRANSESOPHAGEAL ECHOCARDIOGRAPHY; N/A      Comment:  Procedure: ECHOCARDIOGRAM, TRANSESOPHAGEAL;  Surgeon:                Ramírez Ortez III, MD;  Location: [facility] EP LAB;                 Service: Cardiology;  Laterality: N/A;  08/12/2019: TREATMENT OF CARDIAC ARRHYTHMIA; N/A      Comment:  Procedure: CARDIOVERSION;  Surgeon: Moe Seymour MD;               Location: [facility] EP LAB;  Service: Cardiology;  Laterality:               N/A;  KHANH/DCCV/MAC/DM/3PREP/*ADMIT FOR TIKOSYN*  7/8/2024: TREATMENT OF CARDIAC ARRHYTHMIA; N/A      Comment:  Procedure: Cardioversion or Defibrillation;  Surgeon:                GABRIELE Bull MD;  Location: [facility] EP LAB;  Service:               Cardiology;  Laterality: N/A;  AF, KHANH, DCCV, MAC, DM, 3                Prep  08/02/2022: TRIGGER FINGER RELEASE; Left      Comment:  Procedure: RELEASE, TRIGGER FINGER;  Surgeon: Toñito Adame Jr., MD;  Location: [facility] OR;  Service:                Orthopedics;  Laterality: Left;  left middle finger    Social History    Socioeconomic History      Marital status:     Tobacco Use      Smoking status: Never        Passive exposure: Never      Smokeless tobacco: Never    Substance and Sexual Activity      Alcohol use: Yes         Alcohol/week: 2.0 standard drinks of alcohol        Types: 2 Shots of liquor per week        Comment: occasionally      Drug use: No        Comment: CBD oils for headache      Sexual activity: Not Currently        Partners: Male        Birth control/protection: Post-menopausal, See Surgical Hx, None        Comment: , together x 37 years    Social History Narrative      N/A PER THE PATIENT.    Social Drivers of Health  Financial Resource Strain: Low Risk  (3/26/2025)      Overall Financial Resource Strain (CARDIA)          Difficulty of Paying Living Expenses: Not very hard  Food Insecurity: No Food Insecurity (3/26/2025)      Hunger Vital Sign          Worried About Running Out of Food in the Last Year: Never true          Ran Out of Food in the Last Year: Never true  Transportation Needs: No Transportation Needs (3/26/2025)      PRAPARE - Transportation          Lack of Transportation (Medical): No          Lack of Transportation (Non-Medical): No  Physical Activity: Insufficiently Active (3/26/2025)      Exercise Vital Sign          Days of Exercise per Week: 1 day          Minutes of Exercise per Session: 10 min  Stress: No Stress Concern Present (3/26/2025)      [employer] [place] [employer] - Occupational Stress Questionnaire          Feeling of Stress : Not at all  Housing Stability: Low Risk  (3/26/2025)      Housing Stability Vital Sign          Unable to Pay for Housing in the Last Year: No          Number of Times Moved in the Last Year: 0          Homeless in the Last Year: No    Review of patient's family history indicates:  Problem: Heart disease      Relation: Mother          Name: Tianna              Age of Onset: (Not Specified)              Comment: Atrial fib  Problem: Diabetes      Relation: Mother          Name: Tianna              Age of Onset: (Not Specified)              Comment: Type II  Problem: Hearing loss      Relation: Father          Name: Adan              Age of  Onset: (Not Specified)  Problem: Heart disease      Relation: Father          Name: Adan              Age of Onset: (Not Specified)              Comment: Angina, AAA  Problem: Stroke      Relation: Father          Name: Adan              Age of Onset: 93  Problem: Diabetes      Relation: Sister          Name: Mahogany              Age of Onset: (Not Specified)              Comment: Type I  Problem: Heart disease      Relation: Sister          Name: Mahogany              Age of Onset: (Not Specified)              Comment: Atrrial fib, mitral valve repair  Problem: Heart disease      Relation: Brother          Name: Severo              Age of Onset: (Not Specified)              Comment: Atrial fib  Problem: Asthma      Relation: Brother          Name: Severo              Age of Onset: (Not Specified)  Problem: Diverticulosis      Relation: Brother          Name: Severo              Age of Onset: (Not Specified)  Problem: Heart disease      Relation: Brother          Name: Leonard              Age of Onset: (Not Specified)              Comment: Atrial fib  Problem: Diverticulosis      Relation: Brother          Name: Leonard              Age of Onset: (Not Specified)  Problem: Esophageal cancer      Relation: Maternal Uncle          Name:               Age of Onset: (Not Specified)  Problem: No Known Problems      Relation: Daughter          Name: Michael              Age of Onset: (Not Specified)  Problem: Breast cancer      Relation: Neg Hx          Name:               Age of Onset: (Not Specified)  Problem: Ovarian cancer      Relation: Neg Hx          Name:               Age of Onset: (Not Specified)  Problem: Colon cancer      Relation: Neg Hx          Name:               Age of Onset: (Not Specified)  Problem: Cancer      Relation: Neg Hx          Name:               Age of Onset: (Not Specified)  Problem: Hypertension      Relation: Neg Hx          Name:               Age of Onset: (Not Specified)  Problem:  Eczema      Relation: Neg Hx          Name:               Age of Onset: (Not Specified)      Current Outpatient Medications:  acyclovir (ZOVIRAX) 400 MG tablet, Take 1 tablet by mouth three times daily as needed, Disp: 90 tablet, Rfl: 0  amoxicillin (AMOXIL) 500 MG capsule, Take 500 mg by mouth as needed (takes prior to all dental procedures)., Disp: , Rfl:   cycloSPORINE (VEVYE) 0.1 % Drop, Place 1 drop into both eyes 2 (two) times a day., Disp: 2 mL, Rfl: 6  docusate sodium (COLACE) 250 MG capsule, Take 250 mg by mouth 2 (two) times daily. , Disp: , Rfl:   estradioL (ESTRACE) 0.01 % (0.1 mg/gram) vaginal cream, PLACE 1 GRAM VAGINALLY EVERY OTHER DAY AS DIRECTED, Disp: 43 g, Rfl: 0  famotidine (PEPCID) 40 MG tablet, Take 1 tablet (40 mg total) by mouth every evening., Disp: 30 tablet, Rfl: 3  HYDROcodone-acetaminophen (NORCO) 5-325 mg per tablet, Take 1 tablet by mouth every 6 (six) hours as needed for Pain. Pt states takes PRN for headaches, Disp: 28 tablet, Rfl: 0  ipratropium (ATROVENT) 42 mcg (0.06 %) nasal spray, 2 sprays by Each Nostril route 3 (three) times daily., Disp: 15 mL, Rfl: 6  levothyroxine (SYNTHROID) 25 MCG tablet, Take 1 tablet by mouth once daily, Disp: 90 tablet, Rfl: 0  metoprolol succinate (TOPROL-XL) 25 MG 24 hr tablet, Take 1 tablet (25 mg total) by mouth once daily., Disp: 90 tablet, Rfl: 3  omega-3 fatty acids/fish oil (FISH OIL-OMEGA-3 FATTY ACIDS) 300-1,000 mg capsule, Take by mouth once daily., Disp: , Rfl:   omeprazole (PRILOSEC) 40 MG capsule, Take 1 capsule (40 mg total) by mouth once daily., Disp: 30 capsule, Rfl: 3  perfluorohexyloctane, PF, 100 % Drop, Place 1 drop into both eyes 4 (four) times daily., Disp: 3 mL, Rfl: 6  rivaroxaban (XARELTO) 20 mg Tab, Take 1 tablet (20 mg total) by mouth once daily., Disp: 90 tablet, Rfl: 3  sodium chloride 3% 3 % nebulizer solution, Take 4 mLs by nebulization 2 (two) times a day., Disp: 300 mL, Rfl: PRN  vit C/E/Zn/coppr/lutein/zeaxan  (PRESERVISION AREDS-2 ORAL), , Disp: , Rfl:     No current facility-administered medications for this visit.  Facility-Administered Medications Ordered in Other Visits:  sodium chloride 0.9% flush 5 mL, 5 mL, Intravenous, PRN, Aleksandra Taylor NP            Review of Systems   Constitutional: Positive for malaise/fatigue.   Cardiovascular:  Positive for chest pain and dyspnea on exertion. Negative for irregular heartbeat, leg swelling and palpitations.   Respiratory:  Negative for shortness of breath.    Hematologic/Lymphatic: Negative for bleeding problem.   Skin:  Negative for rash.   Musculoskeletal:  Negative for myalgias.   Gastrointestinal:  Negative for hematemesis, hematochezia and nausea.   Genitourinary:  Negative for hematuria.   Neurological:  Positive for light-headedness.   Psychiatric/Behavioral:  Negative for altered mental status.    Allergic/Immunologic: Negative for persistent infections.          Objective     Physical Exam  Vitals and nursing note reviewed.   Constitutional:       Appearance: Normal appearance. She is well-developed.   HENT:      Head: Normocephalic.      Nose: Nose normal.   Eyes:      Pupils: Pupils are equal, round, and reactive to light.   Cardiovascular:      Rate and Rhythm: Normal rate. Rhythm irregularly irregular.   Pulmonary:      Effort: No respiratory distress.   Musculoskeletal:         General: Normal range of motion.   Skin:     General: Skin is warm and dry.      Findings: No erythema.   Neurological:      Mental Status: She is alert and oriented to person, place, and time.   Psychiatric:         Speech: Speech normal.         Behavior: Behavior normal.            Assessment and Plan     1. PSVT (paroxysmal supraventricular tachycardia)    2. Chest pressure    3. Atrial tachycardia        Plan:  75 yoF here for arrhythmia management. I recommended LAAO given her elevated stroke risk and BRITTNI isolation. The patient can tolerate short term OAC but has elevated  stroke risk given her isolated appendage. I discussed management options regarding LAAO. I discussed the process of LAAO via Watchman including pre-procedure testing  as well as the need to take OAC for 6 weeks post implant. We discussed post procedure KHANH studies to assess BRITTNI occlusion. Additionally I mentioned the need to take DAPT up to the 6 month point post implant.      Watchman with Anesthesia support    Julieno to assess symptom/rhythm correlation  PET Stress to assess chest pressure

## 2025-04-07 NOTE — PROGRESS NOTES
Here for cosmetic removal of SK on torso.    Cryosurgery procedure note:    Verbal consent from the patient is obtained including, but not limited to, risk of hypopigmentation/hyperpigmentation, scar, recurrence of lesion. Liquid nitrogen cryosurgery is applied to 3 lesions to produce a freeze injury. The patient is aware that blisters may form and is instructed on wound care with gentle cleansing and use of vaseline ointment to keep moist until healed. The patient is supplied a handout on cryosurgery and is instructed to call if lesions do not completely resolve.    $150 cosmetic fee

## 2025-04-08 ENCOUNTER — OFFICE VISIT (OUTPATIENT)
Dept: ELECTROPHYSIOLOGY | Facility: CLINIC | Age: 75
End: 2025-04-08
Payer: MEDICARE

## 2025-04-08 ENCOUNTER — HOSPITAL ENCOUNTER (OUTPATIENT)
Dept: CARDIOLOGY | Facility: CLINIC | Age: 75
Discharge: HOME | End: 2025-04-08
Payer: MEDICARE

## 2025-04-08 VITALS
BODY MASS INDEX: 24.67 KG/M2 | DIASTOLIC BLOOD PRESSURE: 64 MMHG | WEIGHT: 162.25 LBS | HEART RATE: 77 BPM | SYSTOLIC BLOOD PRESSURE: 120 MMHG

## 2025-04-08 DIAGNOSIS — I48.0 PAROXYSMAL ATRIAL FIBRILLATION: ICD-10-CM

## 2025-04-08 DIAGNOSIS — I47.19 ATRIAL TACHYCARDIA: ICD-10-CM

## 2025-04-08 DIAGNOSIS — R07.89 CHEST PRESSURE: ICD-10-CM

## 2025-04-08 DIAGNOSIS — I47.10 PSVT (PAROXYSMAL SUPRAVENTRICULAR TACHYCARDIA): Primary | ICD-10-CM

## 2025-04-08 LAB
OHS QRS DURATION: 84 MS
OHS QTC CALCULATION: 454 MS

## 2025-04-08 PROCEDURE — 3074F SYST BP LT 130 MM HG: CPT | Mod: CPTII,S$GLB,, | Performed by: INTERNAL MEDICINE

## 2025-04-08 PROCEDURE — 93010 ELECTROCARDIOGRAM REPORT: CPT | Mod: S$GLB,,, | Performed by: INTERNAL MEDICINE

## 2025-04-08 PROCEDURE — 99999 PR PBB SHADOW E&M-EST. PATIENT-LVL III: CPT | Mod: PBBFAC,,, | Performed by: INTERNAL MEDICINE

## 2025-04-08 PROCEDURE — 1101F PT FALLS ASSESS-DOCD LE1/YR: CPT | Mod: CPTII,S$GLB,, | Performed by: INTERNAL MEDICINE

## 2025-04-08 PROCEDURE — 3288F FALL RISK ASSESSMENT DOCD: CPT | Mod: CPTII,S$GLB,, | Performed by: INTERNAL MEDICINE

## 2025-04-08 PROCEDURE — 1126F AMNT PAIN NOTED NONE PRSNT: CPT | Mod: CPTII,S$GLB,, | Performed by: INTERNAL MEDICINE

## 2025-04-08 PROCEDURE — 3078F DIAST BP <80 MM HG: CPT | Mod: CPTII,S$GLB,, | Performed by: INTERNAL MEDICINE

## 2025-04-08 PROCEDURE — 1159F MED LIST DOCD IN RCRD: CPT | Mod: CPTII,S$GLB,, | Performed by: INTERNAL MEDICINE

## 2025-04-08 PROCEDURE — 99214 OFFICE O/P EST MOD 30 MIN: CPT | Mod: S$GLB,,, | Performed by: INTERNAL MEDICINE

## 2025-04-08 PROCEDURE — 93005 ELECTROCARDIOGRAM TRACING: CPT | Mod: S$GLB,,, | Performed by: INTERNAL MEDICINE

## 2025-04-09 ENCOUNTER — LAB VISIT (OUTPATIENT)
Dept: LAB | Facility: HOSPITAL | Age: 75
End: 2025-04-09
Attending: INTERNAL MEDICINE
Payer: MEDICARE

## 2025-04-09 ENCOUNTER — OFFICE VISIT (OUTPATIENT)
Dept: PRIMARY CARE CLINIC | Facility: CLINIC | Age: 75
End: 2025-04-09
Payer: MEDICARE

## 2025-04-09 VITALS
WEIGHT: 160.69 LBS | HEART RATE: 74 BPM | RESPIRATION RATE: 18 BRPM | DIASTOLIC BLOOD PRESSURE: 80 MMHG | SYSTOLIC BLOOD PRESSURE: 120 MMHG | HEIGHT: 68 IN | OXYGEN SATURATION: 98 % | BODY MASS INDEX: 24.35 KG/M2

## 2025-04-09 DIAGNOSIS — E78.2 HYPERLIPIDEMIA, MIXED: ICD-10-CM

## 2025-04-09 DIAGNOSIS — G44.229 CHRONIC TENSION-TYPE HEADACHE, NOT INTRACTABLE: ICD-10-CM

## 2025-04-09 DIAGNOSIS — E03.9 HYPOTHYROIDISM (ACQUIRED): ICD-10-CM

## 2025-04-09 DIAGNOSIS — I48.0 PAROXYSMAL ATRIAL FIBRILLATION: ICD-10-CM

## 2025-04-09 DIAGNOSIS — Z00.00 ANNUAL PHYSICAL EXAM: ICD-10-CM

## 2025-04-09 DIAGNOSIS — I48.0 PAROXYSMAL ATRIAL FIBRILLATION: Primary | ICD-10-CM

## 2025-04-09 LAB
T4 FREE SERPL-MCNC: 1.05 NG/DL (ref 0.71–1.51)
TSH SERPL-ACNC: 1.57 UIU/ML (ref 0.4–4)

## 2025-04-09 PROCEDURE — 36415 COLL VENOUS BLD VENIPUNCTURE: CPT

## 2025-04-09 PROCEDURE — 84439 ASSAY OF FREE THYROXINE: CPT

## 2025-04-09 PROCEDURE — 99999 PR PBB SHADOW E&M-EST. PATIENT-LVL IV: CPT | Mod: PBBFAC,,, | Performed by: INTERNAL MEDICINE

## 2025-04-09 PROCEDURE — 84443 ASSAY THYROID STIM HORMONE: CPT

## 2025-04-09 RX ORDER — ACYCLOVIR 400 MG/1
400 TABLET ORAL 3 TIMES DAILY PRN
Qty: 90 TABLET | Refills: 3 | Status: SHIPPED | OUTPATIENT
Start: 2025-04-09

## 2025-04-09 RX ORDER — LEVOTHYROXINE SODIUM 25 UG/1
25 TABLET ORAL
Qty: 90 TABLET | Refills: 3 | Status: SHIPPED | OUTPATIENT
Start: 2025-04-09

## 2025-04-09 RX ORDER — HYDROCODONE BITARTRATE AND ACETAMINOPHEN 5; 325 MG/1; MG/1
1 TABLET ORAL EVERY 6 HOURS PRN
Qty: 28 TABLET | Refills: 0 | Status: SHIPPED | OUTPATIENT
Start: 2025-04-09

## 2025-04-09 NOTE — PROGRESS NOTES
[facility] Note    Chief Complaint      Chief Complaint   Patient presents with    Annual Exam       History of Present Illness      History of Present Illness    CHIEF COMPLAINT:  Ms. Enrique presents today for cardiac concerns with chest pressure and weight sensation.    HISTORY OF PRESENT ILLNESS:  She reports symptoms since Monday night including chest pressure and weight sensation, conversion from normal sinus rhythm to flutter/fib, and episodes of near-syncope requiring her to sit down. She notes significantly decreased exercise tolerance, only able to walk a few driveways before having to turn around due to pressure and shortness of breath, and needs to rest when walking from parking lot. Home blood pressure readings were initially 108/60s, with current reading of 120/80.    CARDIOVASCULAR HISTORY:  She had a failed ablation attempt in February. Her left atrial appendage is noted to be silent. She is unable to take Flecainide due to hypotension with blood pressure dropping to 70s-80s and associated dizziness, even with reduced dosing. She currently takes Xarelto for anticoagulation.    MEDICAL HISTORY:  She has a history of colonoscopy with polyp removal. She has breast implants with known leakage, noting the presence of lumps throughout her breasts.    CURRENT MEDICATIONS:  She takes Levothyroxine, Norco, and Xarelto.      ROS:  Cardiovascular: +palpitations, +lower extremity edema, +chest pressure, +feeling of flutter in chest, +feeling of skipped beats, +near-syncope, +exercise intolerance  Respiratory: +shortness of breath, +exertional dyspnea  Musculoskeletal: +joint pain  Breasts: +breast lumps       A fib: Sees Dr. Azevedo, EP.  Denies palpitations, chest pain, shortness of breath. S/P multiple attempts at ablation, last with Dr. Azevedo 2/2025.  Considered high risk for stroke due to abnormality with left atrial appendage and considering Watchman.  Off flecainide.  On xarelto for  anticoagulation.    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  Surveillance exams deferred until changes.  Pt has not noticed any new symptoms.    Hypothyroidism: Controlled on synthroid.  TSH pending.  Herpes: Takes acyclovir as needed  Diet: Prepares own food. Limits sugars and carbs.  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      Denies drinking and driving, drinking more than 4 drinks on occasion, drug use.  Flu shot UTD.  TdAP UTD 2020.  Prevnar 2016, pneumovax 2011. Prevnar 20 UTD.  Zostavax UTD. Shingrix UTD.  Mammogram 12/2024.  DEXA 2022 normal  Cscope 2021, Dr. Salgado, polyp, 7 year interval.       Assessment/Plan     Zoë Enrique is a 75 y.o.female with:    Assessment & Plan    Cardiac issues include a-fib/flutter with associated symptoms: chest pressure, shortness of breath, and near-syncope.  Considered Watchman device placement due to increased risk, as recommended by cardiologist.  Recent ablation attempt halted due to concerns about left atrial appendage.  Arrhythmia likely major contributor to symptoms and reduced exercise tolerance.  Noted hypotension as side effect of previous flecainide use.  Continued anticoagulation with Xarelto.  Mild lower extremity edema possibly related to a-fib and fluid retention.  Considered arrhythmia-induced heart failure as potential diagnosis and explained its difference from systolic heart failure.  Discussed discontinuing mammogram screening at age 75, but final decision deferred.    CHRONIC ATRIAL FIBRILLATION:  - Monitored the patient's symptoms, including pressure and weight in chest starting Monday night, faintness, near syncope, and significantly reduced endurance with shortness of breath after walking short distances.  - Evaluated the patient's heart rhythm, noting a very irregular rhythm during exam.  - Recorded blood pressure at 120/80, previously 108/60.  - Assessed that arrhythmia is likely the main cause of the patient's  symptoms, including potential arrhythmia-induced heart failure.  - Ordered stress test and Holter monitor for further evaluation.  - Recommend Watchman device before further treatment.  - Discontinued flecainide due to hypotension.  - Continued Xarelto for anticoagulation.  - Noted the patient's report of feeling better after presumably returning to sinus rhythm.  - Planned further cardiac evaluation with stress test and Holter monitor.  - Monitored the patient's hypotension and dizziness as side effects of flecainide.  - Discontinued flecainide and listed it as an allergy due to adverse effects.  - Assessed that swelling could be related to atrial fibrillation and fluid circulation issues.  - Ordered lipid panel.    SYNCOPE AND COLLAPSE:  - Monitored the patient's near-syncope episodes, which required sitting down to avoid passing out.  - Acknowledged the patient's syncope-like symptoms and their relation to arrhythmia.  - Advised the patient not to drive due to syncope risk.    ADVERSE EFFECT OF MEDICATION:  - Discontinued flecainide due to hypotension.  - Monitored the patient's hypotension and dizziness as side effects of flecainide.  - Discontinued flecainide and listed it as an allergy due to adverse effects.    BREAST IMPLANT LEAKAGE:  - Noted the patient's report of leaking breast implants.  - Discussed discontinuing mammograms due to age and implant concerns.    LOCALIZED EDEMA:  - Monitored the patient's report of recent onset of mild swelling.  - Evaluated and noted minimal swelling on exam.  - Assessed that swelling could be related to atrial fibrillation and fluid circulation issues.    LONG-TERM ANTICOAGULANT USE:  - Continued Xarelto for anticoagulation.  - Confirmed the patient's current use of Xarelto for anticoagulation.    FOLLOW-UP   AND ONGOING CARE:  - Continued Levothyroxine, Mycophenolate, and Norco.  - Ordered thyroid function test and awaiting results.  - Informed patient about updated  guidelines for colonoscopy and mammogram screening at age 75.         1. Chronic tension-type headache, not intractable  - HYDROcodone-acetaminophen (NORCO) 5-325 mg per tablet; Take 1 tablet by mouth every 6 (six) hours as needed for Pain. Pt states takes PRN for headaches  Dispense: 28 tablet; Refill: 0    2. Hypothyroidism (acquired)  - levothyroxine (SYNTHROID) 25 MCG tablet; Take 1 tablet (25 mcg total) by mouth before breakfast.  Dispense: 90 tablet; Refill: 3    3. Paroxysmal atrial fibrillation    4. Hyperlipidemia, mixed  - Lipid Panel; Future      Chronic conditions status updated as per HPI.  Other than changes above, cont current medications and maintain follow up with specialists.  No follow-ups on file.    Future Appointments   Date Time Provider Department Center   4/15/2025  1:00 PM [facility]   2025  1:00 PM CARDIAC, PET IMAGING [facility]   2025 11:00 AM Linda Doll MD [facility]   2025 10:40 AM Toñito Adame Jr., MD [facility]           Past Medical History:  Past Medical History:   Diagnosis Date    Abnormal Pap smear of cervix     Atrial fibrillation 2017    Atrial flutter     Basal cell carcinoma 2021    superficial BCC R cheek s/p Efudex    Cancer     osteosarcoma of right femur    Diverticulosis     Hx of atrial flutter     Hx of mitral valve prolapse     Hypothyroidism     Migraines     Recurrent upper respiratory infection (URI)     Supraventricular tachycardia        Past Surgical History:   has a past surgical history that includes Knee surgery (Right, ); Knee Arthroplasty (Right, ); Treatment of cardiac arrhythmia (N/A, 2019); Augmentation of breast (Bilateral, );  section (); Cosmetic surgery (); Colposcopy; Oophorectomy; Total abdominal hysterectomy (); Ablation of arrhythmogenic focus for atrial fibrillation (N/A, 2020);  Cardioversion (11/24/2020); Transesophageal echocardiography (N/A, 11/24/2020); Colon surgery (2011); Breast surgery (1984); Eye surgery (Bilateral, 2016); Joint replacement (Right, 2/07, 11/07, 2/09); Esophagogastroduodenoscopy (N/A, 03/19/2021); Colonoscopy (N/A, 03/19/2021); De Quervain's release (Left, 08/02/2022); Trigger finger release (Left, 08/02/2022); Treatment of cardiac arrhythmia (N/A, 7/8/2024); echocardiogram,transesophageal (N/A, 7/8/2024); ablation, atrial flutter, atypical (N/A, 2/17/2025); and Diagnostic cardiac electrophysiology study (2/17/2025).    Family History:  family history includes Asthma in her brother; Diabetes in her mother and sister; Diverticulosis in her brother and brother; Esophageal cancer in her maternal uncle; Hearing loss in her father; Heart disease in her brother, brother, father, mother, and sister; No Known Problems in her daughter; Stroke (age of onset: 93) in her father.     Social History:  Social History[1]    Medications:  Encounter Medications[2]    Allergies:  Review of patient's allergies indicates:   Allergen Reactions    Morphine Nausea And Vomiting    Flecainide      Low blood pressure    Succinylcholine      Other reaction(s): v tach       Health Maintenance:  Immunization History   Administered Date(s) Administered    COVID-19 MRNA, LN-S PF (MODERNA HALF 0.25 ML DOSE) 11/05/2021, 04/08/2022    COVID-19, MRNA, LN-S, PF (MODERNA FULL 0.5 ML DOSE) 02/11/2021, 03/11/2021    COVID-19, mRNA, LNP-S, PF (Moderna) Ages 12+ 06/29/2023    COVID-19, mRNA, LNP-S, bivalent booster, PF (Moderna Omicron)12 + YEARS 11/25/2022    Influenza 11/04/2008, 10/06/2018, 10/09/2020    Influenza (FLUAD) - Quadrivalent - Adjuvanted - PF *Preferred* (65+) 10/09/2020, 10/12/2022, 12/14/2023    Influenza - Quadrivalent - High Dose - PF (65 years and older) 09/23/2021    Influenza - Trivalent - Fluad - Adjuvanted - PF (65 years and older 10/09/2018, 09/18/2024    Influenza - Trivalent -  "Fluzone High Dose - PF (65 years and older) 01/04/2016, 11/03/2016, 09/28/2017, 10/30/2019    Pneumococcal Conjugate - 13 Valent 01/04/2016    Pneumococcal Conjugate - 20 Valent 09/29/2023    Pneumococcal Polysaccharide - 23 Valent 06/24/2022, 11/21/2024    RSVpreF (Arexvy) 09/18/2024    Tdap 02/13/2020    Zoster Recombinant 09/02/2022, 11/07/2022      Health Maintenance   Topic Date Due    High Dose Statin  Never done    COVID-19 Vaccine (7 - 2024-25 season) 09/01/2024    DEXA Scan  10/20/2026    Colorectal Cancer Screening  03/19/2028    Lipid Panel  01/02/2029    TETANUS VACCINE  02/13/2030    Hepatitis C Screening  Completed    Shingles Vaccine  Completed    Influenza Vaccine  Completed    RSV Vaccine (Age 60+ and Pregnant patients)  Completed    Pneumococcal Vaccines (Age 50+)  Completed        Physical Exam      Vital Signs  Pulse: 74  Resp: 18  SpO2: 98 %  BP: 120/80  BP Location: Right arm  Patient Position: Sitting  Pain Score: 0-No pain  Height and Weight  Height: 5' 8" (172.7 cm)  Weight: 72.9 kg (160 lb 11.5 oz)  BSA (Calculated - sq m): 1.87 sq meters  BMI (Calculated): 24.4  Weight in (lb) to have BMI = 25: 164.1]    Physical Exam    General: No acute distress. Well-developed. Well-nourished.  Eyes: EOMI. Sclerae anicteric.  HENT: Normocephalic. Atraumatic. Nares patent. Moist oral mucosa.  Ears: Bilateral TMs clear. Bilateral EACs clear.  Cardiovascular: Regular rate. Irregular heart rhythm. No murmurs. No rubs. No gallops. Normal S1, S2.  Respiratory: Normal respiratory effort. Clear to auscultation bilaterally. No rales. No rhonchi. No wheezing.  Abdomen: Soft. Non-tender. Non-distended. Normoactive bowel sounds.  Musculoskeletal: No  obvious deformity.  Extremities: Mild swelling.  Neurological: Alert & oriented x3. No slurred speech. Normal gait.  Psychiatric: Normal mood. Normal affect. Good insight. Good judgment.  Skin: Warm. Dry. No rash.         Physical Exam    Laboratory:    Results         "      CBC:  Recent Labs   Lab 01/02/24  0933 07/05/24  0958 02/10/25  1013   WBC 4.31 4.55 4.74   RBC 4.26 3.98 L 3.99 L   Hemoglobin 13.4 12.3 12.4   Hematocrit 42.0 38.9 38.9   Platelets 131 L 147 L 167   MCV 99 H 98 98   MCH 31.5 H 30.9 31.1 H   MCHC 31.9 L 31.6 L 31.9 L     CMP:  Recent Labs   Lab 12/20/22  0910 08/22/23  1048 01/02/24  0933 06/04/24  1547 02/10/25  1013   Glucose 99   < > 95   < > 92   Calcium 8.8   < > 9.2   < > 8.9   Albumin 4.0  --  3.9  --   --    Total Protein 6.5  --  6.4  --   --    Sodium 140   < > 142   < > 141   Potassium 4.1   < > 4.7   < > 4.7   CO2 24   < > 28   < > 24   Chloride 105   < > 107   < > 108   BUN 13   < > 13   < > 12   Alkaline Phosphatase 63  --  57  --   --    ALT 20  --  15  --   --    AST 26  --  20  --   --    Total Bilirubin 0.5  --  0.6  --   --     < > = values in this interval not displayed.     URINALYSIS:       LIPIDS:  Recent Labs   Lab 12/20/22  0910 01/02/24  0933 07/03/24  1233   TSH 1.718 1.853 1.001   HDL 60 63  --    Cholesterol 166 200 H  --    Triglycerides 107 101  --    LDL Cholesterol 84.6 116.8  --    HDL/Cholesterol Ratio 36.1 31.5  --    Non-HDL Cholesterol 106 137  --    Total Cholesterol/HDL Ratio 2.8 3.2  --      TSH:  Recent Labs   Lab 12/20/22  0910 01/02/24  0933 07/03/24  1233   TSH 1.718 1.853 1.001     A1C:            This note was generated with the assistance of ambient listening technology. Verbal consent was obtained by the patient and accompanying visitor(s) for the recording of patient appointment to facilitate this note. I attest to having reviewed and edited the generated note for accuracy, though some syntax or spelling errors may persist. Please contact the author of this note for any clarification.      Praveen Jeffery MD  [facility]                       [1]   Social History  Tobacco Use    Smoking status: Never     Passive exposure: Never    Smokeless tobacco: Never   Substance Use Topics    Alcohol use: Yes      Alcohol/week: 2.0 standard drinks of alcohol     Types: 2 Shots of liquor per week     Comment: occasionally    Drug use: No     Comment: CBD oils for headache   [2]   Outpatient Encounter Medications as of 2025   Medication Sig Dispense Refill    amoxicillin (AMOXIL) 500 MG capsule Take 500 mg by mouth as needed (takes prior to all dental procedures).      cycloSPORINE (VEVYE) 0.1 % Drop Place 1 drop into both eyes 2 (two) times a day. 2 mL 6    docusate sodium (COLACE) 250 MG capsule Take 250 mg by mouth 2 (two) times daily.       estradioL (ESTRACE) 0.01 % (0.1 mg/gram) vaginal cream PLACE 1 GRAM VAGINALLY EVERY OTHER DAY AS DIRECTED 43 g 0    famotidine (PEPCID) 40 MG tablet Take 1 tablet (40 mg total) by mouth every evening. 30 tablet 3    ipratropium (ATROVENT) 42 mcg (0.06 %) nasal spray 2 sprays by Each Nostril route 3 (three) times daily. 15 mL 6    omega-3 fatty acids/fish oil (FISH OIL-OMEGA-3 FATTY ACIDS) 300-1,000 mg capsule Take by mouth once daily.      omeprazole (PRILOSEC) 40 MG capsule Take 1 capsule (40 mg total) by mouth once daily. 30 capsule 3    rivaroxaban (XARELTO) 20 mg Tab Take 1 tablet (20 mg total) by mouth once daily. 90 tablet 3    sodium chloride 3% 3 % nebulizer solution Take 4 mLs by nebulization 2 (two) times a day. 300 mL PRN    vit C/E/Zn/coppr/lutein/zeaxan (PRESERVISION AREDS-2 ORAL)       [DISCONTINUED] acyclovir (ZOVIRAX) 400 MG tablet Take 1 tablet by mouth three times daily as needed 90 tablet 0    [DISCONTINUED] HYDROcodone-acetaminophen (NORCO) 5-325 mg per tablet Take 1 tablet by mouth every 6 (six) hours as needed for Pain. Pt states takes PRN for headaches 28 tablet 0    [DISCONTINUED] levothyroxine (SYNTHROID) 25 MCG tablet Take 1 tablet by mouth once daily 90 tablet 0    acyclovir (ZOVIRAX) 400 MG tablet Take 1 tablet (400 mg total) by mouth 3 (three) times daily as needed. 90 tablet 3    [] fluorometholone 0.1% (FML) 0.1 % DrpS Place 1 drop into both eyes  4 (four) times daily. for 14 days 5 mL 0    HYDROcodone-acetaminophen (NORCO) 5-325 mg per tablet Take 1 tablet by mouth every 6 (six) hours as needed for Pain. Pt states takes PRN for headaches 28 tablet 0    levothyroxine (SYNTHROID) 25 MCG tablet Take 1 tablet (25 mcg total) by mouth before breakfast. 90 tablet 3    [DISCONTINUED] cycloSPORINE (RESTASIS) 0.05 % ophthalmic emulsion Place 1 drop into both eyes 2 (two) times daily. 180 each 3    [DISCONTINUED] cycloSPORINE (RESTASIS) 0.05 % ophthalmic emulsion Place 1 drop into both eyes 2 (two) times daily. 60 each 3    [DISCONTINUED] flecainide (TAMBOCOR) 100 MG Tab Take 1 tablet (100 mg total) by mouth every 12 (twelve) hours. (Patient not taking: Reported on 3/27/2025) 60 tablet 11    [DISCONTINUED] ipratropium (ATROVENT) 42 mcg (0.06 %) nasal spray 2 sprays by Each Nostril route 3 (three) times daily. 15 mL 6    [DISCONTINUED] levothyroxine (SYNTHROID) 25 MCG tablet Take 1 tablet by mouth once daily 90 tablet 3    [DISCONTINUED] metoprolol succinate (TOPROL-XL) 25 MG 24 hr tablet Take 1 tablet (25 mg total) by mouth once daily. 90 tablet 3    [DISCONTINUED] perfluorohexyloctane, PF, 100 % Drop Place 1 drop into both eyes 4 (four) times daily. 3 mL 6    [DISCONTINUED] RESTASIS 0.05 % ophthalmic emulsion Place 1 drop into both eyes 2 (two) times daily. 180 each 3     Facility-Administered Encounter Medications as of 4/9/2025   Medication Dose Route Frequency Provider Last Rate Last Admin    sodium chloride 0.9% flush 5 mL  5 mL Intravenous PRN Aleksandra Taylor NP

## 2025-04-10 ENCOUNTER — RESULTS FOLLOW-UP (OUTPATIENT)
Dept: PRIMARY CARE CLINIC | Facility: CLINIC | Age: 75
End: 2025-04-10

## 2025-04-15 ENCOUNTER — CLINICAL SUPPORT (OUTPATIENT)
Dept: CARDIOLOGY | Facility: HOSPITAL | Age: 75
End: 2025-04-15
Attending: INTERNAL MEDICINE
Payer: MEDICARE

## 2025-04-15 ENCOUNTER — DOCUMENTATION ONLY (OUTPATIENT)
Dept: CARDIOLOGY | Facility: HOSPITAL | Age: 75
End: 2025-04-15
Payer: MEDICARE

## 2025-04-15 DIAGNOSIS — I47.19 ATRIAL TACHYCARDIA: ICD-10-CM

## 2025-04-15 DIAGNOSIS — I47.10 PSVT (PAROXYSMAL SUPRAVENTRICULAR TACHYCARDIA): ICD-10-CM

## 2025-04-15 PROCEDURE — 93246 EXT ECG>7D<15D RECORDING: CPT

## 2025-04-15 NOTE — PROGRESS NOTES
I reached out to [employer] to verify insurance coverage for the 14-day monitor. I was informed that the estimated out-of-pocket cost is $20 would be the patient's responsibility for the service provided by [employer] . The call reference number is [id number].

## 2025-04-16 ENCOUNTER — TELEPHONE (OUTPATIENT)
Dept: ELECTROPHYSIOLOGY | Facility: CLINIC | Age: 75
End: 2025-04-16
Payer: MEDICARE

## 2025-04-17 DIAGNOSIS — I48.0 PAROXYSMAL ATRIAL FIBRILLATION: Primary | ICD-10-CM

## 2025-04-24 DIAGNOSIS — N95.2 POSTMENOPAUSAL ATROPHIC VAGINITIS: ICD-10-CM

## 2025-04-25 ENCOUNTER — HOSPITAL ENCOUNTER (OUTPATIENT)
Dept: CARDIOLOGY | Facility: HOSPITAL | Age: 75
Discharge: HOME | End: 2025-04-25
Attending: INTERNAL MEDICINE
Payer: MEDICARE

## 2025-04-25 VITALS
WEIGHT: 160 LBS | DIASTOLIC BLOOD PRESSURE: 54 MMHG | BODY MASS INDEX: 24.25 KG/M2 | HEIGHT: 68 IN | HEART RATE: 70 BPM | SYSTOLIC BLOOD PRESSURE: 106 MMHG

## 2025-04-25 DIAGNOSIS — R07.89 CHEST PRESSURE: ICD-10-CM

## 2025-04-25 LAB
CFR FLOW - ANTERIOR: 1.69
CFR FLOW - INFERIOR: 1.76
CFR FLOW - LATERAL: 1.71
CFR FLOW - MAX: 2.28
CFR FLOW - MIN: 1.39
CFR FLOW - SEPTAL: 1.95
CFR FLOW - WHOLE HEART: 1.78
CV STRESS BASE HR: 68 BPM
DIASTOLIC BLOOD PRESSURE: 63 MMHG
EJECTION FRACTION- HIGH: 59 %
END DIASTOLIC INDEX-HIGH: 155 ML/M2
END DIASTOLIC INDEX-LOW: 91 ML/M2
END SYSTOLIC INDEX-HIGH: 78 ML/M2
END SYSTOLIC INDEX-LOW: 40 ML/M2
NUC REST DIASTOLIC VOLUME INDEX: 94
NUC REST EJECTION FRACTION: 63
NUC REST SYSTOLIC VOLUME INDEX: 35
NUC STRESS DIASTOLIC VOLUME INDEX: 105
NUC STRESS EJECTION FRACTION: 71 %
NUC STRESS SYSTOLIC VOLUME INDEX: 30
OHS CV CPX 1 MINUTE RECOVERY HEART RATE: 80 BPM
OHS CV CPX 85 PERCENT MAX PREDICTED HEART RATE MALE: 123
OHS CV CPX MAX PREDICTED HEART RATE: 145
OHS CV CPX PATIENT IS FEMALE: 1
OHS CV CPX PATIENT IS MALE: 0
OHS CV CPX PEAK HEAR RATE: 65 BPM
OHS CV CPX PERCENT MAX PREDICTED HEART RATE ACHIEVED: 46
OHS CV CPX RATE PRESSURE PRODUCT PRESENTING: 9248
OHS CV INITIAL DOSE: 22.6 MCG/KG/MIN
OHS CV MODERATELY REDUCED FLOW CAPACITY: 6 %
OHS CV NO ISCHEMIA MILDLY REDUCED FLOW CAPACTY: 94 %
OHS CV NO ISCHEMIA MINIMALLY REDUCED FLOW CAPACITY: 0 %
OHS CV NORMAL FLOW CAPACITY COMPARABLE TO HEALTHY YOUNG VOLUNTEERS: 0 %
OHS CV PEAK DOSE: 23 MCG/KG/MIN
OHS CV PET ID: 8680
OHS CV SEVERELY REDUCED FLOW CAPACITY: 0 %
OHS CV TOTAL EXAM DLP: 220.59 MGY-CM
REST FLOW - ANTERIOR: 0.72 CC/MIN/G
REST FLOW - INFERIOR: 0.56 CC/MIN/G
REST FLOW - LATERAL: 0.55 CC/MIN/G
REST FLOW - MAX: 1.03 CC/MIN/G
REST FLOW - MIN: 0.41 CC/MIN/G
REST FLOW - SEPTAL: 0.65 CC/MIN/G
REST FLOW - WHOLE HEART: 0.62 CC/MIN/G
RETIRED EF AND QEF - SEE NOTES: 47 %
STRESS FLOW - ANTERIOR: 1.2 CC/MIN/G
STRESS FLOW - INFERIOR: 0.99 CC/MIN/G
STRESS FLOW - LATERAL: 0.93 CC/MIN/G
STRESS FLOW - MAX: 1.49 CC/MIN/G
STRESS FLOW - MIN: 0.73 CC/MIN/G
STRESS FLOW - SEPTAL: 1.26 CC/MIN/G
STRESS FLOW - WHOLE HEART: 1.1 CC/MIN/G
SYSTOLIC BLOOD PRESSURE: 136 MMHG

## 2025-04-25 PROCEDURE — 78431 MYOCRD IMG PET RST&STRS CT: CPT | Mod: 26,HCNC,, | Performed by: INTERNAL MEDICINE

## 2025-04-25 PROCEDURE — 63600175 PHARM REV CODE 636 W HCPCS: Performed by: INTERNAL MEDICINE

## 2025-04-25 PROCEDURE — 93018 CV STRESS TEST I&R ONLY: CPT | Mod: HCNC,,, | Performed by: INTERNAL MEDICINE

## 2025-04-25 PROCEDURE — A9555 RB82 RUBIDIUM: HCPCS | Performed by: INTERNAL MEDICINE

## 2025-04-25 PROCEDURE — 93016 CV STRESS TEST SUPVJ ONLY: CPT | Mod: HCNC,,, | Performed by: INTERNAL MEDICINE

## 2025-04-25 PROCEDURE — 78434 AQMBF PET REST & RX STRESS: CPT | Mod: 26,HCNC,, | Performed by: INTERNAL MEDICINE

## 2025-04-25 PROCEDURE — 78434 AQMBF PET REST & RX STRESS: CPT

## 2025-04-25 RX ORDER — AMINOPHYLLINE 25 MG/ML
75 INJECTION, SOLUTION INTRAVENOUS
Status: COMPLETED | OUTPATIENT
Start: 2025-04-25 | End: 2025-04-25

## 2025-04-25 RX ORDER — ESTRADIOL 0.1 MG/G
1 CREAM VAGINAL
Qty: 43 G | Refills: 0 | Status: SHIPPED | OUTPATIENT
Start: 2025-04-28

## 2025-04-25 RX ORDER — REGADENOSON 0.08 MG/ML
0.4 INJECTION, SOLUTION INTRAVENOUS
Status: COMPLETED | OUTPATIENT
Start: 2025-04-25 | End: 2025-04-25

## 2025-04-25 RX ADMIN — AMINOPHYLLINE 75 MG: 25 INJECTION, SOLUTION INTRAVENOUS at 07:04

## 2025-04-25 RX ADMIN — RUBIDIUM CHLORIDE RB-82 22.6 MILLICURIE: 150 INJECTION, SOLUTION INTRAVENOUS at 07:04

## 2025-04-25 RX ADMIN — RUBIDIUM CHLORIDE RB-82 23 MILLICURIE: 150 INJECTION, SOLUTION INTRAVENOUS at 07:04

## 2025-04-25 RX ADMIN — REGADENOSON 0.4 MG: 0.08 INJECTION, SOLUTION INTRAVENOUS at 07:04

## 2025-04-29 ENCOUNTER — OFFICE VISIT (OUTPATIENT)
Dept: OTOLARYNGOLOGY | Facility: CLINIC | Age: 75
End: 2025-04-29
Payer: MEDICARE

## 2025-04-29 VITALS
DIASTOLIC BLOOD PRESSURE: 68 MMHG | SYSTOLIC BLOOD PRESSURE: 110 MMHG | WEIGHT: 168 LBS | BODY MASS INDEX: 25.54 KG/M2 | HEART RATE: 70 BPM

## 2025-04-29 DIAGNOSIS — K22.4 ESOPHAGEAL DYSMOTILITY: Chronic | ICD-10-CM

## 2025-04-29 DIAGNOSIS — K21.9 LARYNGOPHARYNGEAL REFLUX (LPR): Chronic | ICD-10-CM

## 2025-04-29 DIAGNOSIS — R05.3 CHRONIC COUGH: Chronic | ICD-10-CM

## 2025-04-29 DIAGNOSIS — J30.0 VASOMOTOR RHINITIS: Primary | Chronic | ICD-10-CM

## 2025-04-29 PROCEDURE — 99999 PR PBB SHADOW E&M-EST. PATIENT-LVL IV: CPT | Mod: PBBFAC,HCNC,, | Performed by: OTOLARYNGOLOGY

## 2025-04-29 RX ORDER — IPRATROPIUM BROMIDE 42 UG/1
2 SPRAY, METERED NASAL 3 TIMES DAILY
Qty: 15 ML | Refills: 6 | Status: SHIPPED | OUTPATIENT
Start: 2025-04-29

## 2025-04-29 RX ORDER — FAMOTIDINE 40 MG/1
40 TABLET, FILM COATED ORAL NIGHTLY
Qty: 30 TABLET | Refills: 3 | Status: SHIPPED | OUTPATIENT
Start: 2025-04-29 | End: 2026-04-29

## 2025-04-29 RX ORDER — OMEPRAZOLE 40 MG/1
40 CAPSULE, DELAYED RELEASE ORAL DAILY
Qty: 30 CAPSULE | Refills: 3 | Status: SHIPPED | OUTPATIENT
Start: 2025-04-29 | End: 2026-04-29

## 2025-04-29 NOTE — PROCEDURES
Laryngoscopy    Date/Time: 4/29/2025 11:00 AM    Performed by: Linda Doll MD  Authorized by: Linda Doll MD    Consent Done?:  Yes (Verbal)  Anesthesia:     Local anesthetic:  Lidocaine 2% and [place]-Synephrine 1/2%  Laryngoscopy:     Areas examined:  Nasal cavities, nasopharynx, oropharynx, hypopharynx, larynx and vocal cords  Nose External:      No external nasal deformity  Nose Intranasal:      Mucosa no polyps     Mucosa ulcers not present     No mucosa lesions present     No septum gross deformity     Turbinates not enlarged  Nasopharynx:      No mucosa lesions     Adenoids not present     Posterior choanae patent     Eustachian tube patent  Larynx/hypopharynx:      No epiglottis lesions     No epiglottis edema     No AE folds lesions     No vocal cord polyps     Equal and normal bilateral     No hypopharynx lesions     No piriform sinus pooling     No piriform sinus lesions     Post cricoid edema (mild)     Post cricoid erythema (mild)

## 2025-04-29 NOTE — PROGRESS NOTES
Chief Complaint   Patient presents with    Follow-up     Still having reflux problems  after eating          Interval HPI 8/16/2024:  Follow up visit. Reports that she did see Dr. Bearden for evaluation for potential PNN ablation on the right with bilateral RFA turbinates. She did not schedule this procedure as of yet due to concerns with her A-fib. She reports that she had episode of bronchitis since May. Notes persistent cough which has been productive in nature. She feels that she has mucus in her throat. Denies facial pain/pressure, hyposmia, headaches.     Interval HPI 1/29/2025:  Follow up visit. Reports that she has been having cough about 30 minutes to an hour or so after eating. Has sensation that she needs to cough up phlegm in her throat.  She has had some exercise intolerance with SOB and fatigue and hypotension.  She has been following with cardiology who adjusted some of her medications(flecainide) which seems to have helped. She currently has cardiac monitor and has watchman procedure scheduled.     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    Diverticulosis 1998    Hx of atrial flutter     Hx of mitral valve prolapse 1990    Hypothyroidism     Migraines     Recurrent upper respiratory infection (URI)     Supraventricular tachycardia      Social History     Socioeconomic History    Marital status:    Tobacco Use    Smoking status: Never     Passive exposure: Never    Smokeless tobacco: Never   Substance and Sexual Activity    Alcohol use: Yes     Alcohol/week: 2.0 standard drinks of alcohol     Types: 2 Shots of liquor per week     Comment: occasionally    Drug use: No     Comment: CBD oils for headache    Sexual activity: Not Currently     Partners: Male     Birth control/protection:  Post-menopausal, See Surgical Hx, None     Comment: , together x 37 years   Social History Narrative    N/A PER THE PATIENT.     Social Drivers of Health     Financial Resource Strain: Low Risk  (3/26/2025)    Overall Financial Resource Strain (CARDIA)     Difficulty of Paying Living Expenses: Not very hard   Food Insecurity: No Food Insecurity (3/26/2025)    Hunger Vital Sign     Worried About Running Out of Food in the Last Year: Never true     Ran Out of Food in the Last Year: Never true   Transportation Needs: No Transportation Needs (3/26/2025)    PRAPARE - Transportation     Lack of Transportation (Medical): No     Lack of Transportation (Non-Medical): No   Physical Activity: Insufficiently Active (3/26/2025)    Exercise Vital Sign     Days of Exercise per Week: 1 day     Minutes of Exercise per Session: 10 min   Stress: No Stress Concern Present (3/26/2025)    [employer] [place] [employer] - Occupational Stress Questionnaire     Feeling of Stress : Not at all   Housing Stability: Low Risk  (3/26/2025)    Housing Stability Vital Sign     Unable to Pay for Housing in the Last Year: No     Number of Times Moved in the Last Year: 0     Homeless in the Last Year: No     Past Surgical History:   Procedure Laterality Date    ABLATION OF ARRHYTHMOGENIC FOCUS FOR ATRIAL FIBRILLATION N/A 11/24/2020    Procedure: ABLATION, ARRHYTHMOGENIC FOCUS, FOR ATRIAL FIBRILLATION;  Surgeon: Moe Seymour MD;  Location: [facility] EP LAB;  Service: Cardiology;  Laterality: N/A;  AFL/AF, KHANH, CTI, PVI, RFA, CARTO, GEN, DM, 3 PREP    ABLATION, ATRIAL FLUTTER, ATYPICAL N/A 2/17/2025    Procedure: Ablation, Atrial Flutter, Atypical;  Surgeon: Vinny Azevedo MD;  Location: [facility] EP LAB;  Service: Cardiology;  Laterality: N/A;  AFL (atypical), KHANH (cx if SR), RFA, MIYA, GEN, MB, 3prep    AUGMENTATION OF BREAST Bilateral 1984    35 yrs     BREAST SURGERY  1984    augmentation    CARDIOVERSION  11/24/2020    Procedure:  Cardioversion;  Surgeon: Moe Seymour MD;  Location: [facility] EP LAB;  Service: Cardiology;;     SECTION  1988    COLON SURGERY      partial colon resection    COLONOSCOPY N/A 2021    Procedure: COLONOSCOPY;  Surgeon: Sahara Salgado MD;  Location: [facility] ENDO (4TH FLR);  Service: Endoscopy;  Laterality: N/A;  constipation prep- 7 days of miralax, 2 fulls of fulls, then day bf clears and split prep   covid test 3/5 pcw, prep instr emailed, antibiotics prophylactically, Xarelto hold x 2 days per Dr. Seymour-see telephone encounter 2/10 -ml    COLPOSCOPY      COSMETIC SURGERY      DE QUERVAIN'S RELEASE Left 2022    Procedure: RELEASE, HAND, FOR DEQUERVAIN'S TENOSYNOVITIS;  Surgeon: Toñito Adame Jr., MD;  Location: [facility] OR;  Service: Orthopedics;  Laterality: Left;    DIAGNOSTIC CARDIAC ELECTROPHYSIOLOGY STUDY  2025    Procedure: EP - diagnostic;  Surgeon: Vinny Azevedo MD;  Location: [facility] EP LAB;  Service: Cardiology;;    ECHOCARDIOGRAM,TRANSESOPHAGEAL N/A 2024    Procedure: Transesophageal echo (KHANH) intra-procedure log documentation;  Surgeon: Ramírez Ortez III, MD;  Location: [facility] EP LAB;  Service: Cardiology;  Laterality: N/A;    ESOPHAGOGASTRODUODENOSCOPY N/A 2021    Procedure: EGD (ESOPHAGOGASTRODUODENOSCOPY);  Surgeon: Sahara Salgado MD;  Location: [facility] (4TH FLR);  Service: Endoscopy;  Laterality: N/A;    EYE SURGERY Bilateral 2016    Lasik, then cataracts extraction    JOINT REPLACEMENT Right , ,     KNEE ARTHROPLASTY Right     revison in     KNEE SURGERY Right     distal femoral replacing TK    OOPHORECTOMY      TOTAL ABDOMINAL HYSTERECTOMY      TAHBSO    TRANSESOPHAGEAL ECHOCARDIOGRAPHY N/A 2020    Procedure: ECHOCARDIOGRAM, TRANSESOPHAGEAL;  Surgeon: Ramírez Ortez III, MD;  Location: [facility] EP LAB;  Service: Cardiology;  Laterality: N/A;    TREATMENT OF CARDIAC ARRHYTHMIA N/A 2019    Procedure:  CARDIOVERSION;  Surgeon: Moe Seymour MD;  Location: [facility] EP LAB;  Service: Cardiology;  Laterality: N/A;  KHANH/DCCV/MAC/DM/3PREP/*ADMIT FOR TIKOSYN*    TREATMENT OF CARDIAC ARRHYTHMIA N/A 7/8/2024    Procedure: Cardioversion or Defibrillation;  Surgeon: GABRIELE Bull MD;  Location: [facility] EP LAB;  Service: Cardiology;  Laterality: N/A;  AF, KHANH, DCCV, MAC, DM, 3 Prep    TRIGGER FINGER RELEASE Left 08/02/2022    Procedure: RELEASE, TRIGGER FINGER;  Surgeon: Toñito Adame Jr., MD;  Location: [facility] OR;  Service: Orthopedics;  Laterality: Left;  left middle finger     Family History   Problem Relation Name Age of Onset    Heart disease Mother Tianna         Atrial fib    Diabetes Mother Tianna         Type II    Hearing loss Father Adan     Heart disease Father Adan         Angina, AAA    Stroke Father Adan 93    Diabetes Sister Mahogany         Type I    Heart disease Sister Mahogany         Atrrial fib, mitral valve repair    Heart disease Brother Severo         Atrial fib    Asthma Brother Severo     Diverticulosis Brother Severo     Heart disease Brother Leonard         Atrial fib    Diverticulosis Brother Leonard     Esophageal cancer Maternal Uncle      No Known Problems Daughter Michael     Breast cancer Neg Hx      Ovarian cancer Neg Hx      Colon cancer Neg Hx      Cancer Neg Hx      Hypertension Neg Hx      Eczema Neg Hx             Review of Systems  General: negative for chills, fever or weight loss  Psychological: negative for mood changes or depression  Ophthalmic: negative for blurry vision, photophobia or eye pain  ENT: see HPI  Respiratory: no cough, shortness of breath, or wheezing  Cardiovascular: no chest pain or dyspnea on exertion  Gastrointestinal: no abdominal pain, change in bowel habits, or black/ bloody stools  Musculoskeletal: negative for gait disturbance or muscular weakness  Neurological: no syncope or seizures; no ataxia  Dermatological: negative for puritis,  rash and  jaundice  Hematologic/lymphatic: no easy bruising, no new lumps or bumps      Physical Exam:    Vitals:    25 1120   BP: 110/68   Pulse: 70       Constitutional: Well appearing / communicating without difficutly.  NAD.  Eyes: EOM I Bilaterally  Head/Face: Normocephalic.  Negative paranasal sinus pressure/tenderness.  Salivary glands WNL.  House Brackmann I Bilaterally.    Right Ear: Auricle normal appearance. External Auditory Canal within normal limits,TM w/o masses/lesions/perforations. TM mobility noted.   Left Ear: Auricle normal appearance. External Auditory Canal WNL,TM w/o masses/lesions/perforations. TM mobility noted.  Nose: + mucosal edema. No gross nasal septal deviation. Inferior Turbinates 3+ bilaterally. No septal perforation. No masses/lesions. External nasal skin appears normal without masses/lesions.  Oral Cavity: Gingiva/lips within normal limits.  Dentition/gingiva healthy appearing. Mucus membranes moist. Floor of mouth soft, no masses palpated. Oral Tongue mobile. Hard Palate appears normal.    Oropharynx: Base of tongue appears normal. No masses/lesions noted. Tonsillar fossa/pharyngeal wall without lesions. Posterior oropharynx WNL.  Soft palate without masses. Midline uvula.   Neck/Lymphatic: No LAD I-VI bilaterally.  No thyromegaly.  No masses noted on exam.          Diagnostic studies:  PFTs: 2020: normal spirometry  Laboratory 2022:  IgE level: Less than 35.  ImmunoCAP:  Negative.  IgA: 114.  Ig.  IgM: 66.  Pneumococcal titers: Not protective.    CT sinus 2023: Images interpreted by me and discussed with patient in detail today.   Right pedro bullosa.  Paradoxical curvature of the middle turbinates anteriorly.  Leftward nasal septal deviation with a 3 mm bony spur abutting the left inferior nasal turbinate.  No nasal polyp formation is identified.     Ostiomeatal units are patent with some anatomic narrowing of the distal infundibulum on the left.  The frontal  recesses and sphenoid sinus ostia are patent.     The paranasal sinuses are currently clear other than for minimal mucosal thickening at the floor the left maxillary sinus..     Sellar pneumatization of the sphenoid.  There is some pneumatization overlying the grooves of the anterior ethmoid arteries bilaterally.  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     The middle ears and mastoid air cells are clear.     No acute intracranial process.            Assessment:  No diagnosis found.            There were no encounter diagnoses.      Plan:  No orders of the defined types were placed in this encounter.    Continue Nasal saline rinses twice daily.     Continue  Atrovent to 0.6%-  2 sprays per nostril 3 times per day.  Advised to consider PNN ablation on the right with bilateral RFA turbinates with rhinology when cardiology A-fib treatment plan procedures are completed.      Continue Prilosec 40 mg PO daily  Continue famotidine to 40 mg PO daily    Keep cardiology and pulmonary appts as scheduled.  Suggested follow up with GI-hx of esophageal dysmotility.   Refer to laryngology for additional input.       Linda Yu MD

## 2025-05-01 ENCOUNTER — PATIENT MESSAGE (OUTPATIENT)
Dept: OTOLARYNGOLOGY | Facility: CLINIC | Age: 75
End: 2025-05-01
Payer: MEDICARE

## 2025-05-08 ENCOUNTER — RESULTS FOLLOW-UP (OUTPATIENT)
Dept: ELECTROPHYSIOLOGY | Facility: CLINIC | Age: 75
End: 2025-05-08
Payer: MEDICARE

## 2025-05-28 NOTE — PROGRESS NOTES
Gastroenterology Clinic Consultation Note    Patient ID: Zoë Enrique is a 75 y.o. female.    Chief Complaint: Abdominal Pain and GI Problem    History of Present Illness    CHIEF COMPLAINT:  Patient presents today for follow up of chronic cough and reflux symptoms.    CHRONIC COUGH AND GERD:  She reports a chronic cough developing over the last 6-12 months, producing copious mucus ranging from clear to green in color with difficulty expectorating. She is unable to produce the cough during medical exams when her throat is numbed for scoping procedures. She believes the cough may be related to reflux rather than bronchitis or sinus problems. She takes Prilosec in the morning and Pepcid in the evening for reflux management. An esophagram was performed by Dr. Cali in April of last year.    GASTROINTESTINAL HISTORY:  She experiences persistent right lower quadrant discomfort present approximately 95% of the time, which temporarily improves after taking laxatives. She has a history of partial colon resection with postsurgical sutures in the left lower quadrant. Since the bowel resection, she has experienced chronic constipation managed with regular stool softeners and occasional laxatives. A colonoscopy in 2021 revealed a polyp which was removed.      ROS:  General: -fever, -chills, -fatigue, -weight gain, -weight loss  Eyes: -vision changes, -redness, -discharge  ENT: -ear pain, -nasal congestion, -sore throat  Cardiovascular: -chest pain, -palpitations, -lower extremity edema  Respiratory: +cough, -shortness of breath, +productive cough  Gastrointestinal: -abdominal pain, -nausea, -vomiting, -diarrhea, +constipation, -blood in stool, +increased appetite, +food binging  Genitourinary: -dysuria, -hematuria, -frequency  Musculoskeletal: -joint pain, -muscle pain  Skin: -rash, -lesion  Neurological: -headache, -dizziness, -numbness, -tingling  Psychiatric: -anxiety, -depression, -sleep difficulty         Physical Exam     General: No acute distress. Well-developed. Well-nourished.  Eyes: EOMI. Sclerae anicteric.  HENT: Normocephalic. Atraumatic. Nares patent. Moist oral mucosa.  Ears: Bilateral TMs clear. Bilateral EACs clear.  Cardiovascular: Regular rate. Regular rhythm. No murmurs. No rubs. No gallops. Normal S1, S2.  Respiratory: Normal respiratory effort. Clear to auscultation bilaterally. No rales. No rhonchi. No wheezing.  Abdomen: Soft. Non-tender. Non-distended. Normoactive bowel sounds.  Musculoskeletal: No  obvious deformity.  Extremities: No lower extremity edema.  Neurological: Alert & oriented x3. No slurred speech. Normal gait.  Psychiatric: Normal mood. Normal affect. Good insight. Good judgment.  Skin: Warm. Dry. No rash.         Medical History:  has a past medical history of Abnormal Pap smear of cervix, Atrial fibrillation (2017), Atrial flutter, Basal cell carcinoma (2021), Cancer (), Diverticulosis (), atrial flutter, mitral valve prolapse (), Hypothyroidism, Migraines, Recurrent upper respiratory infection (URI), and Supraventricular tachycardia.    Surgical History:  has a past surgical history that includes Knee surgery (Right, ); Knee Arthroplasty (Right, ); Treatment of cardiac arrhythmia (N/A, 2019); Augmentation of breast (Bilateral, );  section (); Cosmetic surgery (); Colposcopy; Oophorectomy; Total abdominal hysterectomy (); Ablation of arrhythmogenic focus for atrial fibrillation (N/A, 2020); Cardioversion (2020); Transesophageal echocardiography (N/A, 2020); Colon surgery (); Breast surgery (); Eye surgery (Bilateral, ); Joint replacement (Right, , , ); Esophagogastroduodenoscopy (N/A, 2021); Colonoscopy (N/A, 2021); De Quervain's release (Left, 2022); Trigger finger release (Left, 2022); Treatment of cardiac arrhythmia (N/A, 2024); echocardiogram,transesophageal (N/A,  "7/8/2024); ablation, atrial flutter, atypical (N/A, 2/17/2025); and Diagnostic cardiac electrophysiology study (2/17/2025).    Family History: family history includes Asthma in her brother; Diabetes in her mother and sister; Diverticulosis in her brother and brother; Esophageal cancer in her maternal uncle; Hearing loss in her father; Heart disease in her brother, brother, father, mother, and sister; No Known Problems in her daughter; Stroke (age of onset: 93) in her father..       Review of patient's allergies indicates:   Allergen Reactions    Morphine Nausea And Vomiting    Flecainide      Low blood pressure    Succinylcholine      Other reaction(s): v tach       Medications Ordered Prior to Encounter[1]    Labs:  Lab Results   Component Value Date    WBC 4.74 02/10/2025    HGB 12.4 02/10/2025    HCT 38.9 02/10/2025     02/10/2025    CRP 3.1 05/13/2022    CHOL 200 (H) 01/02/2024    TRIG 101 01/02/2024    HDL 63 01/02/2024    ALKPHOS 57 01/02/2024    ALT 15 01/02/2024    AST 20 01/02/2024     02/10/2025    K 4.7 02/10/2025     02/10/2025    CREATININE 0.7 02/10/2025    BUN 12 02/10/2025    CO2 24 02/10/2025    TSH 1.575 04/09/2025    INR 1.1 02/10/2025       Vital Signs:  Ht 5' 8" (1.727 m)   Wt 75.5 kg (166 lb 7.2 oz)   BMI 25.31 kg/m²   Body mass index is 25.31 kg/m².    Imaging reviewed: Esophagram 4/30/2024  FINDINGS:  Swallowing: Normal.     Esophagus: Mild tertiary contractions and proximal escape of contrast suggestive of mild esophageal dysmotility.     Gastroesophageal reflux: None observed.     Other findings: None.     Impression:     Mild esophageal dysmotility as above.     Electronically signed by resident: Aurea Sheffield  Date:                                            04/30/2024  Time:                                           11:43    Endoscopy reviewed: EGD 3/19/2021  Impression:           - Normal esophagus. Biopsied.                         - Erythematous mucosa in the " antrum. Biopsied.                         - Duodenal diverticulum.                         - The patient is a 70 yo female with solid food                         dysphagia. EGD today was unrevealing for a cause.                         Biopsies were performed to evaluate for EoE and H.                         pylori.   Recommendation:       - Proceed to colonoscopy.                         - Resume previous diet.                         - Continue present medications.                         - Await pathology results.                         - If Dysphagia persists and EoE biopsies are                         negative then consider timed barium esophagram with                         barium tablet and esophageal manometry as the next                         steps in her dysphagia work up.   Attending Participation:        I personally performed the entire procedure.   Sahara Salgado MD   3/19/2021 1:37:37 PM     Colonoscopy 3/19/2021  Impression:           - One 5 mm polyp in the cecum, removed with a cold                         snare. Resected and retrieved.                         - Diverticulosis in the sigmoid colon, in the                         descending colon, in the transverse colon and in                         the ascending colon.                         - Patent surgical anastomosis, characterized by                         healthy appearing mucosa.                         - Internal hemorrhoids.                         - The examination was otherwise normal.                         - The patient is a 70 yo female with a positive FIT                         test. Her exam today showed one benign appearing                         polyp which was removed, diverticulosis, evidence                         of previous surgery and internal hemorrhoids.   Recommendation:       - Discharge patient to home.                         - Resume previous diet.                         - Continue present  medications.                         - Await pathology results.                         - Repeat colonoscopy in 7 years for surveillance.   Attending Participation:        I was present and participated during the entire procedure,        including non-becerra portions.   Sahara Salgado MD   3/19/2021 1:43:00 PM     Assessment:  1. Laryngopharyngeal reflux (LPR)    2. Esophageal dysmotility    3. Right lower quadrant pain    4. Chronic cough           Assessment & Plan      GASTRO-ESOPHAGEAL REFLUX DISEASE:   Recommend upper endoscopy to assess esophagus, stomach, and first portion of small intestine for potential causes of symptoms.   Proposed Bravo pH monitoring capsule placement during endoscopy to assess for correlation between reflux episodes and cough.   Advised stopping Prilosec and Pepcid for 2 weeks prior to endoscopy to allow for accurate reflux assessment.   Discussed potential causes of increased mucus production, including reflux-induced inflammation.   Described Bravo pH monitoring capsule procedure, including placement, data collection process, and natural passage of the device.   Discontinued Prilosec and Pepcid: Stop taking 2 weeks prior to scheduled upper endoscopy.   Ordered upper endoscopy with Bravo pH monitoring to be performed during procedure.   Explained the difference between esophagram and manometry testing for assessing swallowing function.    CHRONIC COUGH:   Proposed Bravo pH monitoring capsule placement during endoscopy to assess for correlation between reflux episodes and cough.    CHRONIC IDIOPATHIC CONSTIPATION:   Started Miralax: Use 1 capful (17 grams) daily, adjusting to half a capful if needed based on stool consistency, titrating dosage based on stool consistency for about a week.   Discontinued Colace.    HISTORY OF COLON POLYPS:   Discussed possibility of combining upper endoscopy with colonoscopy, pending insurance pre-authorization.         No follow-ups on  file.        TREVOR PAREDES, JONATHAN-JOHN  Gastroenterology Department  [facility] Office [phone]      This note was generated with the assistance of ambient listening technology. Verbal consent was obtained by the patient and accompanying visitor(s) for the recording of patient appointment to facilitate this note. I attest to having reviewed and edited the generated note for accuracy, though some syntax or spelling errors may persist. Please contact the author of this note for any clarification.                       [1]   Current Outpatient Medications on File Prior to Visit   Medication Sig Dispense Refill    acyclovir (ZOVIRAX) 400 MG tablet Take 1 tablet (400 mg total) by mouth 3 (three) times daily as needed. 90 tablet 3    amoxicillin (AMOXIL) 500 MG capsule Take 500 mg by mouth as needed (takes prior to all dental procedures).      cycloSPORINE (VEVYE) 0.1 % Drop Place 1 drop into both eyes 2 (two) times a day. 2 mL 6    docusate sodium (COLACE) 250 MG capsule Take 250 mg by mouth 2 (two) times daily.       estradioL (ESTRACE) 0.01 % (0.1 mg/gram) vaginal cream Place 1 g vaginally twice a week. 43 g 0    famotidine (PEPCID) 40 MG tablet Take 1 tablet (40 mg total) by mouth every evening. 30 tablet 3    HYDROcodone-acetaminophen (NORCO) 5-325 mg per tablet Take 1 tablet by mouth every 6 (six) hours as needed for Pain. Pt states takes PRN for headaches 28 tablet 0    ipratropium (ATROVENT) 42 mcg (0.06 %) nasal spray 2 sprays by Each Nostril route 3 (three) times daily. 15 mL 6    levothyroxine (SYNTHROID) 25 MCG tablet Take 1 tablet (25 mcg total) by mouth before breakfast. 90 tablet 3    omega-3 fatty acids/fish oil (FISH OIL-OMEGA-3 FATTY ACIDS) 300-1,000 mg capsule Take by mouth once daily.      omeprazole (PRILOSEC) 40 MG capsule Take 1 capsule (40 mg total) by mouth once daily. 30 capsule 3    rivaroxaban (XARELTO) 20 mg Tab Take 1 tablet (20 mg total) by mouth once daily. 90  tablet 3    sodium chloride 3% 3 % nebulizer solution Take 4 mLs by nebulization 2 (two) times a day. 300 mL PRN    vit C/E/Zn/coppr/lutein/zeaxan (PRESERVISION AREDS-2 ORAL)        Current Facility-Administered Medications on File Prior to Visit   Medication Dose Route Frequency Provider Last Rate Last Admin    sodium chloride 0.9% flush 5 mL  5 mL Intravenous PRN Aleksandra Taylor, NP

## 2025-05-29 ENCOUNTER — TELEPHONE (OUTPATIENT)
Dept: ENDOSCOPY | Facility: HOSPITAL | Age: 75
End: 2025-05-29
Payer: MEDICARE

## 2025-05-29 ENCOUNTER — PATIENT MESSAGE (OUTPATIENT)
Dept: ENDOSCOPY | Facility: HOSPITAL | Age: 75
End: 2025-05-29
Payer: MEDICARE

## 2025-05-29 ENCOUNTER — OFFICE VISIT (OUTPATIENT)
Dept: GASTROENTEROLOGY | Facility: CLINIC | Age: 75
End: 2025-05-29
Payer: MEDICARE

## 2025-05-29 VITALS — WEIGHT: 166.44 LBS | HEIGHT: 68 IN | BODY MASS INDEX: 25.23 KG/M2

## 2025-05-29 DIAGNOSIS — K22.4 ESOPHAGEAL DYSMOTILITY: Chronic | ICD-10-CM

## 2025-05-29 DIAGNOSIS — R10.31 RIGHT LOWER QUADRANT PAIN: ICD-10-CM

## 2025-05-29 DIAGNOSIS — R05.3 CHRONIC COUGH: ICD-10-CM

## 2025-05-29 DIAGNOSIS — R13.10 DYSPHAGIA, UNSPECIFIED TYPE: Primary | ICD-10-CM

## 2025-05-29 DIAGNOSIS — K21.9 LARYNGOPHARYNGEAL REFLUX (LPR): Primary | Chronic | ICD-10-CM

## 2025-05-29 PROCEDURE — 1126F AMNT PAIN NOTED NONE PRSNT: CPT | Mod: CPTII,HCNC,S$GLB,

## 2025-05-29 PROCEDURE — 99214 OFFICE O/P EST MOD 30 MIN: CPT | Mod: HCNC,S$GLB,,

## 2025-05-29 PROCEDURE — 3288F FALL RISK ASSESSMENT DOCD: CPT | Mod: CPTII,HCNC,S$GLB,

## 2025-05-29 PROCEDURE — 1101F PT FALLS ASSESS-DOCD LE1/YR: CPT | Mod: CPTII,HCNC,S$GLB,

## 2025-05-29 PROCEDURE — 99999 PR PBB SHADOW E&M-EST. PATIENT-LVL III: CPT | Mod: PBBFAC,HCNC,,

## 2025-05-29 PROCEDURE — 1159F MED LIST DOCD IN RCRD: CPT | Mod: CPTII,HCNC,S$GLB,

## 2025-05-29 NOTE — PROGRESS NOTES
"GENERAL GI PATIENT INTAKE:    COVID symptoms in the last 7 days (runny nose, sore throat, congestion, cough, fever): No  PCP: Praveen Jeffery  If not PCP-  number given to establish [phone]: N/A    ALLERGIES REVIEWED:  Yes    CHIEF COMPLAINT:    Chief Complaint   Patient presents with    Abdominal Pain    GI Problem       VITAL SIGNS:  Ht 5' 8" (1.727 m)   Wt 75.5 kg (166 lb 7.2 oz)   BMI 25.31 kg/m²      Change in medical, surgical, family or social history: No      REVIEWED MEDICATION LIST RECONCILED INCLUDING ABOVE MEDS:  Yes      "

## 2025-05-29 NOTE — TELEPHONE ENCOUNTER
"Oliva Neumann NP  P [facility] Endoscopist Clinic Patients  Caller: Unspecified (Today, 11:59 AM)  Procedure: esophageal manometry    Diagnosis: Dysphagia    Procedure Timing: Within 12 weeks    *If within 4 weeks selected, please julissa as high priority*    *If greater than 12 weeks, please select "5-12 weeks" and delay sending until 3 months prior to requested date*    Location: Any Site    Additional Scheduling Information: No scheduling concerns    Prep Specifications:N/A    Is the patient taking a GLP-1 Agonist:no    Have you attached a patient to this message: yes    Patient is scheduled for a Esophageal Manometry on 7/30/25 with Dr. THOMAS Winters  Referral for procedure from [facility]      "

## 2025-06-06 ENCOUNTER — PATIENT MESSAGE (OUTPATIENT)
Dept: ADMINISTRATIVE | Facility: HOSPITAL | Age: 75
End: 2025-06-06
Payer: MEDICARE

## 2025-06-06 ENCOUNTER — PATIENT MESSAGE (OUTPATIENT)
Dept: GASTROENTEROLOGY | Facility: CLINIC | Age: 75
End: 2025-06-06
Payer: MEDICARE

## 2025-06-10 ENCOUNTER — TELEPHONE (OUTPATIENT)
Dept: ENDOSCOPY | Facility: HOSPITAL | Age: 75
End: 2025-06-10
Payer: MEDICARE

## 2025-06-10 VITALS — BODY MASS INDEX: 25.01 KG/M2 | HEIGHT: 68 IN | WEIGHT: 165 LBS

## 2025-06-10 DIAGNOSIS — K21.9 GASTROESOPHAGEAL REFLUX DISEASE, UNSPECIFIED WHETHER ESOPHAGITIS PRESENT: ICD-10-CM

## 2025-06-10 DIAGNOSIS — Z12.11 COLON CANCER SCREENING: ICD-10-CM

## 2025-06-10 DIAGNOSIS — R10.84 GENERALIZED ABDOMINAL PAIN: ICD-10-CM

## 2025-06-10 DIAGNOSIS — R10.9 ABDOMINAL PAIN, UNSPECIFIED ABDOMINAL LOCATION: Primary | ICD-10-CM

## 2025-06-10 DIAGNOSIS — R13.10 DYSPHAGIA, UNSPECIFIED TYPE: ICD-10-CM

## 2025-06-10 RX ORDER — SODIUM, POTASSIUM,MAG SULFATES 17.5-3.13G
1 SOLUTION, RECONSTITUTED, ORAL ORAL DAILY
Qty: 1 KIT | Refills: 0 | Status: SHIPPED | OUTPATIENT
Start: 2025-06-30

## 2025-06-10 NOTE — TELEPHONE ENCOUNTER
Patient is scheduled for a Upper Endoscopy (EGD) with Bravo Placement/Colonoscopy on 7/16/25 with Dr. BARRY Salgado  Referral for procedure from [facility]

## 2025-06-10 NOTE — TELEPHONE ENCOUNTER
----- Message from Padmini sent at 6/9/2025  8:11 AM CDT -----  Regarding: FW: change of plans for a patient    ----- Message -----  From: Oliva Neumann NP  Sent: 6/6/2025   1:42 PM CDT  To: [facility] Endoscopist Clinic Patients  Subject: change of plans for a patient                    Good evening:This patient was scheduled for manometry. I actually want her to complete the EGD with Infante OFF PPI. We will determine if manometry is necessary at a later date. My apologies, I forgot to delete the referral. Ok to cancel manometry for now. Oliva

## 2025-06-10 NOTE — TELEPHONE ENCOUNTER
Contacted the patient to schedule an endoscopy procedure(s) Upper Endoscopy (EGD) with Infante Placement . The patient did not answer the call and left a voice message requesting a call back.

## 2025-06-10 NOTE — TELEPHONE ENCOUNTER
"Oliva Neumann NP  P [facility] Endoscopist Clinic Patients  Caller: Unspecified (1 week ago)  Procedure: EGD w/Bravo OFF PPI/Colonoscopy    Diagnosis: Abdominal pain, GERD, and Dysphagia    Procedure Timing: Within 12 weeks    *If within 4 weeks selected, please julissa as high priority*    *If greater than 12 weeks, please select "5-12 weeks" and delay sending until 3 months prior to requested date*    Location: Any Site    Additional Scheduling Information: No scheduling concerns    Prep Specifications: Standard prep    Is the patient taking a GLP-1 Agonist: no    Have you attached a patient to this message: yes  "

## 2025-06-11 ENCOUNTER — TELEPHONE (OUTPATIENT)
Dept: ENDOSCOPY | Facility: HOSPITAL | Age: 75
End: 2025-06-11
Payer: MEDICARE

## 2025-06-11 NOTE — TELEPHONE ENCOUNTER
----- Message from FADI Tripathi sent at 6/10/2025  1:17 PM CDT -----  Regardin/16 BT/CL  The patient is currently under an internal cardiologist, manuel Chadwick. Is patient medically optimized by Cardiology for their upcoming scheduled Colonoscopy on 25. Additional request(s) required:internal Electrophysiologist, Dr. Vinny Jeong to hold blood thinner Xarelto (rivaroxaban)Notes: Main Munith, Patient having Watchman placement 25. Do we need to have procedure scheduled prior to watchman placement or after?

## 2025-06-11 NOTE — TELEPHONE ENCOUNTER
Dear Dr Azevedo,    Patient has a tentatively scheduled procedure Colonoscopy on 7/16/25 but are trying to reschedule her to 7/2/25 before her Watchman procedure.    She is currently taking a blood thinner. In order to ensure patient safety, we would like to confirm that the patient can place their blood thinner medication on hold for the procedure. Can he/she discontinue Xarelto (rivaroxaban) for a minimum of 2 days prior to the procedure?     Thank you for your prompt reply.    [facility] Endoscopy Scheduling

## 2025-06-13 NOTE — TELEPHONE ENCOUNTER
A Labat NP-    You placed an order for an EGD/Bravo and colonoscopy.  Patient is scheduled with Dr Salgado on 7/16/25 and is on Xarelto.  See message below regarding holding of medication and clearance from cardiology.  Please advise on how to proceed.  Thank you.    Marissa

## 2025-06-13 NOTE — TELEPHONE ENCOUNTER
Dr Azevedo-    We were not able to get her in earlier for her EGD/Bravo and colonoscopy.  She is scheduled on 7/16/25.  Is it ok to hold Xarelto 2 days?

## 2025-06-17 DIAGNOSIS — I48.0 PAROXYSMAL ATRIAL FIBRILLATION: Primary | ICD-10-CM

## 2025-06-17 RX ORDER — RIVAROXABAN 20 MG/1
20 TABLET, FILM COATED ORAL
Qty: 90 TABLET | Refills: 3 | Status: SHIPPED | OUTPATIENT
Start: 2025-06-17

## 2025-06-20 ENCOUNTER — TELEPHONE (OUTPATIENT)
Dept: ENDOSCOPY | Facility: HOSPITAL | Age: 75
End: 2025-06-20
Payer: MEDICARE

## 2025-06-20 DIAGNOSIS — K21.9 GASTROESOPHAGEAL REFLUX DISEASE, UNSPECIFIED WHETHER ESOPHAGITIS PRESENT: ICD-10-CM

## 2025-06-20 DIAGNOSIS — R13.10 DYSPHAGIA, UNSPECIFIED TYPE: ICD-10-CM

## 2025-06-20 DIAGNOSIS — R10.9 ABDOMINAL PAIN, UNSPECIFIED ABDOMINAL LOCATION: Primary | ICD-10-CM

## 2025-06-20 NOTE — TELEPHONE ENCOUNTER
Called and spoke to patient. Informed patient she can not hold her Xarelto until after her KHANH in August per Dr. Azevedo. Procedure canceled on 7/17/25. EGD/colon with BRAVO referral placed and deferred. Patient verbalized an understanding.

## 2025-06-23 DIAGNOSIS — G44.229 CHRONIC TENSION-TYPE HEADACHE, NOT INTRACTABLE: ICD-10-CM

## 2025-06-23 NOTE — TELEPHONE ENCOUNTER
No care due was identified.  [facility] Embedded Care Due Messages. Reference number: [id number].   6/23/2025 8:06:23 AM CDT

## 2025-06-25 ENCOUNTER — CLINICAL SUPPORT (OUTPATIENT)
Dept: ENDOSCOPY | Facility: HOSPITAL | Age: 75
End: 2025-06-25
Payer: MEDICARE

## 2025-06-25 DIAGNOSIS — R10.9 ABDOMINAL PAIN, UNSPECIFIED ABDOMINAL LOCATION: ICD-10-CM

## 2025-06-25 DIAGNOSIS — K21.9 GASTROESOPHAGEAL REFLUX DISEASE, UNSPECIFIED WHETHER ESOPHAGITIS PRESENT: ICD-10-CM

## 2025-06-25 DIAGNOSIS — R13.10 DYSPHAGIA, UNSPECIFIED TYPE: ICD-10-CM

## 2025-06-25 NOTE — PLAN OF CARE
Contacted the patient to schedule an endoscopy procedure(s) Upper Endoscopy (EGD) with Bravo Placement and Colonoscopy . The patient did not answer the call and left a voice message requesting a call back.

## 2025-06-30 RX ORDER — HYDROCODONE BITARTRATE AND ACETAMINOPHEN 5; 325 MG/1; MG/1
1 TABLET ORAL EVERY 6 HOURS PRN
Qty: 28 TABLET | Refills: 0 | Status: SHIPPED | OUTPATIENT
Start: 2025-06-30

## 2025-07-02 ENCOUNTER — PATIENT MESSAGE (OUTPATIENT)
Dept: ELECTROPHYSIOLOGY | Facility: CLINIC | Age: 75
End: 2025-07-02
Payer: MEDICARE

## 2025-07-03 ENCOUNTER — LAB VISIT (OUTPATIENT)
Dept: LAB | Facility: HOSPITAL | Age: 75
End: 2025-07-03
Attending: INTERNAL MEDICINE
Payer: MEDICARE

## 2025-07-03 DIAGNOSIS — I48.11 LONGSTANDING PERSISTENT ATRIAL FIBRILLATION: ICD-10-CM

## 2025-07-03 DIAGNOSIS — E78.2 HYPERLIPIDEMIA, MIXED: ICD-10-CM

## 2025-07-03 LAB
ANION GAP (OHS): 8 MMOL/L (ref 8–16)
APTT PPP: 29.3 SECONDS (ref 21–32)
BUN SERPL-MCNC: 14 MG/DL (ref 8–23)
CALCIUM SERPL-MCNC: 9.1 MG/DL (ref 8.7–10.5)
CHLORIDE SERPL-SCNC: 104 MMOL/L (ref 95–110)
CHOLEST SERPL-MCNC: 190 MG/DL (ref 120–199)
CHOLEST/HDLC SERPL: 2.9 {RATIO} (ref 2–5)
CO2 SERPL-SCNC: 28 MMOL/L (ref 23–29)
CREAT SERPL-MCNC: 0.8 MG/DL (ref 0.5–1.4)
ERYTHROCYTE [DISTWIDTH] IN BLOOD BY AUTOMATED COUNT: 13.3 % (ref 11.5–14.5)
GFR SERPLBLD CREATININE-BSD FMLA CKD-EPI: >60 ML/MIN/1.73/M2
GLUCOSE SERPL-MCNC: 94 MG/DL (ref 70–110)
HCT VFR BLD AUTO: 38.8 % (ref 37–48.5)
HDLC SERPL-MCNC: 66 MG/DL (ref 40–75)
HDLC SERPL: 34.7 % (ref 20–50)
HGB BLD-MCNC: 12.9 GM/DL (ref 12–16)
INR PPP: 1.1 (ref 0.8–1.2)
LDLC SERPL CALC-MCNC: 111.4 MG/DL (ref 63–159)
MCH RBC QN AUTO: 31.9 PG (ref 27–31)
MCHC RBC AUTO-ENTMCNC: 33.2 G/DL (ref 32–36)
MCV RBC AUTO: 96 FL (ref 82–98)
NONHDLC SERPL-MCNC: 124 MG/DL
PLATELET # BLD AUTO: 155 K/UL (ref 150–450)
PMV BLD AUTO: 11.1 FL (ref 9.2–12.9)
POTASSIUM SERPL-SCNC: 4.5 MMOL/L (ref 3.5–5.1)
PROTHROMBIN TIME: 11.5 SECONDS (ref 9–12.5)
RBC # BLD AUTO: 4.05 M/UL (ref 4–5.4)
SODIUM SERPL-SCNC: 140 MMOL/L (ref 136–145)
TRIGL SERPL-MCNC: 63 MG/DL (ref 30–150)
WBC # BLD AUTO: 4.99 K/UL (ref 3.9–12.7)

## 2025-07-03 PROCEDURE — 85027 COMPLETE CBC AUTOMATED: CPT | Mod: HCNC

## 2025-07-03 PROCEDURE — 36415 COLL VENOUS BLD VENIPUNCTURE: CPT | Mod: HCNC

## 2025-07-03 PROCEDURE — 84132 ASSAY OF SERUM POTASSIUM: CPT | Mod: HCNC

## 2025-07-03 PROCEDURE — 85610 PROTHROMBIN TIME: CPT | Mod: HCNC

## 2025-07-03 PROCEDURE — 85730 THROMBOPLASTIN TIME PARTIAL: CPT | Mod: HCNC

## 2025-07-03 PROCEDURE — 80061 LIPID PANEL: CPT | Mod: HCNC

## 2025-07-07 ENCOUNTER — TELEPHONE (OUTPATIENT)
Dept: ELECTROPHYSIOLOGY | Facility: CLINIC | Age: 75
End: 2025-07-07
Payer: MEDICARE

## 2025-07-07 NOTE — TELEPHONE ENCOUNTER
Spoke to patient     CONFIRMED procedure arrival time of 5:30 AM on 7/9/2025    Reiterated instructions including:  -Directions to check in desk  -NPO after midnight night prior to procedure  -Confirmed compliance of Xarelto. Instructed patient to take Xarelto the evening prior to procedure. Patient verbalized understanding.   -Pre-procedure LABS Reviewed.   -Confirmed absence of implanted device/stimulator   -Confirmed no fever, cough, or shortness of breath in the past 30 days  -Confirmed no redness, rash, irritation, or yeast infection to groin area.   -Do not wear mascara day of procedure  -Reviewed current visitor policy    Patient verbalized understanding of above and appreciated the call.

## 2025-07-08 ENCOUNTER — ANESTHESIA EVENT (OUTPATIENT)
Dept: MEDSURG UNIT | Facility: HOSPITAL | Age: 75
End: 2025-07-08
Payer: MEDICARE

## 2025-07-09 ENCOUNTER — ANESTHESIA (OUTPATIENT)
Dept: MEDSURG UNIT | Facility: HOSPITAL | Age: 75
End: 2025-07-09
Payer: MEDICARE

## 2025-07-09 ENCOUNTER — HOSPITAL ENCOUNTER (INPATIENT)
Facility: HOSPITAL | Age: 75
LOS: 1 days | Discharge: HOME | DRG: 274 | End: 2025-07-09
Attending: INTERNAL MEDICINE | Admitting: INTERNAL MEDICINE
Payer: MEDICARE

## 2025-07-09 VITALS
OXYGEN SATURATION: 100 % | WEIGHT: 163 LBS | DIASTOLIC BLOOD PRESSURE: 59 MMHG | HEART RATE: 83 BPM | BODY MASS INDEX: 24.71 KG/M2 | HEIGHT: 68 IN | RESPIRATION RATE: 18 BRPM | SYSTOLIC BLOOD PRESSURE: 125 MMHG | TEMPERATURE: 97 F

## 2025-07-09 DIAGNOSIS — I49.9 ARRHYTHMIA: ICD-10-CM

## 2025-07-09 DIAGNOSIS — I48.91 ATRIAL FIBRILLATION: ICD-10-CM

## 2025-07-09 DIAGNOSIS — I48.0 PAF (PAROXYSMAL ATRIAL FIBRILLATION): ICD-10-CM

## 2025-07-09 LAB
INDIRECT COOMBS: NORMAL
OHS QRS DURATION: 88 MS
OHS QRS DURATION: 90 MS
OHS QTC CALCULATION: 440 MS
OHS QTC CALCULATION: 510 MS
RH BLD: NORMAL
SPECIMEN OUTDATE: NORMAL

## 2025-07-09 PROCEDURE — C1760 CLOSURE DEV, VASC: HCPCS | Mod: HCNC | Performed by: INTERNAL MEDICINE

## 2025-07-09 PROCEDURE — 25000003 PHARM REV CODE 250: Mod: HCNC | Performed by: INTERNAL MEDICINE

## 2025-07-09 PROCEDURE — 37000009 HC ANESTHESIA EA ADD 15 MINS: Mod: HCNC | Performed by: INTERNAL MEDICINE

## 2025-07-09 PROCEDURE — 63600175 PHARM REV CODE 636 W HCPCS: Mod: HCNC | Performed by: NURSE ANESTHETIST, CERTIFIED REGISTERED

## 2025-07-09 PROCEDURE — 63600175 PHARM REV CODE 636 W HCPCS: Mod: HCNC

## 2025-07-09 PROCEDURE — 25000003 PHARM REV CODE 250: Mod: HCNC | Performed by: NURSE ANESTHETIST, CERTIFIED REGISTERED

## 2025-07-09 PROCEDURE — 93005 ELECTROCARDIOGRAM TRACING: CPT | Mod: HCNC

## 2025-07-09 PROCEDURE — 33340 PERQ CLSR TCAT L ATR APNDGE: CPT | Mod: Q0,HCNC,, | Performed by: INTERNAL MEDICINE

## 2025-07-09 PROCEDURE — 37000008 HC ANESTHESIA 1ST 15 MINUTES: Mod: HCNC | Performed by: INTERNAL MEDICINE

## 2025-07-09 PROCEDURE — 93010 ELECTROCARDIOGRAM REPORT: CPT | Mod: HCNC,,, | Performed by: INTERNAL MEDICINE

## 2025-07-09 PROCEDURE — 02L73DK OCCLUSION OF LEFT ATRIAL APPENDAGE WITH INTRALUMINAL DEVICE, PERCUTANEOUS APPROACH: ICD-10-PCS | Performed by: INTERNAL MEDICINE

## 2025-07-09 PROCEDURE — C1894 INTRO/SHEATH, NON-LASER: HCPCS | Mod: HCNC | Performed by: INTERNAL MEDICINE

## 2025-07-09 PROCEDURE — 86920 COMPATIBILITY TEST SPIN: CPT | Mod: HCNC

## 2025-07-09 PROCEDURE — 63600175 PHARM REV CODE 636 W HCPCS: Mod: HCNC | Performed by: INTERNAL MEDICINE

## 2025-07-09 PROCEDURE — 93010 ELECTROCARDIOGRAM REPORT: CPT | Mod: HCNC,76,, | Performed by: INTERNAL MEDICINE

## 2025-07-09 PROCEDURE — 11000001 HC ACUTE MED/SURG PRIVATE ROOM: Mod: HCNC

## 2025-07-09 PROCEDURE — C1769 GUIDE WIRE: HCPCS | Mod: HCNC | Performed by: INTERNAL MEDICINE

## 2025-07-09 PROCEDURE — 27201423 OPTIME MED/SURG SUP & DEVICES STERILE SUPPLY: Mod: HCNC | Performed by: INTERNAL MEDICINE

## 2025-07-09 PROCEDURE — 33340 PERQ CLSR TCAT L ATR APNDGE: CPT | Mod: Q0,HCNC | Performed by: INTERNAL MEDICINE

## 2025-07-09 PROCEDURE — C1889 IMPLANT/INSERT DEVICE, NOC: HCPCS | Mod: HCNC | Performed by: INTERNAL MEDICINE

## 2025-07-09 PROCEDURE — 86850 RBC ANTIBODY SCREEN: CPT | Mod: HCNC

## 2025-07-09 DEVICE — IMPLANTABLE DEVICE: Type: IMPLANTABLE DEVICE | Site: HEART | Status: FUNCTIONAL

## 2025-07-09 RX ORDER — MIDAZOLAM HYDROCHLORIDE 1 MG/ML
INJECTION INTRAMUSCULAR; INTRAVENOUS
Status: DISCONTINUED | OUTPATIENT
Start: 2025-07-09 | End: 2025-07-09

## 2025-07-09 RX ORDER — LIDOCAINE HYDROCHLORIDE 20 MG/ML
INJECTION, SOLUTION EPIDURAL; INFILTRATION; INTRACAUDAL; PERINEURAL
Status: DISCONTINUED | OUTPATIENT
Start: 2025-07-09 | End: 2025-07-09

## 2025-07-09 RX ORDER — HYDROCODONE BITARTRATE AND ACETAMINOPHEN 500; 5 MG/1; MG/1
TABLET ORAL
Status: DISCONTINUED | OUTPATIENT
Start: 2025-07-09 | End: 2025-07-09 | Stop reason: HOSPADM

## 2025-07-09 RX ORDER — SODIUM CHLORIDE 0.9 % (FLUSH) 0.9 %
3 SYRINGE (ML) INJECTION
Status: DISCONTINUED | OUTPATIENT
Start: 2025-07-09 | End: 2025-07-09 | Stop reason: HOSPADM

## 2025-07-09 RX ORDER — HEPARIN SODIUM 1000 [USP'U]/ML
INJECTION, SOLUTION INTRAVENOUS; SUBCUTANEOUS
Status: DISCONTINUED | OUTPATIENT
Start: 2025-07-09 | End: 2025-07-09

## 2025-07-09 RX ORDER — ACETAMINOPHEN 325 MG/1
650 TABLET ORAL EVERY 4 HOURS PRN
Status: DISCONTINUED | OUTPATIENT
Start: 2025-07-09 | End: 2025-07-09 | Stop reason: HOSPADM

## 2025-07-09 RX ORDER — PROTAMINE SULFATE 10 MG/ML
INJECTION, SOLUTION INTRAVENOUS
Status: DISCONTINUED | OUTPATIENT
Start: 2025-07-09 | End: 2025-07-09

## 2025-07-09 RX ORDER — HEPARIN SOD,PORCINE/0.9 % NACL 1000/500ML
INTRAVENOUS SOLUTION INTRAVENOUS
Status: DISCONTINUED | OUTPATIENT
Start: 2025-07-09 | End: 2025-07-09 | Stop reason: HOSPADM

## 2025-07-09 RX ORDER — LIDOCAINE HYDROCHLORIDE 20 MG/ML
INJECTION, SOLUTION INFILTRATION; PERINEURAL
Status: DISCONTINUED | OUTPATIENT
Start: 2025-07-09 | End: 2025-07-09 | Stop reason: HOSPADM

## 2025-07-09 RX ORDER — ONDANSETRON HYDROCHLORIDE 2 MG/ML
INJECTION, SOLUTION INTRAVENOUS
Status: DISCONTINUED | OUTPATIENT
Start: 2025-07-09 | End: 2025-07-09

## 2025-07-09 RX ORDER — PROCHLORPERAZINE EDISYLATE 5 MG/ML
5 INJECTION INTRAMUSCULAR; INTRAVENOUS EVERY 30 MIN PRN
Status: DISCONTINUED | OUTPATIENT
Start: 2025-07-09 | End: 2025-07-09 | Stop reason: HOSPADM

## 2025-07-09 RX ORDER — FENTANYL CITRATE 50 UG/ML
INJECTION, SOLUTION INTRAMUSCULAR; INTRAVENOUS
Status: DISCONTINUED | OUTPATIENT
Start: 2025-07-09 | End: 2025-07-09

## 2025-07-09 RX ORDER — PROPOFOL 10 MG/ML
VIAL (ML) INTRAVENOUS
Status: DISCONTINUED | OUTPATIENT
Start: 2025-07-09 | End: 2025-07-09

## 2025-07-09 RX ORDER — FENTANYL CITRATE 50 UG/ML
25 INJECTION, SOLUTION INTRAMUSCULAR; INTRAVENOUS EVERY 5 MIN PRN
Status: DISCONTINUED | OUTPATIENT
Start: 2025-07-09 | End: 2025-07-09 | Stop reason: HOSPADM

## 2025-07-09 RX ORDER — DIPHENHYDRAMINE HYDROCHLORIDE 50 MG/ML
25 INJECTION, SOLUTION INTRAMUSCULAR; INTRAVENOUS EVERY 6 HOURS PRN
Status: DISCONTINUED | OUTPATIENT
Start: 2025-07-09 | End: 2025-07-09 | Stop reason: HOSPADM

## 2025-07-09 RX ORDER — EPHEDRINE SULFATE 50 MG/ML
INJECTION, SOLUTION INTRAVENOUS
Status: DISCONTINUED | OUTPATIENT
Start: 2025-07-09 | End: 2025-07-09

## 2025-07-09 RX ORDER — ROCURONIUM BROMIDE 10 MG/ML
INJECTION, SOLUTION INTRAVENOUS
Status: DISCONTINUED | OUTPATIENT
Start: 2025-07-09 | End: 2025-07-09

## 2025-07-09 RX ORDER — GLUCAGON 1 MG
1 KIT INJECTION
Status: DISCONTINUED | OUTPATIENT
Start: 2025-07-09 | End: 2025-07-09 | Stop reason: HOSPADM

## 2025-07-09 RX ORDER — HALOPERIDOL LACTATE 5 MG/ML
0.5 INJECTION, SOLUTION INTRAMUSCULAR EVERY 10 MIN PRN
Status: DISCONTINUED | OUTPATIENT
Start: 2025-07-09 | End: 2025-07-09 | Stop reason: HOSPADM

## 2025-07-09 RX ORDER — CEFAZOLIN 2 G/1
2 INJECTION, POWDER, FOR SOLUTION INTRAMUSCULAR; INTRAVENOUS
Status: DISCONTINUED | OUTPATIENT
Start: 2025-07-09 | End: 2025-07-09 | Stop reason: HOSPADM

## 2025-07-09 RX ADMIN — CEFAZOLIN 2 G: 2 INJECTION, POWDER, FOR SOLUTION INTRAMUSCULAR; INTRAVENOUS at 08:07

## 2025-07-09 RX ADMIN — EPHEDRINE SULFATE 5 MG: 50 INJECTION INTRAVENOUS at 09:07

## 2025-07-09 RX ADMIN — PROPOFOL 50 MG: 10 INJECTION, EMULSION INTRAVENOUS at 09:07

## 2025-07-09 RX ADMIN — LIDOCAINE HYDROCHLORIDE 100 MG: 20 INJECTION, SOLUTION EPIDURAL; INFILTRATION; INTRACAUDAL at 08:07

## 2025-07-09 RX ADMIN — ONDANSETRON 4 MG: 2 INJECTION INTRAMUSCULAR; INTRAVENOUS at 09:07

## 2025-07-09 RX ADMIN — HEPARIN SODIUM 8000 UNITS: 1000 INJECTION INTRAVENOUS; SUBCUTANEOUS at 08:07

## 2025-07-09 RX ADMIN — EPHEDRINE SULFATE 10 MG: 50 INJECTION INTRAVENOUS at 08:07

## 2025-07-09 RX ADMIN — ROCURONIUM BROMIDE 50 MG: 10 INJECTION INTRAVENOUS at 08:07

## 2025-07-09 RX ADMIN — FENTANYL CITRATE 100 MCG: 50 INJECTION, SOLUTION INTRAMUSCULAR; INTRAVENOUS at 08:07

## 2025-07-09 RX ADMIN — SODIUM CHLORIDE: 0.9 INJECTION, SOLUTION INTRAVENOUS at 07:07

## 2025-07-09 RX ADMIN — PROTAMINE SULFATE 75 MG: 10 INJECTION, SOLUTION INTRAVENOUS at 09:07

## 2025-07-09 RX ADMIN — EPHEDRINE SULFATE 10 MG: 50 INJECTION INTRAVENOUS at 09:07

## 2025-07-09 RX ADMIN — PROPOFOL 100 MG: 10 INJECTION, EMULSION INTRAVENOUS at 08:07

## 2025-07-09 RX ADMIN — ACETAMINOPHEN 650 MG: 325 TABLET ORAL at 10:07

## 2025-07-09 RX ADMIN — MIDAZOLAM 2 MG: 1 INJECTION INTRAMUSCULAR; INTRAVENOUS at 08:07

## 2025-07-09 NOTE — PLAN OF CARE
Dr. Joe Aguila @ bedside to discuss findings with patient. Patient able to eat, ambulate and void independently post procedure. Discharge instructions and prescriptions reviewed with patient. Patient verbalizes understanding. VSS. IV removed. NADN. Patient wheeled out via staff.

## 2025-07-09 NOTE — TRANSFER OF CARE
"Anesthesia Transfer of Care Note    Patient: Zoë Enrique    Procedure(s) Performed: Procedure(s) (LRB):  Left atrial appendage closure device (N/A)  ECHOCARDIOGRAM, TRANSESOPHAGEAL (N/A)    Patient location: PACU    Anesthesia Type: general    Transport from OR: Transported from OR on 6-10 L/min O2 by face mask with adequate spontaneous ventilation    Post pain: adequate analgesia    Post assessment: no apparent anesthetic complications    Post vital signs: stable    Level of consciousness: awake, alert and oriented    Nausea/Vomiting: no nausea/vomiting    Complications: none    Transfer of care protocol was followed    Last vitals: Visit Vitals  /78 (BP Location: Right arm, Patient Position: Lying)   Pulse (!) 59   Temp 36.6 °C (97.9 °F) (Temporal)   Resp 18   Ht 5' 8" (1.727 m)   Wt 73.9 kg (163 lb)   SpO2 95%   Breastfeeding No   BMI 24.78 kg/m²     "

## 2025-07-09 NOTE — ANESTHESIA PROCEDURE NOTES
Intubation    Date/Time: 7/9/2025 8:21 AM    Performed by: Polina Lay CRNA  Authorized by: Lan Mitchell MD    Intubation:     Induction:  Intravenous    Mask Ventilation:  Easy mask    Attempts:  1    Attempted By:  CAMMIE    Blade:  Jean Baptiste 3    Laryngeal View Grade: Grade I - full view of cords      Difficult Airway Encountered?: No      Complications:  None    Airway Device:  Oral endotracheal tube    Airway Device Size:  7.5    Style/Cuff Inflation:  Cuffed    Inflation Amount (mL):  5    Tube secured:  22    Secured at:  The teeth    Placement Verified By:  Capnometry    Complicating Factors:  Anterior larynx    Findings Post-Intubation:  BS equal bilateral

## 2025-07-09 NOTE — PROGRESS NOTES
Patient arrived to EP recovery via stretcher accompanied by RN and CRNA. Right groin site assessed and soft with dressing CDI; distal pulses palpable. VSS. Denies pain or nausea at present.  updated via telephone and post-procedure EKG in progress at bedside.

## 2025-07-09 NOTE — DISCHARGE SUMMARY
[facility] - Cardiology  Cardiac Electrophysiology  Discharge Summary        Patient Name: Zoë Enrique   MRN: [id number]   Admission Date: 7/9/2025    Hospital Length of Stay: 0   Discharge Date: 07/09/2025   Attending Physician: Vinny Azevedo MD   Discharging Provider: Tez Diaz MD      HPI:   Mrs Zoë Enrique is a 75 year old female with PMH of atrial fibrillation s/p multiple ablations, hypothyroidism who presents to [facility] for Watchman device implant with Dr. Azevedo. During a prior ablation it was noted her BRITTNI was isolated indicating a marked elevation in stroke risk. A watchman device was recommended.     Hospital Course:   Mrs Enrique underwent successful watchman device implantation. She tolerated the procedure well with no immediate complications. She completed bed rest and ambulated without bleeding/hematoma. She was discharghed home in stable condition.     Follow up:   Follow up for repeat KHANH in 6 weeks   Follow up with Dr. Azevedo in 3 months       Disposition:   Home or Self Care         Medication List        CONTINUE taking these medications      acyclovir 400 MG tablet  Commonly known as: ZOVIRAX  Take 1 tablet (400 mg total) by mouth 3 (three) times daily as needed.     amoxicillin 500 MG capsule  Commonly known as: AMOXIL     docusate sodium 250 MG capsule  Commonly known as: COLACE     estradioL 0.01 % (0.1 mg/gram) vaginal cream  Commonly known as: ESTRACE  Place 1 g vaginally twice a week.     famotidine 40 MG tablet  Commonly known as: PEPCID  Take 1 tablet (40 mg total) by mouth every evening.     fish oil-omega-3 fatty acids 300-1,000 mg capsule     HYDROcodone-acetaminophen 5-325 mg per tablet  Commonly known as: NORCO  Take 1 tablet by mouth every 6 (six) hours as needed for Pain.     ipratropium 42 mcg (0.06 %) nasal spray  Commonly known as: ATROVENT  2 sprays by Each Nostril route 3 (three) times daily.     levothyroxine 25 MCG tablet  Commonly known as: SYNTHROID  Take 1 tablet  (25 mcg total) by mouth before breakfast.     omeprazole 40 MG capsule  Commonly known as: PRILOSEC  Take 1 capsule (40 mg total) by mouth once daily.     PRESERVISION AREDS-2 ORAL     sodium chloride 3% 3 % nebulizer solution  Take 4 mLs by nebulization 2 (two) times a day.     sodium,potassium,mag sulfates 17.5-3.13-1.6 gram Solr  Commonly known as: SUPREP BOWEL PREP KIT  Take 177 mLs by mouth once daily. Take as instructed by Endoscopy nurse      VEVYE 0.1 % Drop  Generic drug: cycloSPORINE  Place 1 drop into both eyes 2 (two) times a day.     XARELTO 20 mg Tab  Generic drug: rivaroxaban  TAKE 1 TABLET BY MOUTH ONCE DAILY                Tez Diaz MD  [facility]  Electrophysiology, PGY-VIII

## 2025-07-09 NOTE — ANESTHESIA PREPROCEDURE EVALUATION
2025  Pre-operative evaluation for Procedure(s) (LRB):  Left atrial appendage closure device (N/A)  ECHOCARDIOGRAM, TRANSESOPHAGEAL (N/A)    Zoë Enrique is a 75 y.o. female with afib here for Watchman device    Problem List[1]    Review of patient's allergies indicates:   Allergen Reactions    Morphine Nausea And Vomiting    Flecainide      Low blood pressure    Succinylcholine      Other reaction(s): v tach       Medications Ordered Prior to Encounter[2]    Past Surgical History:   Procedure Laterality Date    ABLATION OF ARRHYTHMOGENIC FOCUS FOR ATRIAL FIBRILLATION N/A 2020    Procedure: ABLATION, ARRHYTHMOGENIC FOCUS, FOR ATRIAL FIBRILLATION;  Surgeon: Moe Seymour MD;  Location: [facility] EP LAB;  Service: Cardiology;  Laterality: N/A;  AFL/AF, KHANH, CTI, PVI, RFA, CARTO, GEN, DM, 3 PREP    ABLATION, ATRIAL FLUTTER, ATYPICAL N/A 2025    Procedure: Ablation, Atrial Flutter, Atypical;  Surgeon: Vinny Azevedo MD;  Location: [facility] EP LAB;  Service: Cardiology;  Laterality: N/A;  AFL (atypical), KHANH (cx if SR), RFA, MIYA, GEN, MB, 3prep    AUGMENTATION OF BREAST Bilateral     35 yrs     BREAST SURGERY      augmentation    CARDIOVERSION  2020    Procedure: Cardioversion;  Surgeon: Moe Seymour MD;  Location: [facility] EP LAB;  Service: Cardiology;;     SECTION      COLON SURGERY      partial colon resection    COLONOSCOPY N/A 2021    Procedure: COLONOSCOPY;  Surgeon: Sahara Salgado MD;  Location: [facility] ENDO ([address] [place] [address]);  Service: Endoscopy;  Laterality: N/A;  constipation prep- 7 days of miralax, 2 fulls of fulls, then day bf clears and split prep   covid test 3/5 pcw, prep instr emailed, antibiotics prophylactically, Xarelto hold x 2 days per Dr. Seymour-see telephone encounter /10 -ml    COLPOSCOPY      COSMETIC SURGERY      DE QUERVAIN'S RELEASE  Left 08/02/2022    Procedure: RELEASE, HAND, FOR DEQUERVAIN'S TENOSYNOVITIS;  Surgeon: Toñito Adame Jr., MD;  Location: [facility] OR;  Service: Orthopedics;  Laterality: Left;    DIAGNOSTIC CARDIAC ELECTROPHYSIOLOGY STUDY  2/17/2025    Procedure: EP - diagnostic;  Surgeon: Vinny Azevedo MD;  Location: [facility] EP LAB;  Service: Cardiology;;    ECHOCARDIOGRAM,TRANSESOPHAGEAL N/A 7/8/2024    Procedure: Transesophageal echo (KHANH) intra-procedure log documentation;  Surgeon: Ramírez Ortez III, MD;  Location: [facility] EP LAB;  Service: Cardiology;  Laterality: N/A;    ESOPHAGOGASTRODUODENOSCOPY N/A 03/19/2021    Procedure: EGD (ESOPHAGOGASTRODUODENOSCOPY);  Surgeon: Sahara Salgado MD;  Location[facility]  Service: Endoscopy;  Laterality: N/A;    EYE SURGERY Bilateral 2016    Lasik, then cataracts extraction    JOINT REPLACEMENT Right 2/07, 11/07, 2/09    KNEE ARTHROPLASTY Right 2007    revison in 2009    KNEE SURGERY Right 1989    distal femoral replacing TK    OOPHORECTOMY      TOTAL ABDOMINAL HYSTERECTOMY  1994    TAHBSO    TRANSESOPHAGEAL ECHOCARDIOGRAPHY N/A 11/24/2020    Procedure: ECHOCARDIOGRAM, TRANSESOPHAGEAL;  Surgeon: Ramírez Ortez III, MD;  Location: [facility] EP LAB;  Service: Cardiology;  Laterality: N/A;    TREATMENT OF CARDIAC ARRHYTHMIA N/A 08/12/2019    Procedure: CARDIOVERSION;  Surgeon: Moe Seymour MD;  Location: [facility] EP LAB;  Service: Cardiology;  Laterality: N/A;  KHANH/DCCV/MAC/DM/3PREP/*ADMIT FOR TIKOSYN*    TREATMENT OF CARDIAC ARRHYTHMIA N/A 7/8/2024    Procedure: Cardioversion or Defibrillation;  Surgeon: GABRIELE Bull MD;  Location: [facility] EP LAB;  Service: Cardiology;  Laterality: N/A;  AF, KHANH, DCCV, MAC, DM, 3 Prep    TRIGGER FINGER RELEASE Left 08/02/2022    Procedure: RELEASE, TRIGGER FINGER;  Surgeon: Toñito Adame Jr., MD;  Location: [facility] OR;  Service: Orthopedics;  Laterality: Left;  left middle finger       Social History[3]        Pre-op Assessment    I  have reviewed the Patient Summary Reports.     I have reviewed the Nursing Notes. I have reviewed the NPO Status.      Review of Systems  Anesthesia Hx:  No problems with previous Anesthesia                Cardiovascular:         Dysrhythmias atrial fibrillation                                     Hepatic/GI:  Hepatic/GI Normal                    Neurological:    Neuromuscular Disease,  Headaches                                 Endocrine:   Hypothyroidism              Physical Exam    Airway:  Mallampati: II   Mouth Opening: Normal  Tongue: Normal    Chest/Lungs:  Normal Respiratory Rate    Heart:  Rhythm: Regular Rhythm        Anesthesia Plan  Type of Anesthesia, risks & benefits discussed:    Anesthesia Type: Gen ETT  Intra-op Monitoring Plan: Standard ASA Monitors  Induction:  IV  Informed Consent: Informed consent signed with the Patient and all parties understand the risks and agree with anesthesia plan.  All questions answered.   ASA Score: 2    Ready For Surgery From Anesthesia Perspective.     .           [1]   Patient Active Problem List  Diagnosis    Chronic tension-type headache, not intractable    Paroxysmal atrial fibrillation    Hypothyroidism (acquired)    Acute pain of left wrist    Diffuse arthralgia    History of arthroplasty of right knee    Greater trochanteric bursitis of right hip    Weakness of hip    Hip pain    Longstanding persistent atrial fibrillation    Bronchiectasis without complication    Cough    Fecal occult blood test positive    Dysphagia    PSVT (paroxysmal supraventricular tachycardia)    Urge incontinence    Nocturia    Pelvic floor weakness    De Quervain's tenosynovitis, left    Trigger middle finger of right hand    Right knee pain    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    Decreased range of motion of left wrist    Decreased  strength of left hand    Impaired sensation    Aortic atherosclerosis    On anticoagulant therapy    APC (atrial premature  contractions)    Right hip pain    Weakness    Pulmonary hypertension    Hypotension due to drugs   [2]   Current Facility-Administered Medications on File Prior to Encounter   Medication Dose Route Frequency Provider Last Rate Last Admin    sodium chloride 0.9% flush 5 mL  5 mL Intravenous PRN Aleksandra Taylor NP         Current Outpatient Medications on File Prior to Encounter   Medication Sig Dispense Refill    docusate sodium (COLACE) 250 MG capsule Take 250 mg by mouth 2 (two) times daily.       omega-3 fatty acids/fish oil (FISH OIL-OMEGA-3 FATTY ACIDS) 300-1,000 mg capsule Take by mouth once daily.      vit C/E/Zn/coppr/lutein/zeaxan (PRESERVISION AREDS-2 ORAL)       amoxicillin (AMOXIL) 500 MG capsule Take 500 mg by mouth as needed (takes prior to all dental procedures).      cycloSPORINE (VEVYE) 0.1 % Drop Place 1 drop into both eyes 2 (two) times a day. 2 mL 6    sodium chloride 3% 3 % nebulizer solution Take 4 mLs by nebulization 2 (two) times a day. 300 mL PRN   [3]   Social History  Socioeconomic History    Marital status:    Tobacco Use    Smoking status: Never     Passive exposure: Never    Smokeless tobacco: Never   Vaping Use    Vaping status: Never Used   Substance and Sexual Activity    Alcohol use: Yes     Alcohol/week: 2.0 standard drinks of alcohol     Types: 2 Shots of liquor per week     Comment: occasionally    Drug use: No     Comment: CBD oils for headache--use occasionally    Sexual activity: Not Currently     Partners: Male     Birth control/protection: Post-menopausal, See Surgical Hx, None     Comment: , together x 37 years   Social History Narrative    N/A PER THE PATIENT.     Social Drivers of Health     Financial Resource Strain: Low Risk  (3/26/2025)    Overall Financial Resource Strain (CARDIA)     Difficulty of Paying Living Expenses: Not very hard   Food Insecurity: No Food Insecurity (3/26/2025)    Hunger Vital Sign     Worried About Running Out of Food in  the Last Year: Never true     Ran Out of Food in the Last Year: Never true   Transportation Needs: No Transportation Needs (3/26/2025)    PRAPARE - Transportation     Lack of Transportation (Medical): No     Lack of Transportation (Non-Medical): No   Physical Activity: Insufficiently Active (3/26/2025)    Exercise Vital Sign     Days of Exercise per Week: 1 day     Minutes of Exercise per Session: 10 min   Stress: No Stress Concern Present (3/26/2025)    Citizen of [place] of Occupational Health - Occupational Stress Questionnaire     Feeling of Stress : Not at all   Housing Stability: Low Risk  (3/26/2025)    Housing Stability Vital Sign     Unable to Pay for Housing in the Last Year: No     Number of Times Moved in the Last Year: 0     Homeless in the Last Year: No

## 2025-07-09 NOTE — CONSULTS
[facility], [place]  KHANH Consult      Zoë Enrique  YOB: 1950  Medical Record Number:  [id number]  Attending Physician:  Vinny Azevedo MD   Current Principal Problem:  <principal problem not specified>    History     Cc: afib    HPI   75 F with afib/afl who presents for LAAO with watchman device. KHANH requested for procedural guidance.    Last KHANH 7/8/24:    KHANH prior to ablation.    Left Ventricle: The left ventricle is normal in size. There is normal systolic function with a visually estimated ejection fraction of 55 - 60%.    Right Ventricle: Normal right ventricular cavity size. Systolic function is normal.    Left Atrium: Left atrium is dilated. The left atrial appendage appears normal. The left atrial appendage has a chicken wing morphology. Appendage velocity is reduced at less than 40 cm/sec. There is no thrombus in the left atrial appendage. The pulmonary veins have systolic blunting.    Mitral Valve: There is moderate posterior mitral annular calcification present. There is likely a small perforation of the posterior mitral leaflet (P2) with associated mild MR. There is prolapse of the posterior mitral leaflet. There is mild regurgitation.    Tricuspid Valve: There is mild regurgitation.    Aorta: Grade 2 atherosclerosis of the descending aorta and in the aortic arch.    Pericardium: There is a small effusion adjacent to the right ventricle.    Dysphagia or odynophagia:  yes (was present during last KHANH as well)  Liver Disease, esophageal disease, or known varices:  No  Upper GI Bleeding: No  Snoring:  No  Sleep Apnea:  No  Prior neck surgery or radiation:  No  History of anesthetic difficulties:  No  Family history of anesthetic difficulties:  No  Last oral intake: yesterday before midnight  Able to move neck in all directions:  Yes    Medications - Outpatient  Prior to Admission medications    Medication Sig Start Date End Date Taking? Authorizing Provider    acyclovir (ZOVIRAX) 400 MG tablet Take 1 tablet (400 mg total) by mouth 3 (three) times daily as needed. 4/9/25  Yes Praveen Jeffery MD   docusate sodium (COLACE) 250 MG capsule Take 250 mg by mouth 2 (two) times daily.    Yes Provider, Historical   famotidine (PEPCID) 40 MG tablet Take 1 tablet (40 mg total) by mouth every evening. 4/29/25 4/29/26 Yes Linda Doll MD   HYDROcodone-acetaminophen (NORCO) 5-325 mg per tablet Take 1 tablet by mouth every 6 (six) hours as needed for Pain. 6/30/25  Yes Praveen Jeffery MD   ipratropium (ATROVENT) 42 mcg (0.06 %) nasal spray 2 sprays by Each Nostril route 3 (three) times daily. 4/29/25  Yes Linda Doll MD   levothyroxine (SYNTHROID) 25 MCG tablet Take 1 tablet (25 mcg total) by mouth before breakfast. 4/9/25  Yes Praveen Jeffery MD   omega-3 fatty acids/fish oil (FISH OIL-OMEGA-3 FATTY ACIDS) 300-1,000 mg capsule Take by mouth once daily.   Yes Provider, Historical   omeprazole (PRILOSEC) 40 MG capsule Take 1 capsule (40 mg total) by mouth once daily. 4/29/25 4/29/26 Yes Linda Doll MD   vit C/E/Zn/coppr/lutein/zeaxan (PRESERVISION AREDS-2 ORAL)  3/1/16  Yes Provider, Historical   XARELTO 20 mg Tab TAKE 1 TABLET BY MOUTH ONCE DAILY 6/17/25  Yes Vinny Azevedo MD   amoxicillin (AMOXIL) 500 MG capsule Take 500 mg by mouth as needed (takes prior to all dental procedures). 4/11/24   Provider, Historical   cycloSPORINE (VEVYE) 0.1 % Drop Place 1 drop into both eyes 2 (two) times a day. 3/24/25 3/24/26  Marya Cruz OD   estradioL (ESTRACE) 0.01 % (0.1 mg/gram) vaginal cream Place 1 g vaginally twice a week. 4/28/25   Nabila Keller MD   sodium chloride 3% 3 % nebulizer solution Take 4 mLs by nebulization 2 (two) times a day. 9/28/23   Santos Rivas MD   sodium,potassium,mag sulfates (SUPREP BOWEL PREP KIT) 17.5-3.13-1.6 gram SolR Take 177 mLs by mouth once daily. Take as instructed by Endoscopy nurse   25   Sahara Salgado MD       Medications - Current  Scheduled Meds:  Continuous Infusions:    Allergies  Review of patient's allergies indicates:   Allergen Reactions    Morphine Nausea And Vomiting    Flecainide      Low blood pressure    Succinylcholine      Other reaction(s): v tach     Past Medical History  Past Medical History:   Diagnosis Date    Abnormal Pap smear of cervix     Atrial fibrillation 2017    Atrial flutter     Basal cell carcinoma 2021    superficial BCC R cheek s/p Efudex    Cancer     osteosarcoma of right femur    Diverticulosis     Hx of atrial flutter     Hx of mitral valve prolapse     Hypothyroidism     Migraines     Recurrent upper respiratory infection (URI)     Supraventricular tachycardia      Past Surgical History  Past Surgical History:   Procedure Laterality Date    ABLATION OF ARRHYTHMOGENIC FOCUS FOR ATRIAL FIBRILLATION N/A 2020    Procedure: ABLATION, ARRHYTHMOGENIC FOCUS, FOR ATRIAL FIBRILLATION;  Surgeon: Moe Seymour MD;  Location: [facility] EP LAB;  Service: Cardiology;  Laterality: N/A;  AFL/AF, KHANH, CTI, PVI, RFA, CARTO, GEN, DM, 3 PREP    ABLATION, ATRIAL FLUTTER, ATYPICAL N/A 2025    Procedure: Ablation, Atrial Flutter, Atypical;  Surgeon: Vinny Azevedo MD;  Location: [facility] EP LAB;  Service: Cardiology;  Laterality: N/A;  AFL (atypical), KHANH (cx if SR), RFA, MIYA, GEN, MB, 3prep    AUGMENTATION OF BREAST Bilateral 1984    35 yrs     BREAST SURGERY  1984    augmentation    CARDIOVERSION  2020    Procedure: Cardioversion;  Surgeon: Moe Seymour MD;  Location: [facility] EP LAB;  Service: Cardiology;;     SECTION      COLON SURGERY      partial colon resection    COLONOSCOPY N/A 2021    Procedure: COLONOSCOPY;  Surgeon: Sahara Salgado MD;  Location: [facility] ENDO (4TH FLR);  Service: Endoscopy;  Laterality: N/A;  constipation prep- 7 days of miralax, 2 fulls of fulls, then day bf clears and split  prep   covid test 3/5 pcw, prep instr emailed, antibiotics prophylactically, Xarelto hold x 2 days per Dr. Seymour-see telephone encounter 2/10 -ml    COLPOSCOPY      COSMETIC SURGERY  1987    DE QUERVAIN'S RELEASE Left 08/02/2022    Procedure: RELEASE, HAND, FOR DEQUERVAIN'S TENOSYNOVITIS;  Surgeon: Toñito Adame Jr., MD;  Location: [facility] OR;  Service: Orthopedics;  Laterality: Left;    DIAGNOSTIC CARDIAC ELECTROPHYSIOLOGY STUDY  2/17/2025    Procedure: EP - diagnostic;  Surgeon: Vinny Azevedo MD;  Location: [facility] EP LAB;  Service: Cardiology;;    ECHOCARDIOGRAM,TRANSESOPHAGEAL N/A 7/8/2024    Procedure: Transesophageal echo (KHANH) intra-procedure log documentation;  Surgeon: Ramírez Ortez III, MD;  Location: [facility] EP LAB;  Service: Cardiology;  Laterality: N/A;    ESOPHAGOGASTRODUODENOSCOPY N/A 03/19/2021    Procedure: EGD (ESOPHAGOGASTRODUODENOSCOPY);  Surgeon: Sahraa Salgado MD;  Location: [address]);  Service: Endoscopy;  Laterality: N/A;    EYE SURGERY Bilateral 2016    Lasik, then cataracts extraction    JOINT REPLACEMENT Right 2/07, 11/07, 2/09    KNEE ARTHROPLASTY Right 2007    revison in 2009    KNEE SURGERY Right 1989    distal femoral replacing TK    OOPHORECTOMY      TOTAL ABDOMINAL HYSTERECTOMY  1994    TAHBSO    TRANSESOPHAGEAL ECHOCARDIOGRAPHY N/A 11/24/2020    Procedure: ECHOCARDIOGRAM, TRANSESOPHAGEAL;  Surgeon: Ramírez Ortez III, MD;  Location: [facility] EP LAB;  Service: Cardiology;  Laterality: N/A;    TREATMENT OF CARDIAC ARRHYTHMIA N/A 08/12/2019    Procedure: CARDIOVERSION;  Surgeon: Moe Seymour MD;  Location: [facility] EP LAB;  Service: Cardiology;  Laterality: N/A;  KHANH/DCCV/MAC/DM/3PREP/*ADMIT FOR TIKOSYN*    TREATMENT OF CARDIAC ARRHYTHMIA N/A 7/8/2024    Procedure: Cardioversion or Defibrillation;  Surgeon: GABRIELE Bull MD;  Location: [facility] EP LAB;  Service: Cardiology;  Laterality: N/A;  AF, KHANH, DCCV, MAC, DM, 3 Prep    TRIGGER FINGER RELEASE Left 08/02/2022     Procedure: RELEASE, TRIGGER FINGER;  Surgeon: Toñito Adame Jr., MD;  Location: [facility];  Service: Orthopedics;  Laterality: Left;  left middle finger     ROS  10 point ROS performed and negative except as stated in HPI     Physical Examination   Vital Signs  Vitals  Temp: 97.9 °F (36.6 °C)  Temp Source: Temporal  Pulse: (!) 59  Heart Rate Source: SpO2  Resp: 18  SpO2: 95 %  BP: 131/78  MAP (mmHg): 98  BP Location: Right arm  BP Method: Automatic  Patient Position: Lying    24 Hour VS Range  Temp:  [97.9 °F (36.6 °C)]   Pulse:  [59]   Resp:  [18]   BP: (131-136)/(72-78)   SpO2:  [95 %]   No intake or output data in the 24 hours ending 07/09/25 0719      Physical Exam  Vitals and nursing note reviewed.   Constitutional:       General: She is not in acute distress.     Appearance: Normal appearance. She is not ill-appearing.   HENT:      Head: Atraumatic.      Right Ear: External ear normal.      Left Ear: External ear normal.   Eyes:      Extraocular Movements: Extraocular movements intact.   Cardiovascular:      Rate and Rhythm: Normal rate. Rhythm irregular.      Pulses: Normal pulses.      Heart sounds: Normal heart sounds. No murmur heard.  Pulmonary:      Effort: Pulmonary effort is normal. No respiratory distress.      Breath sounds: Normal breath sounds.   Abdominal:      General: Abdomen is flat. Bowel sounds are normal.      Palpations: Abdomen is soft. There is no mass.      Tenderness: There is no abdominal tenderness. There is no guarding.   Musculoskeletal:         General: No tenderness. Normal range of motion.      Right lower leg: No edema.      Left lower leg: No edema.   Skin:     General: Skin is warm and dry.   Neurological:      General: No focal deficit present.      Mental Status: She is alert and oriented to person, place, and time.   Psychiatric:         Mood and Affect: Mood normal.         Behavior: Behavior normal.         Data     Recent Labs   Lab 07/03/25  1438   WBC 4.99   HGB  12.9   HCT 38.8         Recent Labs   Lab 07/03/25  1438   PROTIME 11.5   INR 1.1      Recent Labs   Lab 07/03/25  1438      K 4.5      CO2 28   BUN 14   CREATININE 0.8   ANIONGAP 8   CALCIUM 9.1      Assessment & Plan     KHANH for LAAO procedure  -No absolute contraindications of esophageal stricture, tumor, perforation, laceration,or diverticulum and/or active GI bleed  -The risks, benefits & alternatives of the procedure were explained to the patient.   -The risks of transesophageal echo include but are not limited to:  Dental trauma, esophageal trauma/perforation, bleeding, laryngospasm/brochospasm, aspiration, sore throat/hoarseness, & dislodgement of the endotracheal tube/nasogastric tube (where applicable).    -The risks of ANES monitored sedation include hypotension, respiratory depression, arrhythmias, bronchospasm, & death.    -Informed consent was obtained. The patient is agreeable to proceed with the procedure and all questions and concerns addressed.    Case discussed with an attending in echocardiography lab.    Lillie Romero MD  [facility]   Cardiovascular Disease PGY-6

## 2025-07-09 NOTE — DISCHARGE INSTRUCTIONS
Discharge Instructions and Care of Your Groin      Catheter Insertion Site  The morning after your procedure, you may take the dressing off. The easiest way to do this is when you are showering, get the tape and dressing wet and remove it.  After the bandage is removed, cover the area with a small adhesive bandage. It is normal for the catheter insertion site to be black and blue for a couple of days. The site may also be slightly swollen and pink, and there may be a small lump (about the size of a quarter) at the site.  Wash the catheter insertion site at least once daily with soap and water. Place soapy water on your hand or washcloth and gently wash the insertion site; do not rub.  Keep the area clean and dry when you are not showering.  Do not use creams, lotions or ointment on the wound site.  Wear loose clothes and loose underwear.  Do not take a bath, tub soak, go in a Jacuzzi, or swim in a pool or lake for one week after the procedure.    Activity Instructions  Do not strain during bowel movements for the first 3 to 4 days after the procedure to prevent bleeding from the catheter insertion site.  Avoid heavy lifting (more than 10 pounds) and pushing or pulling heavy objects for the first 5 to 7 days after the procedure.  Do not participate in strenuous activities for 5 days after the procedure. This includes most sports - jogging, golfing, play tennis, and bowling.  You may climb stairs if needed, but walk up and down the stairs more slowly than usual.  Gradually increase your activities until you reach your normal activity level within one week after the procedure.      If bleeding should occur following discharge:  Sit down and apply firm pressure to the puncture site with your fingers for 10 minutes   If the bleeding stops, continue to sit quietly, keeping your leg straight for 2 hours. Notify your physician as soon as possible   If bleeding does not stop after 10 minutes or if there is a large amount of  bleeding or spurting, call 911 immediately.  Do not drive yourself to the hospital.     Notify your physician if these symptoms persist or if you experience:  Change in color or temperature of the leg  Redness, heat, or pus at the puncture site  Chills or fever greater than 101.0 F     Because of the sedation you were given for your procedure, we recommend that you have someone stay with you overnight following your procedure.  Do not drive a car or operate machinery for the remainder of the day.  Do not consume any alcoholic beverages for the remainder of the day.  Postpone signing any important papers or making any important decision for the remainder of the day.  Do not take any muscle relaxants, sedatives, hypnotics, or mood-altering medication today unless ordered by your physician who is aware you are taking the medication today.

## 2025-07-09 NOTE — PROGRESS NOTES
Dr. Diaz at bedside, notified of bleeding to patients right groin. Dressing saturated at 1015 check. Dressing removed and pressure held by RN for 5 minutes. MD assessed site and stated ok to place quick clot dressing. Will continue to monitor.

## 2025-07-09 NOTE — H&P
[facility], [place]  Electrophysiology  H&P      Zoë Enrique   YOB: 1950   Medical Record Number: [id number]   Attending Physician:    Date of Admission: 07/09/2025       Hospital Day:  0  Current Principal Problem:  <principal problem not specified>      History     Cc: atrial fibrillation     HPI  Mrs Zoë Enrique is a 75 year old female with PMH of atrial fibrillation s/p multiple ablations, hypothyroidism who presents to [facility] for Watchman device implant with Dr. Azevedo. During a prior ablation it was noted her BRITTNI was isolated indicating a marked elevation in stroke risk. A watchman device was recommended.           Medications - Outpatient  Prior to Admission medications    Medication Sig Start Date End Date Taking? Authorizing Provider   acyclovir (ZOVIRAX) 400 MG tablet Take 1 tablet (400 mg total) by mouth 3 (three) times daily as needed. 4/9/25   Praveen Jeffery MD   amoxicillin (AMOXIL) 500 MG capsule Take 500 mg by mouth as needed (takes prior to all dental procedures). 4/11/24   Provider, Historical   cycloSPORINE (VEVYE) 0.1 % Drop Place 1 drop into both eyes 2 (two) times a day. 3/24/25 3/24/26  Marya Cruz OD   docusate sodium (COLACE) 250 MG capsule Take 250 mg by mouth 2 (two) times daily.     Provider, Historical   estradioL (ESTRACE) 0.01 % (0.1 mg/gram) vaginal cream Place 1 g vaginally twice a week. 4/28/25   Nabila Keller MD   famotidine (PEPCID) 40 MG tablet Take 1 tablet (40 mg total) by mouth every evening. 4/29/25 4/29/26  Linda Doll MD   HYDROcodone-acetaminophen (NORCO) 5-325 mg per tablet Take 1 tablet by mouth every 6 (six) hours as needed for Pain. 6/30/25   Praveen Jeffery MD   ipratropium (ATROVENT) 42 mcg (0.06 %) nasal spray 2 sprays by Each Nostril route 3 (three) times daily. 4/29/25   Linda Doll MD   levothyroxine (SYNTHROID) 25 MCG tablet Take 1 tablet (25 mcg total) by mouth before breakfast.  4/9/25   Praveen Jeffery MD   omega-3 fatty acids/fish oil (FISH OIL-OMEGA-3 FATTY ACIDS) 300-1,000 mg capsule Take by mouth once daily.    Provider, Historical   omeprazole (PRILOSEC) 40 MG capsule Take 1 capsule (40 mg total) by mouth once daily. 4/29/25 4/29/26  Linda Doll MD   sodium chloride 3% 3 % nebulizer solution Take 4 mLs by nebulization 2 (two) times a day. 9/28/23   Santos Rivas MD   sodium,potassium,mag sulfates (SUPREP BOWEL PREP KIT) 17.5-3.13-1.6 gram SolR Take 177 mLs by mouth once daily. Take as instructed by Endoscopy nurse  6/30/25   Sahara Salgado MD   vit C/E/Zn/coppr/lutein/zeaxan (PRESERVISION AREDS-2 ORAL)  3/1/16   Provider, Historical   XARELTO 20 mg Tab TAKE 1 TABLET BY MOUTH ONCE DAILY 6/17/25   Vinny Azevedo MD         Medications - Current  Scheduled Meds:  Continuous Infusions:  PRN Meds:.      Allergies  Review of patient's allergies indicates:   Allergen Reactions    Morphine Nausea And Vomiting    Flecainide      Low blood pressure    Succinylcholine      Other reaction(s): v tach         Past Medical History  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    Diverticulosis 1998    Hx of atrial flutter     Hx of mitral valve prolapse 1990    Hypothyroidism     Migraines     Recurrent upper respiratory infection (URI)     Supraventricular tachycardia          Past Surgical History  Past Surgical History:   Procedure Laterality Date    ABLATION OF ARRHYTHMOGENIC FOCUS FOR ATRIAL FIBRILLATION N/A 11/24/2020    Procedure: ABLATION, ARRHYTHMOGENIC FOCUS, FOR ATRIAL FIBRILLATION;  Surgeon: Moe Seymour MD;  Location: [facility] EP LAB;  Service: Cardiology;  Laterality: N/A;  AFL/AF, KHANH, CTI, PVI, RFA, CARTO, GEN, DM, 3 PREP    ABLATION, ATRIAL FLUTTER,  ATYPICAL N/A 2025    Procedure: Ablation, Atrial Flutter, Atypical;  Surgeon: Vinny Azevedo MD;  Location: [facility] EP LAB;  Service: Cardiology;  Laterality: N/A;  AFL (atypical), KHANH (cx if SR), RFA, MIYA, GEN, MB, 3prep    AUGMENTATION OF BREAST Bilateral 1984    35 yrs     BREAST SURGERY  1984    augmentation    CARDIOVERSION  2020    Procedure: Cardioversion;  Surgeon: Moe Seymour MD;  Location: [facility] EP LAB;  Service: Cardiology;;     SECTION  1988    COLON SURGERY      partial colon resection    COLONOSCOPY N/A 2021    Procedure: COLONOSCOPY;  Surgeon: Sahara Salgado MD;  Location: [facility] ENDO (4TH FLR);  Service: Endoscopy;  Laterality: N/A;  constipation prep- 7 days of miralax, 2 fulls of fulls, then day bf clears and split prep   covid test 3/5 pcw, prep instr emailed, antibiotics prophylactically, Xarelto hold x 2 days per Dr. Seymour-see telephone encounter 2/10 -ml    COLPOSCOPY      COSMETIC SURGERY  1987    DE QUERVAIN'S RELEASE Left 2022    Procedure: RELEASE, HAND, FOR DEQUERVAIN'S TENOSYNOVITIS;  Surgeon: Toñito Adame Jr., MD;  Location: [facility] OR;  Service: Orthopedics;  Laterality: Left;    DIAGNOSTIC CARDIAC ELECTROPHYSIOLOGY STUDY  2025    Procedure: EP - diagnostic;  Surgeon: Vinny Azevedo MD;  Location: [facility] EP LAB;  Service: Cardiology;;    ECHOCARDIOGRAM,TRANSESOPHAGEAL N/A 2024    Procedure: Transesophageal echo (KHANH) intra-procedure log documentation;  Surgeon: Ramírez Ortez III, MD;  Location: [facility] EP LAB;  Service: Cardiology;  Laterality: N/A;    ESOPHAGOGASTRODUODENOSCOPY N/A 2021    Procedure: EGD (ESOPHAGOGASTRODUODENOSCOPY);  Surgeon: Sahara Salgado MD;  Location: [facility] ENDO (4TH FLR);  Service: Endoscopy;  Laterality: N/A;    EYE SURGERY Bilateral 2016    Lasik, then cataracts extraction    JOINT REPLACEMENT Right , ,     KNEE ARTHROPLASTY Right     revison in     KNEE SURGERY Right      distal femoral replacing TK    OOPHORECTOMY      TOTAL ABDOMINAL HYSTERECTOMY  1994    TAHBSO    TRANSESOPHAGEAL ECHOCARDIOGRAPHY N/A 11/24/2020    Procedure: ECHOCARDIOGRAM, TRANSESOPHAGEAL;  Surgeon: Ramírez Ortez III, MD;  Location: [facility] EP LAB;  Service: Cardiology;  Laterality: N/A;    TREATMENT OF CARDIAC ARRHYTHMIA N/A 08/12/2019    Procedure: CARDIOVERSION;  Surgeon: Moe Seymour MD;  Location: [facility] EP LAB;  Service: Cardiology;  Laterality: N/A;  KHANH/DCCV/MAC/DM/3PREP/*ADMIT FOR TIKOSYN*    TREATMENT OF CARDIAC ARRHYTHMIA N/A 7/8/2024    Procedure: Cardioversion or Defibrillation;  Surgeon: GABRIELE Bull MD;  Location: [facility] EP LAB;  Service: Cardiology;  Laterality: N/A;  AF, KHANH, DCCV, MAC, DM, 3 Prep    TRIGGER FINGER RELEASE Left 08/02/2022    Procedure: RELEASE, TRIGGER FINGER;  Surgeon: Toñito Adame Jr., MD;  Location: [facility] OR;  Service: Orthopedics;  Laterality: Left;  left middle finger         Social History  Social History     Socioeconomic History    Marital status:    Tobacco Use    Smoking status: Never     Passive exposure: Never    Smokeless tobacco: Never   Substance and Sexual Activity    Alcohol use: Yes     Alcohol/week: 2.0 standard drinks of alcohol     Types: 2 Shots of liquor per week     Comment: occasionally    Drug use: No     Comment: CBD oils for headache    Sexual activity: Not Currently     Partners: Male     Birth control/protection: Post-menopausal, See Surgical Hx, None     Comment: , together x 37 years   Social History Narrative    N/A PER THE PATIENT.     Social Drivers of Health     Financial Resource Strain: Low Risk  (3/26/2025)    Overall Financial Resource Strain (CARDIA)     Difficulty of Paying Living Expenses: Not very hard   Food Insecurity: No Food Insecurity (3/26/2025)    Hunger Vital Sign     Worried About Running Out of Food in the Last Year: Never true     Ran Out of Food in the Last Year: Never true   Transportation Needs:  "No Transportation Needs (3/26/2025)    PRAPARE - Transportation     Lack of Transportation (Medical): No     Lack of Transportation (Non-Medical): No   Physical Activity: Insufficiently Active (3/26/2025)    Exercise Vital Sign     Days of Exercise per Week: 1 day     Minutes of Exercise per Session: 10 min   Stress: No Stress Concern Present (3/26/2025)    [place] [employer] - Occupational Stress Questionnaire     Feeling of Stress : Not at all   Housing Stability: Low Risk  (3/26/2025)    Housing Stability Vital Sign     Unable to Pay for Housing in the Last Year: No     Number of Times Moved in the Last Year: 0     Homeless in the Last Year: No         ROS  10 point ROS performed and negative except as stated in HPI     Physical Examination         Vital Signs             24 Hour VS Range       No intake or output data in the 24 hours ending 07/09/25 0043        Physical Exam:   Constitutional: no acute distress  HEENT: NCAT, EOMI, no scleral icterus  Cardiovascular: Regular rate and rhythm  Pulmonary: Normal respiratory effort   Abdomen: nontender, non-distended   Neuro: alert and oriented, no focal deficits  Extremities: warm, no edema   MSK: no deformities  Integument: intact, no rashes       Data       Recent Labs   Lab 07/03/25  1438   WBC 4.99   HGB 12.9   HCT 38.8           Recent Labs   Lab 07/03/25  1438   PROTIME 11.5   INR 1.1        Recent Labs   Lab 07/03/25  1438      K 4.5      CO2 28   BUN 14   CREATININE 0.8   ANIONGAP 8   CALCIUM 9.1        No results for input(s): "PROT", "ALBUMIN", "BILITOT", "ALKPHOS", "AST", "ALT" in the last 168 hours.     No results for input(s): "TROPONINI" in the last 168 hours.     No results found for: "BNP"    No results for input(s): "LABBLOO" in the last 168 hours.         Assessment & Plan   75 year old female with PMH of atrial fibrillation s/p multiple ablations, hypothyroidism who presents to [facility] for Watchman device " implant with Dr. Azevedo.    Atrial fibrillation  BRITTNI isolation:   Risks/benefits/alternatives discussed with patient and she agrees to proceed. Consents signed.   -To EP lab for watchman device implant         Tez Diaz MD  [facility]  Electrophysiology, PGY-VIII

## 2025-07-09 NOTE — ANESTHESIA POSTPROCEDURE EVALUATION
Anesthesia Post Evaluation    Patient: Zoë Enrique    Procedure(s) Performed: Procedure(s) (LRB):  Left atrial appendage closure device (N/A)  ECHOCARDIOGRAM, TRANSESOPHAGEAL (N/A)    Final Anesthesia Type: general      Patient location during evaluation: PACU  Patient participation: Yes- Able to Participate  Level of consciousness: awake and alert  Post-procedure vital signs: reviewed and stable  Pain management: adequate  Airway patency: patent    PONV status at discharge: No PONV  Anesthetic complications: no      Cardiovascular status: blood pressure returned to baseline  Respiratory status: unassisted  Follow-up not needed.              Vitals Value Taken Time   /61 07/09/25 10:47   Temp 36.7 °C (98.1 °F) 07/09/25 10:00   Pulse 91 07/09/25 10:49   Resp 18 07/09/25 10:30   SpO2 95 % 07/09/25 10:49   Vitals shown include unfiled device data.      No case tracking events are documented in the log.      Pain/Kim Score: Pain Rating Prior to Med Admin: 3 (7/9/2025 10:07 AM)  Kim Score: 10 (7/9/2025 10:15 AM)

## 2025-07-09 NOTE — PLAN OF CARE
Received report from FADI Duval. Patient s/p Watchman procedure, AAOx3. VSS, no c/o pain or discomfort at this time, resp even and unlabored. Vascade with Gauze/tegaderm dressing to R groin in placed with small amount of sanguinous drainage with julissa around the site. No active bleeding. No hematoma noted. Post procedure protocol reviewed with patient and patient's family. Understanding verbalized. Family members at bedside. Nurse call bell within reach.       Site redressed with new gauze and Tegaderm. No active bleeding, no hematoma noted.

## 2025-07-09 NOTE — Clinical Note
An angiography was performed of the BRITTNI. The angiography was performed via hand injection with 10 mL of contrast.

## 2025-07-09 NOTE — NURSING TRANSFER
Nursing Transfer Note      7/9/2025   11:10 AM    Nurse giving handoff: GABRIELE Stevens RN  Nurse receiving handoff:FADI Montoya    Reason patient is being transferred: PACU to SSCU    Transfer To: SSCU 1    Transfer via: Stretcher    Transfer with: Cardiac monitoring    Transported by: PCT    Transfer Vital Signs:  See flow-sheet    Telemetry: Box Number [id number]  Order for Tele Monitor? Yes    Additional Lines: N/A    Medicines sent: No    Any special needs or follow-up needed: Bedrest until 1215    Patient belongings transferred with patient: Yes    Chart send with patient: Yes    Notified: spouse    Patient reassessed at: 1100

## 2025-07-10 ENCOUNTER — PATIENT OUTREACH (OUTPATIENT)
Dept: ADMINISTRATIVE | Facility: CLINIC | Age: 75
End: 2025-07-10
Payer: MEDICARE

## 2025-07-10 ENCOUNTER — TELEPHONE (OUTPATIENT)
Dept: PULMONOLOGY | Facility: CLINIC | Age: 75
End: 2025-07-10
Payer: MEDICARE

## 2025-07-10 ENCOUNTER — PATIENT MESSAGE (OUTPATIENT)
Dept: ELECTROPHYSIOLOGY | Facility: CLINIC | Age: 75
End: 2025-07-10
Payer: MEDICARE

## 2025-07-10 NOTE — PROGRESS NOTES
C3 nurse spoke with Zoë Enrique for a TCC post hospital discharge follow up call. The patient does not have a scheduled HOSFU appointment with her PCP, Praveen Jeffery MD within 5-7 days post hospital discharge date of 7/9/25. Pt denies needing a HOSFU with her PCP or a NPHV. No messages routed at this time.

## 2025-07-11 LAB
POC ACTIVATED CLOTTING TIME K: 262 SEC (ref 74–137)
POC ACTIVATED CLOTTING TIME K: 279 SEC (ref 74–137)
SAMPLE: ABNORMAL
SAMPLE: ABNORMAL

## 2025-07-13 LAB
ABO + RH BLD: NORMAL
ABO + RH BLD: NORMAL
BLD PROD TYP BPU: NORMAL
BLD PROD TYP BPU: NORMAL
BLOOD UNIT EXPIRATION DATE: NORMAL
BLOOD UNIT EXPIRATION DATE: NORMAL
BLOOD UNIT TYPE CODE: 6200
BLOOD UNIT TYPE CODE: 6200
CROSSMATCH INTERPRETATION: NORMAL
CROSSMATCH INTERPRETATION: NORMAL
DISPENSE STATUS: NORMAL
DISPENSE STATUS: NORMAL
UNIT NUMBER: NORMAL
UNIT NUMBER: NORMAL

## 2025-07-15 ENCOUNTER — OFFICE VISIT (OUTPATIENT)
Dept: OTOLARYNGOLOGY | Facility: CLINIC | Age: 75
End: 2025-07-15
Payer: MEDICARE

## 2025-07-15 DIAGNOSIS — R05.3 CHRONIC COUGH: Chronic | ICD-10-CM

## 2025-07-15 PROCEDURE — 3288F FALL RISK ASSESSMENT DOCD: CPT | Mod: CPTII,HCNC,S$GLB, | Performed by: OTOLARYNGOLOGY

## 2025-07-15 PROCEDURE — 1159F MED LIST DOCD IN RCRD: CPT | Mod: CPTII,HCNC,S$GLB, | Performed by: OTOLARYNGOLOGY

## 2025-07-15 PROCEDURE — 31575 DIAGNOSTIC LARYNGOSCOPY: CPT | Mod: HCNC,S$GLB,, | Performed by: OTOLARYNGOLOGY

## 2025-07-15 PROCEDURE — 99999 PR PBB SHADOW E&M-EST. PATIENT-LVL III: CPT | Mod: PBBFAC,HCNC,, | Performed by: OTOLARYNGOLOGY

## 2025-07-15 PROCEDURE — 1101F PT FALLS ASSESS-DOCD LE1/YR: CPT | Mod: CPTII,HCNC,S$GLB, | Performed by: OTOLARYNGOLOGY

## 2025-07-15 PROCEDURE — 1111F DSCHRG MED/CURRENT MED MERGE: CPT | Mod: CPTII,HCNC,S$GLB, | Performed by: OTOLARYNGOLOGY

## 2025-07-15 PROCEDURE — 99213 OFFICE O/P EST LOW 20 MIN: CPT | Mod: 25,HCNC,S$GLB, | Performed by: OTOLARYNGOLOGY

## 2025-07-15 PROCEDURE — 1126F AMNT PAIN NOTED NONE PRSNT: CPT | Mod: CPTII,HCNC,S$GLB, | Performed by: OTOLARYNGOLOGY

## 2025-07-15 RX ORDER — GUAIFENESIN 600 MG/1
1200 TABLET, EXTENDED RELEASE ORAL 2 TIMES DAILY
Qty: 120 TABLET | Refills: 3 | Status: SHIPPED | OUTPATIENT
Start: 2025-07-15

## 2025-07-16 NOTE — PROGRESS NOTES
Ear, Nose, & Throat  Otolaryngology - Head & Neck Surgery    Summary of Visit:  Zoë Enrique was referred for voice concerns    Subjective:     Chief Complaint:   Chief Complaint   Patient presents with    Sinus Problem       Zoë Enrique is a 75 y.o. female who was referred for voice complaint.  She has a longstanding history of globus sensation, morning cough, with a occasional dysphonia.  She has been treated with dual therapy anti-reflux therapy with a PPI as well as an H2 blocker for several months with minimal subjective improvement in symptoms.  She denies any dysphagia or aspiration symptoms.   She also has persistent rhinitis symptoms in his awaiting posterior nasal nerve ablation.    Past Medical History  Active Ambulatory Problems     Diagnosis Date Noted    Chronic tension-type headache, not intractable 07/23/2018    Paroxysmal atrial fibrillation 12/10/2018    Hypothyroidism (acquired) 08/13/2019    Acute pain of left wrist 02/03/2020    Diffuse arthralgia 02/03/2020    History of arthroplasty of right knee 02/28/2020    Greater trochanteric bursitis of right hip 02/28/2020    Weakness of hip 07/27/2020    Hip pain 07/27/2020    Longstanding persistent atrial fibrillation 11/24/2020    Bronchiectasis without complication 12/03/2020    Cough 12/03/2020    Fecal occult blood test positive 02/19/2021    Dysphagia 03/19/2021    PSVT (paroxysmal supraventricular tachycardia) 03/26/2021    Urge incontinence 04/08/2021    Nocturia 04/08/2021    Pelvic floor weakness 04/08/2021    De Quervain's tenosynovitis, left 11/04/2021    Trigger middle finger of right hand 02/07/2022    Right knee pain 03/06/2022    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 04/01/2022    Decreased range of motion of left wrist 09/25/2022    Decreased  strength of left hand 09/25/2022    Impaired sensation 09/25/2022    Aortic atherosclerosis 06/21/2023    On anticoagulant therapy 06/11/2024    APC (atrial premature  contractions) 2024    Right hip pain 2024    Weakness 2024    Pulmonary hypertension 2025    Hypotension due to drugs 2025     Resolved Ambulatory Problems     Diagnosis Date Noted    Visit for monitoring Tikosyn therapy 2019    Osteosarcoma of right femur 2019    Thrombocytopenia 2021     Past Medical History:   Diagnosis Date    Abnormal Pap smear of cervix     Atrial fibrillation 2017    Atrial flutter     Basal cell carcinoma 2021    Cancer 1989    Diverticulosis     Hx of atrial flutter     Hx of mitral valve prolapse     Hypothyroidism     Migraines     Recurrent upper respiratory infection (URI)     Supraventricular tachycardia        Past Surgical History  She has a past surgical history that includes Knee surgery (Right, ); Knee Arthroplasty (Right, ); Treatment of cardiac arrhythmia (N/A, 2019); Augmentation of breast (Bilateral, );  section (); Cosmetic surgery (); Colposcopy; Oophorectomy; Total abdominal hysterectomy (); Ablation of arrhythmogenic focus for atrial fibrillation (N/A, 2020); Cardioversion (2020); Transesophageal echocardiography (N/A, 2020); Colon surgery (); Breast surgery (); Eye surgery (Bilateral, ); Joint replacement (Right, , , ); Esophagogastroduodenoscopy (N/A, 2021); Colonoscopy (N/A, 2021); De Quervain's release (Left, 2022); Trigger finger release (Left, 2022); Treatment of cardiac arrhythmia (N/A, 2024); echocardiogram,transesophageal (N/A, 2024); ablation, atrial flutter, atypical (N/A, 2025); Diagnostic cardiac electrophysiology study (2025); Closure of left atrial appendage using device (N/A, 2025); and Transesophageal echocardiography (N/A, 2025).    Past Surgical History:   Procedure Laterality Date    ABLATION OF ARRHYTHMOGENIC FOCUS FOR ATRIAL FIBRILLATION N/A 2020     Procedure: ABLATION, ARRHYTHMOGENIC FOCUS, FOR ATRIAL FIBRILLATION;  Surgeon: Moe Seymour MD;  Location: [facility] EP LAB;  Service: Cardiology;  Laterality: N/A;  AFL/AF, KHANH, CTI, PVI, RFA, CARTO, GEN, DM, 3 PREP    ABLATION, ATRIAL FLUTTER, ATYPICAL N/A 2025    Procedure: Ablation, Atrial Flutter, Atypical;  Surgeon: Vinny Azevedo MD;  Location: [facility] EP LAB;  Service: Cardiology;  Laterality: N/A;  AFL (atypical), KHANH (cx if SR), RFA, MIYA, GEN, MB, 3prep    AUGMENTATION OF BREAST Bilateral     35 yrs     BREAST SURGERY      augmentation    CARDIOVERSION  2020    Procedure: Cardioversion;  Surgeon: Moe Seymour MD;  Location: [facility] EP LAB;  Service: Cardiology;;     SECTION      CLOSURE OF LEFT ATRIAL APPENDAGE USING DEVICE N/A 2025    Procedure: Left atrial appendage closure device;  Surgeon: Vinny Azevedo MD;  Location: [facility] EP LAB;  Service: Cardiology;  Laterality: N/A;  AF, KHANH, Watchman, BSCI, Gen, MB, 3 Prep    COLON SURGERY      partial colon resection    COLONOSCOPY N/A 2021    Procedure: COLONOSCOPY;  Surgeon: Sahara Salgado MD;  Location: [facility] ENDO ([address] [place] [address]);  Service: Endoscopy;  Laterality: N/A;  constipation prep- 7 days of miralax, 2 fulls of fulls, then day bf clears and split prep   covid test 3/5 pcw, prep instr emailed, antibiotics prophylactically, Xarelto hold x 2 days per Dr. Seymour-see telephone encounter 2/10 -ml    COLPOSCOPY      COSMETIC SURGERY  1987    DE QUERVAIN'S RELEASE Left 2022    Procedure: RELEASE, HAND, FOR DEQUERVAIN'S TENOSYNOVITIS;  Surgeon: Toñito Adame Jr., MD;  Location: [facility] OR;  Service: Orthopedics;  Laterality: Left;    DIAGNOSTIC CARDIAC ELECTROPHYSIOLOGY STUDY  2025    Procedure: EP - diagnostic;  Surgeon: Vinny Azevedo MD;  Location: [facility] EP LAB;  Service: Cardiology;;    ECHOCARDIOGRAM,TRANSESOPHAGEAL N/A 2024    Procedure: Transesophageal echo (KHANH) intra-procedure log  documentation;  Surgeon: Ramírez Ortez III, MD;  Location: [facility] EP LAB;  Service: Cardiology;  Laterality: N/A;    ESOPHAGOGASTRODUODENOSCOPY N/A 03/19/2021    Procedure: EGD (ESOPHAGOGASTRODUODENOSCOPY);  Surgeon: Sahara Salgado MD;  Location: [facility] ENDO (4TH FLR);  Service: Endoscopy;  Laterality: N/A;    EYE SURGERY Bilateral 2016    Lasik, then cataracts extraction    JOINT REPLACEMENT Right 2/07, 11/07, 2/09    KNEE ARTHROPLASTY Right 2007    revison in 2009    KNEE SURGERY Right 1989    distal femoral replacing TK    OOPHORECTOMY      TOTAL ABDOMINAL HYSTERECTOMY  1994    TAHBSO    TRANSESOPHAGEAL ECHOCARDIOGRAPHY N/A 11/24/2020    Procedure: ECHOCARDIOGRAM, TRANSESOPHAGEAL;  Surgeon: Ramírez Ortez III, MD;  Location: [facility] EP LAB;  Service: Cardiology;  Laterality: N/A;    TRANSESOPHAGEAL ECHOCARDIOGRAPHY N/A 7/9/2025    Procedure: ECHOCARDIOGRAM, TRANSESOPHAGEAL;  Surgeon: Lui Andrade MD;  Location: [facility] EP LAB;  Service: Cardiology;  Laterality: N/A;    TREATMENT OF CARDIAC ARRHYTHMIA N/A 08/12/2019    Procedure: CARDIOVERSION;  Surgeon: Moe Seymour MD;  Location: [facility] EP LAB;  Service: Cardiology;  Laterality: N/A;  KHANH/DCCV/MAC/DM/3PREP/*ADMIT FOR TIKOSYN*    TREATMENT OF CARDIAC ARRHYTHMIA N/A 7/8/2024    Procedure: Cardioversion or Defibrillation;  Surgeon: GABRIELE Bull MD;  Location: [facility] EP LAB;  Service: Cardiology;  Laterality: N/A;  AF, KHANH, DCCV, MAC, DM, 3 Prep    TRIGGER FINGER RELEASE Left 08/02/2022    Procedure: RELEASE, TRIGGER FINGER;  Surgeon: Toñito Adame Jr., MD;  Location: [facility] OR;  Service: Orthopedics;  Laterality: Left;  left middle finger        Family History  Her family history includes Asthma in her brother; Diabetes in her mother and sister; Diverticulosis in her brother and brother; Esophageal cancer in her maternal uncle; Hearing loss in her father; Heart disease in her brother, brother, father, mother, and sister; No Known Problems in  her daughter; Stroke (age of onset: 93) in her father.    Social History  She reports that she has never smoked. She has never been exposed to tobacco smoke. She has never used smokeless tobacco. She reports current alcohol use of about 2.0 standard drinks of alcohol per week. She reports that she does not use drugs.    Allergies  She is allergic to morphine, flecainide, and succinylcholine.    Medications  She has a current medication list which includes the following prescription(s): acyclovir, amoxicillin, vevye, docusate sodium, estradiol, famotidine, guaifenesin, hydrocodone-acetaminophen, ipratropium, levothyroxine, fish oil-omega-3 fatty acids, omeprazole, sodium chloride 3%, sodium,potassium,mag sulfates, vit c/e/zn/coppr/lutein/zeaxan, and xarelto, and the following Facility-Administered Medications: sodium chloride 0.9%.    ROS:  Pertinent positive and negative review of systems as noted in HPI.     Objective:     There were no vitals taken for this visit.   General Appearance:   Awake, Alert and Oriented. NAD. Appropriate affect and appearance      Neuro:   Spontaneous eye opening, appropriate verbal responses, follows commands  Pupils equal, round & brisk. EOMI, no proptosis  Face is symmetric, HB I, non-edematous bilaterally  Vision grossly intact, Hearing grossly intact     Head and Face:   skin is intact with no lesions noted.  Parotid and submandibular glands are symmetric and non-tender.      Ears:  Periauricular regions non-erythematous, non-fluctuanct non-tender  Pinna normal bilaterally, no skin lesions  EACs patent and without drainage bilaterally   Tympanic membranes are normal in appearance bilaterally.  No middle ear effusion noted bilaterally.    Nose:   External nose is symmetric, no skin lesions  Septum midline, No inferior turbinate hypertrophy, No polyps or rhinorrhea     OC/OP:  Tongue midline on extension, non-edematous, soft  No labial, buccal, oral tongue or floor of mouth  lesions  Soft palate symmetric, midline and without lesions or masses, tonsils symmetric  No masses or lesions of the visualized oropharynx     Neck:  Neck is symmetric, non-edematous, non-erythematous  Trachea is midline and easily palpable,  No palpable adenopathy or masses in levels I-VI  No thyroid nodules or masses, non-tender      Respiratory:  Normal work of breathing, no accessory muscle use, no stridor     Voice:  Normal vocal quality, volume and articulation    Data Review:   LABS    IMAGING        AUDIO      Procedures:   Procedure: Flexible Laryngoscopy    Date: 07/16/2025     Pre procedure Diagnosis: Dysphonia and chronic cough    Post procedure Diagnosis: same    Equipment Used: Distal Chip Video Laryngoscope    Anesthesia: Topical 4% Lidocaine and [place]-synephrine    Nasal Side: right    Procedure:  After verbal consent was obtained, the nose was sprayed with topical anesthetic and decongestant. A flexible laryngoscope was inserted through the nose to the level of the larynx. Findings listed and photodocumentation listed below    Nasopharynx: Non-obstructive, symmetric adenoid tissue. Eustachian tubes orifices patent. Symmetric soft palate elevation  Oropharynx: Symmetric base of tongue, No concerning lesions or mass of valleculae, posterior pharynx, base of tongue or tonsils  Hypopharynx: No concerning lesions/masses of pyriform sinuses. No pooling secretions.   Epiglottis: Crisp  Larynx: True vocal folds mobile bilaterally, no masses or lesions of the endolarynx, minimal edema and erythema, subglottis adequately visualized. No signs of stenosis or masses. Thick secretions noted on the vocal folds bilaterally    Outcome: The patient tolerated the procedure well and without complaint.     Assessment:     1. Chronic cough        Plan:     I had a long discussion with the patient regarding her condition and the further workup and management options.  She does not have overt signs of reflux on fiberoptic  laryngoscopy.  She does have very thick secretions on the vibratory margin of the vocal folds bilaterally.  We will add guaifenesin.  She was encouraged to follow up with Dr. Bearden for her posterior nasal nerve ablation as some of the thick secretions may be sinonasal in origin.  Follow up with me in 4-6 weeks    No orders of the defined types were placed in this encounter.     Medications Ordered This Encounter   Medications    guaiFENesin (MUCINEX) 600 mg 12 hr tablet      Problem List Items Addressed This Visit       Cough    Overview   PFTs actually much improved from 2020 (FEV1 84 > 97).  Recent CXR without infiltrates.  We discussed the possibility of MAC infection.  Time course and waxing waning complaints with periods of improvement in between would be atypical of MAC symptoms, which is usually more indolent and unrelenting. No night sweats, weight loss.  Symptoms unimproved after course of both prednisone and doxy/Avelox, which I think points further away from pulmonary etiology. Reasonable at this time to get better control of her sinus/LPR issues and see if allergy has any thoughts prior to more invasive testing like bronchoscopy.  Starting pulmonary hygiene with hypertonic saline nebs

## 2025-07-17 ENCOUNTER — OFFICE VISIT (OUTPATIENT)
Dept: ORTHOPEDICS | Facility: CLINIC | Age: 75
End: 2025-07-17
Payer: MEDICARE

## 2025-07-17 DIAGNOSIS — M65.331 TRIGGER MIDDLE FINGER OF RIGHT HAND: Primary | ICD-10-CM

## 2025-07-17 PROCEDURE — 99999 PR PBB SHADOW E&M-EST. PATIENT-LVL I: CPT | Mod: PBBFAC,HCNC,, | Performed by: ORTHOPAEDIC SURGERY

## 2025-07-17 RX ORDER — TRIAMCINOLONE ACETONIDE 40 MG/ML
20 INJECTION, SUSPENSION INTRA-ARTICULAR; INTRAMUSCULAR
Status: COMPLETED | OUTPATIENT
Start: 2025-07-17 | End: 2025-07-17

## 2025-07-17 RX ADMIN — TRIAMCINOLONE ACETONIDE 20 MG: 40 INJECTION, SUSPENSION INTRA-ARTICULAR; INTRAMUSCULAR at 10:07

## 2025-07-17 NOTE — PROGRESS NOTES
Subjective:      Patient ID: Zoë Enrique is a 75 y.o. female.  Chief Complaint: Pain of the Right Hand (Trigger mf ) and Pain of the Right Wrist      HPI  Zoë Enrique is a  75 y.o. female presenting today for follow up of triggering of the right middle finger.  She reports that she is having a flare-up again   She had an injection about 5 months ago would like to repeat this   No numbness or tingling reported   Not interested in surgery at this point.    Review of patient's allergies indicates:   Allergen Reactions    Morphine Nausea And Vomiting    Flecainide      Low blood pressure    Succinylcholine      Other reaction(s): v tach         Current Medications[1]    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    Diverticulosis 1998    Hx of atrial flutter     Hx of mitral valve prolapse 1990    Hypothyroidism     Migraines     Recurrent upper respiratory infection (URI)     Supraventricular tachycardia        Past Surgical History:   Procedure Laterality Date    ABLATION OF ARRHYTHMOGENIC FOCUS FOR ATRIAL FIBRILLATION N/A 11/24/2020    Procedure: ABLATION, ARRHYTHMOGENIC FOCUS, FOR ATRIAL FIBRILLATION;  Surgeon: Moe Seymour MD;  Location: [facility] EP LAB;  Service: Cardiology;  Laterality: N/A;  AFL/AF, KHANH, CTI, PVI, RFA, CARTO, GEN, DM, 3 PREP    ABLATION, ATRIAL FLUTTER, ATYPICAL N/A 2/17/2025    Procedure: Ablation, Atrial Flutter, Atypical;  Surgeon: Vinny Azevedo MD;  Location: [facility] EP LAB;  Service: Cardiology;  Laterality: N/A;  AFL (atypical), KHANH (cx if SR), RFA, MIYA, GEN, MB, 3prep    AUGMENTATION OF BREAST Bilateral 1984    35 yrs     BREAST SURGERY  1984    augmentation    CARDIOVERSION  11/24/2020    Procedure: Cardioversion;  Surgeon: Moe Seymour MD;  Location: [facility] EP LAB;  Service:  Cardiology;;     SECTION      CLOSURE OF LEFT ATRIAL APPENDAGE USING DEVICE N/A 2025    Procedure: Left atrial appendage closure device;  Surgeon: Vinny Azevedo MD;  Location: [facility] EP LAB;  Service: Cardiology;  Laterality: N/A;  AF, KHANH, Watchman, BSCI, Gen, MB, 3 Prep    COLON SURGERY      partial colon resection    COLONOSCOPY N/A 2021    Procedure: COLONOSCOPY;  Surgeon: Sahara Salgado MD;  Location: [facility] ENDO (4TH FLR);  Service: Endoscopy;  Laterality: N/A;  constipation prep- 7 days of miralax, 2 fulls of fulls, then day bf clears and split prep   covid test 3/5 pcw, prep instr emailed, antibiotics prophylactically, Xarelto hold x 2 days per Dr. Seymour-see telephone encounter 2/10 -ml    COLPOSCOPY      COSMETIC SURGERY      DE QUERVAIN'S RELEASE Left 2022    Procedure: RELEASE, HAND, FOR DEQUERVAIN'S TENOSYNOVITIS;  Surgeon: Toñito Adame Jr., MD;  Location: [facility] OR;  Service: Orthopedics;  Laterality: Left;    DIAGNOSTIC CARDIAC ELECTROPHYSIOLOGY STUDY  2025    Procedure: EP - diagnostic;  Surgeon: Vinny Azevedo MD;  Location: [facility] EP LAB;  Service: Cardiology;;    ECHOCARDIOGRAM,TRANSESOPHAGEAL N/A 2024    Procedure: Transesophageal echo (KHANH) intra-procedure log documentation;  Surgeon: Ramírez Ortez III, MD;  Location: [facility] EP LAB;  Service: Cardiology;  Laterality: N/A;    ESOPHAGOGASTRODUODENOSCOPY N/A 2021    Procedure: EGD (ESOPHAGOGASTRODUODENOSCOPY);  Surgeon: Sahara Salgado MD;  Location: [facility] ENDO (4TH FLR);  Service: Endoscopy;  Laterality: N/A;    EYE SURGERY Bilateral 2016    Lasik, then cataracts extraction    JOINT REPLACEMENT Right , ,     KNEE ARTHROPLASTY Right     revison in     KNEE SURGERY Right     distal femoral replacing TK    OOPHORECTOMY      TOTAL ABDOMINAL HYSTERECTOMY      TAHBSO    TRANSESOPHAGEAL ECHOCARDIOGRAPHY N/A 2020    Procedure: ECHOCARDIOGRAM,  TRANSESOPHAGEAL;  Surgeon: Ramírez Ortez III, MD;  Location: [facility] EP LAB;  Service: Cardiology;  Laterality: N/A;    TRANSESOPHAGEAL ECHOCARDIOGRAPHY N/A 7/9/2025    Procedure: ECHOCARDIOGRAM, TRANSESOPHAGEAL;  Surgeon: Lui Andrade MD;  Location: [facility] EP LAB;  Service: Cardiology;  Laterality: N/A;    TREATMENT OF CARDIAC ARRHYTHMIA N/A 08/12/2019    Procedure: CARDIOVERSION;  Surgeon: Moe Seymour MD;  Location: [facility] EP LAB;  Service: Cardiology;  Laterality: N/A;  KHANH/DCCV/MAC/DM/3PREP/*ADMIT FOR TIKOSYN*    TREATMENT OF CARDIAC ARRHYTHMIA N/A 7/8/2024    Procedure: Cardioversion or Defibrillation;  Surgeon: GABRIELE Bull MD;  Location: [facility] EP LAB;  Service: Cardiology;  Laterality: N/A;  AF, KHANH, DCCV, MAC, DM, 3 Prep    TRIGGER FINGER RELEASE Left 08/02/2022    Procedure: RELEASE, TRIGGER FINGER;  Surgeon: Toñito Adame Jr., MD;  Location: [facility];  Service: Orthopedics;  Laterality: Left;  left middle finger       OBJECTIVE:   PHYSICAL EXAM:       Vitals:    07/17/25 1016   PainSc:   3   PainLoc: Hand     Ortho/SPM Exam  Examination right hand mild tenderness over the flexor tendon sheath right middle finger mild triggering    strength decreased   Sensation intact Tinel sign negative    RADIOGRAPHS:  None  Comments: I have personally reviewed the imaging and I agree with the above radiologist's report.    ASSESSMENT/PLAN:     IMPRESSION:  Triggering of the right middle    PLAN:  After pause for time-out identified the right middle finger injected flexor tendon sheath combination Kenalog 20 mg 0.5 cc xylocaine sterile technique   Tolerated the procedure well without complication       FOLLOW UP:  2-3 months or as needed    Disclaimer: This note has been generated using voice-recognition software. There may be typographical errors that have been missed during proof-reading.          [1]   Current Outpatient Medications   Medication Sig Dispense Refill    acyclovir (ZOVIRAX) 400  MG tablet Take 1 tablet (400 mg total) by mouth 3 (three) times daily as needed. 90 tablet 3    amoxicillin (AMOXIL) 500 MG capsule Take 500 mg by mouth as needed (takes prior to all dental procedures).      cycloSPORINE (VEVYE) 0.1 % Drop Place 1 drop into both eyes 2 (two) times a day. 2 mL 6    docusate sodium (COLACE) 250 MG capsule Take 250 mg by mouth 2 (two) times daily.       estradioL (ESTRACE) 0.01 % (0.1 mg/gram) vaginal cream Place 1 g vaginally twice a week. 43 g 0    famotidine (PEPCID) 40 MG tablet Take 1 tablet (40 mg total) by mouth every evening. 30 tablet 3    guaiFENesin (MUCINEX) 600 mg 12 hr tablet Take 2 tablets (1,200 mg total) by mouth 2 (two) times daily. 120 tablet 3    HYDROcodone-acetaminophen (NORCO) 5-325 mg per tablet Take 1 tablet by mouth every 6 (six) hours as needed for Pain. 28 tablet 0    ipratropium (ATROVENT) 42 mcg (0.06 %) nasal spray 2 sprays by Each Nostril route 3 (three) times daily. 15 mL 6    levothyroxine (SYNTHROID) 25 MCG tablet Take 1 tablet (25 mcg total) by mouth before breakfast. 90 tablet 3    omega-3 fatty acids/fish oil (FISH OIL-OMEGA-3 FATTY ACIDS) 300-1,000 mg capsule Take by mouth once daily.      omeprazole (PRILOSEC) 40 MG capsule Take 1 capsule (40 mg total) by mouth once daily. 30 capsule 3    sodium,potassium,mag sulfates (SUPREP BOWEL PREP KIT) 17.5-3.13-1.6 gram SolR Take 177 mLs by mouth once daily. Take as instructed by Endoscopy nurse  1 kit 0    vit C/E/Zn/coppr/lutein/zeaxan (PRESERVISION AREDS-2 ORAL)       XARELTO 20 mg Tab TAKE 1 TABLET BY MOUTH ONCE DAILY 90 tablet 3    sodium chloride 3% 3 % nebulizer solution Take 4 mLs by nebulization 2 (two) times a day. 300 mL PRN     No current facility-administered medications for this visit.     Facility-Administered Medications Ordered in Other Visits   Medication Dose Route Frequency Provider Last Rate Last Admin    sodium chloride 0.9% flush 5 mL  5 mL Intravenous PRN Aleksandra Taylor  Judy, JO-ANN

## 2025-07-22 ENCOUNTER — PATIENT MESSAGE (OUTPATIENT)
Dept: OTOLARYNGOLOGY | Facility: CLINIC | Age: 75
End: 2025-07-22
Payer: MEDICARE

## 2025-07-22 ENCOUNTER — PATIENT MESSAGE (OUTPATIENT)
Dept: RHEUMATOLOGY | Facility: CLINIC | Age: 75
End: 2025-07-22
Payer: MEDICARE

## 2025-07-23 DIAGNOSIS — N95.2 POSTMENOPAUSAL ATROPHIC VAGINITIS: ICD-10-CM

## 2025-07-23 RX ORDER — ESTRADIOL 0.1 MG/G
1 CREAM VAGINAL
Qty: 42.5 G | Refills: 2 | Status: SHIPPED | OUTPATIENT
Start: 2025-07-24

## 2025-07-24 ENCOUNTER — PATIENT MESSAGE (OUTPATIENT)
Dept: ELECTROPHYSIOLOGY | Facility: CLINIC | Age: 75
End: 2025-07-24
Payer: MEDICARE

## 2025-07-24 RX ORDER — PREDNISONE 20 MG/1
20 TABLET ORAL DAILY
Qty: 10 TABLET | Refills: 0 | Status: SHIPPED | OUTPATIENT
Start: 2025-07-24

## 2025-07-31 ENCOUNTER — PATIENT MESSAGE (OUTPATIENT)
Dept: ELECTROPHYSIOLOGY | Facility: CLINIC | Age: 75
End: 2025-07-31
Payer: MEDICARE

## 2025-07-31 DIAGNOSIS — I48.0 PAROXYSMAL ATRIAL FIBRILLATION: Primary | ICD-10-CM

## 2025-08-01 ENCOUNTER — TELEPHONE (OUTPATIENT)
Dept: ELECTROPHYSIOLOGY | Facility: CLINIC | Age: 75
End: 2025-08-01
Payer: MEDICARE

## 2025-08-01 ENCOUNTER — HOSPITAL ENCOUNTER (OUTPATIENT)
Facility: HOSPITAL | Age: 75
Discharge: HOME | End: 2025-08-02
Attending: EMERGENCY MEDICINE | Admitting: EMERGENCY MEDICINE
Payer: MEDICARE

## 2025-08-01 DIAGNOSIS — R07.89 CHEST TIGHTNESS: ICD-10-CM

## 2025-08-01 DIAGNOSIS — R07.9 CHEST PAIN: ICD-10-CM

## 2025-08-01 DIAGNOSIS — I31.9 PERICARDITIS, UNSPECIFIED CHRONICITY, UNSPECIFIED TYPE: Primary | ICD-10-CM

## 2025-08-01 PROBLEM — I31.39 PERICARDIAL EFFUSION: Status: ACTIVE | Noted: 2025-08-01

## 2025-08-01 LAB
ABSOLUTE EOSINOPHIL (OHS): 0.04 K/UL
ABSOLUTE MONOCYTE (OHS): 0.59 K/UL (ref 0.3–1)
ABSOLUTE NEUTROPHIL COUNT (OHS): 4.29 K/UL (ref 1.8–7.7)
ALBUMIN SERPL BCP-MCNC: 4.3 G/DL (ref 3.5–5.2)
ALP SERPL-CCNC: 61 UNIT/L (ref 40–150)
ALT SERPL W/O P-5'-P-CCNC: 15 UNIT/L (ref 0–55)
ANION GAP (OHS): 7 MMOL/L (ref 8–16)
AST SERPL-CCNC: 25 UNIT/L (ref 0–50)
BASOPHILS # BLD AUTO: 0.02 K/UL
BASOPHILS NFR BLD AUTO: 0.3 %
BILIRUB SERPL-MCNC: 0.6 MG/DL (ref 0.1–1)
BUN SERPL-MCNC: 14 MG/DL (ref 8–23)
CALCIUM SERPL-MCNC: 8.7 MG/DL (ref 8.7–10.5)
CHLORIDE SERPL-SCNC: 105 MMOL/L (ref 95–110)
CO2 SERPL-SCNC: 27 MMOL/L (ref 23–29)
CREAT SERPL-MCNC: 0.7 MG/DL (ref 0.5–1.4)
CRP SERPL-MCNC: 1.7 MG/L
ERYTHROCYTE [DISTWIDTH] IN BLOOD BY AUTOMATED COUNT: 13.6 % (ref 11.5–14.5)
GFR SERPLBLD CREATININE-BSD FMLA CKD-EPI: >60 ML/MIN/1.73/M2
GLUCOSE SERPL-MCNC: 84 MG/DL (ref 70–110)
HCT VFR BLD AUTO: 38.1 % (ref 37–48.5)
HCV AB SERPL QL IA: NORMAL
HGB BLD-MCNC: 12.6 GM/DL (ref 12–16)
HIV 1+2 AB+HIV1 P24 AG SERPL QL IA: NORMAL
IMM GRANULOCYTES # BLD AUTO: 0.03 K/UL (ref 0–0.04)
IMM GRANULOCYTES NFR BLD AUTO: 0.5 % (ref 0–0.5)
LYMPHOCYTES # BLD AUTO: 1.32 K/UL (ref 1–4.8)
MCH RBC QN AUTO: 31.9 PG (ref 27–31)
MCHC RBC AUTO-ENTMCNC: 33.1 G/DL (ref 32–36)
MCV RBC AUTO: 97 FL (ref 82–98)
NT-PROBNP SERPL-MCNC: 1174 PG/ML
NUCLEATED RBC (/100WBC) (OHS): 0 /100 WBC
OHS QRS DURATION: 80 MS
OHS QRS DURATION: 92 MS
OHS QTC CALCULATION: 417 MS
OHS QTC CALCULATION: 437 MS
PLATELET # BLD AUTO: 176 K/UL (ref 150–450)
PMV BLD AUTO: 10.7 FL (ref 9.2–12.9)
POTASSIUM SERPL-SCNC: 4.6 MMOL/L (ref 3.5–5.1)
PROT SERPL-MCNC: 6.8 GM/DL (ref 6–8.4)
RBC # BLD AUTO: 3.95 M/UL (ref 4–5.4)
RELATIVE EOSINOPHIL (OHS): 0.6 %
RELATIVE LYMPHOCYTE (OHS): 21 % (ref 18–48)
RELATIVE MONOCYTE (OHS): 9.4 % (ref 4–15)
RELATIVE NEUTROPHIL (OHS): 68.2 % (ref 38–73)
SODIUM SERPL-SCNC: 139 MMOL/L (ref 136–145)
TROPONIN I SERPL HS-MCNC: 54 NG/L
TROPONIN I SERPL HS-MCNC: 64 NG/L
WBC # BLD AUTO: 6.29 K/UL (ref 3.9–12.7)

## 2025-08-01 PROCEDURE — 96374 THER/PROPH/DIAG INJ IV PUSH: CPT

## 2025-08-01 PROCEDURE — 86803 HEPATITIS C AB TEST: CPT | Performed by: EMERGENCY MEDICINE

## 2025-08-01 PROCEDURE — 99285 EMERGENCY DEPT VISIT HI MDM: CPT | Mod: 25

## 2025-08-01 PROCEDURE — 25000003 PHARM REV CODE 250: Performed by: PHYSICIAN ASSISTANT

## 2025-08-01 PROCEDURE — 93005 ELECTROCARDIOGRAM TRACING: CPT

## 2025-08-01 PROCEDURE — 93010 ELECTROCARDIOGRAM REPORT: CPT | Mod: ,,, | Performed by: INTERNAL MEDICINE

## 2025-08-01 PROCEDURE — 84460 ALANINE AMINO (ALT) (SGPT): CPT | Performed by: PHYSICIAN ASSISTANT

## 2025-08-01 PROCEDURE — 86140 C-REACTIVE PROTEIN: CPT | Performed by: PHYSICIAN ASSISTANT

## 2025-08-01 PROCEDURE — 83880 ASSAY OF NATRIURETIC PEPTIDE: CPT | Performed by: PHYSICIAN ASSISTANT

## 2025-08-01 PROCEDURE — G0378 HOSPITAL OBSERVATION PER HR: HCPCS

## 2025-08-01 PROCEDURE — 93010 ELECTROCARDIOGRAM REPORT: CPT | Mod: ,,, | Performed by: STUDENT IN AN ORGANIZED HEALTH CARE EDUCATION/TRAINING PROGRAM

## 2025-08-01 PROCEDURE — 84484 ASSAY OF TROPONIN QUANT: CPT | Performed by: PHYSICIAN ASSISTANT

## 2025-08-01 PROCEDURE — 85025 COMPLETE CBC W/AUTO DIFF WBC: CPT | Performed by: PHYSICIAN ASSISTANT

## 2025-08-01 PROCEDURE — 87389 HIV-1 AG W/HIV-1&-2 AB AG IA: CPT | Performed by: EMERGENCY MEDICINE

## 2025-08-01 PROCEDURE — 63600175 PHARM REV CODE 636 W HCPCS: Mod: JZ,TB | Performed by: PHYSICIAN ASSISTANT

## 2025-08-01 RX ORDER — SODIUM CHLORIDE 0.9 % (FLUSH) 0.9 %
10 SYRINGE (ML) INJECTION
Status: DISCONTINUED | OUTPATIENT
Start: 2025-08-01 | End: 2025-08-02 | Stop reason: HOSPADM

## 2025-08-01 RX ORDER — KETOROLAC TROMETHAMINE 30 MG/ML
10 INJECTION, SOLUTION INTRAMUSCULAR; INTRAVENOUS
Status: COMPLETED | OUTPATIENT
Start: 2025-08-01 | End: 2025-08-01

## 2025-08-01 RX ORDER — IBUPROFEN 600 MG/1
600 TABLET, FILM COATED ORAL 3 TIMES DAILY
Status: DISCONTINUED | OUTPATIENT
Start: 2025-08-01 | End: 2025-08-02 | Stop reason: HOSPADM

## 2025-08-01 RX ORDER — TALC
6 POWDER (GRAM) TOPICAL NIGHTLY PRN
Status: DISCONTINUED | OUTPATIENT
Start: 2025-08-01 | End: 2025-08-02 | Stop reason: HOSPADM

## 2025-08-01 RX ORDER — PANTOPRAZOLE SODIUM 40 MG/1
40 TABLET, DELAYED RELEASE ORAL DAILY
Status: DISCONTINUED | OUTPATIENT
Start: 2025-08-02 | End: 2025-08-01

## 2025-08-01 RX ORDER — FAMOTIDINE 20 MG/1
40 TABLET, FILM COATED ORAL NIGHTLY
Status: DISCONTINUED | OUTPATIENT
Start: 2025-08-01 | End: 2025-08-01

## 2025-08-01 RX ORDER — PANTOPRAZOLE SODIUM 40 MG/1
40 TABLET, DELAYED RELEASE ORAL DAILY
Status: DISCONTINUED | OUTPATIENT
Start: 2025-08-01 | End: 2025-08-02 | Stop reason: HOSPADM

## 2025-08-01 RX ORDER — LEVOTHYROXINE SODIUM 25 UG/1
25 TABLET ORAL
Status: DISCONTINUED | OUTPATIENT
Start: 2025-08-02 | End: 2025-08-02 | Stop reason: HOSPADM

## 2025-08-01 RX ORDER — ACETAMINOPHEN 325 MG/1
650 TABLET ORAL EVERY 8 HOURS PRN
Status: DISCONTINUED | OUTPATIENT
Start: 2025-08-01 | End: 2025-08-02 | Stop reason: HOSPADM

## 2025-08-01 RX ORDER — GUAIFENESIN 600 MG/1
1200 TABLET, EXTENDED RELEASE ORAL 2 TIMES DAILY
Status: DISCONTINUED | OUTPATIENT
Start: 2025-08-01 | End: 2025-08-02 | Stop reason: HOSPADM

## 2025-08-01 RX ADMIN — GUAIFENESIN 1200 MG: 600 TABLET, EXTENDED RELEASE ORAL at 08:08

## 2025-08-01 RX ADMIN — RIVAROXABAN 20 MG: 20 TABLET, FILM COATED ORAL at 07:08

## 2025-08-01 RX ADMIN — KETOROLAC TROMETHAMINE 10 MG: 30 INJECTION, SOLUTION INTRAMUSCULAR; INTRAVENOUS at 01:08

## 2025-08-01 RX ADMIN — IBUPROFEN 600 MG: 600 TABLET, FILM COATED ORAL at 06:08

## 2025-08-01 RX ADMIN — PANTOPRAZOLE SODIUM 40 MG: 40 TABLET, DELAYED RELEASE ORAL at 06:08

## 2025-08-01 NOTE — ED TRIAGE NOTES
Watchman procedure on July 9th. Starting July 22 started having episodes of low BP, dizziness, pressure in chest, heaviness in both arms. No complaints of N/V. Today only complaining of chest heaviness.

## 2025-08-01 NOTE — TELEPHONE ENCOUNTER
----- Message from FADI Bonner sent at 7/31/2025  3:21 PM CDT -----  Patient states she has been lightheaded and dizzy lately. She thinks she might be in a fib. Could you please follow up and cll her? I was scheduling her post op appointments. Thanks. Kailey Marsh

## 2025-08-01 NOTE — Clinical Note
Diagnosis: Chest tightness [304149]   Future Attending Provider: GWEN HYLTON [[id number]]   Is the patient being sent to ED Observation?: Yes

## 2025-08-01 NOTE — H&P
"ED Observation Unit  History and Physical      I assumed care of this patient from the Main ED at onset of observation time, 5:30pm on 08/01/2025.       History of Present Illness:    "75-year-old female with history of mitral valve prolapse, hypothyroidism and AFib s/p watchman device presents for episodes of lightheadedness, weakness, hypotension and chest pressure intermittently over the past 3 weeks since getting her Watchman device placed. Reports lowest reading 87/54 at home. She reports cough which has been chronic and unchanged, denies fever, shortness of breath or leg swelling. "    I reviewed the ED Provider Note dated 8/1/25 prior to my evaluation of this patient.  I reviewed all labs and imaging performed in the Main ED, prior to patient being placed in Observation. Patient was placed in the ED Observation Unit for Pericarditis.    Bedside echocardiogram with pericardial effusion.  Per emergency room a PP, case was discussed with Cardiology and recommended formal echocardiogram as well as treatment for possible post procedural pericarditis.    No prior BNP, but pro BNP at 1174.  Initial troponin of 64 and trending down to 54.  ACS has been ruled out.    Her chest pain has improved after toradol.     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    Diverticulosis 1998    Hx of atrial flutter     Hx of mitral valve prolapse 1990    Hypothyroidism     Migraines     Recurrent upper respiratory infection (URI)     Supraventricular tachycardia       Past Surgical History:   Procedure Laterality Date    ABLATION OF ARRHYTHMOGENIC FOCUS FOR ATRIAL FIBRILLATION N/A 11/24/2020    Procedure: ABLATION, ARRHYTHMOGENIC FOCUS, FOR ATRIAL FIBRILLATION;  Surgeon: Moe Seymour MD;  Location: [facility] EP LAB;  Service: " Cardiology;  Laterality: N/A;  AFL/AF, KHANH, CTI, PVI, RFA, CARTO, GEN, DM, 3 PREP    ABLATION, ATRIAL FLUTTER, ATYPICAL N/A 2025    Procedure: Ablation, Atrial Flutter, Atypical;  Surgeon: Vinny Azevedo MD;  Location: [facility] EP LAB;  Service: Cardiology;  Laterality: N/A;  AFL (atypical), KHANH (cx if SR), RFA, MIYA, GEN, MB, 3prep    AUGMENTATION OF BREAST Bilateral     35 yrs     BREAST SURGERY      augmentation    CARDIOVERSION  2020    Procedure: Cardioversion;  Surgeon: Moe Seymour MD;  Location: [facility] EP LAB;  Service: Cardiology;;     SECTION      CLOSURE OF LEFT ATRIAL APPENDAGE USING DEVICE N/A 2025    Procedure: Left atrial appendage closure device;  Surgeon: Vinny zAevedo MD;  Location: [facility] EP LAB;  Service: Cardiology;  Laterality: N/A;  AF, KHANH, Watchman, BSCI, Gen, MB, 3 Prep    COLON SURGERY      partial colon resection    COLONOSCOPY N/A 2021    Procedure: COLONOSCOPY;  Surgeon: Sahara Salgado MD;  Location: [facility] ENDO (4TH FLR);  Service: Endoscopy;  Laterality: N/A;  constipation prep- 7 days of miralax, 2 fulls of fulls, then day bf clears and split prep   covid test 3/5 pcw, prep instr emailed, antibiotics prophylactically, Xarelto hold x 2 days per Dr. Seymour-see telephone encounter 2/10 -ml    COLPOSCOPY      COSMETIC SURGERY      DE QUERVAIN'S RELEASE Left 2022    Procedure: RELEASE, HAND, FOR DEQUERVAIN'S TENOSYNOVITIS;  Surgeon: Toñito Adame Jr., MD;  Location: [facility] OR;  Service: Orthopedics;  Laterality: Left;    DIAGNOSTIC CARDIAC ELECTROPHYSIOLOGY STUDY  2025    Procedure: EP - diagnostic;  Surgeon: Vinny Azevedo MD;  Location: [facility] EP LAB;  Service: Cardiology;;    ECHOCARDIOGRAM,TRANSESOPHAGEAL N/A 2024    Procedure: Transesophageal echo (KHANH) intra-procedure log documentation;  Surgeon: Ramírez Ortez III, MD;  Location: [facility] EP LAB;  Service: Cardiology;  Laterality: N/A;     ESOPHAGOGASTRODUODENOSCOPY N/A 03/19/2021    Procedure: EGD (ESOPHAGOGASTRODUODENOSCOPY);  Surgeon: Sahara Salgado MD;  Location: [address]);  Service: Endoscopy;  Laterality: N/A;    EYE SURGERY Bilateral 2016    Lasik, then cataracts extraction    JOINT REPLACEMENT Right 2/07, 11/07, 2/09    KNEE ARTHROPLASTY Right 2007    revison in 2009    KNEE SURGERY Right 1989    distal femoral replacing TK    OOPHORECTOMY      TOTAL ABDOMINAL HYSTERECTOMY  1994    TAHBSO    TRANSESOPHAGEAL ECHOCARDIOGRAPHY N/A 11/24/2020    Procedure: ECHOCARDIOGRAM, TRANSESOPHAGEAL;  Surgeon: Ramírez Ortez III, MD;  Location: [facility] EP LAB;  Service: Cardiology;  Laterality: N/A;    TRANSESOPHAGEAL ECHOCARDIOGRAPHY N/A 7/9/2025    Procedure: ECHOCARDIOGRAM, TRANSESOPHAGEAL;  Surgeon: Lui Andrade MD;  Location: [facility] EP LAB;  Service: Cardiology;  Laterality: N/A;    TREATMENT OF CARDIAC ARRHYTHMIA N/A 08/12/2019    Procedure: CARDIOVERSION;  Surgeon: Moe Seymour MD;  Location: [facility] EP LAB;  Service: Cardiology;  Laterality: N/A;  KHANH/DCCV/MAC/DM/3PREP/*ADMIT FOR TIKOSYN*    TREATMENT OF CARDIAC ARRHYTHMIA N/A 7/8/2024    Procedure: Cardioversion or Defibrillation;  Surgeon: GABRIELE Bull MD;  Location: [facility] EP LAB;  Service: Cardiology;  Laterality: N/A;  AF, KHANH, DCCV, MAC, DM, 3 Prep    TRIGGER FINGER RELEASE Left 08/02/2022    Procedure: RELEASE, TRIGGER FINGER;  Surgeon: Toñito Adame Jr., MD;  Location: [facility] OR;  Service: Orthopedics;  Laterality: Left;  left middle finger        Family Hx   Family History   Problem Relation Name Age of Onset    Heart disease Mother Tianna         Atrial fib    Diabetes Mother Tianna         Type II    Hearing loss Father Adan     Heart disease Father Adan         Angina, AAA    Stroke Father Adan 93    Diabetes Sister Mahogany         Type I    Heart disease Sister Mahogany         Atrrial fib, mitral valve repair    Heart disease Brother Severo          Atrial fib    Asthma Brother Severo     Diverticulosis Brother Severo     Heart disease Brother Leonard         Atrial fib    Diverticulosis Brother Leonard     Esophageal cancer Maternal Uncle      No Known Problems Daughter Michael     Breast cancer Neg Hx      Ovarian cancer Neg Hx      Colon cancer Neg Hx      Cancer Neg Hx      Hypertension Neg Hx      Eczema Neg Hx          Social Hx   Social History     Socioeconomic History    Marital status:    Tobacco Use    Smoking status: Never     Passive exposure: Never    Smokeless tobacco: Never   Vaping Use    Vaping status: Never Used   Substance and Sexual Activity    Alcohol use: Yes     Alcohol/week: 2.0 standard drinks of alcohol     Types: 2 Shots of liquor per week     Comment: occasionally    Drug use: No     Comment: CBD oils for headache--use occasionally    Sexual activity: Not Currently     Partners: Male     Birth control/protection: Post-menopausal, See Surgical Hx, None     Comment: , together x 37 years   Social History Narrative    N/A PER THE PATIENT.     Social Drivers of Health     Financial Resource Strain: Low Risk  (3/26/2025)    Overall Financial Resource Strain (CARDIA)     Difficulty of Paying Living Expenses: Not very hard   Food Insecurity: No Food Insecurity (3/26/2025)    Hunger Vital Sign     Worried About Running Out of Food in the Last Year: Never true     Ran Out of Food in the Last Year: Never true   Transportation Needs: No Transportation Needs (3/26/2025)    PRAPARE - Transportation     Lack of Transportation (Medical): No     Lack of Transportation (Non-Medical): No   Physical Activity: Insufficiently Active (3/26/2025)    Exercise Vital Sign     Days of Exercise per Week: 1 day     Minutes of Exercise per Session: 10 min   Stress: No Stress Concern Present (3/26/2025)    [employer] [employer] - Occupational Stress Questionnaire     Feeling of Stress : Not at all   Housing Stability: Low Risk   (3/26/2025)    Housing Stability Vital Sign     Unable to Pay for Housing in the Last Year: No     Number of Times Moved in the Last Year: 0     Homeless in the Last Year: No        Vital Signs   Vitals:    08/01/25 1500 08/01/25 1530 08/01/25 1600 08/01/25 1700   BP:  (!) 152/73 (!) 148/67 (!) 118/56   BP Location:  Left arm  Right arm   Patient Position:  Lying  Sitting   Pulse: 61 63 63 74   Resp:  18  20   Temp:  97.9 °F (36.6 °C)     TempSrc:  Oral     SpO2: 100% 100% 100% 100%   Weight:       Height:            Review of Systems  Review of Systems   Constitutional:  Positive for malaise/fatigue.   Cardiovascular:  Positive for chest pain.   Neurological:  Positive for dizziness.       Physical Exam  Physical Exam  Constitutional:       General: She is not in acute distress.     Appearance: She is not ill-appearing.   HENT:      Head: Normocephalic and atraumatic.      Mouth/Throat:      Mouth: Mucous membranes are moist.   Eyes:      Pupils: Pupils are equal, round, and reactive to light.   Cardiovascular:      Rate and Rhythm: Normal rate and regular rhythm.      Heart sounds: No murmur heard.     No friction rub.   Pulmonary:      Effort: Pulmonary effort is normal.      Breath sounds: Normal breath sounds.   Abdominal:      General: Abdomen is flat.   Musculoskeletal:      Right lower leg: No edema.      Left lower leg: No edema.   Skin:     General: Skin is warm and dry.   Neurological:      General: No focal deficit present.      Mental Status: She is alert and oriented to person, place, and time.      Motor: No weakness.      Gait: Gait normal.         Medications:   Scheduled Meds:   famotidine  40 mg Oral QHS    ibuprofen  600 mg Oral TID    [START ON 8/2/2025] levothyroxine  25 mcg Oral Before breakfast    [START ON 8/2/2025] pantoprazole  40 mg Oral Daily    [START ON 8/2/2025] rivaroxaban  20 mg Oral with dinner     Continuous Infusions:  PRN Meds:.  Current Facility-Administered Medications:      acetaminophen, 650 mg, Oral, Q8H PRN    melatonin, 6 mg, Oral, Nightly PRN    sodium chloride 0.9%, 10 mL, Intravenous, PRN      Assessment/Plan:  Postprocedural pericarditis   Pericardial effusion   Malaise    -possible post procedural pericarditis.  Ibuprofen 600 mg 3 times a day initiated. Continue Protonix while on therapy.   -unable to obtain stat echocardiogram today; one will be done 1st thing tomorrow.  -appreciate cardiology  -telemetry  Case was discussed with the ED provider, REYNA Gan PA-C EDOU MICK

## 2025-08-01 NOTE — ED PROVIDER NOTES
Encounter Date: 8/1/2025       History     Chief Complaint   Patient presents with    Multiple Complaints     Chest heaviness and episodes of hypotension down to 80s systolic intermittently since watchman procedure 3 weeks ago.      75-year-old female with history of mitral valve prolapse, hypothyroidism and AFib s/p watchman device presents for episodes of lightheadedness, weakness, hypotension and chest pressure intermittently over the past 3 weeks since getting her Watchman device placed.  Reports lowest reading 87/54 at home.  She reports cough which has been chronic and unchanged, denies fever, shortness of breath or leg swelling.      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    Diverticulosis 1998    Hx of atrial flutter     Hx of mitral valve prolapse 1990    Hypothyroidism     Migraines     Recurrent upper respiratory infection (URI)     Supraventricular tachycardia      Past Surgical History:   Procedure Laterality Date    ABLATION OF ARRHYTHMOGENIC FOCUS FOR ATRIAL FIBRILLATION N/A 11/24/2020    Procedure: ABLATION, ARRHYTHMOGENIC FOCUS, FOR ATRIAL FIBRILLATION;  Surgeon: Moe Seymour MD;  Location: [facility] EP LAB;  Service: Cardiology;  Laterality: N/A;  AFL/AF, KHANH, CTI, PVI, RFA, CARTO, GEN, DM, 3 PREP    ABLATION, ATRIAL FLUTTER, ATYPICAL N/A 2/17/2025    Procedure: Ablation, Atrial Flutter, Atypical;  Surgeon: Vinny Azevedo MD;  Location: [facility] EP LAB;  Service: Cardiology;  Laterality: N/A;  AFL (atypical), KHANH (cx if SR), RFA, MIYA, GEN, MB, 3prep    AUGMENTATION OF BREAST Bilateral 1984    35 yrs     BREAST SURGERY   1984    augmentation    CARDIOVERSION  2020    Procedure: Cardioversion;  Surgeon: Moe Seymour MD;  Location: [facility] EP LAB;  Service: Cardiology;;     SECTION      CLOSURE OF LEFT ATRIAL APPENDAGE USING DEVICE N/A 2025    Procedure: Left atrial appendage closure device;  Surgeon: Vinny Azevedo MD;  Location: [facility] EP LAB;  Service: Cardiology;  Laterality: N/A;  AF, KHANH, Watchman, BSCI, Gen, MB, 3 Prep    COLON SURGERY      partial colon resection    COLONOSCOPY N/A 2021    Procedure: COLONOSCOPY;  Surgeon: Sahara Salgado MD;  Location: [facility] ENDO (4TH FLR);  Service: Endoscopy;  Laterality: N/A;  constipation prep- 7 days of miralax, 2 fulls of fulls, then day bf clears and split prep   covid test 3/5 pcw, prep instr emailed, antibiotics prophylactically, Xarelto hold x 2 days per Dr. Seymour-see telephone encounter 2/10 -ml    COLPOSCOPY      COSMETIC SURGERY  1987    DE QUERVAIN'S RELEASE Left 2022    Procedure: RELEASE, HAND, FOR DEQUERVAIN'S TENOSYNOVITIS;  Surgeon: Toñito Adame Jr., MD;  Location: [facility] OR;  Service: Orthopedics;  Laterality: Left;    DIAGNOSTIC CARDIAC ELECTROPHYSIOLOGY STUDY  2025    Procedure: EP - diagnostic;  Surgeon: Vinny Azevedo MD;  Location: [facility] EP LAB;  Service: Cardiology;;    ECHOCARDIOGRAM,TRANSESOPHAGEAL N/A 2024    Procedure: Transesophageal echo (KHANH) intra-procedure log documentation;  Surgeon: Ramírez Ortez III, MD;  Location: [facility] EP LAB;  Service: Cardiology;  Laterality: N/A;    ESOPHAGOGASTRODUODENOSCOPY N/A 2021    Procedure: EGD (ESOPHAGOGASTRODUODENOSCOPY);  Surgeon: Sahara Salgado MD;  Location: [facility] ENDO (4TH FLR);  Service: Endoscopy;  Laterality: N/A;    EYE SURGERY Bilateral 2016    Lasik, then cataracts extraction    JOINT REPLACEMENT Right , ,     KNEE ARTHROPLASTY Right     revison in     KNEE SURGERY Right     distal femoral replacing TK    OOPHORECTOMY       TOTAL ABDOMINAL HYSTERECTOMY  1994    TAHBSO    TRANSESOPHAGEAL ECHOCARDIOGRAPHY N/A 11/24/2020    Procedure: ECHOCARDIOGRAM, TRANSESOPHAGEAL;  Surgeon: Ramírez Ortez III, MD;  Location: [facility] EP LAB;  Service: Cardiology;  Laterality: N/A;    TRANSESOPHAGEAL ECHOCARDIOGRAPHY N/A 7/9/2025    Procedure: ECHOCARDIOGRAM, TRANSESOPHAGEAL;  Surgeon: Lui Andrade MD;  Location: [facility] EP LAB;  Service: Cardiology;  Laterality: N/A;    TREATMENT OF CARDIAC ARRHYTHMIA N/A 08/12/2019    Procedure: CARDIOVERSION;  Surgeon: Moe Semyour MD;  Location: [facility] EP LAB;  Service: Cardiology;  Laterality: N/A;  KHANH/DCCV/MAC/DM/3PREP/*ADMIT FOR TIKOSYN*    TREATMENT OF CARDIAC ARRHYTHMIA N/A 7/8/2024    Procedure: Cardioversion or Defibrillation;  Surgeon: GABRIELE Bull MD;  Location: [facility] EP LAB;  Service: Cardiology;  Laterality: N/A;  AF, KHANH, DCCV, MAC, DM, 3 Prep    TRIGGER FINGER RELEASE Left 08/02/2022    Procedure: RELEASE, TRIGGER FINGER;  Surgeon: Toñito Adame Jr., MD;  Location: [facility] OR;  Service: Orthopedics;  Laterality: Left;  left middle finger     Family History   Problem Relation Name Age of Onset    Heart disease Mother Tianna         Atrial fib    Diabetes Mother Tianna         Type II    Hearing loss Father Adan     Heart disease Father Adan         Angina, AAA    Stroke Father Adan 93    Diabetes Sister Mahogany         Type I    Heart disease Sister Mahogany         Atrrial fib, mitral valve repair    Heart disease Brother Severo         Atrial fib    Asthma Brother Severo     Diverticulosis Brother Severo     Heart disease Brother Leonard         Atrial fib    Diverticulosis Brother Leonard     Esophageal cancer Maternal Uncle      No Known Problems Daughter Michael     Breast cancer Neg Hx      Ovarian cancer Neg Hx      Colon cancer Neg Hx      Cancer Neg Hx      Hypertension Neg Hx      Eczema Neg Hx       Social History[1]  Review of Systems    Physical Exam     Initial Vitals  Alert/Awake   BP Pulse Resp Temp SpO2   08/01/25 1119 08/01/25 1119 08/01/25 1119 08/01/25 1120 08/01/25 1119   133/70 77 20 98.1 °F (36.7 °C) 98 %      MAP       --                Physical Exam    Nursing note and vitals reviewed.  Constitutional: She appears well-developed and well-nourished. She is not diaphoretic. No distress.   HENT:   Head: Normocephalic and atraumatic.   Cardiovascular:  Normal rate, regular rhythm, normal heart sounds and intact distal pulses.     Exam reveals no gallop and no friction rub.       No murmur heard.  Pulmonary/Chest: Breath sounds normal. No respiratory distress. She has no wheezes. She has no rhonchi. She has no rales. She exhibits no tenderness.   Abdominal: Abdomen is soft. Bowel sounds are normal. She exhibits no distension and no mass. There is no abdominal tenderness. There is no rebound and no guarding.   Musculoskeletal:         General: Normal range of motion.     Neurological: She is alert and oriented to person, place, and time.   Skin: Skin is warm and dry.   Psychiatric: She has a normal mood and affect.         ED Course   Procedures  Labs Reviewed   COMPREHENSIVE METABOLIC PANEL - Abnormal       Result Value    Sodium 139      Potassium 4.6      Chloride 105      CO2 27      Glucose 84      BUN 14      Creatinine 0.7      Calcium 8.7      Protein Total 6.8      Albumin 4.3      Bilirubin Total 0.6      ALP 61      AST 25      ALT 15      Anion Gap 7 (*)     eGFR >60     TROPONIN I HIGH SENSITIVITY - Abnormal    Troponin High Sensitive 64 (*)    TROPONIN I HIGH SENSITIVITY - Abnormal    Troponin High Sensitive 54 (*)    NT-PRO NATRIURETIC PEPTIDE - Abnormal    NT-proBNP 1,174 (*)     Narrative:     NOTE:  Access complete set of age - and/or gender-specific reference intervals for this test in the [facility] Collection Manual.   CBC WITH DIFFERENTIAL - Abnormal    WBC 6.29      RBC 3.95 (*)     HGB 12.6      HCT 38.1      MCV 97      MCH 31.9 (*)     MCHC 33.1      RDW  13.6      Platelet Count 176      MPV 10.7      Nucleated RBC 0      Neut % 68.2      Lymph % 21.0      Mono % 9.4      Eos % 0.6      Basophil % 0.3      Imm Grans % 0.5      Neut # 4.29      Lymph # 1.32      Mono # 0.59      Eos # 0.04      Baso # 0.02      Imm Grans # 0.03     HEPATITIS C ANTIBODY - Normal    Hep C Ab Interp Non-Reactive     HIV 1 / 2 ANTIBODY - Normal    HIV 1/2 Ag/Ab Non-Reactive     C-REACTIVE PROTEIN - Normal    CRP 1.7     CBC W/ AUTO DIFFERENTIAL    Narrative:     The following orders were created for panel order CBC auto differential.  Procedure                               Abnormality         Status                     ---------                               -----------         ------                     CBC with Differential[[id number]]       Abnormal            Final result                 Please view results for these tests on the individual orders.   HEP C VIRUS HOLD SPECIMEN     EKG Readings: (Independently Interpreted)   Initial Reading: No STEMI. Rhythm: Sinus Arrhythmia. Heart Rate: 69. ST Segments: Normal ST Segments. Clinical Impression: Sinus Arrhythmia     ECG Results              EKG 12-lead (Final result)        Collection Time Result Time QRS Duration [facility] QTC Calculation    08/01/25 11:56:13 08/01/25 13:07:17 92 437                     Final result by Interface, Lab In [facility] (08/01/25 13:07:21)                   Narrative:    Test Reason : R07.9,    Vent. Rate :  69 BPM     Atrial Rate :  69 BPM     P-R Int : 274 ms          QRS Dur :  92 ms      QT Int : 408 ms       P-R-T Axes :  66  16  66 degrees    QTcB Int : 437 ms    Sinus rhythm with marked sinus arrythmia with 1st degree A-V block  Low septal forces  Possible prior anterior infarct  Abnormal ECG  When compared with ECG of 01-Aug-2025 11:25,  No significant change was found  Confirmed by Lui Andrade ([id number]) on 8/1/2025 1:07:11 PM    Referred By: AAAREFERRAL SELF           Confirmed By: Lui  Lupe                                     EKG 12-lead (Final result)        Collection Time Result Time QRS Duration [facility] QTC Calculation    08/01/25 11:25:56 08/01/25 11:49:06 80 417                     Final result by Interface, Lab In [facility] (08/01/25 11:49:15)                   Narrative:    Test Reason : R07.89,    Vent. Rate :  66 BPM     Atrial Rate :  66 BPM     P-R Int : 256 ms          QRS Dur :  80 ms      QT Int : 398 ms       P-R-T Axes :  54   9  41 degrees    QTcB Int : 417 ms    Sinus rhythm with marked sinus arrythmia with 1st degree A-V block  Low voltage QRS  Cannot rule out Anterior infarct ,age undetermined  Abnormal ECG  When compared with ECG of 09-Jul-2025 10:11,  Minimal criteria for Anterior infarct are now Present  Inverted T waves have replaced nonspecific T wave abnormality in Anterior  leads  Nonspecific T wave abnormality no longer evident in Lateral leads  QT has shortened  Confirmed by Andres Munroe ([id number]) on 8/1/2025 11:49:04 AM    Referred By:            Confirmed By: Andres Munroe                                  Imaging Results              X-Ray Chest PA And Lateral (Final result)  Result time 08/01/25 12:01:45      Final result by Richard Merino MD (08/01/25 12:01:45)                   Impression:      No evidence of acute cardiopulmonary abnormality.  Incidental findings discussed above.    Electronically signed by resident: Vilma Treadwell  Date:    08/01/2025  Time:    11:49    Electronically signed by: Richard Merino MD  Date:    08/01/2025  Time:    12:01               Narrative:    EXAMINATION:  XR CHEST PA AND LATERAL    CLINICAL HISTORY:  Chest Pain;    TECHNIQUE:  PA and lateral views of the chest were performed.    COMPARISON:  CT 04/25/2025.  Chest radiograph, 08/22/2023.    FINDINGS:  Interval placement of Watchman device.    Cardiomediastinal silhouette is stable in size from prior with similar degree of left atrial enlargement..    The lungs are expanded and  essentially clear. There is no consolidation, pleural effusion, or pneumothorax.    The skeletal structures are intact without acute finding.  Bilateral breast implants are noted.    Partially imaged upper abdomen is within normal limits.                                       Medications   rivaroxaban tablet 20 mg (has no administration in time range)   levothyroxine tablet 25 mcg (has no administration in time range)   sodium chloride 0.9% flush 10 mL (has no administration in time range)   melatonin tablet 6 mg (has no administration in time range)   acetaminophen tablet 650 mg (has no administration in time range)   ibuprofen tablet 600 mg (has no administration in time range)   pantoprazole EC tablet 40 mg (has no administration in time range)   guaiFENesin 12 hr tablet 1,200 mg (has no administration in time range)   ketorolac injection 9.999 mg (9.999 mg Intravenous Given 8/1/25 1355)     Medical Decision Making  75-year-old female presenting for chest pain lightheadedness, episodes of hypotension at home after Watchman placement.  Upon arrival in the ED she is normotensive with normal vitals, well-appearing.    Differential diagnosis:  Pericarditis   Pericardial effusion   Symptomatic AFib   Lower suspicion for ACS   Doubt pulmonary embolus, she is adequately anticoagulated, no tachypnea, tachycardia or hypoxia    Will check labs, bedside echo reassess.    Bedside echo shows trace pericardial effusion, no tamponade.  Case discussed with Cardiology, recommend NSAIDs it admission for official echo.  She will be admitted to ED OU for further management.  Patient comfortable with admission.  I discussed this patient with my supervising physician.    Amount and/or Complexity of Data Reviewed  Labs: ordered. Decision-making details documented in ED Course.  Radiology: ordered and independent interpretation performed. Decision-making details documented in ED Course.  ECG/medicine tests: ordered and independent  interpretation performed.    Risk  OTC drugs.  Prescription drug management.  Decision regarding hospitalization.               ED Course as of 08/01/25 1750   Fri Aug 01, 2025   1136 Pt not yet in room [CC]   1149 X-Ray Chest PA And Lateral  Per my independent interpretation, there is cardiomegaly, concern for pericardial effusion [CC]   1255 Troponin I High Sensitivity(!): 64 [CC]   1255 NT-proBNP(!): 1,174 [CC]   1340 Bedside echo shows trace pericardial effusion, no tamponade [CC]   1345 Case discussed with cardiology, recommend official echo, can treat with NSAIDs for possible pericarditis [CC]   1435 Troponin I High Sensitivity(!): 54  Elevated but downtrending [CC]   1435 CRP: 1.7 [CC]   1436 Pain improved with Toradol [CC]   1532 Case discussed with cardiology fellow Dr. Gillies.  Recommends admission for observation for official echo. [CC]      ED Course User Index  [CC] Maddy Headley PA-C                               Clinical Impression:  Final diagnoses:  [R07.89] Chest tightness  [R07.9] Chest pain (Primary)          ED Disposition Condition    Observation                       [1]   Social History  Tobacco Use    Smoking status: Never     Passive exposure: Never    Smokeless tobacco: Never   Vaping Use    Vaping status: Never Used   Substance Use Topics    Alcohol use: Yes     Alcohol/week: 2.0 standard drinks of alcohol     Types: 2 Shots of liquor per week     Comment: occasionally    Drug use: No     Comment: CBD oils for headache--use occasionally        Maddy Headley PA-C  08/01/25 1750

## 2025-08-02 VITALS
HEIGHT: 68 IN | HEART RATE: 72 BPM | BODY MASS INDEX: 24.71 KG/M2 | SYSTOLIC BLOOD PRESSURE: 131 MMHG | WEIGHT: 163 LBS | RESPIRATION RATE: 18 BRPM | DIASTOLIC BLOOD PRESSURE: 60 MMHG | OXYGEN SATURATION: 96 % | TEMPERATURE: 98 F

## 2025-08-02 LAB
AORTIC SIZE INDEX (SOV): 1.6 CM/M2
AORTIC SIZE INDEX: 1.5 CM/M2
ASCENDING AORTA: 2.8 CM
AV AREA BY CONTINUOUS VTI: 3.6 CM2
AV INDEX (PROSTH): 0.98
AV LVOT MEAN GRADIENT: 2 MMHG
AV LVOT PEAK GRADIENT: 3 MMHG
AV MEAN GRADIENT: 2 MMHG
AV PEAK GRADIENT: 5 MMHG
AV VALVE AREA BY VELOCITY RATIO: 3.1 CM²
AV VALVE AREA: 3.7 CM2
AV VELOCITY RATIO: 0.82
BSA FOR ECHO PROCEDURE: 1.88 M2
CV ECHO LV RWT: 0.23 CM
DOP CALC AO PEAK VEL: 1.1 M/S
DOP CALC AO VTI: 20.7 CM
DOP CALC LVOT AREA: 3.8 CM2
DOP CALC LVOT DIAMETER: 2.2 CM
DOP CALC LVOT PEAK VEL: 0.9 M/S
DOP CALCLVOT PEAK VEL VTI: 20.2 CM
E/E' RATIO: 13 M/S
ECHO EF ESTIMATED: 58 %
ECHO LV POSTERIOR WALL: 0.6 CM (ref 0.6–1.1)
EJECTION FRACTION: 55 %
FRACTIONAL SHORTENING: 30.2 % (ref 28–44)
INTERVENTRICULAR SEPTUM: 0.6 CM (ref 0.6–1.1)
IVRT: 99 MS
LA MAJOR: 6.1 CM
LA MINOR: 6.1 CM
LA WIDTH: 3.7 CM
LEFT ATRIUM SIZE: 3.7 CM
LEFT ATRIUM VOLUME INDEX MOD: 41 ML/M2
LEFT ATRIUM VOLUME INDEX: 38 ML/M2
LEFT ATRIUM VOLUME MOD: 77 ML
LEFT ATRIUM VOLUME: 71 CM3
LEFT INTERNAL DIMENSION IN SYSTOLE: 3.7 CM (ref 2.1–4)
LEFT VENTRICLE DIASTOLIC VOLUME INDEX: 72.19 ML/M2
LEFT VENTRICLE DIASTOLIC VOLUME: 135 ML
LEFT VENTRICLE MASS INDEX: 56.3 G/M2
LEFT VENTRICLE SYSTOLIC VOLUME INDEX: 30.5 ML/M2
LEFT VENTRICLE SYSTOLIC VOLUME: 57 ML
LEFT VENTRICULAR INTERNAL DIMENSION IN DIASTOLE: 5.3 CM (ref 3.5–6)
LEFT VENTRICULAR MASS: 105.2 G
LV LATERAL E/E' RATIO: 14.7 M/S
LV SEPTAL E/E' RATIO: 11.4 M/S
Lab: 1.6 CM/M
Lab: 1.7 CM/M
MV PEAK E VEL: 1.03 M/S
OHS CV CPX PATIENT HEIGHT IN: 68
OHS CV RV/LV RATIO: 0.6 CM
PISA TR MAX VEL: 2 M/S
RA MAJOR: 7.04 CM
RA PRESSURE ESTIMATED: 3 MMHG
RA WIDTH: 3.68 CM
RIGHT VENTRICLE DIASTOLIC BASEL DIMENSION: 3.2 CM
RV TB RVSP: 5 MMHG
RV TISSUE DOPPLER FREE WALL SYSTOLIC VELOCITY 1 (APICAL 4 CHAMBER VIEW): 9.17 CM/S
SINUS: 3 CM
STJ: 2.7 CM
TDI LATERAL: 0.07 M/S
TDI SEPTAL: 0.09 M/S
TDI: 0.08 M/S
TRICUSPID ANNULAR PLANE SYSTOLIC EXCURSION: 1.7 CM
TV PEAK GRADIENT: 16 MMHG
TV REST PULMONARY ARTERY PRESSURE: 19 MMHG
Z-SCORE OF LEFT VENTRICULAR DIMENSION IN END DIASTOLE: 0.17
Z-SCORE OF LEFT VENTRICULAR DIMENSION IN END SYSTOLE: 1.11

## 2025-08-02 PROCEDURE — G0378 HOSPITAL OBSERVATION PER HR: HCPCS

## 2025-08-02 PROCEDURE — 25000003 PHARM REV CODE 250: Performed by: PHYSICIAN ASSISTANT

## 2025-08-02 RX ORDER — IBUPROFEN 200 MG
TABLET ORAL
Qty: 81 TABLET | Refills: 0 | Status: SHIPPED | OUTPATIENT
Start: 2025-08-02 | End: 2025-08-12

## 2025-08-02 RX ORDER — IBUPROFEN 200 MG
TABLET ORAL
Qty: 21 TABLET | Refills: 0 | Status: SHIPPED | OUTPATIENT
Start: 2025-08-02 | End: 2025-08-02

## 2025-08-02 RX ADMIN — GUAIFENESIN 1200 MG: 600 TABLET, EXTENDED RELEASE ORAL at 08:08

## 2025-08-02 RX ADMIN — PANTOPRAZOLE SODIUM 40 MG: 40 TABLET, DELAYED RELEASE ORAL at 08:08

## 2025-08-02 RX ADMIN — LEVOTHYROXINE SODIUM 25 MCG: 0.03 TABLET ORAL at 06:08

## 2025-08-02 RX ADMIN — IBUPROFEN 600 MG: 600 TABLET, FILM COATED ORAL at 08:08

## 2025-08-02 NOTE — DISCHARGE INSTRUCTIONS
Take the prescribed Ibuprofen taper as directed for further management.  Ensure you are taking your Protonix and Pepcid while taking this medication.   Review the below diet recommendations to help avoid gastritis.   Follow-up with your cardiology team for ongoing management.     ?? Foods to Avoid  Spicy foods: hot peppers, chili, salsa  Fried and fatty foods: French fries, burgers, londono, sausage  Citrus fruits and juices: oranges, loan, grapefruits  Tomato-based products: spaghetti sauce, pizza, ketchup  Caffeinated and carbonated beverages: coffee, soda, energy drinks  Chocolate and peppermint  Alcohol

## 2025-08-02 NOTE — HPI
75-year-old female with history of mitral valve prolapse, hypothyroidism and AFib s/p watchman device presents for episodes of lightheadedness, weakness, hypotension and chest pressure. Patient recently had a Watchman device placed on 7/25/25. Cardiology consulted for lightheadedness s/p Watchman. Patient denies headache, fever, sore throat, shortness of breath, wheezing, hemoptysis, palpitations, abdominal pain, mid-epigastric pain, nausea, and vomiting.

## 2025-08-02 NOTE — CONSULTS
[facility] - Emergency Dept  Cardiology  Consult Note    Patient Name: Zoë Enrique  MRN: [id number]  Admission Date: 8/1/2025  Hospital Length of Stay: 0 days  Code Status: Prior   Attending Provider: No att. providers found   Consulting Provider: Rehana Wagner MD  Primary Care Physician: Praveen Jeffery MD  Principal Problem:Pericarditis    Patient information was obtained from ER records.     Consulted for lightheadedness s/p Watchman  Subjective:     Chief Complaint:  lightheadedness     HPI:   75-year-old female with history of mitral valve prolapse, hypothyroidism and AFib s/p watchman device presents for episodes of lightheadedness, weakness, hypotension and chest pressure. Patient recently had a Watchman device placed on 7/25/25. Cardiology consulted for lightheadedness s/p Watchman. Patient denies headache, fever, sore throat, shortness of breath, wheezing, hemoptysis, palpitations, abdominal pain, mid-epigastric pain, nausea, and vomiting.      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    Diverticulosis 1998    Hx of atrial flutter     Hx of mitral valve prolapse 1990    Hypothyroidism     Migraines     Recurrent upper respiratory infection (URI)     Supraventricular tachycardia        Past Surgical History:   Procedure Laterality Date    ABLATION OF ARRHYTHMOGENIC FOCUS FOR ATRIAL FIBRILLATION N/A 11/24/2020    Procedure: ABLATION, ARRHYTHMOGENIC FOCUS, FOR ATRIAL FIBRILLATION;  Surgeon: Moe Seymour MD;  Location: [facility] EP LAB;  Service: Cardiology;  Laterality: N/A;  AFL/AF, KHANH, CTI, PVI, RFA, CARTO, GEN, DM, 3 PREP    ABLATION, ATRIAL FLUTTER, ATYPICAL N/A 2/17/2025    Procedure: Ablation, Atrial Flutter, Atypical;  Surgeon: Vinny Azevedo MD;  Location: [facility] EP LAB;  Service: Cardiology;   Laterality: N/A;  AFL (atypical), KHANH (cx if SR), RFA, MIYA, GEN, MB, 3prep    AUGMENTATION OF BREAST Bilateral     35 yrs     BREAST SURGERY      augmentation    CARDIOVERSION  2020    Procedure: Cardioversion;  Surgeon: Moe Seymour MD;  Location: [facility] EP LAB;  Service: Cardiology;;     SECTION      CLOSURE OF LEFT ATRIAL APPENDAGE USING DEVICE N/A 2025    Procedure: Left atrial appendage closure device;  Surgeon: Vinny Azevedo MD;  Location: [facility] EP LAB;  Service: Cardiology;  Laterality: N/A;  AF, KHANH, Watchman, BSCI, Gen, MB, 3 Prep    COLON SURGERY      partial colon resection    COLONOSCOPY N/A 2021    Procedure: COLONOSCOPY;  Surgeon: Sahara Salgado MD;  Location: [facility] ENDO (4TH FLR);  Service: Endoscopy;  Laterality: N/A;  constipation prep- 7 days of miralax, 2 fulls of fulls, then day bf clears and split prep   covid test 3/5 pcw, prep instr emailed, antibiotics prophylactically, Xarelto hold x 2 days per Dr. Seymour-see telephone encounter /10 -ml    COLPOSCOPY      COSMETIC SURGERY      DE QUERVAIN'S RELEASE Left 2022    Procedure: RELEASE, HAND, FOR DEQUERVAIN'S TENOSYNOVITIS;  Surgeon: Toñito Adame Jr., MD;  Location: [facility] OR;  Service: Orthopedics;  Laterality: Left;    DIAGNOSTIC CARDIAC ELECTROPHYSIOLOGY STUDY  2025    Procedure: EP - diagnostic;  Surgeon: Vinny Azevedo MD;  Location: [facility] EP LAB;  Service: Cardiology;;    ECHOCARDIOGRAM,TRANSESOPHAGEAL N/A 2024    Procedure: Transesophageal echo (KHANH) intra-procedure log documentation;  Surgeon: Ramírez Ortez III, MD;  Location: [facility] EP LAB;  Service: Cardiology;  Laterality: N/A;    ESOPHAGOGASTRODUODENOSCOPY N/A 2021    Procedure: EGD (ESOPHAGOGASTRODUODENOSCOPY);  Surgeon: Sahara Salgado MD;  Location: [facility] ENDO (4TH FLR);  Service: Endoscopy;  Laterality: N/A;    EYE SURGERY Bilateral 2016    Lasik, then cataracts extraction    JOINT REPLACEMENT  Right 2/07, 11/07, 2/09    KNEE ARTHROPLASTY Right 2007    revison in 2009    KNEE SURGERY Right 1989    distal femoral replacing TK    OOPHORECTOMY      TOTAL ABDOMINAL HYSTERECTOMY  1994    TAHBSO    TRANSESOPHAGEAL ECHOCARDIOGRAPHY N/A 11/24/2020    Procedure: ECHOCARDIOGRAM, TRANSESOPHAGEAL;  Surgeon: Ramírez Ortez III, MD;  Location: [facility] EP LAB;  Service: Cardiology;  Laterality: N/A;    TRANSESOPHAGEAL ECHOCARDIOGRAPHY N/A 7/9/2025    Procedure: ECHOCARDIOGRAM, TRANSESOPHAGEAL;  Surgeon: Lui Andrade MD;  Location: [facility] EP LAB;  Service: Cardiology;  Laterality: N/A;    TREATMENT OF CARDIAC ARRHYTHMIA N/A 08/12/2019    Procedure: CARDIOVERSION;  Surgeon: Moe Seymour MD;  Location: [facility] EP LAB;  Service: Cardiology;  Laterality: N/A;  KHANH/DCCV/MAC/DM/3PREP/*ADMIT FOR TIKOSYN*    TREATMENT OF CARDIAC ARRHYTHMIA N/A 7/8/2024    Procedure: Cardioversion or Defibrillation;  Surgeon: GABRIELE Bull MD;  Location: [facility] EP LAB;  Service: Cardiology;  Laterality: N/A;  AF, KHANH, DCCV, MAC, DM, 3 Prep    TRIGGER FINGER RELEASE Left 08/02/2022    Procedure: RELEASE, TRIGGER FINGER;  Surgeon: Toñito Adame Jr., MD;  Location: [facility] OR;  Service: Orthopedics;  Laterality: Left;  left middle finger       Review of patient's allergies indicates:   Allergen Reactions    Morphine Nausea And Vomiting    Flecainide      Low blood pressure    Succinylcholine      Other reaction(s): v tach       Current Facility-Administered Medications on File Prior to Encounter   Medication    sodium chloride 0.9% flush 5 mL     Current Outpatient Medications on File Prior to Encounter   Medication Sig    acyclovir (ZOVIRAX) 400 MG tablet Take 1 tablet (400 mg total) by mouth 3 (three) times daily as needed.    amoxicillin (AMOXIL) 500 MG capsule Take 500 mg by mouth as needed (takes prior to all dental procedures).    cycloSPORINE (VEVYE) 0.1 % Drop Place 1 drop into both eyes 2 (two) times a day.    docusate sodium  (COLACE) 250 MG capsule Take 250 mg by mouth 2 (two) times daily.     estradioL (ESTRACE) 0.01 % (0.1 mg/gram) vaginal cream PLACE 1 GRAM VAGINALLY TWICE A WEEK    famotidine (PEPCID) 40 MG tablet Take 1 tablet (40 mg total) by mouth every evening.    guaiFENesin (MUCINEX) 600 mg 12 hr tablet Take 2 tablets (1,200 mg total) by mouth 2 (two) times daily.    HYDROcodone-acetaminophen (NORCO) 5-325 mg per tablet Take 1 tablet by mouth every 6 (six) hours as needed for Pain.    ipratropium (ATROVENT) 42 mcg (0.06 %) nasal spray 2 sprays by Each Nostril route 3 (three) times daily.    levothyroxine (SYNTHROID) 25 MCG tablet Take 1 tablet (25 mcg total) by mouth before breakfast.    omega-3 fatty acids/fish oil (FISH OIL-OMEGA-3 FATTY ACIDS) 300-1,000 mg capsule Take by mouth once daily.    omeprazole (PRILOSEC) 40 MG capsule Take 1 capsule (40 mg total) by mouth once daily.    predniSONE (DELTASONE) 20 MG tablet Take 1 tablet (20 mg total) by mouth once daily.    sodium chloride 3% 3 % nebulizer solution Take 4 mLs by nebulization 2 (two) times a day.    sodium,potassium,mag sulfates (SUPREP BOWEL PREP KIT) 17.5-3.13-1.6 gram SolR Take 177 mLs by mouth once daily. Take as instructed by Endoscopy nurse     vit C/E/Zn/coppr/lutein/zeaxan (PRESERVISION AREDS-2 ORAL)     XARELTO 20 mg Tab TAKE 1 TABLET BY MOUTH ONCE DAILY     Family History       Problem Relation (Age of Onset)    Asthma Brother    Diabetes Mother, Sister    Diverticulosis Brother, Brother    Esophageal cancer Maternal Uncle    Hearing loss Father    Heart disease Mother, Father, Sister, Brother, Brother    No Known Problems Daughter    Stroke Father (93)          Tobacco Use    Smoking status: Never     Passive exposure: Never    Smokeless tobacco: Never   Vaping Use    Vaping status: Never Used   Substance and Sexual Activity    Alcohol use: Yes     Alcohol/week: 2.0 standard drinks of alcohol     Types: 2 Shots of liquor per week     Comment:  occasionally    Drug use: No     Comment: CBD oils for headache--use occasionally    Sexual activity: Not Currently     Partners: Male     Birth control/protection: Post-menopausal, See Surgical Hx, None     Comment: , together x 37 years     Review of Systems   Constitutional: Negative for chills, diaphoresis, fever, night sweats, weight gain and weight loss.   HENT:  Negative for congestion, ear pain, nosebleeds and sore throat.    Eyes: Negative.  Negative for pain, photophobia and visual disturbance.   Cardiovascular:  Positive for chest pain. Negative for irregular heartbeat, leg swelling, palpitations and syncope.   Respiratory:  Negative for cough, hemoptysis, shortness of breath, sputum production and wheezing.    Endocrine: Negative.    Hematologic/Lymphatic: Negative.    Skin:  Negative for itching and rash.   Musculoskeletal:  Negative for back pain, falls, joint pain, muscle weakness, myalgias and neck pain.   Gastrointestinal:  Negative for abdominal pain, constipation, diarrhea, heartburn, hematochezia, melena, nausea and vomiting.   Genitourinary:  Negative for dysuria, flank pain, frequency, hematuria and urgency.   Neurological:  Negative for dizziness, focal weakness, headaches, light-headedness, seizures and tremors.   Psychiatric/Behavioral: Negative.  Negative for depression and hallucinations.      Objective:     Vital Signs (Most Recent):  Temp: 98.2 °F (36.8 °C) (08/02/25 1059)  Pulse: 72 (08/02/25 1059)  Resp: 18 (08/02/25 1059)  BP: 131/60 (08/02/25 1059)  SpO2: 96 % (08/02/25 1059) Vital Signs (24h Range):  Temp:  [97.6 °F (36.4 °C)-98.6 °F (37 °C)] 98.2 °F (36.8 °C)  Pulse:  [58-74] 72  Resp:  [13-20] 18  SpO2:  [96 %-100 %] 96 %  BP: (110-152)/(56-89) 131/60     Weight: 73.9 kg (163 lb)  Body mass index is 24.78 kg/m².    SpO2: 96 %       No intake or output data in the 24 hours ending 08/02/25 1346    Lines/Drains/Airways       None                    Physical  Exam  Constitutional:       General: She is not in acute distress.     Appearance: Normal appearance. She is not ill-appearing.   Cardiovascular:      Rate and Rhythm: Normal rate and regular rhythm.      Pulses: Normal pulses.      Heart sounds: Normal heart sounds. No murmur heard.     No friction rub. No gallop.   Pulmonary:      Effort: Pulmonary effort is normal. No respiratory distress.      Breath sounds: Normal breath sounds. No wheezing.   Abdominal:      General: Bowel sounds are normal. There is no distension.      Tenderness: There is no abdominal tenderness. There is no right CVA tenderness, left CVA tenderness or guarding.   Musculoskeletal:         General: No swelling. Normal range of motion.      Right lower leg: No edema.      Left lower leg: No edema.   Skin:     General: Skin is warm.      Findings: No rash.   Neurological:      General: No focal deficit present.      Mental Status: She is alert and oriented to person, place, and time.      Motor: No weakness.   Psychiatric:         Mood and Affect: Mood normal.          Significant Labs: CMP   Recent Labs   Lab 08/01/25  1206      K 4.6      CO2 27   GLU 84   BUN 14   CREATININE 0.7   CALCIUM 8.7   PROT 6.8   ALBUMIN 4.3   BILITOT 0.6   ALKPHOS 61   AST 25   ALT 15   ANIONGAP 7*   , CBC   Recent Labs   Lab 08/01/25  1206   WBC 6.29   HGB 12.6   HCT 38.1      , and Troponin   Recent Labs   Lab 08/01/25  1206 08/01/25  1329   TROPONINIHS 64* 54*       Significant Imaging: reviewed  Assessment and Plan:     Pericardial effusion  75-year-old female with history of mitral valve prolapse, hypothyroidism and AFib s/p watchman device presents for episodes of lightheadedness, weakness, hypotension and chest pressure. Recent echo shows trivial small circumferential effusion.     Recommendations:   - recent echo similar to echo completed on intra operative screen  - take 3 days of ibuprofen 800mg PO TID    - taper off remainder over 1  week period    - spoke to EP    - f/u with Dr. Azevedo's clinic   - ok to D/C    At this moment we feel as though the patient is stable from a cardiac standpoint, and we will be signing off. Please do not hesitate to call us if you need us for anything else concerning the patient.       VTE Risk Mitigation (From admission, onward)           Ordered     IP VTE LOW RISK PATIENT  Once         08/01/25 0518                    Thank you for your consult. I will sign off. Please contact us if you have any additional questions.    Rehana Wagner MD  PGY-2  Cardiology   [facility] - Emergency Dept

## 2025-08-02 NOTE — SUBJECTIVE & OBJECTIVE
Past Medical History:   Diagnosis Date    Abnormal Pap smear of cervix     Atrial fibrillation 2017    Atrial flutter     Basal cell carcinoma 2021    superficial BCC R cheek s/p Efudex    Cancer     osteosarcoma of right femur    Diverticulosis     Hx of atrial flutter     Hx of mitral valve prolapse     Hypothyroidism     Migraines     Recurrent upper respiratory infection (URI)     Supraventricular tachycardia        Past Surgical History:   Procedure Laterality Date    ABLATION OF ARRHYTHMOGENIC FOCUS FOR ATRIAL FIBRILLATION N/A 2020    Procedure: ABLATION, ARRHYTHMOGENIC FOCUS, FOR ATRIAL FIBRILLATION;  Surgeon: Moe Seymour MD;  Location: [facility] EP LAB;  Service: Cardiology;  Laterality: N/A;  AFL/AF, KHANH, CTI, PVI, RFA, CARTO, GEN, DM, 3 PREP    ABLATION, ATRIAL FLUTTER, ATYPICAL N/A 2025    Procedure: Ablation, Atrial Flutter, Atypical;  Surgeon: Vinny Azevedo MD;  Location: [facility] EP LAB;  Service: Cardiology;  Laterality: N/A;  AFL (atypical), KHANH (cx if SR), RFA, MIYA, GEN, MB, 3prep    AUGMENTATION OF BREAST Bilateral 1984    35 yrs     BREAST SURGERY      augmentation    CARDIOVERSION  2020    Procedure: Cardioversion;  Surgeon: Moe Seymour MD;  Location: [facility] EP LAB;  Service: Cardiology;;     SECTION      CLOSURE OF LEFT ATRIAL APPENDAGE USING DEVICE N/A 2025    Procedure: Left atrial appendage closure device;  Surgeon: Vinny Azevedo MD;  Location: [facility] EP LAB;  Service: Cardiology;  Laterality: N/A;  AF, KHANH, Watchman, BSCI, Gen, MB, 3 Prep    COLON SURGERY      partial colon resection    COLONOSCOPY N/A 2021    Procedure: COLONOSCOPY;  Surgeon: Sahara Salgado MD;  [facility]  Service: Endoscopy;  Laterality: N/A;  constipation prep- 7 days of miralax, 2 fulls of fulls, then day bf clears and split prep   covid test 3/5 pcw, prep instr emailed, antibiotics prophylactically, Xarelto hold x 2 days  per Dr. Seymour-see telephone encounter 2/10 -ml    COLPOSCOPY      COSMETIC SURGERY  1987    DE QUERVAIN'S RELEASE Left 08/02/2022    Procedure: RELEASE, HAND, FOR DEQUERVAIN'S TENOSYNOVITIS;  Surgeon: Toñito Adame Jr., MD;  Location: [facility] OR;  Service: Orthopedics;  Laterality: Left;    DIAGNOSTIC CARDIAC ELECTROPHYSIOLOGY STUDY  2/17/2025    Procedure: EP - diagnostic;  Surgeon: Vinny Azevedo MD;  Location: [facility] EP LAB;  Service: Cardiology;;    ECHOCARDIOGRAM,TRANSESOPHAGEAL N/A 7/8/2024    Procedure: Transesophageal echo (KHANH) intra-procedure log documentation;  Surgeon: Ramírez Ortez III, MD;  Location: [facility] EP LAB;  Service: Cardiology;  Laterality: N/A;    ESOPHAGOGASTRODUODENOSCOPY N/A 03/19/2021    Procedure: EGD (ESOPHAGOGASTRODUODENOSCOPY);  Surgeon: Sahara Salgado MD;  Location: [address]);  Service: Endoscopy;  Laterality: N/A;    EYE SURGERY Bilateral 2016    Lasik, then cataracts extraction    JOINT REPLACEMENT Right 2/07, 11/07, 2/09    KNEE ARTHROPLASTY Right 2007    revison in 2009    KNEE SURGERY Right 1989    distal femoral replacing TK    OOPHORECTOMY      TOTAL ABDOMINAL HYSTERECTOMY  1994    TAHBSO    TRANSESOPHAGEAL ECHOCARDIOGRAPHY N/A 11/24/2020    Procedure: ECHOCARDIOGRAM, TRANSESOPHAGEAL;  Surgeon: Ramírez Ortez III, MD;  Location: [facility] EP LAB;  Service: Cardiology;  Laterality: N/A;    TRANSESOPHAGEAL ECHOCARDIOGRAPHY N/A 7/9/2025    Procedure: ECHOCARDIOGRAM, TRANSESOPHAGEAL;  Surgeon: Lui Andrade MD;  Location: [facility] EP LAB;  Service: Cardiology;  Laterality: N/A;    TREATMENT OF CARDIAC ARRHYTHMIA N/A 08/12/2019    Procedure: CARDIOVERSION;  Surgeon: Moe Seymour MD;  Location: [facility] EP LAB;  Service: Cardiology;  Laterality: N/A;  KHANH/DCCV/MAC/DM/3PREP/*ADMIT FOR TIKOSYN*    TREATMENT OF CARDIAC ARRHYTHMIA N/A 7/8/2024    Procedure: Cardioversion or Defibrillation;  Surgeon: GABRIELE Bull MD;  Location: [facility] EP LAB;  Service:  Cardiology;  Laterality: N/A;  AF, KHANH, DCCV, MAC, DM, 3 Prep    TRIGGER FINGER RELEASE Left 08/02/2022    Procedure: RELEASE, TRIGGER FINGER;  Surgeon: Toñito Adame Jr., MD;  Location: [facility] OR;  Service: Orthopedics;  Laterality: Left;  left middle finger       Review of patient's allergies indicates:   Allergen Reactions    Morphine Nausea And Vomiting    Flecainide      Low blood pressure    Succinylcholine      Other reaction(s): v tach       Current Facility-Administered Medications on File Prior to Encounter   Medication    sodium chloride 0.9% flush 5 mL     Current Outpatient Medications on File Prior to Encounter   Medication Sig    acyclovir (ZOVIRAX) 400 MG tablet Take 1 tablet (400 mg total) by mouth 3 (three) times daily as needed.    amoxicillin (AMOXIL) 500 MG capsule Take 500 mg by mouth as needed (takes prior to all dental procedures).    cycloSPORINE (VEVYE) 0.1 % Drop Place 1 drop into both eyes 2 (two) times a day.    docusate sodium (COLACE) 250 MG capsule Take 250 mg by mouth 2 (two) times daily.     estradioL (ESTRACE) 0.01 % (0.1 mg/gram) vaginal cream PLACE 1 GRAM VAGINALLY TWICE A WEEK    famotidine (PEPCID) 40 MG tablet Take 1 tablet (40 mg total) by mouth every evening.    guaiFENesin (MUCINEX) 600 mg 12 hr tablet Take 2 tablets (1,200 mg total) by mouth 2 (two) times daily.    HYDROcodone-acetaminophen (NORCO) 5-325 mg per tablet Take 1 tablet by mouth every 6 (six) hours as needed for Pain.    ipratropium (ATROVENT) 42 mcg (0.06 %) nasal spray 2 sprays by Each Nostril route 3 (three) times daily.    levothyroxine (SYNTHROID) 25 MCG tablet Take 1 tablet (25 mcg total) by mouth before breakfast.    omega-3 fatty acids/fish oil (FISH OIL-OMEGA-3 FATTY ACIDS) 300-1,000 mg capsule Take by mouth once daily.    omeprazole (PRILOSEC) 40 MG capsule Take 1 capsule (40 mg total) by mouth once daily.    predniSONE (DELTASONE) 20 MG tablet Take 1 tablet (20 mg total) by mouth once daily.     sodium chloride 3% 3 % nebulizer solution Take 4 mLs by nebulization 2 (two) times a day.    sodium,potassium,mag sulfates (SUPREP BOWEL PREP KIT) 17.5-3.13-1.6 gram SolR Take 177 mLs by mouth once daily. Take as instructed by Endoscopy nurse     vit C/E/Zn/coppr/lutein/zeaxan (PRESERVISION AREDS-2 ORAL)     XARELTO 20 mg Tab TAKE 1 TABLET BY MOUTH ONCE DAILY     Family History       Problem Relation (Age of Onset)    Asthma Brother    Diabetes Mother, Sister    Diverticulosis Brother, Brother    Esophageal cancer Maternal Uncle    Hearing loss Father    Heart disease Mother, Father, Sister, Brother, Brother    No Known Problems Daughter    Stroke Father (93)          Tobacco Use    Smoking status: Never     Passive exposure: Never    Smokeless tobacco: Never   Vaping Use    Vaping status: Never Used   Substance and Sexual Activity    Alcohol use: Yes     Alcohol/week: 2.0 standard drinks of alcohol     Types: 2 Shots of liquor per week     Comment: occasionally    Drug use: No     Comment: CBD oils for headache--use occasionally    Sexual activity: Not Currently     Partners: Male     Birth control/protection: Post-menopausal, See Surgical Hx, None     Comment: , together x 37 years     Review of Systems   Constitutional: Negative for chills, diaphoresis, fever, night sweats, weight gain and weight loss.   HENT:  Negative for congestion, ear pain, nosebleeds and sore throat.    Eyes: Negative.  Negative for pain, photophobia and visual disturbance.   Cardiovascular:  Positive for chest pain. Negative for irregular heartbeat, leg swelling, palpitations and syncope.   Respiratory:  Negative for cough, hemoptysis, shortness of breath, sputum production and wheezing.    Endocrine: Negative.    Hematologic/Lymphatic: Negative.    Skin:  Negative for itching and rash.   Musculoskeletal:  Negative for back pain, falls, joint pain, muscle weakness, myalgias and neck pain.   Gastrointestinal:  Negative for  abdominal pain, constipation, diarrhea, heartburn, hematochezia, melena, nausea and vomiting.   Genitourinary:  Negative for dysuria, flank pain, frequency, hematuria and urgency.   Neurological:  Negative for dizziness, focal weakness, headaches, light-headedness, seizures and tremors.   Psychiatric/Behavioral: Negative.  Negative for depression and hallucinations.      Objective:     Vital Signs (Most Recent):  Temp: 98.2 °F (36.8 °C) (08/02/25 1059)  Pulse: 72 (08/02/25 1059)  Resp: 18 (08/02/25 1059)  BP: 131/60 (08/02/25 1059)  SpO2: 96 % (08/02/25 1059) Vital Signs (24h Range):  Temp:  [97.6 °F (36.4 °C)-98.6 °F (37 °C)] 98.2 °F (36.8 °C)  Pulse:  [58-74] 72  Resp:  [13-20] 18  SpO2:  [96 %-100 %] 96 %  BP: (110-152)/(56-89) 131/60     Weight: 73.9 kg (163 lb)  Body mass index is 24.78 kg/m².    SpO2: 96 %       No intake or output data in the 24 hours ending 08/02/25 1346    Lines/Drains/Airways       None                    Physical Exam  Constitutional:       General: She is not in acute distress.     Appearance: Normal appearance. She is not ill-appearing.   Cardiovascular:      Rate and Rhythm: Normal rate and regular rhythm.      Pulses: Normal pulses.      Heart sounds: Normal heart sounds. No murmur heard.     No friction rub. No gallop.   Pulmonary:      Effort: Pulmonary effort is normal. No respiratory distress.      Breath sounds: Normal breath sounds. No wheezing.   Abdominal:      General: Bowel sounds are normal. There is no distension.      Tenderness: There is no abdominal tenderness. There is no right CVA tenderness, left CVA tenderness or guarding.   Musculoskeletal:         General: No swelling. Normal range of motion.      Right lower leg: No edema.      Left lower leg: No edema.   Skin:     General: Skin is warm.      Findings: No rash.   Neurological:      General: No focal deficit present.      Mental Status: She is alert and oriented to person, place, and time.      Motor: No weakness.    Psychiatric:         Mood and Affect: Mood normal.          Significant Labs: CMP   Recent Labs   Lab 08/01/25  1206      K 4.6      CO2 27   GLU 84   BUN 14   CREATININE 0.7   CALCIUM 8.7   PROT 6.8   ALBUMIN 4.3   BILITOT 0.6   ALKPHOS 61   AST 25   ALT 15   ANIONGAP 7*   , CBC   Recent Labs   Lab 08/01/25  1206   WBC 6.29   HGB 12.6   HCT 38.1      , and Troponin   Recent Labs   Lab 08/01/25  1206 08/01/25  1329   TROPONINIHS 64* 54*       Significant Imaging: reviewed

## 2025-08-02 NOTE — ASSESSMENT & PLAN NOTE
75-year-old female with history of mitral valve prolapse, hypothyroidism and AFib s/p watchman device presents for episodes of lightheadedness, weakness, hypotension and chest pressure. Recent echo shows trivial small circumferential effusion.     Recommendations:   - recent echo similar to echo completed on intra operative screen  - take 3 days of ibuprofen 800mg PO TID    - taper off remainder over 1 week period    - spoke to EP    - f/u with Dr. Azevedo's clinic   - ok to D/C

## 2025-08-02 NOTE — DISCHARGE SUMMARY
"ED Observation Unit  Discharge Summary        History of Present Illness:    "75-year-old female with history of mitral valve prolapse, hypothyroidism and AFib s/p watchman device presents for episodes of lightheadedness, weakness, hypotension and chest pressure intermittently over the past 3 weeks since getting her Watchman device placed. Reports lowest reading 87/54 at home. She reports cough which has been chronic and unchanged, denies fever, shortness of breath or leg swelling. "     I reviewed the ED Provider Note dated 8/1/25 prior to my evaluation of this patient.  I reviewed all labs and imaging performed in the Main ED, prior to patient being placed in Observation. Patient was placed in the ED Observation Unit for Pericarditis.     Bedside echocardiogram with pericardial effusion.  Per emergency room a PP, case was discussed with Cardiology and recommended formal echocardiogram as well as treatment for possible post procedural pericarditis.     No prior BNP, but pro BNP at 1174.  Initial troponin of 64 and trending down to 54.  ACS has been ruled out.     Her chest pain has improved after toradol.     Observation Course:    Placed in EDOU for suspected post-procedure pericarditis from watchman device placement on 7/9/25. Patient's pain improved with Toradol. Echo showed trivial small circumferential effusion. Cardiology consulted and evaluated at bedside. Recommending IBU taper for further management which I have prescribed. Discussed importance of compliance with Protonix and Pepcid while on taper. Additionally discussed diet and lifestyle modifications to help prevent gastritis while on taper. Advised outpatient follow-up with cardiology in clinic. Patient expresses understanding and agreeable to the plan. Return to ED precautions given for new, worsening, or concerning symptoms.    Consultants:    Cardiology     Final Diagnosis:  Pericarditis    Discharge Condition: Good    Disposition: Home or Self " Care     Time spent on the discharge of the patient including review of hospital course with the patient. reviewing discharge medications and arranging follow-up care 35 minutes.  Patient was seen and examined on the date of discharge and determined to be suitable for discharge.    Follow Up:  Future Appointments   Date Time Provider Department Center   8/14/2025  1:00 PM Santos Rivas MD [facility] PULMSVC [facility]   8/21/2025 11:00 AM 3PREP, [facility] SSCU [facility]   8/21/2025  1:00 PM INPATIENT, KHANH [facility] ECHOSTR [facility]   9/9/2025  3:00 PM Eusebio Bearden MD [facility] ENT [place]   9/16/2025  9:15 AM Sergio Harrington MD [facility] ENT [facility]   9/18/2025 10:50 AM Toñito Adame Jr., MD [facility] ORTHO [facility]   10/13/2025  8:45 AM EKG, APPT [facility] EKG [facility]   10/13/2025  9:00 AM Gwen Gilbert, NP [facility] ARRHYTH [facility]

## 2025-08-02 NOTE — PROGRESS NOTES
"ED Observation Unit  Progress Note      HPI   "75-year-old female with history of mitral valve prolapse, hypothyroidism and AFib s/p watchman device presents for episodes of lightheadedness, weakness, hypotension and chest pressure intermittently over the past 3 weeks since getting her Watchman device placed. Reports lowest reading 87/54 at home. She reports cough which has been chronic and unchanged, denies fever, shortness of breath or leg swelling. "     I reviewed the ED Provider Note dated 8/1/25 prior to my evaluation of this patient.  I reviewed all labs and imaging performed in the Main ED, prior to patient being placed in Observation. Patient was placed in the ED Observation Unit for Pericarditis.     Bedside echocardiogram with pericardial effusion.  Per emergency room a PP, case was discussed with Cardiology and recommended formal echocardiogram as well as treatment for possible post procedural pericarditis.     No prior BNP, but pro BNP at 1174.  Initial troponin of 64 and trending down to 54.  ACS has been ruled out.     Her chest pain has improved after toradol.     Interval History   Patient resting comfortably. Denies any medical complaints. Ate breakfast with no issues. Vitals stable. No hypotension. Awaiting echo completion and cardiology recs.     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    Diverticulosis 1998    Hx of atrial flutter     Hx of mitral valve prolapse 1990    Hypothyroidism     Migraines     Recurrent upper respiratory infection (URI)     Supraventricular tachycardia       Past Surgical History:   Procedure Laterality Date    ABLATION OF ARRHYTHMOGENIC FOCUS FOR ATRIAL FIBRILLATION N/A 11/24/2020    Procedure: ABLATION, ARRHYTHMOGENIC FOCUS, FOR ATRIAL FIBRILLATION;  Surgeon: Moe" DONNIE Seymour MD;  Location: [facility] EP LAB;  Service: Cardiology;  Laterality: N/A;  AFL/AF, KHANH, CTI, PVI, RFA, CARTO, GEN, DM, 3 PREP    ABLATION, ATRIAL FLUTTER, ATYPICAL N/A 2025    Procedure: Ablation, Atrial Flutter, Atypical;  Surgeon: Vinny Azevedo MD;  Location: [facility] EP LAB;  Service: Cardiology;  Laterality: N/A;  AFL (atypical), KHANH (cx if SR), RFA, MIYA, GEN, MB, 3prep    AUGMENTATION OF BREAST Bilateral     35 yrs     BREAST SURGERY      augmentation    CARDIOVERSION  2020    Procedure: Cardioversion;  Surgeon: Meo Seymour MD;  Location: [facility] EP LAB;  Service: Cardiology;;     SECTION      CLOSURE OF LEFT ATRIAL APPENDAGE USING DEVICE N/A 2025    Procedure: Left atrial appendage closure device;  Surgeon: Vinny Azevedo MD;  Location: [facility] EP LAB;  Service: Cardiology;  Laterality: N/A;  AF, KHANH, Watchman, BSCI, Gen, MB, 3 Prep    COLON SURGERY      partial colon resection    COLONOSCOPY N/A 2021    Procedure: COLONOSCOPY;  Surgeon: Sahara Salgado MD;  [facility]  Service: Endoscopy;  Laterality: N/A;  constipation prep- 7 days of miralax, 2 fulls of fulls, then day bf clears and split prep   covid test 3/5 pcw, prep instr emailed, antibiotics prophylactically, Xarelto hold x 2 days per Dr. Seymour-see telephone encounter 2/10 -ml    COLPOSCOPY      COSMETIC SURGERY      DE QUERVAIN'S RELEASE Left 2022    Procedure: RELEASE, HAND, FOR DEQUERVAIN'S TENOSYNOVITIS;  Surgeon: Toñito Adame Jr., MD;  Location: [facility] OR;  Service: Orthopedics;  Laterality: Left;    DIAGNOSTIC CARDIAC ELECTROPHYSIOLOGY STUDY  2025    Procedure: EP - diagnostic;  Surgeon: Vinny Azevedo MD;  Location: [facility] EP LAB;  Service: Cardiology;;    ECHOCARDIOGRAM,TRANSESOPHAGEAL N/A 2024    Procedure: Transesophageal echo (KHANH) intra-procedure log documentation;  Surgeon: Ramírez Ortez III, MD;  Location: [facility] LAB;  Service:  Cardiology;  Laterality: N/A;    ESOPHAGOGASTRODUODENOSCOPY N/A 03/19/2021    Procedure: EGD (ESOPHAGOGASTRODUODENOSCOPY);  Surgeon: Sahara Salgado MD;  Location: [facility] ENDO ([address] [place] [address]);  Service: Endoscopy;  Laterality: N/A;    EYE SURGERY Bilateral 2016    Lasik, then cataracts extraction    JOINT REPLACEMENT Right 2/07, 11/07, 2/09    KNEE ARTHROPLASTY Right 2007    revison in 2009    KNEE SURGERY Right 1989    distal femoral replacing TK    OOPHORECTOMY      TOTAL ABDOMINAL HYSTERECTOMY  1994    TAHBSO    TRANSESOPHAGEAL ECHOCARDIOGRAPHY N/A 11/24/2020    Procedure: ECHOCARDIOGRAM, TRANSESOPHAGEAL;  Surgeon: Ramírez Ortez III, MD;  Location: [facility] EP LAB;  Service: Cardiology;  Laterality: N/A;    TRANSESOPHAGEAL ECHOCARDIOGRAPHY N/A 7/9/2025    Procedure: ECHOCARDIOGRAM, TRANSESOPHAGEAL;  Surgeon: Lui Andrade MD;  Location: [facility] EP LAB;  Service: Cardiology;  Laterality: N/A;    TREATMENT OF CARDIAC ARRHYTHMIA N/A 08/12/2019    Procedure: CARDIOVERSION;  Surgeon: Moe Seymour MD;  Location: [facility] EP LAB;  Service: Cardiology;  Laterality: N/A;  KHANH/DCCV/MAC/DM/3PREP/*ADMIT FOR TIKOSYN*    TREATMENT OF CARDIAC ARRHYTHMIA N/A 7/8/2024    Procedure: Cardioversion or Defibrillation;  Surgeon: GABRIELE Bull MD;  Location: [facility] EP LAB;  Service: Cardiology;  Laterality: N/A;  AF, KHANH, DCCV, MAC, DM, 3 Prep    TRIGGER FINGER RELEASE Left 08/02/2022    Procedure: RELEASE, TRIGGER FINGER;  Surgeon: Toñito Adame Jr., MD;  Location: [facility] OR;  Service: Orthopedics;  Laterality: Left;  left middle finger        Family Hx   Family History   Problem Relation Name Age of Onset    Heart disease Mother Tianna         Atrial fib    Diabetes Mother Tianna         Type II    Hearing loss Father Adan     Heart disease Father Adan         Angina, AAA    Stroke Father Adan 93    Diabetes Sister Mahogany         Type I    Heart disease Sister Mahogany         Atrrial fib, mitral valve repair    Heart  disease Brother Severo         Atrial fib    Asthma Brother Severo     Diverticulosis Brother Severo     Heart disease Brother Leonard         Atrial fib    Diverticulosis Brother Leonard     Esophageal cancer Maternal Uncle      No Known Problems Daughter Michael     Breast cancer Neg Hx      Ovarian cancer Neg Hx      Colon cancer Neg Hx      Cancer Neg Hx      Hypertension Neg Hx      Eczema Neg Hx          Social Hx   Social History     Socioeconomic History    Marital status:    Tobacco Use    Smoking status: Never     Passive exposure: Never    Smokeless tobacco: Never   Vaping Use    Vaping status: Never Used   Substance and Sexual Activity    Alcohol use: Yes     Alcohol/week: 2.0 standard drinks of alcohol     Types: 2 Shots of liquor per week     Comment: occasionally    Drug use: No     Comment: CBD oils for headache--use occasionally    Sexual activity: Not Currently     Partners: Male     Birth control/protection: Post-menopausal, See Surgical Hx, None     Comment: , together x 37 years   Social History Narrative    N/A PER THE PATIENT.     Social Drivers of Health     Financial Resource Strain: Low Risk  (3/26/2025)    Overall Financial Resource Strain (CARDIA)     Difficulty of Paying Living Expenses: Not very hard   Food Insecurity: No Food Insecurity (3/26/2025)    Hunger Vital Sign     Worried About Running Out of Food in the Last Year: Never true     Ran Out of Food in the Last Year: Never true   Transportation Needs: No Transportation Needs (3/26/2025)    PRAPARE - Transportation     Lack of Transportation (Medical): No     Lack of Transportation (Non-Medical): No   Physical Activity: Insufficiently Active (3/26/2025)    Exercise Vital Sign     Days of Exercise per Week: 1 day     Minutes of Exercise per Session: 10 min   Stress: No Stress Concern Present (3/26/2025)    Citizen of [place] [employer] - Occupational Stress Questionnaire     Feeling of Stress : Not at all    Housing Stability: Low Risk  (3/26/2025)    Housing Stability Vital Sign     Unable to Pay for Housing in the Last Year: No     Number of Times Moved in the Last Year: 0     Homeless in the Last Year: No        Vital Signs   Vitals:    08/01/25 2040 08/01/25 2358 08/02/25 0409 08/02/25 0706   BP: 125/80 127/73 110/69 123/89   BP Location:    Right arm   Patient Position:    Sitting   Pulse: 65 69 68 70   Resp:   18 16   Temp: 98.6 °F (37 °C) 98 °F (36.7 °C) 98 °F (36.7 °C) 98.2 °F (36.8 °C)   TempSrc: Oral Oral Oral Oral   SpO2: 97% 98% 98% 99%   Weight:       Height:            Review of Systems  Review of Systems   Constitutional:  Negative for fever.       Brief Physical Exam/Reassessment   Physical Exam  Constitutional:       General: She is not in acute distress.     Appearance: She is not ill-appearing.   HENT:      Head: Normocephalic and atraumatic.      Mouth/Throat:      Mouth: Mucous membranes are moist.   Eyes:      Pupils: Pupils are equal, round, and reactive to light.   Cardiovascular:      Rate and Rhythm: Normal rate and regular rhythm.      Heart sounds: No murmur heard.     No friction rub.   Pulmonary:      Effort: Pulmonary effort is normal.      Breath sounds: Normal breath sounds.   Abdominal:      General: Abdomen is flat.   Musculoskeletal:      Right lower leg: No edema.      Left lower leg: No edema.   Skin:     General: Skin is warm and dry.   Neurological:      General: No focal deficit present.      Mental Status: She is alert and oriented to person, place, and time.      Motor: No weakness.      Gait: Gait normal.     Labs/Imaging   Labs Reviewed   COMPREHENSIVE METABOLIC PANEL - Abnormal       Result Value    Sodium 139      Potassium 4.6      Chloride 105      CO2 27      Glucose 84      BUN 14      Creatinine 0.7      Calcium 8.7      Protein Total 6.8      Albumin 4.3      Bilirubin Total 0.6      ALP 61      AST 25      ALT 15      Anion Gap 7 (*)     eGFR >60     TROPONIN I  HIGH SENSITIVITY - Abnormal    Troponin High Sensitive 64 (*)    TROPONIN I HIGH SENSITIVITY - Abnormal    Troponin High Sensitive 54 (*)    NT-PRO NATRIURETIC PEPTIDE - Abnormal    NT-proBNP 1,174 (*)     Narrative:     NOTE:  Access complete set of age - and/or gender-specific reference intervals for this test in the [facility] Collection Manual.   CBC WITH DIFFERENTIAL - Abnormal    WBC 6.29      RBC 3.95 (*)     HGB 12.6      HCT 38.1      MCV 97      MCH 31.9 (*)     MCHC 33.1      RDW 13.6      Platelet Count 176      MPV 10.7      Nucleated RBC 0      Neut % 68.2      Lymph % 21.0      Mono % 9.4      Eos % 0.6      Basophil % 0.3      Imm Grans % 0.5      Neut # 4.29      Lymph # 1.32      Mono # 0.59      Eos # 0.04      Baso # 0.02      Imm Grans # 0.03     HEPATITIS C ANTIBODY - Normal    Hep C Ab Interp Non-Reactive     HIV 1 / 2 ANTIBODY - Normal    HIV 1/2 Ag/Ab Non-Reactive     C-REACTIVE PROTEIN - Normal    CRP 1.7     CBC W/ AUTO DIFFERENTIAL    Narrative:     The following orders were created for panel order CBC auto differential.  Procedure                               Abnormality         Status                     ---------                               -----------         ------                     CBC with Differential[[id number]]       Abnormal            Final result                 Please view results for these tests on the individual orders.   HEP C VIRUS HOLD SPECIMEN      Imaging Results              X-Ray Chest PA And Lateral (Final result)  Result time 08/01/25 12:01:45      Final result by Richard Merino MD (08/01/25 12:01:45)                   Impression:      No evidence of acute cardiopulmonary abnormality.  Incidental findings discussed above.    Electronically signed by resident: Vilma Treadwell  Date:    08/01/2025  Time:    11:49    Electronically signed by: Richard Merino MD  Date:    08/01/2025  Time:    12:01               Narrative:    EXAMINATION:  XR CHEST PA  AND LATERAL    CLINICAL HISTORY:  Chest Pain;    TECHNIQUE:  PA and lateral views of the chest were performed.    COMPARISON:  CT 04/25/2025.  Chest radiograph, 08/22/2023.    FINDINGS:  Interval placement of Watchman device.    Cardiomediastinal silhouette is stable in size from prior with similar degree of left atrial enlargement..    The lungs are expanded and essentially clear. There is no consolidation, pleural effusion, or pneumothorax.    The skeletal structures are intact without acute finding.  Bilateral breast implants are noted.    Partially imaged upper abdomen is within normal limits.                                       I reviewed all labs, imaging, EKGs.     Plan   Postprocedural pericarditis   Pericardial effusion   Malaise     -possible post procedural pericarditis.  Ibuprofen 600 mg 3 times a day initiated. Continue Protonix while on therapy.   -unable to obtain stat echocardiogram today; one will be done 1st thing tomorrow.  -appreciate cardiology  -telemetry    Case was discussed with the ED provider, REYNA Gan     I have discussed this case with JAKE Wilson.

## 2025-08-02 NOTE — ED NOTES
Pt care assumed.  Pt sitting up in bed, no distress noted.  Pt AAO x 4.  Pt on cardiac monitoring.  Pt denies chest pain.  Updated with plan of care, states understanding.  Call button within reach.  Side rails up.

## 2025-08-02 NOTE — PLAN OF CARE
[facility] - Emergency Dept  Initial Discharge Assessment       Primary Care Provider: Praveen Jeffery MD    Admission Diagnosis: Chest tightness [R07.89]    Admission Date: 8/1/2025  Expected Discharge Date:     Pt stated she is independent with her ambulation and ADL's and does not require assistance or equipment.     Pt to d/c home with no needs when ready     Transition of Care Barriers: (P) None    Payor: [employer] MANAGED MEDICARE / Plan: [employer] MEDICARE HMO / Product Type: Capitation /     Extended Emergency Contact Information  Primary Emergency Contact: Edson Enrique  Address: [address] [place] [address] [facility]  Home Phone: [phone]  Mobile Phone: [phone]  Relation: Spouse    Discharge Plan A: (P) Home  Discharge Plan B: (P) Home      [facility] - [place], [place] - [address]  [address] [address]  Phone: [phone] Fax: [phone]    [facility] - [place], [place] - [address]  [address]  [address]  [place] [address]  Phone: [phone] Fax: [phone]      Initial Assessment (most recent)       Adult Discharge Assessment - 08/02/25 1058          Discharge Assessment    Assessment Type Discharge Planning Assessment (P)      Confirmed/corrected address, phone number and insurance Yes (P)      Confirmed Demographics Correct on Facesheet (P)      Source of Information patient (P)      Communicated MALATHI with patient/caregiver Yes (P)      People in Home spouse (P)      Name(s) of People in Home spouse Edson (P)      Facility Arrived From: home (P)      Do you expect to return to your current living situation? Yes (P)      Do you have help at home or someone to help you manage your care at home? No (P)      Prior to hospitilization cognitive status: Alert/Oriented;No Deficits (P)      Current cognitive status: No Deficits (P)      Walking or Climbing Stairs Difficulty no (P)      Dressing/Bathing Difficulty no  (P)      Home Accessibility stairs to enter home (P)      Number of Stairs, Main Entrance one (P)      Home Layout Able to live on 1st floor (P)      Equipment Currently Used at Home none (P)      Patient currently being followed by outpatient case management? No (P)      Do you currently have service(s) that help you manage your care at home? No (P)      Do you take prescription medications? Yes (P)      Do you have prescription coverage? Yes (P)      Do you have any problems affording any of your prescribed medications? No (P)      Is the patient taking medications as prescribed? yes (P)      Who is going to help you get home at discharge? family/friends (P)      How do you get to doctors appointments? car, drives self (P)      Are you on dialysis? No (P)      Do you take coumadin? No (P)      Discharge Plan A Home (P)      Discharge Plan B Home (P)      DME Needed Upon Discharge  none (P)      Discharge Plan discussed with: Patient (P)      Transition of Care Barriers None (P)         Physical Activity    On average, how many days per week do you engage in moderate to strenuous exercise (like a brisk walk)? 0 days (P)      On average, how many minutes do you engage in exercise at this level? 0 min (P)         Financial Resource Strain    How hard is it for you to pay for the very basics like food, housing, medical care, and heating? Not very hard (P)         Housing Stability    In the last 12 months, was there a time when you were not able to pay the mortgage or rent on time? No (P)      At any time in the past 12 months, were you homeless or living in a shelter (including now)? No (P)         Transportation Needs    In the past 12 months, has lack of transportation kept you from medical appointments or from getting medications? No (P)      In the past 12 months, has lack of transportation kept you from meetings, work, or from getting things needed for daily living? No (P)         Food Insecurity    Within the  past 12 months, you worried that your food would run out before you got the money to buy more. Never true (P)      Within the past 12 months, the food you bought just didn't last and you didn't have money to get more. Never true (P)         Stress    Do you feel stress - tense, restless, nervous, or anxious, or unable to sleep at night because your mind is troubled all the time - these days? Not at all (P)         Social Isolation    How often do you feel lonely or isolated from those around you?  Never (P)         Alcohol Use    Q1: How often do you have a drink containing alcohol? Monthly or less (P)      Q2: How many drinks containing alcohol do you have on a typical day when you are drinking? 1 or 2 (P)      Q3: How often do you have six or more drinks on one occasion? Never (P)         Utilities    In the past 12 months has the electric, gas, oil, or water company threatened to shut off services in your home? No (P)         Health Literacy    How often do you need to have someone help you when you read instructions, pamphlets, or other written material from your doctor or pharmacy? Rarely (P)                       Discharge Plan A and Plan B have been determined by review of patient's clinical status, future medical and therapeutic needs, and coverage/benefits for post-acute care in coordination with multidisciplinary team members.    Clary Wolfe CD, MSW, LMSW, RSW   Case Management  [facility]  Email: [email or web address]

## 2025-08-04 LAB — HOLD SPECIMEN: NORMAL

## 2025-08-07 ENCOUNTER — TELEPHONE (OUTPATIENT)
Dept: ELECTROPHYSIOLOGY | Facility: CLINIC | Age: 75
End: 2025-08-07
Payer: MEDICARE

## 2025-08-07 DIAGNOSIS — I48.0 PAROXYSMAL ATRIAL FIBRILLATION: Primary | ICD-10-CM

## 2025-08-07 DIAGNOSIS — I47.19 ATRIAL TACHYCARDIA: ICD-10-CM

## 2025-08-07 NOTE — TELEPHONE ENCOUNTER
Patient s/p watchman, went to ER with symptoms. Patient discharged. Dr. Azevedo reviewed. Patient needs 48 hour holter scheduled and follow up with Dr. Azeveod. Patient scheduled for monitor and follow up. Patient verbalized understanding and agrees with appointment dates and times.

## 2025-08-14 ENCOUNTER — PATIENT MESSAGE (OUTPATIENT)
Dept: ORTHOPEDICS | Facility: CLINIC | Age: 75
End: 2025-08-14
Payer: MEDICARE

## 2025-08-18 ENCOUNTER — OFFICE VISIT (OUTPATIENT)
Dept: ORTHOPEDICS | Facility: CLINIC | Age: 75
End: 2025-08-18
Payer: MEDICARE

## 2025-08-18 ENCOUNTER — TELEPHONE (OUTPATIENT)
Dept: ORTHOPEDICS | Facility: CLINIC | Age: 75
End: 2025-08-18

## 2025-08-18 DIAGNOSIS — M65.4 DE QUERVAIN'S TENOSYNOVITIS, RIGHT: ICD-10-CM

## 2025-08-18 DIAGNOSIS — M79.641 HAND PAIN, RIGHT: Primary | ICD-10-CM

## 2025-08-18 PROCEDURE — 99214 OFFICE O/P EST MOD 30 MIN: CPT | Mod: 25,HCNC,S$GLB,

## 2025-08-18 PROCEDURE — 1160F RVW MEDS BY RX/DR IN RCRD: CPT | Mod: CPTII,HCNC,S$GLB,

## 2025-08-18 PROCEDURE — 1159F MED LIST DOCD IN RCRD: CPT | Mod: CPTII,HCNC,S$GLB,

## 2025-08-18 PROCEDURE — 1125F AMNT PAIN NOTED PAIN PRSNT: CPT | Mod: CPTII,HCNC,S$GLB,

## 2025-08-18 PROCEDURE — 3288F FALL RISK ASSESSMENT DOCD: CPT | Mod: CPTII,HCNC,S$GLB,

## 2025-08-18 PROCEDURE — 20550 NJX 1 TENDON SHEATH/LIGAMENT: CPT | Mod: HCNC,RT,S$GLB,

## 2025-08-18 PROCEDURE — 1101F PT FALLS ASSESS-DOCD LE1/YR: CPT | Mod: CPTII,HCNC,S$GLB,

## 2025-08-18 PROCEDURE — 99999 PR PBB SHADOW E&M-EST. PATIENT-LVL III: CPT | Mod: PBBFAC,HCNC,,

## 2025-08-18 RX ORDER — TRIAMCINOLONE ACETONIDE 40 MG/ML
20 INJECTION, SUSPENSION INTRA-ARTICULAR; INTRAMUSCULAR
Status: DISCONTINUED | OUTPATIENT
Start: 2025-08-18 | End: 2025-08-18 | Stop reason: HOSPADM

## 2025-08-18 RX ADMIN — TRIAMCINOLONE ACETONIDE 20 MG: 40 INJECTION, SUSPENSION INTRA-ARTICULAR; INTRAMUSCULAR at 03:08

## 2025-08-20 ENCOUNTER — TELEPHONE (OUTPATIENT)
Dept: ELECTROPHYSIOLOGY | Facility: CLINIC | Age: 75
End: 2025-08-20
Payer: MEDICARE

## 2025-08-21 ENCOUNTER — HOSPITAL ENCOUNTER (OUTPATIENT)
Facility: HOSPITAL | Age: 75
Discharge: HOME | End: 2025-08-21
Attending: INTERNAL MEDICINE | Admitting: INTERNAL MEDICINE
Payer: MEDICARE

## 2025-08-21 ENCOUNTER — ANESTHESIA EVENT (OUTPATIENT)
Dept: MEDSURG UNIT | Facility: HOSPITAL | Age: 75
End: 2025-08-21
Payer: MEDICARE

## 2025-08-21 ENCOUNTER — ANESTHESIA (OUTPATIENT)
Dept: MEDSURG UNIT | Facility: HOSPITAL | Age: 75
End: 2025-08-21
Payer: MEDICARE

## 2025-08-21 VITALS
SYSTOLIC BLOOD PRESSURE: 160 MMHG | WEIGHT: 163 LBS | HEART RATE: 56 BPM | RESPIRATION RATE: 17 BRPM | BODY MASS INDEX: 24.71 KG/M2 | HEIGHT: 68 IN | TEMPERATURE: 98 F | OXYGEN SATURATION: 96 % | DIASTOLIC BLOOD PRESSURE: 75 MMHG

## 2025-08-21 DIAGNOSIS — I48.4 ATYPICAL ATRIAL FLUTTER: ICD-10-CM

## 2025-08-21 DIAGNOSIS — I48.0 PAROXYSMAL ATRIAL FIBRILLATION: ICD-10-CM

## 2025-08-21 DIAGNOSIS — Z95.818 PRESENCE OF WATCHMAN LEFT ATRIAL APPENDAGE CLOSURE DEVICE: ICD-10-CM

## 2025-08-21 DIAGNOSIS — I48.91 ATRIAL FIBRILLATION: ICD-10-CM

## 2025-08-21 LAB
AORTIC SIZE INDEX (SOV): 1.6 CM/M2
AORTIC SIZE INDEX: 1.6 CM/M2
ASCENDING AORTA: 2.9 CM
BSA FOR ECHO PROCEDURE: 1.88 M2
DOP CALC LVOT AREA: 2.8 CM2
DOP CALC LVOT DIAMETER: 1.9 CM
Lab: 1.7 CM/M
Lab: 1.7 CM/M
OHS CV CPX PATIENT HEIGHT IN: 68
OHS CV CPX PATIENT HEIGHT IN: 68
OHS QRS DURATION: 84 MS
OHS QTC CALCULATION: 428 MS
SINUS: 2.9 CM
STJ: 2.5 CM

## 2025-08-21 PROCEDURE — 63600175 PHARM REV CODE 636 W HCPCS: Mod: HCNC | Performed by: NURSE ANESTHETIST, CERTIFIED REGISTERED

## 2025-08-21 PROCEDURE — 37000009 HC ANESTHESIA EA ADD 15 MINS: Mod: HCNC

## 2025-08-21 PROCEDURE — D9220A PRA ANESTHESIA: Mod: HCNC,ANES,, | Performed by: ANESTHESIOLOGY

## 2025-08-21 PROCEDURE — 25000003 PHARM REV CODE 250: Mod: HCNC | Performed by: NURSE ANESTHETIST, CERTIFIED REGISTERED

## 2025-08-21 PROCEDURE — 37000008 HC ANESTHESIA 1ST 15 MINUTES: Mod: HCNC

## 2025-08-21 PROCEDURE — D9220A PRA ANESTHESIA: Mod: HCNC,CRNA,, | Performed by: NURSE ANESTHETIST, CERTIFIED REGISTERED

## 2025-08-21 PROCEDURE — 93005 ELECTROCARDIOGRAM TRACING: CPT | Mod: HCNC

## 2025-08-21 PROCEDURE — 93010 ELECTROCARDIOGRAM REPORT: CPT | Mod: HCNC,,, | Performed by: INTERNAL MEDICINE

## 2025-08-21 RX ORDER — GLUCAGON 1 MG
1 KIT INJECTION
Status: DISCONTINUED | OUTPATIENT
Start: 2025-08-21 | End: 2025-08-21 | Stop reason: HOSPADM

## 2025-08-21 RX ORDER — LIDOCAINE HYDROCHLORIDE 20 MG/ML
INJECTION INTRAVENOUS
Status: DISCONTINUED | OUTPATIENT
Start: 2025-08-21 | End: 2025-08-21

## 2025-08-21 RX ORDER — ASPIRIN 81 MG/1
81 TABLET ORAL DAILY
Qty: 30 TABLET | Refills: 11 | Status: SHIPPED | OUTPATIENT
Start: 2025-08-21 | End: 2026-08-21

## 2025-08-21 RX ORDER — CLOPIDOGREL BISULFATE 75 MG/1
75 TABLET ORAL DAILY
Qty: 90 TABLET | Refills: 1 | Status: SHIPPED | OUTPATIENT
Start: 2025-08-21

## 2025-08-21 RX ORDER — HYDROMORPHONE HYDROCHLORIDE 1 MG/ML
0.2 INJECTION, SOLUTION INTRAMUSCULAR; INTRAVENOUS; SUBCUTANEOUS EVERY 5 MIN PRN
Status: DISCONTINUED | OUTPATIENT
Start: 2025-08-21 | End: 2025-08-21 | Stop reason: HOSPADM

## 2025-08-21 RX ORDER — PROPOFOL 10 MG/ML
VIAL (ML) INTRAVENOUS
Status: DISCONTINUED | OUTPATIENT
Start: 2025-08-21 | End: 2025-08-21

## 2025-08-21 RX ORDER — SODIUM CHLORIDE 0.9 % (FLUSH) 0.9 %
10 SYRINGE (ML) INJECTION
Status: DISCONTINUED | OUTPATIENT
Start: 2025-08-21 | End: 2025-08-21 | Stop reason: HOSPADM

## 2025-08-21 RX ORDER — LIDOCAINE HYDROCHLORIDE 20 MG/ML
SOLUTION OROPHARYNGEAL
Status: DISCONTINUED | OUTPATIENT
Start: 2025-08-21 | End: 2025-08-21

## 2025-08-21 RX ADMIN — PROPOFOL 150 MCG/KG/MIN: 10 INJECTION, EMULSION INTRAVENOUS at 01:08

## 2025-08-21 RX ADMIN — PROPOFOL 70 MG: 10 INJECTION, EMULSION INTRAVENOUS at 01:08

## 2025-08-21 RX ADMIN — LIDOCAINE HYDROCHLORIDE 100 MG: 20 INJECTION INTRAVENOUS at 01:08

## 2025-08-21 RX ADMIN — LIDOCAINE HYDROCHLORIDE 15 ML: 20 SOLUTION ORAL at 01:08

## 2025-08-21 RX ADMIN — SODIUM CHLORIDE: 0.9 INJECTION, SOLUTION INTRAVENOUS at 01:08

## 2025-08-22 ENCOUNTER — CLINICAL SUPPORT (OUTPATIENT)
Dept: CARDIOLOGY | Facility: HOSPITAL | Age: 75
End: 2025-08-22
Attending: INTERNAL MEDICINE
Payer: MEDICARE

## 2025-08-22 DIAGNOSIS — I48.0 PAROXYSMAL ATRIAL FIBRILLATION: ICD-10-CM

## 2025-08-22 DIAGNOSIS — I47.19 ATRIAL TACHYCARDIA: ICD-10-CM

## 2025-08-22 PROCEDURE — 93227 XTRNL ECG REC<48 HR R&I: CPT | Mod: HCNC,,, | Performed by: INTERNAL MEDICINE

## 2025-08-22 PROCEDURE — 93225 XTRNL ECG REC<48 HRS REC: CPT | Mod: HCNC

## 2025-08-25 LAB
OHS CV EVENT MONITOR DAY: 0
OHS CV HOLTER LENGTH DECIMAL HOURS: 47.98
OHS CV HOLTER LENGTH HOURS: 47
OHS CV HOLTER LENGTH MINUTES: 59
OHS CV HOLTER SINUS AVERAGE HR: 75
OHS CV HOLTER SINUS MAX HR: 114
OHS CV HOLTER SINUS MIN HR: 44

## 2025-08-28 ENCOUNTER — TELEPHONE (OUTPATIENT)
Dept: OPTOMETRY | Facility: CLINIC | Age: 75
End: 2025-08-28
Payer: MEDICARE

## 2025-08-29 ENCOUNTER — OFFICE VISIT (OUTPATIENT)
Dept: OPTOMETRY | Facility: CLINIC | Age: 75
End: 2025-08-29
Payer: MEDICARE

## 2025-08-29 DIAGNOSIS — H53.2 BINOCULAR VISION DISORDER WITH DIPLOPIA: Primary | ICD-10-CM

## 2025-08-29 PROCEDURE — 99999 PR PBB SHADOW E&M-EST. PATIENT-LVL III: CPT | Mod: PBBFAC,HCNC,, | Performed by: OPTOMETRIST

## 2025-09-02 ENCOUNTER — OFFICE VISIT (OUTPATIENT)
Dept: ELECTROPHYSIOLOGY | Facility: CLINIC | Age: 75
End: 2025-09-02
Payer: MEDICARE

## 2025-09-02 ENCOUNTER — HOSPITAL ENCOUNTER (OUTPATIENT)
Dept: CARDIOLOGY | Facility: CLINIC | Age: 75
Discharge: HOME | End: 2025-09-02
Payer: MEDICARE

## 2025-09-02 VITALS
HEART RATE: 67 BPM | HEIGHT: 68 IN | BODY MASS INDEX: 25.09 KG/M2 | WEIGHT: 165.56 LBS | SYSTOLIC BLOOD PRESSURE: 142 MMHG | DIASTOLIC BLOOD PRESSURE: 70 MMHG

## 2025-09-02 DIAGNOSIS — I48.0 PAROXYSMAL ATRIAL FIBRILLATION: ICD-10-CM

## 2025-09-02 DIAGNOSIS — I48.0 PAROXYSMAL ATRIAL FIBRILLATION: Primary | ICD-10-CM

## 2025-09-02 LAB
OHS QRS DURATION: 80 MS
OHS QTC CALCULATION: 445 MS

## 2025-09-02 PROCEDURE — G2211 COMPLEX E/M VISIT ADD ON: HCPCS | Mod: S$GLB,,, | Performed by: INTERNAL MEDICINE

## 2025-09-02 PROCEDURE — 99999 PR PBB SHADOW E&M-EST. PATIENT-LVL III: CPT | Mod: PBBFAC,HCNC,, | Performed by: INTERNAL MEDICINE

## 2025-09-02 PROCEDURE — 1126F AMNT PAIN NOTED NONE PRSNT: CPT | Mod: CPTII,S$GLB,, | Performed by: INTERNAL MEDICINE

## 2025-09-02 PROCEDURE — 93010 ELECTROCARDIOGRAM REPORT: CPT | Mod: S$GLB,,, | Performed by: INTERNAL MEDICINE

## 2025-09-02 PROCEDURE — 3078F DIAST BP <80 MM HG: CPT | Mod: CPTII,S$GLB,, | Performed by: INTERNAL MEDICINE

## 2025-09-02 PROCEDURE — 3077F SYST BP >= 140 MM HG: CPT | Mod: CPTII,S$GLB,, | Performed by: INTERNAL MEDICINE

## 2025-09-02 PROCEDURE — 1159F MED LIST DOCD IN RCRD: CPT | Mod: CPTII,S$GLB,, | Performed by: INTERNAL MEDICINE

## 2025-09-02 PROCEDURE — 99214 OFFICE O/P EST MOD 30 MIN: CPT | Mod: S$GLB,,, | Performed by: INTERNAL MEDICINE

## 2025-09-02 PROCEDURE — 3288F FALL RISK ASSESSMENT DOCD: CPT | Mod: CPTII,S$GLB,, | Performed by: INTERNAL MEDICINE

## 2025-09-02 PROCEDURE — 1101F PT FALLS ASSESS-DOCD LE1/YR: CPT | Mod: CPTII,S$GLB,, | Performed by: INTERNAL MEDICINE

## 2025-09-02 RX ORDER — AMIODARONE HYDROCHLORIDE 200 MG/1
200 TABLET ORAL 2 TIMES DAILY
Qty: 60 TABLET | Refills: 11 | Status: SHIPPED | OUTPATIENT
Start: 2025-09-02 | End: 2026-09-02

## 2025-09-04 ENCOUNTER — CLINICAL SUPPORT (OUTPATIENT)
Dept: CARDIOLOGY | Facility: HOSPITAL | Age: 75
End: 2025-09-04
Attending: INTERNAL MEDICINE
Payer: MEDICARE

## 2025-09-04 DIAGNOSIS — I48.0 PAROXYSMAL ATRIAL FIBRILLATION: ICD-10-CM

## 2025-09-04 PROCEDURE — 93270 REMOTE 30 DAY ECG REV/REPORT: CPT

## 2025-09-05 ENCOUNTER — TELEPHONE (OUTPATIENT)
Dept: ELECTROPHYSIOLOGY | Facility: CLINIC | Age: 75
End: 2025-09-05
Payer: MEDICARE

## (undated) DEVICE — ELECTRODE POLYHESIVEPRE-ATTACH

## (undated) DEVICE — PACK EP DRAPE

## (undated) DEVICE — DILATOR COONS TAPER 14FR

## (undated) DEVICE — PAD DEFIB CADENCE ADULT R2

## (undated) DEVICE — TOWEL OR DISP STRL BLUE 4/PK

## (undated) DEVICE — TOURNIQUET SB QC DP 18X4IN

## (undated) DEVICE — BANDAGE ESMARK ELASTIC ST 4X9

## (undated) DEVICE — NDL HYPO REG 25G X 1 1/2

## (undated) DEVICE — DIALATOR COONS TAPER 12F 20CM

## (undated) DEVICE — CATH TACTFLEX SE BID CURVE F-J

## (undated) DEVICE — LINE PRESSURE MONITORING 96IN

## (undated) DEVICE — COVER PRB TRNSDUC 7.6X183CM

## (undated) DEVICE — INTRO SWARTZ SL2 8.5FR 63CM

## (undated) DEVICE — NDL 18GA X1 1/2 REG BEVEL

## (undated) DEVICE — SYS WATCHMAN FXD CURVE DBL US

## (undated) DEVICE — BLADE SURG #15 CARBON STEEL

## (undated) DEVICE — DILATOR COONS TAPER 10FR .038

## (undated) DEVICE — PATCH CARTO REFERENCE

## (undated) DEVICE — INTRO AGILIS MED CRL 8.5F 71CM

## (undated) DEVICE — GLOVE SURG BIOGEL LATEX SZ 7.5

## (undated) DEVICE — R CATH ACUSON ACUNAV 8FR

## (undated) DEVICE — PAD PREP CUFFED NS 24X48IN

## (undated) DEVICE — GUIDEWIRE AMPLATZ XSTIFF 260CM

## (undated) DEVICE — SPLINT WRIST FOAM 8.8IN LG/LFT

## (undated) DEVICE — KIT CUSTOM MANIFOLD

## (undated) DEVICE — BLADE SCALP OPHTL BEVEL STR

## (undated) DEVICE — SUT MCRYL PLUS 4-0 PS2 27IN

## (undated) DEVICE — SYR B-D DISP CONTROL 10CC100/C

## (undated) DEVICE — NDL TRNSSPTL BRK-1 18GA 71CM

## (undated) DEVICE — SHEATH HEMOSTASIS 8.5FR

## (undated) DEVICE — SHEATH PINNACLE INTRO 11FR

## (undated) DEVICE — SUT ETHILON 5-0 PS-2 18IN

## (undated) DEVICE — PACK EP DRAPE OMC

## (undated) DEVICE — CATH MAP BI-D HD SENSOR ENABLE

## (undated) DEVICE — DRAPE THREE-QTR REINF 53X77IN

## (undated) DEVICE — REPROCESSED CATH ACUNAV 8FR

## (undated) DEVICE — CATH PENTARY F 2-6-2MM 115CM

## (undated) DEVICE — COVER OVERHEAD SURG LT BLUE

## (undated) DEVICE — CATH THERMOCOOL SMTCH SF D F

## (undated) DEVICE — SPIKE SHORT LG BORE 1-WAY 2IN

## (undated) DEVICE — SYR 10CC LUER LOCK

## (undated) DEVICE — GOWN POLY REINF BRTH SLV LG

## (undated) DEVICE — MANIFOLD 4 PORT

## (undated) DEVICE — BOWL FLUID - BACK STOP

## (undated) DEVICE — COVER DRAPE ACUSON STERILE

## (undated) DEVICE — APPLICATOR CHLORAPREP ORN 26ML

## (undated) DEVICE — SET INTRO PERFRMR SHTH 16FR

## (undated) DEVICE — CATH HIS OCTAPOLAR 7FRX115CM

## (undated) DEVICE — PAD CAST 2 IN X 4YDS STERILE

## (undated) DEVICE — PACK BASIC

## (undated) DEVICE — GUIDEWIRE TORAY INOUE

## (undated) DEVICE — STOCKINET 4INX48

## (undated) DEVICE — INTRODUCER HEMOSTASIS 7.5F

## (undated) DEVICE — SEE MEDLINE ITEM 156955

## (undated) DEVICE — KIT PROBE COVER WITH GEL

## (undated) DEVICE — BANDAGE MATRIX HK LOOP 2IN 5YD

## (undated) DEVICE — GOWN POLY REINF BRTH SLV XL

## (undated) DEVICE — KIT ENSITE ELECTRODE SURFACE

## (undated) DEVICE — CATH CS OCTAPOLAR DX 7FRX110CM

## (undated) DEVICE — PAD ALOCOHOL PREP MD STRL 2PLY

## (undated) DEVICE — CATH LASSO NAV 25/15

## (undated) DEVICE — PAD RADI FEMORAL

## (undated) DEVICE — NDL TRNSSPTL BRK-1 18GA 98CM

## (undated) DEVICE — R CATH BIDIRECTIONL DF CRV 7FR

## (undated) DEVICE — INTRO FAST-CATH SL1 8.5FR 63CM

## (undated) DEVICE — ELECTRODE REM PLYHSV RETURN 9

## (undated) DEVICE — DRAPE HAND STERILE

## (undated) DEVICE — ALCOHOL 70% ISOP W/GREEN 16OZ

## (undated) DEVICE — DRESSING XEROFORM FOIL PK 1X8

## (undated) DEVICE — SEE MEDLINE ITEM 107746

## (undated) DEVICE — SEE L#120831

## (undated) DEVICE — CATH ANGIO DIAG PIG 6FX110

## (undated) DEVICE — SET TUBING COOL POINT IRR

## (undated) DEVICE — SET SMARTABLATE IRR TUBE

## (undated) DEVICE — GAUZE SPONGE 4X4 12PLY

## (undated) DEVICE — CATH TRICUSPID HALO XP 7FRX110

## (undated) DEVICE — PAD GROUND UNIV STYLE CORD 9IN